# Patient Record
Sex: MALE | Race: WHITE | NOT HISPANIC OR LATINO | Employment: OTHER | URBAN - NONMETROPOLITAN AREA
[De-identification: names, ages, dates, MRNs, and addresses within clinical notes are randomized per-mention and may not be internally consistent; named-entity substitution may affect disease eponyms.]

---

## 2021-02-03 ENCOUNTER — TRANSCRIBE ORDERS (OUTPATIENT)
Dept: ADMINISTRATIVE | Facility: HOSPITAL | Age: 61
End: 2021-02-03

## 2021-02-03 DIAGNOSIS — Z01.818 PREOP EXAMINATION: Primary | ICD-10-CM

## 2021-02-05 ENCOUNTER — LAB (OUTPATIENT)
Dept: LAB | Facility: HOSPITAL | Age: 61
End: 2021-02-05

## 2021-02-05 DIAGNOSIS — Z01.818 PREOP EXAMINATION: ICD-10-CM

## 2021-02-05 PROCEDURE — U0004 COV-19 TEST NON-CDC HGH THRU: HCPCS

## 2021-02-05 PROCEDURE — C9803 HOPD COVID-19 SPEC COLLECT: HCPCS

## 2021-02-06 LAB — SARS-COV-2 RNA RESP QL NAA+PROBE: NOT DETECTED

## 2022-08-11 ENCOUNTER — HOSPITAL ENCOUNTER (OUTPATIENT)
Dept: HOSPITAL 79 - NM | Age: 62
End: 2022-08-11
Attending: INTERNAL MEDICINE
Payer: COMMERCIAL

## 2022-08-11 DIAGNOSIS — E11.43: Primary | ICD-10-CM

## 2022-08-11 PROCEDURE — A9541 TC99M SULFUR COLLOID: HCPCS

## 2022-12-05 ENCOUNTER — OFFICE VISIT (OUTPATIENT)
Dept: CARDIOLOGY | Facility: CLINIC | Age: 62
End: 2022-12-05

## 2022-12-05 VITALS
WEIGHT: 315 LBS | SYSTOLIC BLOOD PRESSURE: 155 MMHG | RESPIRATION RATE: 16 BRPM | DIASTOLIC BLOOD PRESSURE: 92 MMHG | HEIGHT: 73 IN | BODY MASS INDEX: 41.75 KG/M2 | HEART RATE: 90 BPM

## 2022-12-05 DIAGNOSIS — I20.8 ANGINAL EQUIVALENT: ICD-10-CM

## 2022-12-05 DIAGNOSIS — E78.5 DYSLIPIDEMIA: ICD-10-CM

## 2022-12-05 DIAGNOSIS — R06.09 DYSPNEA ON EXERTION: ICD-10-CM

## 2022-12-05 DIAGNOSIS — I25.10 ASCVD (ARTERIOSCLEROTIC CARDIOVASCULAR DISEASE): Primary | ICD-10-CM

## 2022-12-05 DIAGNOSIS — I10 ESSENTIAL HYPERTENSION: ICD-10-CM

## 2022-12-05 PROCEDURE — 93000 ELECTROCARDIOGRAM COMPLETE: CPT | Performed by: INTERNAL MEDICINE

## 2022-12-05 PROCEDURE — 99204 OFFICE O/P NEW MOD 45 MIN: CPT | Performed by: INTERNAL MEDICINE

## 2022-12-05 NOTE — PROGRESS NOTES
Jose M Simon MD  Wilbert Kelley  1960  12/05/2022      Dear Jose M Simon MD:    Subjective     Wilbert Kelley is a 62 y.o. male with the problems as listed above, presents    Chief complaint: To establish cardiac care and follow-up in a patient with history of known coronary artery disease, status post previous PCI/stenting in June and August 2010.    History of Present Illness: Mr. Kelley is a pleasant 62-year-old  male who has known significant coronary artery disease, status post PCI/stenting of the LAD with 3.0 x 23 mm Xience 5 stent, stenting of the RCA with 2.75 x 28 mm Xience stent as well as 3.0 x 23 mm Xience stent on 6/4/2010 at [facility].  He subsequently had stenting of mid RCA with Xience 2.75 x 28 mm stent on 8/12/2010 also at [facility].  He now presents to establish cardiac care and follow-up through our office.  On further questioning he denies any complaints of chest pain or discomfort recently.  He has dyspnea with moderate exertion such as going up incline or going up a flight of stairs but denies any PND, orthopnea.  He has some mild bilateral leg edema at times.  He is a non-smoker.  He states he takes his medications regularly.  He states he has gained about 50 pounds over a year.  Patient states that his blood pressure at home runs around 120s to 140s over 60s    Allergies   Allergen Reactions   • Morphine Nausea And Vomiting   :    Current Outpatient Medications:   •  amLODIPine (NORVASC) 10 MG tablet, Take 1 tablet by mouth Daily., Disp: , Rfl:   •  atorvastatin (LIPITOR) 20 MG tablet, , Disp: , Rfl:   •  clopidogrel (PLAVIX) 75 MG tablet, Take 1 tablet by mouth Daily., Disp: , Rfl:   •  fenofibrate 160 MG tablet, Take 1 tablet by mouth Daily., Disp: , Rfl:   •  gabapentin (NEURONTIN) 800 MG tablet, 800 mg 3 (Three) Times a Day., Disp: , Rfl:   •  Insulin Glargine (TOUJEO MAX SOLOSTAR SC), Inject 85 Units under the skin into the  appropriate area as directed 2 (Two) Times a Day., Disp: , Rfl:   •  Insulin Regular Human, Conc, (HumuLIN R) 500 UNIT/ML solution pen-injector CONCENTRATED injection, Inject 65 Units under the skin into the appropriate area as directed 3 (Three) Times a Day Before Meals., Disp: , Rfl:   •  lisinopril (PRINIVIL,ZESTRIL) 40 MG tablet, Take 1 tablet by mouth 2 (Two) Times a Day., Disp: , Rfl:   •  metFORMIN (GLUCOPHAGE) 1000 MG tablet, Take 1 tablet by mouth 2 (Two) Times a Day., Disp: , Rfl:   •  metoprolol tartrate (LOPRESSOR) 50 MG tablet, Take 1 tablet by mouth 2 (Two) Times a Day., Disp: , Rfl:   •  B-D ULTRAFINE III SHORT PEN 31G X 8 MM misc, , Disp: , Rfl:   •  BD Insulin Syringe U-500 31G X 6MM 0.5 ML misc, , Disp: , Rfl:   •  furosemide (LASIX) 20 MG tablet, Take 1 tablet by mouth Daily., Disp: , Rfl:   •  potassium chloride 10 MEQ CR tablet, Take 1 tablet by mouth Daily., Disp: , Rfl:     Past Medical History:   Diagnosis Date   • Coronary artery disease    • Diabetes mellitus (HCC)    • Hyperlipidemia    • Hypertension    • Sleep apnea      Past Surgical History:   Procedure Laterality Date   • CATARACT EXTRACTION, BILATERAL     • CORONARY STENT PLACEMENT     • SKIN CANCER EXCISION       Family History   Problem Relation Age of Onset   • COPD Mother    • Heart attack Father 45        passed   • Heart attack Paternal Grandfather 42        massive heart attack   • Heart disease Paternal Grandfather      Social History     Tobacco Use   • Smoking status: Never     Passive exposure: Past   • Smokeless tobacco: Current     Types: Chew   Substance Use Topics   • Alcohol use: Not Currently   • Drug use: Never       Review of Systems   Constitutional: Negative for chills, diaphoresis and fever.   HENT: Positive for hearing loss.    Eyes: Positive for visual disturbance.   Cardiovascular: Positive for leg swelling. Negative for chest pain, orthopnea, palpitations and paroxysmal nocturnal dyspnea.   Respiratory:  "Positive for shortness of breath and wheezing. Negative for cough and hemoptysis.    Endocrine: Negative for cold intolerance and heat intolerance.   Hematologic/Lymphatic: Does not bruise/bleed easily.   Skin: Positive for dry skin. Negative for rash.   Musculoskeletal: Positive for joint pain. Negative for myalgias.   Gastrointestinal: Negative for abdominal pain, constipation, diarrhea, nausea and vomiting.   Genitourinary: Positive for bladder incontinence. Negative for dysuria and hematuria.   Neurological: Negative for dizziness, focal weakness and numbness.   Psychiatric/Behavioral: Negative.    Allergic/Immunologic: Negative.        Objective   Blood pressure 155/92, pulse 90, resp. rate 16, height 185.4 cm (73\"), weight (!) 144 kg (318 lb 6.4 oz).  Body mass index is 42.01 kg/m².    Vitals reviewed.   Constitutional:       Appearance: Well-developed.   Eyes:      Conjunctiva/sclera: Conjunctivae normal.   HENT:      Head: Normocephalic.   Neck:      Thyroid: No thyromegaly.      Vascular: No JVD.      Trachea: No tracheal deviation.   Pulmonary:      Effort: No respiratory distress.      Breath sounds: Normal breath sounds. No wheezing. No rales.   Cardiovascular:      PMI at left midclavicular line. Normal rate. Regular rhythm. Normal S1. Normal S2.      Murmurs: There is no murmur.      No gallop. No click. No rub.   Pulses:     Intact distal pulses.   Edema:     Peripheral edema absent.   Abdominal:      General: Bowel sounds are normal.      Palpations: Abdomen is soft. There is no abdominal mass.      Tenderness: There is no abdominal tenderness.   Musculoskeletal:      Cervical back: Normal range of motion and neck supple. Skin:     General: Skin is warm and dry.   Neurological:      Mental Status: Alert and oriented to person, place, and time.      Cranial Nerves: No cranial nerve deficit.           ECG 12 Lead    Date/Time: 12/5/2022 9:36 AM  Performed by: Sachin Barrett MD  Authorized by: Arnold, " Sachin WAGNER MD   Previous ECG: no previous ECG available  Rhythm: sinus rhythm  BPM: 94  Conduction: conduction normal  T inversion: I, II, aVL, V2, V3, V4, V5 and V6                          Assessment & Plan :   Diagnosis Plan   1. ASCVD s/p stenting of the LAD with 3.0 x 23 mm Xience stent, stenting of RCA with 2.75x28 and 3.0 x 23 mm Xience stents on 6/4/2010 and stenting of RCA with 2.75 x 28 mm Xience stent on 8/12/2010 at [facility]  Stress Test With Myocardial Perfusion (1 Day)   2. Dyspnea on exertion  Adult Transthoracic Echo Complete   3. Anginal equivalent (HCC)  Stress Test With Myocardial Perfusion (1 Day)   4. Essential hypertension     5. Dyslipidemia, on atorvastatin.            Recommendations:  Orders Placed This Encounter   Procedures   • Stress Test With Myocardial Perfusion (1 Day)   • ECG 12 Lead   • Adult Transthoracic Echo Complete w/ Color, Spectral and Contrast if necessary per protocol      1. Since it has been more than 10 years since he had the PCI, and with patient complaining of dyspnea which could be angina equivalent as well as abnormal EKG revealing T wave inversion across the precordial leads and with patient being diabetic, there is a good possibility of progression of the atherosclerotic disease over the last 10 years.  Hence I have discussed with him about the option of further cardiac evaluation with a pharmacological nuclear stress test.  Patient is agreeable to this.  2. Will evaluate his LV systolic and diastolic function with an echo Doppler study again due to dyspnea and tailor further therapy accordingly.  3. For now continue with clopidogrel, metoprolol heart rate, atorvastatin and lisinopril at current doses.  4. I have asked him to keep a check on his blood pressure at home twice a day and take it to the next provider's visit.       Return in about 4 weeks (around 1/2/2023) for or sooner if needed.    As always, Jose M Simon MD  I appreciate very much the opportunity to  participate in the cardiovascular care of your patients. Please do not hesitate to call me with any questions with regards to Wilbert Kelley's evaluation and management.       With Best Regards,        Sachin Barrett MD, FACC    Please note that portions of this note were completed with a voice recognition program.

## 2022-12-08 ENCOUNTER — PATIENT ROUNDING (BHMG ONLY) (OUTPATIENT)
Dept: CARDIOLOGY | Facility: CLINIC | Age: 62
End: 2022-12-08

## 2022-12-08 NOTE — PROGRESS NOTES
December 8, 2022    Hello, may I speak with Wilbert Kelley?    My name is Christina    I am  with [facility]  [facility] CARDIOLOG[facility]  [address] [place] [address] [phone].    Before we get started may I verify your date of birth? 1960    I am calling to officially welcome you to our practice and ask about your recent visit. Is this a good time to talk? YES     Tell me about your visit with us. What things went well?  Patient was really impressed with how nice everyone was     We're always looking for ways to make our patients' experiences even better. Do you have recommendations on ways we may improve?   NO      Overall were you satisfied with your first visit to our practice? YES      I appreciate you taking the time to speak with me today. Is there anything else I can do for you?  NO    Thank you, and have a great day.

## 2023-02-02 ENCOUNTER — HOSPITAL ENCOUNTER (OUTPATIENT)
Dept: NUCLEAR MEDICINE | Facility: HOSPITAL | Age: 63
Discharge: HOME | End: 2023-02-02
Payer: MEDICARE

## 2023-02-02 ENCOUNTER — HOSPITAL ENCOUNTER (OUTPATIENT)
Dept: CARDIOLOGY | Facility: HOSPITAL | Age: 63
Discharge: HOME | End: 2023-02-02
Payer: MEDICARE

## 2023-02-02 DIAGNOSIS — I25.10 ASCVD (ARTERIOSCLEROTIC CARDIOVASCULAR DISEASE): ICD-10-CM

## 2023-02-02 DIAGNOSIS — R06.09 DYSPNEA ON EXERTION: ICD-10-CM

## 2023-02-02 DIAGNOSIS — I20.8 ANGINAL EQUIVALENT: ICD-10-CM

## 2023-02-02 PROCEDURE — 93018 CV STRESS TEST I&R ONLY: CPT | Performed by: SPECIALIST

## 2023-02-02 PROCEDURE — 78452 HT MUSCLE IMAGE SPECT MULT: CPT

## 2023-02-02 PROCEDURE — 0 TECHNETIUM SESTAMIBI: Performed by: INTERNAL MEDICINE

## 2023-02-02 PROCEDURE — 25010000002 PERFLUTREN (DEFINITY) 8.476 MG IN SODIUM CHLORIDE (PF) 0.9 % 10 ML INJECTION: Performed by: INTERNAL MEDICINE

## 2023-02-02 PROCEDURE — 25010000002 REGADENOSON 0.4 MG/5ML SOLUTION: Performed by: INTERNAL MEDICINE

## 2023-02-02 PROCEDURE — 93017 CV STRESS TEST TRACING ONLY: CPT

## 2023-02-02 PROCEDURE — A9500 TC99M SESTAMIBI: HCPCS | Performed by: INTERNAL MEDICINE

## 2023-02-02 PROCEDURE — 93306 TTE W/DOPPLER COMPLETE: CPT

## 2023-02-02 PROCEDURE — 78452 HT MUSCLE IMAGE SPECT MULT: CPT | Performed by: SPECIALIST

## 2023-02-02 RX ADMIN — SODIUM CHLORIDE 2 ML: 9 INJECTION INTRAMUSCULAR; INTRAVENOUS; SUBCUTANEOUS at 09:42

## 2023-02-02 RX ADMIN — TECHNETIUM TC 99M SESTAMIBI 1 DOSE: 1 INJECTION INTRAVENOUS at 11:20

## 2023-02-02 RX ADMIN — REGADENOSON 0.4 MG: 0.08 INJECTION, SOLUTION INTRAVENOUS at 11:20

## 2023-02-02 RX ADMIN — TECHNETIUM TC 99M SESTAMIBI 1 DOSE: 1 INJECTION INTRAVENOUS at 09:55

## 2023-02-03 LAB
BH CV NUCLEAR PRIOR STUDY: 3
BH CV REST NUCLEAR ISOTOPE DOSE: 9.5 MCI
BH CV STRESS BP STAGE 1: NORMAL
BH CV STRESS COMMENTS STAGE 1: NORMAL
BH CV STRESS DOSE REGADENOSON STAGE 1: 0.4
BH CV STRESS DURATION MIN STAGE 1: 0
BH CV STRESS DURATION SEC STAGE 1: 10
BH CV STRESS HR STAGE 1: 88
BH CV STRESS NUCLEAR ISOTOPE DOSE: 28.9 MCI
BH CV STRESS PROTOCOL 1: NORMAL
BH CV STRESS RECOVERY BP: NORMAL MMHG
BH CV STRESS RECOVERY HR: 85 BPM
BH CV STRESS STAGE 1: 1
LV EF NUC BP: 57 %
MAXIMAL PREDICTED HEART RATE: 159 BPM
PERCENT MAX PREDICTED HR: 55.35 %
STRESS BASELINE BP: NORMAL MMHG
STRESS BASELINE HR: 75 BPM
STRESS PERCENT HR: 65 %
STRESS POST PEAK BP: NORMAL MMHG
STRESS POST PEAK HR: 88 BPM
STRESS TARGET HR: 135 BPM

## 2023-02-07 LAB
BH CV ECHO MEAS - ACS: 1.8 CM
BH CV ECHO MEAS - AO MAX PG: 7 MMHG
BH CV ECHO MEAS - AO MEAN PG: 4 MMHG
BH CV ECHO MEAS - AO ROOT DIAM: 3.5 CM
BH CV ECHO MEAS - AO V2 MAX: 132 CM/SEC
BH CV ECHO MEAS - AO V2 VTI: 25.8 CM
BH CV ECHO MEAS - EDV(CUBED): 182.8 ML
BH CV ECHO MEAS - EDV(MOD-SP4): 146 ML
BH CV ECHO MEAS - EF(MOD-SP4): 77 %
BH CV ECHO MEAS - ESV(CUBED): 52.7 ML
BH CV ECHO MEAS - ESV(MOD-SP4): 33.6 ML
BH CV ECHO MEAS - FS: 33.9 %
BH CV ECHO MEAS - IVS/LVPW: 1.11 CM
BH CV ECHO MEAS - IVSD: 1.43 CM
BH CV ECHO MEAS - LA DIMENSION: 3.9 CM
BH CV ECHO MEAS - LAT PEAK E' VEL: 5.7 CM/SEC
BH CV ECHO MEAS - LV DIASTOLIC VOL/BSA (35-75): 55.7 CM2
BH CV ECHO MEAS - LV MASS(C)D: 340.8 GRAMS
BH CV ECHO MEAS - LV SYSTOLIC VOL/BSA (12-30): 12.8 CM2
BH CV ECHO MEAS - LVIDD: 5.7 CM
BH CV ECHO MEAS - LVIDS: 3.8 CM
BH CV ECHO MEAS - LVOT AREA: 4.2 CM2
BH CV ECHO MEAS - LVOT DIAM: 2.3 CM
BH CV ECHO MEAS - LVPWD: 1.29 CM
BH CV ECHO MEAS - MED PEAK E' VEL: 6 CM/SEC
BH CV ECHO MEAS - MV A MAX VEL: 79.9 CM/SEC
BH CV ECHO MEAS - MV E MAX VEL: 125 CM/SEC
BH CV ECHO MEAS - MV E/A: 1.56
BH CV ECHO MEAS - PA ACC TIME: 0.12 SEC
BH CV ECHO MEAS - PA PR(ACCEL): 25 MMHG
BH CV ECHO MEAS - SI(MOD-SP4): 42.9 ML/M2
BH CV ECHO MEAS - SV(MOD-SP4): 112.4 ML
BH CV ECHO MEASUREMENTS AVERAGE E/E' RATIO: 21.37
LEFT ATRIUM VOLUME INDEX: 14.1 ML/M2
MAXIMAL PREDICTED HEART RATE: 159 BPM
STRESS TARGET HR: 135 BPM

## 2023-02-09 ENCOUNTER — OFFICE VISIT (OUTPATIENT)
Dept: CARDIOLOGY | Facility: CLINIC | Age: 63
End: 2023-02-09
Payer: MEDICARE

## 2023-02-09 VITALS
OXYGEN SATURATION: 97 % | HEIGHT: 73 IN | SYSTOLIC BLOOD PRESSURE: 148 MMHG | HEART RATE: 76 BPM | WEIGHT: 315 LBS | BODY MASS INDEX: 41.75 KG/M2 | DIASTOLIC BLOOD PRESSURE: 87 MMHG

## 2023-02-09 DIAGNOSIS — I10 ESSENTIAL HYPERTENSION: ICD-10-CM

## 2023-02-09 DIAGNOSIS — R94.39 ABNORMAL NUCLEAR STRESS TEST: ICD-10-CM

## 2023-02-09 DIAGNOSIS — I25.10 ASCVD (ARTERIOSCLEROTIC CARDIOVASCULAR DISEASE): Primary | ICD-10-CM

## 2023-02-09 DIAGNOSIS — I20.8 ANGINAL EQUIVALENT: ICD-10-CM

## 2023-02-09 DIAGNOSIS — E78.5 DYSLIPIDEMIA: ICD-10-CM

## 2023-02-09 PROCEDURE — 99214 OFFICE O/P EST MOD 30 MIN: CPT | Performed by: NURSE PRACTITIONER

## 2023-02-09 RX ORDER — METOPROLOL TARTRATE 75 MG/1
75 TABLET, FILM COATED ORAL 2 TIMES DAILY
Qty: 180 TABLET | Refills: 3 | Status: SHIPPED | OUTPATIENT
Start: 2023-02-09 | End: 2023-03-30 | Stop reason: HOSPADM

## 2023-02-09 NOTE — PROGRESS NOTES
Jose M Simon MD  Wilbert Kelley  1960  02/09/2023    There is no problem list on file for this patient.      Dear Jose M Simon MD:    Subjective     Chief Complaint   Patient presents with   • Med Management     Verbal.    • Results     Echo and stress   • Edema           History of Present Illness:    Wilbert Kelley is a 62 y.o. male with a past medical history of CAD s/p PCI in 2010 at [facility] in [place]. He had a stent in the LAD with a 3.0 x 23 mm Xience 5 stent, stenting of the RCA with 2.75 x 28 mm Xience stent as well as 3.0 x 23 mm Xience stent. He subsequently had stenting of the mid RCA with Xience 2.75 x 28 mm stent on 8/12/2010. He recently was noted to have T wave inversions in the precordial leads on EKG as well as exertional dyspnea felt to be anginal equivalent. He was evaluated further with a lexiscan stress test. This revealed a basal to mid inferior infarction with basal to mid gibran-infarction ischemia extending to the inferoseptal walls with small apical septal ischemia consistent with an intermediate risk study. An echocardiogram showed an EF of 66-70% with mild LVH and grade II diastolic dysfunction. He has a history of hypertension, dyslipidemia, and diabetes mellitus type 2. He denies any recent chest pains but does have dyspnea with moderate exertion such as walking up stairs or uphill.          Allergies   Allergen Reactions   • Morphine Nausea And Vomiting   :      Current Outpatient Medications:   •  amLODIPine (NORVASC) 10 MG tablet, Take 1 tablet by mouth Daily., Disp: , Rfl:   •  atorvastatin (LIPITOR) 20 MG tablet, , Disp: , Rfl:   •  B-D ULTRAFINE III SHORT PEN 31G X 8 MM misc, , Disp: , Rfl:   •  BD Insulin Syringe U-500 31G X 6MM 0.5 ML misc, , Disp: , Rfl:   •  clopidogrel (PLAVIX) 75 MG tablet, Take 1 tablet by mouth Daily., Disp: , Rfl:   •  fenofibrate 160 MG tablet, Take 1 tablet by mouth Daily., Disp: , Rfl:   •  furosemide (LASIX) 20 MG tablet,  "Take 1 tablet by mouth Daily., Disp: , Rfl:   •  gabapentin (NEURONTIN) 800 MG tablet, 800 mg 3 (Three) Times a Day., Disp: , Rfl:   •  Insulin Glargine (TOUJEO MAX SOLOSTAR SC), Inject 85 Units under the skin into the appropriate area as directed 2 (Two) Times a Day., Disp: , Rfl:   •  Insulin Regular Human, Conc, (HumuLIN R) 500 UNIT/ML solution pen-injector CONCENTRATED injection, Inject 65 Units under the skin into the appropriate area as directed 3 (Three) Times a Day Before Meals., Disp: , Rfl:   •  lisinopril (PRINIVIL,ZESTRIL) 40 MG tablet, Take 1 tablet by mouth 2 (Two) Times a Day., Disp: , Rfl:   •  metFORMIN (GLUCOPHAGE) 1000 MG tablet, Take 1 tablet by mouth 2 (Two) Times a Day., Disp: , Rfl:   •  metoprolol tartrate 75 MG tablet, Take 75 mg by mouth 2 (Two) Times a Day., Disp: 180 tablet, Rfl: 3  •  potassium chloride 10 MEQ CR tablet, Take 1 tablet by mouth Daily., Disp: , Rfl:       The following portions of the patient's history were reviewed and updated as appropriate: allergies, current medications, past family history, past medical history, past social history, past surgical history and problem list.    Social History     Tobacco Use   • Smoking status: Never     Passive exposure: Past   • Smokeless tobacco: Current     Types: Chew   Substance Use Topics   • Alcohol use: Not Currently   • Drug use: Never       Review of Systems   Constitutional: Negative for decreased appetite and malaise/fatigue.   Cardiovascular: Positive for dyspnea on exertion. Negative for chest pain and palpitations.   Respiratory: Negative for cough and shortness of breath.        Objective   Vitals:    02/09/23 1448   BP: 148/87   BP Location: Left arm   Patient Position: Sitting   Cuff Size: Adult   Pulse: 76   SpO2: 97%   Weight: (!) 147 kg (323 lb)   Height: 185.4 cm (73\")     Body mass index is 42.61 kg/m².        Vitals reviewed.   Constitutional:       Appearance: Healthy appearance. Well-developed and not in " distress.   HENT:      Head: Normocephalic and atraumatic.   Pulmonary:      Effort: Pulmonary effort is normal.      Breath sounds: Normal breath sounds. No wheezing. No rales.   Cardiovascular:      Normal rate. Regular rhythm.      Murmurs: There is no murmur.      . No S3 and S4 gallop.   Edema:     Peripheral edema absent.   Abdominal:      General: Bowel sounds are normal.      Palpations: Abdomen is soft.   Skin:     General: Skin is warm and dry.   Neurological:      Mental Status: Alert, oriented to person, place, and time and oriented to person, place and time.   Psychiatric:         Mood and Affect: Mood normal.         Behavior: Behavior normal.               Procedures      Assessment & Plan    Diagnosis Plan   1. ASCVD s/p stenting of the LAD with 3.0 x 23 mm Xience stent, stenting of RCA with 2.75x28 and 3.0 x 23 mm Xience stents on 6/4/2010 and stenting of RCA with 2.75 x 28 mm Xience stent on 8/12/2010 at [facility]  metoprolol tartrate 75 MG tablet    Ambulatory Referral to Cardiology      2. Anginal equivalent (HCC)  metoprolol tartrate 75 MG tablet    Ambulatory Referral to Cardiology      3. Essential hypertension  metoprolol tartrate 75 MG tablet    Ambulatory Referral to Cardiology      4. Dyslipidemia, on atorvastatin.        5. Abnormal nuclear stress test  metoprolol tartrate 75 MG tablet    Ambulatory Referral to Cardiology                   Recommendations:    1. ASCVD - Recent symptoms concerning for anginal equivalent with history of CAD and stenting in 2010 and recent abnormal nuclear stress test. Will refer to interventional cardiology for consideration of cardiac catheterization. He is agreeable. Continue Plavix, atorvastatin, lisinopril, and metoprolol.  2. Essential hypertension - uncontrolled. Will increase metoprolol tartrate to 75 mg BID.  3. Dyslipidemia - on statin therapy.  4. Follow up in 4 weeks or sooner if needed.         Return in about 4 weeks (around 3/9/2023) for  Recheck.    As always, I appreciate very much the opportunity to participate in the cardiovascular care of your patients.      With Best Regards,    MALINDA Farley

## 2023-02-13 ENCOUNTER — OFFICE VISIT (OUTPATIENT)
Dept: CARDIOLOGY | Facility: CLINIC | Age: 63
End: 2023-02-13
Payer: MEDICARE

## 2023-02-13 VITALS
HEART RATE: 83 BPM | SYSTOLIC BLOOD PRESSURE: 135 MMHG | WEIGHT: 315 LBS | HEIGHT: 73 IN | OXYGEN SATURATION: 95 % | DIASTOLIC BLOOD PRESSURE: 73 MMHG | BODY MASS INDEX: 41.75 KG/M2

## 2023-02-13 DIAGNOSIS — I25.118 CORONARY ARTERY DISEASE OF NATIVE HEART WITH STABLE ANGINA PECTORIS, UNSPECIFIED VESSEL OR LESION TYPE: ICD-10-CM

## 2023-02-13 DIAGNOSIS — R06.02 SOB (SHORTNESS OF BREATH) ON EXERTION: ICD-10-CM

## 2023-02-13 DIAGNOSIS — R07.1 CHEST PAIN ON BREATHING: Primary | ICD-10-CM

## 2023-02-13 DIAGNOSIS — M79.89 RIGHT LEG SWELLING: ICD-10-CM

## 2023-02-13 PROCEDURE — 93000 ELECTROCARDIOGRAM COMPLETE: CPT | Performed by: INTERNAL MEDICINE

## 2023-02-13 PROCEDURE — 99213 OFFICE O/P EST LOW 20 MIN: CPT | Performed by: INTERNAL MEDICINE

## 2023-02-13 RX ORDER — HYDROCHLOROTHIAZIDE 25 MG/1
25 TABLET ORAL DAILY
Qty: 90 TABLET | Refills: 3 | Status: SHIPPED | OUTPATIENT
Start: 2023-02-13 | End: 2023-03-30 | Stop reason: HOSPADM

## 2023-02-13 RX ORDER — HYDROCHLOROTHIAZIDE 25 MG/1
25 TABLET ORAL DAILY
Qty: 90 TABLET | Refills: 3 | Status: SHIPPED | OUTPATIENT
Start: 2023-02-13 | End: 2023-02-13 | Stop reason: SDUPTHER

## 2023-02-13 NOTE — PROGRESS NOTES
Office Note    Subjective     Wilbret Kelley is a 62 y.o. male who presents to day for evaluation of dyspnea on exertion.  Patient underwent stress test recently which showed inferior scar with gibran-infarct ischemia.  Preserved LV ejection fraction.  Patient has been getting dyspnea on exertion on going up an incline.  Denies any shortness of breath on walking on a flat surface.  No real chest pain but some Pressure at times.  No palpitation or blackouts.  Any questions for me right lower extremity edema at times.  No nausea vomiting..      Active Problems:  Problem List Items Addressed This Visit    None  Visit Diagnoses     Chest pain on breathing    -  Primary    SOB (shortness of breath) on exertion        Relevant Orders    Case Request Cath Lab: Left Heart Cath (Completed)    Coronary artery disease of native heart with stable angina pectoris, unspecified vessel or lesion type (HCC)        Relevant Orders    Case Request Cath Lab: Left Heart Cath (Completed)    Right leg swelling        Relevant Orders    US Venous Doppler Upper Extremity Right (duplex)           all the symptoms been going    PRIOR MEDS  Current Outpatient Medications on File Prior to Visit   Medication Sig Dispense Refill   • atorvastatin (LIPITOR) 20 MG tablet      • B-D ULTRAFINE III SHORT PEN 31G X 8 MM misc      • BD Insulin Syringe U-500 31G X 6MM 0.5 ML misc      • clopidogrel (PLAVIX) 75 MG tablet Take 1 tablet by mouth Daily.     • fenofibrate 160 MG tablet Take 1 tablet by mouth Daily.     • furosemide (LASIX) 20 MG tablet Take 1 tablet by mouth Daily.     • gabapentin (NEURONTIN) 800 MG tablet 800 mg 3 (Three) Times a Day.     • Insulin Glargine (TOUJEO MAX SOLOSTAR SC) Inject 85 Units under the skin into the appropriate area as directed 2 (Two) Times a Day.     • Insulin Regular Human, Conc, (HumuLIN R) 500 UNIT/ML solution pen-injector CONCENTRATED injection Inject 65 Units under the skin into the appropriate area as directed 3  (Three) Times a Day Before Meals.     • lisinopril (PRINIVIL,ZESTRIL) 40 MG tablet Take 1 tablet by mouth 2 (Two) Times a Day.     • metFORMIN (GLUCOPHAGE) 1000 MG tablet Take 1 tablet by mouth 2 (Two) Times a Day.     • metoprolol tartrate 75 MG tablet Take 75 mg by mouth 2 (Two) Times a Day. 180 tablet 3   • potassium chloride 10 MEQ CR tablet Take 1 tablet by mouth Daily.     • [DISCONTINUED] amLODIPine (NORVASC) 10 MG tablet Take 1 tablet by mouth Daily.       No current facility-administered medications on file prior to visit.       ALLERGIES  Morphine    HISTORY  Past Medical History:   Diagnosis Date   • Coronary artery disease    • Diabetes mellitus (HCC)    • Hyperlipidemia    • Hypertension    • Sleep apnea        Social History     Socioeconomic History   • Marital status:    Tobacco Use   • Smoking status: Never     Passive exposure: Past   • Smokeless tobacco: Current     Types: Chew   Substance and Sexual Activity   • Alcohol use: Not Currently   • Drug use: Never   • Sexual activity: Defer       Family History   Problem Relation Age of Onset   • COPD Mother    • Heart attack Father 45        passed   • Heart attack Paternal Grandfather 42        massive heart attack   • Heart disease Paternal Grandfather        Review of Systems   Constitutional: Negative for activity change, appetite change, chills, diaphoresis, fatigue, fever and unexpected weight change.   HENT: Negative for congestion, ear discharge, ear pain, hearing loss, nosebleeds, sore throat and tinnitus.    Eyes: Negative for redness and visual disturbance.   Respiratory: Positive for shortness of breath. Negative for apnea, cough, choking, chest tightness, wheezing and stridor.    Cardiovascular: Positive for leg swelling. Negative for chest pain and palpitations.   Gastrointestinal: Negative for abdominal distention, abdominal pain, constipation, diarrhea, nausea and vomiting.   Endocrine: Negative for cold intolerance, heat  "intolerance, polydipsia, polyphagia and polyuria.   Genitourinary: Negative for difficulty urinating, flank pain, frequency, hematuria and urgency.   Musculoskeletal: Negative for arthralgias, back pain, gait problem, joint swelling, myalgias and neck pain.   Skin: Negative for pallor and rash.   Neurological: Negative for dizziness, tremors, seizures, syncope, weakness, light-headedness and headaches.   Hematological: Does not bruise/bleed easily.   Psychiatric/Behavioral: Negative for agitation, hallucinations, self-injury, sleep disturbance and suicidal ideas. The patient is not nervous/anxious.        Objective     VITALS: /73   Pulse 83   Ht 185.4 cm (73\")   Wt (!) 148 kg (325 lb 6.4 oz)   SpO2 95%   BMI 42.93 kg/m²     LABS:   Hospital Outpatient Visit on 02/02/2023   Component Date Value Ref Range Status   • Target HR (85%) 02/02/2023 135  bpm Final   • Max. Pred. HR (100%) 02/02/2023 159  bpm Final   • Nuclear Prior Study 02/02/2023 3.0   Final   • BH CV REST NUCLEAR ISOTOPE DOSE 02/02/2023 9.5  mCi Final   • BH CV STRESS NUCLEAR ISOTOPE DOSE 02/02/2023 28.9  mCi Final   • BH CV STRESS PROTOCOL 1 02/02/2023 Pharmacologic   Final   • Stage 1 02/02/2023 1   Final   • HR Stage 1 02/02/2023 88   Final   • BP Stage 1 02/02/2023 143/72   Final   • Duration Min Stage 1 02/02/2023 0   Final   • Duration Sec Stage 1 02/02/2023 10   Final   • Stress Dose Regadenoson Stage 1 02/02/2023 0.4   Final   • Stress Comments Stage 1 02/02/2023 10 sec bolus injection   Final   • Baseline HR 02/02/2023 75  bpm Final   • Baseline BP 02/02/2023 150/74  mmHg Final   • Peak HR 02/02/2023 88  bpm Final   • Percent Max Pred HR 02/02/2023 55.35  % Final   • Percent Target HR 02/02/2023 65  % Final   • Peak BP 02/02/2023 143/72  mmHg Final   • Recovery HR 02/02/2023 85  bpm Final   • Recovery BP 02/02/2023 130/66  mmHg Final   • Nuc Stress EF 02/02/2023 57  % Final   Hospital Outpatient Visit on 02/02/2023   Component Date " Value Ref Range Status   • Target HR (85%) 02/02/2023 135  bpm Final   • Max. Pred. HR (100%) 02/02/2023 159  bpm Final   • LVIDd 02/02/2023 5.7  cm Final   • LVIDs 02/02/2023 3.8  cm Final   • IVSd 02/02/2023 1.43  cm Final   • LVPWd 02/02/2023 1.29  cm Final   • FS 02/02/2023 33.9  % Final   • IVS/LVPW 02/02/2023 1.11  cm Final   • ESV(cubed) 02/02/2023 52.7  ml Final   • LV Sys Vol (BSA corrected) 02/02/2023 12.8  cm2 Final   • EDV(cubed) 02/02/2023 182.8  ml Final   • LV Neri Vol (BSA corrected) 02/02/2023 55.7  cm2 Final   • LVOT area 02/02/2023 4.2  cm2 Final   • LV mass(C)d 02/02/2023 340.8  grams Final   • LVOT diam 02/02/2023 2.30  cm Final   • EDV(MOD-sp4) 02/02/2023 146.0  ml Final   • ESV(MOD-sp4) 02/02/2023 33.6  ml Final   • SV(MOD-sp4) 02/02/2023 112.4  ml Final   • SI(MOD-sp4) 02/02/2023 42.9  ml/m2 Final   • EF(MOD-sp4) 02/02/2023 77.0  % Final   • MV E max benigno 02/02/2023 125.0  cm/sec Final   • MV A max benigno 02/02/2023 79.9  cm/sec Final   • MV E/A 02/02/2023 1.56   Final   • LA ESV Index (BP) 02/02/2023 14.1  ml/m2 Final   • Med Peak E' Benigno 02/02/2023 6.0  cm/sec Final   • Lat Peak E' Benigno 02/02/2023 5.7  cm/sec Final   • Avg E/e' ratio 02/02/2023 21.37   Final   • LA dimension (2D)  02/02/2023 3.9  cm Final   • Ao pk benigno 02/02/2023 132.0  cm/sec Final   • Ao max PG 02/02/2023 7.0  mmHg Final   • Ao mean PG 02/02/2023 4.0  mmHg Final   • Ao V2 VTI 02/02/2023 25.8  cm Final   • PA acc time 02/02/2023 0.12  sec Final   • PA pr(Accel) 02/02/2023 25.0  mmHg Final   • Ao root diam 02/02/2023 3.5  cm Final   • ACS 02/02/2023 1.80  cm Final         IMAGING:   No Images in the past 120 days found..  Results for orders placed during the hospital encounter of 02/02/23    Stress Test With Myocardial Perfusion (1 Day)    Interpretation Summary  •  Left ventricular ejection fraction is normal (Calculated EF = 57%).  •  Impressions are consistent with an intermediate risk study.  •  There is no prior study  available for comparison.  •  Findings consistent with a normal ECG stress test.  •  Basal to mid inferior infarction with basal to mid gibran-infarction ischemia extending also to the inferoseptal walls with also small apical septal ischemia, TID 1.51.     No results found for this or any previous visit.          EXAM:  Physical Exam  Vitals and nursing note reviewed.   Constitutional:       General: He is not in acute distress.     Appearance: Normal appearance. He is not ill-appearing or diaphoretic.   HENT:      Head: Normocephalic and atraumatic.      Right Ear: External ear normal.      Left Ear: External ear normal.      Nose: Nose normal. No congestion or rhinorrhea.      Mouth/Throat:      Mouth: Mucous membranes are moist.      Pharynx: No oropharyngeal exudate.   Eyes:      Extraocular Movements: Extraocular movements intact.      Conjunctiva/sclera: Conjunctivae normal.      Pupils: Pupils are equal, round, and reactive to light.   Neck:      Vascular: No carotid bruit.   Cardiovascular:      Rate and Rhythm: Normal rate and regular rhythm.      Pulses: Normal pulses.      Heart sounds: Normal heart sounds, S1 normal and S2 normal. No murmur heard.   No systolic murmur is present.   No diastolic murmur is present.  Pulmonary:      Effort: Pulmonary effort is normal. No respiratory distress.      Breath sounds: Normal breath sounds. No wheezing, rhonchi or rales.   Abdominal:      General: Abdomen is flat. Bowel sounds are normal.      Palpations: Abdomen is soft.   Musculoskeletal:         General: No swelling, tenderness, deformity or signs of injury.      Cervical back: Normal range of motion and neck supple.      Right lower le+ Edema present.      Left lower leg: No edema.   Skin:     General: Skin is warm.      Coloration: Skin is not jaundiced.      Findings: No bruising, erythema or rash.   Neurological:      General: No focal deficit present.      Mental Status: He is alert and oriented to  person, place, and time. Mental status is at baseline.   Psychiatric:         Mood and Affect: Mood normal.         Behavior: Behavior normal.         Thought Content: Thought content normal.         Judgment: Judgment normal.         Procedure     ECG 12 Lead    Date/Time: 2/13/2023 1:26 PM  Performed by: Jarod Boyle MD  Authorized by: Jarod Boyle MD   Comparison: compared with previous ECG from 12/5/2022  Similar to previous ECG  Comments: Normal sinus rhythm at 76, normal axis, normal intervals, T inversion noted lateral leads.               Assessment & Plan     Diagnoses and all orders for this visit:    1. Chest pain on breathing (Primary)    2. SOB (shortness of breath) on exertion  -     Case Request Cath Lab: Left Heart Cath    3. Coronary artery disease of native heart with stable angina pectoris, unspecified vessel or lesion type (HCC)  -     Case Request Cath Lab: Left Heart Cath    4. Right leg swelling  -     US Venous Doppler Upper Extremity Right (duplex)    Other orders  -     ECG 12 Lead  -     hydroCHLOROthiazide (HYDRODIURIL) 25 MG tablet; Take 1 tablet by mouth Daily.  Dispense: 90 tablet; Refill: 3      Plan  1.  Cardiac.  Patient with history of coronary artery disease with PTCA and stent placement to the LAD and right coronary artery in 2010.  Patient had abnormal stress test done recently and came back abnormal with inferior scar with gibran-infarct ischemia.  Patient EF was normal.  Patient has had dyspnea on exertion.  Has right lower extremity edema.  He has multiple risk factors for heart disease including tobacco use.  Discussed about cutting back on the drip.  Will schedule for left heart catheterization.  #1 hypertension.  Patient blood pressure is stable.  We will get him off Norvasc and start on hydrochlorothiazide.  Rest of the medications to continue.             MEDS ORDERED DURING VISIT ch makes the stent stable and plaque buildup and it breaks off and then it  closes:    Medications Discontinued During This Encounter   Medication Reason   • amLODIPine (NORVASC) 10 MG tablet *Therapy completed        New Medications Ordered This Visit   Medications   • hydroCHLOROthiazide (HYDRODIURIL) 25 MG tablet     Sig: Take 1 tablet by mouth Daily.     Dispense:  90 tablet     Refill:  3         Follow Up:   Return in about 4 weeks (around 3/13/2023) for Recheck.    Patient was given instructions and counseling regarding his condition or for health maintenance advice. Please see specific information pulled into the AVS if appropriate.   As always, Jose M Simon MD  I appreciate very much the opportunity to participate in the cardiovascular care of your patients. Please do not hesitate to call me with any questions with regards to Wilbert Kelley evaluation and management.         This document has been electronically signed by Jarod Boyle MD [facility], Interventional Cardiology  February 13, 2023 13:37 EST    Dictated Utilizing Dragon Dictation: Part of this note may be an electronic transcription/translation of spoken language to printed text using the Dragon Dictation System.

## 2023-02-14 ENCOUNTER — PATIENT ROUNDING (BHMG ONLY) (OUTPATIENT)
Dept: CARDIOLOGY | Facility: CLINIC | Age: 63
End: 2023-02-14
Payer: MEDICARE

## 2023-03-02 ENCOUNTER — HOSPITAL ENCOUNTER (OUTPATIENT)
Dept: CARDIOLOGY | Facility: HOSPITAL | Age: 63
Discharge: HOME | End: 2023-03-02
Admitting: INTERNAL MEDICINE
Payer: MEDICARE

## 2023-03-02 PROCEDURE — 93971 EXTREMITY STUDY: CPT | Performed by: RADIOLOGY

## 2023-03-02 PROCEDURE — 93971 EXTREMITY STUDY: CPT

## 2023-03-08 PROBLEM — R06.02 SOB (SHORTNESS OF BREATH) ON EXERTION: Status: ACTIVE | Noted: 2023-03-08

## 2023-03-08 PROBLEM — I25.118 CORONARY ARTERY DISEASE OF NATIVE HEART WITH STABLE ANGINA PECTORIS: Status: ACTIVE | Noted: 2023-03-08

## 2023-03-16 ENCOUNTER — PREP FOR SURGERY (OUTPATIENT)
Dept: OTHER | Facility: HOSPITAL | Age: 63
End: 2023-03-16
Payer: MEDICARE

## 2023-03-16 ENCOUNTER — HOSPITAL ENCOUNTER (OUTPATIENT)
Facility: HOSPITAL | Age: 63
Discharge: HOME | End: 2023-03-16
Attending: INTERNAL MEDICINE | Admitting: INTERNAL MEDICINE
Payer: MEDICARE

## 2023-03-16 VITALS
HEART RATE: 68 BPM | WEIGHT: 312 LBS | HEIGHT: 73 IN | OXYGEN SATURATION: 95 % | DIASTOLIC BLOOD PRESSURE: 97 MMHG | TEMPERATURE: 98.2 F | SYSTOLIC BLOOD PRESSURE: 147 MMHG | BODY MASS INDEX: 41.35 KG/M2 | RESPIRATION RATE: 16 BRPM

## 2023-03-16 DIAGNOSIS — R06.02 SOB (SHORTNESS OF BREATH) ON EXERTION: ICD-10-CM

## 2023-03-16 DIAGNOSIS — I25.118 CORONARY ARTERY DISEASE OF NATIVE ARTERY OF NATIVE HEART WITH STABLE ANGINA PECTORIS: Primary | ICD-10-CM

## 2023-03-16 DIAGNOSIS — I25.118 CORONARY ARTERY DISEASE OF NATIVE HEART WITH STABLE ANGINA PECTORIS, UNSPECIFIED VESSEL OR LESION TYPE: ICD-10-CM

## 2023-03-16 DIAGNOSIS — I25.118 CORONARY ARTERY DISEASE OF NATIVE HEART WITH STABLE ANGINA PECTORIS, UNSPECIFIED VESSEL OR LESION TYPE: Primary | ICD-10-CM

## 2023-03-16 PROCEDURE — 93454 CORONARY ARTERY ANGIO S&I: CPT | Performed by: INTERNAL MEDICINE

## 2023-03-16 PROCEDURE — C1894 INTRO/SHEATH, NON-LASER: HCPCS | Performed by: INTERNAL MEDICINE

## 2023-03-16 PROCEDURE — 25510000001 IOPAMIDOL PER 1 ML: Performed by: INTERNAL MEDICINE

## 2023-03-16 PROCEDURE — S0260 H&P FOR SURGERY: HCPCS | Performed by: INTERNAL MEDICINE

## 2023-03-16 PROCEDURE — 25010000002 HEPARIN (PORCINE) PER 1000 UNITS: Performed by: INTERNAL MEDICINE

## 2023-03-16 PROCEDURE — 25010000002 FENTANYL CITRATE (PF) 50 MCG/ML SOLUTION: Performed by: INTERNAL MEDICINE

## 2023-03-16 PROCEDURE — 25010000002 MIDAZOLAM PER 1 MG: Performed by: INTERNAL MEDICINE

## 2023-03-16 PROCEDURE — 99239 HOSP IP/OBS DSCHRG MGMT >30: CPT | Performed by: INTERNAL MEDICINE

## 2023-03-16 PROCEDURE — 80048 BASIC METABOLIC PNL TOTAL CA: CPT | Performed by: INTERNAL MEDICINE

## 2023-03-16 PROCEDURE — 25010000002 HYDRALAZINE PER 20 MG: Performed by: INTERNAL MEDICINE

## 2023-03-16 PROCEDURE — 85027 COMPLETE CBC AUTOMATED: CPT | Performed by: INTERNAL MEDICINE

## 2023-03-16 PROCEDURE — C1769 GUIDE WIRE: HCPCS | Performed by: INTERNAL MEDICINE

## 2023-03-16 RX ORDER — CHLORHEXIDINE GLUCONATE 500 MG/1
1 CLOTH TOPICAL EVERY 12 HOURS PRN
Status: CANCELLED | OUTPATIENT
Start: 2023-03-20

## 2023-03-16 RX ORDER — HYDRALAZINE HYDROCHLORIDE 20 MG/ML
INJECTION INTRAMUSCULAR; INTRAVENOUS
Status: DISCONTINUED | OUTPATIENT
Start: 2023-03-16 | End: 2023-03-16 | Stop reason: HOSPADM

## 2023-03-16 RX ORDER — ACETAMINOPHEN 325 MG/1
650 TABLET ORAL EVERY 4 HOURS PRN
Status: DISCONTINUED | OUTPATIENT
Start: 2023-03-16 | End: 2023-03-16 | Stop reason: HOSPADM

## 2023-03-16 RX ORDER — CHLORHEXIDINE GLUCONATE 0.12 MG/ML
15 RINSE ORAL ONCE
Status: CANCELLED | OUTPATIENT
Start: 2023-03-21 | End: 2023-03-21

## 2023-03-16 RX ORDER — CEFAZOLIN SODIUM 2 G/100ML
2 INJECTION, SOLUTION INTRAVENOUS ONCE
Status: CANCELLED | OUTPATIENT
Start: 2023-03-21 | End: 2023-03-21

## 2023-03-16 RX ORDER — HEPARIN SODIUM 1000 [USP'U]/ML
INJECTION, SOLUTION INTRAVENOUS; SUBCUTANEOUS
Status: DISCONTINUED | OUTPATIENT
Start: 2023-03-16 | End: 2023-03-16 | Stop reason: HOSPADM

## 2023-03-16 RX ORDER — MIDAZOLAM HYDROCHLORIDE 1 MG/ML
INJECTION INTRAMUSCULAR; INTRAVENOUS
Status: DISCONTINUED | OUTPATIENT
Start: 2023-03-16 | End: 2023-03-16 | Stop reason: HOSPADM

## 2023-03-16 RX ORDER — VERAPAMIL HYDROCHLORIDE 2.5 MG/ML
INJECTION, SOLUTION INTRAVENOUS
Status: DISCONTINUED | OUTPATIENT
Start: 2023-03-16 | End: 2023-03-16 | Stop reason: HOSPADM

## 2023-03-16 RX ORDER — LIDOCAINE HYDROCHLORIDE 20 MG/ML
INJECTION, SOLUTION INFILTRATION; PERINEURAL
Status: DISCONTINUED | OUTPATIENT
Start: 2023-03-16 | End: 2023-03-16 | Stop reason: HOSPADM

## 2023-03-16 RX ORDER — FENTANYL CITRATE 50 UG/ML
INJECTION, SOLUTION INTRAMUSCULAR; INTRAVENOUS
Status: DISCONTINUED | OUTPATIENT
Start: 2023-03-16 | End: 2023-03-16 | Stop reason: HOSPADM

## 2023-03-16 RX ORDER — SODIUM CHLORIDE 9 MG/ML
INJECTION, SOLUTION INTRAVENOUS
Status: COMPLETED | OUTPATIENT
Start: 2023-03-16 | End: 2023-03-16

## 2023-03-16 RX ORDER — SODIUM CHLORIDE AND POTASSIUM CHLORIDE 150; 450 MG/100ML; MG/100ML
75 INJECTION, SOLUTION INTRAVENOUS CONTINUOUS
OUTPATIENT
Start: 2023-03-16

## 2023-03-16 RX ORDER — ASPIRIN 325 MG
325 TABLET ORAL NIGHTLY
Status: CANCELLED | OUTPATIENT
Start: 2023-03-20 | End: 2023-03-21

## 2023-03-16 RX ORDER — CHLORHEXIDINE GLUCONATE 500 MG/1
1 CLOTH TOPICAL EVERY 12 HOURS PRN
Status: CANCELLED | OUTPATIENT
Start: 2023-03-21

## 2023-03-16 RX ORDER — NITROGLYCERIN 0.4 MG/1
0.4 TABLET SUBLINGUAL
Status: CANCELLED | OUTPATIENT
Start: 2023-03-21

## 2023-03-16 NOTE — DISCHARGE INSTR - ACTIVITY
NO CHANGES TO YOUR MEDICATIONS    No Baths, hot tubs, or swimming pools for 3 days. No submerging wrist in water for 3 days    Rest when you get home, no activities today or tomorrow.    If site starts to bleed or you notice swelling or a lot of bruising at site, hold pressure, raise arm above your head, and call 911 or have someone drive you to nearest Emergency Room.    Tomorrow you can take a shower and remove dressing. Replace with a bandaid, with no creams, powders, or ointments applied.     For today and tomorrow, do not use right hand except for example a cup of coffee.

## 2023-03-16 NOTE — DISCHARGE SUMMARY
.  Patient Identification:  Name:  Wilbert Kelley  Age:  62 y.o.  Sex:  male  :  1960  MRN:  [id number]  Visit Number:  [id number]    Date of Admission: 3/16/2023  Date of Discharge:       PCP: Jose M Simon MD    DISCHARGE DIAGNOSIS  Coronary artery disease    CONSULTS   None    PROCEDURES PERFORMED  Left heart cardiac catheterization with significant coronary artery disease involving multiple vessels.  Patient referred for bypass surgery    HOSPITAL COURSE  Patient is a 62 y.o. male presented to [facility] complaining of worsening dyspnea on exertion and abnormal stress test.  Please see the admitting history and physical for further details.      VITAL SIGNS:      23  0752   Weight: (!) 142 kg (312 lb)     Body mass index is 41.16 kg/m².    PHYSICAL EXAM:  Constitutional:  Well-developed and well-nourished.  No respiratory distress.      HENT:  Head: Normocephalic and atraumatic.  Mouth:  Moist mucous membranes.    Eyes:  Conjunctivae and EOM are normal.  Pupils are equal, round, and reactive to light.  No scleral icterus.  Neck:  Neck supple.  No JVD present.    Cardiovascular:  Normal rate, regular rhythm and normal heart sounds with no murmur.  Pulmonary/Chest:  No respiratory distress, no wheezes, no crackles, with normal breath sounds and good air movement.  Abdominal:  Soft.  Bowel sounds are normal.  No distension and no tenderness.   Musculoskeletal:  No edema, no tenderness, and no deformity.  No red or swollen joints anywhere.    Neurological:  Alert and oriented to person, place, and time.  No cranial nerve deficit.  No tongue deviation.  No facial droop.  No slurred speech.   Skin:  Skin is warm and dry.  No rash noted.  No pallor.   Psychiatric:  Normal mood and affect.  Behavior is normal.  Judgment and thought content normal.   Peripheral vascular:  No edema and strong pulses on all 4 extremities.    DISCHARGE DISPOSITION   Stable    DISCHARGE MEDICATIONS:      Discharge Medications      Changes to Medications      Instructions Start Date   metFORMIN 1000 MG tablet  Commonly known as: GLUCOPHAGE  What changed:   · when to take this  · additional instructions   1,000 mg, Oral, 2 Times Daily With Meals, Hold metformin for 2 days post-cath.  Restart on 3/19/2023         Continue These Medications      Instructions Start Date   atorvastatin 20 MG tablet  Commonly known as: LIPITOR   No dose, route, or frequency recorded.      B-D ULTRAFINE III SHORT PEN 31G X 8 MM misc  Generic drug: Insulin Pen Needle   No dose, route, or frequency recorded.      BD Insulin Syringe U-500 31G X 6MM 0.5 ML misc  Generic drug: Insulin Syringe/Needle U-500   No dose, route, or frequency recorded.      clopidogrel 75 MG tablet  Commonly known as: PLAVIX   75 mg, Oral, Daily      fenofibrate 160 MG tablet   160 mg, Oral, Daily      furosemide 20 MG tablet  Commonly known as: LASIX   20 mg, Oral, Daily      gabapentin 800 MG tablet  Commonly known as: NEURONTIN   800 mg, 3 Times Daily      hydroCHLOROthiazide 25 MG tablet  Commonly known as: HYDRODIURIL   25 mg, Oral, Daily      Insulin Regular Human (Conc) 500 UNIT/ML solution pen-injector CONCENTRATED injection  Commonly known as: HumuLIN R   65 Units, Subcutaneous, 3 Times Daily Before Meals      lisinopril 40 MG tablet  Commonly known as: PRINIVIL,ZESTRIL   40 mg, Oral, 2 Times Daily      Metoprolol Tartrate 75 MG tablet   75 mg, Oral, 2 Times Daily      potassium chloride 10 MEQ CR tablet   10 mEq, Oral, Daily      TOUJEO MAX SOLOSTAR SC   85 Units, Subcutaneous, 2 Times Daily              Results for orders placed during the hospital encounter of 02/02/23    Adult Transthoracic Echo Complete w/ Color, Spectral and Contrast if necessary per protocol    Interpretation Summary  •  Left ventricular systolic function is normal. Left ventricular ejection fraction appears to be 66 - 70%.  •  Left ventricular wall thickness is consistent with mild  concentric hypertrophy.  •  Left ventricular diastolic function is consistent with (grade II w/high LAP) pseudonormalization.  •  Technically very difficult study.  Right ventricle and right atrium is not well visualized for adequate evaluation.   Results for orders placed during the hospital encounter of 02/02/23    Stress Test With Myocardial Perfusion (1 Day)    Interpretation Summary  •  Left ventricular ejection fraction is normal (Calculated EF = 57%).  •  Impressions are consistent with an intermediate risk study.  •  There is no prior study available for comparison.  •  Findings consistent with a normal ECG stress test.  •  Basal to mid inferior infarction with basal to mid gibran-infarction ischemia extending also to the inferoseptal walls with also small apical septal ischemia, TID 1.51.   Results for orders placed during the hospital encounter of 02/02/23    Stress Test With Myocardial Perfusion (1 Day)    Interpretation Summary  •  Left ventricular ejection fraction is normal (Calculated EF = 57%).  •  Impressions are consistent with an intermediate risk study.  •  There is no prior study available for comparison.  •  Findings consistent with a normal ECG stress test.  •  Basal to mid inferior infarction with basal to mid gibran-infarction ischemia extending also to the inferoseptal walls with also small apical septal ischemia, TID 1.51.   Results for orders placed during the hospital encounter of 03/16/23    Cardiac Catheterization/Vascular Study    Narrative  •  Mid RCA lesion is 100% stenosed.  •  1st Mrg lesion is 80% stenosed.  •  Prox LAD lesion is 80% stenosed.  •  Mid LAD lesion is 70% stenosed.    Cardiac.  Patient with significant coronary artery disease involving multiple vessels.  Patient is diabetic with proximal LAD disease also.  Will refer for bypass surgery.  Discussed with Dr. Swanson at [facility].  Patient is going to follow-up on Monday in their clinic    Procedures       Your  Scheduled Appointments    Mar 20, 2023  8:45 AM  New Patient with Markus Emanuel MD  [facility] CARDIOTHORACIC SURGERY ([place]) [address]  [facility] [address]  [facility] [id number] [phone]   -Bring photo ID, insurance card, and list of medications to appointment  -If testing was completed outside of [facility] then patient must bring images on a disc  -Copay will be collected at time of appointment  -New patients should arrive 15 minutes prior to appointment             Follow-up Information     Jose M Simon MD .    Specialty: Geriatric Medicine  Contact information:  [address]  [facility] [address]  [facility] [address] [phone]                            This document has been electronically signed by Jarod Boyle MD [facility], Interventional Cardiology  March 16, 2023 10:07 EDT    Please note that this discharge summary required more than 30 minutes to complete.

## 2023-03-16 NOTE — H&P
.        Patient Identification:  Name:  Wilbert Kelley  Age:  62 y.o.  Sex:  male  :  1960  MRN:  [id number]   Visit Number:  [id number]  Primary Care Physician:  Jose M Simon MD    Chief complaint:   Chest pains  History of presenting illness:            Wilbert Kelley is a 62 y.o. male who presents to day for evaluation of dyspnea on exertion.  Patient underwent stress test recently which showed inferior scar with gibran-infarct ischemia.  Preserved LV ejection fraction.  Patient has been getting dyspnea on exertion on going up an incline.  Denies any shortness of breath on walking on a flat surface.  No real chest pain but some Pressure at times.  No palpitation or blackouts.  Any questions for me right lower extremity edema at times.  No nausea vomiting.    Patient scheduled for outpatient elective cath for today.      Active Problems:  Problem List Items Addressed This Visit        Cardiac and Vasculature    SOB (shortness of breath) on exertion    Overview     Added automatically from request for surgery [id number]         Relevant Orders    Cardiac Catheterization/Vascular Study    Coronary artery disease of native heart with stable angina pectoris (HCC)    Overview     Added automatically from request for surgery [id number]         Relevant Orders    Cardiac Catheterization/Vascular Study        all the symptoms been going    PRIOR MEDS  No current facility-administered medications on file prior to encounter.     Current Outpatient Medications on File Prior to Encounter   Medication Sig Dispense Refill   • atorvastatin (LIPITOR) 20 MG tablet      • B-D ULTRAFINE III SHORT PEN 31G X 8 MM misc      • BD Insulin Syringe U-500 31G X 6MM 0.5 ML misc      • clopidogrel (PLAVIX) 75 MG tablet Take 1 tablet by mouth Daily.     • fenofibrate 160 MG tablet Take 1 tablet by mouth Daily.     • furosemide (LASIX) 20 MG tablet Take 1 tablet by mouth Daily.     • gabapentin (NEURONTIN) 800 MG tablet 1 tablet 3  (Three) Times a Day.     • hydroCHLOROthiazide (HYDRODIURIL) 25 MG tablet Take 1 tablet by mouth Daily. 90 tablet 3   • Insulin Glargine (TOUJEO MAX SOLOSTAR SC) Inject 85 Units under the skin into the appropriate area as directed 2 (Two) Times a Day.     • Insulin Regular Human, Conc, (HumuLIN R) 500 UNIT/ML solution pen-injector CONCENTRATED injection Inject 65 Units under the skin into the appropriate area as directed 3 (Three) Times a Day Before Meals.     • lisinopril (PRINIVIL,ZESTRIL) 40 MG tablet Take 1 tablet by mouth 2 (Two) Times a Day.     • metFORMIN (GLUCOPHAGE) 1000 MG tablet Take 1 tablet by mouth 2 (Two) Times a Day.     • metoprolol tartrate 75 MG tablet Take 75 mg by mouth 2 (Two) Times a Day. 180 tablet 3   • potassium chloride 10 MEQ CR tablet Take 1 tablet by mouth Daily.         ALLERGIES  Morphine    HISTORY  Past Medical History:   Diagnosis Date   • Coronary artery disease    • Diabetes mellitus (HCC)    • Hyperlipidemia    • Hypertension    • Sleep apnea        Social History     Socioeconomic History   • Marital status:    Tobacco Use   • Smoking status: Never     Passive exposure: Past   • Smokeless tobacco: Current     Types: Snuff   Substance and Sexual Activity   • Alcohol use: Not Currently   • Drug use: Never   • Sexual activity: Defer       Family History   Problem Relation Age of Onset   • COPD Mother    • Heart attack Father 45        passed   • Heart attack Paternal Grandfather 42        massive heart attack   • Heart disease Paternal Grandfather        Review of Systems   Constitutional: Negative for activity change, appetite change, chills, diaphoresis, fatigue, fever and unexpected weight change.   HENT: Negative for congestion, ear discharge, ear pain, hearing loss, nosebleeds, sore throat and tinnitus.    Eyes: Negative for redness and visual disturbance.   Respiratory: Positive for shortness of breath. Negative for apnea, cough, choking, chest tightness, wheezing  and stridor.    Cardiovascular: Positive for leg swelling. Negative for chest pain and palpitations.   Gastrointestinal: Negative for abdominal distention, abdominal pain, constipation, diarrhea, nausea and vomiting.   Endocrine: Negative for cold intolerance, heat intolerance, polydipsia, polyphagia and polyuria.   Genitourinary: Negative for difficulty urinating, flank pain, frequency, hematuria and urgency.   Musculoskeletal: Negative for arthralgias, back pain, gait problem, joint swelling, myalgias and neck pain.   Skin: Negative for pallor and rash.   Neurological: Negative for dizziness, tremors, seizures, syncope, weakness, light-headedness and headaches.   Hematological: Does not bruise/bleed easily.   Psychiatric/Behavioral: Negative for agitation, hallucinations, self-injury, sleep disturbance and suicidal ideas. The patient is not nervous/anxious.        Objective     VITALS:   Pulse 67, BP   171/95   98% Pox    LABS:   Results Encounter on 02/18/2023   Component Date Value Ref Range Status   • WBC 03/16/2023 10.58  3.40 - 10.80 10*3/mm3 Final   • RBC 03/16/2023 5.55  4.14 - 5.80 10*6/mm3 Final   • Hemoglobin 03/16/2023 14.9  13.0 - 17.7 g/dL Final   • Hematocrit 03/16/2023 46.8  37.5 - 51.0 % Final   • MCV 03/16/2023 84.3  79.0 - 97.0 fL Final   • MCH 03/16/2023 26.8  26.6 - 33.0 pg Final   • MCHC 03/16/2023 31.8  31.5 - 35.7 g/dL Final   • RDW 03/16/2023 16.6 (H)  12.3 - 15.4 % Final   • RDW-SD 03/16/2023 50.3  37.0 - 54.0 fl Final   • MPV 03/16/2023 10.1  6.0 - 12.0 fL Final   • Platelets 03/16/2023 361  140 - 450 10*3/mm3 Final   • Glucose 03/16/2023 104 (H)  65 - 99 mg/dL Final   • BUN 03/16/2023 19  8 - 23 mg/dL Final   • Creatinine 03/16/2023 1.07  0.76 - 1.27 mg/dL Final   • Sodium 03/16/2023 140  136 - 145 mmol/L Final   • Potassium 03/16/2023 4.3  3.5 - 5.2 mmol/L Final   • Chloride 03/16/2023 101  98 - 107 mmol/L Final   • CO2 03/16/2023 27.3  22.0 - 29.0 mmol/L Final   • Calcium 03/16/2023  9.8  8.6 - 10.5 mg/dL Final   • BUN/Creatinine Ratio 03/16/2023 17.8  7.0 - 25.0 Final   • Anion Gap 03/16/2023 11.7  5.0 - 15.0 mmol/L Final   • eGFR 03/16/2023 79.0  >60.0 mL/min/1.73 Final   Hospital Outpatient Visit on 02/02/2023   Component Date Value Ref Range Status   • Target HR (85%) 02/02/2023 135  bpm Final   • Max. Pred. HR (100%) 02/02/2023 159  bpm Final   • Nuclear Prior Study 02/02/2023 3.0   Final   • BH CV REST NUCLEAR ISOTOPE DOSE 02/02/2023 9.5  mCi Final   • BH CV STRESS NUCLEAR ISOTOPE DOSE 02/02/2023 28.9  mCi Final   • BH CV STRESS PROTOCOL 1 02/02/2023 Pharmacologic   Final   • Stage 1 02/02/2023 1   Final   • HR Stage 1 02/02/2023 88   Final   • BP Stage 1 02/02/2023 143/72   Final   • Duration Min Stage 1 02/02/2023 0   Final   • Duration Sec Stage 1 02/02/2023 10   Final   • Stress Dose Regadenoson Stage 1 02/02/2023 0.4   Final   • Stress Comments Stage 1 02/02/2023 10 sec bolus injection   Final   • Baseline HR 02/02/2023 75  bpm Final   • Baseline BP 02/02/2023 150/74  mmHg Final   • Peak HR 02/02/2023 88  bpm Final   • Percent Max Pred HR 02/02/2023 55.35  % Final   • Percent Target HR 02/02/2023 65  % Final   • Peak BP 02/02/2023 143/72  mmHg Final   • Recovery HR 02/02/2023 85  bpm Final   • Recovery BP 02/02/2023 130/66  mmHg Final   • Nuc Stress EF 02/02/2023 57  % Final   Hospital Outpatient Visit on 02/02/2023   Component Date Value Ref Range Status   • Target HR (85%) 02/02/2023 135  bpm Final   • Max. Pred. HR (100%) 02/02/2023 159  bpm Final   • LVIDd 02/02/2023 5.7  cm Final   • LVIDs 02/02/2023 3.8  cm Final   • IVSd 02/02/2023 1.43  cm Final   • LVPWd 02/02/2023 1.29  cm Final   • FS 02/02/2023 33.9  % Final   • IVS/LVPW 02/02/2023 1.11  cm Final   • ESV(cubed) 02/02/2023 52.7  ml Final   • LV Sys Vol (BSA corrected) 02/02/2023 12.8  cm2 Final   • EDV(cubed) 02/02/2023 182.8  ml Final   • LV Neri Vol (BSA corrected) 02/02/2023 55.7  cm2 Final   • LVOT area 02/02/2023 4.2   cm2 Final   • LV mass(C)d 02/02/2023 340.8  grams Final   • LVOT diam 02/02/2023 2.30  cm Final   • EDV(MOD-sp4) 02/02/2023 146.0  ml Final   • ESV(MOD-sp4) 02/02/2023 33.6  ml Final   • SV(MOD-sp4) 02/02/2023 112.4  ml Final   • SI(MOD-sp4) 02/02/2023 42.9  ml/m2 Final   • EF(MOD-sp4) 02/02/2023 77.0  % Final   • MV E max benigno 02/02/2023 125.0  cm/sec Final   • MV A max benigno 02/02/2023 79.9  cm/sec Final   • MV E/A 02/02/2023 1.56   Final   • LA ESV Index (BP) 02/02/2023 14.1  ml/m2 Final   • Med Peak E' Benigno 02/02/2023 6.0  cm/sec Final   • Lat Peak E' Benigno 02/02/2023 5.7  cm/sec Final   • Avg E/e' ratio 02/02/2023 21.37   Final   • LA dimension (2D)  02/02/2023 3.9  cm Final   • Ao pk benigno 02/02/2023 132.0  cm/sec Final   • Ao max PG 02/02/2023 7.0  mmHg Final   • Ao mean PG 02/02/2023 4.0  mmHg Final   • Ao V2 VTI 02/02/2023 25.8  cm Final   • PA acc time 02/02/2023 0.12  sec Final   • PA pr(Accel) 02/02/2023 25.0  mmHg Final   • Ao root diam 02/02/2023 3.5  cm Final   • ACS 02/02/2023 1.80  cm Final         IMAGING:   No Images in the past 120 days found..  Results for orders placed during the hospital encounter of 02/02/23    Stress Test With Myocardial Perfusion (1 Day)    Interpretation Summary  •  Left ventricular ejection fraction is normal (Calculated EF = 57%).  •  Impressions are consistent with an intermediate risk study.  •  There is no prior study available for comparison.  •  Findings consistent with a normal ECG stress test.  •  Basal to mid inferior infarction with basal to mid gibran-infarction ischemia extending also to the inferoseptal walls with also small apical septal ischemia, TID 1.51.     No results found for this or any previous visit.          EXAM:  Physical Exam  Vitals and nursing note reviewed.   Constitutional:       General: He is not in acute distress.     Appearance: Normal appearance. He is not ill-appearing or diaphoretic.   HENT:      Head: Normocephalic and atraumatic.      Right  Ear: External ear normal.      Left Ear: External ear normal.      Nose: Nose normal. No congestion or rhinorrhea.      Mouth/Throat:      Mouth: Mucous membranes are moist.      Pharynx: No oropharyngeal exudate.   Eyes:      Extraocular Movements: Extraocular movements intact.      Conjunctiva/sclera: Conjunctivae normal.      Pupils: Pupils are equal, round, and reactive to light.   Neck:      Vascular: No carotid bruit.   Cardiovascular:      Rate and Rhythm: Normal rate and regular rhythm.      Pulses: Normal pulses.      Heart sounds: Normal heart sounds, S1 normal and S2 normal. No murmur heard.   No systolic murmur is present.   No diastolic murmur is present.  Pulmonary:      Effort: Pulmonary effort is normal. No respiratory distress.      Breath sounds: Normal breath sounds. No wheezing, rhonchi or rales.   Abdominal:      General: Abdomen is flat. Bowel sounds are normal.      Palpations: Abdomen is soft.   Musculoskeletal:         General: No swelling, tenderness, deformity or signs of injury.      Cervical back: Normal range of motion and neck supple.      Right lower le+ Edema present.      Left lower leg: No edema.   Skin:     General: Skin is warm.      Coloration: Skin is not jaundiced.      Findings: No bruising, erythema or rash.   Neurological:      General: No focal deficit present.      Mental Status: He is alert and oriented to person, place, and time. Mental status is at baseline.   Psychiatric:         Mood and Affect: Mood normal.         Behavior: Behavior normal.         Thought Content: Thought content normal.         Judgment: Judgment normal.             Assessment & Plan     Diagnoses and all orders for this visit:    1. SOB (shortness of breath) on exertion  -     Cardiac Catheterization/Vascular Study; Standing  -     Cardiac Catheterization/Vascular Study    2. Coronary artery disease of native heart with stable angina pectoris, unspecified vessel or lesion type ([facility])  -      Cardiac Catheterization/Vascular Study; Standing  -     Cardiac Catheterization/Vascular Study      Plan  1.  Cardiac.  Patient with history of coronary artery disease with PTCA and stent placement to the LAD and right coronary artery in 2010.  Patient had abnormal stress test done recently and came back abnormal with inferior scar with gibran-infarct ischemia.  Patient EF was normal.  Patient has had dyspnea on exertion.  Has right lower extremity edema.  He has multiple risk factors for heart disease including tobacco use.  Discussed about cutting back on the drip.  Will schedule for left heart catheterization.  Patient here for left heart cardiac catheterization today.  #2 hypertension.  Patient blood pressure is high today and will reassess post cath to see if medication need to be readjusted.                 This document has been electronically signed by Jarod Boyle MD FACC, Interventional Cardiology  March 16, 2023 08:35 EDT

## 2023-03-20 ENCOUNTER — OFFICE VISIT (OUTPATIENT)
Dept: CARDIAC SURGERY | Facility: CLINIC | Age: 63
End: 2023-03-20
Payer: MEDICARE

## 2023-03-20 ENCOUNTER — ANESTHESIA EVENT (OUTPATIENT)
Dept: PERIOP | Facility: HOSPITAL | Age: 63
DRG: 236 | End: 2023-03-20
Payer: MEDICARE

## 2023-03-20 ENCOUNTER — PRE-ADMISSION TESTING (OUTPATIENT)
Dept: PREADMISSION TESTING | Facility: HOSPITAL | Age: 63
DRG: 236 | End: 2023-03-20
Payer: MEDICARE

## 2023-03-20 ENCOUNTER — HOSPITAL ENCOUNTER (OUTPATIENT)
Dept: GENERAL RADIOLOGY | Facility: HOSPITAL | Age: 63
Discharge: HOME | DRG: 236 | End: 2023-03-20
Payer: MEDICARE

## 2023-03-20 ENCOUNTER — HOSPITAL ENCOUNTER (OUTPATIENT)
Dept: PULMONOLOGY | Facility: HOSPITAL | Age: 63
Discharge: HOME | DRG: 236 | End: 2023-03-20
Payer: MEDICARE

## 2023-03-20 VITALS — HEIGHT: 73 IN | BODY MASS INDEX: 41.64 KG/M2 | WEIGHT: 314.16 LBS | OXYGEN SATURATION: 93 %

## 2023-03-20 VITALS
HEIGHT: 73 IN | SYSTOLIC BLOOD PRESSURE: 132 MMHG | HEART RATE: 71 BPM | TEMPERATURE: 97.2 F | BODY MASS INDEX: 41.35 KG/M2 | DIASTOLIC BLOOD PRESSURE: 60 MMHG | OXYGEN SATURATION: 96 % | WEIGHT: 312 LBS

## 2023-03-20 DIAGNOSIS — I25.118 CORONARY ARTERY DISEASE OF NATIVE ARTERY OF NATIVE HEART WITH STABLE ANGINA PECTORIS: ICD-10-CM

## 2023-03-20 DIAGNOSIS — I25.10 CORONARY ARTERY DISEASE INVOLVING NATIVE CORONARY ARTERY OF NATIVE HEART WITHOUT ANGINA PECTORIS: Primary | ICD-10-CM

## 2023-03-20 LAB
ABO GROUP BLD: NORMAL
ALBUMIN SERPL-MCNC: 4.5 G/DL (ref 3.5–5.2)
ALBUMIN/GLOB SERPL: 1.5 G/DL
ALP SERPL-CCNC: 76 U/L (ref 39–117)
ALT SERPL W P-5'-P-CCNC: 19 U/L (ref 1–41)
AMPHET+METHAMPHET UR QL: NEGATIVE
AMPHETAMINES UR QL: NEGATIVE
ANION GAP SERPL CALCULATED.3IONS-SCNC: 13 MMOL/L (ref 5–15)
APTT PPP: 28.7 SECONDS (ref 22–39)
AST SERPL-CCNC: 20 U/L (ref 1–40)
BARBITURATES UR QL SCN: NEGATIVE
BASOPHILS # BLD AUTO: 0.05 10*3/MM3 (ref 0–0.2)
BASOPHILS NFR BLD AUTO: 0.6 % (ref 0–1.5)
BENZODIAZ UR QL SCN: NEGATIVE
BILIRUB SERPL-MCNC: 0.4 MG/DL (ref 0–1.2)
BLD GP AB SCN SERPL QL: NEGATIVE
BUN SERPL-MCNC: 25 MG/DL (ref 8–23)
BUN/CREAT SERPL: 20.8 (ref 7–25)
BUPRENORPHINE SERPL-MCNC: NEGATIVE NG/ML
CALCIUM SPEC-SCNC: 9.6 MG/DL (ref 8.6–10.5)
CANNABINOIDS SERPL QL: NEGATIVE
CHLORIDE SERPL-SCNC: 99 MMOL/L (ref 98–107)
CO2 SERPL-SCNC: 28 MMOL/L (ref 22–29)
COCAINE UR QL: NEGATIVE
CREAT SERPL-MCNC: 1.2 MG/DL (ref 0.76–1.27)
DEPRECATED RDW RBC AUTO: 48.2 FL (ref 37–54)
EGFRCR SERPLBLD CKD-EPI 2021: 68.8 ML/MIN/1.73
EOSINOPHIL # BLD AUTO: 0.35 10*3/MM3 (ref 0–0.4)
EOSINOPHIL NFR BLD AUTO: 4 % (ref 0.3–6.2)
ERYTHROCYTE [DISTWIDTH] IN BLOOD BY AUTOMATED COUNT: 16 % (ref 12.3–15.4)
GLOBULIN UR ELPH-MCNC: 3.1 GM/DL
GLUCOSE SERPL-MCNC: 84 MG/DL (ref 65–99)
HBA1C MFR BLD: 7.7 % (ref 4.8–5.6)
HCT VFR BLD AUTO: 45.1 % (ref 37.5–51)
HGB BLD-MCNC: 14.4 G/DL (ref 13–17.7)
IMM GRANULOCYTES # BLD AUTO: 0.03 10*3/MM3 (ref 0–0.05)
IMM GRANULOCYTES NFR BLD AUTO: 0.3 % (ref 0–0.5)
INR PPP: 0.98 (ref 0.84–1.13)
LYMPHOCYTES # BLD AUTO: 1.5 10*3/MM3 (ref 0.7–3.1)
LYMPHOCYTES NFR BLD AUTO: 17.1 % (ref 19.6–45.3)
MAGNESIUM SERPL-MCNC: 1.7 MG/DL (ref 1.6–2.4)
MCH RBC QN AUTO: 26.4 PG (ref 26.6–33)
MCHC RBC AUTO-ENTMCNC: 31.9 G/DL (ref 31.5–35.7)
MCV RBC AUTO: 82.8 FL (ref 79–97)
METHADONE UR QL SCN: NEGATIVE
MONOCYTES # BLD AUTO: 0.64 10*3/MM3 (ref 0.1–0.9)
MONOCYTES NFR BLD AUTO: 7.3 % (ref 5–12)
NEUTROPHILS NFR BLD AUTO: 6.22 10*3/MM3 (ref 1.7–7)
NEUTROPHILS NFR BLD AUTO: 70.7 % (ref 42.7–76)
NRBC BLD AUTO-RTO: 0 /100 WBC (ref 0–0.2)
OPIATES UR QL: NEGATIVE
OXYCODONE UR QL SCN: NEGATIVE
PA ADP PRP-ACNC: 155 PRU
PCP UR QL SCN: NEGATIVE
PLATELET # BLD AUTO: 385 10*3/MM3 (ref 140–450)
PMV BLD AUTO: 9.8 FL (ref 6–12)
POTASSIUM SERPL-SCNC: 4.2 MMOL/L (ref 3.5–5.2)
PROPOXYPH UR QL: NEGATIVE
PROT SERPL-MCNC: 7.6 G/DL (ref 6–8.5)
PROTHROMBIN TIME: 12.9 SECONDS (ref 11.4–14.4)
RBC # BLD AUTO: 5.45 10*6/MM3 (ref 4.14–5.8)
RH BLD: POSITIVE
SODIUM SERPL-SCNC: 140 MMOL/L (ref 136–145)
T&S EXPIRATION DATE: NORMAL
TRICYCLICS UR QL SCN: NEGATIVE
WBC NRBC COR # BLD: 8.79 10*3/MM3 (ref 3.4–10.8)

## 2023-03-20 PROCEDURE — 1159F MED LIST DOCD IN RCRD: CPT | Performed by: THORACIC SURGERY (CARDIOTHORACIC VASCULAR SURGERY)

## 2023-03-20 PROCEDURE — 85610 PROTHROMBIN TIME: CPT

## 2023-03-20 PROCEDURE — 1160F RVW MEDS BY RX/DR IN RCRD: CPT | Performed by: THORACIC SURGERY (CARDIOTHORACIC VASCULAR SURGERY)

## 2023-03-20 PROCEDURE — 85576 BLOOD PLATELET AGGREGATION: CPT

## 2023-03-20 PROCEDURE — 80053 COMPREHEN METABOLIC PANEL: CPT

## 2023-03-20 PROCEDURE — 86900 BLOOD TYPING SEROLOGIC ABO: CPT

## 2023-03-20 PROCEDURE — 99205 OFFICE O/P NEW HI 60 MIN: CPT | Performed by: THORACIC SURGERY (CARDIOTHORACIC VASCULAR SURGERY)

## 2023-03-20 PROCEDURE — 71046 X-RAY EXAM CHEST 2 VIEWS: CPT

## 2023-03-20 PROCEDURE — 83036 HEMOGLOBIN GLYCOSYLATED A1C: CPT

## 2023-03-20 PROCEDURE — 80306 DRUG TEST PRSMV INSTRMNT: CPT

## 2023-03-20 PROCEDURE — 85730 THROMBOPLASTIN TIME PARTIAL: CPT

## 2023-03-20 PROCEDURE — 36415 COLL VENOUS BLD VENIPUNCTURE: CPT

## 2023-03-20 PROCEDURE — 94010 BREATHING CAPACITY TEST: CPT | Performed by: INTERNAL MEDICINE

## 2023-03-20 PROCEDURE — 94010 BREATHING CAPACITY TEST: CPT

## 2023-03-20 PROCEDURE — 80305 DRUG TEST PRSMV DIR OPT OBS: CPT | Performed by: PHYSICIAN ASSISTANT

## 2023-03-20 PROCEDURE — 83735 ASSAY OF MAGNESIUM: CPT

## 2023-03-20 PROCEDURE — 86901 BLOOD TYPING SEROLOGIC RH(D): CPT

## 2023-03-20 PROCEDURE — 86850 RBC ANTIBODY SCREEN: CPT

## 2023-03-20 PROCEDURE — 86923 COMPATIBILITY TEST ELECTRIC: CPT

## 2023-03-20 PROCEDURE — 85025 COMPLETE CBC W/AUTO DIFF WBC: CPT

## 2023-03-20 RX ORDER — FAMOTIDINE 10 MG/ML
20 INJECTION, SOLUTION INTRAVENOUS ONCE
Status: CANCELLED | OUTPATIENT
Start: 2023-03-20 | End: 2023-03-20

## 2023-03-20 NOTE — PROGRESS NOTES
2023  Patient Information  Wilbert Kelley                                                                                          [address] [facility] [address]   1960  'PCP/Referring Physician'  Jose M Simon MD  [phone]  No ref. provider found    Chief Complaint   Patient presents with   • Coronary Artery Disease     New patient per Dr. Boyle for CAD       History of Present Illness:  The patient is a 62-year-old male who was referred to me to evaluate for coronary bypass grafting surgery.  He has a history of a stent in his left anterior descending coronary artery and he states that his father and grandfather  at age 42 and 45 respectively from heart attacks.  He denies chest pain but states that he has shortness of breath with minimal exertion and a recent catheterization last week demonstrates critical three-vessel coronary disease.  He is a diabetic requiring insulin with hypertension and hyperlipidemia.  He has not smoked in many years.      Patient Active Problem List   Diagnosis   • SOB (shortness of breath) on exertion   • Coronary artery disease involving native coronary artery of native heart without angina pectoris     Past Medical History:   Diagnosis Date   • Coronary artery disease    • Diabetes mellitus (HCC)    • Hyperlipidemia    • Hypertension    • Sleep apnea      Past Surgical History:   Procedure Laterality Date   • CARDIAC CATHETERIZATION     • CARDIAC CATHETERIZATION Right 3/16/2023    Procedure: Left Heart Cath;  Surgeon: Jarod Boyle MD;  Location: [facility] CATH INVASIVE LOCATION;  Service: Cardiovascular;  Laterality: Right;   • CATARACT EXTRACTION, BILATERAL     • CORONARY STENT PLACEMENT     • SKIN CANCER EXCISION         Current Outpatient Medications:   •  atorvastatin (LIPITOR) 20 MG tablet, , Disp: , Rfl:   •  B-D ULTRAFINE III SHORT PEN 31G X 8 MM misc, , Disp: , Rfl:   •  BD Insulin Syringe U-500 31G X 6MM 0.5 ML misc, , Disp: , Rfl:   •  clopidogrel  (PLAVIX) 75 MG tablet, Take 1 tablet by mouth Daily., Disp: , Rfl:   •  fenofibrate 160 MG tablet, Take 1 tablet by mouth Daily., Disp: , Rfl:   •  furosemide (LASIX) 20 MG tablet, Take 1 tablet by mouth Daily., Disp: , Rfl:   •  gabapentin (NEURONTIN) 800 MG tablet, 1 tablet 3 (Three) Times a Day., Disp: , Rfl:   •  hydroCHLOROthiazide (HYDRODIURIL) 25 MG tablet, Take 1 tablet by mouth Daily., Disp: 90 tablet, Rfl: 3  •  Insulin Glargine (TOUJEO MAX SOLOSTAR SC), Inject 85 Units under the skin into the appropriate area as directed 2 (Two) Times a Day., Disp: , Rfl:   •  Insulin Regular Human, Conc, (HumuLIN R) 500 UNIT/ML solution pen-injector CONCENTRATED injection, Inject 65 Units under the skin into the appropriate area as directed 3 (Three) Times a Day Before Meals., Disp: , Rfl:   •  lisinopril (PRINIVIL,ZESTRIL) 40 MG tablet, Take 1 tablet by mouth 2 (Two) Times a Day., Disp: , Rfl:   •  metFORMIN (GLUCOPHAGE) 1000 MG tablet, Take 1 tablet by mouth 2 (Two) Times a Day With Meals. Hold metformin for 2 days post-cath.  Restart on 3/19/2023, Disp: 60 tablet, Rfl: 12  •  metoprolol tartrate 75 MG tablet, Take 75 mg by mouth 2 (Two) Times a Day., Disp: 180 tablet, Rfl: 3  •  potassium chloride 10 MEQ CR tablet, Take 1 tablet by mouth Daily., Disp: , Rfl:   Allergies   Allergen Reactions   • Morphine Nausea And Vomiting     Social History     Socioeconomic History   • Marital status:    Tobacco Use   • Smoking status: Never     Passive exposure: Past   • Smokeless tobacco: Current     Types: Snuff   Vaping Use   • Vaping Use: Never used   Substance and Sexual Activity   • Alcohol use: Not Currently   • Drug use: Never   • Sexual activity: Defer     Family History   Problem Relation Age of Onset   • COPD Mother    • Heart attack Father 45        passed   • Heart attack Paternal Grandfather 42        massive heart attack   • Heart disease Paternal Grandfather      Review of Systems   Constitutional: Negative  "for chills, decreased appetite, diaphoresis, fever, malaise/fatigue, night sweats, weight gain and weight loss.   HENT: Negative for hoarse voice.    Eyes: Negative for blurred vision, double vision and visual disturbance.   Cardiovascular: Positive for dyspnea on exertion. Negative for chest pain, claudication, irregular heartbeat, leg swelling, near-syncope, orthopnea, palpitations, paroxysmal nocturnal dyspnea and syncope.   Respiratory: Negative for cough, hemoptysis, shortness of breath, sputum production and wheezing.    Hematologic/Lymphatic: Negative for adenopathy and bleeding problem. Does not bruise/bleed easily.   Skin: Negative for color change, nail changes, poor wound healing and rash.   Musculoskeletal: Negative for back pain, falls and muscle cramps.   Gastrointestinal: Negative for abdominal pain, dysphagia and heartburn.   Genitourinary: Negative for flank pain.   Neurological: Negative for brief paralysis, disturbances in coordination, dizziness, focal weakness, headaches, light-headedness, loss of balance, numbness, paresthesias, sensory change, vertigo and weakness.   Psychiatric/Behavioral: Negative for depression and suicidal ideas.   Allergic/Immunologic: Negative for persistent infections.     Vitals:    03/20/23 0852   BP: 132/60   BP Location: Right arm   Patient Position: Sitting   Pulse: 71   Temp: 97.2 °F (36.2 °C)   SpO2: 96%   Weight: (!) 142 kg (312 lb)   Height: 185.4 cm (73\")      Physical Exam    CONSTITUTIONAL: Alert and conversant, Well dressed, Well nourished, No acute distress obese  EYES: Sclera clean, Anicteric, Pupils equal  ENT: No nasal deviation, Trachea midline  NECK: No neck masses, Supple  LUNGS: No wheezing, Cough, non-congested  HEART: No rubs, No murmurs  GASTROINTESTINAL: Soft, non-distended, No masses, Non tender  to palpation, normal bowel sounds  NEURO: No motor deficits, No sensory deficits, Cranial Nerves 2 through 12 grossly intact  PSYCHIATRIC: Oriented to " person, place and time, No memory deficits, Mood appropriate  VASCULAR: No carotid bruits, Femoral pulses palpable and symmetric  MUSKULOSKELETAL: No extremity trauma or extremity asymmetry    The ROS, past medical history, surgical history, family history, social history and vitals were reviewed by myself and corrected as needed.      Labs/Imaging:  I discussed this case with his cardiologist and reviewed his cardiac catheterization images demonstrating coronary disease. Smoking cessation was not discussed as patient is no longer a smoker.  He does use chewing tobacco which contains nicotine and will discuss this in more detail. He denies advanced directive.    Assessment/Plan:   The patient is a 62-year-old male who has diabetes requiring insulin and shortness of breath with minimal activity.  His ejection fraction is preserved but he has critical three-vessel coronary disease and a strong family history of coronary disease in his father and grandfather with their death at age 42 and 45 respectively.  We discussed surgery and his recovery also the fact that his risk of infection may be higher with his diabetes and his morbid obesity of over 300 pounds.  He also states that he has severe pain in both shoulders, primarily the left.  He says that at some point he has already been evaluated for bilateral shoulder surgery if not replacement.  He is agreeable to proceed with coronary surgery and is aware of the risks of stroke, bleeding, infection and death and agrees to proceed.  No guarantees have been made as to the outcome.  His wife tells me she is a nurse who used to work in the post open heart recovery room and that she has recovered mini open heart surgery patient's and that she has also talked with him in detail and she is familiar with the procedure    Patient Active Problem List   Diagnosis   • SOB (shortness of breath) on exertion   • Coronary artery disease involving native coronary artery of native heart  without angina pectoris       CC: MD Yamilka Miller editing for Markus Emanuel MD      I, Makrus Emanuel MD, have read and agree with the editing done by Yamilka Rodriguez, .

## 2023-03-20 NOTE — PAT
ekg on chart from 23.     93% ra so abg not needed.     Patient directed to Radiology Department for CXR and pft after Pre Admission Testing Appointment.     Bactroban to be applied to each nostril during PAT visit if surgery the following day.  If surgery NOT the following day, then Bactroban supplied to patient with instructions both written and verbally to insert into each nares the night before surgery.    Instructed patient to take 325 mg of Asprin (or four tablets of the 81 mg strength) the night before heart surgery as per CT surgeon's order.  Patient and/or family verbalized understanding.    Patient instructed to drink 20 ounces of Gatorade and it needs to be completed 1 hour (for Main OR patients) or 2 hours (scheduled  section & [facility] patients) before given arrival time for procedure (NO RED Gatorade)    Patient verbalized understanding.    Per Anesthesia Request, patient instructed not to take their ACE/ARB medications on the AM of surgery.    Patient to apply Chlorhexadine wipes  to surgical area (as instructed) the night before procedure and the AM of procedure. Wipes provided.    Patient/family provided a [facility] Heart Surgery book (if not already provided by the CT surgeon's office).  Encouraged patient and family to read the Heart Surgery book if they had not already done so.     Education during PAT visit included general perioperative instructions that are routine for all surgical patients (PAT PASS, wipes, directions to pre-op,etc.).  Additionally, a handout was provided and discussed that stressed the importance of Honesty, Incentive Spirometry use, Ambulation, and Pain control.  This handout also explained the tubes in place during immediate post op recovery.  Verbal instructions given as well.     Patient and/or family verbalized understanding of education and reinforcement was provided as needed.    Instructed patient to take 325 mg of Aspirin the night  before heart surgery as per CT surgeon's order.  Patient and/or family verbalized understanding.    Patient viewed general PAT education video as instructed in their preoperative information received from their surgeon.  Patient stated the general PAT education video was viewed in its entirety and survey completed.  Copies of PAT general education handouts (Incentive Spirometry, Meds to Beds Program, Patient Belongings, Pre-op skin preparation instructions, Blood Glucose testing, Visitor policy, Surgery FAQ, Code H) distributed to patient if not printed. Education related to the PAT pass and skin preparation for surgery (if applicable) completed in PAT as a reinforcement to PAT education video. Patient instructed to return PAT pass provided today as well as completed skin preparation sheet (if applicable) on the day of procedure.     Additionally if patient had not viewed video yet but intended to view it at home or in our waiting area, then referred them to the handout with QR code/link provided during PAT visit.  Instructed patient to complete survey after viewing the video in its entirety.  Encouraged patient/family to read PAT general education handouts thoroughly and notify PAT staff with any questions or concerns. Patient verbalized understanding of all information and priority content.

## 2023-03-20 NOTE — H&P (VIEW-ONLY)
2023  Patient Information  Wilbert Kelley                                                                                          [address] [facility] [address]   1960  'PCP/Referring Physician'  Jose M Simon MD  [phone]  No ref. provider found    Chief Complaint   Patient presents with   • Coronary Artery Disease     New patient per Dr. Boyle for CAD       History of Present Illness:  The patient is a 62-year-old male who was referred to me to evaluate for coronary bypass grafting surgery.  He has a history of a stent in his left anterior descending coronary artery and he states that his father and grandfather  at age 42 and 45 respectively from heart attacks.  He denies chest pain but states that he has shortness of breath with minimal exertion and a recent catheterization last week demonstrates critical three-vessel coronary disease.  He is a diabetic requiring insulin with hypertension and hyperlipidemia.  He has not smoked in many years.      Patient Active Problem List   Diagnosis   • SOB (shortness of breath) on exertion   • Coronary artery disease involving native coronary artery of native heart without angina pectoris     Past Medical History:   Diagnosis Date   • Coronary artery disease    • Diabetes mellitus (HCC)    • Hyperlipidemia    • Hypertension    • Sleep apnea      Past Surgical History:   Procedure Laterality Date   • CARDIAC CATHETERIZATION     • CARDIAC CATHETERIZATION Right 3/16/2023    Procedure: Left Heart Cath;  Surgeon: Jarod Boyle MD;  Location: [facility] CATH INVASIVE LOCATION;  Service: Cardiovascular;  Laterality: Right;   • CATARACT EXTRACTION, BILATERAL     • CORONARY STENT PLACEMENT     • SKIN CANCER EXCISION         Current Outpatient Medications:   •  atorvastatin (LIPITOR) 20 MG tablet, , Disp: , Rfl:   •  B-D ULTRAFINE III SHORT PEN 31G X 8 MM misc, , Disp: , Rfl:   •  BD Insulin Syringe U-500 31G X 6MM 0.5 ML misc, , Disp: , Rfl:   •  clopidogrel  (PLAVIX) 75 MG tablet, Take 1 tablet by mouth Daily., Disp: , Rfl:   •  fenofibrate 160 MG tablet, Take 1 tablet by mouth Daily., Disp: , Rfl:   •  furosemide (LASIX) 20 MG tablet, Take 1 tablet by mouth Daily., Disp: , Rfl:   •  gabapentin (NEURONTIN) 800 MG tablet, 1 tablet 3 (Three) Times a Day., Disp: , Rfl:   •  hydroCHLOROthiazide (HYDRODIURIL) 25 MG tablet, Take 1 tablet by mouth Daily., Disp: 90 tablet, Rfl: 3  •  Insulin Glargine (TOUJEO MAX SOLOSTAR SC), Inject 85 Units under the skin into the appropriate area as directed 2 (Two) Times a Day., Disp: , Rfl:   •  Insulin Regular Human, Conc, (HumuLIN R) 500 UNIT/ML solution pen-injector CONCENTRATED injection, Inject 65 Units under the skin into the appropriate area as directed 3 (Three) Times a Day Before Meals., Disp: , Rfl:   •  lisinopril (PRINIVIL,ZESTRIL) 40 MG tablet, Take 1 tablet by mouth 2 (Two) Times a Day., Disp: , Rfl:   •  metFORMIN (GLUCOPHAGE) 1000 MG tablet, Take 1 tablet by mouth 2 (Two) Times a Day With Meals. Hold metformin for 2 days post-cath.  Restart on 3/19/2023, Disp: 60 tablet, Rfl: 12  •  metoprolol tartrate 75 MG tablet, Take 75 mg by mouth 2 (Two) Times a Day., Disp: 180 tablet, Rfl: 3  •  potassium chloride 10 MEQ CR tablet, Take 1 tablet by mouth Daily., Disp: , Rfl:   Allergies   Allergen Reactions   • Morphine Nausea And Vomiting     Social History     Socioeconomic History   • Marital status:    Tobacco Use   • Smoking status: Never     Passive exposure: Past   • Smokeless tobacco: Current     Types: Snuff   Vaping Use   • Vaping Use: Never used   Substance and Sexual Activity   • Alcohol use: Not Currently   • Drug use: Never   • Sexual activity: Defer     Family History   Problem Relation Age of Onset   • COPD Mother    • Heart attack Father 45        passed   • Heart attack Paternal Grandfather 42        massive heart attack   • Heart disease Paternal Grandfather      Review of Systems   Constitutional: Negative  "for chills, decreased appetite, diaphoresis, fever, malaise/fatigue, night sweats, weight gain and weight loss.   HENT: Negative for hoarse voice.    Eyes: Negative for blurred vision, double vision and visual disturbance.   Cardiovascular: Positive for dyspnea on exertion. Negative for chest pain, claudication, irregular heartbeat, leg swelling, near-syncope, orthopnea, palpitations, paroxysmal nocturnal dyspnea and syncope.   Respiratory: Negative for cough, hemoptysis, shortness of breath, sputum production and wheezing.    Hematologic/Lymphatic: Negative for adenopathy and bleeding problem. Does not bruise/bleed easily.   Skin: Negative for color change, nail changes, poor wound healing and rash.   Musculoskeletal: Negative for back pain, falls and muscle cramps.   Gastrointestinal: Negative for abdominal pain, dysphagia and heartburn.   Genitourinary: Negative for flank pain.   Neurological: Negative for brief paralysis, disturbances in coordination, dizziness, focal weakness, headaches, light-headedness, loss of balance, numbness, paresthesias, sensory change, vertigo and weakness.   Psychiatric/Behavioral: Negative for depression and suicidal ideas.   Allergic/Immunologic: Negative for persistent infections.     Vitals:    03/20/23 0852   BP: 132/60   BP Location: Right arm   Patient Position: Sitting   Pulse: 71   Temp: 97.2 °F (36.2 °C)   SpO2: 96%   Weight: (!) 142 kg (312 lb)   Height: 185.4 cm (73\")      Physical Exam    CONSTITUTIONAL: Alert and conversant, Well dressed, Well nourished, No acute distress obese  EYES: Sclera clean, Anicteric, Pupils equal  ENT: No nasal deviation, Trachea midline  NECK: No neck masses, Supple  LUNGS: No wheezing, Cough, non-congested  HEART: No rubs, No murmurs  GASTROINTESTINAL: Soft, non-distended, No masses, Non tender  to palpation, normal bowel sounds  NEURO: No motor deficits, No sensory deficits, Cranial Nerves 2 through 12 grossly intact  PSYCHIATRIC: Oriented to " person, place and time, No memory deficits, Mood appropriate  VASCULAR: No carotid bruits, Femoral pulses palpable and symmetric  MUSKULOSKELETAL: No extremity trauma or extremity asymmetry    The ROS, past medical history, surgical history, family history, social history and vitals were reviewed by myself and corrected as needed.      Labs/Imaging:  I discussed this case with his cardiologist and reviewed his cardiac catheterization images demonstrating coronary disease. Smoking cessation was not discussed as patient is no longer a smoker.  He does use chewing tobacco which contains nicotine and will discuss this in more detail. He denies advanced directive.    Assessment/Plan:   The patient is a 62-year-old male who has diabetes requiring insulin and shortness of breath with minimal activity.  His ejection fraction is preserved but he has critical three-vessel coronary disease and a strong family history of coronary disease in his father and grandfather with their death at age 42 and 45 respectively.  We discussed surgery and his recovery also the fact that his risk of infection may be higher with his diabetes and his morbid obesity of over 300 pounds.  He also states that he has severe pain in both shoulders, primarily the left.  He says that at some point he has already been evaluated for bilateral shoulder surgery if not replacement.  He is agreeable to proceed with coronary surgery and is aware of the risks of stroke, bleeding, infection and death and agrees to proceed.  No guarantees have been made as to the outcome.  His wife tells me she is a nurse who used to work in the post open heart recovery room and that she has recovered mini open heart surgery patient's and that she has also talked with him in detail and she is familiar with the procedure    Patient Active Problem List   Diagnosis   • SOB (shortness of breath) on exertion   • Coronary artery disease involving native coronary artery of native heart  without angina pectoris       CC: MD Yamilka Miller editing for Markus Emanuel MD      I, Markus Emanuel MD, have read and agree with the editing done by Yamilka Rodriguez, .

## 2023-03-21 ENCOUNTER — ANESTHESIA EVENT CONVERTED (OUTPATIENT)
Dept: ANESTHESIOLOGY | Facility: HOSPITAL | Age: 63
DRG: 236 | End: 2023-03-21
Payer: MEDICARE

## 2023-03-21 ENCOUNTER — APPOINTMENT (OUTPATIENT)
Dept: GENERAL RADIOLOGY | Facility: HOSPITAL | Age: 63
DRG: 236 | End: 2023-03-21
Payer: MEDICARE

## 2023-03-21 ENCOUNTER — HOSPITAL ENCOUNTER (INPATIENT)
Facility: HOSPITAL | Age: 63
LOS: 9 days | Discharge: HOME HEALTH | DRG: 236 | End: 2023-03-30
Attending: THORACIC SURGERY (CARDIOTHORACIC VASCULAR SURGERY) | Admitting: THORACIC SURGERY (CARDIOTHORACIC VASCULAR SURGERY)
Payer: MEDICARE

## 2023-03-21 ENCOUNTER — ANESTHESIA (OUTPATIENT)
Dept: PERIOP | Facility: HOSPITAL | Age: 63
DRG: 236 | End: 2023-03-21
Payer: MEDICARE

## 2023-03-21 DIAGNOSIS — Z95.1 S/P CABG X 3: Primary | ICD-10-CM

## 2023-03-21 DIAGNOSIS — I25.118 CORONARY ARTERY DISEASE OF NATIVE ARTERY OF NATIVE HEART WITH STABLE ANGINA PECTORIS: ICD-10-CM

## 2023-03-21 LAB
ABO GROUP BLD: NORMAL
ACT BLD: 131 SECONDS (ref 82–152)
ACT BLD: 137 SECONDS (ref 82–152)
ACT BLD: 149 SECONDS (ref 82–152)
ACT BLD: 377 SECONDS (ref 82–152)
ACT BLD: 420 SECONDS (ref 82–152)
ACT BLD: 438 SECONDS (ref 82–152)
ALBUMIN SERPL-MCNC: 4 G/DL (ref 3.5–5.2)
ALBUMIN SERPL-MCNC: 4.2 G/DL (ref 3.5–5.2)
ANION GAP SERPL CALCULATED.3IONS-SCNC: 12 MMOL/L (ref 5–15)
ANION GAP SERPL CALCULATED.3IONS-SCNC: 13 MMOL/L (ref 5–15)
APTT PPP: 32.9 SECONDS (ref 22–39)
ARTERIAL PATENCY WRIST A: ABNORMAL
ARTERIAL PATENCY WRIST A: ABNORMAL
ATMOSPHERIC PRESS: ABNORMAL MM[HG]
ATMOSPHERIC PRESS: ABNORMAL MM[HG]
BASE EXCESS BLDA CALC-SCNC: 1 MMOL/L (ref -5–5)
BASE EXCESS BLDA CALC-SCNC: 1.3 MMOL/L (ref 0–2)
BASE EXCESS BLDA CALC-SCNC: 1.5 MMOL/L (ref 0–2)
BASE EXCESS BLDA CALC-SCNC: 2 MMOL/L (ref -5–5)
BASE EXCESS BLDA CALC-SCNC: 4 MMOL/L (ref -5–5)
BASE EXCESS BLDA CALC-SCNC: 4 MMOL/L (ref -5–5)
BDY SITE: ABNORMAL
BDY SITE: ABNORMAL
BODY TEMPERATURE: 37 C
BODY TEMPERATURE: 37 C
BUN SERPL-MCNC: 24 MG/DL (ref 8–23)
BUN SERPL-MCNC: 24 MG/DL (ref 8–23)
BUN/CREAT SERPL: 16.9 (ref 7–25)
BUN/CREAT SERPL: 17.1 (ref 7–25)
CA-I BLDA-SCNC: 1.03 MMOL/L (ref 1.2–1.32)
CA-I BLDA-SCNC: 1.05 MMOL/L (ref 1.2–1.32)
CA-I BLDA-SCNC: 1.08 MMOL/L (ref 1.2–1.32)
CA-I BLDA-SCNC: 1.2 MMOL/L (ref 1.2–1.32)
CA-I BLDA-SCNC: 1.2 MMOL/L (ref 1.2–1.32)
CA-I BLDA-SCNC: 1.22 MMOL/L (ref 1.2–1.32)
CA-I SERPL ISE-MCNC: 1.25 MMOL/L (ref 1.12–1.32)
CALCIUM SPEC-SCNC: 8.1 MG/DL (ref 8.6–10.5)
CALCIUM SPEC-SCNC: 9 MG/DL (ref 8.6–10.5)
CHLORIDE SERPL-SCNC: 103 MMOL/L (ref 98–107)
CHLORIDE SERPL-SCNC: 99 MMOL/L (ref 98–107)
CO2 BLDA-SCNC: 28 MMOL/L (ref 24–29)
CO2 BLDA-SCNC: 28 MMOL/L (ref 24–29)
CO2 BLDA-SCNC: 28.1 MMOL/L (ref 22–33)
CO2 BLDA-SCNC: 28.4 MMOL/L (ref 22–33)
CO2 BLDA-SCNC: 30 MMOL/L (ref 24–29)
CO2 BLDA-SCNC: 32 MMOL/L (ref 24–29)
CO2 SERPL-SCNC: 25 MMOL/L (ref 22–29)
CO2 SERPL-SCNC: 27 MMOL/L (ref 22–29)
COHGB MFR BLD: 1.2 % (ref 0–2)
COHGB MFR BLD: 1.5 % (ref 0–2)
COTININE UR QL SCN: POSITIVE NG/ML
CPAP: 5 CMH2O
CREAT SERPL-MCNC: 1.4 MG/DL (ref 0.76–1.27)
CREAT SERPL-MCNC: 1.42 MG/DL (ref 0.76–1.27)
DEPRECATED RDW RBC AUTO: 51 FL (ref 37–54)
DEPRECATED RDW RBC AUTO: 51.1 FL (ref 37–54)
EGFRCR SERPLBLD CKD-EPI 2021: 56.2 ML/MIN/1.73
EGFRCR SERPLBLD CKD-EPI 2021: 57.2 ML/MIN/1.73
EPAP: 0
EPAP: 0
ERYTHROCYTE [DISTWIDTH] IN BLOOD BY AUTOMATED COUNT: 16.4 % (ref 12.3–15.4)
ERYTHROCYTE [DISTWIDTH] IN BLOOD BY AUTOMATED COUNT: 16.6 % (ref 12.3–15.4)
GLUCOSE BLDC GLUCOMTR-MCNC: 149 MG/DL (ref 70–130)
GLUCOSE BLDC GLUCOMTR-MCNC: 159 MG/DL (ref 70–130)
GLUCOSE BLDC GLUCOMTR-MCNC: 165 MG/DL (ref 70–130)
GLUCOSE BLDC GLUCOMTR-MCNC: 165 MG/DL (ref 70–130)
GLUCOSE BLDC GLUCOMTR-MCNC: 166 MG/DL (ref 70–130)
GLUCOSE BLDC GLUCOMTR-MCNC: 167 MG/DL (ref 70–130)
GLUCOSE BLDC GLUCOMTR-MCNC: 171 MG/DL (ref 70–130)
GLUCOSE BLDC GLUCOMTR-MCNC: 172 MG/DL (ref 70–130)
GLUCOSE BLDC GLUCOMTR-MCNC: 173 MG/DL (ref 70–130)
GLUCOSE BLDC GLUCOMTR-MCNC: 175 MG/DL (ref 70–130)
GLUCOSE BLDC GLUCOMTR-MCNC: 176 MG/DL (ref 70–130)
GLUCOSE BLDC GLUCOMTR-MCNC: 182 MG/DL (ref 70–130)
GLUCOSE BLDC GLUCOMTR-MCNC: 189 MG/DL (ref 70–130)
GLUCOSE BLDC GLUCOMTR-MCNC: 194 MG/DL (ref 70–130)
GLUCOSE BLDC GLUCOMTR-MCNC: 197 MG/DL (ref 70–130)
GLUCOSE BLDC GLUCOMTR-MCNC: 197 MG/DL (ref 70–130)
GLUCOSE BLDC GLUCOMTR-MCNC: 198 MG/DL (ref 70–130)
GLUCOSE BLDC GLUCOMTR-MCNC: 201 MG/DL (ref 70–130)
GLUCOSE BLDC GLUCOMTR-MCNC: 208 MG/DL (ref 70–130)
GLUCOSE SERPL-MCNC: 184 MG/DL (ref 65–99)
GLUCOSE SERPL-MCNC: 190 MG/DL (ref 65–99)
HCO3 BLDA-SCNC: 26.5 MMOL/L (ref 22–26)
HCO3 BLDA-SCNC: 26.7 MMOL/L (ref 20–26)
HCO3 BLDA-SCNC: 26.9 MMOL/L (ref 22–26)
HCO3 BLDA-SCNC: 27 MMOL/L (ref 20–26)
HCO3 BLDA-SCNC: 28.5 MMOL/L (ref 22–26)
HCO3 BLDA-SCNC: 29.8 MMOL/L (ref 22–26)
HCO3 BLDA-SCNC: 30.1 MMOL/L (ref 22–26)
HCO3 BLDA-SCNC: 30.3 MMOL/L (ref 22–26)
HCT VFR BLD AUTO: 32.3 % (ref 37.5–51)
HCT VFR BLD AUTO: 34.1 % (ref 37.5–51)
HCT VFR BLD CALC: 32.6 % (ref 38–51)
HCT VFR BLD CALC: 33.3 % (ref 38–51)
HCT VFR BLDA CALC: 30 % (ref 38–51)
HCT VFR BLDA CALC: 32 % (ref 38–51)
HCT VFR BLDA CALC: 33 % (ref 38–51)
HCT VFR BLDA CALC: 35 % (ref 38–51)
HCT VFR BLDA CALC: 38 % (ref 38–51)
HCT VFR BLDA CALC: 42 % (ref 38–51)
HGB BLD-MCNC: 10.2 G/DL (ref 13–17.7)
HGB BLD-MCNC: 10.7 G/DL (ref 13–17.7)
HGB BLDA-MCNC: 10.2 G/DL (ref 12–17)
HGB BLDA-MCNC: 10.6 G/DL (ref 13.5–17.5)
HGB BLDA-MCNC: 10.9 G/DL (ref 12–17)
HGB BLDA-MCNC: 10.9 G/DL (ref 13.5–17.5)
HGB BLDA-MCNC: 11.2 G/DL (ref 12–17)
HGB BLDA-MCNC: 11.9 G/DL (ref 12–17)
HGB BLDA-MCNC: 12.9 G/DL (ref 12–17)
HGB BLDA-MCNC: 14.3 G/DL (ref 12–17)
INHALED O2 CONCENTRATION: 100 %
INHALED O2 CONCENTRATION: 40 %
INR PPP: 1.29 (ref 0.84–1.13)
IPAP: 0
IPAP: 0
Lab: ABNORMAL
MAGNESIUM SERPL-MCNC: 2.1 MG/DL (ref 1.6–2.4)
MAGNESIUM SERPL-MCNC: 2.3 MG/DL (ref 1.6–2.4)
MCH RBC QN AUTO: 26.5 PG (ref 26.6–33)
MCH RBC QN AUTO: 26.6 PG (ref 26.6–33)
MCHC RBC AUTO-ENTMCNC: 31.4 G/DL (ref 31.5–35.7)
MCHC RBC AUTO-ENTMCNC: 31.6 G/DL (ref 31.5–35.7)
MCV RBC AUTO: 84.1 FL (ref 79–97)
MCV RBC AUTO: 84.4 FL (ref 79–97)
METHGB BLD QL: 0.5 % (ref 0–1.5)
METHGB BLD QL: 0.6 % (ref 0–1.5)
MODALITY: ABNORMAL
MODALITY: ABNORMAL
NOTE: ABNORMAL
NOTE: ABNORMAL
OXYHGB MFR BLDV: 95.5 % (ref 94–99)
OXYHGB MFR BLDV: 98.7 % (ref 94–99)
PAW @ PEAK INSP FLOW SETTING VENT: 0 CMH2O
PAW @ PEAK INSP FLOW SETTING VENT: 0 CMH2O
PCO2 BLDA: 41.6 MM HG (ref 35–45)
PCO2 BLDA: 43.9 MM HG (ref 35–45)
PCO2 BLDA: 45.6 MM HG (ref 35–45)
PCO2 BLDA: 46.6 MM HG (ref 35–45)
PCO2 BLDA: 55.6 MM HG (ref 35–45)
PCO2 BLDA: 56.6 MM HG (ref 35–45)
PCO2 BLDA: 57.2 MM HG (ref 35–45)
PCO2 BLDA: 73.9 MM HG (ref 35–45)
PCO2 TEMP ADJ BLD: 43.9 MM HG (ref 35–48)
PCO2 TEMP ADJ BLD: 46.6 MM HG (ref 35–48)
PEEP RESPIRATORY: 5 CM[H2O]
PH BLDA: 7.22 PH UNITS (ref 7.35–7.6)
PH BLDA: 7.31 PH UNITS (ref 7.35–7.6)
PH BLDA: 7.33 PH UNITS (ref 7.35–7.6)
PH BLDA: 7.34 PH UNITS (ref 7.35–7.6)
PH BLDA: 7.37 PH UNITS (ref 7.35–7.45)
PH BLDA: 7.37 PH UNITS (ref 7.35–7.6)
PH BLDA: 7.39 PH UNITS (ref 7.35–7.45)
PH BLDA: 7.42 PH UNITS (ref 7.35–7.6)
PH, TEMP CORRECTED: 7.37 PH UNITS
PH, TEMP CORRECTED: 7.39 PH UNITS
PHOSPHATE SERPL-MCNC: 4.4 MG/DL (ref 2.5–4.5)
PHOSPHATE SERPL-MCNC: 4.4 MG/DL (ref 2.5–4.5)
PLATELET # BLD AUTO: 304 10*3/MM3 (ref 140–450)
PLATELET # BLD AUTO: 307 10*3/MM3 (ref 140–450)
PMV BLD AUTO: 10.1 FL (ref 6–12)
PMV BLD AUTO: 9.8 FL (ref 6–12)
PO2 BLDA: 122 MMHG (ref 80–105)
PO2 BLDA: 203 MMHG (ref 80–105)
PO2 BLDA: 252 MM HG (ref 83–108)
PO2 BLDA: 260 MMHG (ref 80–105)
PO2 BLDA: 304 MMHG (ref 80–105)
PO2 BLDA: 42 MMHG (ref 80–105)
PO2 BLDA: 74 MMHG (ref 80–105)
PO2 BLDA: 92.7 MM HG (ref 83–108)
PO2 TEMP ADJ BLD: 252 MM HG (ref 83–108)
PO2 TEMP ADJ BLD: 92.7 MM HG (ref 83–108)
POTASSIUM BLDA-SCNC: 3.5 MMOL/L (ref 3.5–4.9)
POTASSIUM BLDA-SCNC: 3.5 MMOL/L (ref 3.5–4.9)
POTASSIUM BLDA-SCNC: 3.8 MMOL/L (ref 3.5–4.9)
POTASSIUM BLDA-SCNC: 3.9 MMOL/L (ref 3.5–4.9)
POTASSIUM BLDA-SCNC: 4.6 MMOL/L (ref 3.5–4.9)
POTASSIUM BLDA-SCNC: 5.1 MMOL/L (ref 3.5–4.9)
POTASSIUM SERPL-SCNC: 3.5 MMOL/L (ref 3.5–5.2)
POTASSIUM SERPL-SCNC: 4.1 MMOL/L (ref 3.5–5.2)
PROTHROMBIN TIME: 16.1 SECONDS (ref 11.4–14.4)
PSV: 10 CMH2O
PSV: 10 CMH2O
RBC # BLD AUTO: 3.84 10*6/MM3 (ref 4.14–5.8)
RBC # BLD AUTO: 4.04 10*6/MM3 (ref 4.14–5.8)
RH BLD: POSITIVE
SAO2 % BLDA: 100 % (ref 95–98)
SAO2 % BLDA: 72 % (ref 95–98)
SAO2 % BLDA: 94 % (ref 95–98)
SAO2 % BLDA: 98 % (ref 95–98)
SODIUM BLD-SCNC: 136 MMOL/L (ref 138–146)
SODIUM BLD-SCNC: 136 MMOL/L (ref 138–146)
SODIUM BLD-SCNC: 137 MMOL/L (ref 138–146)
SODIUM BLD-SCNC: 137 MMOL/L (ref 138–146)
SODIUM BLD-SCNC: 139 MMOL/L (ref 138–146)
SODIUM BLD-SCNC: 140 MMOL/L (ref 138–146)
SODIUM SERPL-SCNC: 138 MMOL/L (ref 136–145)
SODIUM SERPL-SCNC: 141 MMOL/L (ref 136–145)
TOTAL RATE: 0 BREATHS/MINUTE
TOTAL RATE: 18 BREATHS/MINUTE
VENTILATOR MODE: ABNORMAL
VENTILATOR MODE: ABNORMAL
VT ON VENT VENT: 0.58 ML
WBC NRBC COR # BLD: 13.26 10*3/MM3 (ref 3.4–10.8)
WBC NRBC COR # BLD: 13.54 10*3/MM3 (ref 3.4–10.8)

## 2023-03-21 PROCEDURE — 25010000002 PAPAVERINE PER 60 MG: Performed by: THORACIC SURGERY (CARDIOTHORACIC VASCULAR SURGERY)

## 2023-03-21 PROCEDURE — 33508 ENDOSCOPIC VEIN HARVEST: CPT | Performed by: THORACIC SURGERY (CARDIOTHORACIC VASCULAR SURGERY)

## 2023-03-21 PROCEDURE — 83050 HGB METHEMOGLOBIN QUAN: CPT

## 2023-03-21 PROCEDURE — 02100Z9 BYPASS CORONARY ARTERY, ONE ARTERY FROM LEFT INTERNAL MAMMARY, OPEN APPROACH: ICD-10-PCS | Performed by: THORACIC SURGERY (CARDIOTHORACIC VASCULAR SURGERY)

## 2023-03-21 PROCEDURE — P9100 PATHOGEN TEST FOR PLATELETS: HCPCS

## 2023-03-21 PROCEDURE — 33533 CABG ARTERIAL SINGLE: CPT | Performed by: THORACIC SURGERY (CARDIOTHORACIC VASCULAR SURGERY)

## 2023-03-21 PROCEDURE — 25010000002 CEFAZOLIN PER 500 MG: Performed by: STUDENT IN AN ORGANIZED HEALTH CARE EDUCATION/TRAINING PROGRAM

## 2023-03-21 PROCEDURE — 82803 BLOOD GASES ANY COMBINATION: CPT

## 2023-03-21 PROCEDURE — 25010000002 MIDAZOLAM PER 1 MG: Performed by: ANESTHESIOLOGY

## 2023-03-21 PROCEDURE — 021109W BYPASS CORONARY ARTERY, TWO ARTERIES FROM AORTA WITH AUTOLOGOUS VENOUS TISSUE, OPEN APPROACH: ICD-10-PCS | Performed by: THORACIC SURGERY (CARDIOTHORACIC VASCULAR SURGERY)

## 2023-03-21 PROCEDURE — 85014 HEMATOCRIT: CPT

## 2023-03-21 PROCEDURE — 33508 ENDOSCOPIC VEIN HARVEST: CPT | Performed by: STUDENT IN AN ORGANIZED HEALTH CARE EDUCATION/TRAINING PROGRAM

## 2023-03-21 PROCEDURE — 94799 UNLISTED PULMONARY SVC/PX: CPT

## 2023-03-21 PROCEDURE — 25010000002 PROPOFOL 10 MG/ML EMULSION

## 2023-03-21 PROCEDURE — 82805 BLOOD GASES W/O2 SATURATION: CPT

## 2023-03-21 PROCEDURE — 80069 RENAL FUNCTION PANEL: CPT | Performed by: STUDENT IN AN ORGANIZED HEALTH CARE EDUCATION/TRAINING PROGRAM

## 2023-03-21 PROCEDURE — 82330 ASSAY OF CALCIUM: CPT | Performed by: STUDENT IN AN ORGANIZED HEALTH CARE EDUCATION/TRAINING PROGRAM

## 2023-03-21 PROCEDURE — 82947 ASSAY GLUCOSE BLOOD QUANT: CPT

## 2023-03-21 PROCEDURE — 25010000002 PROTAMINE SULFATE PER 10 MG: Performed by: ANESTHESIOLOGY

## 2023-03-21 PROCEDURE — 84295 ASSAY OF SERUM SODIUM: CPT

## 2023-03-21 PROCEDURE — 25010000002 HYDROMORPHONE PER 4 MG: Performed by: INTERNAL MEDICINE

## 2023-03-21 PROCEDURE — 86900 BLOOD TYPING SEROLOGIC ABO: CPT

## 2023-03-21 PROCEDURE — 25010000002 CEFAZOLIN PER 500 MG: Performed by: PHYSICIAN ASSISTANT

## 2023-03-21 PROCEDURE — 85347 COAGULATION TIME ACTIVATED: CPT

## 2023-03-21 PROCEDURE — 33533 CABG ARTERIAL SINGLE: CPT | Performed by: STUDENT IN AN ORGANIZED HEALTH CARE EDUCATION/TRAINING PROGRAM

## 2023-03-21 PROCEDURE — 0 ACETAMINOPHEN 10 MG/ML SOLUTION: Performed by: STUDENT IN AN ORGANIZED HEALTH CARE EDUCATION/TRAINING PROGRAM

## 2023-03-21 PROCEDURE — 71045 X-RAY EXAM CHEST 1 VIEW: CPT

## 2023-03-21 PROCEDURE — 82375 ASSAY CARBOXYHB QUANT: CPT

## 2023-03-21 PROCEDURE — P9041 ALBUMIN (HUMAN),5%, 50ML: HCPCS

## 2023-03-21 PROCEDURE — 85730 THROMBOPLASTIN TIME PARTIAL: CPT | Performed by: STUDENT IN AN ORGANIZED HEALTH CARE EDUCATION/TRAINING PROGRAM

## 2023-03-21 PROCEDURE — 25810000003 DEXTROSE 5 % WITH KCL 20 MEQ 20 MEQ/L SOLUTION: Performed by: STUDENT IN AN ORGANIZED HEALTH CARE EDUCATION/TRAINING PROGRAM

## 2023-03-21 PROCEDURE — 33518 CABG ARTERY-VEIN TWO: CPT | Performed by: THORACIC SURGERY (CARDIOTHORACIC VASCULAR SURGERY)

## 2023-03-21 PROCEDURE — 86901 BLOOD TYPING SEROLOGIC RH(D): CPT

## 2023-03-21 PROCEDURE — 0 ACETAMINOPHEN 10 MG/ML SOLUTION: Performed by: ANESTHESIOLOGY

## 2023-03-21 PROCEDURE — P9035 PLATELET PHERES LEUKOREDUCED: HCPCS

## 2023-03-21 PROCEDURE — 84132 ASSAY OF SERUM POTASSIUM: CPT

## 2023-03-21 PROCEDURE — 25010000002 GENTAMICIN PER 80 MG: Performed by: THORACIC SURGERY (CARDIOTHORACIC VASCULAR SURGERY)

## 2023-03-21 PROCEDURE — P9041 ALBUMIN (HUMAN),5%, 50ML: HCPCS | Performed by: PHYSICIAN ASSISTANT

## 2023-03-21 PROCEDURE — 25010000002 HEPARIN (PORCINE) PER 1000 UNITS: Performed by: THORACIC SURGERY (CARDIOTHORACIC VASCULAR SURGERY)

## 2023-03-21 PROCEDURE — 85027 COMPLETE CBC AUTOMATED: CPT | Performed by: STUDENT IN AN ORGANIZED HEALTH CARE EDUCATION/TRAINING PROGRAM

## 2023-03-21 PROCEDURE — 82330 ASSAY OF CALCIUM: CPT

## 2023-03-21 PROCEDURE — 99233 SBSQ HOSP IP/OBS HIGH 50: CPT | Performed by: INTERNAL MEDICINE

## 2023-03-21 PROCEDURE — 0 POTASSIUM CHLORIDE PER 2 MEQ: Performed by: THORACIC SURGERY (CARDIOTHORACIC VASCULAR SURGERY)

## 2023-03-21 PROCEDURE — 94002 VENT MGMT INPAT INIT DAY: CPT

## 2023-03-21 PROCEDURE — 25010000002 PROTAMINE SULFATE PER 10 MG

## 2023-03-21 PROCEDURE — 94761 N-INVAS EAR/PLS OXIMETRY MLT: CPT

## 2023-03-21 PROCEDURE — 25010000002 CEFAZOLIN PER 500 MG: Performed by: THORACIC SURGERY (CARDIOTHORACIC VASCULAR SURGERY)

## 2023-03-21 PROCEDURE — 25010000002 VANCOMYCIN PER 500 MG: Performed by: THORACIC SURGERY (CARDIOTHORACIC VASCULAR SURGERY)

## 2023-03-21 PROCEDURE — 85610 PROTHROMBIN TIME: CPT | Performed by: STUDENT IN AN ORGANIZED HEALTH CARE EDUCATION/TRAINING PROGRAM

## 2023-03-21 PROCEDURE — 25010000002 PROPOFOL 10 MG/ML EMULSION: Performed by: STUDENT IN AN ORGANIZED HEALTH CARE EDUCATION/TRAINING PROGRAM

## 2023-03-21 PROCEDURE — 25010000002 ONDANSETRON PER 1 MG: Performed by: STUDENT IN AN ORGANIZED HEALTH CARE EDUCATION/TRAINING PROGRAM

## 2023-03-21 PROCEDURE — 0 ACETAMINOPHEN 10 MG/ML SOLUTION: Performed by: THORACIC SURGERY (CARDIOTHORACIC VASCULAR SURGERY)

## 2023-03-21 PROCEDURE — 33518 CABG ARTERY-VEIN TWO: CPT | Performed by: STUDENT IN AN ORGANIZED HEALTH CARE EDUCATION/TRAINING PROGRAM

## 2023-03-21 PROCEDURE — 99222 1ST HOSP IP/OBS MODERATE 55: CPT | Performed by: INTERNAL MEDICINE

## 2023-03-21 PROCEDURE — 25010000002 ALBUMIN HUMAN 5% PER 50 ML: Performed by: STUDENT IN AN ORGANIZED HEALTH CARE EDUCATION/TRAINING PROGRAM

## 2023-03-21 PROCEDURE — 36430 TRANSFUSION BLD/BLD COMPNT: CPT

## 2023-03-21 PROCEDURE — 06BP4ZZ EXCISION OF RIGHT SAPHENOUS VEIN, PERCUTANEOUS ENDOSCOPIC APPROACH: ICD-10-PCS | Performed by: THORACIC SURGERY (CARDIOTHORACIC VASCULAR SURGERY)

## 2023-03-21 PROCEDURE — 83735 ASSAY OF MAGNESIUM: CPT | Performed by: STUDENT IN AN ORGANIZED HEALTH CARE EDUCATION/TRAINING PROGRAM

## 2023-03-21 PROCEDURE — 25010000002 ALBUMIN HUMAN 5% PER 50 ML

## 2023-03-21 PROCEDURE — 25010000002 CEFAZOLIN PER 500 MG: Performed by: ANESTHESIOLOGY

## 2023-03-21 PROCEDURE — P9041 ALBUMIN (HUMAN),5%, 50ML: HCPCS | Performed by: STUDENT IN AN ORGANIZED HEALTH CARE EDUCATION/TRAINING PROGRAM

## 2023-03-21 PROCEDURE — 25010000002 ALBUMIN HUMAN 5% PER 50 ML: Performed by: PHYSICIAN ASSISTANT

## 2023-03-21 PROCEDURE — 25010000002 HEPARIN (PORCINE) PER 1000 UNITS: Performed by: ANESTHESIOLOGY

## 2023-03-21 PROCEDURE — C1894 INTRO/SHEATH, NON-LASER: HCPCS | Performed by: THORACIC SURGERY (CARDIOTHORACIC VASCULAR SURGERY)

## 2023-03-21 DEVICE — LIGACLIP MCA MULTIPLE CLIP APPLIERS, 20 SMALL CLIPS
Type: IMPLANTABLE DEVICE | Site: LEG | Status: FUNCTIONAL
Brand: LIGACLIP

## 2023-03-21 DEVICE — SEALANT HEMO TACHOSIL FIBRIN PTCH 9.5X4.8CM: Type: IMPLANTABLE DEVICE | Site: CHEST | Status: FUNCTIONAL

## 2023-03-21 RX ORDER — NITROGLYCERIN 20 MG/100ML
5-200 INJECTION INTRAVENOUS CONTINUOUS PRN
Status: DISCONTINUED | OUTPATIENT
Start: 2023-03-21 | End: 2023-03-24

## 2023-03-21 RX ORDER — PAPAVERINE HYDROCHLORIDE 30 MG/ML
INJECTION INTRAMUSCULAR; INTRAVENOUS AS NEEDED
Status: DISCONTINUED | OUTPATIENT
Start: 2023-03-21 | End: 2023-03-21 | Stop reason: HOSPADM

## 2023-03-21 RX ORDER — CEFAZOLIN SODIUM IN 0.9 % NACL 3 G/100 ML
3 INTRAVENOUS SOLUTION, PIGGYBACK (ML) INTRAVENOUS EVERY 8 HOURS
Status: COMPLETED | OUTPATIENT
Start: 2023-03-21 | End: 2023-03-23

## 2023-03-21 RX ORDER — SODIUM CHLORIDE 9 MG/ML
INJECTION, SOLUTION INTRAVENOUS AS NEEDED
Status: DISCONTINUED | OUTPATIENT
Start: 2023-03-21 | End: 2023-03-21 | Stop reason: HOSPADM

## 2023-03-21 RX ORDER — SODIUM CHLORIDE 0.9 % (FLUSH) 0.9 %
10 SYRINGE (ML) INJECTION EVERY 12 HOURS SCHEDULED
Status: DISCONTINUED | OUTPATIENT
Start: 2023-03-21 | End: 2023-03-21 | Stop reason: HOSPADM

## 2023-03-21 RX ORDER — POTASSIUM CHLORIDE 29.8 MG/ML
20 INJECTION INTRAVENOUS
Status: COMPLETED | OUTPATIENT
Start: 2023-03-21 | End: 2023-03-21

## 2023-03-21 RX ORDER — HYDROMORPHONE HYDROCHLORIDE 1 MG/ML
0.5 INJECTION, SOLUTION INTRAMUSCULAR; INTRAVENOUS; SUBCUTANEOUS
Status: DISCONTINUED | OUTPATIENT
Start: 2023-03-21 | End: 2023-03-26

## 2023-03-21 RX ORDER — ROCURONIUM BROMIDE 10 MG/ML
INJECTION, SOLUTION INTRAVENOUS AS NEEDED
Status: DISCONTINUED | OUTPATIENT
Start: 2023-03-21 | End: 2023-03-21 | Stop reason: SURG

## 2023-03-21 RX ORDER — IBUPROFEN 600 MG/1
1 TABLET ORAL
Status: DISCONTINUED | OUTPATIENT
Start: 2023-03-21 | End: 2023-03-29

## 2023-03-21 RX ORDER — PROTAMINE SULFATE 10 MG/ML
50 INJECTION, SOLUTION INTRAVENOUS ONCE
Status: COMPLETED | OUTPATIENT
Start: 2023-03-21 | End: 2023-03-21

## 2023-03-21 RX ORDER — LIDOCAINE HYDROCHLORIDE 10 MG/ML
0.5 INJECTION, SOLUTION EPIDURAL; INFILTRATION; INTRACAUDAL; PERINEURAL ONCE AS NEEDED
Status: COMPLETED | OUTPATIENT
Start: 2023-03-21 | End: 2023-03-21

## 2023-03-21 RX ORDER — SODIUM CHLORIDE 9 MG/ML
40 INJECTION, SOLUTION INTRAVENOUS AS NEEDED
Status: DISCONTINUED | OUTPATIENT
Start: 2023-03-21 | End: 2023-03-21 | Stop reason: HOSPADM

## 2023-03-21 RX ORDER — CHLORHEXIDINE GLUCONATE 500 MG/1
1 CLOTH TOPICAL EVERY 12 HOURS PRN
Status: DISCONTINUED | OUTPATIENT
Start: 2023-03-21 | End: 2023-03-21

## 2023-03-21 RX ORDER — ACETAMINOPHEN 10 MG/ML
INJECTION, SOLUTION INTRAVENOUS AS NEEDED
Status: DISCONTINUED | OUTPATIENT
Start: 2023-03-21 | End: 2023-03-21 | Stop reason: SURG

## 2023-03-21 RX ORDER — ACETAMINOPHEN 325 MG/1
650 TABLET ORAL EVERY 8 HOURS
Status: DISCONTINUED | OUTPATIENT
Start: 2023-03-21 | End: 2023-03-21

## 2023-03-21 RX ORDER — METOPROLOL TARTRATE 5 MG/5ML
2.5 INJECTION INTRAVENOUS EVERY 6 HOURS SCHEDULED
Status: ACTIVE | OUTPATIENT
Start: 2023-03-21 | End: 2023-03-22

## 2023-03-21 RX ORDER — CHLORHEXIDINE GLUCONATE 0.12 MG/ML
15 RINSE ORAL EVERY 12 HOURS SCHEDULED
Status: DISPENSED | OUTPATIENT
Start: 2023-03-21 | End: 2023-03-22

## 2023-03-21 RX ORDER — THROMBIN HUMAN AND FIBRINOGEN 2; 5.5 [USP'U]/1; MG/1
PATCH TOPICAL AS NEEDED
Status: DISCONTINUED | OUTPATIENT
Start: 2023-03-21 | End: 2023-03-21 | Stop reason: HOSPADM

## 2023-03-21 RX ORDER — ATORVASTATIN CALCIUM 40 MG/1
40 TABLET, FILM COATED ORAL NIGHTLY
Status: DISCONTINUED | OUTPATIENT
Start: 2023-03-21 | End: 2023-03-30 | Stop reason: HOSPADM

## 2023-03-21 RX ORDER — ALBUMIN, HUMAN INJ 5% 5 %
250 SOLUTION INTRAVENOUS AS NEEDED
Status: DISCONTINUED | OUTPATIENT
Start: 2023-03-21 | End: 2023-03-23

## 2023-03-21 RX ORDER — FENTANYL CITRATE 50 UG/ML
25 INJECTION, SOLUTION INTRAMUSCULAR; INTRAVENOUS
Status: DISCONTINUED | OUTPATIENT
Start: 2023-03-21 | End: 2023-03-22

## 2023-03-21 RX ORDER — ALBUTEROL SULFATE 2.5 MG/3ML
2.5 SOLUTION RESPIRATORY (INHALATION) EVERY 4 HOURS PRN
Status: ACTIVE | OUTPATIENT
Start: 2023-03-21 | End: 2023-03-22

## 2023-03-21 RX ORDER — ASPIRIN 325 MG
325 TABLET ORAL NIGHTLY
Status: DISCONTINUED | OUTPATIENT
Start: 2023-03-21 | End: 2023-03-21

## 2023-03-21 RX ORDER — PHENYLEPHRINE HCL IN 0.9% NACL 1 MG/10 ML
SYRINGE (ML) INTRAVENOUS AS NEEDED
Status: DISCONTINUED | OUTPATIENT
Start: 2023-03-21 | End: 2023-03-21 | Stop reason: SURG

## 2023-03-21 RX ORDER — FAMOTIDINE 20 MG/1
20 TABLET, FILM COATED ORAL ONCE
Status: COMPLETED | OUTPATIENT
Start: 2023-03-21 | End: 2023-03-21

## 2023-03-21 RX ORDER — NITROGLYCERIN 0.4 MG/1
0.4 TABLET SUBLINGUAL
Status: DISCONTINUED | OUTPATIENT
Start: 2023-03-21 | End: 2023-03-21 | Stop reason: HOSPADM

## 2023-03-21 RX ORDER — AMINOCAPROIC ACID 250 MG/ML
INJECTION, SOLUTION INTRAVENOUS AS NEEDED
Status: DISCONTINUED | OUTPATIENT
Start: 2023-03-21 | End: 2023-03-21 | Stop reason: SURG

## 2023-03-21 RX ORDER — ASPIRIN 325 MG
325 TABLET, DELAYED RELEASE (ENTERIC COATED) ORAL DAILY
Status: DISCONTINUED | OUTPATIENT
Start: 2023-03-22 | End: 2023-03-30 | Stop reason: HOSPADM

## 2023-03-21 RX ORDER — DEXMEDETOMIDINE HYDROCHLORIDE 4 UG/ML
.2-1.5 INJECTION, SOLUTION INTRAVENOUS CONTINUOUS PRN
Status: DISCONTINUED | OUTPATIENT
Start: 2023-03-21 | End: 2023-03-23

## 2023-03-21 RX ORDER — ALBUMIN, HUMAN INJ 5% 5 %
SOLUTION INTRAVENOUS
Status: COMPLETED
Start: 2023-03-21 | End: 2023-03-21

## 2023-03-21 RX ORDER — NOREPINEPHRINE BIT/0.9 % NACL 8 MG/250ML
.02-.3 INFUSION BOTTLE (ML) INTRAVENOUS CONTINUOUS PRN
Status: DISCONTINUED | OUTPATIENT
Start: 2023-03-21 | End: 2023-03-26

## 2023-03-21 RX ORDER — OXYCODONE HYDROCHLORIDE 5 MG/1
10 TABLET ORAL EVERY 4 HOURS PRN
Status: DISPENSED | OUTPATIENT
Start: 2023-03-21 | End: 2023-03-28

## 2023-03-21 RX ORDER — ASPIRIN 325 MG
325 TABLET ORAL ONCE
Status: COMPLETED | OUTPATIENT
Start: 2023-03-21 | End: 2023-03-21

## 2023-03-21 RX ORDER — SODIUM CHLORIDE 9 MG/ML
INJECTION, SOLUTION INTRAVENOUS CONTINUOUS PRN
Status: DISCONTINUED | OUTPATIENT
Start: 2023-03-21 | End: 2023-03-21 | Stop reason: SURG

## 2023-03-21 RX ORDER — ALBUMIN, HUMAN INJ 5% 5 %
25 SOLUTION INTRAVENOUS ONCE
Status: COMPLETED | OUTPATIENT
Start: 2023-03-21 | End: 2023-03-21

## 2023-03-21 RX ORDER — AMOXICILLIN 250 MG
2 CAPSULE ORAL 2 TIMES DAILY
Status: DISCONTINUED | OUTPATIENT
Start: 2023-03-21 | End: 2023-03-23

## 2023-03-21 RX ORDER — SODIUM CHLORIDE 0.9 % (FLUSH) 0.9 %
10 SYRINGE (ML) INJECTION AS NEEDED
Status: DISCONTINUED | OUTPATIENT
Start: 2023-03-21 | End: 2023-03-21 | Stop reason: HOSPADM

## 2023-03-21 RX ORDER — PROPOFOL 10 MG/ML
VIAL (ML) INTRAVENOUS
Status: COMPLETED
Start: 2023-03-21 | End: 2023-03-21

## 2023-03-21 RX ORDER — ETOMIDATE 2 MG/ML
INJECTION INTRAVENOUS AS NEEDED
Status: DISCONTINUED | OUTPATIENT
Start: 2023-03-21 | End: 2023-03-21 | Stop reason: SURG

## 2023-03-21 RX ORDER — CHLORHEXIDINE GLUCONATE 0.12 MG/ML
15 RINSE ORAL ONCE
Status: COMPLETED | OUTPATIENT
Start: 2023-03-21 | End: 2023-03-21

## 2023-03-21 RX ORDER — MIDAZOLAM HYDROCHLORIDE 1 MG/ML
1 INJECTION INTRAMUSCULAR; INTRAVENOUS
Status: DISCONTINUED | OUTPATIENT
Start: 2023-03-21 | End: 2023-03-21 | Stop reason: HOSPADM

## 2023-03-21 RX ORDER — SODIUM CHLORIDE, SODIUM LACTATE, POTASSIUM CHLORIDE, CALCIUM CHLORIDE 600; 310; 30; 20 MG/100ML; MG/100ML; MG/100ML; MG/100ML
INJECTION, SOLUTION INTRAVENOUS CONTINUOUS PRN
Status: DISCONTINUED | OUTPATIENT
Start: 2023-03-21 | End: 2023-03-21 | Stop reason: SURG

## 2023-03-21 RX ORDER — MIDAZOLAM HYDROCHLORIDE 1 MG/ML
INJECTION INTRAMUSCULAR; INTRAVENOUS AS NEEDED
Status: DISCONTINUED | OUTPATIENT
Start: 2023-03-21 | End: 2023-03-21 | Stop reason: SURG

## 2023-03-21 RX ORDER — CEFAZOLIN SODIUM 1 G/3ML
INJECTION, POWDER, FOR SOLUTION INTRAMUSCULAR; INTRAVENOUS AS NEEDED
Status: DISCONTINUED | OUTPATIENT
Start: 2023-03-21 | End: 2023-03-21 | Stop reason: SURG

## 2023-03-21 RX ORDER — PROTAMINE SULFATE 10 MG/ML
INJECTION, SOLUTION INTRAVENOUS
Status: COMPLETED
Start: 2023-03-21 | End: 2023-03-21

## 2023-03-21 RX ORDER — ONDANSETRON 2 MG/ML
4 INJECTION INTRAMUSCULAR; INTRAVENOUS EVERY 6 HOURS PRN
Status: DISCONTINUED | OUTPATIENT
Start: 2023-03-21 | End: 2023-03-30 | Stop reason: HOSPADM

## 2023-03-21 RX ORDER — POLYETHYLENE GLYCOL 3350 17 G/17G
17 POWDER, FOR SOLUTION ORAL DAILY PRN
Status: DISCONTINUED | OUTPATIENT
Start: 2023-03-21 | End: 2023-03-23

## 2023-03-21 RX ORDER — NICOTINE POLACRILEX 4 MG
15 LOZENGE BUCCAL
Status: DISCONTINUED | OUTPATIENT
Start: 2023-03-21 | End: 2023-03-29

## 2023-03-21 RX ORDER — HEPARIN SODIUM 1000 [USP'U]/ML
INJECTION, SOLUTION INTRAVENOUS; SUBCUTANEOUS AS NEEDED
Status: DISCONTINUED | OUTPATIENT
Start: 2023-03-21 | End: 2023-03-21 | Stop reason: SURG

## 2023-03-21 RX ORDER — ACETAMINOPHEN 10 MG/ML
1000 INJECTION, SOLUTION INTRAVENOUS ONCE
Status: COMPLETED | OUTPATIENT
Start: 2023-03-21 | End: 2023-03-21

## 2023-03-21 RX ORDER — CEFAZOLIN SODIUM IN 0.9 % NACL 3 G/100 ML
3 INTRAVENOUS SOLUTION, PIGGYBACK (ML) INTRAVENOUS ONCE
Status: COMPLETED | OUTPATIENT
Start: 2023-03-21 | End: 2023-03-21

## 2023-03-21 RX ORDER — ACETAMINOPHEN 325 MG/1
650 TABLET ORAL EVERY 8 HOURS SCHEDULED
Status: DISCONTINUED | OUTPATIENT
Start: 2023-03-22 | End: 2023-03-30 | Stop reason: HOSPADM

## 2023-03-21 RX ORDER — SODIUM CHLORIDE, SODIUM LACTATE, POTASSIUM CHLORIDE, CALCIUM CHLORIDE 600; 310; 30; 20 MG/100ML; MG/100ML; MG/100ML; MG/100ML
9 INJECTION, SOLUTION INTRAVENOUS CONTINUOUS
Status: DISCONTINUED | OUTPATIENT
Start: 2023-03-21 | End: 2023-03-21

## 2023-03-21 RX ORDER — VANCOMYCIN HYDROCHLORIDE 500 MG/10ML
INJECTION, POWDER, LYOPHILIZED, FOR SOLUTION INTRAVENOUS AS NEEDED
Status: DISCONTINUED | OUTPATIENT
Start: 2023-03-21 | End: 2023-03-21 | Stop reason: HOSPADM

## 2023-03-21 RX ORDER — BISACODYL 10 MG
10 SUPPOSITORY, RECTAL RECTAL DAILY PRN
Status: DISCONTINUED | OUTPATIENT
Start: 2023-03-21 | End: 2023-03-23

## 2023-03-21 RX ORDER — SUFENTANIL CITRATE 50 UG/ML
INJECTION EPIDURAL; INTRAVENOUS AS NEEDED
Status: DISCONTINUED | OUTPATIENT
Start: 2023-03-21 | End: 2023-03-21 | Stop reason: SURG

## 2023-03-21 RX ORDER — CHLORHEXIDINE GLUCONATE 500 MG/1
1 CLOTH TOPICAL EVERY 12 HOURS PRN
Status: DISCONTINUED | OUTPATIENT
Start: 2023-03-21 | End: 2023-03-21 | Stop reason: HOSPADM

## 2023-03-21 RX ORDER — NOREPINEPHRINE BIT/0.9 % NACL 8 MG/250ML
INFUSION BOTTLE (ML) INTRAVENOUS CONTINUOUS PRN
Status: DISCONTINUED | OUTPATIENT
Start: 2023-03-21 | End: 2023-03-21 | Stop reason: SURG

## 2023-03-21 RX ORDER — HYDROCODONE BITARTRATE AND ACETAMINOPHEN 7.5; 325 MG/1; MG/1
1 TABLET ORAL EVERY 4 HOURS PRN
Status: DISPENSED | OUTPATIENT
Start: 2023-03-21 | End: 2023-03-28

## 2023-03-21 RX ORDER — ACETAMINOPHEN 10 MG/ML
1000 INJECTION, SOLUTION INTRAVENOUS ONCE
Status: DISCONTINUED | OUTPATIENT
Start: 2023-03-21 | End: 2023-03-21

## 2023-03-21 RX ORDER — MORPHINE SULFATE 2 MG/ML
2 INJECTION, SOLUTION INTRAMUSCULAR; INTRAVENOUS
Status: DISCONTINUED | OUTPATIENT
Start: 2023-03-21 | End: 2023-03-21

## 2023-03-21 RX ORDER — MEPERIDINE HYDROCHLORIDE 25 MG/ML
25 INJECTION INTRAMUSCULAR; INTRAVENOUS; SUBCUTANEOUS EVERY 4 HOURS PRN
Status: ACTIVE | OUTPATIENT
Start: 2023-03-21 | End: 2023-03-21

## 2023-03-21 RX ORDER — DEXMEDETOMIDINE HYDROCHLORIDE 4 UG/ML
INJECTION, SOLUTION INTRAVENOUS CONTINUOUS PRN
Status: DISCONTINUED | OUTPATIENT
Start: 2023-03-21 | End: 2023-03-21 | Stop reason: SURG

## 2023-03-21 RX ORDER — DOPAMINE HYDROCHLORIDE 160 MG/100ML
2-20 INJECTION, SOLUTION INTRAVENOUS CONTINUOUS PRN
Status: DISCONTINUED | OUTPATIENT
Start: 2023-03-21 | End: 2023-03-22

## 2023-03-21 RX ORDER — EPHEDRINE SULFATE 50 MG/ML
INJECTION INTRAVENOUS AS NEEDED
Status: DISCONTINUED | OUTPATIENT
Start: 2023-03-21 | End: 2023-03-21 | Stop reason: SURG

## 2023-03-21 RX ORDER — VECURONIUM BROMIDE 1 MG/ML
INJECTION, POWDER, LYOPHILIZED, FOR SOLUTION INTRAVENOUS AS NEEDED
Status: DISCONTINUED | OUTPATIENT
Start: 2023-03-21 | End: 2023-03-21 | Stop reason: SURG

## 2023-03-21 RX ORDER — DEXTROSE MONOHYDRATE 25 G/50ML
10-50 INJECTION, SOLUTION INTRAVENOUS
Status: DISCONTINUED | OUTPATIENT
Start: 2023-03-21 | End: 2023-03-29

## 2023-03-21 RX ORDER — NITROGLYCERIN 20 MG/100ML
INJECTION INTRAVENOUS CONTINUOUS PRN
Status: DISCONTINUED | OUTPATIENT
Start: 2023-03-21 | End: 2023-03-21 | Stop reason: SURG

## 2023-03-21 RX ORDER — GABAPENTIN 100 MG/1
100 CAPSULE ORAL EVERY 8 HOURS
Status: DISCONTINUED | OUTPATIENT
Start: 2023-03-21 | End: 2023-03-23

## 2023-03-21 RX ORDER — POTASSIUM CHLORIDE, DEXTROSE MONOHYDRATE 150; 5 MG/100ML; G/100ML
30 INJECTION, SOLUTION INTRAVENOUS CONTINUOUS
Status: DISCONTINUED | OUTPATIENT
Start: 2023-03-21 | End: 2023-03-23

## 2023-03-21 RX ORDER — DOBUTAMINE HYDROCHLORIDE 100 MG/100ML
2-20 INJECTION INTRAVENOUS CONTINUOUS PRN
Status: DISCONTINUED | OUTPATIENT
Start: 2023-03-21 | End: 2023-03-22

## 2023-03-21 RX ORDER — PROTAMINE SULFATE 10 MG/ML
INJECTION, SOLUTION INTRAVENOUS AS NEEDED
Status: DISCONTINUED | OUTPATIENT
Start: 2023-03-21 | End: 2023-03-21 | Stop reason: SURG

## 2023-03-21 RX ADMIN — ACETAMINOPHEN 1000 MG: 10 INJECTION INTRAVENOUS at 17:23

## 2023-03-21 RX ADMIN — ATORVASTATIN CALCIUM 40 MG: 40 TABLET, FILM COATED ORAL at 22:14

## 2023-03-21 RX ADMIN — ALBUMIN (HUMAN) 250 ML: 12.5 INJECTION, SOLUTION INTRAVENOUS at 11:40

## 2023-03-21 RX ADMIN — ALBUMIN (HUMAN) 250 ML: 12.5 INJECTION, SOLUTION INTRAVENOUS at 11:56

## 2023-03-21 RX ADMIN — SENNOSIDES AND DOCUSATE SODIUM 2 TABLET: 8.6; 5 TABLET ORAL at 22:14

## 2023-03-21 RX ADMIN — VECURONIUM BROMIDE 3 MG: 1 INJECTION, POWDER, LYOPHILIZED, FOR SOLUTION INTRAVENOUS at 08:59

## 2023-03-21 RX ADMIN — 0.12% CHLORHEXIDINE GLUCONATE 15 ML: 1.2 RINSE ORAL at 22:14

## 2023-03-21 RX ADMIN — VECURONIUM BROMIDE 2 MG: 1 INJECTION, POWDER, LYOPHILIZED, FOR SOLUTION INTRAVENOUS at 09:48

## 2023-03-21 RX ADMIN — SUFENTANIL CITRATE 100 MCG: 50 INJECTION EPIDURAL; INTRAVENOUS at 07:49

## 2023-03-21 RX ADMIN — SUFENTANIL CITRATE 50 MCG: 50 INJECTION EPIDURAL; INTRAVENOUS at 09:48

## 2023-03-21 RX ADMIN — HYDROMORPHONE HYDROCHLORIDE 0.5 MG: 1 INJECTION, SOLUTION INTRAMUSCULAR; INTRAVENOUS; SUBCUTANEOUS at 18:36

## 2023-03-21 RX ADMIN — OXYCODONE 10 MG: 5 TABLET ORAL at 22:14

## 2023-03-21 RX ADMIN — 0.12% CHLORHEXIDINE GLUCONATE 15 ML: 1.2 RINSE ORAL at 12:19

## 2023-03-21 RX ADMIN — POTASSIUM CHLORIDE 20 MEQ: 29.8 INJECTION, SOLUTION INTRAVENOUS at 15:21

## 2023-03-21 RX ADMIN — Medication 100 MCG: at 07:12

## 2023-03-21 RX ADMIN — ONDANSETRON 4 MG: 2 INJECTION INTRAMUSCULAR; INTRAVENOUS at 20:44

## 2023-03-21 RX ADMIN — POTASSIUM CHLORIDE 20 MEQ: 29.8 INJECTION, SOLUTION INTRAVENOUS at 14:03

## 2023-03-21 RX ADMIN — EPHEDRINE SULFATE 5 MG: 50 INJECTION INTRAVENOUS at 07:26

## 2023-03-21 RX ADMIN — HEPARIN SODIUM 40000 UNITS: 1000 INJECTION, SOLUTION INTRAVENOUS; SUBCUTANEOUS at 08:38

## 2023-03-21 RX ADMIN — HYDROMORPHONE HYDROCHLORIDE 0.5 MG: 1 INJECTION, SOLUTION INTRAMUSCULAR; INTRAVENOUS; SUBCUTANEOUS at 16:56

## 2023-03-21 RX ADMIN — SUFENTANIL CITRATE 25 MCG: 50 INJECTION EPIDURAL; INTRAVENOUS at 08:49

## 2023-03-21 RX ADMIN — SODIUM CHLORIDE, POTASSIUM CHLORIDE, SODIUM LACTATE AND CALCIUM CHLORIDE 9 ML/HR: 600; 310; 30; 20 INJECTION, SOLUTION INTRAVENOUS at 06:23

## 2023-03-21 RX ADMIN — LIDOCAINE HYDROCHLORIDE 0.5 ML: 10 INJECTION, SOLUTION EPIDURAL; INFILTRATION; INTRACAUDAL; PERINEURAL at 06:12

## 2023-03-21 RX ADMIN — ETOMIDATE 22 MG: 20 INJECTION, SOLUTION INTRAVENOUS at 07:07

## 2023-03-21 RX ADMIN — ALBUMIN (HUMAN) 25 G: 12.5 INJECTION, SOLUTION INTRAVENOUS at 13:15

## 2023-03-21 RX ADMIN — HYDROCODONE BITARTRATE AND ACETAMINOPHEN 1 TABLET: 7.5; 325 TABLET ORAL at 15:21

## 2023-03-21 RX ADMIN — AMINOCAPROIC ACID 10 G: 250 INJECTION, SOLUTION INTRAVENOUS at 07:20

## 2023-03-21 RX ADMIN — CEFAZOLIN 2 G: 10 INJECTION, POWDER, FOR SOLUTION INTRAVENOUS at 07:09

## 2023-03-21 RX ADMIN — CEFAZOLIN SODIUM 2 G: 1 INJECTION, POWDER, FOR SOLUTION INTRAMUSCULAR; INTRAVENOUS at 10:03

## 2023-03-21 RX ADMIN — Medication 100 MCG: at 08:53

## 2023-03-21 RX ADMIN — PROTAMINE SULFATE 50 MG: 10 INJECTION, SOLUTION INTRAVENOUS at 11:20

## 2023-03-21 RX ADMIN — ALBUMIN (HUMAN) 250 ML: 12.5 INJECTION, SOLUTION INTRAVENOUS at 11:20

## 2023-03-21 RX ADMIN — SODIUM CHLORIDE: 9 INJECTION, SOLUTION INTRAVENOUS at 07:19

## 2023-03-21 RX ADMIN — PROTAMINE SULFATE 400 MG: 10 INJECTION, SOLUTION INTRAVENOUS at 09:56

## 2023-03-21 RX ADMIN — LIDOCAINE HYDROCHLORIDE 100 MG: 20 INJECTION INTRAVENOUS at 07:07

## 2023-03-21 RX ADMIN — HYDROMORPHONE HYDROCHLORIDE 0.5 MG: 1 INJECTION, SOLUTION INTRAMUSCULAR; INTRAVENOUS; SUBCUTANEOUS at 22:11

## 2023-03-21 RX ADMIN — OXYCODONE 10 MG: 5 TABLET ORAL at 16:00

## 2023-03-21 RX ADMIN — EPHEDRINE SULFATE 2.5 MG: 50 INJECTION INTRAVENOUS at 08:06

## 2023-03-21 RX ADMIN — EPHEDRINE SULFATE 2.5 MG: 50 INJECTION INTRAVENOUS at 07:12

## 2023-03-21 RX ADMIN — DEXMEDETOMIDINE HYDROCHLORIDE 0.2 MCG/KG/HR: 4 INJECTION, SOLUTION INTRAVENOUS at 08:10

## 2023-03-21 RX ADMIN — MIDAZOLAM HYDROCHLORIDE 2 MG: 1 INJECTION, SOLUTION INTRAMUSCULAR; INTRAVENOUS at 07:02

## 2023-03-21 RX ADMIN — PROTAMINE SULFATE 50 MG: 10 INJECTION, SOLUTION INTRAVENOUS at 10:21

## 2023-03-21 RX ADMIN — CEFAZOLIN 3 G: 10 INJECTION, POWDER, FOR SOLUTION INTRAVENOUS at 17:34

## 2023-03-21 RX ADMIN — VECURONIUM BROMIDE 3 MG: 1 INJECTION, POWDER, LYOPHILIZED, FOR SOLUTION INTRAVENOUS at 08:16

## 2023-03-21 RX ADMIN — ACETAMINOPHEN 1000 MG: 10 INJECTION INTRAVENOUS at 10:31

## 2023-03-21 RX ADMIN — HYDROMORPHONE HYDROCHLORIDE 0.5 MG: 1 INJECTION, SOLUTION INTRAMUSCULAR; INTRAVENOUS; SUBCUTANEOUS at 15:44

## 2023-03-21 RX ADMIN — POTASSIUM CHLORIDE AND DEXTROSE MONOHYDRATE 30 ML/HR: 150; 5 INJECTION, SOLUTION INTRAVENOUS at 11:15

## 2023-03-21 RX ADMIN — ROCURONIUM BROMIDE 100 MG: 10 INJECTION INTRAVENOUS at 07:07

## 2023-03-21 RX ADMIN — SUFENTANIL CITRATE 50 MCG: 50 INJECTION EPIDURAL; INTRAVENOUS at 10:29

## 2023-03-21 RX ADMIN — MUPIROCIN 1 APPLICATION: 20 OINTMENT TOPICAL at 06:12

## 2023-03-21 RX ADMIN — ASPIRIN 325 MG: 325 TABLET ORAL at 12:19

## 2023-03-21 RX ADMIN — PROPOFOL 45 MCG/KG/MIN: 10 INJECTION, EMULSION INTRAVENOUS at 13:34

## 2023-03-21 RX ADMIN — CHLORHEXIDINE GLUCONATE 0.12% ORAL RINSE 15 ML: 1.2 LIQUID ORAL at 06:12

## 2023-03-21 RX ADMIN — AMINOCAPROIC ACID 10 G: 250 INJECTION, SOLUTION INTRAVENOUS at 10:03

## 2023-03-21 RX ADMIN — VECURONIUM BROMIDE 2 MG: 1 INJECTION, POWDER, LYOPHILIZED, FOR SOLUTION INTRAVENOUS at 10:21

## 2023-03-21 RX ADMIN — SODIUM CHLORIDE, POTASSIUM CHLORIDE, SODIUM LACTATE AND CALCIUM CHLORIDE 9 ML/HR: 600; 310; 30; 20 INJECTION, SOLUTION INTRAVENOUS at 06:12

## 2023-03-21 RX ADMIN — SODIUM CHLORIDE, POTASSIUM CHLORIDE, SODIUM LACTATE AND CALCIUM CHLORIDE: 600; 310; 30; 20 INJECTION, SOLUTION INTRAVENOUS at 07:19

## 2023-03-21 RX ADMIN — SUFENTANIL CITRATE 25 MCG: 50 INJECTION EPIDURAL; INTRAVENOUS at 07:07

## 2023-03-21 RX ADMIN — GABAPENTIN 100 MG: 100 CAPSULE ORAL at 22:14

## 2023-03-21 RX ADMIN — INSULIN HUMAN 2.7 UNITS/HR: 1 INJECTION, SOLUTION INTRAVENOUS at 07:30

## 2023-03-21 RX ADMIN — Medication 0.02 MCG/KG/MIN: at 08:05

## 2023-03-21 RX ADMIN — FAMOTIDINE 20 MG: 20 TABLET ORAL at 06:12

## 2023-03-21 RX ADMIN — Medication 50 MCG: at 08:57

## 2023-03-21 RX ADMIN — PROPOFOL 5 MCG/KG/MIN: 10 INJECTION, EMULSION INTRAVENOUS at 11:25

## 2023-03-21 RX ADMIN — Medication 100 MCG: at 10:15

## 2023-03-21 RX ADMIN — NITROGLYCERIN 0.5 MCG/KG/MIN: 20 INJECTION INTRAVENOUS at 10:07

## 2023-03-21 NOTE — PROGRESS NOTES
INTENSIVIST   PROGRESS NOTE     Hospital:  LOS: 0 days     Chief Complaint: Postoperative management    Subjective   S     HPI:   Mr. Kelley is a 61 yo male with PMH CAD with PTCA and stent placement to LAD and right Coronary Artery in 2010, HTN, HLD, diabetes who presents to the ICU s/p CABG with Dr. Emanuel. Patient initially presented to Dr. Campbell cardiology office in December 2022 to establish care. At that time he had been having bouts of dyspnea. With history of CAD and concern for progression of disease he was sent for further cardiac evaluation.  Stress test revealed a basal to mid inferior infarction with basal to mid gibran-infarction ischemia extending to the inferior septal walls with small apical septal ischemia consistent with an intermediate risk study. Echo showed EF 66-70% with mild LVH and grade II diastolic dysfunction. At that time he was referred for [facility] which showed critical three vessel CAD so he was sent to Dr. Emanuel for surgical evaluation.   Dr. Emanuel felt patient would benefit from CABG so he presented to the hospital today for procedure. Post op he is brought to the ICU for management.     PFTs: FVC 75%, FEV1 72%, FEV1/FVC 73    A1C: 7.7    I spent 5 minutes of time on this.  Kassi Gerber, MSN, AGACNP-BC, APRN    Electronically signed by MALINDA Zhang, 03/21/23, 7:11 AM EDT.      The patient's relevant past medical, surgical and social history were reviewed and updated in Epic as appropriate.      ROS: Unable to obtain as patient intubated and sedated.    Objective   O     Intake/Ouptut 24 hrs (7:00AM - 6:59 AM)  Intake & Output (last 3 days)       03/18 0701  03/19 0700 03/19 0701  03/20 0700 03/20 0701  03/21 0700 03/21 0701  03/22 0700    I.V. (mL/kg)    1750 (12.3)    Blood    631    IV Piggyback    200    Total Intake(mL/kg)    2581 (18.2)    Urine (mL/kg/hr)    1010 (0.9)    Chest Tube    230    Total Output    1240    Net    +1341                Medications  "(drips):  dexmedetomidine, Last Rate: Stopped (03/21/23 1130)  dextrose 5 % with KCl 20 mEq, Last Rate: 30 mL/hr (03/21/23 1115)  DOBUTamine  DOPamine  EPINEPHrine  insulin, Last Rate: 2 Units/hr (03/21/23 1153)  niCARdipine  nitroglycerin, Last Rate: Stopped (03/21/23 1130)  norepinephrine, Last Rate: 0.01 mcg/kg/min (03/21/23 1202)  phenylephrine  propofol, Last Rate: 45 mcg/kg/min (03/21/23 1334)      Respiratory Support: MV    Physical Examination:  Vital Signs: Blood pressure 106/70, pulse 79, temperature 96.8 °F (36 °C), temperature source Bladder, resp. rate 20, height 185.4 cm (73\"), weight (!) 142 kg (314 lb), SpO2 98 %.    General: Critically ill-appearing.  Intubated on mechanical ventilation.  Head/neck: Trachea midline, No masses.  ETT in place.  Chest: Clear to auscultation bilaterally, No wheezing, rhonchi, or rales. Normal work of breathing. Equal chest rise.  Cardiac: Regular rhythm, normal rate, S1S2 auscultated.  Extremities: Nonpitting lower extremity edema. No clubbing or cyanosis.  Skin: Surgical site appears normal without significant postprocedural bleeding, swelling.  Neuro: Unable to obtain full exam as patient intubated/sedated.    Lines, Drains & Airways     Active LDAs     Name Placement date Placement time Site Days    Peripheral IV 03/21/23 0612 Left;Posterior Hand 03/21/23  0612  Hand  less than 1    NG/OG Tube Nasogastric 16 Fr Right mouth 03/21/23  1116  Right mouth  less than 1    Urethral Catheter Silicone 16 Fr. 03/21/23  --  -- less than 1    Y Chest Tube 1 and 2 1 Mediastinal 32 Fr. 2 Mediastinal 32 Fr. 03/21/23  --  -- less than 1    Y Chest Tube 3 and 4 3 Mediastinal 32 Fr. 4 Mediastinal 32 Fr. 03/21/23  --  -- less than 1    ETT  03/21/23  0734  created via procedure documentation  -- less than 1    Arterial Line 03/21/23 Right Radial 03/21/23  0645  created via procedure documentation  Radial  less than 1    Arterial Line 03/21/23 Left Femoral 03/21/23  --  Femoral  less " than 1    Introducer- Double Lumen 03/21/23 Internal jugular Right 03/21/23  --  Internal jugular  less than 1    Pacer Wires 03/21/23  --  Ventricular  less than 1                Results from last 7 days   Lab Units 03/21/23  1137 03/21/23  1011 03/21/23  0941 03/21/23  0708 03/20/23  1512 03/16/23  0804   WBC 10*3/mm3 13.54*  --   --   --  8.79 10.58   HEMOGLOBIN g/dL 10.7*  --   --   --  14.4 14.9   HEMOGLOBIN, POC g/dL  --  11.2* 11.9*   < >  --   --    MCV fL 84.4  --   --   --  82.8 84.3   PLATELETS 10*3/mm3 304  --   --   --  385 361    < > = values in this interval not displayed.     Results from last 7 days   Lab Units 03/21/23  1137 03/20/23  1512 03/16/23  0804   SODIUM mmol/L 138 140 140   POTASSIUM mmol/L 3.5 4.2 4.3   CO2 mmol/L 27.0 28.0 27.3   CREATININE mg/dL 1.40* 1.20 1.07   GLUCOSE mg/dL 184* 84 104*   MAGNESIUM mg/dL 2.1 1.7  --    PHOSPHORUS mg/dL 4.4  --   --      Estimated Creatinine Clearance: 82.3 mL/min (A) (by C-G formula based on SCr of 1.4 mg/dL (H)).  Results from last 7 days   Lab Units 03/20/23  1512   ALK PHOS U/L 76   BILIRUBIN mg/dL 0.4   ALT (SGPT) U/L 19   AST (SGOT) U/L 20       Results from last 7 days   Lab Units 03/21/23  1231 03/21/23  1011 03/21/23  0941 03/21/23  0910 03/21/23  0906   PH, ARTERIAL pH units 7.392 7.37 7.34* 7.33* 7.31*   PCO2, ARTERIAL mm Hg 43.9  --   --   --   --    PO2 ART mm Hg 252.0*  --   --   --   --    FIO2 % 100  --   --   --   --        CXR (3/21/2023):  Radiology Impression:  Postoperative changes noted from interval median sternotomy. Endotracheal tube terminates in good position above the shelley. A nasogastric tube is noted entering the stomach. Right IJ introducer is present in the SVC. Mediastinal and pleural drains noted   in place. Lung volumes are diminished with associated atelectasis. There is otherwise no focal opacity. There is no distinct pneumothorax. Prominent cardiac and mediastinal contours are noted, likely  postoperative.    Results: Reviewed.    I reviewed the patient's new laboratory and imaging results.  I independently reviewed the patient's new images.    Medications: Reviewed.    Assessment & Plan   A / P     Active Hospital Problems    Diagnosis    • **Coronary artery disease involving native coronary artery of native heart without angina pectoris    • Coronary artery disease of native artery of native heart with stable angina pectoris (HCC)    • Diabetes mellitus (HCC)    • Sleep apnea      Patient Warren is a 62M s/p CABG x3 on 3/21.  Immediate postoperative course complicated by labile blood pressure.  Otherwise no acute concerns per bedside nursing.    -ICU to follow  -Postoperative orders and chest tubes per CT surgery  -Continue mechanical ventilation wean as able  -Continue to wean vasoactive medications   -Insulin drip  -Aspirin/statin/beta-blocker  -Duo nebs as needed  -PT/OT to evaluate post extubation  -SCDs for DVT prophylaxis until chest tubes are removed  -A.m. chest x-ray  -A.m. labs    Advance Directives:   Code Status and Medical Interventions:   Ordered at: 03/21/23 1133     Code Status (Patient has no pulse and is not breathing):    CPR (Attempt to Resuscitate)     Medical Interventions (Patient has pulse or is breathing):    Full Support     Release to patient:    Routine Release     High level of risk due to:  severe exacerbation of chronic illness and illness with threat to life or bodily function.    Plan of care and goals reviewed during interdisciplinary rounds.  I discussed the patient's findings and my recommendations with patient and nursing staff    -- Maury Engel MD  Pulmonary/Critical Care

## 2023-03-21 NOTE — CONSULTS
[facility] at [facility]  CARDIOLOGY CONSULTATION NOTE    Wilbert Kelley  : 1960  MRN:[id number]  Home Phone:[phone]    Date of Admission:3/21/2023  Date of Consultation: 23    PCP: Jose M Simon MD    IDENTIFICATION: A 62 y.o. male resident of [place], [place]     CC/ Reason for consult: post-op management of HTN, HLD    PROBLEM LIST:   Active Hospital Problems    Diagnosis  POA   • **Coronary artery disease involving native coronary artery of native heart without angina pectoris [I25.10]  Yes     · H/o remote JOVITA to LAD/RCA  · ROBLES with abnormal stress MPS  · Echo 2023: EF normal, grade II diastolic dysfunction, technically difficult study, right heart note well visualized  · LHC 3/16/23 Dr. Boyle: MVCAD involving RCA, LAD and Om1   · CABG x3 Dr. Emanuel 3/21/23: LIMA-LAD, SVG to OM, SVG to RPDA      • Coronary artery disease of native artery of native heart with stable angina pectoris (HCC) [I25.118]  Yes   • Diabetes mellitus (HCC) [E11.9]  Yes     couple times month check sugar     • Sleep apnea [G47.30]  Yes     doesnt use machine       ALLERGIES:   Allergies   Allergen Reactions   • Morphine Nausea And Vomiting     projectile   • Adhesive Tape Rash     Pt states sticky tabs cause irritation when left on long        HOME MEDICINES:   Current Outpatient Medications   Medication Instructions   • atorvastatin (LIPITOR) 20 MG tablet No dose, route, or frequency recorded.   • B-D ULTRAFINE III SHORT PEN 31G X 8 MM misc No dose, route, or frequency recorded.   • BD Insulin Syringe U-500 31G X 6MM 0.5 ML misc No dose, route, or frequency recorded.   • clopidogrel (PLAVIX) 75 mg, Oral, Daily   • fenofibrate 160 mg, Oral, Daily   • gabapentin (NEURONTIN) 800 mg, 3 Times Daily   • hydroCHLOROthiazide (HYDRODIURIL) 25 mg, Oral, Daily   • Insulin Glargine (TOUJEO MAX SOLOSTAR SC) 85 Units, Subcutaneous, 2 Times Daily   • Insulin Regular Human (Conc) (HUMULIN R) 65 Units,  Subcutaneous, 3 Times Daily Before Meals   • lisinopril (PRINIVIL,ZESTRIL) 40 mg, Oral, 2 Times Daily   • metFORMIN (GLUCOPHAGE) 1,000 mg, Oral, 2 Times Daily With Meals, Hold metformin for 2 days post-cath.  Restart on 3/19/2023   • Metoprolol Tartrate 75 mg, Oral, 2 Times Daily   • potassium chloride 10 MEQ CR tablet 10 mEq, Oral, Daily       HPI: History obtained from chart review as patient is intubated and sedated. Mr. Kelley is a 61 y/o male with CAD, HTN, HLD, diabetes who is seen in consultation post-op CABG. He is followed by Dr. Boyle and recently underwent stress MPS for ROBLES which was abnormal. Subsequent cath 3/16 showed MVCAD and he was referred to Dr. Emanuel for CABG. Echo 02/2022 with normal EF, technically difficult study, grade II diastolic dysfunction. Cardiology has been asked to see him post-op for management of HTN, HLD and any arrhythmias. Post-op he is hemodynamically stable, however nursing reports labile blood pressures.       ROS: Unable to obtain as patient is intubated and sedated.    Surgical History:   Past Surgical History:   Procedure Laterality Date   • CARDIAC CATHETERIZATION     • CARDIAC CATHETERIZATION Right 03/16/2023    Procedure: Left Heart Cath;  Surgeon: Jarod Boyle MD;  Location: [facility] INVASIVE LOC[facility];  Service: Cardiovascular;  Laterality: Right;   • CATARACT EXTRACTION, BILATERAL Bilateral    • COLONOSCOPY     • CORONARY STENT PLACEMENT      x4   • ENDOSCOPY     • FINGER SURGERY Left     middle finger - left side   • SKIN CANCER EXCISION      x2   • WISDOM TOOTH EXTRACTION         Social History:   Social History     Socioeconomic History   • Marital status:    Tobacco Use   • Smoking status: Never     Passive exposure: Past   • Smokeless tobacco: Current     Types: Snuff   Vaping Use   • Vaping Use: Never used   Substance and Sexual Activity   • Alcohol use: Not Currently   • Drug use: Never   • Sexual activity: Defer     Family History:   Family  "History   Problem Relation Age of Onset   • COPD Mother    • Heart attack Father 45        passed   • Heart attack Paternal Grandfather 42        massive heart attack   • Heart disease Paternal Grandfather        Objective     BP 91/56   Pulse 74   Temp 97.7 °F (36.5 °C) (Axillary)   Resp 18   Ht 185.4 cm (73\")   Wt (!) 142 kg (314 lb)   SpO2 100%   BMI 41.43 kg/m²     Intake/Output Summary (Last 24 hours) at 3/21/2023 1301  Last data filed at 3/21/2023 1116  Gross per 24 hour   Intake 2581 ml   Output 565 ml   Net 2016 ml       PHYSICAL EXAM:  CONSTITUTIONAL: Sedated, intubated; morbidly obese   CARDIOVASCULAR:  Regular rhythm and normal rate, no murmur, gallop, rub.   RESPIRATORY: Intubated on mechanical ventilation, no wheezing, rales or rhonchi  EXTREMITIES: No gross deformities, no edema. RLE wrap in place  SKIN: Warm, dry. No bruising or rash  NEUROLOGICAL: Sedated     Labs/Diagnostic Data  Results from last 7 days   Lab Units 03/21/23  1137 03/20/23  1512 03/16/23  0804   SODIUM mmol/L 138 140 140   POTASSIUM mmol/L 3.5 4.2 4.3   CHLORIDE mmol/L 99 99 101   CO2 mmol/L 27.0 28.0 27.3   BUN mg/dL 24* 25* 19   CREATININE mg/dL 1.40* 1.20 1.07   GLUCOSE mg/dL 184* 84 104*   CALCIUM mg/dL 9.0 9.6 9.8         Results from last 7 days   Lab Units 03/21/23  1137 03/21/23  1011 03/21/23  0941 03/21/23  0708 03/20/23  1512 03/16/23  0804   WBC 10*3/mm3 13.54*  --   --   --  8.79 10.58   HEMOGLOBIN g/dL 10.7*  --   --   --  14.4 14.9   HEMOGLOBIN, POC g/dL  --  11.2* 11.9*   < >  --   --    HEMATOCRIT % 34.1*  --   --   --  45.1 46.8   HEMATOCRIT POC %  --  33* 35*   < >  --   --    PLATELETS 10*3/mm3 304  --   --   --  385 361    < > = values in this interval not displayed.     Results from last 7 days   Lab Units 03/21/23  1137   MAGNESIUM mg/dL 2.1             Results from last 7 days   Lab Units 03/20/23  1512   HEMOGLOBIN A1C % 7.70*         Results from last 7 days   Lab Units 03/21/23  1137 03/20/23  1512 "   PROTIME Seconds 16.1* 12.9   INR  1.29* 0.98   APTT seconds 32.9 28.7       I personally reviewed the patient's EKG/Telemetry data    Radiology Data:   CXR 3/21/23:  IMPRESSION:  Impression:  Postoperative changes noted from interval median sternotomy. Endotracheal tube terminates in good position above the shelley. A nasogastric tube is noted entering the stomach. Right IJ introducer is present in the SVC. Mediastinal and pleural drains noted   in place. Lung volumes are diminished with associated atelectasis. There is otherwise no focal opacity. There is no distinct pneumothorax. Prominent cardiac and mediastinal contours are noted, likely postoperative.    Current Medications:    acetaminophen, 1,000 mg, Intravenous, Once  [START ON 3/22/2023] acetaminophen, 650 mg, Oral, Q8H  albumin human, 25 g, Intravenous, Once  [START ON 3/22/2023] aspirin, 325 mg, Oral, Daily  atorvastatin, 40 mg, Oral, Nightly  ceFAZolin, 3 g, Intravenous, Q8H  chlorhexidine, 15 mL, Mouth/Throat, Q12H  gabapentin, 100 mg, Oral, Q8H  [START ON 3/22/2023] metoprolol tartrate, 12.5 mg, Oral, Q12H  metoprolol tartrate, 2.5 mg, Intravenous, Q6H  pharmacy consult - [place], , Does not apply, Daily  senna-docusate sodium, 2 tablet, Oral, BID      dexmedetomidine, 0.2-1.5 mcg/kg/hr, Last Rate: Stopped (03/21/23 1130)  dextrose 5 % with KCl 20 mEq, 30 mL/hr, Last Rate: 30 mL/hr (03/21/23 1115)  DOBUTamine, 2-20 mcg/kg/min  DOPamine, 2-20 mcg/kg/min  EPINEPHrine, 0.02-0.3 mcg/kg/min  insulin, 0-100 Units/hr, Last Rate: 2 Units/hr (03/21/23 1153)  niCARdipine, 5-15 mg/hr  nitroglycerin, 5-200 mcg/min, Last Rate: Stopped (03/21/23 1130)  norepinephrine, 0.02-0.3 mcg/kg/min, Last Rate: 0.01 mcg/kg/min (03/21/23 1202)  phenylephrine, 0.5-3 mcg/kg/min  propofol, 5-50 mcg/kg/min, Last Rate: 40 mcg/kg/min (03/21/23 1203)      CO:  [8.1 L/min] 8.1 L/min      Assessment and Plan:     1. MVCAD   - [facility] 3/16 Dr. Boyle with MVCAD, normal EF pre-op   - CABG x3 with  "Dr. Emanuel 3/21  - continue routine post-op care, EKG stable     2. Hypertension   - labile BPs per nursing  - currently on low dose NE     3. Hyperlipidemia  - On Lipitor 20mg at home  - check AM lipid panel and target LDL <70    4. DM2  - A1C 7.7%  - per intensivists    5. EMANUEL   - Cr up to 1.4 post-op  - continue to monitor and avoid hypotension/nephrotoxins         Electronically signed by Corinna Savage PA-C, 03/21/23, 1:13 PM EDT.    ___________________________________________________________  The patient was seen and examined by me.  Agree and verified/discussed key components of E/M as outlined by the Nurse practitioner/PA.    /70   Pulse 76   Temp 96.8 °F (36 °C) (Bladder)   Resp 20   Ht 185.4 cm (73\")   Wt (!) 142 kg (314 lb)   SpO2 98%   BMI 41.43 kg/m²     General Appearance:   · Morbidly obese, intubated and sedated  Neck:  · thyroid not enlarged  · supple  Respiratory:  · no respiratory distress  · normal breath sounds  · no rales  Cardiovascular:  · no jugular venous distention  · regular rhythm  · apical impulse normal  · S1 normal, S2 normal  · no S3, no S4   · no murmur  · no rub, no thrill  · carotid pulses normal; no bruit  · pedal pulses normal  · lower extremity edema: 1+.   Skin:   · warm, dry        Plan:    For family members including his wife at bedside, I discussed his overall prognosis and care with them, all questions answered.  Sinus rhythm currently, on low-dose Levophed  Holding beta-blocker until off pressors  Monitor rhythm closely  Adequate urine output thus far    Markus Riddle MD, [facility], [facility]                   "

## 2023-03-21 NOTE — OP NOTE
Operative Report    Preop Diagnosis: Coronary disease.  Morbid obesity hypertension hyperlipidemia diabetes shoulder pain with frozen shoulders    Results of STS risk score discussed with patient and family    Postoperative Diagnosis: Same      Procedure: Coronary artery bypass grafting x3 with endoscopic harvesting of the right great saphenous vein.  Left internal mammary artery to left anterior descending.  Saphenous vein graft to the obtuse marginal branch and circumflex.  Saphenous vein graft to the posterior descending branch of the right.        Surgeons: Markus Emanuel MD      Assistant: Juanpablo gregg PA-C    The Assistant provided services of suctioning, irrigation and retraction.  Also, assisted in suture closure of parts of the skin incision.      Indication: This patient was referred to me with the above listed medical problems.  He understood that surgery carries with the risk of stroke bleeding infection death and agreed to proceed.  His wife is a retired postcardiac surgery intensive care unit nurse she was also very well informed.  I also discussed the STS mortality risk base with the patient preoperatively.  He understood his risk according to their scale.  I believe his mortality was slightly higher than that based upon his overall lack of mobility which is not well captured in the STS database        Description: Supine position sterile prep and drape.  Antibiotics given.  General endotracheal anesthesia right great saphenous vein harvested from the right leg using an endoscope and it was a good quality conduit.  Median sternotomy was performed there was nearly 4 inches of fat between the skin and the anterior sternal table.  The left internal mammary artery was harvested with some difficulty due to visualization difficulties secondary to his obesity.  Patient was fully heparinized and cannulas placed in the ascending aortic aneurysm atrial appendage and patient begun on cardiopulmonary bypass.   Aorta crossclamped antegrade cardioplegia given.  For saphenous vein graft was sutured to a 2 mm obtuse marginal branch of the circumflex and the second saphenous vein graft to a 1-1/2 mm posterior descending branch of the right.  The left internal mammary was sutured to the left anterior descending coronary which was a good quality target vessel.  2 proximal vein graft sutured to the ascending aortic aneurysm and the patient weaned from bypass without the need for pressor agents but paced initially.  Patient subsequently regained his own sinus rhythm.  No red cells were transfused his did receive platelets secondary to low P2 Y12.  The sternum was closed with a number of #7 wires due to his obesity fascia was closed with PDS subcutaneous tissue and a number of layers of Vicryl as to the skin and some staples were added on the skin for additional security.  Estimated blood loss less than 250 mL.  Sponge and needle count reported as correct and there was no apparent early complications.      Please note that portions of this note were completed with a voice recognition program. Efforts were made to edit the dictations, but occasionally words are mistranscribed.

## 2023-03-21 NOTE — ANESTHESIA PREPROCEDURE EVALUATION
Anesthesia Evaluation     Patient summary reviewed and Nursing notes reviewed   no history of anesthetic complications:  NPO Solid Status: > 8 hours  NPO Liquid Status: > 2 hours           Airway   Mallampati: I  TM distance: >3 FB  Neck ROM: full  No difficulty expected  Dental    (+) edentulous    Pulmonary - normal exam   (+) sleep apnea,   Cardiovascular - normal exam  Exercise tolerance: poor (<4 METS)    ECG reviewed  PT is on anticoagulation therapy  Patient on routine beta blocker and Beta blocker given within 24 hours of surgery    (+) hypertension, CAD, cardiac stents more than 12 months ago hyperlipidemia,     ROS comment: TTE reviewed. Normal LVEF, no significant valvular abnormalities.     Neuro/Psych  GI/Hepatic/Renal/Endo    (+) morbid obesity, GERD,  diabetes mellitus,     ROS Comment: Denies pain or difficulty swallowing. No h/o esophageal procedures.     Musculoskeletal     Abdominal    Substance History      OB/GYN          Other   arthritis,                      Anesthesia Plan    ASA 4     general, More and CVL     intravenous induction   Postoperative Plan: Expected vent after surgery  Anesthetic plan, risks, benefits, and alternatives have been provided, discussed and informed consent has been obtained with: patient.    Use of blood products discussed with patient and spouse/significant other  Consented to blood products.       CODE STATUS:

## 2023-03-21 NOTE — ANESTHESIA PROCEDURE NOTES
Airway  Urgency: elective    Date/Time: 3/21/2023 7:08 AM  Airway not difficult    General Information and Staff    Patient location during procedure: OR  Anesthesiologist: Orion Parmar Jr., MD    Indications and Patient Condition  Indications for airway management: airway protection    Preoxygenated: yes  MILS not maintained throughout  Mask difficulty assessment: 2 - vent by mask + OA or adjuvant +/- NMBA    Final Airway Details  Final airway type: endotracheal airway      Successful airway: ETT  Cuffed: yes   Successful intubation technique: direct laryngoscopy  Facilitating devices/methods: intubating stylet  Endotracheal tube insertion site: oral  Blade: James  Blade size: 4  ETT size (mm): 8.0  Cormack-Lehane Classification: grade IIa - partial view of glottis  Placement verified by: chest auscultation and capnometry   Measured from: lips  ETT/EBT  to lips (cm): 23  Number of attempts at approach: 1  Assessment: lips, teeth, and gum same as pre-op and atraumatic intubation    Additional Comments  Negative epigastric sounds, Breath sound equal bilaterally with symmetric chest rise and fall

## 2023-03-21 NOTE — PROGRESS NOTES
[facility] Adult Cardiac Surgery Database Version 4.20  RISK SCORES  Procedure: Isolated CAB  Risk of Mortality:  0.858%  Renal Failure:  1.418%  Permanent Stroke:  0.426%  Prolonged Ventilation:  4.598%  DSW Infection:  0.263%  Reoperation:  1.512%  Morbidity or Mortality:  7.599%  Short Length of Stay:  56.438%  Long Length of Stay:  2.460%

## 2023-03-21 NOTE — ANESTHESIA POSTPROCEDURE EVALUATION
Patient: Wilbert Kelley    Procedure Summary     Date: 03/21/23 Room / Location:  [facility] [address] [place] [address] [facility]    Anesthesia Start: 0700 Anesthesia Stop: 1117    Procedures:       MEDIAN STERNOTOMY, CORONARY ARTERY BYPASS GRAFTING X 3, UTILIZING THE LEFT INTERNAL MAMMARY ARTERY (Chest)      ENDOSCOPIC VEIN HARVEST OF THE RIGHT GREATER SAPENOUSE VEIN Diagnosis:       Coronary artery disease of native heart with stable angina pectoris, unspecified vessel or lesion type (HCC)      (Coronary artery disease of native heart with stable angina pectoris, unspecified vessel or lesion type (HCC) [I25.118])    Surgeons: Maruks Emanuel MD Provider: Orion Parmar Jr., MD    Anesthesia Type: general, More, CVL ASA Status: 4          Anesthesia Type: general, [place], CVL    Vitals  No vitals data found for the desired time range.          Post Anesthesia Care and Evaluation    Patient location during evaluation: ICU  Patient participation: waiting for patient participation  Post-procedure mental status: Intubated and sedated.  Pain score: 0  Pain management: adequate    Airway patency: patent  Anesthetic complications: No anesthetic complications  PONV Status: none  Cardiovascular status: acceptable, hemodynamically stable and stable  Respiratory status: acceptable, ETT, intubated and ventilator  Hydration status: acceptable    Comments: Pt transported to ICU with transport monitor and 100% O2 by ambu bag. Report given to RN at the bedside.

## 2023-03-21 NOTE — INTERVAL H&P NOTE
"Pre-Op H&P  Wilbert Kelley  [id number]  1960    Full history physical from office visit on 3/20/2023 is up-to-date.    Reports he has held his Plavix as directed and states his last dose was on Wednesday, 3/15/2023.    Review of Systems:  General ROS: negative for chills, fever or skin lesions;  No changes since last office visit.  Neg for recent sick exposure  Cardiovascular ROS: no chest pain or dyspnea on exertion  Respiratory ROS: no cough, shortness of breath, or wheezing    Allergies: Denies allergy to latex or contrast dye.  Allergies   Allergen Reactions   • Morphine Nausea And Vomiting     projectile   • Adhesive Tape Rash     Pt states sticky tabs cause irritation when left on long        Immunization History:  Influenza: No  Pneumococcal: No  Tetanus: No    Vitals:           /93 (BP Location: Left arm, Patient Position: Lying)   Pulse 84   Temp 96.9 °F (36.1 °C) (Tympanic)   Resp 20   Ht 185.4 cm (73\")   Wt (!) 142 kg (314 lb)   SpO2 93%   BMI 41.43 kg/m²     Physical Exam:  General Appearance:    Alert, cooperative, no distress, appears stated age   Head:    Normocephalic, without obvious abnormality, atraumatic   Lungs:     Clear to auscultation bilaterally, respirations unlabored    Heart:   Regular rate and rhythm, S1 and S2 normal, no murmur, rub    or gallop    Abdomen:    Soft, nontender.  +bowel sounds   Breast Exam:    deferred   Genitalia:    deferred   Extremities:   Extremities normal, atraumatic, no cyanosis or edema   Skin:   Skin color, texture, turgor normal, no rashes or lesions   Neurologic:   Grossly intact   Results Review  LABS:  Lab Results   Component Value Date    WBC 8.79 03/20/2023    HGB 14.4 03/20/2023    HCT 45.1 03/20/2023    MCV 82.8 03/20/2023     03/20/2023    NEUTROABS 6.22 03/20/2023    GLUCOSE 84 03/20/2023    BUN 25 (H) 03/20/2023    CREATININE 1.20 03/20/2023     03/20/2023    K 4.2 03/20/2023    CL 99 03/20/2023    CO2 28.0 03/20/2023 "    MG 1.7 03/20/2023    CALCIUM 9.6 03/20/2023    ALBUMIN 4.5 03/20/2023    AST 20 03/20/2023    ALT 19 03/20/2023    BILITOT 0.4 03/20/2023    PTT 28.7 03/20/2023    INR 0.98 03/20/2023       RADIOLOGY:  No radiology results for the last 3 days     I reviewed the patient's new clinical results.  CBC and CHEM profile from 3/20/2023 reviewed and available within patient's chart.    Cancer Staging (if applicable)  Cancer Patient: __ yes __no __unknown; If yes, clinical stage T:__ N:__M:__, stage group or __N/A    Impression: Patient presents with coronary artery disease involving native coronary artery of native heart without angina pectoris.    Plan: Dr. Emanuel will perform coronary artery bypass grafting with endoscopic vein harvest.  I agree with the above plan for coronary bypass grafting today I discussed this in detail with the patient and his wife who is a former postcardiac surgery care nurse.  We discussed this yesterday and they are both aware of the risk of stroke bleeding infection death and agreed to proceed  Isael Serna PA-C   03/21/23   6:41 AM EDT

## 2023-03-21 NOTE — ANESTHESIA PROCEDURE NOTES
Arterial Line      Patient reassessed immediately prior to procedure    Patient location during procedure: pre-op  Start time: 3/21/2023 6:45 AM  Stop Time:3/21/2023 6:50 AM       Line placed for hemodynamic monitoring.  Performed By   Anesthesiologist: Orion Parmar Jr., MD   Preanesthetic Checklist  Completed: patient identified, IV checked, site marked, risks and benefits discussed, surgical consent, monitors and equipment checked, pre-op evaluation and timeout performed  Arterial Line Prep    Sterile Tech: cap, gloves and sterile barriers  Prep: ChloraPrep  Patient monitoring: blood pressure monitoring, continuous pulse oximetry and EKG  Arterial Line Procedure   Laterality:right  Location:  radial artery  Catheter size: 20 G   Guidance: ultrasound guided  PROCEDURE NOTE/ULTRASOUND INTERPRETATION.  Using ultrasound guidance the potential vascular sites for insertion of the catheter were visualized to determine the patency of the vessel to be used for vascular access.  After selecting the appropriate site for insertion, the needle was visualized under ultrasound being inserted into the radial artery, followed by ultrasound confirmation of wire and catheter placement. There were no abnormalities seen on ultrasound; an image was taken; and the patient tolerated the procedure with no complications.   Number of attempts: 1  Successful placement: yes   Post Assessment   Dressing Type: line sutured, occlusive dressing applied, secured with tape, wrist guard applied and biopatch applied.   Complications no  Circ/Move/Sens Assessment: normal and unchanged.   Patient Tolerance: patient tolerated the procedure well with no apparent complications

## 2023-03-21 NOTE — PLAN OF CARE
Goal Outcome Evaluation:    Extubated to 2L N/C at 1640. Resp even without labor 20-22. Sats 93-94%. Lungs clear but diminished in lower bases. Coughing up tan sputum. MT tubes intact with 1+2=260 and 3+4=170. Alert and orientated x4 pleasant. Speech hoarse at this time. Able to follow commands. PERRLA. HR in 70's-80's. Heart tones S1 S2 with pericardial friction rub. Remains on levophed for pressure support. Bp has had multiple shifts from high to low. Has received 1500 ML Albumin. Remains NPO until swallow test. BS positive but hypoactive.

## 2023-03-22 ENCOUNTER — APPOINTMENT (OUTPATIENT)
Dept: GENERAL RADIOLOGY | Facility: HOSPITAL | Age: 63
DRG: 236 | End: 2023-03-22
Payer: MEDICARE

## 2023-03-22 LAB
ALBUMIN SERPL-MCNC: 4.3 G/DL (ref 3.5–5.2)
ALBUMIN/GLOB SERPL: 1.9 G/DL
ALP SERPL-CCNC: 52 U/L (ref 39–117)
ALT SERPL W P-5'-P-CCNC: 13 U/L (ref 1–41)
ANION GAP SERPL CALCULATED.3IONS-SCNC: 11 MMOL/L (ref 5–15)
AST SERPL-CCNC: 25 U/L (ref 1–40)
BASOPHILS # BLD AUTO: 0.05 10*3/MM3 (ref 0–0.2)
BASOPHILS NFR BLD AUTO: 0.3 % (ref 0–1.5)
BH BB BLOOD EXPIRATION DATE: NORMAL
BH BB BLOOD EXPIRATION DATE: NORMAL
BH BB BLOOD TYPE BARCODE: 6200
BH BB BLOOD TYPE BARCODE: 8400
BH BB DISPENSE STATUS: NORMAL
BH BB DISPENSE STATUS: NORMAL
BH BB PRODUCT CODE: NORMAL
BH BB PRODUCT CODE: NORMAL
BH BB UNIT NUMBER: NORMAL
BH BB UNIT NUMBER: NORMAL
BILIRUB SERPL-MCNC: 0.6 MG/DL (ref 0–1.2)
BUN SERPL-MCNC: 23 MG/DL (ref 8–23)
BUN/CREAT SERPL: 14 (ref 7–25)
CALCIUM SPEC-SCNC: 8.5 MG/DL (ref 8.6–10.5)
CHLORIDE SERPL-SCNC: 101 MMOL/L (ref 98–107)
CHOLEST SERPL-MCNC: 71 MG/DL (ref 0–200)
CO2 SERPL-SCNC: 26 MMOL/L (ref 22–29)
CREAT SERPL-MCNC: 1.64 MG/DL (ref 0.76–1.27)
DEPRECATED RDW RBC AUTO: 51.8 FL (ref 37–54)
EGFRCR SERPLBLD CKD-EPI 2021: 47.3 ML/MIN/1.73
EOSINOPHIL # BLD AUTO: 0.09 10*3/MM3 (ref 0–0.4)
EOSINOPHIL NFR BLD AUTO: 0.6 % (ref 0.3–6.2)
ERYTHROCYTE [DISTWIDTH] IN BLOOD BY AUTOMATED COUNT: 16.7 % (ref 12.3–15.4)
GLOBULIN UR ELPH-MCNC: 2.3 GM/DL
GLUCOSE BLDC GLUCOMTR-MCNC: 137 MG/DL (ref 70–130)
GLUCOSE BLDC GLUCOMTR-MCNC: 143 MG/DL (ref 70–130)
GLUCOSE BLDC GLUCOMTR-MCNC: 159 MG/DL (ref 70–130)
GLUCOSE BLDC GLUCOMTR-MCNC: 161 MG/DL (ref 70–130)
GLUCOSE BLDC GLUCOMTR-MCNC: 166 MG/DL (ref 70–130)
GLUCOSE BLDC GLUCOMTR-MCNC: 169 MG/DL (ref 70–130)
GLUCOSE BLDC GLUCOMTR-MCNC: 170 MG/DL (ref 70–130)
GLUCOSE BLDC GLUCOMTR-MCNC: 174 MG/DL (ref 70–130)
GLUCOSE BLDC GLUCOMTR-MCNC: 175 MG/DL (ref 70–130)
GLUCOSE BLDC GLUCOMTR-MCNC: 178 MG/DL (ref 70–130)
GLUCOSE BLDC GLUCOMTR-MCNC: 182 MG/DL (ref 70–130)
GLUCOSE BLDC GLUCOMTR-MCNC: 187 MG/DL (ref 70–130)
GLUCOSE BLDC GLUCOMTR-MCNC: 191 MG/DL (ref 70–130)
GLUCOSE BLDC GLUCOMTR-MCNC: 194 MG/DL (ref 70–130)
GLUCOSE BLDC GLUCOMTR-MCNC: 194 MG/DL (ref 70–130)
GLUCOSE SERPL-MCNC: 175 MG/DL (ref 65–99)
HCT VFR BLD AUTO: 33.4 % (ref 37.5–51)
HDLC SERPL-MCNC: 18 MG/DL (ref 40–60)
HGB BLD-MCNC: 10.1 G/DL (ref 13–17.7)
IMM GRANULOCYTES # BLD AUTO: 0.05 10*3/MM3 (ref 0–0.05)
IMM GRANULOCYTES NFR BLD AUTO: 0.3 % (ref 0–0.5)
INR PPP: 1.17 (ref 0.84–1.13)
LDLC SERPL CALC-MCNC: 23 MG/DL (ref 0–100)
LDLC/HDLC SERPL: 0.89 {RATIO}
LYMPHOCYTES # BLD AUTO: 0.62 10*3/MM3 (ref 0.7–3.1)
LYMPHOCYTES NFR BLD AUTO: 3.9 % (ref 19.6–45.3)
MAGNESIUM SERPL-MCNC: 2 MG/DL (ref 1.6–2.4)
MCH RBC QN AUTO: 25.9 PG (ref 26.6–33)
MCHC RBC AUTO-ENTMCNC: 30.2 G/DL (ref 31.5–35.7)
MCV RBC AUTO: 85.6 FL (ref 79–97)
MONOCYTES # BLD AUTO: 1.34 10*3/MM3 (ref 0.1–0.9)
MONOCYTES NFR BLD AUTO: 8.5 % (ref 5–12)
NEUTROPHILS NFR BLD AUTO: 13.58 10*3/MM3 (ref 1.7–7)
NEUTROPHILS NFR BLD AUTO: 86.4 % (ref 42.7–76)
NRBC BLD AUTO-RTO: 0 /100 WBC (ref 0–0.2)
PLATELET # BLD AUTO: 311 10*3/MM3 (ref 140–450)
PMV BLD AUTO: 10 FL (ref 6–12)
POTASSIUM SERPL-SCNC: 4.6 MMOL/L (ref 3.5–5.2)
PROT SERPL-MCNC: 6.6 G/DL (ref 6–8.5)
PROTHROMBIN TIME: 14.9 SECONDS (ref 11.4–14.4)
QT INTERVAL: 340 MS
QTC INTERVAL: 434 MS
RBC # BLD AUTO: 3.9 10*6/MM3 (ref 4.14–5.8)
SODIUM SERPL-SCNC: 138 MMOL/L (ref 136–145)
TRIGL SERPL-MCNC: 185 MG/DL (ref 0–150)
UNIT  ABO: NORMAL
UNIT  ABO: NORMAL
UNIT  RH: NORMAL
UNIT  RH: NORMAL
VLDLC SERPL-MCNC: 30 MG/DL (ref 5–40)
WBC NRBC COR # BLD: 15.73 10*3/MM3 (ref 3.4–10.8)

## 2023-03-22 PROCEDURE — 80061 LIPID PANEL: CPT | Performed by: PHYSICIAN ASSISTANT

## 2023-03-22 PROCEDURE — 83735 ASSAY OF MAGNESIUM: CPT | Performed by: STUDENT IN AN ORGANIZED HEALTH CARE EDUCATION/TRAINING PROGRAM

## 2023-03-22 PROCEDURE — 85610 PROTHROMBIN TIME: CPT | Performed by: STUDENT IN AN ORGANIZED HEALTH CARE EDUCATION/TRAINING PROGRAM

## 2023-03-22 PROCEDURE — 74018 RADEX ABDOMEN 1 VIEW: CPT

## 2023-03-22 PROCEDURE — 71045 X-RAY EXAM CHEST 1 VIEW: CPT

## 2023-03-22 PROCEDURE — 85025 COMPLETE CBC W/AUTO DIFF WBC: CPT | Performed by: STUDENT IN AN ORGANIZED HEALTH CARE EDUCATION/TRAINING PROGRAM

## 2023-03-22 PROCEDURE — 93005 ELECTROCARDIOGRAM TRACING: CPT | Performed by: STUDENT IN AN ORGANIZED HEALTH CARE EDUCATION/TRAINING PROGRAM

## 2023-03-22 PROCEDURE — 25010000002 HYDROMORPHONE PER 4 MG: Performed by: INTERNAL MEDICINE

## 2023-03-22 PROCEDURE — 99024 POSTOP FOLLOW-UP VISIT: CPT | Performed by: THORACIC SURGERY (CARDIOTHORACIC VASCULAR SURGERY)

## 2023-03-22 PROCEDURE — 99291 CRITICAL CARE FIRST HOUR: CPT | Performed by: INTERNAL MEDICINE

## 2023-03-22 PROCEDURE — 25010000002 CEFAZOLIN PER 500 MG: Performed by: STUDENT IN AN ORGANIZED HEALTH CARE EDUCATION/TRAINING PROGRAM

## 2023-03-22 PROCEDURE — 97162 PT EVAL MOD COMPLEX 30 MIN: CPT

## 2023-03-22 PROCEDURE — 82962 GLUCOSE BLOOD TEST: CPT

## 2023-03-22 PROCEDURE — 93010 ELECTROCARDIOGRAM REPORT: CPT | Performed by: STUDENT IN AN ORGANIZED HEALTH CARE EDUCATION/TRAINING PROGRAM

## 2023-03-22 PROCEDURE — 25010000002 METOCLOPRAMIDE PER 10 MG: Performed by: NURSE PRACTITIONER

## 2023-03-22 PROCEDURE — 80053 COMPREHEN METABOLIC PANEL: CPT | Performed by: STUDENT IN AN ORGANIZED HEALTH CARE EDUCATION/TRAINING PROGRAM

## 2023-03-22 PROCEDURE — 25010000002 ONDANSETRON PER 1 MG: Performed by: STUDENT IN AN ORGANIZED HEALTH CARE EDUCATION/TRAINING PROGRAM

## 2023-03-22 PROCEDURE — 99232 SBSQ HOSP IP/OBS MODERATE 35: CPT | Performed by: INTERNAL MEDICINE

## 2023-03-22 RX ORDER — SODIUM CHLORIDE, SODIUM LACTATE, POTASSIUM CHLORIDE, CALCIUM CHLORIDE 600; 310; 30; 20 MG/100ML; MG/100ML; MG/100ML; MG/100ML
100 INJECTION, SOLUTION INTRAVENOUS CONTINUOUS
Status: DISCONTINUED | OUTPATIENT
Start: 2023-03-22 | End: 2023-03-23

## 2023-03-22 RX ORDER — METOCLOPRAMIDE HYDROCHLORIDE 5 MG/ML
10 INJECTION INTRAMUSCULAR; INTRAVENOUS ONCE
Status: COMPLETED | OUTPATIENT
Start: 2023-03-22 | End: 2023-03-22

## 2023-03-22 RX ORDER — SIMETHICONE 80 MG
80 TABLET,CHEWABLE ORAL 4 TIMES DAILY PRN
Status: DISCONTINUED | OUTPATIENT
Start: 2023-03-22 | End: 2023-03-30 | Stop reason: HOSPADM

## 2023-03-22 RX ADMIN — ACETAMINOPHEN 325MG 650 MG: 325 TABLET ORAL at 01:55

## 2023-03-22 RX ADMIN — METOCLOPRAMIDE 10 MG: 5 INJECTION, SOLUTION INTRAMUSCULAR; INTRAVENOUS at 21:22

## 2023-03-22 RX ADMIN — 0.12% CHLORHEXIDINE GLUCONATE 15 ML: 1.2 RINSE ORAL at 22:11

## 2023-03-22 RX ADMIN — INSULIN HUMAN 1.7 UNITS/HR: 1 INJECTION, SOLUTION INTRAVENOUS at 15:46

## 2023-03-22 RX ADMIN — SIMETHICONE 80 MG: 80 TABLET, CHEWABLE ORAL at 18:10

## 2023-03-22 RX ADMIN — OXYCODONE 10 MG: 5 TABLET ORAL at 15:02

## 2023-03-22 RX ADMIN — ACETAMINOPHEN 325MG 650 MG: 325 TABLET ORAL at 22:11

## 2023-03-22 RX ADMIN — ASPIRIN 325 MG: 325 TABLET, COATED ORAL at 08:40

## 2023-03-22 RX ADMIN — CEFAZOLIN 3 G: 10 INJECTION, POWDER, FOR SOLUTION INTRAVENOUS at 17:34

## 2023-03-22 RX ADMIN — CEFAZOLIN 3 G: 10 INJECTION, POWDER, FOR SOLUTION INTRAVENOUS at 01:55

## 2023-03-22 RX ADMIN — ONDANSETRON 4 MG: 2 INJECTION INTRAMUSCULAR; INTRAVENOUS at 18:10

## 2023-03-22 RX ADMIN — HYDROMORPHONE HYDROCHLORIDE 0.5 MG: 1 INJECTION, SOLUTION INTRAMUSCULAR; INTRAVENOUS; SUBCUTANEOUS at 07:00

## 2023-03-22 RX ADMIN — ONDANSETRON 4 MG: 2 INJECTION INTRAMUSCULAR; INTRAVENOUS at 05:40

## 2023-03-22 RX ADMIN — HYDROMORPHONE HYDROCHLORIDE 0.5 MG: 1 INJECTION, SOLUTION INTRAMUSCULAR; INTRAVENOUS; SUBCUTANEOUS at 18:24

## 2023-03-22 RX ADMIN — GABAPENTIN 100 MG: 100 CAPSULE ORAL at 05:27

## 2023-03-22 RX ADMIN — SENNOSIDES AND DOCUSATE SODIUM 2 TABLET: 8.6; 5 TABLET ORAL at 22:11

## 2023-03-22 RX ADMIN — INSULIN HUMAN 8.3 UNITS/HR: 1 INJECTION, SOLUTION INTRAVENOUS at 01:56

## 2023-03-22 RX ADMIN — OXYCODONE 10 MG: 5 TABLET ORAL at 05:27

## 2023-03-22 RX ADMIN — Medication 12.5 MG: at 22:11

## 2023-03-22 RX ADMIN — GABAPENTIN 100 MG: 100 CAPSULE ORAL at 15:03

## 2023-03-22 RX ADMIN — SODIUM CHLORIDE, POTASSIUM CHLORIDE, SODIUM LACTATE AND CALCIUM CHLORIDE 100 ML/HR: 600; 310; 30; 20 INJECTION, SOLUTION INTRAVENOUS at 15:19

## 2023-03-22 RX ADMIN — HYDROCODONE BITARTRATE AND ACETAMINOPHEN 1 TABLET: 7.5; 325 TABLET ORAL at 08:40

## 2023-03-22 RX ADMIN — ATORVASTATIN CALCIUM 40 MG: 40 TABLET, FILM COATED ORAL at 22:11

## 2023-03-22 RX ADMIN — OXYCODONE 10 MG: 5 TABLET ORAL at 22:11

## 2023-03-22 RX ADMIN — GABAPENTIN 100 MG: 100 CAPSULE ORAL at 22:11

## 2023-03-22 RX ADMIN — ONDANSETRON 4 MG: 2 INJECTION INTRAMUSCULAR; INTRAVENOUS at 12:08

## 2023-03-22 RX ADMIN — SIMETHICONE 80 MG: 80 TABLET, CHEWABLE ORAL at 11:08

## 2023-03-22 RX ADMIN — CEFAZOLIN 3 G: 10 INJECTION, POWDER, FOR SOLUTION INTRAVENOUS at 11:08

## 2023-03-22 RX ADMIN — SENNOSIDES AND DOCUSATE SODIUM 2 TABLET: 8.6; 5 TABLET ORAL at 08:40

## 2023-03-22 NOTE — PLAN OF CARE
Goal Outcome Evaluation:  Plan of Care Reviewed With: patient           Outcome Evaluation: Patient presents s/p CABGx3 with deficits in strength and endurance that are currently limiting his functional mobility below his baseline. He ambulated 70' CGA with BUE support on tripod monitor today, all VSS with minor dizziness. IPPT is indicated to address current deficits. Will recommend that patient D/C home with assist from his family.

## 2023-03-22 NOTE — NURSING NOTE
Pt. Referred for Phase II Cardiac Rehab. Staff discussed benefits of exercise, program protocol, and educational material provided. Teach back verified.  Permission granted from patient for staff to fax referral information to outlying program at this time.  Staff faxed referral info to [facility].

## 2023-03-22 NOTE — PROGRESS NOTES
INTENSIVIST / PULMONARY FOLLOW UP NOTE     Hospital:  LOS: 1 day   Mr. Wilbert Kelley, 62 y.o. male is followed for:     Coronary artery disease involving native coronary artery of native heart without angina pectoris    Diabetes mellitus (HCC)    Sleep apnea    Coronary artery disease of native artery of native heart with stable angina pectoris (HCC)          SUBJECTIVE   Remains on insulin gtt and low dose levophed    The patient's relevant past medical, surgical, family, and social history were reviewed    Allergies and medications were reviewed    ROS:    Per subjective, all other systems were reviewed and were negative        OBJECTIVE     Vital Sign Min/Max for last 24 hours:  Temp  Min: 96.4 °F (35.8 °C)  Max: 100 °F (37.8 °C)   BP  Min: 71/57  Max: 147/88   Pulse  Min: 76  Max: 101   Resp  Min: 13  Max: 32   SpO2  Min: 85 %  Max: 100 %   No data recorded     Physical Exam:  General Appearance:  No acute distress  Eyes:  No scleral icterus or pallor, pupils normal  Ears, Nose, Mouth, Throat:  Atraumatic, oropharynx clear  Neck:  Trachea midline, thyroid normal  Respiratory:  Clear to auscultation bilaterally, normal effort  Cardiovascular:  Regular rate and rhythm, no murmurs, no peripheral edema  Gastrointestinal:  Soft, non-tender, non-distended, no hepatosplenomegaly  Skin:  Normal temperature, no rash  Psychiatric:  No agitation  Neuro:  No new focal neurologic deficits observed    Telemetry:              Hemodynamics:   CVP:     PAP:     PAOP:     CO: CO (L/min): 11.9 L/min   CI: CI (L/min/m2): 4.56 L/min/m2   SVI: SVI: 49.03   SVR:       SpO2: (!) 88 % SpO2  Min: 85 %  Max: 100 %   Device:      Flow Rate:   No data recorded       Intake/Ouptut 24 hrs (7:00AM - 6:59 AM)  Intake & Output (last 3 days)       03/19 0701 03/20 0700 03/20 0701 03/21 0700 03/21 0701 03/22 0700 03/22 0701  03/23 0700    P.O.   480     I.V. (mL/kg)   3340.5 (22.7)     Blood   631     IV Piggyback   1828.6     Total  Intake(mL/kg)   6280.1 (42.7)     Urine (mL/kg/hr)   2485 (0.7) 104 (0.1)    Chest Tube   865 50    Total Output   3350 154    Net   +2930.1 -154                  Lines, Drains & Airways     Active LDAs     Name Placement date Placement time Site Days    Peripheral IV 03/21/23 0612 Left;Posterior Hand 03/21/23  0612  Hand  1    Urethral Catheter Silicone 16 Fr. 03/21/23  --  -- 1    Y Chest Tube 1 and 2 1 Mediastinal 32 Fr. 2 Mediastinal 32 Fr. 03/21/23  --  -- 1    Y Chest Tube 3 and 4 3 Mediastinal 32 Fr. 4 Mediastinal 32 Fr. 03/21/23  --  -- 1    Arterial Line 03/21/23 Right Radial 03/21/23  0645  created via procedure documentation  Radial  1    Introducer- Double Lumen 03/21/23 Internal jugular Right 03/21/23  --  Internal jugular  1    Pacer Wires 03/21/23  --  Ventricular  1                Hematology:  Results from last 7 days   Lab Units 03/22/23  0218 03/21/23  1529 03/21/23  1137 03/21/23  1011 03/21/23  0941 03/21/23  0910 03/21/23  0906 03/21/23  0708 03/20/23  1512 03/16/23  0804   WBC 10*3/mm3 15.73* 13.26* 13.54*  --   --   --   --   --  8.79 10.58   HEMOGLOBIN g/dL 10.1* 10.2* 10.7*  --   --   --   --   --  14.4 14.9   HEMOGLOBIN, POC g/dL  --   --   --  11.2* 11.9* 10.9* 10.2*   < >  --   --    HEMATOCRIT % 33.4* 32.3* 34.1*  --   --   --   --   --  45.1 46.8   HEMATOCRIT POC %  --   --   --  33* 35* 32* 30*   < >  --   --    PLATELETS 10*3/mm3 311 307 304  --   --   --   --   --  385 361    < > = values in this interval not displayed.     Electrolytes, Magnesium and Phosphorus:  Results from last 7 days   Lab Units 03/22/23  0218 03/21/23  1529 03/21/23  1137 03/20/23  1512 03/16/23  0804   SODIUM mmol/L 138 141 138 140 140   CHLORIDE mmol/L 101 103 99 99 101   POTASSIUM mmol/L 4.6 4.1 3.5 4.2 4.3   CO2 mmol/L 26.0 25.0 27.0 28.0 27.3   MAGNESIUM mg/dL 2.0 2.3 2.1 1.7  --    PHOSPHORUS mg/dL  --  4.4 4.4  --   --      Renal:  Results from last 7 days   Lab Units 03/22/23  0218 03/21/23  1529  03/21/23  1137 03/20/23  1512 03/16/23  0804   CREATININE mg/dL 1.64* 1.42* 1.40* 1.20 1.07   BUN mg/dL 23 24* 24* 25* 19     Estimated Creatinine Clearance: 71.6 mL/min (A) (by C-G formula based on SCr of 1.64 mg/dL (H)).  Hepatic:  Results from last 7 days   Lab Units 03/22/23  0218 03/20/23  1512   ALK PHOS U/L 52 76   BILIRUBIN mg/dL 0.6 0.4   ALT (SGPT) U/L 13 19   AST (SGOT) U/L 25 20     Arterial Blood Gases:  Results from last 7 days   Lab Units 03/21/23  1632 03/21/23  1231 03/21/23  1011 03/21/23  0941 03/21/23  0910 03/21/23  0906 03/21/23  0845   PH, ARTERIAL pH units 7.371 7.392 7.37 7.34* 7.33* 7.31* 7.22*   PCO2, ARTERIAL mm Hg 46.6* 43.9  --   --   --   --   --    PO2 ART mm Hg 92.7 252.0*  --   --   --   --   --    FIO2 % 40 100  --   --   --   --   --        Results from last 7 days   Lab Units 03/20/23  1512   HEMOGLOBIN A1C % 7.70*       No results found for: LACTATE    Relevant imaging studies and labs from 03/22/23 were reviewed    Medications (drips):  dexmedetomidine, Last Rate: Stopped (03/21/23 1130)  dextrose 5 % with KCl 20 mEq, Last Rate: 30 mL/hr (03/21/23 1115)  insulin, Last Rate: 8.4 Units/hr (03/22/23 0719)  niCARdipine  nitroglycerin, Last Rate: Stopped (03/21/23 1630)  norepinephrine, Last Rate: 0.02 mcg/kg/min (03/22/23 0300)        acetaminophen, 650 mg, Oral, Q8H  aspirin, 325 mg, Oral, Daily  atorvastatin, 40 mg, Oral, Nightly  ceFAZolin, 3 g, Intravenous, Q8H  chlorhexidine, 15 mL, Mouth/Throat, Q12H  gabapentin, 100 mg, Oral, Q8H  metoprolol tartrate, 12.5 mg, Oral, Q12H  pharmacy consult - [place], , Does not apply, Daily  senna-docusate sodium, 2 tablet, Oral, BID        •  albumin human  •  bisacodyl  •  Calcium Replacement - Follow Nurse / BPA Driven Protocol  •  dexmedetomidine  •  dextrose  •  dextrose  •  glucagon (human recombinant)  •  HYDROcodone-acetaminophen  •  HYDROmorphone  •  Magnesium Standard Dose Replacement - Follow Nurse / BPA Driven Protocol  •   niCARdipine  •  nitroglycerin  •  norepinephrine  •  ondansetron  •  oxyCODONE  •  Phosphorus Replacement - Follow Nurse / BPA Driven Protocol  •  polyethylene glycol  •  Potassium Replacement - Follow Nurse / BPA Driven Protocol  •  racemic epinephrine  •  simethicone  •  sodium bicarbonate    Assessment & Plan   IMPRESSION / PLAN     Inpatient Problem List:  62 y.o.male:  Active Hospital Problems    Diagnosis    • **Coronary artery disease involving native coronary artery of native heart without angina pectoris    • Coronary artery disease of native artery of native heart with stable angina pectoris (HCC)    • Diabetes mellitus (HCC)    • Sleep apnea         Hospital Course:  62 y.o.male with relevant PMH of CAD with PTCA and stent placement to LAD and right Coronary Artery in 2010, HTN, HLD, recent LHC 3/16 w/ MVCAD, diabetes admitted 3/21/2023 post op [id number] by Dr. Emanuel.    Impression:  Remains on insulin gtt and low dose levophed    Plan:    Post op Care  CAD  Per CTS / Cardiology    T2DM A1c 7.7  Stress Hyperglycemia  D/c dextrose containing IVF, try to transition to SQ insulin later today    Hypotension  EMANUEL  Wean levophed as able to keep MAP > 65, give 2 liters of LR over 20 hours.  Avoid Nephrotoxins.    Likely MARIAH  CPAP hs and prn    PT/OT    Nutrition  Dietary Orders (From admission, onward)     Start     Ordered    03/22/23 0126  Diet: Liquid Diets; Clear Liquid; Texture: Regular Texture (IDDSI 7); Fluid Consistency: Thin (IDDSI 0)  Diet Effective Now        References:    Diet Order [facility]   Question Answer Comment   Diets: Liquid Diets    Liquid Diet: Clear Liquid    Texture: Regular Texture (IDDSI 7)    Fluid Consistency: Thin (IDDSI 0)        03/22/23 0125              Plan of care and goals reviewed with multidisciplinary team at daily rounds    Critical Care time spent in direct patient care: 35 minutes (excluding procedure time, if applicable) including high complexity decision making to  assess, manipulate, and support vital organ system failure in this individual who has impairment of one or more vital organ systems such that there is a high probability of imminent or life threatening deterioration in the patient’s condition.       Sushil Reina MD  Intensive Care Medicine  03/22/23 13:15 EDT

## 2023-03-22 NOTE — PLAN OF CARE
Goal Outcome Evaluation:  Plan of Care Reviewed With: patient        Progress: improving    Neuro- Patient drowsy but easily wakes to verbal stimuli. Remains AO x 4. Moves all extremities and follows all commands without diff. Patient transferred to bedside chair this am with assist x 3 with minimal diff.     Respiratory- Respirations even and unlabored. Denies any SOB or difficulty breathing at this time. Remains on O2 at 4lpm via nasal cannula at this time. Patient with strong cough and minimal amount of thin white sputum noted. MT x 4 noted, draining sanguineous drainage noted. MT 1/2 output- 340, MT 3/4 output- 95.     Cardiac- Remains in NSR at this time. HR 80-90. SBP- 110-130. Norepinephrine remains at 0.02mcg/kg/min. Soft heart tones noted. Pulses palpable. + pericardial friction rub noted.     GI/- Patient passed dysphagia screen without diff. Encouraged to take drinking water slow due N/V. Patient voiced understanding. Abd soft non-tender with ABS + x 4. 0 BM noted this shift. Ken cath remains and draining clear yellow urine. UOP- 600.     Other- Patient medicated multiple times for pain and nausea. 0 s/s of acute distress noted. Will cont to follow.

## 2023-03-22 NOTE — PROGRESS NOTES
"  [facility]  PROGRESS NOTE    Date of Admission: 3/21/2023  Date of Service: 03/22/23    Primary Care Physician: Jose M Simon MD    Chief Complaint: f/u HTN, HLD  Problem List:   Coronary artery disease involving native coronary artery of native heart without angina pectoris    Diabetes mellitus (HCC)    Sleep apnea    Coronary artery disease of native artery of native heart with stable angina pectoris (HCC)      Subjective      HPI: Extubated, ambulated with physical therapy, wife at bedside, no new complaints today.      Objective   Vitals: BP 91/54   Pulse 92   Temp 99.5 °F (37.5 °C) (Bladder)   Resp 15   Ht 185.4 cm (73\")   Wt (!) 147 kg (324 lb 15.3 oz)   SpO2 94%   BMI 42.87 kg/m²     Physical Exam:  General Appearance:   · Morbidly obese no acute distress  Neck:  · thyroid not enlarged  · supple  Respiratory:  · no respiratory distress  · normal breath sounds  · no rales  Cardiovascular:  · no jugular venous distention  · regular rhythm  · apical impulse normal  · S1 normal, S2 normal  · no S3, no S4   · no murmur  · Slight precordial rub, no thrill  · carotid pulses normal; no bruit  · pedal pulses normal  · lower extremity edema: 1+  Skin:   warm, dry      Results:  Results from last 7 days   Lab Units 03/22/23  0218 03/21/23  1529 03/21/23  1137   WBC 10*3/mm3 15.73* 13.26* 13.54*   HEMOGLOBIN g/dL 10.1* 10.2* 10.7*   HEMATOCRIT % 33.4* 32.3* 34.1*   PLATELETS 10*3/mm3 311 307 304     Results from last 7 days   Lab Units 03/22/23  0218 03/21/23  1529 03/21/23  1137   SODIUM mmol/L 138 141 138   POTASSIUM mmol/L 4.6 4.1 3.5   CHLORIDE mmol/L 101 103 99   CO2 mmol/L 26.0 25.0 27.0   BUN mg/dL 23 24* 24*   CREATININE mg/dL 1.64* 1.42* 1.40*   GLUCOSE mg/dL 175* 190* 184*      Lab Results   Component Value Date    AST 25 03/22/2023    ALT 13 03/22/2023     Results from last 7 days   Lab Units 03/20/23  1512   HEMOGLOBIN A1C % 7.70*         Results from last " 7 days   Lab Units 03/22/23  0218 03/21/23  1137 03/20/23  1512   PROTIME Seconds 14.9* 16.1* 12.9   INR  1.17* 1.29* 0.98   APTT seconds  --  32.9 28.7       Intake/Output Summary (Last 24 hours) at 3/22/2023 0802  Last data filed at 3/22/2023 0600  Gross per 24 hour   Intake 6180.11 ml   Output 3350 ml   Net 2830.11 ml     I personally reviewed the patient's EKG/Telemetry data    Radiology Data:   CXR 3/22/23:  IMPRESSION:  Impression:  1. Interval extubation and removal of esophagogastric tube.  2. Right IJ central venous catheter tip at the mid SVC.  3. Small left apical pneumothorax measuring 10 mm. Left chest tube in place.  4. Hypoinflated lungs with bibasilar atelectasis left greater than right, stable.    Current Medications:  acetaminophen, 650 mg, Oral, Q8H  aspirin, 325 mg, Oral, Daily  atorvastatin, 40 mg, Oral, Nightly  ceFAZolin, 3 g, Intravenous, Q8H  chlorhexidine, 15 mL, Mouth/Throat, Q12H  gabapentin, 100 mg, Oral, Q8H  metoprolol tartrate, 12.5 mg, Oral, Q12H  metoprolol tartrate, 2.5 mg, Intravenous, Q6H  pharmacy consult - [place], , Does not apply, Daily  senna-docusate sodium, 2 tablet, Oral, BID      dexmedetomidine, 0.2-1.5 mcg/kg/hr, Last Rate: Stopped (03/21/23 1130)  dextrose 5 % with KCl 20 mEq, 30 mL/hr, Last Rate: 30 mL/hr (03/21/23 1115)  insulin, 0-100 Units/hr, Last Rate: 8.4 Units/hr (03/22/23 0719)  niCARdipine, 5-15 mg/hr  nitroglycerin, 5-200 mcg/min, Last Rate: Stopped (03/21/23 1630)  norepinephrine, 0.02-0.3 mcg/kg/min, Last Rate: 0.02 mcg/kg/min (03/22/23 0300)        Assessment and Plan:     1. MVCAD   - [facility] 3/16 Dr. Boyle with MVCAD, normal EF pre-op   - CABG x3 with Dr. Emanuel 3/21     2. Hypertension   - currently on low dose NE   Holding beta-blocker for now     3. Hyperlipidemia  -Continue atorvastatin 40 mg, which is double home dose  - check AM lipid panel and target LDL <70     4. DM2  - A1C 7.7%  - per intensivists     5. EMANUEL   - Cr up to 1.6 today   - continue to  monitor and avoid hypotension/nephrotoxins   Encourage fluids        Electronically signed by Corinna Savage PA-C, 03/22/23, 8:05 AM EDT.    I have seen and examined the patient, performing a face-to-face diagnostic evaluation with plan of care reviewed and developed with the Advanced Practice Clinician and nursing staff. I have addended and modified the above history of present illness, physical examination, and assessment and plan to reflect my findings and impressions. All medical decision making performed by Dr. Riddle.    Markus Riddle MD, [facility], [facility]  03/22/23

## 2023-03-22 NOTE — THERAPY EVALUATION
Patient Name: Wilbert Kelley  : 1960    MRN: [id number]                              Today's Date: 3/22/2023       Admit Date: 3/21/2023    Visit Dx:     ICD-10-CM ICD-9-CM   1. Coronary artery disease of native artery of native heart with stable angina pectoris (HCC)  I25.118 414.01     413.9     Patient Active Problem List   Diagnosis   • SOB (shortness of breath) on exertion   • Coronary artery disease involving native coronary artery of native heart without angina pectoris   • Diabetes mellitus (HCC)   • Sleep apnea   • Coronary artery disease of native artery of native heart with stable angina pectoris (HCC)     Past Medical History:   Diagnosis Date   • Arthritis    • BPH (benign prostatic hyperplasia)    • Cancer (HCC)     basal and melanoma-  x2 - left side ear and elbow   • Constipation    • Coronary artery disease    • DDD (degenerative disc disease), lumbar    • Diabetes mellitus (HCC)     couple times month check sugar   • Frozen shoulder     left   • GERD (gastroesophageal reflux disease)    • Hyperlipidemia    • Hypertension    • Limited mobility     left shoulder  - possible frozen shoulder-= please be aware for surgery   • Sleep apnea     doesnt use machine   • Tinnitus    • Wears glasses     small print     Past Surgical History:   Procedure Laterality Date   • CARDIAC CATHETERIZATION     • CARDIAC CATHETERIZATION Right 2023    Procedure: Left Heart Cath;  Surgeon: Jarod Boyle MD;  Location:  [facility];  Service: Cardiovascular;  Laterality: Right;   • CATARACT EXTRACTION, BILATERAL Bilateral    • COLONOSCOPY     • CORONARY ARTERY BYPASS GRAFT N/A 3/21/2023    Procedure: MEDIAN STERNOTOMY, CORONARY ARTERY BYPASS GRAFTING X 3, UTILIZING THE LEFT INTERNAL MAMMARY ARTERY, ENDOSCOPIC VEIN HARVEST OF THE RIGHT GREATER SAPENOUSE VEIN;  Surgeon: Markus Emanuel MD;  Location: [facility] OR;  Service: Cardiothoracic;  Laterality: N/A;   • CORONARY STENT PLACEMENT       x4   • ENDOSCOPY     • FINGER SURGERY Left     middle finger - left side   • SKIN CANCER EXCISION      x2   • WISDOM TOOTH EXTRACTION        General Information     Row Name 03/22/23 1129          Physical Therapy Time and Intention    Document Type evaluation  -CM     Mode of Treatment physical therapy;individual therapy  -CM     Row Name 03/22/23 1129          General Information    Patient Profile Reviewed yes  -CM     Prior Level of Function independent:;all household mobility;ADL's  ind no AD, walks short distances at baseline due to sciatica pain  -CM     Existing Precautions/Restrictions cardiac;fall;oxygen therapy device and L/min;sternal;other (see comments)  CTx2  -CM     Barriers to Rehab medically complex;previous functional deficit  -CM     Row Name 03/22/23 1129          Living Environment    People in Home spouse;child(rama), adult;grandchild(rama)  wife, daughter, 2 grandchildren  -CM     Row Name 03/22/23 1129          Home Main Entrance    Number of Stairs, Main Entrance five  -CM     Stair Railings, Main Entrance none  -CM     Row Name 03/22/23 1129          Stairs Within Home, Primary    Stairs, Within Home, Primary 2 story home, able to stay on main level  -CM     Number of Stairs, Within Home, Primary twelve  -CM     Row Name 03/22/23 1129          Cognition    Orientation Status (Cognition) oriented x 3  -CM     Row Name 03/22/23 1129          Safety Issues, Functional Mobility    Safety Issues Affecting Function (Mobility) awareness of need for assistance;insight into deficits/self-awareness;safety precaution awareness;safety precautions follow-through/compliance  -CM     Impairments Affecting Function (Mobility) balance;endurance/activity tolerance;shortness of breath;strength;pain  -CM           User Key  (r) = Recorded By, (t) = Taken By, (c) = Cosigned By    Initials Name Provider Type    CM Anahy Deleon, PT Physical Therapist               Mobility     Row Name 03/22/23 113           Bed Mobility    Bed Mobility sit-supine  -CM     Sit-Supine [place] (Bed Mobility) moderate assist (50% patient effort);2 person assist;verbal cues  -CM     Assistive Device (Bed Mobility) draw sheet  -CM     Comment, (Bed Mobility) VCs provided for maintenance of sternal precautions, patient requires assist at trunk and BLEs for sit to sup  -CM     Row Name 03/22/23 1132          Sit-Stand Transfer    Sit-Stand [place] (Transfers) minimum assist (75% patient effort);2 person assist;verbal cues  -CM     Comment, (Sit-Stand Transfer) cues for sternal precautions, grasps tripod monitor upon standing  -CM     Row Name 03/22/23 1132          Gait/Stairs (Locomotion)    [place] Level (Gait) contact guard;1 person assist  -CM     Assistive Device (Gait) other (see comments)  BUE support on tripod monitor  -CM     Distance in Feet (Gait) 70  -CM     Deviations/Abnormal Patterns (Gait) bilateral deviations;base of support, wide;gait speed decreased;stride length decreased;weight shifting decreased  -CM     Comment, (Gait/Stairs) Patient ambulated in hallway with a step through gait pattern. He demonstrates a wide base of support and endorses dizziness with ambulation. Cues provided for maintenance of eye opening and navigation of obstacles. All VSS and patient with no LOB. Distance limited by dizziness and fatigue.  -CM           User Key  (r) = Recorded By, (t) = Taken By, (c) = Cosigned By    Initials Name Provider Type    Anahy Plata, PT Physical Therapist               Obj/Interventions     Row Name 03/22/23 1137          Range of Motion Comprehensive    General Range of Motion bilateral lower extremity ROM WFL  -CM     Row Name 03/22/23 1137          Strength Comprehensive (MMT)    General Manual Muscle Testing (MMT) Assessment lower extremity strength deficits identified  -CM     Comment, General Manual Muscle Testing (MMT) Assessment BLE grossly 4-/5  -CM     Row Name 03/22/23 1137           Balance    Balance Assessment sitting static balance;standing static balance;standing dynamic balance  -CM     Static Sitting Balance contact guard  -CM     Position, Sitting Balance unsupported;sitting in chair  -CM     Static Standing Balance contact guard  -CM     Dynamic Standing Balance contact guard  -CM     Position/Device Used, Standing Balance supported;other (see comments)  BUE support on tripod monitor  -CM     Comment, Balance no LOB  -CM           User Key  (r) = Recorded By, (t) = Taken By, (c) = Cosigned By    Initials Name Provider Type    CM Anahy Deleon, PT Physical Therapist               Goals/Plan     Row Name 03/22/23 1140          Bed Mobility Goal 1 (PT)    Activity/Assistive Device (Bed Mobility Goal 1, PT) sit to supine/supine to sit  -CM     [place] Level/Cues Needed (Bed Mobility Goal 1, PT) minimum assist (75% or more patient effort)  -CM     Time Frame (Bed Mobility Goal 1, PT) long term goal (LTG);10 days  -CM     Progress/Outcomes (Bed Mobility Goal 1, PT) new goal  -CM     Row Name 03/22/23 1140          Transfer Goal 1 (PT)    Activity/Assistive Device (Transfer Goal 1, PT) sit-to-stand/stand-to-sit;bed-to-chair/chair-to-bed  -CM     [place] Level/Cues Needed (Transfer Goal 1, PT) supervision required  -CM     Time Frame (Transfer Goal 1, PT) long term goal (LTG);10 days  -CM     Progress/Outcome (Transfer Goal 1, PT) new goal  -CM     Row Name 03/22/23 1140          Gait Training Goal 1 (PT)    Activity/Assistive Device (Gait Training Goal 1, PT) gait (walking locomotion)  -CM     [place] Level (Gait Training Goal 1, PT) standby assist  -CM     Distance (Gait Training Goal 1, PT) 500'  -CM     Time Frame (Gait Training Goal 1, PT) long term goal (LTG);10 days  -CM     Progress/Outcome (Gait Training Goal 1, PT) new goal  -CM     Row Name 03/22/23 1140          Stairs Goal 1 (PT)    Activity/Assistive Device (Stairs Goal 1, PT) ascending stairs;descending  stairs  -CM     [place] Level/Cues Needed (Stairs Goal 1, PT) contact guard required  -CM     Number of Stairs (Stairs Goal 1, PT) 5  -CM     Time Frame (Stairs Goal 1, PT) long term goal (LTG);10 days  -CM     Progress/Outcome (Stairs Goal 1, PT) new goal  -CM     Row Name 03/22/23 1140          Therapy Assessment/Plan (PT)    Planned Therapy Interventions (PT) balance training;bed mobility training;gait training;home exercise program;postural re-education;patient/family education;neuromuscular re-education;motor coordination training;ROM (range of motion);stair training;strengthening;stretching;transfer training  -CM           User Key  (r) = Recorded By, (t) = Taken By, (c) = Cosigned By    Initials Name Provider Type    CM Anahy Deleon, PT Physical Therapist               Clinical Impression     Row Name 03/22/23 7852          Pain    Pain Intervention(s) Ambulation/increased activity;Repositioned  -CM     Additional Documentation Pain Scale: FACES Pre/Post-Treatment (Group)  -CM     Row Name 03/22/23 2405          Pain Scale: FACES Pre/Post-Treatment    Pain: FACES Scale, Pretreatment 4-->hurts little more  -CM     Posttreatment Pain Rating 4-->hurts little more  -CM     Pain Location generalized  -CM     Pain Location - back  -CM     Pre/Posttreatment Pain Comment patient with complaints of sciatica that he reports is present at baseline  -     Row Name 03/22/23 5508          Plan of Care Review    Plan of Care Reviewed With patient  -CM     Outcome Evaluation Patient presents s/p CABGx3 with deficits in strength and endurance that are currently limiting his functional mobility below his baseline. He ambulated 70' CGA with BUE support on tripod monitor today, all VSS with minor dizziness. IPPT is indicated to address current deficits. Will recommend that patient D/C home with assist from his family.  -CM     Row Name 03/22/23 3846          Therapy Assessment/Plan (PT)    Rehab Potential (PT)  good, to achieve stated therapy goals  -CM     Criteria for Skilled Interventions Met (PT) yes;meets criteria;skilled treatment is necessary  -CM     Therapy Frequency (PT) daily  -CM     Row Name 03/22/23 1137          Vital Signs    Pre Systolic BP Rehab 106  -CM     Pre Treatment Diastolic BP 57  -CM     Post Systolic BP Rehab 94  -CM     Post Treatment Diastolic BP 54  -CM     Pretreatment Heart Rate (beats/min) 91  -CM     Posttreatment Heart Rate (beats/min) 93  -CM     Pre SpO2 (%) 95  -CM     O2 Delivery Pre Treatment nasal cannula  -CM     O2 Delivery Intra Treatment nasal cannula  -CM     Post SpO2 (%) 92  -CM     O2 Delivery Post Treatment nasal cannula  -CM     Pre Patient Position Sitting  -CM     Intra Patient Position Standing  -CM     Post Patient Position Supine  -CM     Row Name 03/22/23 1137          Positioning and Restraints    Pre-Treatment Position sitting in chair/recliner  -CM     Post Treatment Position bed  -CM     In Bed supine;call light within reach;encouraged to call for assist;RUE elevated;LUE elevated;with nsg;notified nsg  -CM           User Key  (r) = Recorded By, (t) = Taken By, (c) = Cosigned By    Initials Name Provider Type    CM Anahy Deleon, PT Physical Therapist               Outcome Measures     Row Name 03/22/23 1142          How much help from another person do you currently need...    Turning from your back to your side while in flat bed without using bedrails? 2  -CM     Moving from lying on back to sitting on the side of a flat bed without bedrails? 2  -CM     Moving to and from a bed to a chair (including a wheelchair)? 3  -CM     Standing up from a chair using your arms (e.g., wheelchair, bedside chair)? 3  -CM     Climbing 3-5 steps with a railing? 3  -CM     To walk in hospital room? 3  -CM     AM-PAC 6 Clicks Score (PT) 16  -CM     Highest level of mobility 5 --> Static standing  -CM           User Key  (r) = Recorded By, (t) = Taken By, (c) = Cosigned By     Initials Name Provider Type    Anahy Plata PT Physical Therapist                             Physical Therapy Education     Title: PT OT SLP Therapies (In Progress)     Topic: Physical Therapy (In Progress)     Point: Mobility training (In Progress)     Learning Progress Summary           Patient Acceptance, E, NR by CM at 3/22/2023 1142                   Point: Home exercise program (Not Started)     Learner Progress:  Not documented in this visit.          Point: Body mechanics (In Progress)     Learning Progress Summary           Patient Acceptance, E, NR by CM at 3/22/2023 1142                   Point: Precautions (In Progress)     Learning Progress Summary           Patient Acceptance, E, NR by CM at 3/22/2023 1142                               User Key     Initials Effective Dates Name Provider Type Discipline     09/22/22 -  Anahy Deleon PT Physical Therapist PT              PT Recommendation and Plan  Planned Therapy Interventions (PT): balance training, bed mobility training, gait training, home exercise program, postural re-education, patient/family education, neuromuscular re-education, motor coordination training, ROM (range of motion), stair training, strengthening, stretching, transfer training  Plan of Care Reviewed With: patient  Outcome Evaluation: Patient presents s/p CABGx3 with deficits in strength and endurance that are currently limiting his functional mobility below his baseline. He ambulated 70' CGA with BUE support on tripod monitor today, all VSS with minor dizziness. IPPT is indicated to address current deficits. Will recommend that patient D/C home with assist from his family.     Time Calculation:    PT Charges     Row Name 03/22/23 1142             Time Calculation    Start Time 0940  -CM      PT Received On 03/22/23  -CM      PT Goal Re-Cert Due Date 04/01/23  -CM         Untimed Charges    PT Eval/Re-eval Minutes 50  -CM         Total Minutes    Untimed  Charges Total Minutes 50  -CM       Total Minutes 50  -CM            User Key  (r) = Recorded By, (t) = Taken By, (c) = Cosigned By    Initials Name Provider Type    Anahy Plata, PT Physical Therapist              Therapy Charges for Today     Code Description Service Date Service Provider Modifiers Qty    79994819708 HC PT EVAL MOD COMPLEXITY 4 3/22/2023 Anahy Deleon, PT GP 1          PT G-Codes  AM-PAC 6 Clicks Score (PT): 16  PT Discharge Summary  Anticipated Discharge Disposition (PT): home with assist    Anahy Deleon, MANOHAR  3/22/2023

## 2023-03-22 NOTE — PROGRESS NOTES
CTS Progress Note       LOS: 1 day   Patient Care Team:  Jose M Simon MD as PCP - General (Geriatric Medicine)    Chief Complaint: Coronary artery disease involving native coronary artery of native heart without angina pectoris    Vital Signs:  Temp:  [96.4 °F (35.8 °C)-100 °F (37.8 °C)] 99.5 °F (37.5 °C)  Heart Rate:  [] 95  Resp:  [13-32] 15  BP: ()/(45-96) 112/64  FiO2 (%):  [40 %-100 %] 40 %    Physical Exam: Extubated.  Up in chair neurologically intact       Results:   Results from last 7 days   Lab Units 03/22/23  0218   WBC 10*3/mm3 15.73*   HEMOGLOBIN g/dL 10.1*   HEMATOCRIT % 33.4*   PLATELETS 10*3/mm3 311     Results from last 7 days   Lab Units 03/22/23  0218   SODIUM mmol/L 138   POTASSIUM mmol/L 4.6   CHLORIDE mmol/L 101   CO2 mmol/L 26.0   BUN mg/dL 23   CREATININE mg/dL 1.64*   GLUCOSE mg/dL 175*   CALCIUM mg/dL 8.5*           Imaging Results (Last 24 Hours)     Procedure Component Value Units Date/Time    XR Chest 1 View [[id number]] Resulted: 03/22/23 0403     Updated: 03/22/23 0403    XR Chest 1 View [[id number]] Collected: 03/21/23 1251     Updated: 03/21/23 1258    Narrative:      XR CHEST 1 VW    Date of Exam: 3/21/2023 11:41 AM EDT    Indication: Post-Op Check Line & Tube Placement.    Comparison: One day prior    Findings:  Postoperative changes noted from interval median sternotomy. Endotracheal tube terminates in good position above the shelley. A nasogastric tube is noted entering the stomach. Right IJ introducer is present in the SVC. Mediastinal and pleural drains noted   in place. Lung volumes are diminished with associated atelectasis. There is otherwise no focal opacity. There is no distinct pneumothorax. Prominent cardiac and mediastinal contours are noted, likely postoperative.      Impression:      Impression:  Postoperative changes noted from interval median sternotomy. Endotracheal tube terminates in good position above the shelley. A nasogastric tube is noted  entering the stomach. Right IJ introducer is present in the SVC. Mediastinal and pleural drains noted   in place. Lung volumes are diminished with associated atelectasis. There is otherwise no focal opacity. There is no distinct pneumothorax. Prominent cardiac and mediastinal contours are noted, likely postoperative.    Electronically Signed: Prudencio Mendoza    3/21/2023 12:55 PM EDT    Workstation ID: [id number]          Assessment      Coronary artery disease involving native coronary artery of native heart without angina pectoris    Diabetes mellitus (HCC)    Sleep apnea    Coronary artery disease of native artery of native heart with stable angina pectoris (HCC)    Satisfactory course less than 24 hours postoperative    Plan   Continuing supportive care    Please note that portions of this note were completed with a voice recognition program. Efforts were made to edit the dictations, but occasionally words are mistranscribed.    Markus Emanuel MD  03/22/23  07:04 EDT

## 2023-03-23 ENCOUNTER — APPOINTMENT (OUTPATIENT)
Dept: GENERAL RADIOLOGY | Facility: HOSPITAL | Age: 63
DRG: 236 | End: 2023-03-23
Payer: MEDICARE

## 2023-03-23 ENCOUNTER — APPOINTMENT (OUTPATIENT)
Dept: ULTRASOUND IMAGING | Facility: HOSPITAL | Age: 63
DRG: 236 | End: 2023-03-23
Payer: MEDICARE

## 2023-03-23 PROBLEM — Z95.1 S/P CABG X 3: Status: ACTIVE | Noted: 2023-03-23

## 2023-03-23 LAB
ALBUMIN SERPL-MCNC: 4.1 G/DL (ref 3.5–5.2)
ALBUMIN/GLOB SERPL: 1.5 G/DL
ALP SERPL-CCNC: 57 U/L (ref 39–117)
ALT SERPL W P-5'-P-CCNC: 6 U/L (ref 1–41)
ANION GAP SERPL CALCULATED.3IONS-SCNC: 12 MMOL/L (ref 5–15)
AST SERPL-CCNC: 26 U/L (ref 1–40)
BASOPHILS # BLD AUTO: 0.05 10*3/MM3 (ref 0–0.2)
BASOPHILS NFR BLD AUTO: 0.2 % (ref 0–1.5)
BH BB BLOOD EXPIRATION DATE: NORMAL
BH BB BLOOD EXPIRATION DATE: NORMAL
BH BB BLOOD TYPE BARCODE: 5100
BH BB BLOOD TYPE BARCODE: 5100
BH BB DISPENSE STATUS: NORMAL
BH BB DISPENSE STATUS: NORMAL
BH BB PRODUCT CODE: NORMAL
BH BB PRODUCT CODE: NORMAL
BH BB UNIT NUMBER: NORMAL
BH BB UNIT NUMBER: NORMAL
BILIRUB SERPL-MCNC: 0.7 MG/DL (ref 0–1.2)
BUN SERPL-MCNC: 36 MG/DL (ref 8–23)
BUN/CREAT SERPL: 14.5 (ref 7–25)
CALCIUM SPEC-SCNC: 8.5 MG/DL (ref 8.6–10.5)
CHLORIDE SERPL-SCNC: 98 MMOL/L (ref 98–107)
CO2 SERPL-SCNC: 27 MMOL/L (ref 22–29)
CREAT SERPL-MCNC: 2.49 MG/DL (ref 0.76–1.27)
CROSSMATCH INTERPRETATION: NORMAL
CROSSMATCH INTERPRETATION: NORMAL
DEPRECATED RDW RBC AUTO: 53.8 FL (ref 37–54)
EGFRCR SERPLBLD CKD-EPI 2021: 28.7 ML/MIN/1.73
EOSINOPHIL # BLD AUTO: 0.02 10*3/MM3 (ref 0–0.4)
EOSINOPHIL NFR BLD AUTO: 0.1 % (ref 0.3–6.2)
ERYTHROCYTE [DISTWIDTH] IN BLOOD BY AUTOMATED COUNT: 17.1 % (ref 12.3–15.4)
GLOBULIN UR ELPH-MCNC: 2.8 GM/DL
GLUCOSE BLDC GLUCOMTR-MCNC: 122 MG/DL (ref 70–130)
GLUCOSE BLDC GLUCOMTR-MCNC: 133 MG/DL (ref 70–130)
GLUCOSE BLDC GLUCOMTR-MCNC: 133 MG/DL (ref 70–130)
GLUCOSE BLDC GLUCOMTR-MCNC: 138 MG/DL (ref 70–130)
GLUCOSE BLDC GLUCOMTR-MCNC: 143 MG/DL (ref 70–130)
GLUCOSE BLDC GLUCOMTR-MCNC: 144 MG/DL (ref 70–130)
GLUCOSE BLDC GLUCOMTR-MCNC: 144 MG/DL (ref 70–130)
GLUCOSE BLDC GLUCOMTR-MCNC: 149 MG/DL (ref 70–130)
GLUCOSE BLDC GLUCOMTR-MCNC: 161 MG/DL (ref 70–130)
GLUCOSE BLDC GLUCOMTR-MCNC: 162 MG/DL (ref 70–130)
GLUCOSE BLDC GLUCOMTR-MCNC: 163 MG/DL (ref 70–130)
GLUCOSE BLDC GLUCOMTR-MCNC: 167 MG/DL (ref 70–130)
GLUCOSE BLDC GLUCOMTR-MCNC: 169 MG/DL (ref 70–130)
GLUCOSE SERPL-MCNC: 154 MG/DL (ref 65–99)
HCT VFR BLD AUTO: 33.1 % (ref 37.5–51)
HGB BLD-MCNC: 10 G/DL (ref 13–17.7)
IMM GRANULOCYTES # BLD AUTO: 0.15 10*3/MM3 (ref 0–0.05)
IMM GRANULOCYTES NFR BLD AUTO: 0.7 % (ref 0–0.5)
LYMPHOCYTES # BLD AUTO: 1 10*3/MM3 (ref 0.7–3.1)
LYMPHOCYTES NFR BLD AUTO: 4.7 % (ref 19.6–45.3)
MAGNESIUM SERPL-MCNC: 2.1 MG/DL (ref 1.6–2.4)
MCH RBC QN AUTO: 26.1 PG (ref 26.6–33)
MCHC RBC AUTO-ENTMCNC: 30.2 G/DL (ref 31.5–35.7)
MCV RBC AUTO: 86.4 FL (ref 79–97)
MONOCYTES # BLD AUTO: 2.05 10*3/MM3 (ref 0.1–0.9)
MONOCYTES NFR BLD AUTO: 9.6 % (ref 5–12)
NEUTROPHILS NFR BLD AUTO: 18.06 10*3/MM3 (ref 1.7–7)
NEUTROPHILS NFR BLD AUTO: 84.7 % (ref 42.7–76)
NRBC BLD AUTO-RTO: 0 /100 WBC (ref 0–0.2)
PHOSPHATE SERPL-MCNC: 4.6 MG/DL (ref 2.5–4.5)
PLATELET # BLD AUTO: 319 10*3/MM3 (ref 140–450)
PMV BLD AUTO: 10.2 FL (ref 6–12)
POTASSIUM SERPL-SCNC: 4.7 MMOL/L (ref 3.5–5.2)
PROT SERPL-MCNC: 6.9 G/DL (ref 6–8.5)
QT INTERVAL: 344 MS
QTC INTERVAL: 432 MS
RBC # BLD AUTO: 3.83 10*6/MM3 (ref 4.14–5.8)
SODIUM SERPL-SCNC: 137 MMOL/L (ref 136–145)
UNIT  ABO: NORMAL
UNIT  ABO: NORMAL
UNIT  RH: NORMAL
UNIT  RH: NORMAL
WBC NRBC COR # BLD: 21.33 10*3/MM3 (ref 3.4–10.8)

## 2023-03-23 PROCEDURE — 82570 ASSAY OF URINE CREATININE: CPT | Performed by: INTERNAL MEDICINE

## 2023-03-23 PROCEDURE — 99291 CRITICAL CARE FIRST HOUR: CPT | Performed by: INTERNAL MEDICINE

## 2023-03-23 PROCEDURE — 97530 THERAPEUTIC ACTIVITIES: CPT

## 2023-03-23 PROCEDURE — 84100 ASSAY OF PHOSPHORUS: CPT | Performed by: INTERNAL MEDICINE

## 2023-03-23 PROCEDURE — 83735 ASSAY OF MAGNESIUM: CPT | Performed by: INTERNAL MEDICINE

## 2023-03-23 PROCEDURE — 94799 UNLISTED PULMONARY SVC/PX: CPT

## 2023-03-23 PROCEDURE — 25010000002 CEFAZOLIN PER 500 MG: Performed by: STUDENT IN AN ORGANIZED HEALTH CARE EDUCATION/TRAINING PROGRAM

## 2023-03-23 PROCEDURE — 25010000002 HYDROMORPHONE PER 4 MG: Performed by: INTERNAL MEDICINE

## 2023-03-23 PROCEDURE — 76775 US EXAM ABDO BACK WALL LIM: CPT

## 2023-03-23 PROCEDURE — 25010000002 METOCLOPRAMIDE PER 10 MG: Performed by: THORACIC SURGERY (CARDIOTHORACIC VASCULAR SURGERY)

## 2023-03-23 PROCEDURE — P9047 ALBUMIN (HUMAN), 25%, 50ML: HCPCS | Performed by: INTERNAL MEDICINE

## 2023-03-23 PROCEDURE — 93005 ELECTROCARDIOGRAM TRACING: CPT | Performed by: STUDENT IN AN ORGANIZED HEALTH CARE EDUCATION/TRAINING PROGRAM

## 2023-03-23 PROCEDURE — 25010000002 METHYLNALTREXONE 12 MG/0.6ML SOLUTION: Performed by: INTERNAL MEDICINE

## 2023-03-23 PROCEDURE — 84156 ASSAY OF PROTEIN URINE: CPT | Performed by: INTERNAL MEDICINE

## 2023-03-23 PROCEDURE — 25010000002 ALBUMIN HUMAN 25% PER 50 ML: Performed by: INTERNAL MEDICINE

## 2023-03-23 PROCEDURE — 94640 AIRWAY INHALATION TREATMENT: CPT

## 2023-03-23 PROCEDURE — 93010 ELECTROCARDIOGRAM REPORT: CPT | Performed by: INTERNAL MEDICINE

## 2023-03-23 PROCEDURE — 85025 COMPLETE CBC W/AUTO DIFF WBC: CPT | Performed by: INTERNAL MEDICINE

## 2023-03-23 PROCEDURE — 99232 SBSQ HOSP IP/OBS MODERATE 35: CPT | Performed by: INTERNAL MEDICINE

## 2023-03-23 PROCEDURE — 82962 GLUCOSE BLOOD TEST: CPT

## 2023-03-23 PROCEDURE — 84300 ASSAY OF URINE SODIUM: CPT | Performed by: INTERNAL MEDICINE

## 2023-03-23 PROCEDURE — 94660 CPAP INITIATION&MGMT: CPT

## 2023-03-23 PROCEDURE — 71045 X-RAY EXAM CHEST 1 VIEW: CPT

## 2023-03-23 PROCEDURE — 99024 POSTOP FOLLOW-UP VISIT: CPT | Performed by: THORACIC SURGERY (CARDIOTHORACIC VASCULAR SURGERY)

## 2023-03-23 PROCEDURE — 80053 COMPREHEN METABOLIC PANEL: CPT | Performed by: INTERNAL MEDICINE

## 2023-03-23 RX ORDER — ALBUMIN (HUMAN) 12.5 G/50ML
25 SOLUTION INTRAVENOUS ONCE
Status: COMPLETED | OUTPATIENT
Start: 2023-03-23 | End: 2023-03-23

## 2023-03-23 RX ORDER — METOCLOPRAMIDE HYDROCHLORIDE 5 MG/ML
10 INJECTION INTRAMUSCULAR; INTRAVENOUS 3 TIMES DAILY
Status: DISCONTINUED | OUTPATIENT
Start: 2023-03-23 | End: 2023-03-23

## 2023-03-23 RX ORDER — BISACODYL 5 MG/1
5 TABLET, DELAYED RELEASE ORAL DAILY PRN
Status: DISCONTINUED | OUTPATIENT
Start: 2023-03-23 | End: 2023-03-30 | Stop reason: HOSPADM

## 2023-03-23 RX ORDER — BUMETANIDE 0.25 MG/ML
0.5 INJECTION INTRAMUSCULAR; INTRAVENOUS EVERY 6 HOURS
Status: COMPLETED | OUTPATIENT
Start: 2023-03-23 | End: 2023-03-24

## 2023-03-23 RX ORDER — AMOXICILLIN 250 MG
2 CAPSULE ORAL 2 TIMES DAILY
Status: DISCONTINUED | OUTPATIENT
Start: 2023-03-23 | End: 2023-03-30 | Stop reason: HOSPADM

## 2023-03-23 RX ORDER — METOCLOPRAMIDE HYDROCHLORIDE 5 MG/ML
10 INJECTION INTRAMUSCULAR; INTRAVENOUS
Status: DISCONTINUED | OUTPATIENT
Start: 2023-03-23 | End: 2023-03-25

## 2023-03-23 RX ORDER — IPRATROPIUM BROMIDE AND ALBUTEROL SULFATE 2.5; .5 MG/3ML; MG/3ML
3 SOLUTION RESPIRATORY (INHALATION) EVERY 6 HOURS PRN
Status: DISCONTINUED | OUTPATIENT
Start: 2023-03-23 | End: 2023-03-30 | Stop reason: HOSPADM

## 2023-03-23 RX ORDER — BISACODYL 10 MG
10 SUPPOSITORY, RECTAL RECTAL DAILY PRN
Status: DISCONTINUED | OUTPATIENT
Start: 2023-03-23 | End: 2023-03-30 | Stop reason: HOSPADM

## 2023-03-23 RX ORDER — SODIUM CHLORIDE, SODIUM LACTATE, POTASSIUM CHLORIDE, CALCIUM CHLORIDE 600; 310; 30; 20 MG/100ML; MG/100ML; MG/100ML; MG/100ML
100 INJECTION, SOLUTION INTRAVENOUS CONTINUOUS
Status: DISCONTINUED | OUTPATIENT
Start: 2023-03-23 | End: 2023-03-24

## 2023-03-23 RX ORDER — POLYETHYLENE GLYCOL 3350 17 G/17G
17 POWDER, FOR SOLUTION ORAL DAILY PRN
Status: DISCONTINUED | OUTPATIENT
Start: 2023-03-23 | End: 2023-03-30 | Stop reason: HOSPADM

## 2023-03-23 RX ORDER — CASTOR OIL AND BALSAM, PERU 788; 87 MG/G; MG/G
1 OINTMENT TOPICAL EVERY 12 HOURS SCHEDULED
Status: DISCONTINUED | OUTPATIENT
Start: 2023-03-23 | End: 2023-03-30 | Stop reason: HOSPADM

## 2023-03-23 RX ADMIN — HYDROMORPHONE HYDROCHLORIDE 0.5 MG: 1 INJECTION, SOLUTION INTRAMUSCULAR; INTRAVENOUS; SUBCUTANEOUS at 07:53

## 2023-03-23 RX ADMIN — CASTOR OIL AND BALSAM, PERU 1 APPLICATION: 788; 87 OINTMENT TOPICAL at 16:20

## 2023-03-23 RX ADMIN — HYDROMORPHONE HYDROCHLORIDE 0.5 MG: 1 INJECTION, SOLUTION INTRAMUSCULAR; INTRAVENOUS; SUBCUTANEOUS at 02:54

## 2023-03-23 RX ADMIN — SENNOSIDES AND DOCUSATE SODIUM 2 TABLET: 8.6; 5 TABLET ORAL at 10:59

## 2023-03-23 RX ADMIN — ACETAMINOPHEN 325MG 650 MG: 325 TABLET ORAL at 22:38

## 2023-03-23 RX ADMIN — SODIUM CHLORIDE, POTASSIUM CHLORIDE, SODIUM LACTATE AND CALCIUM CHLORIDE 100 ML/HR: 600; 310; 30; 20 INJECTION, SOLUTION INTRAVENOUS at 03:02

## 2023-03-23 RX ADMIN — INSULIN HUMAN 4.1 UNITS/HR: 1 INJECTION, SOLUTION INTRAVENOUS at 06:45

## 2023-03-23 RX ADMIN — IPRATROPIUM BROMIDE AND ALBUTEROL SULFATE 3 ML: .5; 3 SOLUTION RESPIRATORY (INHALATION) at 05:33

## 2023-03-23 RX ADMIN — METOCLOPRAMIDE 10 MG: 5 INJECTION, SOLUTION INTRAMUSCULAR; INTRAVENOUS at 11:03

## 2023-03-23 RX ADMIN — HYDROMORPHONE HYDROCHLORIDE 0.5 MG: 1 INJECTION, SOLUTION INTRAMUSCULAR; INTRAVENOUS; SUBCUTANEOUS at 05:53

## 2023-03-23 RX ADMIN — ACETAMINOPHEN 325MG 650 MG: 325 TABLET ORAL at 14:16

## 2023-03-23 RX ADMIN — SENNOSIDES AND DOCUSATE SODIUM 2 TABLET: 8.6; 5 TABLET ORAL at 20:47

## 2023-03-23 RX ADMIN — ACETAMINOPHEN 325MG 650 MG: 325 TABLET ORAL at 05:53

## 2023-03-23 RX ADMIN — OXYCODONE 10 MG: 5 TABLET ORAL at 18:48

## 2023-03-23 RX ADMIN — ALBUMIN (HUMAN) 25 G: 0.25 INJECTION, SOLUTION INTRAVENOUS at 16:04

## 2023-03-23 RX ADMIN — CEFAZOLIN 3 G: 10 INJECTION, POWDER, FOR SOLUTION INTRAVENOUS at 02:47

## 2023-03-23 RX ADMIN — SODIUM CHLORIDE, POTASSIUM CHLORIDE, SODIUM LACTATE AND CALCIUM CHLORIDE 100 ML/HR: 600; 310; 30; 20 INJECTION, SOLUTION INTRAVENOUS at 11:04

## 2023-03-23 RX ADMIN — GABAPENTIN 100 MG: 100 CAPSULE ORAL at 05:53

## 2023-03-23 RX ADMIN — HYDROMORPHONE HYDROCHLORIDE 0.5 MG: 1 INJECTION, SOLUTION INTRAMUSCULAR; INTRAVENOUS; SUBCUTANEOUS at 22:55

## 2023-03-23 RX ADMIN — BUMETANIDE 0.5 MG: 0.25 INJECTION INTRAMUSCULAR; INTRAVENOUS at 16:04

## 2023-03-23 RX ADMIN — CASTOR OIL AND BALSAM, PERU 1 APPLICATION: 788; 87 OINTMENT TOPICAL at 20:48

## 2023-03-23 RX ADMIN — ATORVASTATIN CALCIUM 40 MG: 40 TABLET, FILM COATED ORAL at 20:47

## 2023-03-23 RX ADMIN — METOCLOPRAMIDE 10 MG: 5 INJECTION, SOLUTION INTRAMUSCULAR; INTRAVENOUS at 18:04

## 2023-03-23 RX ADMIN — BUMETANIDE 0.5 MG: 0.25 INJECTION INTRAMUSCULAR; INTRAVENOUS at 22:38

## 2023-03-23 RX ADMIN — OXYCODONE 10 MG: 5 TABLET ORAL at 07:53

## 2023-03-23 RX ADMIN — HYDROCODONE BITARTRATE AND ACETAMINOPHEN 1 TABLET: 7.5; 325 TABLET ORAL at 06:45

## 2023-03-23 RX ADMIN — METHYLNALTREXONE BROMIDE 12 MG: 12 INJECTION, SOLUTION SUBCUTANEOUS at 11:04

## 2023-03-23 NOTE — CONSULTS
Referring Provider: Jose Preston  Reason for Consultation: Acute kidney failure    Subjective     Chief complaint coronary artery disease with elective CABG procedure on 3/21/2023    History of present illness:    62-year-old male admitted for CABG three-vessel by Dr. Emanuel's on 3/21/2023, patient had recent heart cath on 3/16/2023 which showed multiple vessel coronary artery disease, does have a history of diabetes, hypertension, hyperlipidemia, shortness of breath, sleep apnea.  Admission creatinine 1.2 which has got worse over the third time postsurgery.  Patient significant hypotension required Levophed.  He also received IV fluids.  Renal has been consulted.  Patient creatinine up to 2.49, BUN 36, calcium 8.5, albumin 4.1, phosphorus 4.6, hemoglobin 10.0 significant oliguria is noted.  Patient no history of epistaxis, hemoptysis hematemesis, gross hematuria, kidney stones, nonsteroidal use, no history of kidney disease no family history of kidney disease.  He has never smoked.  History  Past Medical History:   Diagnosis Date   • Arthritis    • BPH (benign prostatic hyperplasia)    • Cancer (HCC)     basal and melanoma-  x2 - left side ear and elbow   • Constipation    • Coronary artery disease    • DDD (degenerative disc disease), lumbar    • Diabetes mellitus (HCC)     couple times month check sugar   • Frozen shoulder     left   • GERD (gastroesophageal reflux disease)    • Hyperlipidemia    • Hypertension    • Limited mobility     left shoulder  - possible frozen shoulder-= please be aware for surgery   • Sleep apnea     doesnt use machine   • Tinnitus    • Wears glasses     small print   ,   Past Surgical History:   Procedure Laterality Date   • CARDIAC CATHETERIZATION     • CARDIAC CATHETERIZATION Right 03/16/2023    Procedure: Left Heart Cath;  Surgeon: Jarod Boyle MD;  Location: [facility] INVASIVE LOCATION;  Service: Cardiovascular;  Laterality: Right;   • CATARACT EXTRACTION, BILATERAL  Bilateral    • COLONOSCOPY     • CORONARY ARTERY BYPASS GRAFT N/A 3/21/2023    Procedure: MEDIAN STERNOTOMY, CORONARY ARTERY BYPASS GRAFTING X 3, UTILIZING THE LEFT INTERNAL MAMMARY ARTERY, ENDOSCOPIC VEIN HARVEST OF THE RIGHT GREATER SAPENOUSE VEIN;  Surgeon: Markus Emanuel MD;  Location: [facility];  Service: Cardiothoracic;  Laterality: N/A;   • CORONARY STENT PLACEMENT      x4   • ENDOSCOPY     • FINGER SURGERY Left     middle finger - left side   • SKIN CANCER EXCISION      x2   • WISDOM TOOTH EXTRACTION     ,   Family History   Problem Relation Age of Onset   • COPD Mother    • Heart attack Father 45        passed   • Heart attack Paternal Grandfather 42        massive heart attack   • Heart disease Paternal Grandfather    ,   Social History     Socioeconomic History   • Marital status:    Tobacco Use   • Smoking status: Never     Passive exposure: Past   • Smokeless tobacco: Current     Types: Snuff   Vaping Use   • Vaping Use: Never used   Substance and Sexual Activity   • Alcohol use: Not Currently   • Drug use: Never   • Sexual activity: Defer     E-cigarette/Vaping   • E-cigarette/Vaping Use Never User      E-cigarette/Vaping Substances     E-cigarette/Vaping Devices       ,   Medications Prior to Admission   Medication Sig Dispense Refill Last Dose   • atorvastatin (LIPITOR) 20 MG tablet Take 1 tablet by mouth Daily.   3/20/2023 at 0800   • B-D ULTRAFINE III SHORT PEN 31G X 8 MM misc    3/20/2023 at 0800   • BD Insulin Syringe U-500 31G X 6MM 0.5 ML misc    3/20/2023 at 0800   • fenofibrate 160 MG tablet Take 1 tablet by mouth Daily.   3/20/2023 at 0800   • gabapentin (NEURONTIN) 800 MG tablet 1 tablet 3 (Three) Times a Day.   3/20/2023 at 2000   • hydroCHLOROthiazide (HYDRODIURIL) 25 MG tablet Take 1 tablet by mouth Daily. 90 tablet 3 3/20/2023 at 0800   • Insulin Glargine (TOUJEO MAX SOLOSTAR SC) Inject 80 Units under the skin into the appropriate area as directed 2 (Two) Times a Day.    3/20/2023 at 2000   • Insulin Regular Human, Conc, (HumuLIN R) 500 UNIT/ML solution pen-injector CONCENTRATED injection Inject 65 Units under the skin into the appropriate area as directed 3 (Three) Times a Day Before Meals.   3/20/2023 at 0800   • lisinopril (PRINIVIL,ZESTRIL) 40 MG tablet Take 1 tablet by mouth 2 (Two) Times a Day.   3/20/2023 at 0800   • metFORMIN (GLUCOPHAGE) 1000 MG tablet Take 1 tablet by mouth 2 (Two) Times a Day With Meals. Hold metformin for 2 days post-cath.  Restart on 3/19/2023 60 tablet 12 3/20/2023 at 2000   • metoprolol tartrate 75 MG tablet Take 75 mg by mouth 2 (Two) Times a Day. 180 tablet 3 3/20/2023 at 2000   • clopidogrel (PLAVIX) 75 MG tablet Take 1 tablet by mouth Daily.   3/17/2023   , Scheduled Meds:  acetaminophen, 650 mg, Oral, Q8H  aspirin, 325 mg, Oral, Daily  atorvastatin, 40 mg, Oral, Nightly  castor oil-balsam peru, 1 application, Topical, Q12H  metoclopramide, 10 mg, Intravenous, TID AC  pharmacy consult - [facility], , Does not apply, Daily  senna-docusate sodium, 2 tablet, Oral, BID    , Continuous Infusions:  insulin, 0-100 Units/hr, Last Rate: 4.1 Units/hr (03/23/23 1400)  lactated ringers, 100 mL/hr, Last Rate: 100 mL/hr (03/23/23 1104)  niCARdipine, 5-15 mg/hr  nitroglycerin, 5-200 mcg/min, Last Rate: Stopped (03/21/23 1630)  norepinephrine, 0.02-0.3 mcg/kg/min, Last Rate: Stopped (03/22/23 1200)    , PRN Meds:  •  senna-docusate sodium **AND** polyethylene glycol **AND** bisacodyl **AND** bisacodyl  •  bisacodyl  •  Calcium Replacement - Follow Nurse / BPA Driven Protocol  •  dextrose  •  dextrose  •  glucagon (human recombinant)  •  HYDROcodone-acetaminophen  •  HYDROmorphone  •  ipratropium-albuterol  •  Magnesium Standard Dose Replacement - Follow Nurse / BPA Driven Protocol  •  niCARdipine  •  nitroglycerin  •  norepinephrine  •  ondansetron  •  oxyCODONE  •  Phosphorus Replacement - Follow Nurse / BPA Driven Protocol  •  polyethylene glycol  •  Potassium  Replacement - Follow Nurse / BPA Driven Protocol  •  simethicone and Allergies:  Morphine and Adhesive tape    Review of Systems  Pertinent items are noted in HPI, all other systems reviewed and negative    Objective     Vital Signs  Temp:  [98.3 °F (36.8 °C)-100.4 °F (38 °C)] 98.3 °F (36.8 °C)  Heart Rate:  [] 89  Resp:  [12-28] 14  BP: ()/() 126/62    I/O this shift:  In: 180 [P.O.:120; Other:60]  Out: 265 [Urine:185; Chest Tube:80]  I/O last 3 completed shifts:  In: 3785 [P.O.:600; I.V.:2865; NG/GT:120; IV Piggyback:200]  Out: 2040 [Urine:1195; Emesis/NG output:100; Chest Tube:745]    Physical Exam:  General appearance:  male morbidly obese mild distress, anxious.  HEENT: Atraumatic normocephalic head, eyes pupil reactive, extraocular muscle intact, nose no bleed, oropharynx clear, neck is supple, no JVD, no lymph node enlargement, trachea midline.  Chest: Chest tubes are noted.   Lungs: Clear to auscultation, equal chest movement, no crepitation.  Heart: Positive rub, normal S1, S2, no gallop, murmur, RRR.  Abdomen: Morbid obesity soft, nontender, positive bowel sounds, organomegaly cannot be ruled out  Extremities: No edema, no cyanosis, no joint swelling.  Neuro: Alert, oriented x4, no focal deficit.  Psych: Mood and affect are normal and appropriate.  Skin: Skin is warm and dry.  : No suprapubic fullness or tenderness, Ken catheter in place..    Results Review:   I reviewed the patient's new clinical results.    WBC WBC   Date Value Ref Range Status   03/23/2023 21.33 (H) 3.40 - 10.80 10*3/mm3 Final   03/22/2023 15.73 (H) 3.40 - 10.80 10*3/mm3 Final   03/21/2023 13.26 (H) 3.40 - 10.80 10*3/mm3 Final   03/21/2023 13.54 (H) 3.40 - 10.80 10*3/mm3 Final      HGB Hemoglobin   Date Value Ref Range Status   03/23/2023 10.0 (L) 13.0 - 17.7 g/dL Final   03/22/2023 10.1 (L) 13.0 - 17.7 g/dL Final   03/21/2023 10.2 (L) 13.0 - 17.7 g/dL Final   03/21/2023 10.7 (L) 13.0 - 17.7 g/dL Final    03/21/2023 11.2 (L) 12.0 - 17.0 g/dL Final   03/21/2023 11.9 (L) 12.0 - 17.0 g/dL Final   03/21/2023 10.9 (L) 12.0 - 17.0 g/dL Final   03/21/2023 10.2 (L) 12.0 - 17.0 g/dL Final   03/21/2023 12.9 12.0 - 17.0 g/dL Final   03/21/2023 14.3 12.0 - 17.0 g/dL Final      HCT Hematocrit   Date Value Ref Range Status   03/23/2023 33.1 (L) 37.5 - 51.0 % Final   03/22/2023 33.4 (L) 37.5 - 51.0 % Final   03/21/2023 32.3 (L) 37.5 - 51.0 % Final   03/21/2023 34.1 (L) 37.5 - 51.0 % Final   03/21/2023 33 (L) 38 - 51 % Final   03/21/2023 35 (L) 38 - 51 % Final   03/21/2023 32 (L) 38 - 51 % Final   03/21/2023 30 (L) 38 - 51 % Final   03/21/2023 38 38 - 51 % Final   03/21/2023 42 38 - 51 % Final      Platlets No results found for: LABPLAT   MCV MCV   Date Value Ref Range Status   03/23/2023 86.4 79.0 - 97.0 fL Final   03/22/2023 85.6 79.0 - 97.0 fL Final   03/21/2023 84.1 79.0 - 97.0 fL Final   03/21/2023 84.4 79.0 - 97.0 fL Final          Sodium Sodium   Date Value Ref Range Status   03/23/2023 137 136 - 145 mmol/L Final   03/22/2023 138 136 - 145 mmol/L Final   03/21/2023 141 136 - 145 mmol/L Final   03/21/2023 138 136 - 145 mmol/L Final      Potassium Potassium   Date Value Ref Range Status   03/23/2023 4.7 3.5 - 5.2 mmol/L Final     Comment:     Slight hemolysis detected by analyzer. Results may be affected.   03/22/2023 4.6 3.5 - 5.2 mmol/L Final   03/21/2023 4.1 3.5 - 5.2 mmol/L Final     Comment:     Slight hemolysis detected by analyzer. Results may be affected.   03/21/2023 3.5 3.5 - 5.2 mmol/L Final     Comment:     Slight hemolysis detected by analyzer. Results may be affected.      Chloride Chloride   Date Value Ref Range Status   03/23/2023 98 98 - 107 mmol/L Final   03/22/2023 101 98 - 107 mmol/L Final   03/21/2023 103 98 - 107 mmol/L Final   03/21/2023 99 98 - 107 mmol/L Final      CO2 CO2   Date Value Ref Range Status   03/23/2023 27.0 22.0 - 29.0 mmol/L Final   03/22/2023 26.0 22.0 - 29.0 mmol/L Final   03/21/2023  25.0 22.0 - 29.0 mmol/L Final   03/21/2023 27.0 22.0 - 29.0 mmol/L Final      BUN BUN   Date Value Ref Range Status   03/23/2023 36 (H) 8 - 23 mg/dL Final   03/22/2023 23 8 - 23 mg/dL Final   03/21/2023 24 (H) 8 - 23 mg/dL Final   03/21/2023 24 (H) 8 - 23 mg/dL Final      Creatinine Creatinine   Date Value Ref Range Status   03/23/2023 2.49 (H) 0.76 - 1.27 mg/dL Final   03/22/2023 1.64 (H) 0.76 - 1.27 mg/dL Final   03/21/2023 1.42 (H) 0.76 - 1.27 mg/dL Final   03/21/2023 1.40 (H) 0.76 - 1.27 mg/dL Final      Calcium Calcium   Date Value Ref Range Status   03/23/2023 8.5 (L) 8.6 - 10.5 mg/dL Final   03/22/2023 8.5 (L) 8.6 - 10.5 mg/dL Final   03/21/2023 8.1 (L) 8.6 - 10.5 mg/dL Final   03/21/2023 9.0 8.6 - 10.5 mg/dL Final      PO4 No results found for: CAPO4   Albumin Albumin   Date Value Ref Range Status   03/23/2023 4.1 3.5 - 5.2 g/dL Final   03/22/2023 4.3 3.5 - 5.2 g/dL Final   03/21/2023 4.2 3.5 - 5.2 g/dL Final   03/21/2023 4.0 3.5 - 5.2 g/dL Final      Magnesium Magnesium   Date Value Ref Range Status   03/23/2023 2.1 1.6 - 2.4 mg/dL Final   03/22/2023 2.0 1.6 - 2.4 mg/dL Final   03/21/2023 2.3 1.6 - 2.4 mg/dL Final   03/21/2023 2.1 1.6 - 2.4 mg/dL Final      Uric Acid No results found for: URICACID         acetaminophen, 650 mg, Oral, Q8H  aspirin, 325 mg, Oral, Daily  atorvastatin, 40 mg, Oral, Nightly  castor oil-balsam peru, 1 application, Topical, Q12H  metoclopramide, 10 mg, Intravenous, TID AC  pharmacy consult - [facility] [place], , Does not apply, Daily  senna-docusate sodium, 2 tablet, Oral, BID      insulin, 0-100 Units/hr, Last Rate: 4.1 Units/hr (03/23/23 1400)  lactated ringers, 100 mL/hr, Last Rate: 100 mL/hr (03/23/23 1104)  niCARdipine, 5-15 mg/hr  nitroglycerin, 5-200 mcg/min, Last Rate: Stopped (03/21/23 1630)  norepinephrine, 0.02-0.3 mcg/kg/min, Last Rate: Stopped (03/22/23 1200)        Assessment & Plan       Coronary artery disease involving native coronary artery of native heart without angina  pectoris    Diabetes mellitus (HCC)    Sleep apnea    Coronary artery disease of native artery of native heart with stable angina pectoris (HCC)    1.  EMANUEL on CKD, baseline creatinine 1.2, patient is postop CABG, also had hypotensive episode postsurgery requiring IV Levophed.  Patient has decreased urine output with increasing creatinine most likely ATN.  Electrolytes acceptable range at this time.  2.  S/p CABG x3.  3.  Shock: Improved at this time off the Levophed.  4.  History of diabetes without significant complications.  5.  History of hypertension.  Plan:  IV albumin will be ordered at this time.  Low-dose IV diuretic try to convert to nonoliguric.  Case discussed with the wife, and the staff RN.  No indication for dialysis today.  Avoid nephrotoxic medications.  Keep systolic blood pressure greater than 100.  Check volume status.  Check labs in the morning.  Daily evaluation for renal replacement therapy will be done.  Adjust medication for the new GFR.  High risk patient with multiple medical problems.    Holden Arenas MD  03/23/23  @NOW

## 2023-03-23 NOTE — PROGRESS NOTES
CTS Progress Note       LOS: 2 days   Patient Care Team:  Jose M Simon MD as PCP - General (Geriatric Medicine)    Chief Complaint: Coronary artery disease involving native coronary artery of native heart without angina pectoris    Vital Signs:  Temp:  [98.7 °F (37.1 °C)-100.4 °F (38 °C)] 100.4 °F (38 °C)  Heart Rate:  [89-99] 99  Resp:  [15-24] 24  BP: ()/() 143/78    Physical Exam: Up in chair breathing unlabored       Results:   Results from last 7 days   Lab Units 03/23/23  0303   WBC 10*3/mm3 21.33*   HEMOGLOBIN g/dL 10.0*   HEMATOCRIT % 33.1*   PLATELETS 10*3/mm3 319     Results from last 7 days   Lab Units 03/23/23  0303   SODIUM mmol/L 137   POTASSIUM mmol/L 4.7   CHLORIDE mmol/L 98   CO2 mmol/L 27.0   BUN mg/dL 36*   CREATININE mg/dL 2.49*   GLUCOSE mg/dL 154*   CALCIUM mg/dL 8.5*           Imaging Results (Last 24 Hours)     Procedure Component Value Units Date/Time    XR Chest 1 View [[id number]] Resulted: 03/23/23 0319     Updated: 03/23/23 0328    XR Abdomen KUB [[id number]] Collected: 03/22/23 2156     Updated: 03/22/23 2201    Narrative:      XR ABDOMEN KUB    Date of Exam: 3/22/2023 9:20 PM EDT    Indication: NG tube placement.    Comparison: 3/22/2023    Findings:  There is been interval placement of nasogastric tube the tip projecting over the expected location of the fundus of the stomach. Bibasilar thoracostomy tubes and mediastinal drains are unchanged. There is patchy left basilar airspace disease likely due   to atelectasis, unchanged. Visualized upper abdominal bowel gas pattern is within normal      Impression:      Impression:    1. Interval placement of nasogastric tube with tip projecting over the expected location of the fundus of the stomach.    Electronically Signed: Boni Maher    3/22/2023 9:58 PM EDT    Workstation ID: [id number]    XR Abdomen KUB [[id number]] Collected: 03/22/23 1619     Updated: 03/22/23 1623    Narrative:      XR ABDOMEN KUB    Date of Exam:  3/22/2023 3:54 PM EDT    Indication: PONV.    Comparison: None available.    Findings:  Nonspecific gas-filled segments of small bowel and colon are seen throughout the abdomen, with appearance favoring ileus over obstruction. There is no overt pneumoperitoneum.      Impression:      Impression:  Nonspecific gas-filled segments of small bowel and colon are seen throughout the abdomen, with appearance favoring ileus over obstruction. There is no overt pneumoperitoneum.    Electronically Signed: Prudencio Mendoza    3/22/2023 4:20 PM EDT    Workstation ID: [id number]    XR Chest 1 View [[id number]] Collected: 03/22/23 0725     Updated: 03/22/23 0730    Narrative:      XR CHEST 1 VW    Date of Exam: 3/22/2023 3:19 AM EDT    Indication: Post-Op Heart Surgery.    Comparison: 3/21/2023    Findings:  Interval extubation and removal of esophagogastric tube. Right IJ central venous catheter tip is at the mid SVC. Left-sided chest tube in stable position. Small left apical pneumothorax measuring 10 mm.. Low lung volumes with persistent bibasilar   atelectasis left greater than right.       Impression:      Impression:  1. Interval extubation and removal of esophagogastric tube.  2. Right IJ central venous catheter tip at the mid SVC.  3. Small left apical pneumothorax measuring 10 mm. Left chest tube in place.  4. Hypoinflated lungs with bibasilar atelectasis left greater than right, stable.    Electronically Signed: Tom Michaudmarlene    3/22/2023 7:27 AM EDT    Workstation ID: [id number]          Assessment      Coronary artery disease involving native coronary artery of native heart without angina pectoris    Diabetes mellitus (HCC)    Sleep apnea    Coronary artery disease of native artery of native heart with stable angina pectoris (HCC)        Plan   Do not remove chest tubes  [facility] to see  CBC, CMP and chest x-ray in the a.m.    Please note that portions of this note were completed with a voice recognition program.  Efforts were made to edit the dictations, but occasionally words are mistranscribed.    Markus Emanuel MD  03/23/23  06:34 EDT

## 2023-03-23 NOTE — NURSING NOTE
Patient noted with multiple episodes of belching and retching. 0 emesis noted but patient does c/o nausea. ICU NP notified and new orders obtained. NGT placed to left nare without diff. Lg amounts of air noted once in stomach. X-ray obtained for placement verification. Patient tolerated without diff and stated relief after placement.

## 2023-03-23 NOTE — NURSING NOTE
"                             Wound, Ostomy and Continence (WOC) Note    Reason for WOC Consultation: \"right inner thigh wound \"     Patient awake, sitting up in chair.  Family/support person at bedside.     RN reports patient's Ace wrap on the right leg was removed per protocol  post CABG.  It should be noted patient is edematous in his right lower extremity.    Skin/Wound Assessment:     Wound Type: Medical device related pressure injury (ace wrap) versus bruise  Location: Right inner thigh  Measurements: 0.5 x 11 x 0 cm  Wound Bed: non-blanchable and maroon/purple  Wound Edges: Linear  Periwound Skin: intact and blanchable, bruised  Drainage Characteristics/Odor: none  Drainage Amount: none  Pain: No   Image:         Summary and Recommendation(s):     - Venelex ordered BID.  - Keep all pressure from effected area.   - Refer to wound care instructions in the \"Orders\" tab  - Specialty support surface in place: Isolibrium       Pressure Injury Prevention Measures (initiate for a Gigi Scale Score of <18):     Most recent Gigi Scale score:  Sensory Perception: 4-->no impairment  Moisture: 4-->rarely moist  Activity: 3-->walks occasionally  Mobility: 3-->slightly limited  Nutrition: 2-->probably inadequate  Friction and Shear: 2-->potential problem  Gigi Score: 18 (03/23/23 0800)     -Turn q 2 hr. using an offloading foam wedge, keep heels elevated and offloaded with offloading heel boots.    -Raise knee-gatch before elevating HOB to reduce shearing   -Follow C.A.R.E protocol if medical devices (Bipap, mai, Ng tube, etc) are being used.  -Apply moisture barrier cream to bottom BID & PRN, if incontinent.  -If incontinent, consider applying an external catheter. External catheters are finicky and should be checked every few hours for placement.   -Clean skin gently with no-rinse PH-balanced foam cleanser and a soft, disposable cloth (barrier wipes-blue pack).   -Reduce layers under patient (one sheet as drawsheet " and two incontinence pads)    Thank you for consulting the WOC Nurse.  WOC Team will continue to follow.  Please re consult if the wound(s) worsens.     Marcelo Tesfaye RN, BSN, CCRN, CWOCN  Wound, Ostomy and Continence (WOC) Department  [facility]

## 2023-03-23 NOTE — PLAN OF CARE
Goal Outcome Evaluation:  Plan of Care Reviewed With: patient           Outcome Evaluation: Pt neuro intact.  Pt up to chair.  Pt on 4-5L NC sat >93%.  CT drainage #1/2-100ml #3/4-50ml.  UOP 400ml.  Neb treatment given .  will continue to monitor.  at 3/23/2023 [id number]

## 2023-03-23 NOTE — PLAN OF CARE
Problem: Adult Inpatient Plan of Care  Goal: Plan of Care Review   Goal Outcome Evaluation:  Plan of Care Reviewed With: patient           Outcome Evaluation: Pt neuro intac.  Pt up in chair.  Pt remains on 4-5L NC sats >92%.  CT drainage #1/2-30ml #3/4-150ml.  UOP 400ml.  Neb treatment  given.  Pain meds for discomfort.  will continue to monitor.  at 3/23/2023 [id number]

## 2023-03-23 NOTE — THERAPY TREATMENT NOTE
Patient Name: Wilbert Kelely  : 1960    MRN: [id number]                              Today's Date: 3/23/2023       Admit Date: 3/21/2023    Visit Dx:     ICD-10-CM ICD-9-CM   1. Coronary artery disease of native artery of native heart with stable angina pectoris (HCC)  I25.118 414.01     413.9     Patient Active Problem List   Diagnosis   • SOB (shortness of breath) on exertion   • Coronary artery disease involving native coronary artery of native heart without angina pectoris   • Diabetes mellitus (HCC)   • Sleep apnea   • Coronary artery disease of native artery of native heart with stable angina pectoris (HCC)     Past Medical History:   Diagnosis Date   • Arthritis    • BPH (benign prostatic hyperplasia)    • Cancer (HCC)     basal and melanoma-  x2 - left side ear and elbow   • Constipation    • Coronary artery disease    • DDD (degenerative disc disease), lumbar    • Diabetes mellitus (HCC)     couple times month check sugar   • Frozen shoulder     left   • GERD (gastroesophageal reflux disease)    • Hyperlipidemia    • Hypertension    • Limited mobility     left shoulder  - possible frozen shoulder-= please be aware for surgery   • Sleep apnea     doesnt use machine   • Tinnitus    • Wears glasses     small print     Past Surgical History:   Procedure Laterality Date   • CARDIAC CATHETERIZATION     • CARDIAC CATHETERIZATION Right 2023    Procedure: Left Heart Cath;  Surgeon: Jarod Boyle MD;  Location:  [facility];  Service: Cardiovascular;  Laterality: Right;   • CATARACT EXTRACTION, BILATERAL Bilateral    • COLONOSCOPY     • CORONARY ARTERY BYPASS GRAFT N/A 3/21/2023    Procedure: MEDIAN STERNOTOMY, CORONARY ARTERY BYPASS GRAFTING X 3, UTILIZING THE LEFT INTERNAL MAMMARY ARTERY, ENDOSCOPIC VEIN HARVEST OF THE RIGHT GREATER SAPENOUSE VEIN;  Surgeon: Markus Emanuel MD;  [facility];  Service: Cardiothoracic;  Laterality: N/A;   • CORONARY STENT PLACEMENT       x4   • ENDOSCOPY     • FINGER SURGERY Left     middle finger - left side   • SKIN CANCER EXCISION      x2   • WISDOM TOOTH EXTRACTION        General Information     Row Name 03/23/23 1035          Physical Therapy Time and Intention    Document Type therapy note (daily note)  -CM     Mode of Treatment physical therapy;individual therapy  -CM     Row Name 03/23/23 1035          General Information    Patient Profile Reviewed yes  -CM     Existing Precautions/Restrictions cardiac;fall;oxygen therapy device and L/min;sternal;other (see comments)  CTx2  -CM     Barriers to Rehab medically complex;previous functional deficit  -CM     Row Name 03/23/23 1035          Cognition    Orientation Status (Cognition) oriented x 3  -CM     Row Name 03/23/23 1035          Safety Issues, Functional Mobility    Safety Issues Affecting Function (Mobility) awareness of need for assistance;insight into deficits/self-awareness;safety precaution awareness;safety precautions follow-through/compliance  -CM     Impairments Affecting Function (Mobility) balance;endurance/activity tolerance;shortness of breath;strength;pain  -CM           User Key  (r) = Recorded By, (t) = Taken By, (c) = Cosigned By    Initials Name Provider Type    CM Anahy Deleon, PT Physical Therapist               Mobility     Row Name 03/23/23 1036          Bed Mobility    Comment, (Bed Mobility) [facility] pre/post treatment  -     Row Name 03/23/23 1036          Sit-Stand Transfer    Sit-Stand [place] (Transfers) minimum assist (75% patient effort);2 person assist;verbal cues  -CM     Comment, (Sit-Stand Transfer) cues for sternal precautions, grasps tripod monitor upon standing  -     Row Name 03/23/23 1036          Gait/Stairs (Locomotion)    [place] Level (Gait) minimum assist (75% patient effort);2 person assist;verbal cues  -CM     Assistive Device (Gait) other (see comments)  BUE support on tripod monitor  -CM     Distance in Feet (Gait) 150'  -CM      Deviations/Abnormal Patterns (Gait) bilateral deviations;base of support, wide;gait speed decreased;stride length decreased;weight shifting decreased  -CM     Bilateral Gait Deviations forward flexed posture  -CM     Comment, (Gait/Stairs) Patient ambulated in hallway on 4L O2 with a step through gait pattern. He continues to demonstrate wide base of support with mild unsteadiness, however dizziness improved today. VCs provided for upright posture with decreased weight through arms on tripod monitor and safe proximity of tripod monitor. Gait distance limited by fatigue today.  -CM           User Key  (r) = Recorded By, (t) = Taken By, (c) = Cosigned By    Initials Name Provider Type    Anahy Plata, MANOHAR Physical Therapist               Obj/Interventions     Row Name 03/23/23 1039          Balance    Balance Assessment sitting static balance;standing static balance;standing dynamic balance  -CM     Static Sitting Balance supervision  -CM     Position, Sitting Balance unsupported;sitting in chair  -CM     Static Standing Balance contact guard  -CM     Dynamic Standing Balance minimal assist;2-person assist;verbal cues  -CM     Position/Device Used, Standing Balance supported;other (see comments)  BUE support on tripod monitor  -CM     Comment, Balance mildly unsteady requiring assist for management of tripod monitor, no overt LOB  -CM           User Key  (r) = Recorded By, (t) = Taken By, (c) = Cosigned By    Initials Name Provider Type    Anahy Plata, MANOHAR Physical Therapist               Goals/Plan    No documentation.                Clinical Impression     Row Name 03/23/23 1040          Pain    Pretreatment Pain Rating 4/10  -CM     Posttreatment Pain Rating 4/10  -CM     Pain Location generalized  -CM     Pain Location - back  -CM     Pre/Posttreatment Pain Comment patient reports sciatic pain, present at baseline, RN aware  -CM     Pain Intervention(s) Ambulation/increased activity;Nursing  Notified;Repositioned  -CM     Row Name 03/23/23 1040          Plan of Care Review    Plan of Care Reviewed With patient  -CM     Progress improving  -CM     Outcome Evaluation Patient able to progress ambulation distance to 150' today on 4L O2 with minAx2 and BUE support on tripod monitor. Dizziness with ambulation is improved today, however patient with unsteadiness requiring assist for management of tripod monitor. He continues to mobilize below his baseline indicating IPPT intervention. Will continue with current PT POC with D/C home with family assist recommended.  -CM     Row Name 03/23/23 1040          Vital Signs    Pre Systolic BP Rehab 93  -CM     Pre Treatment Diastolic BP 50  -CM     Post Systolic BP Rehab 136  -CM     Post Treatment Diastolic BP 71  -CM     Pretreatment Heart Rate (beats/min) 92  -CM     Posttreatment Heart Rate (beats/min) 101  -CM     Pre SpO2 (%) 93  -CM     O2 Delivery Pre Treatment nasal cannula  -CM     Post SpO2 (%) 93  -CM     O2 Delivery Post Treatment nasal cannula  -CM     Pre Patient Position Sitting  -CM     Intra Patient Position Standing  -CM     Post Patient Position Sitting  -CM     Row Name 03/23/23 1040          Positioning and Restraints    Pre-Treatment Position sitting in chair/recliner  -CM     Post Treatment Position chair  -CM     In Chair reclined;call light within reach;encouraged to call for assist;RUE elevated;LUE elevated;with nsg;notified nsg  -CM           User Key  (r) = Recorded By, (t) = Taken By, (c) = Cosigned By    Initials Name Provider Type    Anahy Plata, PT Physical Therapist               Outcome Measures     Row Name 03/23/23 1042          How much help from another person do you currently need...    Turning from your back to your side while in flat bed without using bedrails? 2  -CM     Moving from lying on back to sitting on the side of a flat bed without bedrails? 2  -CM     Moving to and from a bed to a chair (including a  wheelchair)? 3  -CM     Standing up from a chair using your arms (e.g., wheelchair, bedside chair)? 3  -CM     Climbing 3-5 steps with a railing? 3  -CM     To walk in hospital room? 3  -CM     AM-PAC 6 Clicks Score (PT) 16  -CM     Highest level of mobility 5 --> Static standing  -CM     Row Name 03/23/23 1042          Functional Assessment    Outcome Measure Options AM-PAC 6 Clicks Basic Mobility (PT)  -CM           User Key  (r) = Recorded By, (t) = Taken By, (c) = Cosigned By    Initials Name Provider Type    CM Anahy Deleon PT Physical Therapist                             Physical Therapy Education     Title: PT OT SLP Therapies (In Progress)     Topic: Physical Therapy (In Progress)     Point: Mobility training (In Progress)     Learning Progress Summary           Patient Acceptance, E, NR by CM at 3/23/2023 1043    Acceptance, E, NR by CM at 3/22/2023 1142                   Point: Home exercise program (Not Started)     Learner Progress:  Not documented in this visit.          Point: Body mechanics (In Progress)     Learning Progress Summary           Patient Acceptance, E, NR by CM at 3/23/2023 1043    Acceptance, E, NR by CM at 3/22/2023 1142                   Point: Precautions (In Progress)     Learning Progress Summary           Patient Acceptance, E, NR by CM at 3/23/2023 1043    Acceptance, E, NR by CM at 3/22/2023 1142                               User Key     Initials Effective Dates Name Provider Type Discipline     09/22/22 -  Anahy Deleon PT Physical Therapist PT              PT Recommendation and Plan  Planned Therapy Interventions (PT): balance training, bed mobility training, gait training, home exercise program, postural re-education, patient/family education, neuromuscular re-education, motor coordination training, ROM (range of motion), stair training, strengthening, stretching, transfer training  Plan of Care Reviewed With: patient  Progress: improving  Outcome  Evaluation: Patient able to progress ambulation distance to 150' today on 4L O2 with minAx2 and BUE support on tripod monitor. Dizziness with ambulation is improved today, however patient with unsteadiness requiring assist for management of tripod monitor. He continues to mobilize below his baseline indicating IPPT intervention. Will continue with current PT POC with D/C home with family assist recommended.     Time Calculation:    PT Charges     Row Name 03/23/23 1043             Time Calculation    Start Time 0915  -CM      PT Received On 03/23/23  -CM      PT Goal Re-Cert Due Date 04/01/23  -CM         Timed Charges    50698 - PT Therapeutic Activity Minutes 23  -CM         Total Minutes    Timed Charges Total Minutes 23  -CM       Total Minutes 23  -CM            User Key  (r) = Recorded By, (t) = Taken By, (c) = Cosigned By    Initials Name Provider Type    Anahy Plata, PT Physical Therapist              Therapy Charges for Today     Code Description Service Date Service Provider Modifiers Qty    01667832835 HC PT EVAL MOD COMPLEXITY 4 3/22/2023 Anahy Deleon, PT GP 1    52365998899 HC PT THERAPEUTIC ACT EA 15 MIN 3/23/2023 Anahy Deleon, PT GP 2          PT G-Codes  Outcome Measure Options: AM-PAC 6 Clicks Basic Mobility (PT)  AM-PAC 6 Clicks Score (PT): 16  PT Discharge Summary  Anticipated Discharge Disposition (PT): home with assist    Anahy Deleon PT  3/23/2023

## 2023-03-23 NOTE — PLAN OF CARE
Goal Outcome Evaluation:  Plan of Care Reviewed With: patient        Progress: improving  Outcome Evaluation: Patient able to progress ambulation distance to 150' today on 4L O2 with minAx2 and BUE support on tripod monitor. Dizziness with ambulation is improved today, however patient with unsteadiness requiring assist for management of tripod monitor. He continues to mobilize below his baseline indicating IPPT intervention. Will continue with current PT POC with D/C home with family assist recommended.

## 2023-03-23 NOTE — PROGRESS NOTES
INTENSIVIST / PULMONARY FOLLOW UP NOTE     Hospital:  LOS: 2 days   Mr. Wilbert Kelley, 62 y.o. male is followed for:     Coronary artery disease involving native coronary artery of native heart without angina pectoris    Diabetes mellitus (HCC)    Sleep apnea    Coronary artery disease of native artery of native heart with stable angina pectoris (HCC)          SUBJECTIVE   Remains on insulin gtt     The patient's relevant past medical, surgical, family, and social history were reviewed    Allergies and medications were reviewed    ROS:    Per subjective, all other systems were reviewed and were negative        OBJECTIVE     Vital Sign Min/Max for last 24 hours:  Temp  Min: 98.3 °F (36.8 °C)  Max: 100.4 °F (38 °C)   BP  Min: 93/63  Max: 143/78   Pulse  Min: 89  Max: 100   Resp  Min: 15  Max: 24   SpO2  Min: 87 %  Max: 100 %   No data recorded     Physical Exam:  General Appearance:  No acute distress  Eyes:  No scleral icterus or pallor, pupils normal  Ears, Nose, Mouth, Throat:  Atraumatic, oropharynx clear  Neck:  Trachea midline, thyroid normal  Respiratory:  Clear to auscultation bilaterally, normal effort  Cardiovascular:  Regular rate and rhythm, no murmurs, no peripheral edema  Gastrointestinal:  Soft, non-tender, non-distended, no hepatosplenomegaly  Skin:  Normal temperature, no rash  Psychiatric:  No agitation  Neuro:  No new focal neurologic deficits observed    Telemetry:              Hemodynamics:   CVP:     PAP:     PAOP:     CO: CO (L/min): 11.9 L/min   CI: CI (L/min/m2): 4.56 L/min/m2   SVI: SVI: 49.03   SVR:       SpO2: 95 % SpO2  Min: 87 %  Max: 100 %   Device:      Flow Rate:   No data recorded       Intake/Ouptut 24 hrs (7:00AM - 6:59 AM)  Intake & Output (last 3 days)       03/20 0701  03/21 0700 03/21 0701  03/22 0700 03/22 0701  03/23 0700 03/23 0701  03/24 0700    P.O.  480 120 120    I.V. (mL/kg)  3340.5 (22.7) 1882 (12.8)     Blood  631      NG/GT   120     IV Piggyback  1828.6 200      Total Intake(mL/kg)  6280.1 (42.7) 2322 (15.8) 120 (0.8)    Urine (mL/kg/hr)  2485 (0.7) 595 (0.2) 75 (0.1)    Emesis/NG output   100     Chest Tube  865 310 40    Total Output  3350 1005 115    Net  +2930.1 +1317 +5                  Lines, Drains & Airways     Active LDAs     Name Placement date Placement time Site Days    Peripheral IV 03/21/23 0612 Left;Posterior Hand 03/21/23  0612  Hand  1    Urethral Catheter Silicone 16 Fr. 03/21/23  --  -- 1    Y Chest Tube 1 and 2 1 Mediastinal 32 Fr. 2 Mediastinal 32 Fr. 03/21/23  --  -- 1    Y Chest Tube 3 and 4 3 Mediastinal 32 Fr. 4 Mediastinal 32 Fr. 03/21/23  --  -- 1    Arterial Line 03/21/23 Right Radial 03/21/23  0645  created via procedure documentation  Radial  1    Introducer- Double Lumen 03/21/23 Internal jugular Right 03/21/23  --  Internal jugular  1    Pacer Wires 03/21/23  --  Ventricular  1                Hematology:  Results from last 7 days   Lab Units 03/23/23  0303 03/22/23  0218 03/21/23  1529 03/21/23  1137 03/21/23  1011 03/21/23  0941 03/21/23  0910 03/21/23  0708 03/20/23  1512   WBC 10*3/mm3 21.33* 15.73* 13.26* 13.54*  --   --   --   --  8.79   HEMOGLOBIN g/dL 10.0* 10.1* 10.2* 10.7*  --   --   --   --  14.4   HEMOGLOBIN, POC g/dL  --   --   --   --  11.2* 11.9* 10.9*   < >  --    HEMATOCRIT % 33.1* 33.4* 32.3* 34.1*  --   --   --   --  45.1   HEMATOCRIT POC %  --   --   --   --  33* 35* 32*   < >  --    PLATELETS 10*3/mm3 319 311 307 304  --   --   --   --  385    < > = values in this interval not displayed.     Electrolytes, Magnesium and Phosphorus:  Results from last 7 days   Lab Units 03/23/23  0303 03/22/23  0218 03/21/23  1529 03/21/23  1137 03/20/23  1512   SODIUM mmol/L 137 138 141 138 140   CHLORIDE mmol/L 98 101 103 99 99   POTASSIUM mmol/L 4.7 4.6 4.1 3.5 4.2   CO2 mmol/L 27.0 26.0 25.0 27.0 28.0   MAGNESIUM mg/dL 2.1 2.0 2.3 2.1 1.7   PHOSPHORUS mg/dL 4.6*  --  4.4 4.4  --      Renal:  Results from last 7 days   Lab Units  03/23/23  0303 03/22/23  0218 03/21/23  1529 03/21/23  1137 03/20/23  1512   CREATININE mg/dL 2.49* 1.64* 1.42* 1.40* 1.20   BUN mg/dL 36* 23 24* 24* 25*     Estimated Creatinine Clearance: 47.1 mL/min (A) (by C-G formula based on SCr of 2.49 mg/dL (H)).  Hepatic:  Results from last 7 days   Lab Units 03/23/23  0303 03/22/23  0218 03/20/23  1512   ALK PHOS U/L 57 52 76   BILIRUBIN mg/dL 0.7 0.6 0.4   ALT (SGPT) U/L 6 13 19   AST (SGOT) U/L 26 25 20     Arterial Blood Gases:  Results from last 7 days   Lab Units 03/21/23  1632 03/21/23  1231 03/21/23  1011 03/21/23  0941 03/21/23  0910 03/21/23  0906 03/21/23  0845   PH, ARTERIAL pH units 7.371 7.392 7.37 7.34* 7.33* 7.31* 7.22*   PCO2, ARTERIAL mm Hg 46.6* 43.9  --   --   --   --   --    PO2 ART mm Hg 92.7 252.0*  --   --   --   --   --    FIO2 % 40 100  --   --   --   --   --        Results from last 7 days   Lab Units 03/20/23  1512   HEMOGLOBIN A1C % 7.70*       No results found for: LACTATE    Relevant imaging studies and labs from 03/23/23 were reviewed    Medications (drips):  insulin, Last Rate: 4.1 Units/hr (03/23/23 0645)  lactated ringers  niCARdipine  nitroglycerin, Last Rate: Stopped (03/21/23 1630)  norepinephrine, Last Rate: Stopped (03/22/23 1200)        acetaminophen, 650 mg, Oral, Q8H  aspirin, 325 mg, Oral, Daily  atorvastatin, 40 mg, Oral, Nightly  metoclopramide, 10 mg, Intravenous, TID AC  pharmacy consult - MTM, , Does not apply, Daily  senna-docusate sodium, 2 tablet, Oral, BID        •  bisacodyl  •  Calcium Replacement - Follow Nurse / BPA Driven Protocol  •  dextrose  •  dextrose  •  glucagon (human recombinant)  •  HYDROcodone-acetaminophen  •  HYDROmorphone  •  ipratropium-albuterol  •  Magnesium Standard Dose Replacement - Follow Nurse / BPA Driven Protocol  •  niCARdipine  •  nitroglycerin  •  norepinephrine  •  ondansetron  •  oxyCODONE  •  Phosphorus Replacement - Follow Nurse / BPA Driven Protocol  •  polyethylene glycol  •   Potassium Replacement - Follow Nurse / BPA Driven Protocol  •  simethicone    Assessment & Plan   IMPRESSION / PLAN     Inpatient Problem List:  62 y.o.male:  Active Hospital Problems    Diagnosis    • **Coronary artery disease involving native coronary artery of native heart without angina pectoris    • Coronary artery disease of native artery of native heart with stable angina pectoris (HCC)    • Diabetes mellitus (HCC)    • Sleep apnea         Hospital Course:  62 y.o.male with relevant PMH of CAD with PTCA and stent placement to LAD and right Coronary Artery in 2010, HTN, HLD, recent LHC 3/16 w/ MVCAD, diabetes admitted 3/21/2023 post op 3vCABG by Dr. Emanuel.    Impression:  Remains on insulin gtt, poor po intake, Cr rising despite IVF    Plan:    Post op Care  CAD  Per CTS / Cardiology    T2DM A1c 7.7  Stress Hyperglycemia  Nausea  Likely Gastroparesis  D/cd dextrose containing IVF, continue insulin gtt until taking better po.  Schedule Reglan 10 mg IV q8h.  Relistor x1 --> narcotics can exacerbate gastroparesis.  On enormous amounts of insulin at home nearly 400 units per 24 hours.  Bowel Regimen.    Hypotension  EMANUEL  Given his baseline HTN and worsening despite IVF, use levophed to try and keep MAP > 75.  D/c anti-hypertensives for now.  Re-order another 2 liters of LR over 20 hours.  Avoid Nephrotoxins.  Nephrology has been consulted.  Will order urine studies / renal US.    Pain  Titrate narcs, hold neurontin in setting of worsening EMANUEL    Likely MARIAH  CPAP hs and prn    PT/OT    Nutrition  Dietary Orders (From admission, onward)     Start     Ordered    03/22/23 0126  Diet: Liquid Diets; Clear Liquid; Texture: Regular Texture (IDDSI 7); Fluid Consistency: Thin (IDDSI 0)  Diet Effective Now        References:    Diet Order [facility]   Question Answer Comment   Diets: Liquid Diets    Liquid Diet: Clear Liquid    Texture: Regular Texture (IDDSI 7)    Fluid Consistency: Thin (IDDSI 0)        03/22/23 0125               Plan of care and goals reviewed with multidisciplinary team at daily rounds    Critical Care time spent in direct patient care: 35 minutes (excluding procedure time, if applicable) including high complexity decision making to assess, manipulate, and support vital organ system failure in this individual who has impairment of one or more vital organ systems such that there is a high probability of imminent or life threatening deterioration in the patient’s condition.       Sushil Reina MD  Intensive Care Medicine  03/23/23 10:44 EDT

## 2023-03-23 NOTE — PROGRESS NOTES
"  [facility] at [facility]  PROGRESS NOTE    Date of Admission: 3/21/2023  Date of Service: 03/23/23    Primary Care Physician: Jose M Simon MD    Chief Complaint: f/u HTN, HLD  Problem List:   Coronary artery disease involving native coronary artery of native heart without angina pectoris    Diabetes mellitus (HCC)    Sleep apnea    Coronary artery disease of native artery of native heart with stable angina pectoris (HCC)      Subjective      HPI: Belching and nausea continue.  Creatinine rising      Objective   Vitals: /78   Pulse 99   Temp 100.4 °F (38 °C) (Bladder)   Resp 24   Ht 185.4 cm (73\")   Wt (!) 147 kg (324 lb 15.3 oz)   SpO2 98%   BMI 42.87 kg/m²     Physical Exam:  General Appearance:   · Morbidly obese no acute distress  Neck:  · thyroid not enlarged  · supple  Respiratory:  · no respiratory distress  · normal breath sounds  · no rales  Cardiovascular:  · no jugular venous distention  · regular rhythm  · apical impulse normal  · S1 normal, S2 normal  · no S3, no S4   · no murmur  · Slight precordial rub, no thrill  · carotid pulses normal; no bruit  · pedal pulses normal  · lower extremity edema: 1+  Skin:   warm, dry      Results:  Results from last 7 days   Lab Units 03/23/23  0303 03/22/23  0218 03/21/23  1529   WBC 10*3/mm3 21.33* 15.73* 13.26*   HEMOGLOBIN g/dL 10.0* 10.1* 10.2*   HEMATOCRIT % 33.1* 33.4* 32.3*   PLATELETS 10*3/mm3 319 311 307     Results from last 7 days   Lab Units 03/23/23  0303 03/22/23  0218 03/21/23  1529   SODIUM mmol/L 137 138 141   POTASSIUM mmol/L 4.7 4.6 4.1   CHLORIDE mmol/L 98 101 103   CO2 mmol/L 27.0 26.0 25.0   BUN mg/dL 36* 23 24*   CREATININE mg/dL 2.49* 1.64* 1.42*   GLUCOSE mg/dL 154* 175* 190*      Lab Results   Component Value Date    CHOL 71 03/22/2023    TRIG 185 (H) 03/22/2023    HDL 18 (L) 03/22/2023    LDL 23 03/22/2023    AST 26 03/23/2023    ALT 6 03/23/2023     Results from last 7 days   Lab Units " 03/20/23  1512   HEMOGLOBIN A1C % 7.70*         Results from last 7 days   Lab Units 03/22/23  0218 03/21/23  1137 03/20/23  1512   PROTIME Seconds 14.9* 16.1* 12.9   INR  1.17* 1.29* 0.98   APTT seconds  --  32.9 28.7       Intake/Output Summary (Last 24 hours) at 3/23/2023 0817  Last data filed at 3/23/2023 0600  Gross per 24 hour   Intake 2322.02 ml   Output 905 ml   Net 1417.02 ml     I personally reviewed the patient's EKG/Telemetry data    Radiology Data:   CXR 3/22/23:  IMPRESSION:  Impression:  1. Interval extubation and removal of esophagogastric tube.  2. Right IJ central venous catheter tip at the mid SVC.  3. Small left apical pneumothorax measuring 10 mm. Left chest tube in place.  4. Hypoinflated lungs with bibasilar atelectasis left greater than right, stable.    Current Medications:  acetaminophen, 650 mg, Oral, Q8H  aspirin, 325 mg, Oral, Daily  atorvastatin, 40 mg, Oral, Nightly  gabapentin, 100 mg, Oral, Q8H  metoprolol tartrate, 12.5 mg, Oral, Q12H  pharmacy consult - [place], , Does not apply, Daily  senna-docusate sodium, 2 tablet, Oral, BID      dexmedetomidine, 0.2-1.5 mcg/kg/hr, Last Rate: Stopped (03/21/23 1130)  dextrose 5 % with KCl 20 mEq, 30 mL/hr, Last Rate: 30 mL/hr (03/21/23 1115)  insulin, 0-100 Units/hr, Last Rate: 4.1 Units/hr (03/23/23 0645)  lactated ringers, 100 mL/hr, Last Rate: 100 mL/hr (03/23/23 0302)  niCARdipine, 5-15 mg/hr  nitroglycerin, 5-200 mcg/min, Last Rate: Stopped (03/21/23 1630)  norepinephrine, 0.02-0.3 mcg/kg/min, Last Rate: Stopped (03/22/23 1200)        Assessment and Plan:     1. MVCAD   - LHC 3/16 Dr. Boyle with MVCAD, normal EF pre-op   - CABG x3 with Dr. Eamnuel 3/21  Continue aspirin, atorvastatin and low-dose beta-blocker     2. Hypertension   Avoid hypotension due to EMANUEL  Holding beta-blocker for now     3. Hyperlipidemia  -Continue atorvastatin 40 mg, which is double home dose  -Excellent lipids 3/22, LDL 23     4. DM2  - A1C 7.7%  - per  intensivists     5. EMANUEL   - Cr continues to rise  - continue to monitor and avoid hypotension/nephrotoxins   Encourage fluids  Renal consult        Markus Riddle MD, [facility], [facility]  03/23/23

## 2023-03-24 ENCOUNTER — APPOINTMENT (OUTPATIENT)
Dept: GENERAL RADIOLOGY | Facility: HOSPITAL | Age: 63
DRG: 236 | End: 2023-03-24
Payer: MEDICARE

## 2023-03-24 LAB
ALBUMIN SERPL-MCNC: 3.7 G/DL (ref 3.5–5.2)
ALBUMIN/GLOB SERPL: 1.2 G/DL
ALP SERPL-CCNC: 59 U/L (ref 39–117)
ALT SERPL W P-5'-P-CCNC: 6 U/L (ref 1–41)
ANION GAP SERPL CALCULATED.3IONS-SCNC: 11 MMOL/L (ref 5–15)
ARTERIAL PATENCY WRIST A: POSITIVE
AST SERPL-CCNC: 25 U/L (ref 1–40)
ATMOSPHERIC PRESS: ABNORMAL MM[HG]
BASE EXCESS BLDA CALC-SCNC: -1.8 MMOL/L (ref 0–2)
BASOPHILS # BLD AUTO: 0.03 10*3/MM3 (ref 0–0.2)
BASOPHILS NFR BLD AUTO: 0.2 % (ref 0–1.5)
BDY SITE: ABNORMAL
BILIRUB SERPL-MCNC: 0.8 MG/DL (ref 0–1.2)
BODY TEMPERATURE: 37 C
BUN SERPL-MCNC: 44 MG/DL (ref 8–23)
BUN/CREAT SERPL: 15.9 (ref 7–25)
CALCIUM SPEC-SCNC: 8.5 MG/DL (ref 8.6–10.5)
CHLORIDE SERPL-SCNC: 95 MMOL/L (ref 98–107)
CO2 BLDA-SCNC: 25.9 MMOL/L (ref 22–33)
CO2 SERPL-SCNC: 26 MMOL/L (ref 22–29)
COHGB MFR BLD: 1.6 % (ref 0–2)
CREAT SERPL-MCNC: 2.77 MG/DL (ref 0.76–1.27)
CREAT UR-MCNC: 246.9 MG/DL
DEPRECATED RDW RBC AUTO: 53.1 FL (ref 37–54)
EGFRCR SERPLBLD CKD-EPI 2021: 25.2 ML/MIN/1.73
EOSINOPHIL # BLD AUTO: 0.18 10*3/MM3 (ref 0–0.4)
EOSINOPHIL NFR BLD AUTO: 1.1 % (ref 0.3–6.2)
EPAP: 0
ERYTHROCYTE [DISTWIDTH] IN BLOOD BY AUTOMATED COUNT: 17.1 % (ref 12.3–15.4)
GLOBULIN UR ELPH-MCNC: 3.1 GM/DL
GLUCOSE BLDC GLUCOMTR-MCNC: 120 MG/DL (ref 70–130)
GLUCOSE BLDC GLUCOMTR-MCNC: 121 MG/DL (ref 70–130)
GLUCOSE BLDC GLUCOMTR-MCNC: 134 MG/DL (ref 70–130)
GLUCOSE BLDC GLUCOMTR-MCNC: 135 MG/DL (ref 70–130)
GLUCOSE BLDC GLUCOMTR-MCNC: 161 MG/DL (ref 70–130)
GLUCOSE BLDC GLUCOMTR-MCNC: 164 MG/DL (ref 70–130)
GLUCOSE BLDC GLUCOMTR-MCNC: 179 MG/DL (ref 70–130)
GLUCOSE BLDC GLUCOMTR-MCNC: 187 MG/DL (ref 70–130)
GLUCOSE BLDC GLUCOMTR-MCNC: 188 MG/DL (ref 70–130)
GLUCOSE BLDC GLUCOMTR-MCNC: 189 MG/DL (ref 70–130)
GLUCOSE SERPL-MCNC: 126 MG/DL (ref 65–99)
HCO3 BLDA-SCNC: 24.5 MMOL/L (ref 20–26)
HCT VFR BLD AUTO: 29.4 % (ref 37.5–51)
HCT VFR BLD CALC: 28.1 % (ref 38–51)
HGB BLD-MCNC: 9.1 G/DL (ref 13–17.7)
HGB BLDA-MCNC: 9.2 G/DL (ref 13.5–17.5)
IMM GRANULOCYTES # BLD AUTO: 0.07 10*3/MM3 (ref 0–0.05)
IMM GRANULOCYTES NFR BLD AUTO: 0.4 % (ref 0–0.5)
INHALED O2 CONCENTRATION: 36 %
IPAP: 0
LYMPHOCYTES # BLD AUTO: 0.83 10*3/MM3 (ref 0.7–3.1)
LYMPHOCYTES NFR BLD AUTO: 5.3 % (ref 19.6–45.3)
MAGNESIUM SERPL-MCNC: 1.8 MG/DL (ref 1.6–2.4)
MCH RBC QN AUTO: 26.8 PG (ref 26.6–33)
MCHC RBC AUTO-ENTMCNC: 31 G/DL (ref 31.5–35.7)
MCV RBC AUTO: 86.5 FL (ref 79–97)
METHGB BLD QL: 0.4 % (ref 0–1.5)
MODALITY: ABNORMAL
MONOCYTES # BLD AUTO: 1.57 10*3/MM3 (ref 0.1–0.9)
MONOCYTES NFR BLD AUTO: 10 % (ref 5–12)
NEUTROPHILS NFR BLD AUTO: 13.01 10*3/MM3 (ref 1.7–7)
NEUTROPHILS NFR BLD AUTO: 83 % (ref 42.7–76)
NOTE: ABNORMAL
NRBC BLD AUTO-RTO: 0 /100 WBC (ref 0–0.2)
OXYHGB MFR BLDV: 92 % (ref 94–99)
PAW @ PEAK INSP FLOW SETTING VENT: 0 CMH2O
PCO2 BLDA: 47.8 MM HG (ref 35–45)
PCO2 TEMP ADJ BLD: 47.8 MM HG (ref 35–48)
PH BLDA: 7.32 PH UNITS (ref 7.35–7.45)
PH, TEMP CORRECTED: 7.32 PH UNITS
PHOSPHATE SERPL-MCNC: 4.3 MG/DL (ref 2.5–4.5)
PLATELET # BLD AUTO: 281 10*3/MM3 (ref 140–450)
PMV BLD AUTO: 10.1 FL (ref 6–12)
PO2 BLDA: 74.5 MM HG (ref 83–108)
PO2 TEMP ADJ BLD: 74.5 MM HG (ref 83–108)
POTASSIUM SERPL-SCNC: 4.8 MMOL/L (ref 3.5–5.2)
PROT ?TM UR-MCNC: 57.6 MG/DL
PROT SERPL-MCNC: 6.8 G/DL (ref 6–8.5)
RBC # BLD AUTO: 3.4 10*6/MM3 (ref 4.14–5.8)
SODIUM SERPL-SCNC: 132 MMOL/L (ref 136–145)
SODIUM UR-SCNC: <20 MMOL/L
TOTAL RATE: 0 BREATHS/MINUTE
WBC NRBC COR # BLD: 15.69 10*3/MM3 (ref 3.4–10.8)

## 2023-03-24 PROCEDURE — 85025 COMPLETE CBC W/AUTO DIFF WBC: CPT | Performed by: INTERNAL MEDICINE

## 2023-03-24 PROCEDURE — 82962 GLUCOSE BLOOD TEST: CPT

## 2023-03-24 PROCEDURE — 25010000002 METOCLOPRAMIDE PER 10 MG: Performed by: THORACIC SURGERY (CARDIOTHORACIC VASCULAR SURGERY)

## 2023-03-24 PROCEDURE — 63710000001 INSULIN DETEMIR PER 5 UNITS: Performed by: INTERNAL MEDICINE

## 2023-03-24 PROCEDURE — 93005 ELECTROCARDIOGRAM TRACING: CPT | Performed by: THORACIC SURGERY (CARDIOTHORACIC VASCULAR SURGERY)

## 2023-03-24 PROCEDURE — 97530 THERAPEUTIC ACTIVITIES: CPT

## 2023-03-24 PROCEDURE — 83050 HGB METHEMOGLOBIN QUAN: CPT

## 2023-03-24 PROCEDURE — 63710000001 INSULIN LISPRO (HUMAN) PER 5 UNITS: Performed by: INTERNAL MEDICINE

## 2023-03-24 PROCEDURE — 80053 COMPREHEN METABOLIC PANEL: CPT | Performed by: STUDENT IN AN ORGANIZED HEALTH CARE EDUCATION/TRAINING PROGRAM

## 2023-03-24 PROCEDURE — 25010000002 HYDROMORPHONE PER 4 MG: Performed by: INTERNAL MEDICINE

## 2023-03-24 PROCEDURE — 82375 ASSAY CARBOXYHB QUANT: CPT

## 2023-03-24 PROCEDURE — 84100 ASSAY OF PHOSPHORUS: CPT | Performed by: INTERNAL MEDICINE

## 2023-03-24 PROCEDURE — 71045 X-RAY EXAM CHEST 1 VIEW: CPT

## 2023-03-24 PROCEDURE — 36600 WITHDRAWAL OF ARTERIAL BLOOD: CPT

## 2023-03-24 PROCEDURE — 83735 ASSAY OF MAGNESIUM: CPT | Performed by: INTERNAL MEDICINE

## 2023-03-24 PROCEDURE — 99232 SBSQ HOSP IP/OBS MODERATE 35: CPT | Performed by: INTERNAL MEDICINE

## 2023-03-24 PROCEDURE — 99024 POSTOP FOLLOW-UP VISIT: CPT | Performed by: THORACIC SURGERY (CARDIOTHORACIC VASCULAR SURGERY)

## 2023-03-24 PROCEDURE — 99291 CRITICAL CARE FIRST HOUR: CPT | Performed by: INTERNAL MEDICINE

## 2023-03-24 PROCEDURE — 82805 BLOOD GASES W/O2 SATURATION: CPT

## 2023-03-24 PROCEDURE — 0 MAGNESIUM SULFATE 4 GM/100ML SOLUTION: Performed by: THORACIC SURGERY (CARDIOTHORACIC VASCULAR SURGERY)

## 2023-03-24 PROCEDURE — 93010 ELECTROCARDIOGRAM REPORT: CPT | Performed by: INTERNAL MEDICINE

## 2023-03-24 RX ORDER — INSULIN LISPRO 100 [IU]/ML
5 INJECTION, SOLUTION INTRAVENOUS; SUBCUTANEOUS
Status: DISCONTINUED | OUTPATIENT
Start: 2023-03-25 | End: 2023-03-30 | Stop reason: HOSPADM

## 2023-03-24 RX ORDER — INSULIN LISPRO 100 [IU]/ML
0-24 INJECTION, SOLUTION INTRAVENOUS; SUBCUTANEOUS
Status: DISCONTINUED | OUTPATIENT
Start: 2023-03-24 | End: 2023-03-29

## 2023-03-24 RX ORDER — SODIUM CHLORIDE 9 MG/ML
50 INJECTION, SOLUTION INTRAVENOUS CONTINUOUS
Status: DISCONTINUED | OUTPATIENT
Start: 2023-03-24 | End: 2023-03-25

## 2023-03-24 RX ORDER — MAGNESIUM SULFATE HEPTAHYDRATE 40 MG/ML
4 INJECTION, SOLUTION INTRAVENOUS ONCE
Status: COMPLETED | OUTPATIENT
Start: 2023-03-24 | End: 2023-03-24

## 2023-03-24 RX ADMIN — INSULIN DETEMIR 15 UNITS: 100 INJECTION, SOLUTION SUBCUTANEOUS at 16:47

## 2023-03-24 RX ADMIN — METOCLOPRAMIDE 10 MG: 5 INJECTION, SOLUTION INTRAMUSCULAR; INTRAVENOUS at 06:43

## 2023-03-24 RX ADMIN — ASPIRIN 325 MG: 325 TABLET, COATED ORAL at 08:56

## 2023-03-24 RX ADMIN — CASTOR OIL AND BALSAM, PERU 1 APPLICATION: 788; 87 OINTMENT TOPICAL at 08:57

## 2023-03-24 RX ADMIN — HYDROMORPHONE HYDROCHLORIDE 0.5 MG: 1 INJECTION, SOLUTION INTRAMUSCULAR; INTRAVENOUS; SUBCUTANEOUS at 04:18

## 2023-03-24 RX ADMIN — ACETAMINOPHEN 325MG 650 MG: 325 TABLET ORAL at 06:02

## 2023-03-24 RX ADMIN — BUMETANIDE 0.5 MG: 0.25 INJECTION INTRAMUSCULAR; INTRAVENOUS at 10:42

## 2023-03-24 RX ADMIN — ACETAMINOPHEN 325MG 650 MG: 325 TABLET ORAL at 14:21

## 2023-03-24 RX ADMIN — OXYCODONE 10 MG: 5 TABLET ORAL at 16:47

## 2023-03-24 RX ADMIN — METOCLOPRAMIDE 10 MG: 5 INJECTION, SOLUTION INTRAMUSCULAR; INTRAVENOUS at 16:47

## 2023-03-24 RX ADMIN — SENNOSIDES AND DOCUSATE SODIUM 2 TABLET: 8.6; 5 TABLET ORAL at 08:56

## 2023-03-24 RX ADMIN — OXYCODONE 10 MG: 5 TABLET ORAL at 06:42

## 2023-03-24 RX ADMIN — CASTOR OIL AND BALSAM, PERU 1 APPLICATION: 788; 87 OINTMENT TOPICAL at 21:08

## 2023-03-24 RX ADMIN — MAGNESIUM SULFATE HEPTAHYDRATE 4 G: 40 INJECTION, SOLUTION INTRAVENOUS at 06:02

## 2023-03-24 RX ADMIN — INSULIN LISPRO 4 UNITS: 100 INJECTION, SOLUTION INTRAVENOUS; SUBCUTANEOUS at 21:08

## 2023-03-24 RX ADMIN — BUMETANIDE 0.5 MG: 0.25 INJECTION INTRAMUSCULAR; INTRAVENOUS at 04:20

## 2023-03-24 RX ADMIN — METOCLOPRAMIDE 10 MG: 5 INJECTION, SOLUTION INTRAMUSCULAR; INTRAVENOUS at 12:16

## 2023-03-24 RX ADMIN — SODIUM CHLORIDE 75 ML/HR: 9 INJECTION, SOLUTION INTRAVENOUS at 10:42

## 2023-03-24 RX ADMIN — SENNOSIDES AND DOCUSATE SODIUM 2 TABLET: 8.6; 5 TABLET ORAL at 21:08

## 2023-03-24 RX ADMIN — ATORVASTATIN CALCIUM 40 MG: 40 TABLET, FILM COATED ORAL at 21:08

## 2023-03-24 RX ADMIN — HYDROMORPHONE HYDROCHLORIDE 0.5 MG: 1 INJECTION, SOLUTION INTRAMUSCULAR; INTRAVENOUS; SUBCUTANEOUS at 04:14

## 2023-03-24 RX ADMIN — ACETAMINOPHEN 325MG 650 MG: 325 TABLET ORAL at 21:08

## 2023-03-24 NOTE — THERAPY TREATMENT NOTE
Patient Name: Wilbert Kelley  : 1960    MRN: [id number]                              Today's Date: 3/24/2023       Admit Date: 3/21/2023    Visit Dx:     ICD-10-CM ICD-9-CM   1. Coronary artery disease of native artery of native heart with stable angina pectoris (HCC)  I25.118 414.01     413.9     Patient Active Problem List   Diagnosis   • SOB (shortness of breath) on exertion   • Insulin resistant Diabetes mellitus (HCC)   • Sleep apnea   • Multivessel CAD with remote ptca stent, now with stable anginal equivelent (dyspnea) ([facility])   • S/P CABG x 3 on 3/21/2023   • Hypertension   • Hyperlipidemia   • EMANUEL (HCC)     Past Medical History:   Diagnosis Date   • Arthritis    • BPH (benign prostatic hyperplasia)    • Cancer ([facility])     basal and melanoma-  x2 - left side ear and elbow   • Constipation    • Coronary artery disease    • DDD (degenerative disc disease), lumbar    • Diabetes mellitus (HCC)     couple times month check sugar   • Frozen shoulder     left   • GERD (gastroesophageal reflux disease)    • Hyperlipidemia    • Hypertension    • Limited mobility     left shoulder  - possible frozen shoulder-= please be aware for surgery   • Sleep apnea     doesnt use machine   • Tinnitus    • Wears glasses     small print     Past Surgical History:   Procedure Laterality Date   • CARDIAC CATHETERIZATION     • CARDIAC CATHETERIZATION Right 2023    Procedure: Left Heart Cath;  Surgeon: Jarod Boyle MD;  Location:  [facility] CATH INVASIVE LOCATION;  Service: Cardiovascular;  Laterality: Right;   • CATARACT EXTRACTION, BILATERAL Bilateral    • COLONOSCOPY     • CORONARY ARTERY BYPASS GRAFT N/A 3/21/2023    Procedure: MEDIAN STERNOTOMY, CORONARY ARTERY BYPASS GRAFTING X 3, UTILIZING THE LEFT INTERNAL MAMMARY ARTERY, ENDOSCOPIC VEIN HARVEST OF THE RIGHT GREATER SAPENOUSE VEIN;  Surgeon: Markus Emanuel MD;  Location: [facility] OR;  Service: Cardiothoracic;  Laterality: N/A;   • CORONARY STENT PLACEMENT      x4    • ENDOSCOPY     • FINGER SURGERY Left     middle finger - left side   • SKIN CANCER EXCISION      x2   • WISDOM TOOTH EXTRACTION        General Information     Row Name 03/24/23 1145          Physical Therapy Time and Intention    Document Type therapy note (daily note)  -CM     Mode of Treatment physical therapy;individual therapy  -CM     Row Name 03/24/23 1145          General Information    Patient Profile Reviewed yes  -CM     Existing Precautions/Restrictions cardiac;fall;oxygen therapy device and L/min;sternal  -CM     Barriers to Rehab medically complex;previous functional deficit  -CM     Row Name 03/24/23 1145          Cognition    Orientation Status (Cognition) oriented x 3  -CM     Row Name 03/24/23 1145          Safety Issues, Functional Mobility    Safety Issues Affecting Function (Mobility) awareness of need for assistance;insight into deficits/self-awareness;safety precautions follow-through/compliance  -CM     Impairments Affecting Function (Mobility) balance;endurance/activity tolerance;shortness of breath;strength;pain  -CM           User Key  (r) = Recorded By, (t) = Taken By, (c) = Cosigned By    Initials Name Provider Type    CM Anahy Deleon, PT Physical Therapist               Mobility     Row Name 03/24/23 1145          Bed Mobility    Bed Mobility supine-sit  -CM     Supine-Sit [place] (Bed Mobility) moderate assist (50% patient effort);2 person assist;verbal cues  -CM     Assistive Device (Bed Mobility) head of bed elevated  -CM     Comment, (Bed Mobility) cues for sternal precautions and to bring BLEs to EOB, assist required at trunk to lift from elevated HOB and to scoot to EOB  -CM     Row Name 03/24/23 1145          Sit-Stand Transfer    Sit-Stand [place] (Transfers) minimum assist (75% patient effort);2 person assist;verbal cues  -CM     Comment, (Sit-Stand Transfer) cues for sternal precautions, grasps tripod monitor upon standing  -CM     Row Name 03/24/23 1143           Gait/Stairs (Locomotion)    [place] Level (Gait) contact guard  -CM     Assistive Device (Gait) other (see comments)  BUE support on tripod monitor  -CM     Distance in Feet (Gait) 220  -CM     Deviations/Abnormal Patterns (Gait) bilateral deviations;base of support, wide;gait speed decreased;stride length decreased;weight shifting decreased  -CM     Bilateral Gait Deviations forward flexed posture  -CM     Comment, (Gait/Stairs) Patient ambulated in hallway on 6L O2 with a step through gait pattern. He continues to demonstrate wide base of support but is steady on his feet with no LOB. He takes 2 brief standing rest breaks due to SOA, all VSS.  -CM           User Key  (r) = Recorded By, (t) = Taken By, (c) = Cosigned By    Initials Name Provider Type    Anahy Plata PT Physical Therapist               Obj/Interventions     Row Name 03/24/23 1149          Balance    Balance Assessment sitting static balance;standing static balance;standing dynamic balance  -CM     Static Sitting Balance supervision  -CM     Position, Sitting Balance unsupported;sitting in chair;sitting edge of bed  -CM     Static Standing Balance contact guard  -CM     Dynamic Standing Balance contact guard  -CM     Position/Device Used, Standing Balance supported;other (see comments)  BUE support on tripod monitor  -CM     Comment, Balance no LOB  -CM           User Key  (r) = Recorded By, (t) = Taken By, (c) = Cosigned By    Initials Name Provider Type    Anahy Plata PT Physical Therapist               Goals/Plan    No documentation.                Clinical Impression     Row Name 03/24/23 1150          Pain    Pain Intervention(s) Ambulation/increased activity;Repositioned;Splinting  -CM     Row Name 03/24/23 1150          Pain Scale: FACES Pre/Post-Treatment    Pain: FACES Scale, Pretreatment 2-->hurts little bit  -CM     Posttreatment Pain Rating 2-->hurts little bit  -CM     Pain Location incisional  -CM      Pain Location - chest  -CM     Row Name 03/24/23 1150          Plan of Care Review    Plan of Care Reviewed With patient;spouse  -CM     Progress improving  -CM     Outcome Evaluation Patient continues to show improvement with ability to progress ambulation distance to 220' with CGA and BUE support on tripod monitor. He took 2 brief standing rest breaks due to SOA. His balance is improved requiring on CGA to mobilize on his feet, however still requiring significant assist for bed mobility. IPPT intervention remains indicated. Will continue with current PT POC with D/C rec home with assist remaining appropriate.  -CM     Row Name 03/24/23 1150          Vital Signs    Pre Systolic BP Rehab 112  -CM     Pre Treatment Diastolic BP 78  -CM     Post Systolic BP Rehab 144  -CM     Post Treatment Diastolic BP 63  -CM     Pretreatment Heart Rate (beats/min) 87  -CM     Posttreatment Heart Rate (beats/min) 100  -CM     Pre SpO2 (%) 93  -CM     O2 Delivery Pre Treatment nasal cannula  -CM     Post SpO2 (%) 94  -CM     O2 Delivery Post Treatment nasal cannula  -CM     Pre Patient Position Supine  -CM     Intra Patient Position Standing  -CM     Post Patient Position Sitting  -CM     Row Name 03/24/23 1150          Positioning and Restraints    Pre-Treatment Position in bed  -CM     Post Treatment Position chair  -CM     In Chair reclined;call light within reach;encouraged to call for assist;with family/caregiver;RUE elevated;LUE elevated;with nsg;notified nsg  -CM           User Key  (r) = Recorded By, (t) = Taken By, (c) = Cosigned By    Initials Name Provider Type    Anahy Plata, PT Physical Therapist               Outcome Measures     Row Name 03/24/23 1150          How much help from another person do you currently need...    Turning from your back to your side while in flat bed without using bedrails? 2  -CM     Moving from lying on back to sitting on the side of a flat bed without bedrails? 2  -CM     Moving  to and from a bed to a chair (including a wheelchair)? 3  -CM     Standing up from a chair using your arms (e.g., wheelchair, bedside chair)? 3  -CM     Climbing 3-5 steps with a railing? 3  -CM     To walk in hospital room? 3  -CM     AM-PAC 6 Clicks Score (PT) 16  -CM     Highest level of mobility 5 --> Static standing  -CM           User Key  (r) = Recorded By, (t) = Taken By, (c) = Cosigned By    Initials Name Provider Type    Anahy Plata PT Physical Therapist                             Physical Therapy Education     Title: PT OT SLP Therapies (In Progress)     Topic: Physical Therapy (In Progress)     Point: Mobility training (In Progress)     Learning Progress Summary           Patient Acceptance, E, NR by CM at 3/24/2023 1155    Acceptance, E, NR by CM at 3/23/2023 1043    Acceptance, E, NR by CM at 3/22/2023 1142   Significant Other Acceptance, E, NR by CM at 3/24/2023 1155                   Point: Home exercise program (Not Started)     Learner Progress:  Not documented in this visit.          Point: Body mechanics (In Progress)     Learning Progress Summary           Patient Acceptance, E, NR by CM at 3/24/2023 1155    Acceptance, E, NR by CM at 3/23/2023 1043    Acceptance, E, NR by CM at 3/22/2023 1142   Significant Other Acceptance, E, NR by CM at 3/24/2023 1155                   Point: Precautions (In Progress)     Learning Progress Summary           Patient Acceptance, E, NR by CM at 3/24/2023 1155    Acceptance, E, NR by CM at 3/23/2023 1043    Acceptance, E, NR by CM at 3/22/2023 1142   Significant Other Acceptance, E, NR by CM at 3/24/2023 1155                               User Key     Initials Effective Dates Name Provider Type Discipline    MANSI 09/22/22 -  Anahy Deleon PT Physical Therapist PT              PT Recommendation and Plan  Planned Therapy Interventions (PT): balance training, bed mobility training, gait training, home exercise program, postural re-education,  patient/family education, neuromuscular re-education, motor coordination training, ROM (range of motion), stair training, strengthening, stretching, transfer training  Plan of Care Reviewed With: patient, spouse  Progress: improving  Outcome Evaluation: Patient continues to show improvement with ability to progress ambulation distance to 220' with CGA and BUE support on tripod monitor. He took 2 brief standing rest breaks due to SOA. His balance is improved requiring on CGA to mobilize on his feet, however still requiring significant assist for bed mobility. IPPT intervention remains indicated. Will continue with current PT POC with D/C rec home with assist remaining appropriate.     Time Calculation:    PT Charges     Row Name 03/24/23 1155             Time Calculation    Start Time 1015  -CM      PT Received On 03/24/23  -CM      PT Goal Re-Cert Due Date 04/01/23  -CM         Timed Charges    10252 - PT Therapeutic Activity Minutes 15  -CM         Total Minutes    Timed Charges Total Minutes 15  -CM       Total Minutes 15  -CM            User Key  (r) = Recorded By, (t) = Taken By, (c) = Cosigned By    Initials Name Provider Type    CM Anahy Deleon, PT Physical Therapist              Therapy Charges for Today     Code Description Service Date Service Provider Modifiers Qty    25440949191 HC PT THERAPEUTIC ACT EA 15 MIN 3/23/2023 Anahy Deleon, MANOHAR GP 2    33877005049 HC PT THERAPEUTIC ACT EA 15 MIN 3/24/2023 Anahy Deleon, MANOHAR GP 1          PT G-Codes  Outcome Measure Options: AM-PAC 6 Clicks Basic Mobility (PT)  AM-PAC 6 Clicks Score (PT): 16  PT Discharge Summary  Anticipated Discharge Disposition (PT): home with assist    Anahy Deleon PT  3/24/2023

## 2023-03-24 NOTE — PROGRESS NOTES
CTS Progress Note       LOS: 3 days   Patient Care Team:  Jose M Simon MD as PCP - General (Geriatric Medicine)    Chief Complaint: Coronary artery disease of native artery of native heart with stable angina pectoris (HCC)    Vital Signs:  Temp:  [98.3 °F (36.8 °C)-99.3 °F (37.4 °C)] 99.2 °F (37.3 °C)  Heart Rate:  [] 100  Resp:  [12-28] 14  BP: ()/(50-87) 121/73    Physical Exam:  Up in chair breathing unlabored     Results:   Results from last 7 days   Lab Units 03/24/23  0305   WBC 10*3/mm3 15.69*   HEMOGLOBIN g/dL 9.1*   HEMATOCRIT % 29.4*   PLATELETS 10*3/mm3 281     Results from last 7 days   Lab Units 03/24/23  0305   SODIUM mmol/L 132*   POTASSIUM mmol/L 4.8   CHLORIDE mmol/L 95*   CO2 mmol/L 26.0   BUN mg/dL 44*   CREATININE mg/dL 2.77*   GLUCOSE mg/dL 126*   CALCIUM mg/dL 8.5*           Imaging Results (Last 24 Hours)     Procedure Component Value Units Date/Time    XR Chest 1 View [[id number]] Resulted: 03/24/23 0320     Updated: 03/24/23 0326     Renal Limited [[id number]] Resulted: 03/23/23 2243     Updated: 03/23/23 2254    XR Chest 1 View [[id number]] Collected: 03/23/23 0838     Updated: 03/23/23 0842    Narrative:      XR CHEST 1 VW    Date of Exam: 3/23/2023 2:47 AM EDT    Indication: follow up  follow up.    Comparison: One day prior    Findings:  Interval placement of a nasogastric tube which is noted terminating in the stomach. Remaining support hardware projects unchanged. Lung volumes are mildly diminished in comparison, with some increased basilar atelectasis on the left. There is no   enlarging effusion or distinct pneumothorax.      Impression:      Impression:  Interval placement of a nasogastric tube which is noted terminating in the stomach. Remaining support hardware projects unchanged. Lung volumes are mildly diminished in comparison, with some increased basilar atelectasis on the left. There is no   enlarging effusion or distinct pneumothorax.    Electronically  Signed: Prudencio Mendoza    3/23/2023 8:39 AM EDT    Workstation ID: [id number]          Assessment      Multivessel CAD with remote ptca stent, now with stable anginal equivelent (dyspnea) (HCC)    Insulin resistant Diabetes mellitus (HCC)    Sleep apnea    S/P CABG x 3 on 3/21/2023    Hypertension    Hyperlipidemia    EMANUEL (HCC)    Slight increase in serum creatinine but patient with over 945 mL of urine.  This slight increase in creatinine is probably relatively insignificant when considering his overall body mass.  We should be able to hold off on dialysis for today  Plan   CBC and CMP in the a.m.  Discontinue nasogastric tube  Discontinue chest tubes and pacer wires    Please note that portions of this note were completed with a voice recognition program. Efforts were made to edit the dictations, but occasionally words are mistranscribed.    Markus Emanuel MD  03/24/23  06:34 EDT

## 2023-03-24 NOTE — PROGRESS NOTES
"  [facility] at [facility]  PROGRESS NOTE    Date of Admission: 3/21/2023  Date of Service: 03/24/23    Primary Care Physician: Jose M Simon MD    Chief Complaint: f/u HTN, HLD  Problem List:   Multivessel CAD with remote ptca stent, now with stable anginal equivelent (dyspnea) (HCC)    Insulin resistant Diabetes mellitus (HCC)    Sleep apnea    S/P CABG x 3 on 3/21/2023    Hypertension    Hyperlipidemia    EMANUEL (HCC)      Subjective      HPI: No new complaints today, wife at bedside      Objective   Vitals: /68   Pulse 86   Temp 99.1 °F (37.3 °C) (Oral)   Resp 16   Ht 185.4 cm (73\")   Wt (!) 147 kg (324 lb 15.3 oz)   SpO2 93%   BMI 42.87 kg/m²     Physical Exam:  General Appearance:   · well developed  · well nourished, obese  Neck:  · thyroid not enlarged  · supple  Respiratory:  · no respiratory distress  · normal breath sounds  · no rales  Cardiovascular:  · no jugular venous distention  · regular rhythm  · apical impulse normal  · S1 normal, S2 normal  · no S3, no S4   · no murmur  · no rub, no thrill  · carotid pulses normal; no bruit  · pedal pulses normal  · lower extremity edema: 1+  Skin:   warm, dry      Results:  Results from last 7 days   Lab Units 03/24/23  0305 03/23/23 0303 03/22/23  0218   WBC 10*3/mm3 15.69* 21.33* 15.73*   HEMOGLOBIN g/dL 9.1* 10.0* 10.1*   HEMATOCRIT % 29.4* 33.1* 33.4*   PLATELETS 10*3/mm3 281 319 311     Results from last 7 days   Lab Units 03/24/23  0305 03/23/23  0303 03/22/23  0218   SODIUM mmol/L 132* 137 138   POTASSIUM mmol/L 4.8 4.7 4.6   CHLORIDE mmol/L 95* 98 101   CO2 mmol/L 26.0 27.0 26.0   BUN mg/dL 44* 36* 23   CREATININE mg/dL 2.77* 2.49* 1.64*   GLUCOSE mg/dL 126* 154* 175*      Lab Results   Component Value Date    CHOL 71 03/22/2023    TRIG 185 (H) 03/22/2023    HDL 18 (L) 03/22/2023    LDL 23 03/22/2023    AST 25 03/24/2023    ALT 6 03/24/2023     Results from last 7 days   Lab Units 03/20/23  1512   HEMOGLOBIN A1C % " 7.70*     Results from last 7 days   Lab Units 03/22/23  0218   CHOLESTEROL mg/dL 71   TRIGLYCERIDES mg/dL 185*   HDL CHOL mg/dL 18*   LDL CHOL mg/dL 23         Results from last 7 days   Lab Units 03/22/23  0218 03/21/23  1137 03/20/23  1512   PROTIME Seconds 14.9* 16.1* 12.9   INR  1.17* 1.29* 0.98   APTT seconds  --  32.9 28.7       Intake/Output Summary (Last 24 hours) at 3/24/2023 1252  Last data filed at 3/24/2023 1000  Gross per 24 hour   Intake 1673.38 ml   Output 1310 ml   Net 363.38 ml     I personally reviewed the patient's EKG/Telemetry data    Radiology Data:   CXR 3/24/23:  IMPRESSION:  Impression:  1. Stable lines and support tubes.  2. Suspected 10 mm left apical pneumothorax. Left-sided chest tube in stable position.  3. Stable cardiomegaly.  4. Hypoinflated lungs with unchanged bibasilar airspace disease likely atelectasis.    Current Medications:  acetaminophen, 650 mg, Oral, Q8H  aspirin, 325 mg, Oral, Daily  atorvastatin, 40 mg, Oral, Nightly  castor oil-balsam peru, 1 application, Topical, Q12H  metoclopramide, 10 mg, Intravenous, TID AC  pharmacy consult - [facility], , Does not apply, Daily  senna-docusate sodium, 2 tablet, Oral, BID      insulin, 0-100 Units/hr, Last Rate: 4 Units/hr (03/23/23 1800)  niCARdipine, 5-15 mg/hr  nitroglycerin, 5-200 mcg/min, Last Rate: Stopped (03/21/23 1630)  norepinephrine, 0.02-0.3 mcg/kg/min, Last Rate: Stopped (03/22/23 1200)  sodium chloride, 75 mL/hr, Last Rate: 75 mL/hr (03/24/23 1042)        Assessment and Plan:     1. MVCAD   - C 3/16 Dr. Boyle with MVCAD, normal EF pre-op   - CABG x3 with Dr. Emanuel 3/21  Continue aspirin, atorvastatin and low-dose beta-blocker     2. Hypertension   Avoid hypotension due to EMANUEL  Resume metoprolol if renal function stable or improving 3/25     3. Hyperlipidemia  -Continue atorvastatin 40 mg, which is double home dose  -Excellent lipids 3/22, LDL 23     4. DM2  - A1C 7.7%  - per intensivists     5. EMANUEL   - Cr continues to  rise  - continue to monitor and avoid hypotension/nephrotoxins   Received albumin and diuretics per nephrology  Renal following       Electronically signed by Corinna Savage PA-C, 03/24/23, 12:55 PM EDT.    I have seen and examined the patient, performing a face-to-face diagnostic evaluation with plan of care reviewed and developed with the Advanced Practice Clinician and nursing staff. I have addended and modified the above history of present illness, physical examination, and assessment and plan to reflect my findings and impressions. All medical decision making performed by Dr. Riddle.    Markus Riddle MD, [facility], [facility]  03/24/23

## 2023-03-24 NOTE — PLAN OF CARE
Goal Outcome Evaluation:  Plan of Care Reviewed With: patient, spouse        Progress: improving  Outcome Evaluation: Patient continues to show improvement with ability to progress ambulation distance to 220' with CGA and BUE support on tripod monitor. He took 2 brief standing rest breaks due to SOA. His balance is improved requiring on CGA to mobilize on his feet, however still requiring significant assist for bed mobility. IPPT intervention remains indicated. Will continue with current PT POC with D/C rec home with assist remaining appropriate.

## 2023-03-24 NOTE — PROGRESS NOTES
Nutrition Services    Patient Name:  Wilbert Kelley  YOB: 1960  MRN: [id number]  Admit Date:  3/21/2023     Pt seen 2/2 potential NPO/Clr Liq diet> 72 hr. Order per MD to advance diet. Order placed, menu choice taken and supplement ordered per pt preference. Follow per protocol.     Electronically signed by:  Corrina Schuster RD  03/24/23 18:16 EDT

## 2023-03-24 NOTE — PROGRESS NOTES
"Continued Stay Note  [facility]     Patient Name: Wilbert Kelley  MRN: [id number]  Today's Date: 3/24/2023    Admit Date: 3/21/2023    Plan: Ongoing   Discharge Plan     Row Name 03/24/23 1131       Plan    Plan Ongoing    Plan Comments Spoke with patient's wife who states she is a nurse and will be available to assist with care after discharge.  Reports she has obtained a lift chair, rollator and shower bench for use at home \"...if he needs it.\"  Voices concern over steps into their home and I assured her physical therapy would address this if patient is able.  She does report resources with local EMS that she could call on if needed.  Case Management will continue to follow and assist with any discharge planning needs.               Discharge Codes    No documentation.               Expected Discharge Date and Time     Expected Discharge Date Expected Discharge Time    Mar 27, 2023             Kika Fletcher RN    "

## 2023-03-24 NOTE — PROGRESS NOTES
Intensivist Note     3/24/2023  Hospital Day: 3  3 Days Post-Op  ICU Stays Timeline            Hospital Admission: 03/21/23 0544 - Current  ICU stays: 1      In Date/Time Event Department ICU Stay Duration     03/21/23 0544 Admission  [facility] OR      03/21/23 1120 Transfer In  [facility] 3 days 4 hours 58 minutes             Mr. Wilbert Kelley, 62 y.o. male is followed for:    Multivessel CAD with remote ptca stent, now with stable anginal equivelent (dyspnea) ([facility])    S/P CABG x 3 on 3/21/2023    EMANUEL ([facility])    Hypertension    Hyperlipidemia    Insulin resistant Diabetes mellitus ([facility])    Sleep apnea       SUBJECTIVE     62-year-old white male remote smoker with PMH CAD s/p PTCA and stent to LAD and RCA 2010, hypertension and diastolic dysfunction, hyperlipidemia, and diabetes mellitus.  Had noted progressive ROBLES for which he underwent LHC at [facility] revealing critical three-vessel CAD but preserved LV function.  Was referred to Dr. Emanuel 3/20/2023 and admitted 3/21/2023 at which point he underwent uneventful CABG x3 and was transferred to the ICU where he was weaned and extubated in a timely fashion.  He however required nocturnal BiPAP in order to maintain adequate oxygenation and ventilation.  In addition has suffered postoperative EMANUEL.    Interval history: Continues to have the usual amount of postoperative pain but reasonable control.  Continues to require BiPAP at night and is on 5 L of nasal oxygen at the present time with a sat of 95%.  Denies however any significant dyspnea. Hemodynamics adequate and patient not requiring any pressors.  UOP remains on the low side with only 945 cc out over 24 hours and BUN/creatinine continued to worsen increasing from 36/2.49 yesterday, to 44/2.77 today.  Tmax 99.3 but WBC has decreased from 21.33 yesterday, to 15.69 today off all antimicrobials.  Glycemic control remains fair although he is still on an insulin drip on POD #3.  Oral intake remains poor because  "of nausea for which NG tube was placed yesterday.  Tube removed this a.m. per surgery as patient denied nausea.    ROS: Per subjective, all other systems reviewed and were negative.    The patient's relevant PMH, PSH, FH, and SH were reviewed and updated in Epic as appropriate. Allergies and Medications reviewed.    OBJECTIVE     /61   Pulse 86   Temp 99 °F (37.2 °C) (Oral)   Resp 18   Ht 185.4 cm (73\")   Wt (!) 147 kg (324 lb 15.3 oz)   SpO2 92%   BMI 42.87 kg/m²   Flow (L/min): 4    Flowsheet Rows    Flowsheet Row First Filed Value   Admission Height 185.4 cm (73\") Documented at 03/21/2023 0615   Admission Weight 142 kg (314 lb) Documented at 03/21/2023 0615        Intake & Output (last day)       03/23 0701  03/24 0700 03/24 0701  03/25 0700    P.O. 120     I.V. (mL/kg) 1673.4 (11.4)     Other 60     NG/GT      IV Piggyback      Total Intake(mL/kg) 1853.4 (12.6)     Urine (mL/kg/hr) 945 (0.3) 450 (0.3)    Emesis/NG output 100     Chest Tube 280     Total Output 1325 450    Net +528.4 -450                Exam:  General Exam:  Overweight white male sitting up in chair in NAD  HEENT: Pupils equal and reactive. Nose and throat clear.  Neck:                          Supple, no JVD, thyromegaly, or adenopathy  Lungs: Clear to auscultation and percussion anteriorly but diminished breath sounds in the bases.  No wheezes, rales, rhonchi  Cardiovascular: RRR without murmurs or gallops.  Loud pericardial rub.  HR 91 bpm  Abdomen: Soft nontender without organomegaly or masses.  Quite obese   and rectal: Ken catheter in place  Extremities: No cyanosis or clubbing and only trace lower extremity edema.  Neurologic:                 Symmetric strength. No focal deficits.  Oriented x 3 and cooperative      Chest X-Ray: Cardiomegaly with intact sternal wires.  Poor inspiration with mild bibasilar atelectasis.  Multiple MT/CT tubes in place and no evidence of pneumothorax.  Right IJ cordis in " place    INFUSIONS  insulin, 0-100 Units/hr, Last Rate: 4 Units/hr (03/23/23 1800)  nitroglycerin, 5-200 mcg/min, Last Rate: Stopped (03/21/23 1630)  norepinephrine, 0.02-0.3 mcg/kg/min, Last Rate: Stopped (03/22/23 1200)  sodium chloride, 75 mL/hr, Last Rate: 75 mL/hr (03/24/23 1042)        Results from last 7 days   Lab Units 03/24/23 0305 03/23/23 0303 03/22/23 0218   WBC 10*3/mm3 15.69* 21.33* 15.73*   HEMOGLOBIN g/dL 9.1* 10.0* 10.1*   HEMATOCRIT % 29.4* 33.1* 33.4*   PLATELETS 10*3/mm3 281 319 311     Results from last 7 days   Lab Units 03/24/23 0305 03/23/23 0303   SODIUM mmol/L 132* 137   POTASSIUM mmol/L 4.8 4.7   CHLORIDE mmol/L 95* 98   CO2 mmol/L 26.0 27.0   BUN mg/dL 44* 36*   CREATININE mg/dL 2.77* 2.49*   GLUCOSE mg/dL 126* 154*   CALCIUM mg/dL 8.5* 8.5*     Results from last 7 days   Lab Units 03/24/23  0305 03/23/23  0303 03/22/23  0218 03/21/23  1529   MAGNESIUM mg/dL 1.8 2.1 2.0 2.3   PHOSPHORUS mg/dL 4.3 4.6*  --  4.4     Results from last 7 days   Lab Units 03/24/23  0305 03/23/23  0303 03/22/23  0218   ALK PHOS U/L 59 57 52   BILIRUBIN mg/dL 0.8 0.7 0.6   ALT (SGPT) U/L 6 6 13   AST (SGOT) U/L 25 26 25       No results found for: SEDRATE    No results found for: PROBNP      Procalitonin Results:             COVID LABS:  Results From Last 14 Days   Lab Units 03/22/23  0218 03/21/23  1137 03/20/23  1512   PROTIME Seconds 14.9* 16.1* 12.9   INR  1.17* 1.29* 0.98   TRIGLYCERIDES mg/dL 185*  --   --           No results found for: CKTOTAL, CKMB, CKMBINDEX, TROPONINI, TROPONINT  No results found for: TSH  No results found for: LACTATE  No results found for: CORTISOL    Results from last 7 days   Lab Units 03/24/23  1116 03/21/23  1632 03/21/23  1231 03/21/23  1011 03/21/23  0941   PH, ARTERIAL pH units 7.318* 7.371 7.392 7.37 7.34*   PCO2, ARTERIAL mm Hg 47.8* 46.6* 43.9  --   --    PO2 ART mm Hg 74.5* 92.7 252.0*  --   --    HCO3 ART mmol/L 24.5 27.0* 26.7*  --   --    FIO2 % 36 40 100  --    --        I reviewed the patient's results, images and medication.    Assessment & Plan   ASSESSMENT        Multivessel CAD with remote ptca stent, now with stable anginal equivelent (dyspnea) ([facility])    S/P CABG x 3 on 3/21/2023    EMANUEL ([facility])    Hypertension    Hyperlipidemia    Insulin resistant Diabetes mellitus ([facility])    Sleep apnea      DISCUSSION: Appears to have adequate hemodynamics but EMANUEL worsening and oral intake is quite poor.  He does not appear to be significantly volume overloaded so I am going to place him on IV fluids and follow-up renal function in a.m.  Nephrology is ordering albumin as well for volume challenge and is also giving him low-dose diuretic to try to improve output.  He remains mildly hypercarbic most likely due to obesity hypoventilation syndrome and will continue nocturnal BiPAP.  Glycemic control adequate at present and should be able to transition soon but holding off because of poor oral intake and wish to avoid hypoglycemia (is on significant doses of insulin at home)    PLAN     Avoid nephrotoxic agents  Albumin and diuretics per nephrology  IV fluids normal saline 50 cc an hour  Recheck labs in a.m.  CVP monitoring  Transition off insulin drip when oral intake improves  Continue to use BiPAP at night  Wean nasal oxygen as tolerated  Aggressive pulmonary toilet and mobilization    Plan of care and goals reviewed with multidisciplinary team at daily rounds.    I discussed the patient's findings and my recommendations with patient and nursing staff    Patient is critically ill due to persistent hypercarbia, worsening renal function, and marginal glycemic control.  Still has a high risk of life-threatening decline in condition and requires continuous monitoring and frequent reassessment of condition for adjustment of management in order to lessen risk.  I visited the patient's bedside and interacted with nurse numerous times today.    Time spent Critical care 35 min (It does not  include procedure time).    Electronically signed by Stephen Calzada MD, 03/24/23, 4:18 PM EDT.   Pulmonary / Critical care medicine

## 2023-03-24 NOTE — PROGRESS NOTES
"   LOS: 3 days    Patient Care Team:  Jose M Simon MD as PCP - General (Geriatric Medicine)    Chief Complaint:   CABG x3 on 3/21/2023  History of diabetes, hypertension, hyperlipidemia, sleep apnea, admission creatinine 1.2, patient has shock received IV pressors, now the creatinine 2.49, along with oliguria  Subjective     Interval History:   Patient remained critically ill in ICU setting although improvement is noted urine output has improved, patient received some diuretics and albumin yesterday.    Review of Systems:   Complains of pain on the incision site of the chest.  Patient denies shortness of breath, chest pain, dysuria, hematuria, nausea, vomiting.  All systems negative.    Objective     Vital Sign Min/Max for last 24 hours  Temp  Min: 98.9 °F (37.2 °C)  Max: 99.3 °F (37.4 °C)   BP  Min: 103/62  Max: 145/73   Pulse  Min: 86  Max: 114   Resp  Min: 12  Max: 22   SpO2  Min: 84 %  Max: 98 %   Flow (L/min)  Min: 4  Max: 5   No data recorded     Flowsheet Rows    Flowsheet Row First Filed Value   Admission Height 185.4 cm (73\") Documented at 03/21/2023 0615   Admission Weight 142 kg (314 lb) Documented at 03/21/2023 0615          I/O this shift:  In: -   Out: 250 [Urine:250]  I/O last 3 completed shifts:  In: 2978.4 [P.O.:240; I.V.:2558.4; Other:60; NG/GT:120]  Out: 2005 [Urine:1345; Emesis/NG output:200; Chest Tube:460]    Physical Exam:  General Appearance: Alert, oriented, no obvious distress.  Morbidly obese  male  Eyes: PER, EOMI.  Neck: Supple no JVD.  Right IJ in place  Chest wall: Incision slightly slightly inflamed  Lungs: Clear auscultation, no rales rhonchi's, equal chest movement, nonlabored.  Heart: Rub improved, RRR.  Abdomen: Soft, nontender, positive bowel sounds, no organomegaly.  Extremities: No edema, no cyanosis.  Neuro: No focal deficit, moving all extremities, alert oriented X 3   Ken catheter in place.    WBC WBC   Date Value Ref Range Status   03/24/2023 15.69 (H) " 3.40 - 10.80 10*3/mm3 Final   03/23/2023 21.33 (H) 3.40 - 10.80 10*3/mm3 Final   03/22/2023 15.73 (H) 3.40 - 10.80 10*3/mm3 Final   03/21/2023 13.26 (H) 3.40 - 10.80 10*3/mm3 Final      HGB Hemoglobin   Date Value Ref Range Status   03/24/2023 9.1 (L) 13.0 - 17.7 g/dL Final   03/23/2023 10.0 (L) 13.0 - 17.7 g/dL Final   03/22/2023 10.1 (L) 13.0 - 17.7 g/dL Final   03/21/2023 10.2 (L) 13.0 - 17.7 g/dL Final      HCT Hematocrit   Date Value Ref Range Status   03/24/2023 29.4 (L) 37.5 - 51.0 % Final   03/23/2023 33.1 (L) 37.5 - 51.0 % Final   03/22/2023 33.4 (L) 37.5 - 51.0 % Final   03/21/2023 32.3 (L) 37.5 - 51.0 % Final      Platlets No results found for: LABPLAT   MCV MCV   Date Value Ref Range Status   03/24/2023 86.5 79.0 - 97.0 fL Final   03/23/2023 86.4 79.0 - 97.0 fL Final   03/22/2023 85.6 79.0 - 97.0 fL Final   03/21/2023 84.1 79.0 - 97.0 fL Final          Sodium Sodium   Date Value Ref Range Status   03/24/2023 132 (L) 136 - 145 mmol/L Final   03/23/2023 137 136 - 145 mmol/L Final   03/22/2023 138 136 - 145 mmol/L Final   03/21/2023 141 136 - 145 mmol/L Final      Potassium Potassium   Date Value Ref Range Status   03/24/2023 4.8 3.5 - 5.2 mmol/L Final   03/23/2023 4.7 3.5 - 5.2 mmol/L Final     Comment:     Slight hemolysis detected by analyzer. Results may be affected.   03/22/2023 4.6 3.5 - 5.2 mmol/L Final   03/21/2023 4.1 3.5 - 5.2 mmol/L Final     Comment:     Slight hemolysis detected by analyzer. Results may be affected.      Chloride Chloride   Date Value Ref Range Status   03/24/2023 95 (L) 98 - 107 mmol/L Final   03/23/2023 98 98 - 107 mmol/L Final   03/22/2023 101 98 - 107 mmol/L Final   03/21/2023 103 98 - 107 mmol/L Final      CO2 CO2   Date Value Ref Range Status   03/24/2023 26.0 22.0 - 29.0 mmol/L Final   03/23/2023 27.0 22.0 - 29.0 mmol/L Final   03/22/2023 26.0 22.0 - 29.0 mmol/L Final   03/21/2023 25.0 22.0 - 29.0 mmol/L Final      BUN BUN   Date Value Ref Range Status   03/24/2023 44  (H) 8 - 23 mg/dL Final   03/23/2023 36 (H) 8 - 23 mg/dL Final   03/22/2023 23 8 - 23 mg/dL Final   03/21/2023 24 (H) 8 - 23 mg/dL Final      Creatinine Creatinine   Date Value Ref Range Status   03/24/2023 2.77 (H) 0.76 - 1.27 mg/dL Final   03/23/2023 2.49 (H) 0.76 - 1.27 mg/dL Final   03/22/2023 1.64 (H) 0.76 - 1.27 mg/dL Final   03/21/2023 1.42 (H) 0.76 - 1.27 mg/dL Final      Calcium Calcium   Date Value Ref Range Status   03/24/2023 8.5 (L) 8.6 - 10.5 mg/dL Final   03/23/2023 8.5 (L) 8.6 - 10.5 mg/dL Final   03/22/2023 8.5 (L) 8.6 - 10.5 mg/dL Final   03/21/2023 8.1 (L) 8.6 - 10.5 mg/dL Final      PO4 No results found for: CAPO4   Albumin Albumin   Date Value Ref Range Status   03/24/2023 3.7 3.5 - 5.2 g/dL Final   03/23/2023 4.1 3.5 - 5.2 g/dL Final   03/22/2023 4.3 3.5 - 5.2 g/dL Final   03/21/2023 4.2 3.5 - 5.2 g/dL Final      Magnesium Magnesium   Date Value Ref Range Status   03/24/2023 1.8 1.6 - 2.4 mg/dL Final   03/23/2023 2.1 1.6 - 2.4 mg/dL Final   03/22/2023 2.0 1.6 - 2.4 mg/dL Final   03/21/2023 2.3 1.6 - 2.4 mg/dL Final      Uric Acid No results found for: URICACID        Results Review:     I reviewed the patient's new clinical results.  I reviewed the patient's new imaging results and agree with the interpretation.    acetaminophen, 650 mg, Oral, Q8H  aspirin, 325 mg, Oral, Daily  atorvastatin, 40 mg, Oral, Nightly  castor oil-balsam peru, 1 application, Topical, Q12H  metoclopramide, 10 mg, Intravenous, TID AC  pharmacy consult - [facility], , Does not apply, Daily  senna-docusate sodium, 2 tablet, Oral, BID      insulin, 0-100 Units/hr, Last Rate: 4 Units/hr (03/23/23 1800)  niCARdipine, 5-15 mg/hr  nitroglycerin, 5-200 mcg/min, Last Rate: Stopped (03/21/23 1630)  norepinephrine, 0.02-0.3 mcg/kg/min, Last Rate: Stopped (03/22/23 1200)  sodium chloride, 75 mL/hr, Last Rate: 75 mL/hr (03/24/23 1042)        Medication Review: Reviewed    Assessment & Plan       Multivessel CAD with remote ptca stent, now  with stable anginal equivelent (dyspnea) (H[facility])    Insulin resistant Diabetes mellitus ([facility]CC)    Sleep apnea    S/P CABG x 3 on 3/21/2023    Hypertension    Hyperlipidemia    EMANUEL (HCC)       1.  EMANUEL on CKD, baseline creatinine 1.2, patient is postop CABG, also had hypotensive episode postsurgery requiring IV Levophed.  Patient has decreased urine output with increasing creatinine most likely ATN.  Electrolytes acceptable range at this time.  2.  S/p CABG x3.  3.  Shock: Improved at this time off the Levophed.  4.  History of diabetes without significant complications.  5.  History of hypertension.  6.  Leukocytosis  7.  Mild hyponatremia    Recommendation:    Repeat IV albumin and IV diuretic to keep him nonoliguric  No dialysis for today.  Case discussed with RN.   Avoid nephrotoxic medications.  Keep systolic blood pressure greater than 100.  Check volume status.  Check labs in the morning.  Daily evaluation for renal replacement therapy will be done.  Adjust medication for the new GFR.  High risk patient with multiple medical problems  Case discussed with the wife in the room  Holden Arenas MD  03/24/23  11:43 EDT

## 2023-03-25 LAB
ALBUMIN SERPL-MCNC: 3.6 G/DL (ref 3.5–5.2)
ALBUMIN/GLOB SERPL: 1.1 G/DL
ALP SERPL-CCNC: 74 U/L (ref 39–117)
ALT SERPL W P-5'-P-CCNC: 7 U/L (ref 1–41)
ANION GAP SERPL CALCULATED.3IONS-SCNC: 11 MMOL/L (ref 5–15)
AST SERPL-CCNC: 27 U/L (ref 1–40)
BILIRUB SERPL-MCNC: 1 MG/DL (ref 0–1.2)
BUN SERPL-MCNC: 45 MG/DL (ref 8–23)
BUN/CREAT SERPL: 31.7 (ref 7–25)
CALCIUM SPEC-SCNC: 8.7 MG/DL (ref 8.6–10.5)
CHLORIDE SERPL-SCNC: 94 MMOL/L (ref 98–107)
CO2 SERPL-SCNC: 25 MMOL/L (ref 22–29)
CREAT SERPL-MCNC: 1.42 MG/DL (ref 0.76–1.27)
DEPRECATED RDW RBC AUTO: 51.6 FL (ref 37–54)
EGFRCR SERPLBLD CKD-EPI 2021: 56.2 ML/MIN/1.73
ERYTHROCYTE [DISTWIDTH] IN BLOOD BY AUTOMATED COUNT: 16.7 % (ref 12.3–15.4)
GLOBULIN UR ELPH-MCNC: 3.2 GM/DL
GLUCOSE BLDC GLUCOMTR-MCNC: 156 MG/DL (ref 70–130)
GLUCOSE BLDC GLUCOMTR-MCNC: 159 MG/DL (ref 70–130)
GLUCOSE BLDC GLUCOMTR-MCNC: 162 MG/DL (ref 70–130)
GLUCOSE BLDC GLUCOMTR-MCNC: 174 MG/DL (ref 70–130)
GLUCOSE BLDC GLUCOMTR-MCNC: 191 MG/DL (ref 70–130)
GLUCOSE SERPL-MCNC: 163 MG/DL (ref 65–99)
HCT VFR BLD AUTO: 30 % (ref 37.5–51)
HGB BLD-MCNC: 9.5 G/DL (ref 13–17.7)
MAGNESIUM SERPL-MCNC: 2.3 MG/DL (ref 1.6–2.4)
MCH RBC QN AUTO: 26.8 PG (ref 26.6–33)
MCHC RBC AUTO-ENTMCNC: 31.7 G/DL (ref 31.5–35.7)
MCV RBC AUTO: 84.7 FL (ref 79–97)
PLATELET # BLD AUTO: 320 10*3/MM3 (ref 140–450)
PMV BLD AUTO: 10.1 FL (ref 6–12)
POTASSIUM SERPL-SCNC: 4.1 MMOL/L (ref 3.5–5.2)
PROT SERPL-MCNC: 6.8 G/DL (ref 6–8.5)
QT INTERVAL: 332 MS
QT INTERVAL: 344 MS
QTC INTERVAL: 475 MS
QTC INTERVAL: 513 MS
RBC # BLD AUTO: 3.54 10*6/MM3 (ref 4.14–5.8)
SODIUM SERPL-SCNC: 130 MMOL/L (ref 136–145)
WBC NRBC COR # BLD: 11.22 10*3/MM3 (ref 3.4–10.8)

## 2023-03-25 PROCEDURE — 97530 THERAPEUTIC ACTIVITIES: CPT

## 2023-03-25 PROCEDURE — 63710000001 INSULIN DETEMIR PER 5 UNITS: Performed by: INTERNAL MEDICINE

## 2023-03-25 PROCEDURE — 25010000002 AMIODARONE IN DEXTROSE 5% 360-4.14 MG/200ML-% SOLUTION: Performed by: PHYSICIAN ASSISTANT

## 2023-03-25 PROCEDURE — 25010000002 AMIODARONE IN DEXTROSE 5% 360-4.14 MG/200ML-% SOLUTION: Performed by: NURSE PRACTITIONER

## 2023-03-25 PROCEDURE — 80053 COMPREHEN METABOLIC PANEL: CPT | Performed by: STUDENT IN AN ORGANIZED HEALTH CARE EDUCATION/TRAINING PROGRAM

## 2023-03-25 PROCEDURE — 63710000001 INSULIN LISPRO (HUMAN) PER 5 UNITS: Performed by: INTERNAL MEDICINE

## 2023-03-25 PROCEDURE — 99232 SBSQ HOSP IP/OBS MODERATE 35: CPT | Performed by: INTERNAL MEDICINE

## 2023-03-25 PROCEDURE — 93010 ELECTROCARDIOGRAM REPORT: CPT | Performed by: INTERNAL MEDICINE

## 2023-03-25 PROCEDURE — 63710000001 INSULIN LISPRO (HUMAN) PER 5 UNITS: Performed by: PHYSICIAN ASSISTANT

## 2023-03-25 PROCEDURE — 25010000002 HYDROMORPHONE PER 4 MG: Performed by: INTERNAL MEDICINE

## 2023-03-25 PROCEDURE — 93005 ELECTROCARDIOGRAM TRACING: CPT | Performed by: INTERNAL MEDICINE

## 2023-03-25 PROCEDURE — 25010000002 METOCLOPRAMIDE PER 10 MG: Performed by: THORACIC SURGERY (CARDIOTHORACIC VASCULAR SURGERY)

## 2023-03-25 PROCEDURE — 82962 GLUCOSE BLOOD TEST: CPT

## 2023-03-25 PROCEDURE — 85027 COMPLETE CBC AUTOMATED: CPT | Performed by: STUDENT IN AN ORGANIZED HEALTH CARE EDUCATION/TRAINING PROGRAM

## 2023-03-25 PROCEDURE — 93005 ELECTROCARDIOGRAM TRACING: CPT | Performed by: THORACIC SURGERY (CARDIOTHORACIC VASCULAR SURGERY)

## 2023-03-25 PROCEDURE — 83735 ASSAY OF MAGNESIUM: CPT | Performed by: THORACIC SURGERY (CARDIOTHORACIC VASCULAR SURGERY)

## 2023-03-25 PROCEDURE — 25010000002 AMIODARONE IN DEXTROSE 5% 150-4.21 MG/100ML-% SOLUTION: Performed by: NURSE PRACTITIONER

## 2023-03-25 PROCEDURE — 63710000001 INSULIN DETEMIR PER 5 UNITS: Performed by: PHYSICIAN ASSISTANT

## 2023-03-25 RX ORDER — AMIODARONE HYDROCHLORIDE 200 MG/1
200 TABLET ORAL EVERY 12 HOURS
Status: DISCONTINUED | OUTPATIENT
Start: 2023-04-02 | End: 2023-03-26

## 2023-03-25 RX ORDER — AMIODARONE HYDROCHLORIDE 200 MG/1
200 TABLET ORAL DAILY
Status: DISCONTINUED | OUTPATIENT
Start: 2023-04-16 | End: 2023-03-26

## 2023-03-25 RX ORDER — AMIODARONE HYDROCHLORIDE 200 MG/1
200 TABLET ORAL EVERY 8 HOURS
Status: DISCONTINUED | OUTPATIENT
Start: 2023-03-26 | End: 2023-03-26

## 2023-03-25 RX ORDER — AMIODARONE HYDROCHLORIDE 200 MG/1
200 TABLET ORAL ONCE
Status: DISCONTINUED | OUTPATIENT
Start: 2023-03-26 | End: 2023-03-26

## 2023-03-25 RX ORDER — TAMSULOSIN HYDROCHLORIDE 0.4 MG/1
0.4 CAPSULE ORAL DAILY
Status: DISCONTINUED | OUTPATIENT
Start: 2023-03-25 | End: 2023-03-30 | Stop reason: HOSPADM

## 2023-03-25 RX ORDER — NEBIVOLOL 5 MG/1
5 TABLET ORAL
Status: DISCONTINUED | OUTPATIENT
Start: 2023-03-25 | End: 2023-03-30 | Stop reason: HOSPADM

## 2023-03-25 RX ORDER — METOCLOPRAMIDE 10 MG/1
10 TABLET ORAL
Status: DISCONTINUED | OUTPATIENT
Start: 2023-03-25 | End: 2023-03-28

## 2023-03-25 RX ORDER — GABAPENTIN 400 MG/1
400 CAPSULE ORAL 3 TIMES DAILY
Status: DISCONTINUED | OUTPATIENT
Start: 2023-03-25 | End: 2023-03-30 | Stop reason: HOSPADM

## 2023-03-25 RX ADMIN — GABAPENTIN 400 MG: 400 CAPSULE ORAL at 20:25

## 2023-03-25 RX ADMIN — OXYCODONE 10 MG: 5 TABLET ORAL at 16:34

## 2023-03-25 RX ADMIN — AMIODARONE HYDROCHLORIDE 1 MG/MIN: 1.8 INJECTION, SOLUTION INTRAVENOUS at 13:57

## 2023-03-25 RX ADMIN — TAMSULOSIN HYDROCHLORIDE 0.4 MG: 0.4 CAPSULE ORAL at 12:12

## 2023-03-25 RX ADMIN — ACETAMINOPHEN 325MG 650 MG: 325 TABLET ORAL at 20:25

## 2023-03-25 RX ADMIN — METOCLOPRAMIDE 10 MG: 10 TABLET ORAL at 16:35

## 2023-03-25 RX ADMIN — INSULIN LISPRO 4 UNITS: 100 INJECTION, SOLUTION INTRAVENOUS; SUBCUTANEOUS at 17:29

## 2023-03-25 RX ADMIN — HYDROCODONE BITARTRATE AND ACETAMINOPHEN 1 TABLET: 7.5; 325 TABLET ORAL at 07:42

## 2023-03-25 RX ADMIN — SENNOSIDES AND DOCUSATE SODIUM 2 TABLET: 8.6; 5 TABLET ORAL at 08:42

## 2023-03-25 RX ADMIN — AMIODARONE HYDROCHLORIDE 0.5 MG/MIN: 1.8 INJECTION, SOLUTION INTRAVENOUS at 20:24

## 2023-03-25 RX ADMIN — INSULIN LISPRO 5 UNITS: 100 INJECTION, SOLUTION INTRAVENOUS; SUBCUTANEOUS at 12:13

## 2023-03-25 RX ADMIN — SENNOSIDES AND DOCUSATE SODIUM 2 TABLET: 8.6; 5 TABLET ORAL at 20:24

## 2023-03-25 RX ADMIN — INSULIN LISPRO 4 UNITS: 100 INJECTION, SOLUTION INTRAVENOUS; SUBCUTANEOUS at 12:12

## 2023-03-25 RX ADMIN — ACETAMINOPHEN 325MG 650 MG: 325 TABLET ORAL at 06:43

## 2023-03-25 RX ADMIN — NEBIVOLOL 5 MG: 5 TABLET ORAL at 10:26

## 2023-03-25 RX ADMIN — METOCLOPRAMIDE 10 MG: 5 INJECTION, SOLUTION INTRAMUSCULAR; INTRAVENOUS at 07:46

## 2023-03-25 RX ADMIN — HYDROCODONE BITARTRATE AND ACETAMINOPHEN 1 TABLET: 7.5; 325 TABLET ORAL at 01:01

## 2023-03-25 RX ADMIN — INSULIN DETEMIR 15 UNITS: 100 INJECTION, SOLUTION SUBCUTANEOUS at 08:42

## 2023-03-25 RX ADMIN — METOCLOPRAMIDE 10 MG: 5 INJECTION, SOLUTION INTRAMUSCULAR; INTRAVENOUS at 10:30

## 2023-03-25 RX ADMIN — HYDROMORPHONE HYDROCHLORIDE 0.5 MG: 1 INJECTION, SOLUTION INTRAMUSCULAR; INTRAVENOUS; SUBCUTANEOUS at 08:44

## 2023-03-25 RX ADMIN — INSULIN LISPRO 5 UNITS: 100 INJECTION, SOLUTION INTRAVENOUS; SUBCUTANEOUS at 07:46

## 2023-03-25 RX ADMIN — INSULIN LISPRO 5 UNITS: 100 INJECTION, SOLUTION INTRAVENOUS; SUBCUTANEOUS at 17:29

## 2023-03-25 RX ADMIN — INSULIN LISPRO 4 UNITS: 100 INJECTION, SOLUTION INTRAVENOUS; SUBCUTANEOUS at 07:46

## 2023-03-25 RX ADMIN — OXYCODONE 10 MG: 5 TABLET ORAL at 10:30

## 2023-03-25 RX ADMIN — INSULIN LISPRO 4 UNITS: 100 INJECTION, SOLUTION INTRAVENOUS; SUBCUTANEOUS at 20:26

## 2023-03-25 RX ADMIN — AMIODARONE HYDROCHLORIDE 150 MG: 1.5 INJECTION, SOLUTION INTRAVENOUS at 13:52

## 2023-03-25 RX ADMIN — HYDROCODONE BITARTRATE AND ACETAMINOPHEN 1 TABLET: 7.5; 325 TABLET ORAL at 14:16

## 2023-03-25 RX ADMIN — OXYCODONE 10 MG: 5 TABLET ORAL at 03:22

## 2023-03-25 RX ADMIN — ATORVASTATIN CALCIUM 40 MG: 40 TABLET, FILM COATED ORAL at 20:25

## 2023-03-25 RX ADMIN — ASPIRIN 325 MG: 325 TABLET, COATED ORAL at 08:42

## 2023-03-25 RX ADMIN — CASTOR OIL AND BALSAM, PERU 1 APPLICATION: 788; 87 OINTMENT TOPICAL at 08:42

## 2023-03-25 RX ADMIN — GABAPENTIN 400 MG: 400 CAPSULE ORAL at 16:23

## 2023-03-25 RX ADMIN — INSULIN DETEMIR 15 UNITS: 100 INJECTION, SOLUTION SUBCUTANEOUS at 20:25

## 2023-03-25 NOTE — PROGRESS NOTES
INTENSIVIST / PULMONARY FOLLOW UP NOTE     Hospital:  LOS: 4 days   Mr. Wilbert Kelley, 62 y.o. male is followed for:     Multivessel CAD with remote ptca stent, now with stable anginal equivelent (dyspnea) (HCC)    Insulin resistant Diabetes mellitus (HCC)    Sleep apnea    S/P CABG x 3 on 3/21/2023    Hypertension    Hyperlipidemia    EMANUEL (HCC)          SUBJECTIVE   Complains of pain    The patient's relevant past medical, surgical, family, and social history were reviewed    Allergies and medications were reviewed    ROS:    Per subjective, all other systems were reviewed and were negative        OBJECTIVE     Vital Sign Min/Max for last 24 hours:  Temp  Min: 98.9 °F (37.2 °C)  Max: 99.1 °F (37.3 °C)   BP  Min: 121/61  Max: 174/94   Pulse  Min: 84  Max: 112   Resp  Min: 18  Max: 20   SpO2  Min: 86 %  Max: 98 %   No data recorded     Physical Exam:  General Appearance:  No acute distress  Eyes:  No scleral icterus or pallor, pupils normal  Ears, Nose, Mouth, Throat:  Atraumatic, oropharynx clear  Neck:  Trachea midline, thyroid normal  Respiratory:  Clear to auscultation bilaterally, normal effort  Cardiovascular:  Regular rate and rhythm, no murmurs, no peripheral edema  Gastrointestinal:  Soft, non-tender, non-distended, no hepatosplenomegaly  Skin:  Normal temperature, no rash  Psychiatric:  No agitation  Neuro:  No new focal neurologic deficits observed    Telemetry:              Hemodynamics:   CVP: CVP (mmHg): 33 mmHg   PAP:     PAOP:     CO: CO (L/min): 11.9 L/min   CI: CI (L/min/m2): 4.56 L/min/m2   SVI: SVI: 49.03   SVR:       SpO2: 95 % SpO2  Min: 86 %  Max: 98 %   Device:      Flow Rate:   No data recorded       Intake/Ouptut 24 hrs (7:00AM - 6:59 AM)  Intake & Output (last 3 days)       03/22 0701  03/23 0700 03/23 0701 03/24 0700 03/24 0701 03/25 0700 03/25 0701 03/26 0700    P.O. 120 120  150    I.V. (mL/kg) 1882 (12.8) 1673.4 (11.4) 1908 (13)     Blood        Other  60      NG/       IV  Piggyback 200       Total Intake(mL/kg) 2322 (15.8) 1853.4 (12.6) 1908 (13) 150 (1)    Urine (mL/kg/hr) 595 (0.2) 945 (0.3) 2800 (0.8) 400 (0.4)    Emesis/NG output 100 100      Chest Tube 310 280      Total Output 1005 1325 2800 400    Net +1317 +528.4 -892 -250                  Lines, Drains & Airways     Active LDAs     Name Placement date Placement time Site Days    Peripheral IV 03/21/23 0612 Left;Posterior Hand 03/21/23  0612  Hand  1    Urethral Catheter Silicone 16 Fr. 03/21/23  --  -- 1    Y Chest Tube 1 and 2 1 Mediastinal 32 Fr. 2 Mediastinal 32 Fr. 03/21/23  --  -- 1    Y Chest Tube 3 and 4 3 Mediastinal 32 Fr. 4 Mediastinal 32 Fr. 03/21/23  --  -- 1    Arterial Line 03/21/23 Right Radial 03/21/23  0645  created via procedure documentation  Radial  1    Introducer- Double Lumen 03/21/23 Internal jugular Right 03/21/23  --  Internal jugular  1    Pacer Wires 03/21/23  --  Ventricular  1                Hematology:  Results from last 7 days   Lab Units 03/25/23  0350 03/24/23  0305 03/23/23  0303 03/22/23  0218 03/21/23  1529 03/21/23  1137 03/21/23  1011 03/21/23  0708 03/20/23  1512   WBC 10*3/mm3 11.22* 15.69* 21.33* 15.73* 13.26* 13.54*  --   --  8.79   HEMOGLOBIN g/dL 9.5* 9.1* 10.0* 10.1* 10.2* 10.7*  --   --  14.4   HEMOGLOBIN, POC g/dL  --   --   --   --   --   --  11.2*   < >  --    HEMATOCRIT % 30.0* 29.4* 33.1* 33.4* 32.3* 34.1*  --   --  45.1   HEMATOCRIT POC %  --   --   --   --   --   --  33*   < >  --    PLATELETS 10*3/mm3 320 281 319 311 307 304  --   --  385    < > = values in this interval not displayed.     Electrolytes, Magnesium and Phosphorus:  Results from last 7 days   Lab Units 03/25/23  0350 03/24/23  0305 03/23/23  0303 03/22/23  0218 03/21/23  1529 03/21/23  1137 03/20/23  1512   SODIUM mmol/L 130* 132* 137 138 141 138 140   CHLORIDE mmol/L 94* 95* 98 101 103 99 99   POTASSIUM mmol/L 4.1 4.8 4.7 4.6 4.1 3.5 4.2   CO2 mmol/L 25.0 26.0 27.0 26.0 25.0 27.0 28.0   MAGNESIUM mg/dL 2.3  1.8 2.1 2.0 2.3 2.1 1.7   PHOSPHORUS mg/dL  --  4.3 4.6*  --  4.4 4.4  --      Renal:  Results from last 7 days   Lab Units 03/25/23  0350 03/24/23  0305 03/23/23  0303 03/22/23  0218 03/21/23  1529 03/21/23  1137 03/20/23  1512   CREATININE mg/dL 1.42* 2.77* 2.49* 1.64* 1.42* 1.40* 1.20   BUN mg/dL 45* 44* 36* 23 24* 24* 25*     Estimated Creatinine Clearance: 82.7 mL/min (A) (by C-G formula based on SCr of 1.42 mg/dL (H)).  Hepatic:  Results from last 7 days   Lab Units 03/25/23  0350 03/24/23  0305 03/23/23  0303 03/22/23  0218 03/20/23  1512   ALK PHOS U/L 74 59 57 52 76   BILIRUBIN mg/dL 1.0 0.8 0.7 0.6 0.4   ALT (SGPT) U/L 7 6 6 13 19   AST (SGOT) U/L 27 25 26 25 20     Arterial Blood Gases:  Results from last 7 days   Lab Units 03/24/23  1116 03/21/23  1632 03/21/23  1231 03/21/23  1011 03/21/23  0941 03/21/23  0910 03/21/23  0906   PH, ARTERIAL pH units 7.318* 7.371 7.392 7.37 7.34* 7.33* 7.31*   PCO2, ARTERIAL mm Hg 47.8* 46.6* 43.9  --   --   --   --    PO2 ART mm Hg 74.5* 92.7 252.0*  --   --   --   --    FIO2 % 36 40 100  --   --   --   --        Results from last 7 days   Lab Units 03/20/23  1512   HEMOGLOBIN A1C % 7.70*       No results found for: LACTATE    Relevant imaging studies and labs from 03/25/23 were reviewed    Medications (drips):  norepinephrine, Last Rate: Stopped (03/22/23 1200)  sodium chloride, Last Rate: 50 mL/hr (03/24/23 1844)        acetaminophen, 650 mg, Oral, Q8H  aspirin, 325 mg, Oral, Daily  atorvastatin, 40 mg, Oral, Nightly  castor oil-balsam peru, 1 application, Topical, Q12H  gabapentin, 400 mg, Oral, TID  insulin detemir, 15 Units, Subcutaneous, Q12H  insulin lispro, 0-24 Units, Subcutaneous, 4x Daily With Meals & Nightly  Insulin Lispro, 5 Units, Subcutaneous, TID With Meals  metoclopramide, 10 mg, Intravenous, TID AC  nebivolol, 5 mg, Oral, Q24H  pharmacy consult - [facility], , Does not apply, Daily  senna-docusate sodium, 2 tablet, Oral, BID  tamsulosin, 0.4 mg, Oral,  Daily        •  senna-docusate sodium **AND** polyethylene glycol **AND** bisacodyl **AND** bisacodyl  •  Calcium Replacement - Follow Nurse / BPA Driven Protocol  •  dextrose  •  dextrose  •  glucagon (human recombinant)  •  HYDROcodone-acetaminophen  •  HYDROmorphone  •  ipratropium-albuterol  •  Magnesium Standard Dose Replacement - Follow Nurse / BPA Driven Protocol  •  norepinephrine  •  ondansetron  •  oxyCODONE  •  Phosphorus Replacement - Follow Nurse / BPA Driven Protocol  •  Potassium Replacement - Follow Nurse / BPA Driven Protocol  •  simethicone    Assessment & Plan   IMPRESSION / PLAN     Inpatient Problem List:  62 y.o.male:  Active Hospital Problems    Diagnosis    • **Multivessel CAD with remote ptca stent, now with stable anginal equivelent (dyspnea) ([facility])    • S/P CABG x 3 on 3/21/2023    • Hypertension    • Hyperlipidemia    • EMANUEL ([facility])    • Insulin resistant Diabetes mellitus ([facility])    • Sleep apnea         Hospital Course:  62 y.o.male with relevant PMH of CAD with PTCA and stent placement to LAD and right Coronary Artery in 2010, HTN, HLD, recent LHC 3/16 w/ MVCAD, diabetes admitted 3/21/2023 post op 3vCABG by Dr. Emanuel.    Impression:  Remains on insulin gtt, poor po intake, Cr rising despite IVF    Plan:    Post op Care  CAD  PAT vs Aflutter  Per CTS / Cardiology    T2DM A1c 7.7  Stress Hyperglycemia  Nausea  Likely Gastroparesis  Titrate SQ insulin.  Bowel Regimen.  Continue reglan -> switch to po.    Hypotension  EMANUEL  Hyponatremia  Improved w/ fluids / liberal MAP.  Nephrology following.    Pain  Titrate narcs, resume home neurontin at half home dose (renal function recovering).    Likely MARIAH  CPAP hs and prn    PT/OT    Nutrition  Dietary Orders (From admission, onward)     Start     Ordered    03/25/23 0919  DIET MESSAGE Please send patient  a banana  Once        Comments: Please send patient  a banana    03/25/23 0919 03/24/23 1800  Dietary Nutrition Supplements Premier Protein   Daily With Breakfast & Dinner      Comments: Premier Protein Bfst and Dinner   Question:  Select Supplement:  Answer:  Premier Protein    03/24/23 1459    03/24/23 1800  DIET MESSAGE Need to use the tray tags with carbohydrate printed on them for the consistent carb menu.  Daily With Breakfast, Lunch & Dinner      Comments: Need to use the tray tags with carbohydrate printed on them for the consistent carb menu.    03/24/23 1459    03/24/23 1457  Diet: Cardiac Diets, Diabetic Diets; Healthy Heart (2-3 Na+); Consistent Carbohydrate; Texture: Regular Texture (IDDSI 7); Fluid Consistency: Thin (IDDSI 0)  Diet Effective Now        References:    Diet Order [place]   Question Answer Comment   Diets: Cardiac Diets    Diets: Diabetic Diets    Cardiac Diet: Healthy Heart (2-3 Na+)    Diabetic Diet: Consistent Carbohydrate    Texture: Regular Texture (IDDSI 7)    Fluid Consistency: Thin (IDDSI 0)        03/24/23 1459              Plan of care and goals reviewed with multidisciplinary team at daily rounds    Ok for tele from my standpoint         Sushil Reina MD  Intensive Care Medicine  03/25/23 13:19 EDT

## 2023-03-25 NOTE — PROGRESS NOTES
Wilbert Kelley  [id number]  1960   LOS: 4 days   Patient Care Team:  Jose M Simon MD as PCP - General (Geriatric Medicine)    Chief Complaint:  IHD / T2 DM / EMANUEL / CABG x 3    Subjective     Comfortable up in chair on supplemental oxygen therapy (oximetry 96%).  Moderate incisional pain generally well controlled.  No anginal type chest discomfort, increased tachypnea/dyspnea, nausea, emesis, abdominal pain, headache, focal motor/sensory change.  Suboptimal appetite.    Objective     Vital Sign Min/Max for last 24 hours  Temp  Min: 98.9 °F (37.2 °C)  Max: 99.1 °F (37.3 °C)   BP  Min: 107/76  Max: 174/93   Pulse  Min: 84  Max: 112   Resp  Min: 16  Max: 20   SpO2  Min: 88 %  Max: 98 %               03/21/23  0615 03/22/23  0600   Weight: (!) 142 kg (314 lb) (!) 147 kg (324 lb 15.3 oz)         Intake/Output Summary (Last 24 hours) at 3/25/2023 0855  Last data filed at 3/25/2023 0550  Gross per 24 hour   Intake 1908 ml   Output 2675 ml   Net -767 ml       Physical Exam:     General Appearance:    Alert, cooperative, in no acute distress   Lungs:    Left basilar crackles,respirations regular, even and                unlabored    Heart:    Regular and normal rate, normal S1 and S2, no            murmur, no gallop, no rub, no click   Abdomen:  Extremities:   Soft, nontender, bowel sounds audible x4    No edema, normal range of motion   Pulses:   Pulses palpable and equal bilaterally      Results Review:   Results from last 7 days   Lab Units 03/25/23  0350 03/24/23  0305 03/23/23  0303   SODIUM mmol/L 130* 132* 137   POTASSIUM mmol/L 4.1 4.8 4.7   CHLORIDE mmol/L 94* 95* 98   CO2 mmol/L 25.0 26.0 27.0   BUN mg/dL 45* 44* 36*   CREATININE mg/dL 1.42* 2.77* 2.49*   GLUCOSE mg/dL 163* 126* 154*   CALCIUM mg/dL 8.7 8.5* 8.5*     Results from last 7 days   Lab Units 03/25/23  0350 03/24/23  0305 03/23/23  0303   WBC 10*3/mm3 11.22* 15.69* 21.33*   HEMOGLOBIN g/dL 9.5* 9.1* 10.0*   HEMATOCRIT % 30.0* 29.4* 33.1*    PLATELETS 10*3/mm3 320 281 319     Results from last 7 days   Lab Units 03/22/23  0218   CHOLESTEROL mg/dL 71   TRIGLYCERIDES mg/dL 185*   HDL CHOL mg/dL 18*   LDL CHOL mg/dL 23     Results from last 7 days   Lab Units 03/20/23  1512   HEMOGLOBIN A1C % 7.70*     · ALBUMIN: 3.6  · LFTs: WNL   · NO CXR.  · MAGNESIUM: 2.3  · ACCU-CHEKS: 161 - 164 - 179 - 163 - 162 MG/DL.  · EKG:    Unusual P axis, possible ectopic atrial tachycardia  Inferior infarct (cited on or before 24-MAR-2023)  T wave abnormality, consider lateral ischemia  Abnormal ECG  When compared with ECG of 24-MAR-2023 05:30, (Unconfirmed)  Ectopic atrial rhythm has replaced Junctional rhythm  Serial changes of evolving Inferior infarct present    Medication Review: REVIEWED; DRIP = normal saline 50 cc/hour continuous infusion.    Assessment & Plan     Acceptable diuresis with improving GFR.  Stable acute blood loss anemia.  Electrocardiogram essentially unchanged from 24 hours ago and cannot exclude ectopic atrial rhythm versus atypical atrial flutter with 2 1 AV block.  Currently off beta-blocker drugs; will restart.  We will continue additional cardiac medication.      Multivessel CAD with remote ptca stent, now with stable anginal equivelent (dyspnea) ([facility])    Insulin resistant Diabetes mellitus ([facility])    Sleep apnea    S/P CABG x 3 on 3/21/2023    Hypertension    Hyperlipidemia    EMANUEL ([facility])    03/25/23  08:55 EDT

## 2023-03-25 NOTE — PROGRESS NOTES
Cardiothoracic Surgery Progress Note      POD # 4 s/p CABG x 3     LOS: 4 days      Subjective:  No complaints    Objective:  Vital Signs  Temp:  [98.9 °F (37.2 °C)-99.1 °F (37.3 °C)] 99.1 °F (37.3 °C)  Heart Rate:  [] 94  Resp:  [18-20] 20  BP: (121-174)/() 171/88    Physical Exam:   General Appearance: alert, appears stated age and cooperative   Lungs: clear to auscultation, respirations regular, respirations even and respirations unlabored   Heart: regular rhythm & normal rate, normal S1, S2 and no murmur, no gallop, no rub   Skin: Incision c/d/i     Results:  Results from last 7 days   Lab Units 03/25/23  0350   WBC 10*3/mm3 11.22*   HEMOGLOBIN g/dL 9.5*   HEMATOCRIT % 30.0*   PLATELETS 10*3/mm3 320     Results from last 7 days   Lab Units 03/25/23  0350   SODIUM mmol/L 130*   POTASSIUM mmol/L 4.1   CHLORIDE mmol/L 94*   CO2 mmol/L 25.0   BUN mg/dL 45*   CREATININE mg/dL 1.42*   GLUCOSE mg/dL 163*   CALCIUM mg/dL 8.7       Assessment:  POD # 4 s/p CABG x 3    Plan:  D/C central line  Ambulate  Pulmonary toilet  Transfer to telemetry    Stephen See MD  03/25/23  14:12 EDT

## 2023-03-25 NOTE — NURSING NOTE
Prior to central line removal, order for the removal of catheter was verified, patient was assessed, necessary materials were gathered and patient was educated regarding procedure .    Patient was positioned supine to ensure that the insertion site was at or below the level of the heart.    Hands were washed, clean gloves were applied and central line dressing was gently removed. Catheter exit site was not cultured.     A new pair of clean gloves were then applied. Insertion site was cleansed with 2% Chlorhexidine swab using a circular motion beginning at the insertion site and moving outward for 30 seconds and allowed to dry.     Clamp on line was present and clamped.     Patient was instructed to hold breath as catheter was withdrawn.     The central line was grasped at the insertion site and slowly pulled outward parallel to the skin. Resistance was not met.    After central line was completely removed, a sterile 4x4 gauze pad was used to apply light pressure until bleeding stopped. At that time, petroleum-based gauze and a sterile occlusive dressing was applied to exit site.     Patient was instructed to keep dressing in place for at least 24 hours and to remain in a flat or reclining position for 30 minutes post-removal.     Catheter was inspected after removal and Intact,. Tip of central line was not sent for culture. Patient tolerated procedure.

## 2023-03-25 NOTE — PLAN OF CARE
Goal Outcome Evaluation:  Plan of Care Reviewed With: patient, spouse        Progress: improving  Outcome Evaluation: pt doing ok. LOTS of pain chronic pain issues which have made an already difficult recovery even harder. No position is comfortable, pt in near constant need of repositioning. Creatinine down to 1.42. Sodium 130- 1.5 L FR started today. Decreased appetite. UOP adequate. Flomax started today for hesitancy. Bystolic started today for SBP running up to 170s. VSS. Orders to tele. Pt went into afib , amio protocol started, converted back to NSR approx 1515. Cont plan of care

## 2023-03-25 NOTE — THERAPY TREATMENT NOTE
Patient Name: Wilbert Kelley  : 1960    MRN: [id number]                              Today's Date: 3/25/2023       Admit Date: 3/21/2023    Visit Dx:     ICD-10-CM ICD-9-CM   1. Coronary artery disease of native artery of native heart with stable angina pectoris (HCC)  I25.118 414.01     413.9     Patient Active Problem List   Diagnosis   • SOB (shortness of breath) on exertion   • Insulin resistant Diabetes mellitus (HCC)   • Sleep apnea   • Multivessel CAD with remote ptca stent, now with stable anginal equivelent (dyspnea) ([facility])   • S/P CABG x 3 on 3/21/2023   • Hypertension   • Hyperlipidemia   • EMANUEL (HCC)     Past Medical History:   Diagnosis Date   • Arthritis    • BPH (benign prostatic hyperplasia)    • Cancer ([facility])     basal and melanoma-  x2 - left side ear and elbow   • Constipation    • Coronary artery disease    • DDD (degenerative disc disease), lumbar    • Diabetes mellitus (HCC)     couple times month check sugar   • Frozen shoulder     left   • GERD (gastroesophageal reflux disease)    • Hyperlipidemia    • Hypertension    • Limited mobility     left shoulder  - possible frozen shoulder-= please be aware for surgery   • Sleep apnea     doesnt use machine   • Tinnitus    • Wears glasses     small print     Past Surgical History:   Procedure Laterality Date   • CARDIAC CATHETERIZATION     • CARDIAC CATHETERIZATION Right 2023    Procedure: Left Heart Cath;  Surgeon: Jarod Boyle MD;  Location:  [facility];  Service: Cardiovascular;  Laterality: Right;   • CATARACT EXTRACTION, BILATERAL Bilateral    • COLONOSCOPY     • CORONARY ARTERY BYPASS GRAFT N/A 3/21/2023    Procedure: MEDIAN STERNOTOMY, CORONARY ARTERY BYPASS GRAFTING X 3, UTILIZING THE LEFT INTERNAL MAMMARY ARTERY, ENDOSCOPIC VEIN HARVEST OF THE RIGHT GREATER SAPENOUSE VEIN;  Surgeon: Markus Emanuel MD;  Location: [facility] OR;  Service: Cardiothoracic;  Laterality: N/A;   • CORONARY STENT PLACEMENT      x4    • ENDOSCOPY     • FINGER SURGERY Left     middle finger - left side   • SKIN CANCER EXCISION      x2   • WISDOM TOOTH EXTRACTION        General Information     Row Name 03/25/23 1604          Physical Therapy Time and Intention    Document Type therapy note (daily note)  -     Mode of Treatment physical therapy  -     Row Name 03/25/23 1604          General Information    Existing Precautions/Restrictions cardiac;fall;oxygen therapy device and L/min;sternal  chronic pain issues at baseline  -     Row Name 03/25/23 1604          Cognition    Orientation Status (Cognition) oriented x 3  -           User Key  (r) = Recorded By, (t) = Taken By, (c) = Cosigned By    Initials Name Provider Type     Kirti Morfin, PT Physical Therapist               Mobility     Row Name 03/25/23 1604          Bed Mobility    Bed Mobility sit-supine  -     Sit-Supine [place] (Bed Mobility) moderate assist (50% patient effort);2 person assist;verbal cues  -     Row Name 03/25/23 1604          Sit-Stand Transfer    Sit-Stand [place] (Transfers) minimum assist (75% patient effort);2 person assist;verbal cues  -     Row Name 03/25/23 1604          Gait/Stairs (Locomotion)    [place] Level (Gait) contact guard;verbal cues  -     Assistive Device (Gait) other (see comments)  UE support on tele monitor  -     Deviations/Abnormal Patterns (Gait) base of support, wide;stride length decreased;weight shifting decreased  -     Bilateral Gait Deviations forward flexed posture;heel strike decreased  -     Comment, (Gait/Stairs) VC for upright posture and increasing step length/ foot clearance bilaterally. Required 3 brief standing rest breaks.  -           User Key  (r) = Recorded By, (t) = Taken By, (c) = Cosigned By    Initials Name Provider Type     Kirti Morfin, PT Physical Therapist               Obj/Interventions     Row Name 03/25/23 1605          Balance    Static Sitting Balance supervision  -      Position, Sitting Balance sitting edge of bed  -LS     Static Standing Balance contact guard  -LS     Position/Device Used, Standing Balance supported  -LS           User Key  (r) = Recorded By, (t) = Taken By, (c) = Cosigned By    Initials Name Provider Type    Kirti Jung, PT Physical Therapist               Goals/Plan    No documentation.                Clinical Impression     Row Name 03/25/23 1606          Pain    Pain Intervention(s) Repositioned;Ambulation/increased activity  -     Row Name 03/25/23 1606          Pain Scale: FACES Pre/Post-Treatment    Pain: FACES Scale, Pretreatment 4-->hurts little more  -LS     Posttreatment Pain Rating 4-->hurts little more  -LS     Pain Location incisional  -LS     Pain Location - chest  -LS     Pre/Posttreatment Pain Comment also generalized chronic pain; RN present and aware  -     Row Name 03/25/23 1606          Plan of Care Review    Plan of Care Reviewed With patient  -LS     Progress no change  -LS     Outcome Evaluation Pt demonstrated ability to maintain forward ambulation distance of 220 ft with CGA (3 standing rest breaks). Cont to be limited by incisional and generalized pain, but gave good effort. Will cont PT POC.  -LS     Row Name 03/25/23 1606          Vital Signs    Pre Systolic BP Rehab 171  -LS     Pre Treatment Diastolic BP 88  -LS     Posttreatment Heart Rate (beats/min) 99  -LS     Pre SpO2 (%) 95  -LS     O2 Delivery Pre Treatment nasal cannula  -LS     O2 Delivery Intra Treatment nasal cannula  -LS     Post SpO2 (%) 94  -LS     O2 Delivery Post Treatment nasal cannula  -LS     Pre Patient Position Sitting  -LS     Intra Patient Position Standing  -LS     Post Patient Position Side Lying  -LS     Row Name 03/25/23 1606          Positioning and Restraints    Pre-Treatment Position sitting in chair/recliner  -LS     Post Treatment Position bed  -LS     In Bed notified nsg;side lying right;encouraged to call for assist;call light within  reach;patient within staff view;pillow between legs  -LS           User Key  (r) = Recorded By, (t) = Taken By, (c) = Cosigned By    Initials Name Provider Type    Kirti Jung, PT Physical Therapist               Outcome Measures     Row Name 03/25/23 1609 03/25/23 0800       How much help from another person do you currently need...    Turning from your back to your side while in flat bed without using bedrails? 3  -LS 2  -LK    Moving from lying on back to sitting on the side of a flat bed without bedrails? 2  -LS 2  -LK    Moving to and from a bed to a chair (including a wheelchair)? 3  -LS 2  -LK    Standing up from a chair using your arms (e.g., wheelchair, bedside chair)? 3  -LS 2  -LK    Climbing 3-5 steps with a railing? 2  -LS 3  -LK    To walk in hospital room? 3  -LS 3  -LK    AM-PAC 6 Clicks Score (PT) 16  -LS 14  -LK    Highest level of mobility 5 --> Static standing  -LS 4 --> Transferred to chair/commode  -LK          User Key  (r) = Recorded By, (t) = Taken By, (c) = Cosigned By    Initials Name Provider Type    Kirti Jung, PT Physical Therapist    Lilibeth hPam RN Registered Nurse                             Physical Therapy Education     Title: PT OT SLP Therapies (In Progress)     Topic: Physical Therapy (In Progress)     Point: Mobility training (In Progress)     Learning Progress Summary           Patient Acceptance, E,D, NR by LS at 3/25/2023 1610    Acceptance, E, NR by CM at 3/24/2023 1155    Acceptance, E, NR by CM at 3/23/2023 1043    Acceptance, E, NR by CM at 3/22/2023 1142   Significant Other Acceptance, E, NR by CM at 3/24/2023 1155                   Point: Home exercise program (Not Started)     Learner Progress:  Not documented in this visit.          Point: Body mechanics (In Progress)     Learning Progress Summary           Patient Acceptance, E,D, NR by LS at 3/25/2023 1610    Acceptance, E, NR by CM at 3/24/2023 1155    Acceptance, E, NR by CM at 3/23/2023 1043     Acceptance, E, NR by CM at 3/22/2023 1142   Significant Other Acceptance, E, NR by CM at 3/24/2023 1155                   Point: Precautions (In Progress)     Learning Progress Summary           Patient Acceptance, E,D, NR by LS at 3/25/2023 1610    Acceptance, E, NR by CM at 3/24/2023 1155    Acceptance, E, NR by CM at 3/23/2023 1043    Acceptance, E, NR by CM at 3/22/2023 1142   Significant Other Acceptance, E, NR by CM at 3/24/2023 1155                               User Key     Initials Effective Dates Name Provider Type Discipline    LS 02/03/23 -  Kirti Morfin, PT Physical Therapist PT    CM 09/22/22 -  Anahy Deleon, PT Physical Therapist PT              PT Recommendation and Plan     Plan of Care Reviewed With: patient  Progress: no change  Outcome Evaluation: Pt demonstrated ability to maintain forward ambulation distance of 220 ft with CGA (3 standing rest breaks). Cont to be limited by incisional and generalized pain, but gave good effort. Will cont PT POC.     Time Calculation:    PT Charges     Row Name 03/25/23 1610             Time Calculation    Start Time 1152  -LS      PT Received On 03/25/23  -LS         Timed Charges    33302 - PT Therapeutic Activity Minutes 18  -LS         Total Minutes    Timed Charges Total Minutes 18  -LS       Total Minutes 18  -LS            User Key  (r) = Recorded By, (t) = Taken By, (c) = Cosigned By    Initials Name Provider Type     Kirti Morfin, PT Physical Therapist              Therapy Charges for Today     Code Description Service Date Service Provider Modifiers Qty    [id number] HC PT THERAPEUTIC ACT EA 15 MIN 3/25/2023 Kirti Morfin, PT GP 1          PT G-Codes  Outcome Measure Options: AM-PAC 6 Clicks Basic Mobility (PT)  AM-PAC 6 Clicks Score (PT): 16       Kirti Morfin, PT  3/25/2023

## 2023-03-25 NOTE — PLAN OF CARE
Goal Outcome Evaluation:  Plan of Care Reviewed With: patient        Progress: no change  Outcome Evaluation: Pt demonstrated ability to maintain forward ambulation distance of 220 ft with CGA (3 standing rest breaks). Cont to be limited by incisional and generalized pain, but gave good effort. Will cont PT POC.

## 2023-03-25 NOTE — PROGRESS NOTES
"   LOS: 4 days    Patient Care Team:  Jose M Simon MD as PCP - General (Geriatric Medicine)    Chief Complaint:   CABG x3 on 3/21/2023  History of diabetes, hypertension, hyperlipidemia, sleep apnea, admission creatinine 1.2, patient has shock received IV pressors, creatinine 2.49, along with oliguria, on day of examination  Subjective     Interval History:   Overall improvement is noted.  Patient has good urine output with improvement in creatinine.  No indication for dialysis at this time.    Review of Systems:   Mild complaints disease site of the chest.  Denies any shortness of breath, nausea, vomiting, dysuria, hematuria, all other systems negative  All systems negative.    Objective     Vital Sign Min/Max for last 24 hours  Temp  Min: 98.9 °F (37.2 °C)  Max: 99.1 °F (37.3 °C)   BP  Min: 107/76  Max: 163/92   Pulse  Min: 84  Max: 112   Resp  Min: 16  Max: 20   SpO2  Min: 88 %  Max: 98 %   Flow (L/min)  Min: 4  Max: 4   No data recorded     Flowsheet Rows    Flowsheet Row First Filed Value   Admission Height 185.4 cm (73\") Documented at 03/21/2023 0615   Admission Weight 142 kg (314 lb) Documented at 03/21/2023 0615          No intake/output data recorded.  I/O last 3 completed shifts:  In: 1908 [I.V.:1908]  Out: 3525 [Urine:3365; Chest Tube:160]    Physical Exam:  General Appearance: Morbidly obese  male awake, alert, oriented x4.  Laying comfortable in chair.  Eyes: PER, EOMI.  Neck: Supple no JVD.  Right IJ in place  Chest wall: Incision slightly slightly inflamed  Lungs: Clear auscultation, no rales rhonchi's, equal chest movement, nonlabored.  Heart: Rub improved, RRR.  Abdomen: Soft, nontender, positive bowel sounds, no organomegaly.  Extremities: Trace edema left lower extremity, 1+ edema on the right lower extremity, no cyanosis.  Neuro: No focal deficit, moving all extremities, alert oriented X 3  : No Ken catheter  Skin: Warm and dry.  WBC WBC   Date Value Ref Range Status "   03/25/2023 11.22 (H) 3.40 - 10.80 10*3/mm3 Final   03/24/2023 15.69 (H) 3.40 - 10.80 10*3/mm3 Final   03/23/2023 21.33 (H) 3.40 - 10.80 10*3/mm3 Final      HGB Hemoglobin   Date Value Ref Range Status   03/25/2023 9.5 (L) 13.0 - 17.7 g/dL Final   03/24/2023 9.1 (L) 13.0 - 17.7 g/dL Final   03/23/2023 10.0 (L) 13.0 - 17.7 g/dL Final      HCT Hematocrit   Date Value Ref Range Status   03/25/2023 30.0 (L) 37.5 - 51.0 % Final   03/24/2023 29.4 (L) 37.5 - 51.0 % Final   03/23/2023 33.1 (L) 37.5 - 51.0 % Final      Platlets No results found for: LABPLAT   MCV MCV   Date Value Ref Range Status   03/25/2023 84.7 79.0 - 97.0 fL Final   03/24/2023 86.5 79.0 - 97.0 fL Final   03/23/2023 86.4 79.0 - 97.0 fL Final          Sodium Sodium   Date Value Ref Range Status   03/25/2023 130 (L) 136 - 145 mmol/L Final   03/24/2023 132 (L) 136 - 145 mmol/L Final   03/23/2023 137 136 - 145 mmol/L Final      Potassium Potassium   Date Value Ref Range Status   03/25/2023 4.1 3.5 - 5.2 mmol/L Final   03/24/2023 4.8 3.5 - 5.2 mmol/L Final   03/23/2023 4.7 3.5 - 5.2 mmol/L Final     Comment:     Slight hemolysis detected by analyzer. Results may be affected.      Chloride Chloride   Date Value Ref Range Status   03/25/2023 94 (L) 98 - 107 mmol/L Final   03/24/2023 95 (L) 98 - 107 mmol/L Final   03/23/2023 98 98 - 107 mmol/L Final      CO2 CO2   Date Value Ref Range Status   03/25/2023 25.0 22.0 - 29.0 mmol/L Final   03/24/2023 26.0 22.0 - 29.0 mmol/L Final   03/23/2023 27.0 22.0 - 29.0 mmol/L Final      BUN BUN   Date Value Ref Range Status   03/25/2023 45 (H) 8 - 23 mg/dL Final   03/24/2023 44 (H) 8 - 23 mg/dL Final   03/23/2023 36 (H) 8 - 23 mg/dL Final      Creatinine Creatinine   Date Value Ref Range Status   03/25/2023 1.42 (H) 0.76 - 1.27 mg/dL Final   03/24/2023 2.77 (H) 0.76 - 1.27 mg/dL Final   03/23/2023 2.49 (H) 0.76 - 1.27 mg/dL Final      Calcium Calcium   Date Value Ref Range Status   03/25/2023 8.7 8.6 - 10.5 mg/dL Final    03/24/2023 8.5 (L) 8.6 - 10.5 mg/dL Final   03/23/2023 8.5 (L) 8.6 - 10.5 mg/dL Final      PO4 No results found for: CAPO4   Albumin Albumin   Date Value Ref Range Status   03/25/2023 3.6 3.5 - 5.2 g/dL Final   03/24/2023 3.7 3.5 - 5.2 g/dL Final   03/23/2023 4.1 3.5 - 5.2 g/dL Final      Magnesium Magnesium   Date Value Ref Range Status   03/25/2023 2.3 1.6 - 2.4 mg/dL Final   03/24/2023 1.8 1.6 - 2.4 mg/dL Final   03/23/2023 2.1 1.6 - 2.4 mg/dL Final      Uric Acid No results found for: URICACID        Results Review:     I reviewed the patient's new clinical results.  I reviewed the patient's new imaging results and agree with the interpretation.    acetaminophen, 650 mg, Oral, Q8H  aspirin, 325 mg, Oral, Daily  atorvastatin, 40 mg, Oral, Nightly  castor oil-balsam peru, 1 application, Topical, Q12H  insulin detemir, 15 Units, Subcutaneous, Q12H  insulin lispro, 0-24 Units, Subcutaneous, 4x Daily With Meals & Nightly  Insulin Lispro, 5 Units, Subcutaneous, TID With Meals  metoclopramide, 10 mg, Intravenous, TID AC  pharmacy consult - [facility], , Does not apply, Daily  senna-docusate sodium, 2 tablet, Oral, BID      norepinephrine, 0.02-0.3 mcg/kg/min, Last Rate: Stopped (03/22/23 1200)  sodium chloride, 50 mL/hr, Last Rate: 50 mL/hr (03/24/23 1844)        Medication Review: Reviewed    Assessment & Plan       Multivessel CAD with remote ptca stent, now with stable anginal equivelent (dyspnea) ([facility])    Insulin resistant Diabetes mellitus ([facility])    Sleep apnea    S/P CABG x 3 on 3/21/2023    Hypertension    Hyperlipidemia    EMANUEL ([facility])       1.  EMANUEL on CKD, baseline creatinine 1.2, patient is postop CABG, also had hypotensive episode postsurgery requiring IV Levophed.  Patient has decreased urine output with increasing creatinine most likely ATN.  Improvement in renal function noted.  2.  S/p CABG x3.  3.  Shock: Improved at this time off the Levophed.  4.  History of diabetes without significant complications.  5.   History of hypertension.  6.  Leukocytosis: Improving  7.  Mild hyponatremia slightly worse    Recommendation:  Hyponatremia is noted, will place oral fluid restriction 1200 mL/day.  Renal function has improved creatinine 1.42.  No indication for dialysis.  Patient has improved renal function with good urine output  Monitor electrolytes replace as needed.  Avoid nephrotoxic medications.  Keep systolic blood pressure greater than 100.  Check volume status daily.  Check labs in the morning.   Adjust medication for the new GFR.  High risk patient with multiple medical problems  Other consulting physician notes and recommendations are noted  Holden Arenas MD  03/25/23  08:41 EDT

## 2023-03-26 ENCOUNTER — APPOINTMENT (OUTPATIENT)
Dept: GENERAL RADIOLOGY | Facility: HOSPITAL | Age: 63
DRG: 236 | End: 2023-03-26
Payer: MEDICARE

## 2023-03-26 LAB
ALBUMIN SERPL-MCNC: 3.5 G/DL (ref 3.5–5.2)
ALBUMIN/GLOB SERPL: 1.1 G/DL
ALP SERPL-CCNC: 105 U/L (ref 39–117)
ALT SERPL W P-5'-P-CCNC: 7 U/L (ref 1–41)
ANION GAP SERPL CALCULATED.3IONS-SCNC: 12 MMOL/L (ref 5–15)
AST SERPL-CCNC: 35 U/L (ref 1–40)
BASOPHILS # BLD AUTO: 0.05 10*3/MM3 (ref 0–0.2)
BASOPHILS NFR BLD AUTO: 0.5 % (ref 0–1.5)
BILIRUB SERPL-MCNC: 0.9 MG/DL (ref 0–1.2)
BUN SERPL-MCNC: 50 MG/DL (ref 8–23)
BUN/CREAT SERPL: 44.6 (ref 7–25)
CALCIUM SPEC-SCNC: 8.5 MG/DL (ref 8.6–10.5)
CHLORIDE SERPL-SCNC: 96 MMOL/L (ref 98–107)
CO2 SERPL-SCNC: 26 MMOL/L (ref 22–29)
CREAT SERPL-MCNC: 1.12 MG/DL (ref 0.76–1.27)
DEPRECATED RDW RBC AUTO: 50.1 FL (ref 37–54)
EGFRCR SERPLBLD CKD-EPI 2021: 74.7 ML/MIN/1.73
EOSINOPHIL # BLD AUTO: 0.3 10*3/MM3 (ref 0–0.4)
EOSINOPHIL NFR BLD AUTO: 3 % (ref 0.3–6.2)
ERYTHROCYTE [DISTWIDTH] IN BLOOD BY AUTOMATED COUNT: 16.7 % (ref 12.3–15.4)
GLOBULIN UR ELPH-MCNC: 3.3 GM/DL
GLUCOSE BLDC GLUCOMTR-MCNC: 105 MG/DL (ref 70–130)
GLUCOSE BLDC GLUCOMTR-MCNC: 175 MG/DL (ref 70–130)
GLUCOSE BLDC GLUCOMTR-MCNC: 235 MG/DL (ref 70–130)
GLUCOSE SERPL-MCNC: 175 MG/DL (ref 65–99)
HCT VFR BLD AUTO: 27.1 % (ref 37.5–51)
HGB BLD-MCNC: 8.7 G/DL (ref 13–17.7)
IMM GRANULOCYTES # BLD AUTO: 0.04 10*3/MM3 (ref 0–0.05)
IMM GRANULOCYTES NFR BLD AUTO: 0.4 % (ref 0–0.5)
LYMPHOCYTES # BLD AUTO: 0.68 10*3/MM3 (ref 0.7–3.1)
LYMPHOCYTES NFR BLD AUTO: 6.8 % (ref 19.6–45.3)
MAGNESIUM SERPL-MCNC: 2.2 MG/DL (ref 1.6–2.4)
MCH RBC QN AUTO: 26.6 PG (ref 26.6–33)
MCHC RBC AUTO-ENTMCNC: 32.1 G/DL (ref 31.5–35.7)
MCV RBC AUTO: 82.9 FL (ref 79–97)
MONOCYTES # BLD AUTO: 0.99 10*3/MM3 (ref 0.1–0.9)
MONOCYTES NFR BLD AUTO: 9.9 % (ref 5–12)
NEUTROPHILS NFR BLD AUTO: 7.89 10*3/MM3 (ref 1.7–7)
NEUTROPHILS NFR BLD AUTO: 79.4 % (ref 42.7–76)
NRBC BLD AUTO-RTO: 0 /100 WBC (ref 0–0.2)
PHOSPHATE SERPL-MCNC: 2.8 MG/DL (ref 2.5–4.5)
PLATELET # BLD AUTO: 391 10*3/MM3 (ref 140–450)
PMV BLD AUTO: 9.7 FL (ref 6–12)
POTASSIUM SERPL-SCNC: 4.3 MMOL/L (ref 3.5–5.2)
PROT SERPL-MCNC: 6.8 G/DL (ref 6–8.5)
QT INTERVAL: 354 MS
QT INTERVAL: 448 MS
QTC INTERVAL: 459 MS
QTC INTERVAL: 476 MS
RBC # BLD AUTO: 3.27 10*6/MM3 (ref 4.14–5.8)
SODIUM SERPL-SCNC: 134 MMOL/L (ref 136–145)
WBC NRBC COR # BLD: 9.95 10*3/MM3 (ref 3.4–10.8)

## 2023-03-26 PROCEDURE — 83735 ASSAY OF MAGNESIUM: CPT | Performed by: PHYSICIAN ASSISTANT

## 2023-03-26 PROCEDURE — 71045 X-RAY EXAM CHEST 1 VIEW: CPT

## 2023-03-26 PROCEDURE — 99232 SBSQ HOSP IP/OBS MODERATE 35: CPT | Performed by: INTERNAL MEDICINE

## 2023-03-26 PROCEDURE — 85025 COMPLETE CBC W/AUTO DIFF WBC: CPT | Performed by: PHYSICIAN ASSISTANT

## 2023-03-26 PROCEDURE — 93005 ELECTROCARDIOGRAM TRACING: CPT | Performed by: NURSE PRACTITIONER

## 2023-03-26 PROCEDURE — 84100 ASSAY OF PHOSPHORUS: CPT | Performed by: PHYSICIAN ASSISTANT

## 2023-03-26 PROCEDURE — 80053 COMPREHEN METABOLIC PANEL: CPT | Performed by: PHYSICIAN ASSISTANT

## 2023-03-26 PROCEDURE — 25010000002 AMIODARONE IN DEXTROSE 5% 360-4.14 MG/200ML-% SOLUTION: Performed by: PHYSICIAN ASSISTANT

## 2023-03-26 PROCEDURE — 94799 UNLISTED PULMONARY SVC/PX: CPT

## 2023-03-26 PROCEDURE — 99233 SBSQ HOSP IP/OBS HIGH 50: CPT | Performed by: FAMILY MEDICINE

## 2023-03-26 PROCEDURE — 93010 ELECTROCARDIOGRAM REPORT: CPT | Performed by: INTERNAL MEDICINE

## 2023-03-26 PROCEDURE — 63710000001 INSULIN DETEMIR PER 5 UNITS: Performed by: PHYSICIAN ASSISTANT

## 2023-03-26 PROCEDURE — 63710000001 INSULIN LISPRO (HUMAN) PER 5 UNITS: Performed by: PHYSICIAN ASSISTANT

## 2023-03-26 PROCEDURE — 82962 GLUCOSE BLOOD TEST: CPT

## 2023-03-26 RX ORDER — BUMETANIDE 0.25 MG/ML
2 INJECTION INTRAMUSCULAR; INTRAVENOUS ONCE
Status: DISCONTINUED | OUTPATIENT
Start: 2023-03-26 | End: 2023-03-26

## 2023-03-26 RX ORDER — AMIODARONE HYDROCHLORIDE 200 MG/1
200 TABLET ORAL EVERY 12 HOURS SCHEDULED
Status: DISCONTINUED | OUTPATIENT
Start: 2023-03-26 | End: 2023-03-30

## 2023-03-26 RX ORDER — BUMETANIDE 0.25 MG/ML
2 INJECTION INTRAMUSCULAR; INTRAVENOUS EVERY 12 HOURS
Status: DISCONTINUED | OUTPATIENT
Start: 2023-03-26 | End: 2023-03-27

## 2023-03-26 RX ADMIN — POLYETHYLENE GLYCOL 3350 17 G: 17 POWDER, FOR SOLUTION ORAL at 13:18

## 2023-03-26 RX ADMIN — INSULIN LISPRO 8 UNITS: 100 INJECTION, SOLUTION INTRAVENOUS; SUBCUTANEOUS at 21:47

## 2023-03-26 RX ADMIN — HYDROCODONE BITARTRATE AND ACETAMINOPHEN 1 TABLET: 7.5; 325 TABLET ORAL at 21:48

## 2023-03-26 RX ADMIN — BUMETANIDE 2 MG: 0.25 INJECTION, SOLUTION INTRAMUSCULAR; INTRAVENOUS at 15:28

## 2023-03-26 RX ADMIN — INSULIN DETEMIR 15 UNITS: 100 INJECTION, SOLUTION SUBCUTANEOUS at 09:10

## 2023-03-26 RX ADMIN — INSULIN LISPRO 4 UNITS: 100 INJECTION, SOLUTION INTRAVENOUS; SUBCUTANEOUS at 12:14

## 2023-03-26 RX ADMIN — AMIODARONE HYDROCHLORIDE 200 MG: 200 TABLET ORAL at 12:06

## 2023-03-26 RX ADMIN — GABAPENTIN 400 MG: 400 CAPSULE ORAL at 08:30

## 2023-03-26 RX ADMIN — ACETAMINOPHEN 325MG 650 MG: 325 TABLET ORAL at 05:22

## 2023-03-26 RX ADMIN — INSULIN LISPRO 8 UNITS: 100 INJECTION, SOLUTION INTRAVENOUS; SUBCUTANEOUS at 17:33

## 2023-03-26 RX ADMIN — AMIODARONE HYDROCHLORIDE 0.5 MG/MIN: 1.8 INJECTION, SOLUTION INTRAVENOUS at 09:08

## 2023-03-26 RX ADMIN — GABAPENTIN 400 MG: 400 CAPSULE ORAL at 21:49

## 2023-03-26 RX ADMIN — METOCLOPRAMIDE 10 MG: 10 TABLET ORAL at 08:30

## 2023-03-26 RX ADMIN — OXYCODONE 10 MG: 5 TABLET ORAL at 13:19

## 2023-03-26 RX ADMIN — TAMSULOSIN HYDROCHLORIDE 0.4 MG: 0.4 CAPSULE ORAL at 08:30

## 2023-03-26 RX ADMIN — SENNOSIDES AND DOCUSATE SODIUM 2 TABLET: 8.6; 5 TABLET ORAL at 21:48

## 2023-03-26 RX ADMIN — ASPIRIN 325 MG: 325 TABLET, COATED ORAL at 08:30

## 2023-03-26 RX ADMIN — NEBIVOLOL 5 MG: 5 TABLET ORAL at 12:06

## 2023-03-26 RX ADMIN — AMIODARONE HYDROCHLORIDE 200 MG: 200 TABLET ORAL at 21:49

## 2023-03-26 RX ADMIN — METOCLOPRAMIDE 10 MG: 10 TABLET ORAL at 17:34

## 2023-03-26 RX ADMIN — METOCLOPRAMIDE 10 MG: 10 TABLET ORAL at 12:06

## 2023-03-26 RX ADMIN — CASTOR OIL AND BALSAM, PERU 1 APPLICATION: 788; 87 OINTMENT TOPICAL at 13:18

## 2023-03-26 RX ADMIN — INSULIN LISPRO 5 UNITS: 100 INJECTION, SOLUTION INTRAVENOUS; SUBCUTANEOUS at 17:33

## 2023-03-26 RX ADMIN — INSULIN DETEMIR 15 UNITS: 100 INJECTION, SOLUTION SUBCUTANEOUS at 21:47

## 2023-03-26 RX ADMIN — INSULIN LISPRO 5 UNITS: 100 INJECTION, SOLUTION INTRAVENOUS; SUBCUTANEOUS at 12:09

## 2023-03-26 RX ADMIN — ATORVASTATIN CALCIUM 40 MG: 40 TABLET, FILM COATED ORAL at 21:49

## 2023-03-26 RX ADMIN — GABAPENTIN 400 MG: 400 CAPSULE ORAL at 15:28

## 2023-03-26 RX ADMIN — HYDROCODONE BITARTRATE AND ACETAMINOPHEN 1 TABLET: 7.5; 325 TABLET ORAL at 08:30

## 2023-03-26 RX ADMIN — SENNOSIDES AND DOCUSATE SODIUM 2 TABLET: 8.6; 5 TABLET ORAL at 08:30

## 2023-03-26 RX ADMIN — CASTOR OIL AND BALSAM, PERU 1 APPLICATION: 788; 87 OINTMENT TOPICAL at 21:49

## 2023-03-26 NOTE — PROGRESS NOTES
Wilbert Kelley  [id number]  1960   LOS: 5 days   Patient Care Team:  Jose M Simon MD as PCP - General (Geriatric Medicine)    Chief Complaint:  IHD / T2 DM / EMANUEL / CABG x 3 / MORBID OBESITY    Subjective     Comfortable up in chair.  He was unable to sleep last night due to chronic lower back pain issues.  He denies increasing tachypnea/dyspnea, anginal type chest discomfort, uncontrolled incisional pain, cough, sputum production, pleurisy, or hemoptysis.  Unremarkable bowel movement.  Suboptimal appetite.  No fever or chills.    Objective     Vital Sign Min/Max for last 24 hours  Temp  Min: 97.8 °F (36.6 °C)  Max: 98.8 °F (37.1 °C)   BP  Min: 102/51  Max: 171/88   Pulse  Min: 60  Max: 114   Resp  Min: 18  Max: 22   SpO2  Min: 94 %  Max: 100 %      Weight  Min: 146 kg (321 lb 6.4 oz)  Max: 146 kg (321 lb 6.4 oz)         03/22/23  0600 03/26/23  0500   Weight: (!) 147 kg (324 lb 15.3 oz) (!) 146 kg (321 lb 6.4 oz)         Intake/Output Summary (Last 24 hours) at 3/26/2023 1051  Last data filed at 3/26/2023 1001  Gross per 24 hour   Intake 565 ml   Output 400 ml   Net 165 ml       Physical Exam:     General Appearance:    Alert, cooperative, in no acute distress   Lungs:     Clear to auscultation,respirations regular, even and                unlabored    Heart:    Regular and normal rate, normal S1 and S2, no            murmur, no gallop, no rub, no click   Abdomen:  Extremities:   Soft, nontender, bowel sounds audible x4    No edema, normal range of motion   Pulses:   Pulses palpable and equal bilaterally      Results Review:   Results from last 7 days   Lab Units 03/25/23  0350 03/24/23  0305 03/23/23  0303   SODIUM mmol/L 130* 132* 137   POTASSIUM mmol/L 4.1 4.8 4.7   CHLORIDE mmol/L 94* 95* 98   CO2 mmol/L 25.0 26.0 27.0   BUN mg/dL 45* 44* 36*   CREATININE mg/dL 1.42* 2.77* 2.49*   GLUCOSE mg/dL 163* 126* 154*   CALCIUM mg/dL 8.7 8.5* 8.5*     Results from last 7 days   Lab Units 03/25/23  1960  03/24/23  0305 03/23/23  0303   WBC 10*3/mm3 11.22* 15.69* 21.33*   HEMOGLOBIN g/dL 9.5* 9.1* 10.0*   HEMATOCRIT % 30.0* 29.4* 33.1*   PLATELETS 10*3/mm3 320 281 319     Results from last 7 days   Lab Units 03/22/23  0218   CHOLESTEROL mg/dL 71   TRIGLYCERIDES mg/dL 185*   HDL CHOL mg/dL 18*   LDL CHOL mg/dL 23     Results from last 7 days   Lab Units 03/20/23  1512   HEMOGLOBIN A1C % 7.70*     · ACCU-CHEKS: 156 - 174 - 105 MG/DL.    · NO NEW LAB RESULTS.    · CXR:    Findings:    Right IJ introducer sheath, NG/OG tube, and left thoracostomy tube have been removed in the interim. Stable cardiomegaly and unchanged prominence of the superior mediastinal contour. Mild streaky opacities in the left mid and lower lung likely reflect   atelectasis. The right lung is grossly clear. There may be a small left pleural effusion, unchanged. Redemonstration of vague curvilinear interface of the left apex compatible with trace left apical pneumothorax.        IMPRESSION:    Removal of medical devices as above. Removal of left thoracostomy tube. Unchanged trace left apical pneumothorax. Linear streaky opacities in the mid and lower left lung likely reflect atelectasis.     · EKG:    Normal sinus rhythm  Inferior infarct (cited on or before 24-MAR-2023)  T wave abnormality, consider lateral ischemia  Abnormal ECG  When compared with ECG of 25-MAR-2023 13:04,  Sinus rhythm has replaced Atrial fibrillation  Vent. rate has decreased BY  55 BPM    Medication Review: REVIEWED; DRIP = continuous IV amiodarone 0.5 mg/minute infusion.    Assessment & Plan     Slow but acceptable progress.  Persistent normal sinus rhythm.  We will discontinue IV amiodarone after bag infused today.  Continued efforts at mobilization, observation, and pulmonary hygiene encouraged as well as nutrition.  His wife states she will discuss with nutrition  appropriate foods that he can eat normally.      Multivessel CAD with remote ptca stent, now with stable  anginal equivelent (dyspnea) ([facility])    Insulin resistant Diabetes mellitus ([facility])    Sleep apnea    S/P CABG x 3 on 3/21/2023    Hypertension    Hyperlipidemia    EMANUEL ([facility])        03/26/23  10:51 EDT

## 2023-03-26 NOTE — PROGRESS NOTES
"   LOS: 5 days    Patient Care Team:  Jose M Simon MD as PCP - General (Geriatric Medicine)    Chief Complaint:   CABG x3 on 3/21/2023  History of diabetes, hypertension, hyperlipidemia, sleep apnea, admission creatinine 1.2, patient has shock received IV pressors, creatinine 2.49, along with oliguria, on day of examination  Subjective     Interval History:   Patient transferred to floor.  Overall condition improved.    Review of Systems:   Mild chest discomfort at time, edema.  Denies any nausea, vomiting, chest pain, shortness of breath.  All other systems negative      Objective     Vital Sign Min/Max for last 24 hours  Temp  Min: 97.8 °F (36.6 °C)  Max: 98.8 °F (37.1 °C)   BP  Min: 102/51  Max: 154/78   Pulse  Min: 60  Max: 114   Resp  Min: 18  Max: 22   SpO2  Min: 94 %  Max: 100 %   Flow (L/min)  Min: 3  Max: 3.5   Weight  Min: 146 kg (321 lb 6.4 oz)  Max: 146 kg (321 lb 6.4 oz)     Flowsheet Rows    Flowsheet Row First Filed Value   Admission Height 185.4 cm (73\") Documented at 03/21/2023 0615   Admission Weight 142 kg (314 lb) Documented at 03/21/2023 0615          I/O this shift:  In: 240 [P.O.:240]  Out: -   I/O last 3 completed shifts:  In: 983 [P.O.:475; I.V.:508]  Out: 2150 [Urine:2150]    Physical Exam:  General Appearance: Morbidly obese  male awake, alert, oriented x4.  Laying comfortable in chair.  Eyes: PER, EOMI.  Neck: Supple no JVD.  Right IJ in place  Chest wall: Incision slightly slightly inflamed  Lungs: Clear auscultation, no rales rhonchi's, equal chest movement, nonlabored.  Heart: Rub improved, RRR.  Abdomen: Soft, nontender, positive bowel sounds, no organomegaly.  Extremities: 1+ edema left lower extremity, 2+ edema on the right lower extremity, no cyanosis.  Neuro: No focal deficit, moving all extremities, alert oriented X 3  : No Ken catheter  Skin: Warm and dry.  WBC WBC   Date Value Ref Range Status   03/26/2023 9.95 3.40 - 10.80 10*3/mm3 Final   03/25/2023 11.22 " (H) 3.40 - 10.80 10*3/mm3 Final   03/24/2023 15.69 (H) 3.40 - 10.80 10*3/mm3 Final      HGB Hemoglobin   Date Value Ref Range Status   03/26/2023 8.7 (L) 13.0 - 17.7 g/dL Final   03/25/2023 9.5 (L) 13.0 - 17.7 g/dL Final   03/24/2023 9.1 (L) 13.0 - 17.7 g/dL Final      HCT Hematocrit   Date Value Ref Range Status   03/26/2023 27.1 (L) 37.5 - 51.0 % Final   03/25/2023 30.0 (L) 37.5 - 51.0 % Final   03/24/2023 29.4 (L) 37.5 - 51.0 % Final      Platlets No results found for: LABPLAT   MCV MCV   Date Value Ref Range Status   03/26/2023 82.9 79.0 - 97.0 fL Final   03/25/2023 84.7 79.0 - 97.0 fL Final   03/24/2023 86.5 79.0 - 97.0 fL Final          Sodium Sodium   Date Value Ref Range Status   03/26/2023 134 (L) 136 - 145 mmol/L Final   03/25/2023 130 (L) 136 - 145 mmol/L Final   03/24/2023 132 (L) 136 - 145 mmol/L Final      Potassium Potassium   Date Value Ref Range Status   03/26/2023 4.3 3.5 - 5.2 mmol/L Final   03/25/2023 4.1 3.5 - 5.2 mmol/L Final   03/24/2023 4.8 3.5 - 5.2 mmol/L Final      Chloride Chloride   Date Value Ref Range Status   03/26/2023 96 (L) 98 - 107 mmol/L Final   03/25/2023 94 (L) 98 - 107 mmol/L Final   03/24/2023 95 (L) 98 - 107 mmol/L Final      CO2 CO2   Date Value Ref Range Status   03/26/2023 26.0 22.0 - 29.0 mmol/L Final   03/25/2023 25.0 22.0 - 29.0 mmol/L Final   03/24/2023 26.0 22.0 - 29.0 mmol/L Final      BUN BUN   Date Value Ref Range Status   03/26/2023 50 (H) 8 - 23 mg/dL Final   03/25/2023 45 (H) 8 - 23 mg/dL Final   03/24/2023 44 (H) 8 - 23 mg/dL Final      Creatinine Creatinine   Date Value Ref Range Status   03/26/2023 1.12 0.76 - 1.27 mg/dL Final   03/25/2023 1.42 (H) 0.76 - 1.27 mg/dL Final   03/24/2023 2.77 (H) 0.76 - 1.27 mg/dL Final      Calcium Calcium   Date Value Ref Range Status   03/26/2023 8.5 (L) 8.6 - 10.5 mg/dL Final   03/25/2023 8.7 8.6 - 10.5 mg/dL Final   03/24/2023 8.5 (L) 8.6 - 10.5 mg/dL Final      PO4 No results found for: CAPO4   Albumin Albumin   Date  Value Ref Range Status   03/26/2023 3.5 3.5 - 5.2 g/dL Final   03/25/2023 3.6 3.5 - 5.2 g/dL Final   03/24/2023 3.7 3.5 - 5.2 g/dL Final      Magnesium Magnesium   Date Value Ref Range Status   03/26/2023 2.2 1.6 - 2.4 mg/dL Final   03/25/2023 2.3 1.6 - 2.4 mg/dL Final   03/24/2023 1.8 1.6 - 2.4 mg/dL Final      Uric Acid No results found for: URICACID        Results Review:     I reviewed the patient's new clinical results.  I reviewed the patient's new imaging results and agree with the interpretation.    acetaminophen, 650 mg, Oral, Q8H  amiodarone, 200 mg, Oral, Q12H  aspirin, 325 mg, Oral, Daily  atorvastatin, 40 mg, Oral, Nightly  bumetanide, 2 mg, Intravenous, Once  castor oil-balsam peru, 1 application, Topical, Q12H  gabapentin, 400 mg, Oral, TID  insulin detemir, 15 Units, Subcutaneous, Q12H  insulin lispro, 0-24 Units, Subcutaneous, 4x Daily With Meals & Nightly  Insulin Lispro, 5 Units, Subcutaneous, TID With Meals  metoclopramide, 10 mg, Oral, TID AC  nebivolol, 5 mg, Oral, Q24H  [facility] - [facility], , Does not apply, Daily  senna-docusate sodium, 2 tablet, Oral, BID  tamsulosin, 0.4 mg, Oral, Daily           Medication Review: Reviewed    Assessment & Plan       Multivessel CAD with remote ptca stent, now with stable anginal equivelent (dyspnea) ([facility])    Insulin resistant Diabetes mellitus ([facility])    Sleep apnea    S/P CABG x 3 on 3/21/2023    Hypertension    Hyperlipidemia    EMANUEL ([facility])       1.  EMANUEL on CKD, baseline creatinine 1.2, patient is postop CABG, also had hypotensive episode postsurgery requiring IV Levophed.  Patient has decreased urine output with increasing creatinine most likely ATN.  Improvement in renal function noted.  2.  S/p CABG x3.  3.  Shock: Improved at this time off the Levophed.  4.  History of diabetes without significant complications.  5.  History of hypertension.  6.  Leukocytosis: Improving  7.  Mild hyponatremia slightly worse    Recommendation:  Hyponatremia sodium  improved, change fluid restriction 1500 mL.  Renal function has improved creatinine 1.2.  Bumex 2 mg every 12 will be started for edema.  Renal function slowly improving at this time.  Monitor electrolytes replace as needed.  Avoid nephrotoxic medications.  Keep systolic blood pressure greater than 100.  Check volume status daily.  Check labs in the morning.   Adjust medication for the new GFR.  High risk patient with multiple medical problems  Discussed with the wife in the room.  Discussed with the staff nurse  Other consulting physician notes and recommendations are noted  Holden Arenas MD  03/26/23  13:12 EDT

## 2023-03-26 NOTE — PROGRESS NOTES
Cardiothoracic Surgery Progress Note      POD # 5 s/p CABG x 3     LOS: 5 days      Subjective:  No complaints    Objective:  Vital Signs  Temp:  [97.8 °F (36.6 °C)-98.8 °F (37.1 °C)] 97.8 °F (36.6 °C)  Heart Rate:  [] 69  Resp:  [18-22] 20  BP: (102-154)/(51-90) 121/58    Physical Exam:   General Appearance: alert, appears stated age and cooperative   Lungs: clear to auscultation, respirations regular, respirations even and respirations unlabored   Heart: regular rhythm & normal rate, normal S1, S2 and no murmur, no gallop, no rub   Skin: Incision c/d/i     Results:    Results from last 7 days   Lab Units 03/26/23  1130   WBC 10*3/mm3 9.95   HEMOGLOBIN g/dL 8.7*   HEMATOCRIT % 27.1*   PLATELETS 10*3/mm3 391     Results from last 7 days   Lab Units 03/26/23  1130   SODIUM mmol/L 134*   POTASSIUM mmol/L 4.3   CHLORIDE mmol/L 96*   CO2 mmol/L 26.0   BUN mg/dL 50*   CREATININE mg/dL 1.12   GLUCOSE mg/dL 175*   CALCIUM mg/dL 8.5*       Assessment:  POD # 5 s/p CABG x 3    Plan:    Ambulate  Pulmonary toilet      Low Francois PA-C  03/26/23  12:26 EDT

## 2023-03-26 NOTE — PROGRESS NOTES
[facility] Medicine Services  PROGRESS NOTE    Patient Name: Wilbert Kelley  : 1960  MRN: [id number]    Date of Admission: 3/21/2023  Primary Care Physician: Jose M Simon MD    Subjective   Subjective     CC:  F/U med mgmt     HPI:  Patient seen and examined. Up in chair eating breakfast. Has some pain. Hasn't ambulated much yet today.     ROS:  Gen- No fevers, chills  CV- No chest pain, palpitations  Resp- No cough, dyspnea  GI- No N/V/D, abd pain    Objective   Objective     Vital Signs:   Temp:  [97.8 °F (36.6 °C)-98.8 °F (37.1 °C)] 97.8 °F (36.6 °C)  Heart Rate:  [] 64  Resp:  [18-22] 20  BP: (102-171)/(51-90) 112/68  Flow (L/min):  [3-3.5] 3.5     Physical Exam:  Constitutional: No acute distress, awake, alert, up in chair eating breakfast   HENT: NCAT, mucous membranes moist  Respiratory: Decreased in bases bilaterally, respiratory effort normal   Cardiovascular: RRR, no murmurs, rubs, or gallops  Gastrointestinal: Positive bowel sounds, soft, nontender, nondistended  Musculoskeletal: No bilateral ankle edema  Psychiatric: Appropriate affect, cooperative  Neurologic: Oriented x 3, LICONA, speech clear  Skin: No rashes, sternal incision c/d/i    Results Reviewed:  LAB RESULTS:      Lab 23  0350 23  0305 23  0303 23  0218 23  1529 23  1137 23  0708 23  1512   WBC 11.22* 15.69* 21.33* 15.73* 13.26* 13.54*  --  8.79   HEMOGLOBIN 9.5* 9.1* 10.0* 10.1* 10.2* 10.7*  --  14.4   HEMOGLOBIN, POC  --   --   --   --   --   --    < >  --    HEMATOCRIT 30.0* 29.4* 33.1* 33.4* 32.3* 34.1*  --  45.1   HEMATOCRIT POC  --   --   --   --   --   --    < >  --    PLATELETS 320 281 319 311 307 304  --  385   NEUTROS ABS  --  13.01* 18.06* 13.58*  --   --   --  6.22   IMMATURE GRANS (ABS)  --  0.07* 0.15* 0.05  --   --   --  0.03   LYMPHS ABS  --  0.83 1.00 0.62*  --   --   --  1.50   MONOS ABS  --  1.57* 2.05* 1.34*  --   --   --  0.64    EOS ABS  --  0.18 0.02 0.09  --   --   --  0.35   MCV 84.7 86.5 86.4 85.6 84.1 84.4  --  82.8   PROTIME  --   --   --  14.9*  --  16.1*  --  12.9   APTT  --   --   --   --   --  32.9  --  28.7    < > = values in this interval not displayed.         Lab 03/25/23 0350 03/24/23 0305 03/23/23 0303 03/22/23 0218 03/21/23  1529 03/21/23 1137 03/20/23  1512   SODIUM 130* 132* 137 138 141 138 140   POTASSIUM 4.1 4.8 4.7 4.6 4.1 3.5 4.2   CHLORIDE 94* 95* 98 101 103 99 99   CO2 25.0 26.0 27.0 26.0 25.0 27.0 28.0   ANION GAP 11.0 11.0 12.0 11.0 13.0 12.0 13.0   BUN 45* 44* 36* 23 24* 24* 25*   CREATININE 1.42* 2.77* 2.49* 1.64* 1.42* 1.40* 1.20   EGFR 56.2* 25.2* 28.7* 47.3* 56.2* 57.2* 68.8   GLUCOSE 163* 126* 154* 175* 190* 184* 84   CALCIUM 8.7 8.5* 8.5* 8.5* 8.1* 9.0 9.6   IONIZED CALCIUM  --   --   --   --   --  1.25  --    MAGNESIUM 2.3 1.8 2.1 2.0 2.3 2.1 1.7   PHOSPHORUS  --  4.3 4.6*  --  4.4 4.4  --    HEMOGLOBIN A1C  --   --   --   --   --   --  7.70*         Lab 03/25/23 0350 03/24/23 0305 03/23/23 0303 03/22/23 0218 03/21/23  1529 03/21/23  1137 03/20/23  1512   TOTAL PROTEIN 6.8 6.8 6.9 6.6  --   --  7.6   ALBUMIN 3.6 3.7 4.1 4.3 4.2   < > 4.5   GLOBULIN 3.2 3.1 2.8 2.3  --   --  3.1   ALT (SGPT) 7 6 6 13  --   --  19   AST (SGOT) 27 25 26 25  --   --  20   BILIRUBIN 1.0 0.8 0.7 0.6  --   --  0.4   ALK PHOS 74 59 57 52  --   --  76    < > = values in this interval not displayed.         Lab 03/22/23  0218 03/21/23  1137 03/20/23  1512   PROTIME 14.9* 16.1* 12.9   INR 1.17* 1.29* 0.98         Lab 03/22/23  0218   CHOLESTEROL 71   LDL CHOL 23   HDL CHOL 18*   TRIGLYCERIDES 185*         Lab 03/21/23  0610 03/20/23  1512   ABO TYPING O O   RH TYPING Positive Positive   ANTIBODY SCREEN  --  Negative         Lab 03/24/23  1116 03/21/23  1632 03/21/23  1231   PH, ARTERIAL 7.318* 7.371 7.392   PCO2, ARTERIAL 47.8* 46.6* 43.9   PO2 ART 74.5* 92.7 252.0*   FIO2 36 40 100   HCO3 ART 24.5 27.0* 26.7*   BASE  EXCESS ART -1.8* 1.3 1.5   CARBOXYHEMOGLOBIN 1.6 1.5 1.2     Brief Urine Lab Results  (Last result in the past 365 days)      Color   Clarity   Blood   Leuk Est   Nitrite   Protein   CREAT   Urine HCG        03/23/23 1612             246.9               Microbiology Results Abnormal     None          XR Chest 1 View    Result Date: 3/26/2023  XR CHEST 1 VW Date of Exam: 3/26/2023 5:38 AM EDT Indication: follow up follow up. Comparison: Chest x-ray 3/24/2023 Findings: Right IJ introducer sheath, NG/OG tube, and left thoracostomy tube have been removed in the interim. Stable cardiomegaly and unchanged prominence of the superior mediastinal contour. Mild streaky opacities in the left mid and lower lung likely reflect atelectasis. The right lung is grossly clear. There may be a small left pleural effusion, unchanged. Redemonstration of vague curvilinear interface of the left apex compatible with trace left apical pneumothorax.     Impression: Impression: Removal of medical devices as above. Removal of left thoracostomy tube. Unchanged trace left apical pneumothorax. Linear streaky opacities in the mid and lower left lung likely reflect atelectasis. Electronically Signed: Jung Salazar  3/26/2023 7:57 AM EDT  Workstation ID: [id number]      Results for orders placed during the hospital encounter of 02/02/23    Adult Transthoracic Echo Complete w/ Color, Spectral and Contrast if necessary per protocol    Interpretation Summary  •  Left ventricular systolic function is normal. Left ventricular ejection fraction appears to be 66 - 70%.  •  Left ventricular wall thickness is consistent with mild concentric hypertrophy.  •  Left ventricular diastolic function is consistent with (grade II w/high LAP) pseudonormalization.  •  Technically very difficult study.  Right ventricle and right atrium is not well visualized for adequate evaluation.      Current medications:  Scheduled Meds:acetaminophen, 650 mg, Oral, Q8H  amiodarone,  200 mg, Oral, Once   Followed by  amiodarone, 200 mg, Oral, Q8H   Followed by  [START ON 4/2/2023] amiodarone, 200 mg, Oral, Q12H   Followed by  [START ON 4/16/2023] amiodarone, 200 mg, Oral, Daily  aspirin, 325 mg, Oral, Daily  atorvastatin, 40 mg, Oral, Nightly  castor oil-balsam peru, 1 application, Topical, Q12H  gabapentin, 400 mg, Oral, TID  insulin detemir, 15 Units, Subcutaneous, Q12H  insulin lispro, 0-24 Units, Subcutaneous, 4x Daily With Meals & Nightly  Insulin Lispro, 5 Units, Subcutaneous, TID With Meals  metoclopramide, 10 mg, Oral, TID AC  nebivolol, 5 mg, Oral, Q24H  pharmacy consult - [facility], , Does not apply, Daily  senna-docusate sodium, 2 tablet, Oral, BID  tamsulosin, 0.4 mg, Oral, Daily      Continuous Infusions:amiodarone, 0.5 mg/min, Last Rate: 0.5 mg/min (03/26/23 0908)  norepinephrine, 0.02-0.3 mcg/kg/min, Last Rate: Stopped (03/22/23 1200)      PRN Meds:.•  senna-docusate sodium **AND** polyethylene glycol **AND** bisacodyl **AND** bisacodyl  •  Calcium Replacement - Follow Nurse / BPA Driven Protocol  •  dextrose  •  dextrose  •  glucagon (human recombinant)  •  HYDROcodone-acetaminophen  •  HYDROmorphone  •  ipratropium-albuterol  •  Magnesium Standard Dose Replacement - Follow Nurse / BPA Driven Protocol  •  norepinephrine  •  ondansetron  •  oxyCODONE  •  Phosphorus Replacement - Follow Nurse / BPA Driven Protocol  •  Potassium Replacement - Follow Nurse / BPA Driven Protocol  •  simethicone    Assessment & Plan   Assessment & Plan     Active Hospital Problems    Diagnosis  POA   • **Multivessel CAD with remote ptca stent, now with stable anginal equivelent (dyspnea) ([facility]) [I25.118]  Yes   • S/P CABG x 3 on 3/21/2023 [Z95.1]  Not Applicable   • Hypertension [I10]  Yes   • Hyperlipidemia [E78.5]  Yes   • EMANUEL ([facility]) [N17.9]  No   • Insulin resistant Diabetes mellitus ([facility]) [E11.9]  Yes   • Sleep apnea [G47.30]  Yes      Resolved Hospital Problems   No resolved problems to display.         Brief Hospital Course to date:  Wilbert Kelley is a 62 y.o. male with relevant PMH of CAD with PTCA and stent placement to LAD and right Coronary Artery in 2010, HTN, HLD, recent LHC 3/16 w/ MVCAD, diabetes admitted 3/21/2023 post op 3vCABG by Dr. Emanuel.  Transitioned from ICU to telemetry 3/25.     This patient's problems and plans were partially entered by my partner and updated as appropriate by me 03/26/23.  All problems are new to me today     MVCAD s/p 3v CABG  PAT vs Aflutter  -Post Op care per CTS  -Cardiology following, IV-> PO Amiodarone   -Ambulate, pulmonary toilet   -Pain Control: Continue oral Norco/Roxicodone. Neurontin at half home dose (renal function recovering). DC IV Dilaudid. Continue scheduled Tylenol   -ASA, Statin, BB     T2DM A1c 7.7  Stress Hyperglycemia  Nausea  Likely Gastroparesis  -Continue Basal + Bolus Insulin  -Continue PO Reglan     Hypotension  EMANUEL  Hyponatremia  -No labs to review as of yet   -On Fluid restriction, 1200mL daily per Renal      Likely MARIAH  -CPAP hs and prn     Expected Discharge Location and Transportation: Home per primary service   Expected Discharge   Expected Discharge Date and Time     Expected Discharge Date Expected Discharge Time    Mar 27, 2023            DVT prophylaxis:  Mechanical DVT prophylaxis orders are present.     AM-PAC 6 Clicks Score (PT): 16 (03/26/23 0800)    CODE STATUS:   Code Status and Medical Interventions:   Ordered at: 03/21/23 1133     Code Status (Patient has no pulse and is not breathing):    CPR (Attempt to Resuscitate)     Medical Interventions (Patient has pulse or is breathing):    Full Support     Release to patient:    Routine Release       Swapna Don,   03/26/23

## 2023-03-26 NOTE — PLAN OF CARE
Goal Outcome Evaluation:            VSS. Per , amiodarone drip should be stopped after infusion is complete. Pt is on PO amiodarone.

## 2023-03-27 LAB
ANION GAP SERPL CALCULATED.3IONS-SCNC: 13 MMOL/L (ref 5–15)
BUN SERPL-MCNC: 54 MG/DL (ref 8–23)
BUN/CREAT SERPL: 43.9 (ref 7–25)
CALCIUM SPEC-SCNC: 8.8 MG/DL (ref 8.6–10.5)
CHLORIDE SERPL-SCNC: 95 MMOL/L (ref 98–107)
CO2 SERPL-SCNC: 28 MMOL/L (ref 22–29)
CREAT SERPL-MCNC: 1.23 MG/DL (ref 0.76–1.27)
EGFRCR SERPLBLD CKD-EPI 2021: 66.8 ML/MIN/1.73
GLUCOSE BLDC GLUCOMTR-MCNC: 116 MG/DL (ref 70–130)
GLUCOSE BLDC GLUCOMTR-MCNC: 119 MG/DL (ref 70–130)
GLUCOSE BLDC GLUCOMTR-MCNC: 184 MG/DL (ref 70–130)
GLUCOSE BLDC GLUCOMTR-MCNC: 186 MG/DL (ref 70–130)
GLUCOSE BLDC GLUCOMTR-MCNC: 289 MG/DL (ref 70–130)
GLUCOSE SERPL-MCNC: 226 MG/DL (ref 65–99)
POTASSIUM SERPL-SCNC: 4.6 MMOL/L (ref 3.5–5.2)
SODIUM SERPL-SCNC: 136 MMOL/L (ref 136–145)

## 2023-03-27 PROCEDURE — 82962 GLUCOSE BLOOD TEST: CPT

## 2023-03-27 PROCEDURE — 99024 POSTOP FOLLOW-UP VISIT: CPT | Performed by: THORACIC SURGERY (CARDIOTHORACIC VASCULAR SURGERY)

## 2023-03-27 PROCEDURE — 80048 BASIC METABOLIC PNL TOTAL CA: CPT | Performed by: PHYSICIAN ASSISTANT

## 2023-03-27 PROCEDURE — 93005 ELECTROCARDIOGRAM TRACING: CPT | Performed by: NURSE PRACTITIONER

## 2023-03-27 PROCEDURE — 63710000001 INSULIN LISPRO (HUMAN) PER 5 UNITS: Performed by: PHYSICIAN ASSISTANT

## 2023-03-27 PROCEDURE — 99232 SBSQ HOSP IP/OBS MODERATE 35: CPT | Performed by: INTERNAL MEDICINE

## 2023-03-27 PROCEDURE — 63710000001 INSULIN DETEMIR PER 5 UNITS: Performed by: PHYSICIAN ASSISTANT

## 2023-03-27 PROCEDURE — 99231 SBSQ HOSP IP/OBS SF/LOW 25: CPT | Performed by: PHYSICIAN ASSISTANT

## 2023-03-27 PROCEDURE — 97116 GAIT TRAINING THERAPY: CPT

## 2023-03-27 PROCEDURE — 97530 THERAPEUTIC ACTIVITIES: CPT

## 2023-03-27 RX ORDER — CLOPIDOGREL BISULFATE 75 MG/1
75 TABLET ORAL DAILY
Status: DISCONTINUED | OUTPATIENT
Start: 2023-03-28 | End: 2023-03-30 | Stop reason: HOSPADM

## 2023-03-27 RX ORDER — CLOPIDOGREL BISULFATE 75 MG/1
300 TABLET ORAL ONCE
Status: COMPLETED | OUTPATIENT
Start: 2023-03-27 | End: 2023-03-27

## 2023-03-27 RX ORDER — BUMETANIDE 2 MG/1
2 TABLET ORAL DAILY
Status: DISCONTINUED | OUTPATIENT
Start: 2023-03-27 | End: 2023-03-27

## 2023-03-27 RX ORDER — BUMETANIDE 2 MG/1
2 TABLET ORAL DAILY
Status: DISCONTINUED | OUTPATIENT
Start: 2023-03-27 | End: 2023-03-30 | Stop reason: HOSPADM

## 2023-03-27 RX ORDER — OXYMETAZOLINE HYDROCHLORIDE 0.05 G/100ML
2 SPRAY NASAL 2 TIMES DAILY
Status: COMPLETED | OUTPATIENT
Start: 2023-03-27 | End: 2023-03-29

## 2023-03-27 RX ADMIN — BUMETANIDE 2 MG: 2 TABLET ORAL at 16:47

## 2023-03-27 RX ADMIN — INSULIN LISPRO 5 UNITS: 100 INJECTION, SOLUTION INTRAVENOUS; SUBCUTANEOUS at 08:08

## 2023-03-27 RX ADMIN — METOCLOPRAMIDE 10 MG: 10 TABLET ORAL at 08:07

## 2023-03-27 RX ADMIN — ACETAMINOPHEN 325MG 650 MG: 325 TABLET ORAL at 16:47

## 2023-03-27 RX ADMIN — INSULIN LISPRO 4 UNITS: 100 INJECTION, SOLUTION INTRAVENOUS; SUBCUTANEOUS at 17:18

## 2023-03-27 RX ADMIN — BUMETANIDE 2 MG: 0.25 INJECTION, SOLUTION INTRAMUSCULAR; INTRAVENOUS at 02:02

## 2023-03-27 RX ADMIN — OXYCODONE 10 MG: 5 TABLET ORAL at 08:07

## 2023-03-27 RX ADMIN — NEBIVOLOL 5 MG: 5 TABLET ORAL at 08:07

## 2023-03-27 RX ADMIN — INSULIN LISPRO 5 UNITS: 100 INJECTION, SOLUTION INTRAVENOUS; SUBCUTANEOUS at 12:33

## 2023-03-27 RX ADMIN — TAMSULOSIN HYDROCHLORIDE 0.4 MG: 0.4 CAPSULE ORAL at 08:07

## 2023-03-27 RX ADMIN — METOCLOPRAMIDE 10 MG: 10 TABLET ORAL at 16:47

## 2023-03-27 RX ADMIN — GABAPENTIN 400 MG: 400 CAPSULE ORAL at 08:07

## 2023-03-27 RX ADMIN — ATORVASTATIN CALCIUM 40 MG: 40 TABLET, FILM COATED ORAL at 20:24

## 2023-03-27 RX ADMIN — INSULIN DETEMIR 15 UNITS: 100 INJECTION, SOLUTION SUBCUTANEOUS at 20:25

## 2023-03-27 RX ADMIN — ASPIRIN 325 MG: 325 TABLET, COATED ORAL at 08:07

## 2023-03-27 RX ADMIN — GABAPENTIN 400 MG: 400 CAPSULE ORAL at 16:47

## 2023-03-27 RX ADMIN — OXYCODONE 10 MG: 5 TABLET ORAL at 18:42

## 2023-03-27 RX ADMIN — INSULIN DETEMIR 15 UNITS: 100 INJECTION, SOLUTION SUBCUTANEOUS at 08:08

## 2023-03-27 RX ADMIN — CASTOR OIL AND BALSAM, PERU 1 APPLICATION: 788; 87 OINTMENT TOPICAL at 20:25

## 2023-03-27 RX ADMIN — CLOPIDOGREL BISULFATE 300 MG: 75 TABLET ORAL at 12:32

## 2023-03-27 RX ADMIN — HYDROCODONE BITARTRATE AND ACETAMINOPHEN 1 TABLET: 7.5; 325 TABLET ORAL at 20:25

## 2023-03-27 RX ADMIN — OXYMETAZOLINE HCL 2 SPRAY: 0.05 SPRAY NASAL at 20:28

## 2023-03-27 RX ADMIN — OXYMETAZOLINE HCL 2 SPRAY: 0.05 SPRAY NASAL at 16:46

## 2023-03-27 RX ADMIN — INSULIN LISPRO 4 UNITS: 100 INJECTION, SOLUTION INTRAVENOUS; SUBCUTANEOUS at 08:08

## 2023-03-27 RX ADMIN — INSULIN LISPRO 12 UNITS: 100 INJECTION, SOLUTION INTRAVENOUS; SUBCUTANEOUS at 12:33

## 2023-03-27 RX ADMIN — GABAPENTIN 400 MG: 400 CAPSULE ORAL at 20:24

## 2023-03-27 RX ADMIN — OXYCODONE 10 MG: 5 TABLET ORAL at 02:03

## 2023-03-27 RX ADMIN — SENNOSIDES AND DOCUSATE SODIUM 2 TABLET: 8.6; 5 TABLET ORAL at 08:07

## 2023-03-27 RX ADMIN — INSULIN LISPRO 5 UNITS: 100 INJECTION, SOLUTION INTRAVENOUS; SUBCUTANEOUS at 17:17

## 2023-03-27 RX ADMIN — ACETAMINOPHEN 325MG 650 MG: 325 TABLET ORAL at 05:30

## 2023-03-27 RX ADMIN — SENNOSIDES AND DOCUSATE SODIUM 2 TABLET: 8.6; 5 TABLET ORAL at 20:24

## 2023-03-27 RX ADMIN — AMIODARONE HYDROCHLORIDE 200 MG: 200 TABLET ORAL at 20:25

## 2023-03-27 RX ADMIN — AMIODARONE HYDROCHLORIDE 200 MG: 200 TABLET ORAL at 08:07

## 2023-03-27 RX ADMIN — CASTOR OIL AND BALSAM, PERU 1 APPLICATION: 788; 87 OINTMENT TOPICAL at 08:07

## 2023-03-27 RX ADMIN — METOCLOPRAMIDE 10 MG: 10 TABLET ORAL at 12:33

## 2023-03-27 NOTE — PLAN OF CARE
Goal Outcome Evaluation:               Uneventful shift. Vitals stable. PRN paramjit given. Bumex changed from IV to PO. Patient ambulated in halls.

## 2023-03-27 NOTE — PLAN OF CARE
Goal Outcome Evaluation:  Plan of Care Reviewed With: patient, spouse        Progress: improving  Outcome Evaluation: Patient continuing to require frequent rest breaks during gait activities due to SOA. SpO2 levels maintain > 90% throughout on RA. Continuing to require vc for sternal precautions throughout all transfers. Cont IP PT POC

## 2023-03-27 NOTE — CASE MANAGEMENT/SOCIAL WORK
Continued Stay Note  [facility]     Patient Name: Wilbert Kelley  MRN: [id number]  Today's Date: 3/27/2023    Admit Date: 3/21/2023    Plan: Home   Discharge Plan     Row Name 03/27/23 1003       Plan    Plan Home    Patient/Family in Agreement with Plan yes    Plan Comments CM met w/pt in room. Pt d/c plan remains home w/spouse transporting. PT to see today, on Friday pt ambulated 220 ft. Pt on room air. Denies d/c needs @ this time.    Final Discharge Disposition Code 01 - home or self-care    Row Name 03/27/23 0810       Plan    Final Discharge Disposition Code 01 - home or self-care               Discharge Codes    No documentation.               Expected Discharge Date and Time     Expected Discharge Date Expected Discharge Time    Mar 28, 2023             Isael Pak RN

## 2023-03-27 NOTE — THERAPY TREATMENT NOTE
Patient Name: Wilbert Kelley  : 1960    MRN: [id number]                              Today's Date: 3/27/2023       Admit Date: 3/21/2023    Visit Dx:     ICD-10-CM ICD-9-CM   1. Coronary artery disease of native artery of native heart with stable angina pectoris (HCC)  I25.118 414.01     413.9     Patient Active Problem List   Diagnosis   • SOB (shortness of breath) on exertion   • Insulin resistant Diabetes mellitus (HCC)   • Sleep apnea   • Multivessel CAD with remote ptca stent, now with stable anginal equivelent (dyspnea) ([facility])   • S/P CABG x 3 on 3/21/2023   • Hypertension   • Hyperlipidemia   • EMANUEL (HCC)     Past Medical History:   Diagnosis Date   • Arthritis    • BPH (benign prostatic hyperplasia)    • Cancer ([facility])     basal and melanoma-  x2 - left side ear and elbow   • Constipation    • Coronary artery disease    • DDD (degenerative disc disease), lumbar    • Diabetes mellitus (HCC)     couple times month check sugar   • Frozen shoulder     left   • GERD (gastroesophageal reflux disease)    • Hyperlipidemia    • Hypertension    • Limited mobility     left shoulder  - possible frozen shoulder-= please be aware for surgery   • Sleep apnea     doesnt use machine   • Tinnitus    • Wears glasses     small print     Past Surgical History:   Procedure Laterality Date   • CARDIAC CATHETERIZATION     • CARDIAC CATHETERIZATION Right 2023    Procedure: Left Heart Cath;  Surgeon: Jarod Boyle MD;  Location:  [facility]ATH [facility]VASIVE LOCATION;  Service: Cardiovascular;  Laterality: Right;   • CATARACT EXTRACTION, BILATERAL Bilateral    • COLONOSCOPY     • CORONARY ARTERY BYPASS GRAFT N/A 3/21/2023    Procedure: MEDIAN STERNOTOMY, CORONARY ARTERY BYPASS GRAFTING X 3, UTILIZING THE LEFT INTERNAL MAMMARY ARTERY, ENDOSCOPIC VEIN HARVEST OF THE RIGHT GREATER SAPENOUSE VEIN;  Surgeon: Markus Emanuel MD;  Location: [facility] OR;  Service: Cardiothoracic;  Laterality: N/A;   • CORONARY STENT PLACEMENT      x4    • ENDOSCOPY     • FINGER SURGERY Left     middle finger - left side   • SKIN CANCER EXCISION      x2   • WISDOM TOOTH EXTRACTION        General Information     Row Name 03/27/23 1542          Physical Therapy Time and Intention    Document Type therapy note (daily note)  -LO     Mode of Treatment physical therapy  -LO     Row Name 03/27/23 1542          General Information    Patient Profile Reviewed yes  -LO     Existing Precautions/Restrictions cardiac;fall;oxygen therapy device and L/min;sternal  -LO     Row Name 03/27/23 1542          Cognition    Orientation Status (Cognition) oriented x 3  -LO     Row Name 03/27/23 1542          Safety Issues, Functional Mobility    Impairments Affecting Function (Mobility) balance;endurance/activity tolerance;shortness of breath;strength;pain  -LO           User Key  (r) = Recorded By, (t) = Taken By, (c) = Cosigned By    Initials Name Provider Type    Kirti Mike, PT Physical Therapist               Mobility     Row Name 03/27/23 1542          Bed Mobility    Comment, (Bed Mobility) UIC  -LO     Row Name 03/27/23 1542          Transfers    Comment, (Transfers) recliner>FWW>recliner  -LO     Row Name 03/27/23 1542          Sit-Stand Transfer    Sit-Stand Leon (Transfers) minimum assist (75% patient effort);verbal cues  -LO     Assistive Device (Sit-Stand Transfers) walker, front-wheeled  -LO     Comment, (Sit-Stand Transfer) vc for sternal precautions  -LO     Row Name 03/27/23 1542          Gait/Stairs (Locomotion)    Leon Level (Gait) contact guard;verbal cues  -LO     Assistive Device (Gait) walker, front-wheeled  -LO     Distance in Feet (Gait) 100+50+100+100  -LO     Deviations/Abnormal Patterns (Gait) base of support, wide;stride length decreased;weight shifting decreased  -LO     Bilateral Gait Deviations forward flexed posture;heel strike decreased  -LO     Comment, (Gait/Stairs) Frequent rest breaks ( 3 standing, 1 sitting) required for SOA,  SPO2 levels maintain >90% on RA, vc for upright posture, deep breathng techniques  -LO           User Key  (r) = Recorded By, (t) = Taken By, (c) = Cosigned By    Initials Name Provider Type    Kirti Mike PT Physical Therapist               Obj/Interventions     Row Name 03/27/23 1546          Balance    Balance Assessment sitting static balance;sitting dynamic balance;standing static balance;standing dynamic balance  -LO     Static Sitting Balance independent  -LO     Dynamic Sitting Balance independent  -LO     Position, Sitting Balance supported;sitting in chair  -LO     Static Standing Balance contact guard  -LO     Dynamic Standing Balance contact guard  -LO     Position/Device Used, Standing Balance supported;walker, rolling  -LO     Comment, Balance FWW and CGA instanding  -LO           User Key  (r) = Recorded By, (t) = Taken By, (c) = Cosigned By    Initials Name Provider Type    Kirti Mike, MANOHAR Physical Therapist               Goals/Plan    No documentation.                Clinical Impression     Row Name 03/27/23 1547          Pain Scale: FACES Pre/Post-Treatment    Pain: FACES Scale, Pretreatment 4-->hurts little more  -LO     Posttreatment Pain Rating 4-->hurts little more  -LO     Pain Location incisional  -LO     Pain Location - chest  -LO     Pre/Posttreatment Pain Comment no change in pain throughout session  -LO     Row Name 03/27/23 1540          Plan of Care Review    Plan of Care Reviewed With patient;spouse  -LO     Progress improving  -LO     Outcome Evaluation Patient continuing to require frequent rest breaks during gait activities due to SOA. SpO2 levels maintain > 90% throughout on RA. Continuing to require vc for sternal precautions throughout all transfers. Cont IP PT POC  -LO     Row Name 03/27/23 5070          Therapy Assessment/Plan (PT)    Rehab Potential (PT) good, to achieve stated therapy goals  -LO     Criteria for Skilled Interventions Met (PT) yes;meets  criteria;skilled treatment is necessary  -LO     Therapy Frequency (PT) daily  -LO     Row Name 03/27/23 1547          Vital Signs    Pre Systolic BP Rehab 163  -LO     Pre Treatment Diastolic BP 81  -LO     Post Systolic BP Rehab 150  -LO     Post Treatment Diastolic BP 78  -LO     Pretreatment Heart Rate (beats/min) 69  -LO     Posttreatment Heart Rate (beats/min) 71  -LO     O2 Delivery Pre Treatment room air  -LO     Intra SpO2 (%) 92  -LO     O2 Delivery Intra Treatment room air  -LO     Post SpO2 (%) 95  -LO     O2 Delivery Post Treatment room air  -LO     Pre Patient Position Sitting  -LO     Intra Patient Position Standing  -LO     Post Patient Position Sitting  -LO     Rest Breaks  4  -LO     Row Name 03/27/23 1547          Positioning and Restraints    Pre-Treatment Position sitting in chair/recliner  -LO     Post Treatment Position chair  -LO     In Chair notified nsg;reclined;call light within reach;encouraged to call for assist;exit alarm on;waffle cushion  -LO           User Key  (r) = Recorded By, (t) = Taken By, (c) = Cosigned By    Initials Name Provider Type    Kirti Mike, PT Physical Therapist               Outcome Measures     Row Name 03/27/23 1551 03/27/23 0808       How much help from another person do you currently need...    Turning from your back to your side while in flat bed without using bedrails? 3  -LO 3  -MP    Moving from lying on back to sitting on the side of a flat bed without bedrails? 2  -LO 2  -MP    Moving to and from a bed to a chair (including a wheelchair)? 3  -LO 3  -MP    Standing up from a chair using your arms (e.g., wheelchair, bedside chair)? 3  -LO 3  -MP    Climbing 3-5 steps with a railing? 2  -LO 2  -MP    To walk in hospital room? 3  -LO 3  -MP    AM-PAC 6 Clicks Score (PT) 16  -LO 16  -MP    Highest level of mobility 5 --> Static standing  -LO 5 --> Static standing  -MP    Row Name 03/27/23 1551          Functional Assessment    Outcome Measure Options  AM-PAC 6 Clicks Basic Mobility (PT)  -           User Key  (r) = Recorded By, (t) = Taken By, (c) = Cosigned By    Initials Name Provider Type    Kirti Mike PT Physical Therapist    Angie Houston, RN Registered Nurse                             Physical Therapy Education     Title: PT OT SLP Therapies (In Progress)     Topic: Physical Therapy (In Progress)     Point: Mobility training (In Progress)     Learning Progress Summary           Patient Acceptance, E, VU,NR by LO at 3/27/2023 1454    Comment: PT POC, sternal precautions    Acceptance, E,D, NR by LS at 3/25/2023 1610    Acceptance, E, NR by CM at 3/24/2023 1155    Acceptance, E, NR by CM at 3/23/2023 1043    Acceptance, E, NR by CM at 3/22/2023 1142   Significant Other Acceptance, E, NR by CM at 3/24/2023 1155                   Point: Home exercise program (Done)     Learning Progress Summary           Patient Acceptance, E, VU,NR by LO at 3/27/2023 1454    Comment: PT POC, sternal precautions                   Point: Body mechanics (In Progress)     Learning Progress Summary           Patient Acceptance, E, VU,NR by LO at 3/27/2023 1454    Comment: PT POC, sternal precautions    Acceptance, E,D, NR by LS at 3/25/2023 1610    Acceptance, E, NR by CM at 3/24/2023 1155    Acceptance, E, NR by CM at 3/23/2023 1043    Acceptance, E, NR by CM at 3/22/2023 1142   Significant Other Acceptance, E, NR by CM at 3/24/2023 1155                   Point: Precautions (In Progress)     Learning Progress Summary           Patient Acceptance, E, VU,NR by LO at 3/27/2023 1454    Comment: PT POC, sternal precautions    Acceptance, E,D, NR by LS at 3/25/2023 1610    Acceptance, E, NR by CM at 3/24/2023 1155    Acceptance, E, NR by CM at 3/23/2023 1043    Acceptance, E, NR by CM at 3/22/2023 1142   Significant Other Acceptance, E, NR by CM at 3/24/2023 1155                               User Key     Initials Effective Dates Name Provider Type [facility]  02/03/23 -  Kirti Morfin PT Physical Therapist PT    LO 06/16/21 -  Kirti Garcia, PT Physical Therapist PT    CM 09/22/22 -  Anahy Deleon, PT Physical Therapist PT              PT Recommendation and Plan     Plan of Care Reviewed With: patient, spouse  Progress: improving  Outcome Evaluation: Patient continuing to require frequent rest breaks during gait activities due to SOA. SpO2 levels maintain > 90% throughout on RA. Continuing to require vc for sternal precautions throughout all transfers. Cont IP PT POC     Time Calculation:    PT Charges     Row Name 03/27/23 1454             Time Calculation    Start Time 1454  -LO      PT Received On 03/27/23  -LO      PT Goal Re-Cert Due Date 04/01/23  -LO         Timed Charges    20088 - Gait Training Minutes  18  -LO      11905 - PT Therapeutic Activity Minutes 14  -LO         Total Minutes    Timed Charges Total Minutes 32  -LO       Total Minutes 32  -LO            User Key  (r) = Recorded By, (t) = Taken By, (c) = Cosigned By    Initials Name Provider Type     Kirti Garcia, PT Physical Therapist              Therapy Charges for Today     Code Description Service Date Service Provider Modifiers Qty    87246212868 HC GAIT TRAINING EA 15 MIN 3/27/2023 Kirti Garcia, PT GP 1    14186096319 HC PT THERAPEUTIC ACT EA 15 MIN 3/27/2023 Kirti Garcia, PT GP 1          PT G-Codes  Outcome Measure Options: AM-PAC 6 Clicks Basic Mobility (PT)  AM-PAC 6 Clicks Score (PT): 16  PT Discharge Summary  Anticipated Discharge Disposition (PT): home with assist    Kirti Garcia PT  3/27/2023

## 2023-03-27 NOTE — PROGRESS NOTES
[facility] Medicine Services  PROGRESS NOTE    Patient Name: Wilbert Kelley  : 1960  MRN: [id number]    Date of Admission: 3/21/2023  Primary Care Physician: Jose M Simon MD    Subjective     CC: post-CABG medical management      HPI:  In chair. Feeling okay. Complains of back pain and nasal congestion      ROS:  Gen- No fevers, chills  CV- No chest pain, palpitations  Resp- No cough, dyspnea  GI- No N/V/D, abd pain    Objective     Vital Signs:   Temp:  [98.1 °F (36.7 °C)-98.8 °F (37.1 °C)] 98.1 °F (36.7 °C)  Heart Rate:  [67-74] 72  Resp:  [18-20] 18  BP: (118-156)/(62-84) 125/65  Flow (L/min):  [0-2] 0     Physical Exam:  Constitutional: No acute distress, awake, alert and conversant. Sitting in chair. Chronically ill appearing   HENT: NCAT, mucous membranes moist  Respiratory: Expiratory wheezing bilaterally without rales or rhonchi. Normal respiratory effort on room air   Cardiovascular: RRR, no murmurs, rubs, or gallops. Border of sternal incision is erythematous without overt purulence ir induration, EVH site c/d/i without erythema, induration or drainage.  Gastrointestinal: Positive bowel sounds, soft, nontender, nondistended  Musculoskeletal: No bilateral ankle edema  Psychiatric: Appropriate affect, cooperative  Neurologic: Oriented x 3, moves all extremities spontaneously without focal deficits, speech clear    Results Reviewed:  LAB RESULTS:      Lab 23  1130 23  0350 23  0305 23  0303 23  0218 23  1529 23  1137 23  0708 23  1512   WBC 9.95 11.22* 15.69* 21.33* 15.73*   < > 13.54*  --  8.79   HEMOGLOBIN 8.7* 9.5* 9.1* 10.0* 10.1*   < > 10.7*  --  14.4   HEMOGLOBIN, POC  --   --   --   --   --   --   --    < >  --    HEMATOCRIT 27.1* 30.0* 29.4* 33.1* 33.4*   < > 34.1*  --  45.1   HEMATOCRIT POC  --   --   --   --   --   --   --    < >  --    PLATELETS 391 320 281 319 311   < > 304  --  385   NEUTROS ABS  7.89*  --  13.01* 18.06* 13.58*  --   --   --  6.22   IMMATURE GRANS (ABS) 0.04  --  0.07* 0.15* 0.05  --   --   --  0.03   LYMPHS ABS 0.68*  --  0.83 1.00 0.62*  --   --   --  1.50   MONOS ABS 0.99*  --  1.57* 2.05* 1.34*  --   --   --  0.64   EOS ABS 0.30  --  0.18 0.02 0.09  --   --   --  0.35   MCV 82.9 84.7 86.5 86.4 85.6   < > 84.4  --  82.8   PROTIME  --   --   --   --  14.9*  --  16.1*  --  12.9   APTT  --   --   --   --   --   --  32.9  --  28.7    < > = values in this interval not displayed.         Lab 03/27/23  1110 03/26/23  1130 03/25/23  0350 03/24/23  0305 03/23/23  0303 03/22/23  0218 03/21/23  1529 03/21/23  1137 03/20/23  1512   SODIUM 136 134* 130* 132* 137 138 141 138 140   POTASSIUM 4.6 4.3 4.1 4.8 4.7 4.6 4.1 3.5 4.2   CHLORIDE 95* 96* 94* 95* 98 101 103 99 99   CO2 28.0 26.0 25.0 26.0 27.0 26.0 25.0 27.0 28.0   ANION GAP 13.0 12.0 11.0 11.0 12.0 11.0 13.0 12.0 13.0   BUN 54* 50* 45* 44* 36* 23 24* 24* 25*   CREATININE 1.23 1.12 1.42* 2.77* 2.49* 1.64* 1.42* 1.40* 1.20   EGFR 66.8 74.7 56.2* 25.2* 28.7* 47.3* 56.2* 57.2* 68.8   GLUCOSE 226* 175* 163* 126* 154* 175* 190* 184* 84   CALCIUM 8.8 8.5* 8.7 8.5* 8.5* 8.5* 8.1* 9.0 9.6   IONIZED CALCIUM  --   --   --   --   --   --   --  1.25  --    MAGNESIUM  --  2.2 2.3 1.8 2.1 2.0 2.3 2.1 1.7   PHOSPHORUS  --  2.8  --  4.3 4.6*  --  4.4 4.4  --    HEMOGLOBIN A1C  --   --   --   --   --   --   --   --  7.70*         Lab 03/26/23  1130 03/25/23  0350 03/24/23  0305 03/23/23  0303 03/22/23  0218   TOTAL PROTEIN 6.8 6.8 6.8 6.9 6.6   ALBUMIN 3.5 3.6 3.7 4.1 4.3   GLOBULIN 3.3 3.2 3.1 2.8 2.3   ALT (SGPT) 7 7 6 6 13   AST (SGOT) 35 27 25 26 25   BILIRUBIN 0.9 1.0 0.8 0.7 0.6   ALK PHOS 105 74 59 57 52         Lab 03/22/23  0218 03/21/23  1137 03/20/23  1512   PROTIME 14.9* 16.1* 12.9   INR 1.17* 1.29* 0.98         Lab 03/22/23  0218   CHOLESTEROL 71   LDL CHOL 23   HDL CHOL 18*   TRIGLYCERIDES 185*         Lab 03/21/23  0610 03/20/23  1512   ABO TYPING  O O   RH TYPING Positive Positive   ANTIBODY SCREEN  --  Negative         Lab 03/24/23  1116 03/21/23  1632 03/21/23  1231   PH, ARTERIAL 7.318* 7.371 7.392   PCO2, ARTERIAL 47.8* 46.6* 43.9   PO2 ART 74.5* 92.7 252.0*   FIO2 36 40 100   HCO3 ART 24.5 27.0* 26.7*   BASE EXCESS ART -1.8* 1.3 1.5   CARBOXYHEMOGLOBIN 1.6 1.5 1.2     Brief Urine Lab Results  (Last result in the past 365 days)      Color   Clarity   Blood   Leuk Est   Nitrite   Protein   CREAT   Urine HCG        03/23/23 1612             246.9             Microbiology Results Abnormal     None        XR Chest 1 View    Result Date: 3/26/2023  XR CHEST 1 VW Date of Exam: 3/26/2023 5:38 AM EDT Indication: follow up follow up. Comparison: Chest x-ray 3/24/2023 Findings: Right IJ introducer sheath, NG/OG tube, and left thoracostomy tube have been removed in the interim. Stable cardiomegaly and unchanged prominence of the superior mediastinal contour. Mild streaky opacities in the left mid and lower lung likely reflect atelectasis. The right lung is grossly clear. There may be a small left pleural effusion, unchanged. Redemonstration of vague curvilinear interface of the left apex compatible with trace left apical pneumothorax.     Impression: Impression: Removal of medical devices as above. Removal of left thoracostomy tube. Unchanged trace left apical pneumothorax. Linear streaky opacities in the mid and lower left lung likely reflect atelectasis. Electronically Signed: Jung Salazar  3/26/2023 7:57 AM EDT  Workstation ID: [id number]    Results for orders placed during the hospital encounter of 02/02/23    Adult Transthoracic Echo Complete w/ Color, Spectral and Contrast if necessary per protocol    Interpretation Summary  •  Left ventricular systolic function is normal. Left ventricular ejection fraction appears to be 66 - 70%.  •  Left ventricular wall thickness is consistent with mild concentric hypertrophy.  •  Left ventricular diastolic function is  consistent with (grade II w/high LAP) pseudonormalization.  •  Technically very difficult study.  Right ventricle and right atrium is not well visualized for adequate evaluation.    Current medications:  Scheduled Meds:acetaminophen, 650 mg, Oral, Q8H  amiodarone, 200 mg, Oral, Q12H  aspirin, 325 mg, Oral, Daily  atorvastatin, 40 mg, Oral, Nightly  bumetanide, 2 mg, Oral, Daily  castor oil-balsam peru, 1 application, Topical, Q12H  [START ON 3/28/2023] clopidogrel, 75 mg, Oral, Daily  gabapentin, 400 mg, Oral, TID  insulin detemir, 15 Units, Subcutaneous, Q12H  insulin lispro, 0-24 Units, Subcutaneous, 4x Daily With Meals & Nightly  Insulin Lispro, 5 Units, Subcutaneous, TID With Meals  metoclopramide, 10 mg, Oral, TID AC  nebivolol, 5 mg, Oral, Q24H  oxymetazoline, 2 spray, Each Nare, BID  pharmacy consult - MTM, , Does not apply, Daily  senna-docusate sodium, 2 tablet, Oral, BID  tamsulosin, 0.4 mg, Oral, Daily      Continuous Infusions:   PRN Meds:.•  senna-docusate sodium **AND** polyethylene glycol **AND** bisacodyl **AND** bisacodyl  •  Calcium Replacement - Follow Nurse / BPA Driven Protocol  •  dextrose  •  dextrose  •  glucagon (human recombinant)  •  HYDROcodone-acetaminophen  •  ipratropium-albuterol  •  Magnesium Standard Dose Replacement - Follow Nurse / BPA Driven Protocol  •  ondansetron  •  oxyCODONE  •  Phosphorus Replacement - Follow Nurse / BPA Driven Protocol  •  Potassium Replacement - Follow Nurse / BPA Driven Protocol  •  simethicone    Assessment & Plan     Active Hospital Problems    Diagnosis  POA   • **Multivessel CAD with remote ptca stent, now with stable anginal equivelent (dyspnea) (HCC) [I25.118]  Yes   • S/P CABG x 3 on 3/21/2023 [Z95.1]  Not Applicable   • Hypertension [I10]  Yes   • Hyperlipidemia [E78.5]  Yes   • EMANUEL (HCC) [N17.9]  No   • Insulin resistant Diabetes mellitus (HCC) [E11.9]  Yes   • Sleep apnea [G47.30]  Yes      Resolved Hospital Problems   No resolved problems to  display.     Brief Hospital Course to date:  Wilbert Kelley is a 62 y.o. male with relevant PMH of CAD with PTCA and stent placement to LAD and right Coronary Artery in 2010, HTN, HLD, recent [facility] 3/16 w/ MVCAD and diabetes. He was admitted to [facility] 3/21/2023 following CABG x 3 by Dr. Emanuel. He transferred out of ICU on 3/25/2023 and hospital medicine consulted to follow.      MVCAD s/p 3v CABG  PAT vs Aflutter  - Post-operative care per CT surgery  - Cardiology following. S/p IV Amiodarone, now onPO  - Encourage ambulation, IS  - PRN pain control. Gabapenin dose reduced due to EMANUEL   - Continue ASA, high-intensity statin and BB     DMII   Nausea, suspected gastroparesis  Diabetic peripheral neuropathy  - Reasonable glucose control - continue current regimen  - PO Reglan      Hypotension, resolved   EMANUEL  Hyponatremia  - Nephrology following  - 1200mL fluid restriction - sodium normal at 136 today  - Creatinine 1.23 today    Hypertension  - Cardiology managing      Likely MARIAH  - CPAP QHS and PRN    Expected Discharge Location and Transportation: Home per primary service   Expected Discharge   Expected Discharge Date and Time     Expected Discharge Date Expected Discharge Time    Mar 29, 2023          DVT prophylaxis:Mechanical DVT prophylaxis orders are present.     AM-PAC 6 Clicks Score (PT): 16 (03/27/23 0808)    CODE STATUS:   Code Status and Medical Interventions:   Ordered at: 03/21/23 1133     Code Status (Patient has no pulse and is not breathing):    CPR (Attempt to Resuscitate)     Medical Interventions (Patient has pulse or is breathing):    Full Support     Release to patient:    Routine Release     Mirian Hurley PA-C  03/27/23

## 2023-03-27 NOTE — PROGRESS NOTES
"  [facility] at [facility]  PROGRESS NOTE    Date of Admission: 3/21/2023  Date of Service: 03/27/23    Primary Care Physician: Jose M Simon MD    Chief Complaint: f/u HTN, HLD  Problem List:   Multivessel CAD with remote ptca stent, now with stable anginal equivelent (dyspnea) (HCC)    Insulin resistant Diabetes mellitus (HCC)    Sleep apnea    S/P CABG x 3 on 3/21/2023    Hypertension    Hyperlipidemia    EMANUEL (HCC)      Subjective      Sitting in chair, reports he is in \"pain all over.\" Has history of chronic back pain. Denies any pleuritic pain.  Denies specific complaints, ambulating with assist    Objective   Vitals: /82 (BP Location: Right arm, Patient Position: Sitting)   Pulse 67   Temp 98.3 °F (36.8 °C) (Oral)   Resp 20   Ht 185.4 cm (73\")   Wt (!) 148 kg (325 lb 8 oz)   SpO2 91%   BMI 42.94 kg/m²     Physical Exam:  GENERAL: Alert, cooperative, in no acute distress.   HEENT: Normocephalic, no jugular venous distention  HEART: Regular rhythm, normal rate, and no murmurs, gallops, or rubs.   LUNGS: No wheezing, rales or rhonchi.  NEUROLOGIC: No focal abnormalities involving strength or sensation are noted.   EXTREMITIES: No clubbing, cyanosis, 1 +BLE edema noted.   Regular rate and rhythm, normal sinus rhythm on telemetry    Results:  Results from last 7 days   Lab Units 03/26/23  1130 03/25/23  0350 03/24/23  0305   WBC 10*3/mm3 9.95 11.22* 15.69*   HEMOGLOBIN g/dL 8.7* 9.5* 9.1*   HEMATOCRIT % 27.1* 30.0* 29.4*   PLATELETS 10*3/mm3 391 320 281     Results from last 7 days   Lab Units 03/26/23  1130 03/25/23  0350 03/24/23  0305   SODIUM mmol/L 134* 130* 132*   POTASSIUM mmol/L 4.3 4.1 4.8   CHLORIDE mmol/L 96* 94* 95*   CO2 mmol/L 26.0 25.0 26.0   BUN mg/dL 50* 45* 44*   CREATININE mg/dL 1.12 1.42* 2.77*   GLUCOSE mg/dL 175* 163* 126*      Lab Results   Component Value Date    CHOL 71 03/22/2023    TRIG 185 (H) 03/22/2023    HDL 18 (L) 03/22/2023    LDL 23 " 03/22/2023    AST 35 03/26/2023    ALT 7 03/26/2023     Results from last 7 days   Lab Units 03/20/23  1512   HEMOGLOBIN A1C % 7.70*     Results from last 7 days   Lab Units 03/22/23  0218   CHOLESTEROL mg/dL 71   TRIGLYCERIDES mg/dL 185*   HDL CHOL mg/dL 18*   LDL CHOL mg/dL 23             Results from last 7 days   Lab Units 03/22/23  0218 03/21/23  1137 03/20/23  1512   PROTIME Seconds 14.9* 16.1* 12.9   INR  1.17* 1.29* 0.98   APTT seconds  --  32.9 28.7       Intake/Output Summary (Last 24 hours) at 3/27/2023 0906  Last data filed at 3/26/2023 2000  Gross per 24 hour   Intake 715 ml   Output --   Net 715 ml     I personally reviewed the patient's EKG/Telemetry data    Radiology Data:  CXR 3/26/23:  IMPRESSION:  Impression:  Removal of medical devices as above. Removal of left thoracostomy tube. Unchanged trace left apical pneumothorax. Linear streaky opacities in the mid and lower left lung likely reflect atelectasis.       Current Medications:  acetaminophen, 650 mg, Oral, Q8H  amiodarone, 200 mg, Oral, Q12H  aspirin, 325 mg, Oral, Daily  atorvastatin, 40 mg, Oral, Nightly  bumetanide, 2 mg, Intravenous, Q12H  castor oil-balsam peru, 1 application, Topical, Q12H  clopidogrel, 300 mg, Oral, Once  [START ON 3/28/2023] clopidogrel, 75 mg, Oral, Daily  gabapentin, 400 mg, Oral, TID  insulin detemir, 15 Units, Subcutaneous, Q12H  insulin lispro, 0-24 Units, Subcutaneous, 4x Daily With Meals & Nightly  Insulin Lispro, 5 Units, Subcutaneous, TID With Meals  metoclopramide, 10 mg, Oral, TID AC  nebivolol, 5 mg, Oral, Q24H  pharmacy consult - [place], , Does not apply, Daily  senna-docusate sodium, 2 tablet, Oral, BID  tamsulosin, 0.4 mg, Oral, Daily           Assessment and Plan:     1. MVCAD   - LHC 3/16 Dr. Boyle with MVCAD, normal EF pre-op   - CABG x3 with Dr. Emanuel 3/21  Continue aspirin, atorvastatin and low-dose beta-blocker    Change Bumex to 2 mg p.o. daily    2. Post-op Afib  - onset 3/25, brief  - started on  Amiodarone protocol and maintaining SR  Amiodarone 200 mg twice daily for 5 days then 200 mg daily     3. Hypertension   - slightly above target  - Nebivolol added over the weekend   Continue close monitoring     4. Hyperlipidemia  -Continue atorvastatin 40 mg, which is double home dose  -Excellent lipids 3/22, LDL 23     5. DM2  - A1C 7.7%  - per intensivists     6. EMANUEL   - resolved, max Cr 2.7, returned to normal at 1.12 yesterday  Renal following      Likely stable for discharge 3/28/2020    Electronically signed by Corinna Savage PA-C, 03/27/23, 9:09 AM EDT.    I have seen and examined the patient, performing a face-to-face diagnostic evaluation with plan of care reviewed and developed with the Advanced Practice Clinician and nursing staff. I have addended and modified the above history of present illness, physical examination, and assessment and plan to reflect my findings and impressions. All medical decision making performed by Dr. Riddle.    Markus Riddle MD, FAC, [facility]  03/27/23

## 2023-03-27 NOTE — PROGRESS NOTES
"   LOS: 6 days    Patient Care Team:  Jose M Simon MD as PCP - General (Geriatric Medicine)    Chief Complaint:   CABG x3 on 3/21/2023  History of diabetes, hypertension, hyperlipidemia, sleep apnea, admission creatinine 1.2, patient has shock received IV pressors, creatinine 2.49, along with oliguria, on day of examination  Subjective F/U EMANUEL ON CKD    Interval History:     Review of Systems:   Mild chest discomfort at time, edema.  Denies any nausea, vomiting, chest pain, shortness of breath.  All other systems negative      Objective     Vital Sign Min/Max for last 24 hours  Temp  Min: 98.1 °F (36.7 °C)  Max: 98.8 °F (37.1 °C)   BP  Min: 118/74  Max: 156/82   Pulse  Min: 67  Max: 74   Resp  Min: 18  Max: 20   SpO2  Min: 90 %  Max: 97 %   Flow (L/min)  Min: 0  Max: 2   Weight  Min: 148 kg (325 lb 8 oz)  Max: 148 kg (325 lb 8 oz)     Flowsheet Rows    Flowsheet Row First Filed Value   Admission Height 185.4 cm (73\") Documented at 03/21/2023 0615   Admission Weight 142 kg (314 lb) Documented at 03/21/2023 0615          I/O this shift:  In: 750 [P.O.:750]  Out: 100 [Urine:100]  I/O last 3 completed shifts:  In: 715 [P.O.:715]  Out: 400 [Urine:400]    Physical Exam:  General Appearance: Morbidly obese  male awake, alert, oriented x4.  Laying comfortable in chair.  Eyes: PER, EOMI.  Neck: Supple no JVD.  Right IJ in place  Chest wall: Incision slightly slightly inflamed  Lungs: Clear auscultation, no rales rhonchi's, equal chest movement, nonlabored.  Heart: Rub improved, RRR.  Abdomen: Soft, nontender, positive bowel sounds, no organomegaly.  Extremities: 1+ edema left lower extremity, 1+ edema on the right lower extremity, no cyanosis.  Neuro: No focal deficit, moving all extremities, alert oriented X 3  : No Ken catheter  Skin: Warm and dry.  WBC WBC   Date Value Ref Range Status   03/26/2023 9.95 3.40 - 10.80 10*3/mm3 Final   03/25/2023 11.22 (H) 3.40 - 10.80 10*3/mm3 Final      HGB Hemoglobin "   Date Value Ref Range Status   03/26/2023 8.7 (L) 13.0 - 17.7 g/dL Final   03/25/2023 9.5 (L) 13.0 - 17.7 g/dL Final      HCT Hematocrit   Date Value Ref Range Status   03/26/2023 27.1 (L) 37.5 - 51.0 % Final   03/25/2023 30.0 (L) 37.5 - 51.0 % Final      Platlets No results found for: LABPLAT   MCV MCV   Date Value Ref Range Status   03/26/2023 82.9 79.0 - 97.0 fL Final   03/25/2023 84.7 79.0 - 97.0 fL Final          Sodium Sodium   Date Value Ref Range Status   03/27/2023 136 136 - 145 mmol/L Final   03/26/2023 134 (L) 136 - 145 mmol/L Final   03/25/2023 130 (L) 136 - 145 mmol/L Final      Potassium Potassium   Date Value Ref Range Status   03/27/2023 4.6 3.5 - 5.2 mmol/L Final   03/26/2023 4.3 3.5 - 5.2 mmol/L Final   03/25/2023 4.1 3.5 - 5.2 mmol/L Final      Chloride Chloride   Date Value Ref Range Status   03/27/2023 95 (L) 98 - 107 mmol/L Final   03/26/2023 96 (L) 98 - 107 mmol/L Final   03/25/2023 94 (L) 98 - 107 mmol/L Final      CO2 CO2   Date Value Ref Range Status   03/27/2023 28.0 22.0 - 29.0 mmol/L Final   03/26/2023 26.0 22.0 - 29.0 mmol/L Final   03/25/2023 25.0 22.0 - 29.0 mmol/L Final      BUN BUN   Date Value Ref Range Status   03/27/2023 54 (H) 8 - 23 mg/dL Final   03/26/2023 50 (H) 8 - 23 mg/dL Final   03/25/2023 45 (H) 8 - 23 mg/dL Final      Creatinine Creatinine   Date Value Ref Range Status   03/27/2023 1.23 0.76 - 1.27 mg/dL Final   03/26/2023 1.12 0.76 - 1.27 mg/dL Final   03/25/2023 1.42 (H) 0.76 - 1.27 mg/dL Final      Calcium Calcium   Date Value Ref Range Status   03/27/2023 8.8 8.6 - 10.5 mg/dL Final   03/26/2023 8.5 (L) 8.6 - 10.5 mg/dL Final   03/25/2023 8.7 8.6 - 10.5 mg/dL Final      PO4 No results found for: CAPO4   Albumin Albumin   Date Value Ref Range Status   03/26/2023 3.5 3.5 - 5.2 g/dL Final   03/25/2023 3.6 3.5 - 5.2 g/dL Final      Magnesium Magnesium   Date Value Ref Range Status   03/26/2023 2.2 1.6 - 2.4 mg/dL Final   03/25/2023 2.3 1.6 - 2.4 mg/dL Final      Uric  Acid No results found for: URICACID        Results Review:     I reviewed the patient's new clinical results.  I reviewed the patient's new imaging results and agree with the interpretation.    acetaminophen, 650 mg, Oral, Q8H  amiodarone, 200 mg, Oral, Q12H  aspirin, 325 mg, Oral, Daily  atorvastatin, 40 mg, Oral, Nightly  bumetanide, 2 mg, Oral, Daily  castor oil-balsam peru, 1 application, Topical, Q12H  [START ON 3/28/2023] clopidogrel, 75 mg, Oral, Daily  gabapentin, 400 mg, Oral, TID  insulin detemir, 15 Units, Subcutaneous, Q12H  insulin lispro, 0-24 Units, Subcutaneous, 4x Daily With Meals & Nightly  Insulin Lispro, 5 Units, Subcutaneous, TID With Meals  metoclopramide, 10 mg, Oral, TID AC  nebivolol, 5 mg, Oral, Q24H  oxymetazoline, 2 spray, Each Nare, BID  pharmacy consult - [place], , Does not apply, Daily  senna-docusate sodium, 2 tablet, Oral, BID  tamsulosin, 0.4 mg, Oral, Daily           Medication Review: Reviewed    Assessment & Plan       Multivessel CAD with remote ptca stent, now with stable anginal equivelent (dyspnea) ([facility])    Insulin resistant Diabetes mellitus ([facility])    Sleep apnea    S/P CABG x 3 on 3/21/2023    Hypertension    Hyperlipidemia    EMANUEL ([facility])       1.  EMANUEL on CKD, baseline creatinine 1.2, patient is postop CABG, also had hypotensive episode postsurgery requiring IV Levophed.  Patient has decreased urine output with increasing creatinine most likely ATN.  Improvement in renal function noted.  2.  S/p CABG x3.  3.  Shock: Improved at this time off the Levophed.  4.  History of diabetes without significant complications.  5.  History of hypertension.  6.  Leukocytosis: Improving  7.  Mild hyponatremia slightly BETTER    Recommendation:  Hyponatremia sodium improved, change fluid restriction 1500 mL.  Renal function has improved creatinine 1.2.  Bumex 2 mg every 12 will be started for edema.  Renal function slowly improving at this time.  Monitor electrolytes replace as needed.  Dick  Nichelle Lagos MD  03/27/23  15:23 EDT

## 2023-03-27 NOTE — PROGRESS NOTES
CTS Progress Note       LOS: 6 days   Patient Care Team:  Jose M Simon MD as PCP - General (Geriatric Medicine)    Chief Complaint: Coronary artery disease of native artery of native heart with stable angina pectoris (HCC)    Vital Signs:  Temp:  [97.8 °F (36.6 °C)-98.8 °F (37.1 °C)] 98.4 °F (36.9 °C)  Heart Rate:  [62-77] 67  Resp:  [18-20] 18  BP: (110-154)/(55-92) 147/84    Physical Exam: Up in chair breathing unlabored       Results:   Results from last 7 days   Lab Units 03/26/23  1130   WBC 10*3/mm3 9.95   HEMOGLOBIN g/dL 8.7*   HEMATOCRIT % 27.1*   PLATELETS 10*3/mm3 391     Results from last 7 days   Lab Units 03/26/23  1130   SODIUM mmol/L 134*   POTASSIUM mmol/L 4.3   CHLORIDE mmol/L 96*   CO2 mmol/L 26.0   BUN mg/dL 50*   CREATININE mg/dL 1.12   GLUCOSE mg/dL 175*   CALCIUM mg/dL 8.5*           Imaging Results (Last 24 Hours)     Procedure Component Value Units Date/Time    XR Chest 1 View [[id number]] Collected: 03/26/23 0754     Updated: 03/26/23 0800    Narrative:      XR CHEST 1 VW    Date of Exam: 3/26/2023 5:38 AM EDT    Indication: follow up  follow up.    Comparison: Chest x-ray 3/24/2023    Findings:  Right IJ introducer sheath, NG/OG tube, and left thoracostomy tube have been removed in the interim. Stable cardiomegaly and unchanged prominence of the superior mediastinal contour. Mild streaky opacities in the left mid and lower lung likely reflect   atelectasis. The right lung is grossly clear. There may be a small left pleural effusion, unchanged. Redemonstration of vague curvilinear interface of the left apex compatible with trace left apical pneumothorax.        Impression:      Impression:  Removal of medical devices as above. Removal of left thoracostomy tube. Unchanged trace left apical pneumothorax. Linear streaky opacities in the mid and lower left lung likely reflect atelectasis.    Electronically Signed: Jung Salazar    3/26/2023 7:57 AM EDT    Workstation ID: [id number]           Assessment      Multivessel CAD with remote ptca stent, now with stable anginal equivelent (dyspnea) (HCC)    Insulin resistant Diabetes mellitus (HCC)    Sleep apnea    S/P CABG x 3 on 3/21/2023    Hypertension    Hyperlipidemia    EMANUEL (HCC)    Creatinine has improved.  Patient is still not incredibly mobile but is improving anticipate should be able to discharge home tomorrow  Plan   Plavix 300 mg p.o. now and then 75 mg p.o. daily  Anticipate probable discharge tomorrow if satisfactory with cardiology and nephrology    Please note that portions of this note were completed with a voice recognition program. Efforts were made to edit the dictations, but occasionally words are mistranscribed.    Markus Emanuel MD  03/27/23  06:44 EDT

## 2023-03-28 ENCOUNTER — APPOINTMENT (OUTPATIENT)
Dept: GENERAL RADIOLOGY | Facility: HOSPITAL | Age: 63
DRG: 236 | End: 2023-03-28
Payer: MEDICARE

## 2023-03-28 LAB
ANION GAP SERPL CALCULATED.3IONS-SCNC: 13 MMOL/L (ref 5–15)
BUN SERPL-MCNC: 43 MG/DL (ref 8–23)
BUN/CREAT SERPL: 38.7 (ref 7–25)
CALCIUM SPEC-SCNC: 9 MG/DL (ref 8.6–10.5)
CHLORIDE SERPL-SCNC: 97 MMOL/L (ref 98–107)
CO2 SERPL-SCNC: 28 MMOL/L (ref 22–29)
CREAT SERPL-MCNC: 1.11 MG/DL (ref 0.76–1.27)
DEPRECATED RDW RBC AUTO: 53 FL (ref 37–54)
EGFRCR SERPLBLD CKD-EPI 2021: 75.5 ML/MIN/1.73
ERYTHROCYTE [DISTWIDTH] IN BLOOD BY AUTOMATED COUNT: 17.1 % (ref 12.3–15.4)
GLUCOSE BLDC GLUCOMTR-MCNC: 186 MG/DL (ref 70–130)
GLUCOSE BLDC GLUCOMTR-MCNC: 188 MG/DL (ref 70–130)
GLUCOSE BLDC GLUCOMTR-MCNC: 197 MG/DL (ref 70–130)
GLUCOSE BLDC GLUCOMTR-MCNC: 239 MG/DL (ref 70–130)
GLUCOSE SERPL-MCNC: 195 MG/DL (ref 65–99)
HCT VFR BLD AUTO: 31.4 % (ref 37.5–51)
HGB BLD-MCNC: 9.8 G/DL (ref 13–17.7)
L PNEUMO1 AG UR QL IA: NEGATIVE
MCH RBC QN AUTO: 26.6 PG (ref 26.6–33)
MCHC RBC AUTO-ENTMCNC: 31.2 G/DL (ref 31.5–35.7)
MCV RBC AUTO: 85.3 FL (ref 79–97)
PLATELET # BLD AUTO: 595 10*3/MM3 (ref 140–450)
PMV BLD AUTO: 9.5 FL (ref 6–12)
POTASSIUM SERPL-SCNC: 4.3 MMOL/L (ref 3.5–5.2)
PROCALCITONIN SERPL-MCNC: 0.12 NG/ML (ref 0–0.25)
RBC # BLD AUTO: 3.68 10*6/MM3 (ref 4.14–5.8)
S PNEUM AG SPEC QL LA: NEGATIVE
SODIUM SERPL-SCNC: 138 MMOL/L (ref 136–145)
WBC NRBC COR # BLD: 11.86 10*3/MM3 (ref 3.4–10.8)

## 2023-03-28 PROCEDURE — 87899 AGENT NOS ASSAY W/OPTIC: CPT | Performed by: INTERNAL MEDICINE

## 2023-03-28 PROCEDURE — 84145 PROCALCITONIN (PCT): CPT | Performed by: PHYSICIAN ASSISTANT

## 2023-03-28 PROCEDURE — 63710000001 INSULIN DETEMIR PER 5 UNITS: Performed by: PHYSICIAN ASSISTANT

## 2023-03-28 PROCEDURE — 99231 SBSQ HOSP IP/OBS SF/LOW 25: CPT | Performed by: PHYSICIAN ASSISTANT

## 2023-03-28 PROCEDURE — 87641 MR-STAPH DNA AMP PROBE: CPT | Performed by: FAMILY MEDICINE

## 2023-03-28 PROCEDURE — 87449 NOS EACH ORGANISM AG IA: CPT | Performed by: INTERNAL MEDICINE

## 2023-03-28 PROCEDURE — 99024 POSTOP FOLLOW-UP VISIT: CPT | Performed by: THORACIC SURGERY (CARDIOTHORACIC VASCULAR SURGERY)

## 2023-03-28 PROCEDURE — 85027 COMPLETE CBC AUTOMATED: CPT

## 2023-03-28 PROCEDURE — 82962 GLUCOSE BLOOD TEST: CPT

## 2023-03-28 PROCEDURE — 71045 X-RAY EXAM CHEST 1 VIEW: CPT

## 2023-03-28 PROCEDURE — 63710000001 INSULIN LISPRO (HUMAN) PER 5 UNITS: Performed by: PHYSICIAN ASSISTANT

## 2023-03-28 PROCEDURE — 97530 THERAPEUTIC ACTIVITIES: CPT

## 2023-03-28 PROCEDURE — 80048 BASIC METABOLIC PNL TOTAL CA: CPT

## 2023-03-28 PROCEDURE — 94799 UNLISTED PULMONARY SVC/PX: CPT

## 2023-03-28 PROCEDURE — 93005 ELECTROCARDIOGRAM TRACING: CPT | Performed by: NURSE PRACTITIONER

## 2023-03-28 PROCEDURE — 99232 SBSQ HOSP IP/OBS MODERATE 35: CPT | Performed by: INTERNAL MEDICINE

## 2023-03-28 PROCEDURE — 97116 GAIT TRAINING THERAPY: CPT

## 2023-03-28 RX ORDER — L.ACID,PARA/B.BIFIDUM/S.THERM 8B CELL
1 CAPSULE ORAL DAILY
Status: DISCONTINUED | OUTPATIENT
Start: 2023-03-28 | End: 2023-03-30 | Stop reason: HOSPADM

## 2023-03-28 RX ORDER — LISINOPRIL 20 MG/1
20 TABLET ORAL
Status: DISCONTINUED | OUTPATIENT
Start: 2023-03-28 | End: 2023-03-29

## 2023-03-28 RX ORDER — LINEZOLID 600 MG/1
600 TABLET, FILM COATED ORAL EVERY 12 HOURS SCHEDULED
Status: DISCONTINUED | OUTPATIENT
Start: 2023-03-28 | End: 2023-03-30 | Stop reason: HOSPADM

## 2023-03-28 RX ORDER — AZITHROMYCIN 250 MG/1
500 TABLET, FILM COATED ORAL DAILY
Status: DISCONTINUED | OUTPATIENT
Start: 2023-03-28 | End: 2023-03-30 | Stop reason: HOSPADM

## 2023-03-28 RX ORDER — METOCLOPRAMIDE 10 MG/1
10 TABLET ORAL
Status: COMPLETED | OUTPATIENT
Start: 2023-03-28 | End: 2023-03-29

## 2023-03-28 RX ADMIN — METOCLOPRAMIDE 10 MG: 10 TABLET ORAL at 12:09

## 2023-03-28 RX ADMIN — SENNOSIDES AND DOCUSATE SODIUM 2 TABLET: 8.6; 5 TABLET ORAL at 21:31

## 2023-03-28 RX ADMIN — INSULIN DETEMIR 15 UNITS: 100 INJECTION, SOLUTION SUBCUTANEOUS at 21:33

## 2023-03-28 RX ADMIN — INSULIN LISPRO 5 UNITS: 100 INJECTION, SOLUTION INTRAVENOUS; SUBCUTANEOUS at 12:04

## 2023-03-28 RX ADMIN — ACETAMINOPHEN 325MG 650 MG: 325 TABLET ORAL at 15:37

## 2023-03-28 RX ADMIN — ACETAMINOPHEN 325MG 650 MG: 325 TABLET ORAL at 21:32

## 2023-03-28 RX ADMIN — IPRATROPIUM BROMIDE AND ALBUTEROL SULFATE 3 ML: .5; 3 SOLUTION RESPIRATORY (INHALATION) at 22:05

## 2023-03-28 RX ADMIN — METOCLOPRAMIDE 10 MG: 10 TABLET ORAL at 05:25

## 2023-03-28 RX ADMIN — METOCLOPRAMIDE 10 MG: 10 TABLET ORAL at 17:58

## 2023-03-28 RX ADMIN — INSULIN LISPRO 4 UNITS: 100 INJECTION, SOLUTION INTRAVENOUS; SUBCUTANEOUS at 08:45

## 2023-03-28 RX ADMIN — SENNOSIDES AND DOCUSATE SODIUM 2 TABLET: 8.6; 5 TABLET ORAL at 08:46

## 2023-03-28 RX ADMIN — AMIODARONE HYDROCHLORIDE 200 MG: 200 TABLET ORAL at 08:46

## 2023-03-28 RX ADMIN — ATORVASTATIN CALCIUM 40 MG: 40 TABLET, FILM COATED ORAL at 21:31

## 2023-03-28 RX ADMIN — LINEZOLID 600 MG: 600 TABLET ORAL at 21:31

## 2023-03-28 RX ADMIN — TAMSULOSIN HYDROCHLORIDE 0.4 MG: 0.4 CAPSULE ORAL at 08:46

## 2023-03-28 RX ADMIN — OXYMETAZOLINE HCL 2 SPRAY: 0.05 SPRAY NASAL at 08:47

## 2023-03-28 RX ADMIN — GABAPENTIN 400 MG: 400 CAPSULE ORAL at 08:46

## 2023-03-28 RX ADMIN — Medication 1 CAPSULE: at 17:58

## 2023-03-28 RX ADMIN — AZITHROMYCIN MONOHYDRATE 500 MG: 250 TABLET ORAL at 21:31

## 2023-03-28 RX ADMIN — CLOPIDOGREL BISULFATE 75 MG: 75 TABLET ORAL at 08:46

## 2023-03-28 RX ADMIN — INSULIN LISPRO 4 UNITS: 100 INJECTION, SOLUTION INTRAVENOUS; SUBCUTANEOUS at 21:32

## 2023-03-28 RX ADMIN — ASPIRIN 325 MG: 325 TABLET, COATED ORAL at 08:46

## 2023-03-28 RX ADMIN — OXYMETAZOLINE HCL 2 SPRAY: 0.05 SPRAY NASAL at 21:35

## 2023-03-28 RX ADMIN — INSULIN LISPRO 5 UNITS: 100 INJECTION, SOLUTION INTRAVENOUS; SUBCUTANEOUS at 08:45

## 2023-03-28 RX ADMIN — CASTOR OIL AND BALSAM, PERU 1 APPLICATION: 788; 87 OINTMENT TOPICAL at 21:35

## 2023-03-28 RX ADMIN — LISINOPRIL 20 MG: 20 TABLET ORAL at 12:04

## 2023-03-28 RX ADMIN — BUMETANIDE 2 MG: 2 TABLET ORAL at 08:46

## 2023-03-28 RX ADMIN — CASTOR OIL AND BALSAM, PERU 1 APPLICATION: 788; 87 OINTMENT TOPICAL at 08:45

## 2023-03-28 RX ADMIN — INSULIN DETEMIR 15 UNITS: 100 INJECTION, SOLUTION SUBCUTANEOUS at 08:45

## 2023-03-28 RX ADMIN — OXYCODONE 10 MG: 5 TABLET ORAL at 03:32

## 2023-03-28 RX ADMIN — GABAPENTIN 400 MG: 400 CAPSULE ORAL at 15:37

## 2023-03-28 RX ADMIN — INSULIN LISPRO 4 UNITS: 100 INJECTION, SOLUTION INTRAVENOUS; SUBCUTANEOUS at 12:05

## 2023-03-28 RX ADMIN — AMIODARONE HYDROCHLORIDE 200 MG: 200 TABLET ORAL at 21:32

## 2023-03-28 RX ADMIN — GABAPENTIN 400 MG: 400 CAPSULE ORAL at 21:32

## 2023-03-28 RX ADMIN — NEBIVOLOL 5 MG: 5 TABLET ORAL at 08:45

## 2023-03-28 RX ADMIN — INSULIN LISPRO 5 UNITS: 100 INJECTION, SOLUTION INTRAVENOUS; SUBCUTANEOUS at 17:57

## 2023-03-28 RX ADMIN — INSULIN LISPRO 8 UNITS: 100 INJECTION, SOLUTION INTRAVENOUS; SUBCUTANEOUS at 17:57

## 2023-03-28 NOTE — THERAPY TREATMENT NOTE
Patient Name: Wilbert Kelley  : 1960    MRN: [id number]                              Today's Date: 3/28/2023       Admit Date: 3/21/2023    Visit Dx:     ICD-10-CM ICD-9-CM   1. Coronary artery disease of native artery of native heart with stable angina pectoris (HCC)  I25.118 414.01     413.9     Patient Active Problem List   Diagnosis   • SOB (shortness of breath) on exertion   • Insulin resistant Diabetes mellitus (HCC)   • Sleep apnea   • Multivessel CAD with remote ptca stent, now with stable anginal equivelent (dyspnea) ([facility])   • S/P CABG x 3 on 3/21/2023   • Hypertension   • Hyperlipidemia   • EMANUEL (HCC)     Past Medical History:   Diagnosis Date   • Arthritis    • BPH (benign prostatic hyperplasia)    • Cancer ([facility])     basal and melanoma-  x2 - left side ear and elbow   • Constipation    • Coronary artery disease    • DDD (degenerative disc disease), lumbar    • Diabetes mellitus (HCC)     couple times month check sugar   • Frozen shoulder     left   • GERD (gastroesophageal reflux disease)    • Hyperlipidemia    • Hypertension    • Limited mobility     left shoulder  - possible frozen shoulder-= please be aware for surgery   • Sleep apnea     doesnt use machine   • Tinnitus    • Wears glasses     small print     Past Surgical History:   Procedure Laterality Date   • CARDIAC CATHETERIZATION     • CARDIAC CATHETERIZATION Right 2023    Procedure: Left Heart Cath;  Surgeon: Jarod Boyle MD;  Location:  [facility] CATH INVASIVE LOCATION;  Service: Cardiovascular;  Laterality: Right;   • CATARACT EXTRACTION, BILATERAL Bilateral    • COLONOSCOPY     • CORONARY ARTERY BYPASS GRAFT N/A 3/21/2023    Procedure: MEDIAN STERNOTOMY, CORONARY ARTERY BYPASS GRAFTING X 3, UTILIZING THE LEFT INTERNAL MAMMARY ARTERY, ENDOSCOPIC VEIN HARVEST OF THE RIGHT GREATER SAPENOUSE VEIN;  Surgeon: Markus Emanuel MD;  Location: [facility] OR;  Service: Cardiothoracic;  Laterality: N/A;   • CORONARY STENT PLACEMENT      x4    • ENDOSCOPY     • FINGER SURGERY Left     middle finger - left side   • SKIN CANCER EXCISION      x2   • WISDOM TOOTH EXTRACTION        General Information     Row Name 03/28/23 1521          Physical Therapy Time and Intention    Document Type therapy note (daily note)  -ES     Mode of Treatment physical therapy  -ES     Row Name 03/28/23 1521          General Information    Patient Profile Reviewed yes  -ES     Existing Precautions/Restrictions cardiac;fall;oxygen therapy device and L/min;sternal  -ES     Barriers to Rehab medically complex;previous functional deficit  -ES     Row Name 03/28/23 1521          Cognition    Orientation Status (Cognition) oriented x 3  -ES     Row Name 03/28/23 1521          Safety Issues, Functional Mobility    Safety Issues Affecting Function (Mobility) awareness of need for assistance;insight into deficits/self-awareness;safety precaution awareness  -ES     Impairments Affecting Function (Mobility) balance;endurance/activity tolerance;shortness of breath;strength;pain  -ES     Comment, Safety Issues/Impairments (Mobility) Pt required constant cueing/education for adherence to sternal precautions. Adherent ~50% of session despite cueing.  -ES           User Key  (r) = Recorded By, (t) = Taken By, (c) = Cosigned By    Initials Name Provider Type    ES Donna Agudelo, PT Physical Therapist               Mobility     Row Name 03/28/23 1522          Bed Mobility    Comment, (Bed Mobility) [facility]  -ES     Row Name 03/28/23 1522          Sit-Stand Transfer    Sit-Stand Remus (Transfers) moderate assist (50% patient effort)  -ES     Assistive Device (Sit-Stand Transfers) walker, front-wheeled  -ES     Comment, (Sit-Stand Transfer) constant cueing for sternal precautions. Required 2 attempts with rocking to gain momentum to reach upright posture.  -ES     Row Name 03/28/23 1522          Gait/Stairs (Locomotion)    Remus Level (Gait) minimum assist (75% patient effort);verbal  "cues  -ES     Assistive Device (Gait) walker, front-wheeled  -ES     Distance in Feet (Gait) 50+25+25  -ES     Deviations/Abnormal Patterns (Gait) base of support, wide;stride length decreased;weight shifting decreased  -ES     Bilateral Gait Deviations forward flexed posture;heel strike decreased  -ES     Comment, (Gait/Stairs) Pt required frequent rest breaks due to c/o pain \"all over\", especially in BLE. Pt also required frequent cueing for adherence to sternal precautions throughout session with compliance ~50% of session. No LOB.  -ES           User Key  (r) = Recorded By, (t) = Taken By, (c) = Cosigned By    Initials Name Provider Type    ES Donna Agudelo PT Physical Therapist               Obj/Interventions     Row Name 03/28/23 1525          Balance    Balance Assessment sitting static balance;sitting dynamic balance;sit to stand dynamic balance;standing dynamic balance;standing static balance  -ES     Static Sitting Balance supervision  -ES     Dynamic Sitting Balance contact guard;verbal cues;other (see comments)  required constant cueing for adherence to sternal precautions with ~50% compliance  -ES     Position, Sitting Balance unsupported;sitting in chair  -ES     Sit to Stand Dynamic Balance minimal assist;verbal cues  -ES     Static Standing Balance contact guard  -ES     Dynamic Standing Balance minimal assist;verbal cues  -ES     Position/Device Used, Standing Balance supported;walker, front-wheeled  -ES     Balance Interventions sitting;standing;sit to stand;supported;static;dynamic;occupation based/functional task  -ES     Comment, Balance Pt with 2 LOB requiring Karo and constant cueing to return to upright posture.  -ES           User Key  (r) = Recorded By, (t) = Taken By, (c) = Cosigned By    Initials Name Provider Type    Donna Fisher PT Physical Therapist               Goals/Plan    No documentation.                Clinical Impression     Row Name 03/28/23 1537          Pain    " Pretreatment Pain Rating 8/10  -ES     Posttreatment Pain Rating 8/10  -ES     Pain Location incisional  -ES     Pain Location - chest  -ES     Pre/Posttreatment Pain Comment Limited heavily this date by c/o BLE sciatic pain as well as incisional chest pain  -ES     Pain Intervention(s) Repositioned;Ambulation/increased activity;Nursing Notified  -ES     Row Name 03/28/23 1535          Plan of Care Review    Progress declining  -ES     Outcome Evaluation Pt limited heavily this date by c/o BLE sciatic pain as well as incisional chest pain. Pt required constant cueing and increased assistance for adherence to sternal precautions, with only ~50% compliance throughout session. Pt required multiple rest breaks during mobility, including seated rest break and was unable to ambulate household distances. Pt is a high fall risk and at a high risk for readmission based on current functional deficits. Pt has 5 stairs to enter home, which he has been unable to tolerate stair training thus far due to BLE pain, fatigue, and generalized weakness. Based on today's performance and current functional deficits, PT rec IRF at d/c. Also recommend OT consult.  -ES     Row Name 03/28/23 1535          Therapy Assessment/Plan (PT)    Rehab Potential (PT) good, to achieve stated therapy goals  -ES     Criteria for Skilled Interventions Met (PT) yes;meets criteria;skilled treatment is necessary  -ES     Therapy Frequency (PT) daily  -ES     Row Name 03/28/23 1535          Vital Signs    Pre Systolic BP Rehab --  VSS  -ES     O2 Delivery Pre Treatment room air  -ES     O2 Delivery Intra Treatment room air  -ES     O2 Delivery Post Treatment room air  -ES     Pre Patient Position Sitting  -ES     Intra Patient Position Standing  -ES     Post Patient Position Sitting  -ES     Row Name 03/28/23 1535          Positioning and Restraints    Pre-Treatment Position sitting in chair/recliner  -ES     Post Treatment Position chair  -ES     In Chair  notified nsg;reclined;sitting;call light within reach;encouraged to call for assist;exit alarm on;waffle cushion;legs elevated  -ES           User Key  (r) = Recorded By, (t) = Taken By, (c) = Cosigned By    Initials Name Provider Type    Donna Fisher PT Physical Therapist               Outcome Measures     Row Name 03/28/23 1540 03/28/23 0845       How much help from another person do you currently need...    Turning from your back to your side while in flat bed without using bedrails? 3  -ES 3  -TH    Moving from lying on back to sitting on the side of a flat bed without bedrails? 2  -ES 2  -TH    Moving to and from a bed to a chair (including a wheelchair)? 3  -ES 3  -TH    Standing up from a chair using your arms (e.g., wheelchair, bedside chair)? 3  -ES 3  -TH    Climbing 3-5 steps with a railing? 1  -ES 2  -TH    To walk in hospital room? 2  -ES 3  -TH    AM-PAC 6 Clicks Score (PT) 14  -ES 16  -TH    Highest level of mobility 4 --> Transferred to chair/commode  -ES 5 --> Static standing  -TH    Row Name 03/28/23 1540          Functional Assessment    Outcome Measure Options AM-PAC 6 Clicks Basic Mobility (PT)  -ES           User Key  (r) = Recorded By, (t) = Taken By, (c) = Cosigned By    Initials Name Provider Type     Yojana Montes RN Registered Nurse    Donna Fisher PT Physical Therapist                             Physical Therapy Education     Title: PT OT SLP Therapies (In Progress)     Topic: Physical Therapy (In Progress)     Point: Mobility training (In Progress)     Learning Progress Summary           Patient Acceptance, E, VU,NR by LO at 3/27/2023 1454    Comment: PT POC, sternal precautions    Acceptance, E,D, NR by LS at 3/25/2023 1610    Acceptance, E, NR by CM at 3/24/2023 1155    Acceptance, E, NR by CM at 3/23/2023 1043    Acceptance, E, NR by CM at 3/22/2023 1142   Significant Other Acceptance, E, NR by CM at 3/24/2023 1155                   Point: Home exercise program  (Done)     Learning Progress Summary           Patient Acceptance, E, VU,NR by LO at 3/27/2023 1454    Comment: PT POC, sternal precautions                   Point: Body mechanics (In Progress)     Learning Progress Summary           Patient Acceptance, E, VU,NR by LO at 3/27/2023 1454    Comment: PT POC, sternal precautions    Acceptance, E,D, NR by LS at 3/25/2023 1610    Acceptance, E, NR by CM at 3/24/2023 1155    Acceptance, E, NR by CM at 3/23/2023 1043    Acceptance, E, NR by CM at 3/22/2023 1142   Significant Other Acceptance, E, NR by CM at 3/24/2023 1155                   Point: Precautions (In Progress)     Learning Progress Summary           Patient Acceptance, E, VU,NR by LO at 3/27/2023 1454    Comment: PT POC, sternal precautions    Acceptance, E,D, NR by LS at 3/25/2023 1610    Acceptance, E, NR by CM at 3/24/2023 1155    Acceptance, E, NR by CM at 3/23/2023 1043    Acceptance, E, NR by CM at 3/22/2023 1142   Significant Other Acceptance, E, NR by CM at 3/24/2023 1155                               User Key     Initials Effective Dates Name Provider Type Discipline    LS 02/03/23 -  Kirti Morfin, PT Physical Therapist PT    LO 06/16/21 -  Kirti Garcia, PT Physical Therapist PT    CM 09/22/22 -  Anahy Deleon, PT Physical Therapist PT              PT Recommendation and Plan     Progress: declining  Outcome Evaluation: Pt limited heavily this date by c/o BLE sciatic pain as well as incisional chest pain. Pt required constant cueing and increased assistance for adherence to sternal precautions, with only ~50% compliance throughout session. Pt required multiple rest breaks during mobility, including seated rest break and was unable to ambulate household distances. Pt is a high fall risk and at a high risk for readmission based on current functional deficits. Pt has 5 stairs to enter home, which he has been unable to tolerate stair training thus far due to BLE pain, fatigue, and generalized  weakness. Based on today's performance and current functional deficits, PT rec IRF at d/c. Also recommend OT consult.     Time Calculation:    PT Charges     Row Name 03/28/23 1541             Time Calculation    Start Time 1452  -ES      PT Received On 03/28/23  -ES      PT Goal Re-Cert Due Date 04/01/23  -ES         Time Calculation- PT    Total Timed Code Minutes- PT 24 minute(s)  -ES         Timed Charges    52421 - Gait Training Minutes  15  -ES      07995 - PT Therapeutic Activity Minutes 9  -ES         Total Minutes    Timed Charges Total Minutes 24  -ES       Total Minutes 24  -ES            User Key  (r) = Recorded By, (t) = Taken By, (c) = Cosigned By    Initials Name Provider Type    ES Donna Agudelo PT Physical Therapist              Therapy Charges for Today     Code Description Service Date Service Provider Modifiers Qty    25596938968 HC GAIT TRAINING EA 15 MIN 3/28/2023 Donna Agudelo, PT GP 1    68762203448 HC PT THERAPEUTIC ACT EA 15 MIN 3/28/2023 Donna Agudelo, PT GP 1          PT G-Codes  Outcome Measure Options: AM-PAC 6 Clicks Basic Mobility (PT)  AM-PAC 6 Clicks Score (PT): 14  PT Discharge Summary  Anticipated Discharge Disposition (PT): inpatient rehabilitation facility    Donna Agudelo PT  3/28/2023

## 2023-03-28 NOTE — PROGRESS NOTES
"    [facility] Medicine Services  PROGRESS NOTE    Patient Name: Wilbert Kelley  : 1960  MRN: [id number]    Date of Admission: 3/21/2023  Primary Care Physician: Jose M Simon MD    Subjective     CC: post-CABG medical management      HPI:  In chair. Feels about the same today. Coughing up some phlegm - feels like this is improving but still \"thick.\"     ROS:  Gen- No fevers, chills  CV- No chest pain, palpitations  Resp- No cough, dyspnea  GI- No N/V/D, abd pain    Objective     Vital Signs:   Temp:  [97.2 °F (36.2 °C)-98.7 °F (37.1 °C)] 97.2 °F (36.2 °C)  Heart Rate:  [66-79] 71  Resp:  [18-20] 20  BP: (132-153)/(68-86) 153/71  Flow (L/min):  [1-1.5] 1     Physical Exam:  Constitutional: No acute distress, awake, alert and conversant. Sitting in chair. Chronically ill appearing   HENT: NCAT, mucous membranes moist  Respiratory: Expiratory wheezing bilaterally without rales or rhonchi. Normal respiratory effort on room air   Cardiovascular: RRR, no murmurs, rubs, or gallops. Border of sternal incision is erythematous without overt purulence ir induration, EVH site c/d/i without erythema, induration or drainage.  Gastrointestinal: Positive bowel sounds, soft, nontender, nondistended  Musculoskeletal: No bilateral ankle edema  Psychiatric: Appropriate affect, cooperative  Neurologic: Oriented x 3, moves all extremities spontaneously without focal deficits, speech clear    Results Reviewed:  LAB RESULTS:      Lab 23  0741 23  1130 23  0350 23  0305 23  0303 23  0218   WBC 11.86* 9.95 11.22* 15.69* 21.33* 15.73*   HEMOGLOBIN 9.8* 8.7* 9.5* 9.1* 10.0* 10.1*   HEMATOCRIT 31.4* 27.1* 30.0* 29.4* 33.1* 33.4*   PLATELETS 595* 391 320 281 319 311   NEUTROS ABS  --  7.89*  --  13.01* 18.06* 13.58*   IMMATURE GRANS (ABS)  --  0.04  --  0.07* 0.15* 0.05   LYMPHS ABS  --  0.68*  --  0.83 1.00 0.62*   MONOS ABS  --  0.99*  --  1.57* 2.05* 1.34*   EOS ABS  " --  0.30  --  0.18 0.02 0.09   MCV 85.3 82.9 84.7 86.5 86.4 85.6   PROCALCITONIN 0.12  --   --   --   --   --    PROTIME  --   --   --   --   --  14.9*         Lab 03/28/23  0741 03/27/23  1110 03/26/23  1130 03/25/23  0350 03/24/23  0305 03/23/23  0303 03/22/23  0218 03/21/23  1529   SODIUM 138 136 134* 130* 132* 137 138 141   POTASSIUM 4.3 4.6 4.3 4.1 4.8 4.7 4.6 4.1   CHLORIDE 97* 95* 96* 94* 95* 98 101 103   CO2 28.0 28.0 26.0 25.0 26.0 27.0 26.0 25.0   ANION GAP 13.0 13.0 12.0 11.0 11.0 12.0 11.0 13.0   BUN 43* 54* 50* 45* 44* 36* 23 24*   CREATININE 1.11 1.23 1.12 1.42* 2.77* 2.49* 1.64* 1.42*   EGFR 75.5 66.8 74.7 56.2* 25.2* 28.7* 47.3* 56.2*   GLUCOSE 195* 226* 175* 163* 126* 154* 175* 190*   CALCIUM 9.0 8.8 8.5* 8.7 8.5* 8.5* 8.5* 8.1*   MAGNESIUM  --   --  2.2 2.3 1.8 2.1 2.0 2.3   PHOSPHORUS  --   --  2.8  --  4.3 4.6*  --  4.4         Lab 03/26/23  1130 03/25/23  0350 03/24/23  0305 03/23/23  0303 03/22/23  0218   TOTAL PROTEIN 6.8 6.8 6.8 6.9 6.6   ALBUMIN 3.5 3.6 3.7 4.1 4.3   GLOBULIN 3.3 3.2 3.1 2.8 2.3   ALT (SGPT) 7 7 6 6 13   AST (SGOT) 35 27 25 26 25   BILIRUBIN 0.9 1.0 0.8 0.7 0.6   ALK PHOS 105 74 59 57 52         Lab 03/22/23  0218   PROTIME 14.9*   INR 1.17*         Lab 03/22/23  0218   CHOLESTEROL 71   LDL CHOL 23   HDL CHOL 18*   TRIGLYCERIDES 185*             Lab 03/24/23  1116 03/21/23  1632   PH, ARTERIAL 7.318* 7.371   PCO2, ARTERIAL 47.8* 46.6*   PO2 ART 74.5* 92.7   FIO2 36 40   HCO3 ART 24.5 27.0*   BASE EXCESS ART -1.8* 1.3   CARBOXYHEMOGLOBIN 1.6 1.5     Brief Urine Lab Results  (Last result in the past 365 days)      Color   Clarity   Blood   Leuk Est   Nitrite   Protein   CREAT   Urine HCG        03/23/23 1612             246.9             Microbiology Results Abnormal     None        XR Chest 1 View    Result Date: 3/28/2023  XR CHEST 1 VW Date of Exam: 3/28/2023 3:50 AM EDT Indication: postop. Comparison: Chest x-ray 3/26/2023 Findings: Stable cardiomegaly. There is improved  aeration of the left lung base with decreased streaky atelectasis. No significant pleural effusion. No pneumothorax.     Impression: Impression: Improved aeration and decreased atelectasis in the left lung base. No definite pneumothorax. Electronically Signed: Jung Hurdbradley  3/28/2023 8:39 AM EDT  Workstation ID: [id number]    Results for orders placed during the hospital encounter of 02/02/23    Adult Transthoracic Echo Complete w/ Color, Spectral and Contrast if necessary per protocol    Interpretation Summary  •  Left ventricular systolic function is normal. Left ventricular ejection fraction appears to be 66 - 70%.  •  Left ventricular wall thickness is consistent with mild concentric hypertrophy.  •  Left ventricular diastolic function is consistent with (grade II w/high LAP) pseudonormalization.  •  Technically very difficult study.  Right ventricle and right atrium is not well visualized for adequate evaluation.    Current medications:  Scheduled Meds:acetaminophen, 650 mg, Oral, Q8H  amiodarone, 200 mg, Oral, Q12H  aspirin, 325 mg, Oral, Daily  atorvastatin, 40 mg, Oral, Nightly  bumetanide, 2 mg, Oral, Daily  castor oil-balsam peru, 1 application, Topical, Q12H  clopidogrel, 75 mg, Oral, Daily  gabapentin, 400 mg, Oral, TID  insulin detemir, 15 Units, Subcutaneous, Q12H  insulin lispro, 0-24 Units, Subcutaneous, 4x Daily With Meals & Nightly  Insulin Lispro, 5 Units, Subcutaneous, TID With Meals  lisinopril, 20 mg, Oral, Q24H  metoclopramide, 10 mg, Oral, TID AC  nebivolol, 5 mg, Oral, Q24H  oxymetazoline, 2 spray, Each Nare, BID  pharmacy consult - MTM, , Does not apply, Daily  senna-docusate sodium, 2 tablet, Oral, BID  tamsulosin, 0.4 mg, Oral, Daily      Continuous Infusions:   PRN Meds:.•  senna-docusate sodium **AND** polyethylene glycol **AND** bisacodyl **AND** bisacodyl  •  Calcium Replacement - Follow Nurse / BPA Driven Protocol  •  dextrose  •  dextrose  •  glucagon (human recombinant)  •   ipratropium-albuterol  •  Magnesium Standard Dose Replacement - Follow Nurse / BPA Driven Protocol  •  ondansetron  •  Phosphorus Replacement - Follow Nurse / BPA Driven Protocol  •  Potassium Replacement - Follow Nurse / BPA Driven Protocol  •  simethicone    Assessment & Plan     Active Hospital Problems    Diagnosis  POA   • **Multivessel CAD with remote ptca stent, now with stable anginal equivelent (dyspnea) ([facility]) [I25.118]  Yes   • S/P CABG x 3 on 3/21/2023 [Z95.1]  Not Applicable   • Hypertension [I10]  Yes   • Hyperlipidemia [E78.5]  Yes   • EMANUEL (HCC) [N17.9]  No   • Insulin resistant Diabetes mellitus (HCC) [E11.9]  Yes   • Sleep apnea [G47.30]  Yes      Resolved Hospital Problems   No resolved problems to display.     Brief Hospital Course to date:  Wilbert Kelley is a 62 y.o. male with relevant PMH of CAD with PTCA and stent placement to LAD and right Coronary Artery in 2010, HTN, HLD, recent C 3/16 w/ MVCAD and diabetes. He was admitted to [facility] 3/21/2023 following CABG x 3 by Dr. Emanuel. He transferred out of ICU on 3/25/2023 and hospital medicine consulted to follow.      MVCAD s/p 3v CABG  PAT vs Aflutter  - Post-operative care per CT surgery  - Cardiology following. S/p IV Amiodarone, now onPO  - Encourage ambulation, IS  - PRN pain control. Gabapenin dose reduced due to EMANUEL   - Cardiology added Bumex on 3/27  - Continue ASA, high-intensity statin and BB     Leukocytosis  - Discussed with CT surgery - consulting ID today due to concern for possible pneumonia and sternal incision erythema.   - Lungs do sound more coarse today, patient denies history of COPD or asthma. Procalcitonin reassuring at 0.12, afebrile, no hypoxia and WBCs overall improved from prior, slightly elevated from yesterday  - Defer antibiotics to ID      DMII   Nausea, suspected gastroparesis  Diabetic peripheral neuropathy  - Reasonable glucose control - continue current regimen  - PO Reglan       Hypotension, resolved   EMANUEL  Hyponatremia  - Nephrology following  - 1200mL fluid restriction - sodium normal at 138 today  - Creatinine 1.11 today    Hypertension  - Cardiology managing      Likely MARIAH  - CPAP QHS and PRN    Expected Discharge Location and Transportation: Home per primary service   Expected Discharge   Expected Discharge Date and Time     Expected Discharge Date Expected Discharge Time    Mar 29, 2023          DVT prophylaxis:Mechanical DVT prophylaxis orders are present.     AM-PAC 6 Clicks Score (PT): 16 (03/27/23 7001)    CODE STATUS:   Code Status and Medical Interventions:   Ordered at: 03/21/23 1133     Code Status (Patient has no pulse and is not breathing):    CPR (Attempt to Resuscitate)     Medical Interventions (Patient has pulse or is breathing):    Full Support     Release to patient:    Routine Release     Mirian Hurley PA-C  03/28/23

## 2023-03-28 NOTE — PLAN OF CARE
Goal Outcome Evaluation:           Progress: declining  Outcome Evaluation: Pt limited heavily this date by c/o BLE sciatic pain as well as incisional chest pain. Pt required constant cueing and increased assistance for adherence to sternal precautions, with only ~50% compliance throughout session. Pt required multiple rest breaks during mobility, including seated rest break and was unable to ambulate household distances. Pt is a high fall risk and at a high risk for readmission based on current functional deficits. Pt has 5 stairs to enter home, which he has been unable to tolerate stair training thus far due to BLE pain, fatigue, and generalized weakness. Based on today's performance and current functional deficits, PT rec IRF at d/c. Also recommend OT consult.

## 2023-03-28 NOTE — CONSULTS
Patient Name: Wilbert Kelley  : 1960  MRN: [id number]    Date of Consult: 3/28/2023  Admission Date: 3/21/2023    Requesting Provider: Cristina Coombs  Evaluating Physician: Ruben Garcia MD    Chief Complaint: fatigue    Reason for Consultation: pneumonia sternal wound infection    Subjective   History of present illness:   Patient is a 62 y.o. male with history of BPH, skin cancer, degenerative disc disease, diabetes, coronary artery disease.  Admitted for CABG 3/21/23.     3/28/23: Infectious disease consulted due to concern for developing sternal wound infection as well as possible pneumonia.  Afebrile.  No antibiotics for the past week.  Patient complains about pain around incision but no dehiscence, drainage.  No drains in place at this time.  Does complain of cough productive of phlegm which is unusual for him. No other new concerns noted.       Past Medical History:   Diagnosis Date   • Arthritis    • BPH (benign prostatic hyperplasia)    • Cancer (HCC)     basal and melanoma-  x2 - left side ear and elbow   • Constipation    • Coronary artery disease    • DDD (degenerative disc disease), lumbar    • Diabetes mellitus (HCC)     couple times month check sugar   • Frozen shoulder     left   • GERD (gastroesophageal reflux disease)    • Hyperlipidemia    • Hypertension    • Limited mobility     left shoulder  - possible frozen shoulder-= please be aware for surgery   • Sleep apnea     doesnt use machine   • Tinnitus    • Wears glasses     small print       Past Surgical History:   Procedure Laterality Date   • CARDIAC CATHETERIZATION     • CARDIAC CATHETERIZATION Right 2023    Procedure: Left Heart Cath;  Surgeon: Jarod Boyle MD;  Location: [facility] CATH INVASIVE LOCATION;  Service: Cardiovascular;  Laterality: Right;   • CATARACT EXTRACTION, BILATERAL Bilateral    • COLONOSCOPY     • CORONARY ARTERY BYPASS GRAFT N/A 3/21/2023    Procedure: MEDIAN STERNOTOMY, CORONARY ARTERY BYPASS  GRAFTING X 3, UTILIZING THE LEFT INTERNAL MAMMARY ARTERY, ENDOSCOPIC VEIN HARVEST OF THE RIGHT GREATER SAPENOUSE VEIN;  Surgeon: Markus Emanuel MD;  Location[facility];  Service: Cardiothoracic;  Laterality: N/A;   • CORONARY STENT PLACEMENT      x4   • ENDOSCOPY     • FINGER SURGERY Left     middle finger - left side   • SKIN CANCER EXCISION      x2   • WISDOM TOOTH EXTRACTION         Social History     Socioeconomic History   • Marital status:    Tobacco Use   • Smoking status: Never     Passive exposure: Past   • Smokeless tobacco: Current     Types: Snuff   Vaping Use   • Vaping Use: Never used   Substance and Sexual Activity   • Alcohol use: Not Currently   • Drug use: Never   • Sexual activity: Defer        Family History   Problem Relation Age of Onset   • COPD Mother    • Heart attack Father 45        passed   • Heart attack Paternal Grandfather 42        massive heart attack   • Heart disease Paternal Grandfather        Allergies   Allergen Reactions   • Morphine Nausea And Vomiting     projectile   • Adhesive Tape Rash     Pt states sticky tabs cause irritation when left on long          Objective   Antibiotics:  Anti-Infectives (From admission, onward)    Ordered     Dose/Rate Route Frequency Start Stop    03/21/23 1133  ceFAZolin in Sodium Chloride (ANCEF) IVPB solution 3 g        Ordering Provider: Edwina Preston PA-C    3 g  200 mL/hr over 30 Minutes Intravenous Every 8 Hours 03/21/23 1800 03/23/23 0317    03/21/23 0547  ceFAZolin in Sodium Chloride (ANCEF) IVPB solution 3 g        Ordering Provider: Rolan Enriquez PA    3 g  200 mL/hr over 30 Minutes Intravenous Once 03/21/23 0549 03/21/23 0709          Other Medications:  Current Facility-Administered Medications   Medication Dose Route Frequency Provider Last Rate Last Admin   • acetaminophen (TYLENOL) tablet 650 mg  650 mg Oral Q8H Low Francois PA-C   650 mg at 03/28/23 1537   • amiodarone (PACERONE) tablet 200 mg  200 mg  Oral Q12H Kemar Walsh MD   200 mg at 03/28/23 0846   • aspirin EC tablet 325 mg  325 mg Oral Daily Low Francois PA-C   325 mg at 03/28/23 0846   • atorvastatin (LIPITOR) tablet 40 mg  40 mg Oral Nightly Low Francois PA-C   40 mg at 03/27/23 2024   • sennosides-docusate (PERICOLACE) 8.6-50 MG per tablet 2 tablet  2 tablet Oral BID Low Francois PA-C   2 tablet at 03/28/23 0846    And   • polyethylene glycol (MIRALAX) packet 17 g  17 g Oral Daily PRN Low Francois PA-C   17 g at 03/26/23 1318    And   • bisacodyl (DULCOLAX) EC tablet 5 mg  5 mg Oral Daily PRN Low Francois PA-C        And   • bisacodyl (DULCOLAX) suppository 10 mg  10 mg Rectal Daily PRN Low Francois PA-C       • bumetanide (BUMEX) tablet 2 mg  2 mg Oral Daily Markus Riddle MD   2 mg at 03/28/23 0846   • Calcium Replacement - Follow Nurse / BPA Driven Protocol   Does not apply PRN Low Francois PA-C       • castor oil-balsam peru (VENELEX) ointment 1 application  1 application Topical Q12H Low Francois PA-C   1 application at 03/28/23 0845   • clopidogrel (PLAVIX) tablet 75 mg  75 mg Oral Daily Corin Dean APRN   75 mg at 03/28/23 0846   • dextrose (D50W) (25 g/50 mL) IV injection 10-50 mL  10-50 mL Intravenous Q15 Min PRN Low Francois PA-C       • dextrose (GLUTOSE) oral gel 15 g  15 g Oral Q15 Min PRN Low Francois PA-C       • gabapentin (NEURONTIN) capsule 400 mg  400 mg Oral TID Low Francois PA-C   400 mg at 03/28/23 1537   • glucagon (GLUCAGEN) injection 1 mg  1 mg Intramuscular Q15 Min PRN Low Francois PA-C       • insulin detemir (LEVEMIR) injection 15 Units  15 Units Subcutaneous Q12H Low Francois PA-C   15 Units at 03/28/23 0845   • Insulin Lispro (humaLOG) injection 0-24 Units  0-24 Units Subcutaneous 4x Daily With Meals & Nightly Low Francois PA-C   4 Units at 03/28/23 1205   • Insulin Lispro (humaLOG) injection 5 Units  5 Units Subcutaneous TID  "With Meals Low Francois PA-C   5 Units at 03/28/23 1204   • ipratropium-albuterol (DUO-NEB) nebulizer solution 3 mL  3 mL Nebulization Q6H PRN Low Francois PA-C   3 mL at 03/23/23 0533   • lisinopril (PRINIVIL,ZESTRIL) tablet 20 mg  20 mg Oral Q24H Markus Riddle MD   20 mg at 03/28/23 1204   • Magnesium Standard Dose Replacement - Follow Nurse / BPA Driven Protocol   Does not apply PRN Low Francois PA-C       • metoclopramide (REGLAN) tablet 10 mg  10 mg Oral TID AC Mirian Hurley PA-C       • nebivolol (BYSTOLIC) tablet 5 mg  5 mg Oral Q24H Low Francois PA-C   5 mg at 03/28/23 0845   • ondansetron (ZOFRAN) injection 4 mg  4 mg Intravenous Q6H PRN Low Francois PA-C   4 mg at 03/22/23 1810   • oxymetazoline (AFRIN) nasal spray 2 spray  2 spray Each Nare BID Mirian Hurley PA-C   2 spray at 03/28/23 0847   • Pharmacy Consult - MTM   Does not apply Daily Low Francois PA-C       • Phosphorus Replacement - Follow Nurse / BPA Driven Protocol   Does not apply PRN Low Francois PA-C       • Potassium Replacement - Follow Nurse / BPA Driven Protocol   Does not apply PRN Low Francois PA-C       • simethicone (MYLICON) chewable tablet 80 mg  80 mg Oral 4x Daily PRN Low Francois PA-C   80 mg at 03/22/23 1810   • tamsulosin (FLOMAX) 24 hr capsule 0.4 mg  0.4 mg Oral Daily Low Francois PA-C   0.4 mg at 03/28/23 0846       Physical Exam:   Vital Signs   /71   Pulse 72   Temp 97.2 °F (36.2 °C) (Oral)   Resp 20   Ht 185.4 cm (73\")   Wt (!) 148 kg (326 lb)   SpO2 96%   BMI 43.01 kg/m²     GENERAL: Awake and alert, sitting up  HEENT: Normocephalic, atraumatic.  EOMI. No conjunctival injection. No icterus. Oropharynx clear without evidence of thrush or exudate.   NECK: Supple without nuchal rigidity.   HEART: RRR; No murmur.  LUNGS: Wheezes bilaterally.  No cough appreciated  Chest wall: Sternotomy incision with erythema but no " dehiscence  ABDOMEN: obese. Soft, nontender   EXT:  No edema.   : Without Ken catheter.  SKIN: No rash.  Vein graft site healing well.    NEURO: Oriented to PPT. No focal deficits on motor/sensory exam at arms/legs.  PSYCHIATRIC: Normal insight and judgement. Cooperative with PE    Laboratory Data  Lab Results   Component Value Date    WBC 11.86 (H) 03/28/2023    HGB 9.8 (L) 03/28/2023    HCT 31.4 (L) 03/28/2023    MCV 85.3 03/28/2023     (H) 03/28/2023     Lab Results   Component Value Date    GLUCOSE 195 (H) 03/28/2023    CALCIUM 9.0 03/28/2023     03/28/2023    K 4.3 03/28/2023    CO2 28.0 03/28/2023    CL 97 (L) 03/28/2023    BUN 43 (H) 03/28/2023    CREATININE 1.11 03/28/2023     Estimated Creatinine Clearance: 105.8 mL/min (by C-G formula based on SCr of 1.11 mg/dL).  Lab Results   Component Value Date    ALT 7 03/26/2023    AST 35 03/26/2023    ALKPHOS 105 03/26/2023    BILITOT 0.9 03/26/2023     No results found for: CRP  No results found for: SEDRATE    The above labs were reviewed.     Microbiology:  Microbiology Results (last 10 days)     ** No results found for the last 240 hours. **          Radiology:  XR Chest 1 View    Result Date: 3/28/2023  Impression: Improved aeration and decreased atelectasis in the left lung base. No definite pneumothorax. Electronically Signed: Jung Salazar  3/28/2023 8:39 AM EDT  Workstation ID: [id number]    I personally reviewed the above CXR: no focal infiltrate    3/28 QTc 447 ms    Assessment     1. Leukocytosis   2. Sternotomy incision infection, cellulitis: No dehiscence or drainage yet but definitely erythema  3. Productive cough, bronchitis: Possible developing pneumonia but no clear infiltrate on CXR. procal normal  4. CAD s/p CABG on 3/21/23  5. Atrial fibrillation on amiodarone  6. Obesity  7. DM2    PLAN:   - Respiratory culture if able to produce a good specimen  - MRSA nares screening  - Urine strep and Legionella antigens    - Start PO  linezolid 600 mg BID  - Start azithromycin 500 mg daily for a few days.  Follow EKG to monitor for QT prolongation  - probiotic    I will follow     Ruben Garcia MD  3/28/2023

## 2023-03-28 NOTE — PROGRESS NOTES
"  [facility]  PROGRESS NOTE    Date of Admission: 3/21/2023  Date of Service: 03/28/23    Primary Care Physician: Jose M Simon MD    Chief Complaint: f/u HTN, HLD  Problem List:   Multivessel CAD with remote ptca stent, now with stable anginal equivelent (dyspnea) (HCC)    Insulin resistant Diabetes mellitus (HCC)    Sleep apnea    S/P CABG x 3 on 3/21/2023    Hypertension    Hyperlipidemia    EMANUEL (HCC)      Subjective      HPI: Ambulatory this morning, still reporting productive cough white count slightly elevated      Objective   Vitals: /84   Pulse 71   Temp 97.7 °F (36.5 °C) (Oral)   Resp 20   Ht 185.4 cm (73\")   Wt (!) 148 kg (326 lb)   SpO2 95%   BMI 43.01 kg/m²     Physical Exam:  General Appearance:   · well developed  · well nourished  Neck:  · thyroid not enlarged  · supple  Respiratory:  · no respiratory distress  · normal breath sounds  · no rales  Cardiovascular:  · no jugular venous distention  · regular rhythm  · apical impulse normal  · S1 normal, S2 normal  · no S3, no S4   · no murmur  · no rub, no thrill  · carotid pulses normal; no bruit  · pedal pulses normal  · lower extremity edema: 1-2+  Skin:   warm, dry      Results:  Results from last 7 days   Lab Units 03/28/23  0741 03/26/23  1130 03/25/23  0350   WBC 10*3/mm3 11.86* 9.95 11.22*   HEMOGLOBIN g/dL 9.8* 8.7* 9.5*   HEMATOCRIT % 31.4* 27.1* 30.0*   PLATELETS 10*3/mm3 595* 391 320     Results from last 7 days   Lab Units 03/28/23  0741 03/27/23  1110 03/26/23  1130   SODIUM mmol/L 138 136 134*   POTASSIUM mmol/L 4.3 4.6 4.3   CHLORIDE mmol/L 97* 95* 96*   CO2 mmol/L 28.0 28.0 26.0   BUN mg/dL 43* 54* 50*   CREATININE mg/dL 1.11 1.23 1.12   GLUCOSE mg/dL 195* 226* 175*      Lab Results   Component Value Date    CHOL 71 03/22/2023    TRIG 185 (H) 03/22/2023    HDL 18 (L) 03/22/2023    LDL 23 03/22/2023    AST 35 03/26/2023    ALT 7 03/26/2023         Results from last 7 days   Lab Units " 03/22/23  0218   CHOLESTEROL mg/dL 71   TRIGLYCERIDES mg/dL 185*   HDL CHOL mg/dL 18*   LDL CHOL mg/dL 23       Results from last 7 days   Lab Units 03/22/23  0218 03/21/23  1137   PROTIME Seconds 14.9* 16.1*   INR  1.17* 1.29*   APTT seconds  --  32.9       Intake/Output Summary (Last 24 hours) at 3/28/2023 0853  Last data filed at 3/28/2023 0308  Gross per 24 hour   Intake 1090 ml   Output 1500 ml   Net -410 ml     I personally reviewed the patient's EKG/Telemetry data    Radiology Data:   CXR 3/28/23:  IMPRESSION:  Impression:  Improved aeration and decreased atelectasis in the left lung base. No definite pneumothorax.    Current Medications:  acetaminophen, 650 mg, Oral, Q8H  amiodarone, 200 mg, Oral, Q12H  aspirin, 325 mg, Oral, Daily  atorvastatin, 40 mg, Oral, Nightly  bumetanide, 2 mg, Oral, Daily  castor oil-balsam peru, 1 application, Topical, Q12H  clopidogrel, 75 mg, Oral, Daily  gabapentin, 400 mg, Oral, TID  insulin detemir, 15 Units, Subcutaneous, Q12H  insulin lispro, 0-24 Units, Subcutaneous, 4x Daily With Meals & Nightly  Insulin Lispro, 5 Units, Subcutaneous, TID With Meals  metoclopramide, 10 mg, Oral, TID AC  nebivolol, 5 mg, Oral, Q24H  oxymetazoline, 2 spray, Each Nare, BID  pharmacy consult - [facility], , Does not apply, Daily  senna-docusate sodium, 2 tablet, Oral, BID  tamsulosin, 0.4 mg, Oral, Daily           Assessment and Plan:     1. MVCAD   - LHC 3/16 Dr. Boyle with MVCAD, normal EF pre-op   - CABG x3 with Dr. Emanuel 3/21  Continue aspirin, atorvastatin and low-dose beta-blocker  Continue Bumex 2 mg p.o. daily    Slight elevation white count today, plans per CT surgery     2. Post-op Afib  - onset 3/25, brief  - started on Amiodarone protocol and maintaining SR  Amiodarone 200 mg twice daily for 5 days then 200 mg daily  - EKG today reviewed, stable   Anticoagulation not necessary due to brief episode     3. Hypertension   - slightly above target, resume lisinopril 20 mg daily (half home  dose)  - Nebivolol added over the weekend   Continue close monitoring     4. Hyperlipidemia  -Continue atorvastatin 40 mg, which is double home dose  -Excellent lipids 3/22, LDL 23     5. DM2  - A1C 7.7%  - per hospitalists     6. EMANUEL   - resolved, max Cr 2.7, returned to normal at 1.1 today   Renal following          Electronically signed by Corinna Savage PA-C, 03/28/23, 8:55 AM EDT.  I have seen and examined the patient, performing a face-to-face diagnostic evaluation with plan of care reviewed and developed with the Advanced Practice Clinician and nursing staff. I have addended and modified the above history of present illness, physical examination, and assessment and plan to reflect my findings and impressions. All medical decision making performed by Dr. Riddle.    Markus Riddle MD, [facility], [facility]  03/28/23

## 2023-03-28 NOTE — PROGRESS NOTES
"   LOS: 7 days    Patient Care Team:  Jose M Simon MD as PCP - General (Geriatric Medicine)    Chief Complaint:   CABG x3 on 3/21/2023  History of diabetes, hypertension, hyperlipidemia, sleep apnea, admission creatinine 1.2, patient has shock received IV pressors, creatinine 2.49, along with oliguria, on day of examination  Subjective F/U EMANUEL ON CKD    Interval History:     Review of Systems:   Mild chest discomfort at time, edema.  Denies any nausea, vomiting, chest pain, shortness of breath.  All other systems negative      Objective     Vital Sign Min/Max for last 24 hours  Temp  Min: 97.2 °F (36.2 °C)  Max: 98.7 °F (37.1 °C)   BP  Min: 132/72  Max: 153/71   Pulse  Min: 66  Max: 79   Resp  Min: 18  Max: 20   SpO2  Min: 94 %  Max: 100 %   Flow (L/min)  Min: 1  Max: 1.5   Weight  Min: 148 kg (326 lb)  Max: 148 kg (326 lb)     Flowsheet Rows    Flowsheet Row First Filed Value   Admission Height 185.4 cm (73\") Documented at 03/21/2023 0615   Admission Weight 142 kg (314 lb) Documented at 03/21/2023 0615          I/O this shift:  In: 360 [P.O.:360]  Out: -   I/O last 3 completed shifts:  In: 1365 [P.O.:1365]  Out: 1600 [Urine:1600]    Physical Exam:  General Appearance: Morbidly obese  male awake, alert, oriented x4.  Laying comfortable in chair.  Eyes: PER, EOMI.  Neck: Supple no JVD.  Right IJ in place  Chest wall: Incision slightly slightly inflamed  Lungs: Clear auscultation, no rales rhonchi's, equal chest movement, nonlabored.  Heart: Rub improved, RRR.  Abdomen: Soft, nontender, positive bowel sounds, no organomegaly.  Extremities: 1+ edema left lower extremity, 1+ edema on the right lower extremity, no cyanosis.  Neuro: No focal deficit, moving all extremities, alert oriented X 3  : No Ken catheter  Skin: Warm and dry.  WBC WBC   Date Value Ref Range Status   03/28/2023 11.86 (H) 3.40 - 10.80 10*3/mm3 Final   03/26/2023 9.95 3.40 - 10.80 10*3/mm3 Final      HGB Hemoglobin   Date Value Ref " Range Status   03/28/2023 9.8 (L) 13.0 - 17.7 g/dL Final   03/26/2023 8.7 (L) 13.0 - 17.7 g/dL Final      HCT Hematocrit   Date Value Ref Range Status   03/28/2023 31.4 (L) 37.5 - 51.0 % Final   03/26/2023 27.1 (L) 37.5 - 51.0 % Final      Platlets No results found for: LABPLAT   MCV MCV   Date Value Ref Range Status   03/28/2023 85.3 79.0 - 97.0 fL Final   03/26/2023 82.9 79.0 - 97.0 fL Final          Sodium Sodium   Date Value Ref Range Status   03/28/2023 138 136 - 145 mmol/L Final   03/27/2023 136 136 - 145 mmol/L Final   03/26/2023 134 (L) 136 - 145 mmol/L Final      Potassium Potassium   Date Value Ref Range Status   03/28/2023 4.3 3.5 - 5.2 mmol/L Final   03/27/2023 4.6 3.5 - 5.2 mmol/L Final   03/26/2023 4.3 3.5 - 5.2 mmol/L Final      Chloride Chloride   Date Value Ref Range Status   03/28/2023 97 (L) 98 - 107 mmol/L Final   03/27/2023 95 (L) 98 - 107 mmol/L Final   03/26/2023 96 (L) 98 - 107 mmol/L Final      CO2 CO2   Date Value Ref Range Status   03/28/2023 28.0 22.0 - 29.0 mmol/L Final   03/27/2023 28.0 22.0 - 29.0 mmol/L Final   03/26/2023 26.0 22.0 - 29.0 mmol/L Final      BUN BUN   Date Value Ref Range Status   03/28/2023 43 (H) 8 - 23 mg/dL Final   03/27/2023 54 (H) 8 - 23 mg/dL Final   03/26/2023 50 (H) 8 - 23 mg/dL Final      Creatinine Creatinine   Date Value Ref Range Status   03/28/2023 1.11 0.76 - 1.27 mg/dL Final   03/27/2023 1.23 0.76 - 1.27 mg/dL Final   03/26/2023 1.12 0.76 - 1.27 mg/dL Final      Calcium Calcium   Date Value Ref Range Status   03/28/2023 9.0 8.6 - 10.5 mg/dL Final   03/27/2023 8.8 8.6 - 10.5 mg/dL Final   03/26/2023 8.5 (L) 8.6 - 10.5 mg/dL Final      PO4 No results found for: CAPO4   Albumin Albumin   Date Value Ref Range Status   03/26/2023 3.5 3.5 - 5.2 g/dL Final      Magnesium Magnesium   Date Value Ref Range Status   03/26/2023 2.2 1.6 - 2.4 mg/dL Final      Uric Acid No results found for: URICACID        Results Review:     I reviewed the patient's new clinical  results.  I reviewed the patient's new imaging results and agree with the interpretation.    acetaminophen, 650 mg, Oral, Q8H  amiodarone, 200 mg, Oral, Q12H  aspirin, 325 mg, Oral, Daily  atorvastatin, 40 mg, Oral, Nightly  bumetanide, 2 mg, Oral, Daily  castor oil-balsam peru, 1 application, Topical, Q12H  clopidogrel, 75 mg, Oral, Daily  gabapentin, 400 mg, Oral, TID  insulin detemir, 15 Units, Subcutaneous, Q12H  insulin lispro, 0-24 Units, Subcutaneous, 4x Daily With Meals & Nightly  Insulin Lispro, 5 Units, Subcutaneous, TID With Meals  lisinopril, 20 mg, Oral, Q24H  metoclopramide, 10 mg, Oral, TID AC  nebivolol, 5 mg, Oral, Q24H  oxymetazoline, 2 spray, Each Nare, BID  pharmacy consult - [facility], , Does not apply, Daily  senna-docusate sodium, 2 tablet, Oral, BID  tamsulosin, 0.4 mg, Oral, Daily           Medication Review: Reviewed    Assessment & Plan       Multivessel CAD with remote ptca stent, now with stable anginal equivelent (dyspnea) ([facility])    Insulin resistant Diabetes mellitus ([facility])    Sleep apnea    S/P CABG x 3 on 3/21/2023    Hypertension    Hyperlipidemia    EMANUEL ([facility])       1.  EMANUEL on CKD, baseline creatinine 1.2, patient is postop CABG, also had hypotensive episode postsurgery requiring IV Levophed.  Patient has decreased urine output with increasing creatinine most likely ATN.  Improvement in renal function noted. RESOLVING  2.  S/p CABG x3.  3.  Shock: Improved at this time off the Levophed.  4.  History of diabetes without significant complications.  5.  History of hypertension.  6.  Leukocytosis: Improving  7.  Mild hyponatremia.RESOLVED    Recommendation:  Hyponatremia sodium improved, change fluid restriction 1500 mL.  Renal function has improved creatinine 1.1.  Bumex 2 mg every 12 will be started for edema.  Renal function slowly improving at this time.  Monitor GFR/electrolytes WITH DIURETICS. replace as needed.  Dick Lagos MD  03/28/23  13:58 EDT

## 2023-03-28 NOTE — PROGRESS NOTES
[facility] Cardiothoracic Surgery In-Patient Progress Note    POD # 7 s/p CABG x3     LOS: 7 days       Subjective  Sitting up in chair. Denies chest pain, endorses mild dyspnea. On 1L NC saturations 95%. Having productive cough.       Objective  Vital Signs  Temp:  [97.7 °F (36.5 °C)-98.7 °F (37.1 °C)] 97.7 °F (36.5 °C)  Heart Rate:  [66-79] 79  Resp:  [18-20] 20  BP: (125-153)/(65-86) 134/68      Physical Exam:   General Appearance: alert, appears stated age and  cooperative   Lungs: Bibasilar rhonci   Heart: normal S1, S2, no murmur, no gallop, no rub and no click    Skin: [facility] site c/d/i   Ext: Trace edema present bilaterally   Sternum: Stable, staples present every other. Erythema present around incision. See image below.            Results     Results from last 7 days   Lab Units 03/28/23  0741   WBC 10*3/mm3 11.86*   HEMOGLOBIN g/dL 9.8*   HEMATOCRIT % 31.4*   PLATELETS 10*3/mm3 595*     Results from last 7 days   Lab Units 03/28/23  0741   SODIUM mmol/L 138   POTASSIUM mmol/L 4.3   CHLORIDE mmol/L 97*   CO2 mmol/L 28.0   BUN mg/dL 43*   CREATININE mg/dL 1.11   GLUCOSE mg/dL 195*   CALCIUM mg/dL 9.0     Assessment  POD # 7 s/p CABG x3    Plan   Wean oxygen as tolerated  ASA/Statin/BB/Plavix  Diuresis   EMANUEL/Hyponatremia per renal  CXR concerning for pneumonia, aggressive pulmonary toilet and would benefit from oral abx given increase in WBC count this AM    Corin Dean, APRN  03/28/23  08:35 EDT

## 2023-03-29 ENCOUNTER — APPOINTMENT (OUTPATIENT)
Dept: GENERAL RADIOLOGY | Facility: HOSPITAL | Age: 63
DRG: 236 | End: 2023-03-29
Payer: MEDICARE

## 2023-03-29 LAB
ALBUMIN SERPL-MCNC: 3.5 G/DL (ref 3.5–5.2)
ANION GAP SERPL CALCULATED.3IONS-SCNC: 14 MMOL/L (ref 5–15)
BACTERIA SPEC RESP CULT: NORMAL
BUN SERPL-MCNC: 36 MG/DL (ref 8–23)
BUN/CREAT SERPL: 31.9 (ref 7–25)
CALCIUM SPEC-SCNC: 8.8 MG/DL (ref 8.6–10.5)
CHLORIDE SERPL-SCNC: 98 MMOL/L (ref 98–107)
CO2 SERPL-SCNC: 27 MMOL/L (ref 22–29)
CREAT SERPL-MCNC: 1.13 MG/DL (ref 0.76–1.27)
CRP SERPL-MCNC: 9.2 MG/DL (ref 0–0.5)
DEPRECATED RDW RBC AUTO: 53.9 FL (ref 37–54)
EGFRCR SERPLBLD CKD-EPI 2021: 73.9 ML/MIN/1.73
ERYTHROCYTE [DISTWIDTH] IN BLOOD BY AUTOMATED COUNT: 17.3 % (ref 12.3–15.4)
GLUCOSE BLDC GLUCOMTR-MCNC: 195 MG/DL (ref 70–130)
GLUCOSE BLDC GLUCOMTR-MCNC: 198 MG/DL (ref 70–130)
GLUCOSE BLDC GLUCOMTR-MCNC: 201 MG/DL (ref 70–130)
GLUCOSE BLDC GLUCOMTR-MCNC: 205 MG/DL (ref 70–130)
GLUCOSE SERPL-MCNC: 198 MG/DL (ref 65–99)
GRAM STN SPEC: NORMAL
HCT VFR BLD AUTO: 30.3 % (ref 37.5–51)
HGB BLD-MCNC: 9.4 G/DL (ref 13–17.7)
MCH RBC QN AUTO: 26.6 PG (ref 26.6–33)
MCHC RBC AUTO-ENTMCNC: 31 G/DL (ref 31.5–35.7)
MCV RBC AUTO: 85.6 FL (ref 79–97)
MRSA DNA SPEC QL NAA+PROBE: NEGATIVE
PHOSPHATE SERPL-MCNC: 3 MG/DL (ref 2.5–4.5)
PLATELET # BLD AUTO: 597 10*3/MM3 (ref 140–450)
PMV BLD AUTO: 9.5 FL (ref 6–12)
POTASSIUM SERPL-SCNC: 4.7 MMOL/L (ref 3.5–5.2)
RBC # BLD AUTO: 3.54 10*6/MM3 (ref 4.14–5.8)
SODIUM SERPL-SCNC: 139 MMOL/L (ref 136–145)
WBC NRBC COR # BLD: 12.49 10*3/MM3 (ref 3.4–10.8)

## 2023-03-29 PROCEDURE — 63710000001 INSULIN LISPRO (HUMAN) PER 5 UNITS: Performed by: PHYSICIAN ASSISTANT

## 2023-03-29 PROCEDURE — 86140 C-REACTIVE PROTEIN: CPT | Performed by: INTERNAL MEDICINE

## 2023-03-29 PROCEDURE — 63710000001 INSULIN DETEMIR PER 5 UNITS: Performed by: PHYSICIAN ASSISTANT

## 2023-03-29 PROCEDURE — 99024 POSTOP FOLLOW-UP VISIT: CPT | Performed by: THORACIC SURGERY (CARDIOTHORACIC VASCULAR SURGERY)

## 2023-03-29 PROCEDURE — 97110 THERAPEUTIC EXERCISES: CPT

## 2023-03-29 PROCEDURE — 87205 SMEAR GRAM STAIN: CPT | Performed by: INTERNAL MEDICINE

## 2023-03-29 PROCEDURE — 85027 COMPLETE CBC AUTOMATED: CPT

## 2023-03-29 PROCEDURE — 82962 GLUCOSE BLOOD TEST: CPT

## 2023-03-29 PROCEDURE — 99232 SBSQ HOSP IP/OBS MODERATE 35: CPT | Performed by: PHYSICIAN ASSISTANT

## 2023-03-29 PROCEDURE — 97116 GAIT TRAINING THERAPY: CPT

## 2023-03-29 PROCEDURE — 71045 X-RAY EXAM CHEST 1 VIEW: CPT

## 2023-03-29 PROCEDURE — 97166 OT EVAL MOD COMPLEX 45 MIN: CPT

## 2023-03-29 PROCEDURE — 80069 RENAL FUNCTION PANEL: CPT | Performed by: INTERNAL MEDICINE

## 2023-03-29 PROCEDURE — 99232 SBSQ HOSP IP/OBS MODERATE 35: CPT | Performed by: INTERNAL MEDICINE

## 2023-03-29 RX ORDER — INSULIN LISPRO 100 [IU]/ML
0-7 INJECTION, SOLUTION INTRAVENOUS; SUBCUTANEOUS
Status: DISCONTINUED | OUTPATIENT
Start: 2023-03-29 | End: 2023-03-30 | Stop reason: HOSPADM

## 2023-03-29 RX ORDER — AMLODIPINE BESYLATE 5 MG/1
5 TABLET ORAL
Status: DISCONTINUED | OUTPATIENT
Start: 2023-03-29 | End: 2023-03-30 | Stop reason: HOSPADM

## 2023-03-29 RX ORDER — DEXTROSE MONOHYDRATE 25 G/50ML
25 INJECTION, SOLUTION INTRAVENOUS
Status: DISCONTINUED | OUTPATIENT
Start: 2023-03-29 | End: 2023-03-30 | Stop reason: HOSPADM

## 2023-03-29 RX ORDER — LISINOPRIL 20 MG/1
40 TABLET ORAL
Status: DISCONTINUED | OUTPATIENT
Start: 2023-03-29 | End: 2023-03-30 | Stop reason: HOSPADM

## 2023-03-29 RX ORDER — NICOTINE POLACRILEX 4 MG
15 LOZENGE BUCCAL
Status: DISCONTINUED | OUTPATIENT
Start: 2023-03-29 | End: 2023-03-30 | Stop reason: HOSPADM

## 2023-03-29 RX ORDER — BUMETANIDE 0.25 MG/ML
2 INJECTION INTRAMUSCULAR; INTRAVENOUS ONCE
Status: COMPLETED | OUTPATIENT
Start: 2023-03-29 | End: 2023-03-29

## 2023-03-29 RX ORDER — IBUPROFEN 600 MG/1
1 TABLET ORAL
Status: DISCONTINUED | OUTPATIENT
Start: 2023-03-29 | End: 2023-03-30 | Stop reason: HOSPADM

## 2023-03-29 RX ADMIN — SENNOSIDES AND DOCUSATE SODIUM 2 TABLET: 8.6; 5 TABLET ORAL at 21:10

## 2023-03-29 RX ADMIN — LISINOPRIL 40 MG: 20 TABLET ORAL at 09:00

## 2023-03-29 RX ADMIN — ACETAMINOPHEN 325MG 650 MG: 325 TABLET ORAL at 21:10

## 2023-03-29 RX ADMIN — ACETAMINOPHEN 325MG 650 MG: 325 TABLET ORAL at 15:44

## 2023-03-29 RX ADMIN — BUMETANIDE 2 MG: 0.25 INJECTION, SOLUTION INTRAMUSCULAR; INTRAVENOUS at 15:44

## 2023-03-29 RX ADMIN — AMIODARONE HYDROCHLORIDE 200 MG: 200 TABLET ORAL at 21:10

## 2023-03-29 RX ADMIN — TAMSULOSIN HYDROCHLORIDE 0.4 MG: 0.4 CAPSULE ORAL at 09:00

## 2023-03-29 RX ADMIN — SENNOSIDES AND DOCUSATE SODIUM 2 TABLET: 8.6; 5 TABLET ORAL at 09:00

## 2023-03-29 RX ADMIN — INSULIN LISPRO 3 UNITS: 100 INJECTION, SOLUTION INTRAVENOUS; SUBCUTANEOUS at 17:32

## 2023-03-29 RX ADMIN — Medication 1 CAPSULE: at 09:00

## 2023-03-29 RX ADMIN — AMIODARONE HYDROCHLORIDE 200 MG: 200 TABLET ORAL at 09:00

## 2023-03-29 RX ADMIN — CLOPIDOGREL BISULFATE 75 MG: 75 TABLET ORAL at 09:00

## 2023-03-29 RX ADMIN — OXYMETAZOLINE HCL 2 SPRAY: 0.05 SPRAY NASAL at 09:08

## 2023-03-29 RX ADMIN — INSULIN LISPRO 3 UNITS: 100 INJECTION, SOLUTION INTRAVENOUS; SUBCUTANEOUS at 08:59

## 2023-03-29 RX ADMIN — INSULIN LISPRO 5 UNITS: 100 INJECTION, SOLUTION INTRAVENOUS; SUBCUTANEOUS at 17:33

## 2023-03-29 RX ADMIN — METOCLOPRAMIDE 10 MG: 10 TABLET ORAL at 12:42

## 2023-03-29 RX ADMIN — INSULIN DETEMIR 18 UNITS: 100 INJECTION, SOLUTION SUBCUTANEOUS at 08:59

## 2023-03-29 RX ADMIN — GABAPENTIN 400 MG: 400 CAPSULE ORAL at 21:10

## 2023-03-29 RX ADMIN — BUMETANIDE 2 MG: 2 TABLET ORAL at 09:00

## 2023-03-29 RX ADMIN — OXYMETAZOLINE HCL 2 SPRAY: 0.05 SPRAY NASAL at 21:18

## 2023-03-29 RX ADMIN — INSULIN DETEMIR 18 UNITS: 100 INJECTION, SOLUTION SUBCUTANEOUS at 21:17

## 2023-03-29 RX ADMIN — METOCLOPRAMIDE 10 MG: 10 TABLET ORAL at 09:00

## 2023-03-29 RX ADMIN — ACETAMINOPHEN 325MG 650 MG: 325 TABLET ORAL at 05:27

## 2023-03-29 RX ADMIN — BISACODYL 5 MG: 5 TABLET, COATED ORAL at 05:27

## 2023-03-29 RX ADMIN — CASTOR OIL AND BALSAM, PERU 1 APPLICATION: 788; 87 OINTMENT TOPICAL at 21:18

## 2023-03-29 RX ADMIN — ASPIRIN 325 MG: 325 TABLET, COATED ORAL at 09:00

## 2023-03-29 RX ADMIN — AZITHROMYCIN MONOHYDRATE 500 MG: 250 TABLET ORAL at 09:06

## 2023-03-29 RX ADMIN — AMLODIPINE BESYLATE 5 MG: 5 TABLET ORAL at 09:00

## 2023-03-29 RX ADMIN — INSULIN LISPRO 2 UNITS: 100 INJECTION, SOLUTION INTRAVENOUS; SUBCUTANEOUS at 12:42

## 2023-03-29 RX ADMIN — LINEZOLID 600 MG: 600 TABLET ORAL at 09:07

## 2023-03-29 RX ADMIN — INSULIN LISPRO 5 UNITS: 100 INJECTION, SOLUTION INTRAVENOUS; SUBCUTANEOUS at 12:42

## 2023-03-29 RX ADMIN — CASTOR OIL AND BALSAM, PERU 1 APPLICATION: 788; 87 OINTMENT TOPICAL at 09:06

## 2023-03-29 RX ADMIN — ATORVASTATIN CALCIUM 40 MG: 40 TABLET, FILM COATED ORAL at 21:10

## 2023-03-29 RX ADMIN — NEBIVOLOL 5 MG: 5 TABLET ORAL at 09:06

## 2023-03-29 RX ADMIN — LINEZOLID 600 MG: 600 TABLET ORAL at 21:18

## 2023-03-29 RX ADMIN — GABAPENTIN 400 MG: 400 CAPSULE ORAL at 09:00

## 2023-03-29 RX ADMIN — METOCLOPRAMIDE 10 MG: 10 TABLET ORAL at 17:32

## 2023-03-29 RX ADMIN — GABAPENTIN 400 MG: 400 CAPSULE ORAL at 15:44

## 2023-03-29 RX ADMIN — INSULIN LISPRO 5 UNITS: 100 INJECTION, SOLUTION INTRAVENOUS; SUBCUTANEOUS at 08:59

## 2023-03-29 NOTE — THERAPY TREATMENT NOTE
Patient Name: Wilbert Kelley  : 1960    MRN: [id number]                              Today's Date: 3/29/2023       Admit Date: 3/21/2023    Visit Dx:     ICD-10-CM ICD-9-CM   1. Coronary artery disease of native artery of native heart with stable angina pectoris (HCC)  I25.118 414.01     413.9     Patient Active Problem List   Diagnosis   • SOB (shortness of breath) on exertion   • Insulin resistant Diabetes mellitus (HCC)   • Sleep apnea   • Multivessel CAD with remote ptca stent, now with stable anginal equivelent (dyspnea) (Formerly [facility])   • S/P CABG x 3 on 3/21/2023   • Hypertension   • Hyperlipidemia   • EMANUEL (HCC)     Past Medical History:   Diagnosis Date   • Arthritis    • BPH (benign prostatic hyperplasia)    • Cancer (Formerly [facility])     basal and melanoma-  x2 - left side ear and elbow   • Constipation    • Coronary artery disease    • DDD (degenerative disc disease), lumbar    • Diabetes mellitus (HCC)     couple times month check sugar   • Frozen shoulder     left   • GERD (gastroesophageal reflux disease)    • Hyperlipidemia    • Hypertension    • Limited mobility     left shoulder  - possible frozen shoulder-= please be aware for surgery   • Sleep apnea     doesnt use machine   • Tinnitus    • Wears glasses     small print     Past Surgical History:   Procedure Laterality Date   • CARDIAC CATHETERIZATION     • CARDIAC CATHETERIZATION Right 2023    Procedure: Left Heart Cath;  Surgeon: Jarod Boyle MD;  Location:  [facility];  Service: Cardiovascular;  Laterality: Right;   • CATARACT EXTRACTION, BILATERAL Bilateral    • COLONOSCOPY     • CORONARY ARTERY BYPASS GRAFT N/A 3/21/2023    Procedure: MEDIAN STERNOTOMY, CORONARY ARTERY BYPASS GRAFTING X 3, UTILIZING THE LEFT INTERNAL MAMMARY ARTERY, ENDOSCOPIC VEIN HARVEST OF THE RIGHT GREATER SAPENOUSE VEIN;  Surgeon: Markus Emanuel MD;  Location: [facility] OR;  Service: Cardiothoracic;  Laterality: N/A;   • CORONARY STENT PLACEMENT      x4    • ENDOSCOPY     • FINGER SURGERY Left     middle finger - left side   • SKIN CANCER EXCISION      x2   • WISDOM TOOTH EXTRACTION        General Information     Row Name 03/29/23 0946          Physical Therapy Time and Intention    Document Type therapy note (daily note)  -ES     Mode of Treatment physical therapy  -ES     Row Name 03/29/23 0946          General Information    Patient Profile Reviewed yes  -ES     Existing Precautions/Restrictions cardiac;fall;oxygen therapy device and L/min;sternal  -ES     Barriers to Rehab medically complex;previous functional deficit  -ES     Row Name 03/29/23 0946          Cognition    Orientation Status (Cognition) oriented x 3  -ES     Row Name 03/29/23 0946          Safety Issues, Functional Mobility    Safety Issues Affecting Function (Mobility) awareness of need for assistance;insight into deficits/self-awareness;safety precaution awareness;safety precautions follow-through/compliance  -ES     Impairments Affecting Function (Mobility) balance;endurance/activity tolerance;shortness of breath;strength;pain  -ES     Comment, Safety Issues/Impairments (Mobility) Required frequent cueing for adherence to sternal precautions.  -ES           User Key  (r) = Recorded By, (t) = Taken By, (c) = Cosigned By    Initials Name Provider Type    ES Donna Agudelo PT Physical Therapist               Mobility     Row Name 03/29/23 0946          Bed Mobility    Comment, (Bed Mobility) UIC  -ES     Row Name 03/29/23 0946          Sit-Stand Transfer    Sit-Stand [place] (Transfers) minimum assist (75% patient effort);verbal cues  -ES     Assistive Device (Sit-Stand Transfers) walker, front-wheeled  -ES     Comment, (Sit-Stand Transfer) Frequent cueing for sternal precautions. Able to stand on first attempt but required rocking to gain momentum  -ES     Row Name 03/29/23 0946          Gait/Stairs (Locomotion)    [place] Level (Gait) minimum assist (75% patient effort);verbal cues   -ES     Assistive Device (Gait) walker, front-wheeled  -ES     Distance in Feet (Gait) 75+50+50+50  -ES     Deviations/Abnormal Patterns (Gait) base of support, wide;stride length decreased;weight shifting decreased  -ES     Bilateral Gait Deviations forward flexed posture;heel strike decreased  -ES     Comment, (Gait/Stairs) Pt ambulated multiple short bouts but required prolonged standing rest breaks due to c/o BLE pain and fatigue. Required frequent cueing for adherence to sternal precautions. No LOB.  -ES           User Key  (r) = Recorded By, (t) = Taken By, (c) = Cosigned By    Initials Name Provider Type    ES Donna Agudelo PT Physical Therapist               Obj/Interventions     Row Name 03/29/23 0950          Motor Skills    Therapeutic Exercise hip;knee;ankle  -ES     Row Name 03/29/23 0950          Hip (Therapeutic Exercise)    Hip (Therapeutic Exercise) isometric exercises;strengthening exercise  -ES     Hip Isometrics (Therapeutic Exercise) bilateral;gluteal sets;10 repetitions  -ES     Hip Strengthening (Therapeutic Exercise) bilateral;marching while seated;10 repetitions  -ES     Row Name 03/29/23 0950          Knee (Therapeutic Exercise)    Knee (Therapeutic Exercise) isometric exercises;strengthening exercise  -ES     Knee Isometrics (Therapeutic Exercise) bilateral;quad sets;10 repetitions  -ES     Knee Strengthening (Therapeutic Exercise) bilateral;LAQ (long arc quad);SAQ (short arc quad);marching while seated;10 repetitions  -ES     Row Name 03/29/23 0950          Ankle (Therapeutic Exercise)    Ankle (Therapeutic Exercise) strengthening exercise  -ES     Ankle Strengthening (Therapeutic Exercise) bilateral;dorsiflexion;plantarflexion;10 repetitions  -ES     Row Name 03/29/23 0927          Balance    Balance Assessment sitting static balance;sitting dynamic balance;sit to stand dynamic balance;standing static balance;standing dynamic balance  -ES     Static Sitting Balance supervision  -ES      Dynamic Sitting Balance contact guard;verbal cues  -ES     Position, Sitting Balance unsupported;sitting in chair  -ES     Static Standing Balance contact guard  -ES     Dynamic Standing Balance minimal assist;verbal cues  -ES     Position/Device Used, Standing Balance supported;walker, front-wheeled  -ES     Balance Interventions sitting;standing;sit to stand;supported;static;dynamic;occupation based/functional task  -ES     Comment, Balance No LOB but required increased assistance throughout mobility  -ES           User Key  (r) = Recorded By, (t) = Taken By, (c) = Cosigned By    Initials Name Provider Type    ES Donna Agudelo, PT Physical Therapist               Goals/Plan    No documentation.                Clinical Impression     Row Name 03/29/23 0952          Pain    Pretreatment Pain Rating 7/10  -ES     Posttreatment Pain Rating 8/10  -ES     Pain Location incisional  -ES     Pain Location - chest;other (see comments)  BLE pain  -ES     Pre/Posttreatment Pain Comment Continues to be limited heavily by c/o BLE sciatic pain  -ES     Pain Intervention(s) Repositioned;Ambulation/increased activity  -ES     Row Name 03/29/23 0952          Plan of Care Review    Plan of Care Reviewed With patient  -ES     Progress no change  -ES     Outcome Evaluation Pt continues to be limited heavily by BLE sciatic pain and incisional chest pain. Pt also continues to require constant cueing for adherence to sternal precautions with ~50% compliance. Pt required multiple rest breaks throughout mobility and was unable to safely attempt stair training. Will continue to progress as able. PT rec IRF at d/c.  -ES     Row Name 03/29/23 0952          Therapy Assessment/Plan (PT)    Rehab Potential (PT) good, to achieve stated therapy goals  -ES     Criteria for Skilled Interventions Met (PT) yes;meets criteria;skilled treatment is necessary  -ES     Therapy Frequency (PT) daily  -ES     Row Name 03/29/23 0952          Vital Signs     Pre Systolic BP Rehab --  VSS  -ES     O2 Delivery Pre Treatment room air  -ES     O2 Delivery Intra Treatment room air  -ES     O2 Delivery Post Treatment room air  -ES     Pre Patient Position Sitting  -ES     Intra Patient Position Standing  -ES     Post Patient Position Sitting  -ES     Row Name 03/29/23 0952          Positioning and Restraints    Pre-Treatment Position sitting in chair/recliner  -ES     Post Treatment Position chair  -ES     In Chair notified nsg;reclined;sitting;call light within reach;encouraged to call for assist;exit alarm on;waffle cushion  -ES           User Key  (r) = Recorded By, (t) = Taken By, (c) = Cosigned By    Initials Name Provider Type    Donna Fisher PT Physical Therapist               Outcome Measures     Row Name 03/29/23 0954          How much help from another person do you currently need...    Turning from your back to your side while in flat bed without using bedrails? 3  -ES     Moving from lying on back to sitting on the side of a flat bed without bedrails? 2  -ES     Moving to and from a bed to a chair (including a wheelchair)? 3  -ES     Standing up from a chair using your arms (e.g., wheelchair, bedside chair)? 3  -ES     Climbing 3-5 steps with a railing? 1  -ES     To walk in hospital room? 2  -ES     AM-PAC 6 Clicks Score (PT) 14  -ES     Highest level of mobility 4 --> Transferred to chair/commode  -ES     Row Name 03/29/23 0954          Functional Assessment    Outcome Measure Options AM-PAC 6 Clicks Basic Mobility (PT)  -ES           User Key  (r) = Recorded By, (t) = Taken By, (c) = Cosigned By    Initials Name Provider Type    Donna Fisher PT Physical Therapist                             Physical Therapy Education     Title: PT OT SLP Therapies (In Progress)     Topic: Physical Therapy (In Progress)     Point: Mobility training (In Progress)     Learning Progress Summary           Patient Acceptance, E, VU,NR by MOSES at 3/27/2023 1161     Comment: PT POC, sternal precautions    Acceptance, E,D, NR by LS at 3/25/2023 1610    Acceptance, E, NR by CM at 3/24/2023 1155    Acceptance, E, NR by CM at 3/23/2023 1043    Acceptance, E, NR by CM at 3/22/2023 1142   Significant Other Acceptance, E, NR by CM at 3/24/2023 1155                   Point: Home exercise program (Done)     Learning Progress Summary           Patient Acceptance, E, VU,NR by LO at 3/27/2023 1454    Comment: PT POC, sternal precautions                   Point: Body mechanics (In Progress)     Learning Progress Summary           Patient Acceptance, E, VU,NR by LO at 3/27/2023 1454    Comment: PT POC, sternal precautions    Acceptance, E,D, NR by LS at 3/25/2023 1610    Acceptance, E, NR by CM at 3/24/2023 1155    Acceptance, E, NR by CM at 3/23/2023 1043    Acceptance, E, NR by CM at 3/22/2023 1142   Significant Other Acceptance, E, NR by CM at 3/24/2023 1155                   Point: Precautions (In Progress)     Learning Progress Summary           Patient Acceptance, E, VU,NR by LO at 3/27/2023 1454    Comment: PT POC, sternal precautions    Acceptance, E,D, NR by LS at 3/25/2023 1610    Acceptance, E, NR by CM at 3/24/2023 1155    Acceptance, E, NR by CM at 3/23/2023 1043    Acceptance, E, NR by CM at 3/22/2023 1142   Significant Other Acceptance, E, NR by CM at 3/24/2023 1155                               User Key     Initials Effective Dates Name Provider Type Discipline     02/03/23 -  Kirti Morfin, PT Physical Therapist PT    LO 06/16/21 -  Kirti Garcia, PT Physical Therapist PT    CM 09/22/22 -  Anahy Deleon PT Physical Therapist PT              PT Recommendation and Plan     Plan of Care Reviewed With: patient  Progress: no change  Outcome Evaluation: Pt continues to be limited heavily by BLE sciatic pain and incisional chest pain. Pt also continues to require constant cueing for adherence to sternal precautions with ~50% compliance. Pt required multiple rest breaks  throughout mobility and was unable to safely attempt stair training. Will continue to progress as able. PT rec IRF at d/c.     Time Calculation:    PT Charges     Row Name 03/29/23 0954             Time Calculation    Start Time 0918  -ES      PT Received On 03/29/23  -ES      PT Goal Re-Cert Due Date 04/01/23  -ES         Time Calculation- PT    Total Timed Code Minutes- PT 25 minute(s)  -ES         Timed Charges    88599 - PT Therapeutic Exercise Minutes 10  -ES      83138 - Gait Training Minutes  15  -ES         Total Minutes    Timed Charges Total Minutes 25  -ES       Total Minutes 25  -ES            User Key  (r) = Recorded By, (t) = Taken By, (c) = Cosigned By    Initials Name Provider Type    Donna Fisher PT Physical Therapist              Therapy Charges for Today     Code Description Service Date Service Provider Modifiers Qty    98127812547 HC GAIT TRAINING EA 15 MIN 3/28/2023 Donna Agudelo, PT GP 1    39522377702 HC PT THERAPEUTIC ACT EA 15 MIN 3/28/2023 Donna Agudelo, PT GP 1    76132541908 HC PT THER PROC EA 15 MIN 3/29/2023 Donna Agudelo, PT GP 1    76228217599 HC GAIT TRAINING EA 15 MIN 3/29/2023 Donna Agudelo, PT GP 1          PT G-Codes  Outcome Measure Options: AM-PAC 6 Clicks Basic Mobility (PT)  AM-PAC 6 Clicks Score (PT): 14  PT Discharge Summary  Anticipated Discharge Disposition (PT): inpatient rehabilitation facility    Donna Agudelo PT  3/29/2023

## 2023-03-29 NOTE — PLAN OF CARE
Goal Outcome Evaluation:  Plan of Care Reviewed With: patient        Progress: improving  Outcome Evaluation: OT eval completed, Pt demonstrates deficits in strength, balance, and activity tolerance as well as cues required for safe sternal precaution management with progress today with education, recom IPOT DC IRF for best outcomes

## 2023-03-29 NOTE — THERAPY EVALUATION
Patient Name: Wilbert Kelley  : 1960    MRN: [id number]                              Today's Date: 3/29/2023       Admit Date: 3/21/2023    Visit Dx:     ICD-10-CM ICD-9-CM   1. Coronary artery disease of native artery of native heart with stable angina pectoris (HCC)  I25.118 414.01     413.9     Patient Active Problem List   Diagnosis   • SOB (shortness of breath) on exertion   • Insulin resistant Diabetes mellitus (HCC)   • Sleep apnea   • Multivessel CAD with remote ptca stent, now with stable anginal equivelent (dyspnea) ([facility])   • S/P CABG x 3 on 3/21/2023   • Hypertension   • Hyperlipidemia   • EMANUEL (HCC)     Past Medical History:   Diagnosis Date   • Arthritis    • BPH (benign prostatic hyperplasia)    • Cancer ([facility])     basal and melanoma-  x2 - left side ear and elbow   • Constipation    • Coronary artery disease    • DDD (degenerative disc disease), lumbar    • Diabetes mellitus (HCC)     couple times month check sugar   • Frozen shoulder     left   • GERD (gastroesophageal reflux disease)    • Hyperlipidemia    • Hypertension    • Limited mobility     left shoulder  - possible frozen shoulder-= please be aware for surgery   • Sleep apnea     doesnt use machine   • Tinnitus    • Wears glasses     small print     Past Surgical History:   Procedure Laterality Date   • CARDIAC CATHETERIZATION     • CARDIAC CATHETERIZATION Right 2023    Procedure: Left Heart Cath;  Surgeon: Jarod Boyle MD;  Location:  [facility];  Service: Cardiovascular;  Laterality: Right;   • CATARACT EXTRACTION, BILATERAL Bilateral    • COLONOSCOPY     • CORONARY ARTERY BYPASS GRAFT N/A 3/21/2023    Procedure: MEDIAN STERNOTOMY, CORONARY ARTERY BYPASS GRAFTING X 3, UTILIZING THE LEFT INTERNAL MAMMARY ARTERY, ENDOSCOPIC VEIN HARVEST OF THE RIGHT GREATER SAPENOUSE VEIN;  Surgeon: Markus Emanuel MD;  Location: [facility] OR;  Service: Cardiothoracic;  Laterality: N/A;   • CORONARY STENT PLACEMENT      x4    • ENDOSCOPY     • FINGER SURGERY Left     middle finger - left side   • SKIN CANCER EXCISION      x2   • WISDOM TOOTH EXTRACTION        General Information     Row Name 03/29/23 1144          OT Time and Intention    Document Type evaluation  -KF     Mode of Treatment occupational therapy  -     Row Name 03/29/23 1144          General Information    Patient Profile Reviewed yes  -KF     Prior Level of Function independent:;transfer;bed mobility;ADL's  -KF     Existing Precautions/Restrictions cardiac;fall;sternal  -KF     Barriers to Rehab medically complex;previous functional deficit  -KF     Row Name 03/29/23 1144          Occupational Profile    Environmental Supports and Barriers (Occupational Profile) no DME used at baseline, tub shower  -KF     Row Name 03/29/23 1144          Living Environment    People in Home spouse;child(rama), adult;grandchild(rama)  -     Row Name 03/29/23 1144          Home Main Entrance    Number of Stairs, Main Entrance twelve  per PT report has 12+4+3+2 steps to enter dwelling  -     Row Name 03/29/23 1144          Stairs Within Home, Primary    Stairs, Within Home, Primary can stay on main level once in home  -KF     Row Name 03/29/23 1144          Cognition    Orientation Status (Cognition) oriented x 3  -     Row Name 03/29/23 1144          Safety Issues, Functional Mobility    Safety Issues Affecting Function (Mobility) insight into deficits/self-awareness;awareness of need for assistance;safety precaution awareness;sequencing abilities  -KF     Impairments Affecting Function (Mobility) balance;endurance/activity tolerance;shortness of breath;strength;pain  -KF     Comment, Safety Issues/Impairments (Mobility) cues to maintain sternal precautions throughout for repositioning and transfers, did progress with transfers with cues and education  -KF           User Key  (r) = Recorded By, (t) = Taken By, (c) = Cosigned By    Initials Name Provider Type    KF Penny Goodwin  M, OT Occupational Therapist                 Mobility/ADL's     Row Name 03/29/23 1146          Bed Mobility    Comment, (Bed Mobility) UIC  -KF     Row Name 03/29/23 1146          Transfers    Transfers sit-stand transfer;stand-sit transfer  -KF     Comment, (Transfers) 2 STS reps with cues for sternal precautions and did well with rocking to get momentum without using UEs on chair or pushing to maintain precautions  -KF     Row Name 03/29/23 1146          Sit-Stand Transfer    Sit-Stand [place] (Transfers) contact guard  -KF     Assistive Device (Sit-Stand Transfers) walker, front-wheeled  -KF     Row Name 03/29/23 1146          Stand-Sit Transfer    Stand-Sit [place] (Transfers) contact guard  -KF     Assistive Device (Stand-Sit Transfers) walker, front-wheeled  -KF     Row Name 03/29/23 1146          Functional Mobility    Functional Mobility- Safety Issues step length decreased;weight-shifting ability decreased  -KF     Functional Mobility- Comment deferred to PT  -KF     Row Name 03/29/23 1146          Activities of Daily Living    BADL Assessment/Intervention lower body dressing  -     Row Name 03/29/23 1146          Lower Body Dressing Assessment/Training    [place] Level (Lower Body Dressing) don;socks;moderate assist (50% patient effort)  -KF     Position (Lower Body Dressing) unsupported sitting  -KF     Comment, (Lower Body Dressing) can assist in crossing LEs however limited 2/2 edema especially in RLE  -KF           User Key  (r) = Recorded By, (t) = Taken By, (c) = Cosigned By    Initials Name Provider Type    KF Penny Goodwin, OT Occupational Therapist               Obj/Interventions     Row Name 03/29/23 1148          Sensory Assessment (Somatosensory)    Sensory Assessment (Somatosensory) bilateral UE  -KF     Bilateral UE Sensory Assessment impaired;general sensation  -KF     Row Name 03/29/23 1148          Vision Assessment/Intervention    Visual Impairment/Limitations WFL   -KF     Row Name 03/29/23 1148          Range of Motion Comprehensive    General Range of Motion upper extremity range of motion deficits identified  -KF     Comment, General Range of Motion limited chronic LUE shoulder deficits reported, WFL RUE for ADL completion within sternal precautions  -KF     Row Name 03/29/23 1148          Strength Comprehensive (MMT)    Comment, General Manual Muscle Testing (MMT) Assessment B  5/5 deferred full MMT due to sternal precautions  -KF     Row Name 03/29/23 1148          Balance    Balance Assessment sitting static balance;sitting dynamic balance;standing static balance;standing dynamic balance  -KF     Static Sitting Balance supervision  -KF     Dynamic Sitting Balance standby assist  -KF     Position, Sitting Balance unsupported;sitting in chair  -KF     Static Standing Balance contact guard  -KF     Dynamic Standing Balance contact guard  -KF     Position/Device Used, Standing Balance walker, front-wheeled  -KF     Balance Interventions sit to stand;supported;weight shifting activity  -KF     Comment, Balance education on how to weightshift hips without use of UEs and good completion however requires cues throughout to maintain sternal precautions; no LOB overall with use of FWW with weightshifting within room  -KF           User Key  (r) = Recorded By, (t) = Taken By, (c) = Cosigned By    Initials Name Provider Type    KF Penny Goodwin, OT Occupational Therapist               Goals/Plan     Row Name 03/29/23 1153          Transfer Goal 1 (OT)    Activity/Assistive Device (Transfer Goal 1, OT) sit-to-stand/stand-to-sit;toilet;walker, rolling  -KF     [place] Level/Cues Needed (Transfer Goal 1, OT) contact guard required  -KF     Time Frame (Transfer Goal 1, OT) long term goal (LTG);10 days  -KF     Progress/Outcome (Transfer Goal 1, OT) goal ongoing;new goal  -KF     Row Name 03/29/23 1153          Dressing Goal 1 (OT)    Activity/Device (Dressing Goal 1, OT)  lower body dressing  reacher PRN  -KF     [place]/Cues Needed (Dressing Goal 1, OT) contact guard required  -KF     Time Frame (Dressing Goal 1, OT) long term goal (LTG);10 days  -KF     Progress/Outcome (Dressing Goal 1, OT) goal ongoing;new goal  -KF     Row Name 03/29/23 1153          Toileting Goal 1 (OT)    Activity/Device (Toileting Goal 1, OT) adjust/manage clothing;perform perineal hygiene;commode  AE PRN  -KF     [place] Level/Cues Needed (Toileting Goal 1, OT) contact guard required  -KF     Time Frame (Toileting Goal 1, OT) long term goal (LTG);10 days  -KF     Progress/Outcome (Toileting Goal 1, OT) goal ongoing;new goal  -KF     Row Name 03/29/23 1156          Therapy Assessment/Plan (OT)    Planned Therapy Interventions (OT) activity tolerance training;adaptive equipment training;BADL retraining;occupation/activity based interventions;transfer/mobility retraining;functional balance retraining;cognitive/visual perception retraining;ROM/therapeutic exercise;patient/caregiver education/training  -KF           User Key  (r) = Recorded By, (t) = Taken By, (c) = Cosigned By    Initials Name Provider Type    KF Penny Goodwin, OT Occupational Therapist               Clinical Impression     Row Name 03/29/23 1151          Pain Assessment    Pretreatment Pain Rating 6/10  -KF     Posttreatment Pain Rating 6/10  -KF     Pain Location - Side/Orientation Bilateral  -KF     Pain Location lower  -KF     Pain Location - extremity  -KF     Pre/Posttreatment Pain Comment reports shoulder discomfort and BLE pain due to chronic sciatic pain  -KF     Pain Intervention(s) Repositioned;Ambulation/increased activity  -KF     Row Name 03/29/23 1155          Plan of Care Review    Plan of Care Reviewed With patient  -KF     Progress improving  -KF     Outcome Evaluation OT eval completed, Pt demonstrates deficits in strength, balance, and activity tolerance as well as cues required for safe sternal precaution  management with progress today with education, recom IPOT DC IRF for best outcomes  -KF     Row Name 03/29/23 1151          Therapy Assessment/Plan (OT)    Rehab Potential (OT) good, to achieve stated therapy goals  -KF     Criteria for Skilled Therapeutic Interventions Met (OT) yes;meets criteria;skilled treatment is necessary  -KF     Therapy Frequency (OT) daily  -KF     Row Name 03/29/23 1151          Therapy Plan Review/Discharge Plan (OT)    Equipment Needs Upon Discharge (OT) walker, rolling  if home  -KF     Anticipated Discharge Disposition (OT) inpatient rehabilitation facility  -KF     Row Name 03/29/23 1151          Vital Signs    Pre Systolic BP Rehab 128  RN cleared VSS  -KF     Pre Treatment Diastolic BP 65  -KF     O2 Delivery Pre Treatment room air  -KF     Pre Patient Position Sitting  -KF     Intra Patient Position Standing  -KF     Post Patient Position Sitting  -KF     Rest Breaks  2  -KF     Row Name 03/29/23 1151          Positioning and Restraints    Pre-Treatment Position sitting in chair/recliner  -KF     Post Treatment Position chair  -KF     In Chair notified nsg;sitting;call light within reach;encouraged to call for assist;exit alarm on;waffle cushion  -KF           User Key  (r) = Recorded By, (t) = Taken By, (c) = Cosigned By    Initials Name Provider Type    KF Penny Goodwin, OT Occupational Therapist               Outcome Measures     Row Name 03/29/23 1154          How much help from another is currently needed...    Putting on and taking off regular lower body clothing? 2  -KF     Bathing (including washing, rinsing, and drying) 2  -KF     Toileting (which includes using toilet bed pan or urinal) 2  -KF     Putting on and taking off regular upper body clothing 3  -KF     Taking care of personal grooming (such as brushing teeth) 3  -KF     Eating meals 4  -KF     AM-PAC 6 Clicks Score (OT) 16  -KF     Row Name 03/29/23 0954 03/29/23 0859       How much help from another person  do you currently need...    Turning from your back to your side while in flat bed without using bedrails? 3  -ES 3  -TH    Moving from lying on back to sitting on the side of a flat bed without bedrails? 2  -ES 2  -TH    Moving to and from a bed to a chair (including a wheelchair)? 3  -ES 3  -TH    Standing up from a chair using your arms (e.g., wheelchair, bedside chair)? 3  -ES 3  -TH    Climbing 3-5 steps with a railing? 1  -ES 1  -TH    To walk in hospital room? 2  -ES 2  -TH    AM-PAC 6 Clicks Score (PT) 14  -ES 14  -TH    Highest level of mobility 4 --> Transferred to chair/commode  -ES 4 --> Transferred to chair/commode  -TH    Row Name 03/29/23 1154 03/29/23 0954       Functional Assessment    Outcome Measure Options AM-PAC 6 Clicks Daily Activity (OT)  -KF AM-PAC 6 Clicks Basic Mobility (PT)  -ES          User Key  (r) = Recorded By, (t) = Taken By, (c) = Cosigned By    Initials Name Provider Type     Penny Goodwin OT Occupational Therapist    Yojana Landaverde, RN Registered Nurse    Donna Fisher, PT Physical Therapist                Occupational Therapy Education     Title: PT OT SLP Therapies (In Progress)     Topic: Occupational Therapy (In Progress)     Point: ADL training (Done)     Description:   Instruct learner(s) on proper safety adaptation and remediation techniques during self care or transfers.   Instruct in proper use of assistive devices.              Learning Progress Summary           Patient Acceptance, E,TB,D, VU,DU,NR by  at 3/29/2023 1155                   Point: Home exercise program (Not Started)     Description:   Instruct learner(s) on appropriate technique for monitoring, assisting and/or progressing therapeutic exercises/activities.              Learner Progress:  Not documented in this visit.          Point: Precautions (Done)     Description:   Instruct learner(s) on prescribed precautions during self-care and functional transfers.              Learning Progress  Summary           Patient Acceptance, E,TB,D, VU,DU,NR by  at 3/29/2023 1155                   Point: Body mechanics (Done)     Description:   Instruct learner(s) on proper positioning and spine alignment during self-care, functional mobility activities and/or exercises.              Learning Progress Summary           Patient Acceptance, E,TB,D, VU,DU,NR by  at 3/29/2023 1155                               User Key     Initials Effective Dates Name Provider Type Discipline     06/16/21 -  Penny Goodwin OT Occupational Therapist OT              OT Recommendation and Plan  Planned Therapy Interventions (OT): activity tolerance training, adaptive equipment training, BADL retraining, occupation/activity based interventions, transfer/mobility retraining, functional balance retraining, cognitive/visual perception retraining, ROM/therapeutic exercise, patient/caregiver education/training  Therapy Frequency (OT): daily  Plan of Care Review  Plan of Care Reviewed With: patient  Progress: improving  Outcome Evaluation: OT eval completed, Pt demonstrates deficits in strength, balance, and activity tolerance as well as cues required for safe sternal precaution management with progress today with education, recom IPOT DC IRF for best outcomes     Time Calculation:    Time Calculation- OT     Row Name 03/29/23 1123 03/29/23 0954          Time Calculation- OT    OT Start Time 1123 -KF --     OT Received On 03/29/23  - --     OT Goal Re-Cert Due Date 04/08/23  -KF --        Timed Charges    50357 - Gait Training Minutes  -- 15  -ES        Untimed Charges    OT Eval/Re-eval Minutes 35  -KF --        Total Minutes    Timed Charges Total Minutes -- 15  -ES     Untimed Charges Total Minutes 35  -KF --      Total Minutes 35  -KF 15  -ES           User Key  (r) = Recorded By, (t) = Taken By, (c) = Cosigned By    Initials Name Provider Type     Penny Goodwin OT Occupational Therapist    ES Donna Agudelo, PT  Physical Therapist              Therapy Charges for Today     Code Description Service Date Service Provider Modifiers Qty    [id number] HC OT EVAL MOD COMPLEXITY 3 3/29/2023 Penny Goodwin, OT GO 1               Penny Goodwin OT  3/29/2023

## 2023-03-29 NOTE — PROGRESS NOTES
Patient Name: Wilbert Kelley  : 1960  MRN: [id number]    Chief Complaint: fatigue    Reason for Consultation: pneumonia sternal wound infection    Subjective   History of present illness:   Patient is a 62 y.o. male with history of BPH, skin cancer, degenerative disc disease, diabetes, coronary artery disease.  Admitted for CABG 3/21/23.     3/28/23: Infectious disease consulted due to concern for developing sternal wound infection as well as possible pneumonia.  Afebrile.  No antibiotics for the past week.  Patient complains about pain around incision but no dehiscence, drainage.  No drains in place at this time.  Does complain of cough productive of phlegm which is unusual for him. No other new concerns noted.     3/29/23: Afebrile.  Comfortable on room air, but still not feeling great.  Cough less productive.  No sternal drainage or worsening chest pain. Tolerating PO antibiotics.     Allergies   Allergen Reactions   • Morphine Nausea And Vomiting     projectile   • Adhesive Tape Rash     Pt states sticky tabs cause irritation when left on long          Objective   Antibiotics:  Anti-Infectives (From admission, onward)    Ordered     Dose/Rate Route Frequency Start Stop    23 1642  linezolid (ZYVOX) tablet 600 mg        Ordering Provider: Ruben Garcia MD    600 mg Oral Every 12 Hours Scheduled 23 2100 23 2059    23 1642  azithromycin (ZITHROMAX) tablet 500 mg        Ordering Provider: Ruben Garcia MD    500 mg Oral Daily 23 1730 23 0859    23 1133  ceFAZolin in Sodium Chloride (ANCEF) IVPB solution 3 g        Ordering Provider: Edwina Preston PA-C    3 g  200 mL/hr over 30 Minutes Intravenous Every 8 Hours 23 1800 23 0317    23 0547  ceFAZolin in Sodium Chloride (ANCEF) IVPB solution 3 g        Ordering Provider: Rolan Enriquez PA    3 g  200 mL/hr over 30 Minutes Intravenous Once 23 0549 23 0709           Other Medications:  Current Facility-Administered Medications   Medication Dose Route Frequency Provider Last Rate Last Admin   • acetaminophen (TYLENOL) tablet 650 mg  650 mg Oral Q8H Low Francois PA-C   650 mg at 03/29/23 0527   • amiodarone (PACERONE) tablet 200 mg  200 mg Oral Q12H Kemar Walsh MD   200 mg at 03/29/23 0900   • amLODIPine (NORVASC) tablet 5 mg  5 mg Oral Q24H Markus Riddle MD   5 mg at 03/29/23 0900   • aspirin EC tablet 325 mg  325 mg Oral Daily Low Francois PA-C   325 mg at 03/29/23 0900   • atorvastatin (LIPITOR) tablet 40 mg  40 mg Oral Nightly Low Francois PA-C   40 mg at 03/28/23 2131   • azithromycin (ZITHROMAX) tablet 500 mg  500 mg Oral Daily Ruben Garcia MD   500 mg at 03/29/23 0906   • sennosides-docusate (PERICOLACE) 8.6-50 MG per tablet 2 tablet  2 tablet Oral BID Low Francois PA-C   2 tablet at 03/29/23 0900    And   • polyethylene glycol (MIRALAX) packet 17 g  17 g Oral Daily PRN Low Francois PA-C   17 g at 03/26/23 1318    And   • bisacodyl (DULCOLAX) EC tablet 5 mg  5 mg Oral Daily PRN Low Francois PA-C   5 mg at 03/29/23 0527    And   • bisacodyl (DULCOLAX) suppository 10 mg  10 mg Rectal Daily PRN Low Francois PA-C       • bumetanide (BUMEX) tablet 2 mg  2 mg Oral Daily Markus Riddle MD   2 mg at 03/29/23 0900   • Calcium Replacement - Follow Nurse / BPA Driven Protocol   Does not apply PRN Low Francois PA-C       • castor oil-balsam peru (VENELEX) ointment 1 application  1 application Topical Q12H Low Francois PA-C   1 application at 03/29/23 0906   • clopidogrel (PLAVIX) tablet 75 mg  75 mg Oral Daily Corin Dean APRN   75 mg at 03/29/23 0900   • dextrose (D50W) (25 g/50 mL) IV injection 25 g  25 g Intravenous Q15 Min PRN Mirian Hurley PA-C       • dextrose (GLUTOSE) oral gel 15 g  15 g Oral Q15 Min PRN Mirian Hurley PA-C       • gabapentin (NEURONTIN) capsule 400 mg  400 mg  Oral TID Low Francois PA-C   400 mg at 03/29/23 0900   • glucagon (GLUCAGEN) injection 1 mg  1 mg Intramuscular Q15 Min PRN Mirian Hurley PA-C       • insulin detemir (LEVEMIR) injection 18 Units  18 Units Subcutaneous Q12H Mirian Hurley PA-C   18 Units at 03/29/23 0859   • Insulin Lispro (humaLOG) injection 0-7 Units  0-7 Units Subcutaneous TID AC Mirian Hurley PA-C   3 Units at 03/29/23 0859   • Insulin Lispro (humaLOG) injection 5 Units  5 Units Subcutaneous TID With Meals Low Francois PA-C   5 Units at 03/29/23 0859   • ipratropium-albuterol (DUO-NEB) nebulizer solution 3 mL  3 mL Nebulization Q6H PRN Low Francois PA-C   3 mL at 03/28/23 2205   • lactobacillus acidophilus (RISAQUAD) capsule 1 capsule  1 capsule Oral Daily Ruben Garcia MD   1 capsule at 03/29/23 0900   • linezolid (ZYVOX) tablet 600 mg  600 mg Oral Q12H Ruben Garcia MD   600 mg at 03/29/23 0907   • lisinopril (PRINIVIL,ZESTRIL) tablet 40 mg  40 mg Oral Q24H Markus Riddle MD   40 mg at 03/29/23 0900   • Magnesium Standard Dose Replacement - Follow Nurse / BPA Driven Protocol   Does not apply PRN Low Francois PA-C       • metoclopramide (REGLAN) tablet 10 mg  10 mg Oral TID AC Mirian Hurley PA-C   10 mg at 03/29/23 0900   • nebivolol (BYSTOLIC) tablet 5 mg  5 mg Oral Q24H Low Francois PA-C   5 mg at 03/29/23 0906   • ondansetron (ZOFRAN) injection 4 mg  4 mg Intravenous Q6H PRN Low Francois PA-C   4 mg at 03/22/23 1810   • oxymetazoline (AFRIN) nasal spray 2 spray  2 spray Each Nare BID Mirian Hurley PA-C   2 spray at 03/29/23 0908   • Pharmacy Consult - MTM   Does not apply Daily Low Francois PA-C       • Phosphorus Replacement - Follow Nurse / BPA Driven Protocol   Does not apply PRN Low Francois PA-C       • Potassium Replacement - Follow Nurse / BPA Driven Protocol   Does not apply PRN Low Francois PA-C       • simethicone  "(MYLICON) chewable tablet 80 mg  80 mg Oral 4x Daily PRN Low Francois PA-C   80 mg at 03/22/23 1810   • tamsulosin (FLOMAX) 24 hr capsule 0.4 mg  0.4 mg Oral Daily Low Francois PA-C   0.4 mg at 03/29/23 0900       Physical Exam:   Vital Signs   /57   Pulse 73   Temp 98.2 °F (36.8 °C) (Oral)   Resp 18   Ht 185.4 cm (73\")   Wt (!) 148 kg (325 lb 3.2 oz)   SpO2 95%   BMI 42.90 kg/m²     GENERAL: Awake and alert, sitting up  HEENT: Normocephalic, atraumatic.  EOMI. No conjunctival injection. No icterus. NECK: Supple without nuchal rigidity.   HEART: RRR; No murmur.  LUNGS: no wheezes. Dry cough appreciated  Chest wall: Sternotomy incision with erythema but no dehiscence  ABDOMEN: obese. Soft, nontender   EXT:  No edema.   : Without Ken catheter.  SKIN: No rash.  Vein graft site healing well.    NEURO: Oriented to PPT. No focal deficits on motor/sensory exam at arms/legs.  PSYCHIATRIC: Normal insight and judgement. Cooperative with PE    Laboratory Data  Lab Results   Component Value Date    WBC 12.49 (H) 03/29/2023    HGB 9.4 (L) 03/29/2023    HCT 30.3 (L) 03/29/2023    MCV 85.6 03/29/2023     (H) 03/29/2023     Lab Results   Component Value Date    GLUCOSE 198 (H) 03/29/2023    CALCIUM 8.8 03/29/2023     03/29/2023    K 4.7 03/29/2023    CO2 27.0 03/29/2023    CL 98 03/29/2023    BUN 36 (H) 03/29/2023    CREATININE 1.13 03/29/2023     Estimated Creatinine Clearance: 103.9 mL/min (by C-G formula based on SCr of 1.13 mg/dL).  Lab Results   Component Value Date    ALT 7 03/26/2023    AST 35 03/26/2023    ALKPHOS 105 03/26/2023    BILITOT 0.9 03/26/2023     No results found for: CRP  No results found for: SEDRATE    The above labs were reviewed.     Microbiology:  Microbiology Results (last 10 days)     Procedure Component Value - Date/Time    MRSA Screen, PCR (Inpatient) - Swab, Nares [[id number]]  (Normal) Collected: 03/28/23 2136    Lab Status: Final result Specimen: Swab from " Nares Updated: 03/29/23 0803     MRSA PCR Negative    Narrative:      The negative predictive value of this diagnostic test is high and should only be used to consider de-escalating anti-MRSA therapy. A positive result may indicate colonization with MRSA and must be correlated clinically.  MRSA Negative    S. Pneumo Ag Urine or CSF - Urine, Urine, Clean Catch [[id number]]  (Normal) Collected: 03/28/23 1948    Lab Status: Final result Specimen: Urine, Clean Catch Updated: 03/28/23 2325     Strep Pneumo Ag Negative    Legionella Antigen, Urine - Urine, Urine, Clean Catch [[id number]]  (Normal) Collected: 03/28/23 1948    Lab Status: Final result Specimen: Urine, Clean Catch Updated: 03/28/23 2325     LEGIONELLA ANTIGEN, URINE Negative          Radiology:  XR Chest 1 View    Result Date: 3/29/2023  Impression: 1.Continued decreasing patchy left basilar opacities, likely atelectasis. 2.No definite pneumothorax. Electronically Signed: Kirti Cordero  3/29/2023 8:50 AM EDT  Workstation ID: [id number]    XR Chest 1 View    Result Date: 3/28/2023  Impression: Improved aeration and decreased atelectasis in the left lung base. No definite pneumothorax. Electronically Signed: Jung Salazar  3/28/2023 8:39 AM EDT  Workstation ID: [id number]    I personally reviewed the above CXR: no focal infiltrate, stable.     3/28 QTc 447 ms    Assessment     1. Leukocytosis   2. Sternotomy incision infection, cellulitis: No dehiscence or drainage yet but definitely erythema. No improvement yet.    3. Productive cough, bronchitis: Possible developing pneumonia but no clear infiltrate on CXR. procal normal. MRSA nares screen and urine strep and legionella antigens negative.   4. CAD s/p CABG on 3/21/23  5. Atrial fibrillation on amiodarone  6. Obesity  7. DM2    PLAN:   - Respiratory culture if able to produce a good specimen  - Follow CBC, BMP. Add CRP    - continue PO linezolid 600 mg BID  - Start azithromycin 500 mg daily for a few days.  Follow  EKG to monitor for QT prolongation  - probiotic    I will follow     Ruben Garcia MD  3/29/2023

## 2023-03-29 NOTE — PROGRESS NOTES
[facility] Cardiothoracic Surgery In-Patient Progress Note    POD # 8 s/p CABG x3     LOS: 8 days       Subjective  Continued cough.  Still reports mild dyspnea with exertion.  Currently on room air saturations 95%.  Tmax 99.4.      Objective  Vital Signs  Temp:  [97.2 °F (36.2 °C)-99.4 °F (37.4 °C)] 98.2 °F (36.8 °C)  Heart Rate:  [64-73] 67  Resp:  [18-20] 18  BP: (116-172)/(67-97) 151/97      Physical Exam:   General Appearance: alert, appears stated age and  cooperative   Lungs: Bibasilar rhonci   Heart: normal S1, S2, no murmur, no gallop, no rub and no click    Skin: EVH site c/d/i   Ext: Trace edema present bilaterally   Sternum: Stable, staples present every other.  Erythema present around incision.      Results     Results from last 7 days   Lab Units 03/29/23  0524   WBC 10*3/mm3 12.49*   HEMOGLOBIN g/dL 9.4*   HEMATOCRIT % 30.3*   PLATELETS 10*3/mm3 597*     Results from last 7 days   Lab Units 03/29/23  0524   SODIUM mmol/L 139   POTASSIUM mmol/L 4.7   CHLORIDE mmol/L 98   CO2 mmol/L 27.0   BUN mg/dL 36*   CREATININE mg/dL 1.13   GLUCOSE mg/dL 198*   CALCIUM mg/dL 8.8     Assessment  POD # 8 s/p CABG x3    Plan   AM CXR pending  Awaiting sputum culture  Antibiotics per ID  Wean oxygen as tolerated  ASA/Statin/BB/Plavix  Diuresis   EMANUEL/Hyponatremia per renal  Discharge once ok with ID     Corin Dean, APRN  03/29/23  07:35 EDT

## 2023-03-29 NOTE — PROGRESS NOTES
"  [facility]  PROGRESS NOTE    Date of Admission: 3/21/2023  Date of Service: 03/29/23    Primary Care Physician: Jose M Simon MD    Chief Complaint: f/u HTN, HLD  Problem List:   Multivessel CAD with remote ptca stent, now with stable anginal equivelent (dyspnea) (HCC)    Insulin resistant Diabetes mellitus (HCC)    Sleep apnea    S/P CABG x 3 on 3/21/2023    Hypertension    Hyperlipidemia    EMANUEL (HCC)      Subjective      Sitting in chair, still has productive cough. Reports LE edema and pain related to chronic lower back issues.  Has ambulated with physical therapy, very fatigued today      Objective   Vitals: /97 (BP Location: Right arm, Patient Position: Sitting)   Pulse 67   Temp 98.2 °F (36.8 °C) (Oral)   Resp 18   Ht 185.4 cm (73\")   Wt (!) 148 kg (325 lb 3.2 oz)   SpO2 95%   BMI 42.90 kg/m²     Physical Exam:  GENERAL: Alert, cooperative, in no acute distress.   HEENT: Normocephalic, no jugular venous distention  HEART: Regular rhythm, normal rate, and no murmurs, gallops, or rubs.   LUNGS:  No wheezing, rales or rhonchi.  NEUROLOGIC: No focal abnormalities involving strength or sensation are noted.   EXTREMITIES: No clubbing, cyanosis, 1- 2+BLE edema noted.   Regular rate and rhythm, edema noted    Results:  Results from last 7 days   Lab Units 03/29/23  0524 03/28/23  0741 03/26/23  1130   WBC 10*3/mm3 12.49* 11.86* 9.95   HEMOGLOBIN g/dL 9.4* 9.8* 8.7*   HEMATOCRIT % 30.3* 31.4* 27.1*   PLATELETS 10*3/mm3 597* 595* 391     Results from last 7 days   Lab Units 03/29/23  0524 03/28/23  0741 03/27/23  1110   SODIUM mmol/L 139 138 136   POTASSIUM mmol/L 4.7 4.3 4.6   CHLORIDE mmol/L 98 97* 95*   CO2 mmol/L 27.0 28.0 28.0   BUN mg/dL 36* 43* 54*   CREATININE mg/dL 1.13 1.11 1.23   GLUCOSE mg/dL 198* 195* 226*      Lab Results   Component Value Date    CHOL 71 03/22/2023    TRIG 185 (H) 03/22/2023    HDL 18 (L) 03/22/2023    LDL 23 03/22/2023    AST 35 " 03/26/2023    ALT 7 03/26/2023       Intake/Output Summary (Last 24 hours) at 3/29/2023 0812  Last data filed at 3/28/2023 2220  Gross per 24 hour   Intake 360 ml   Output 400 ml   Net -40 ml     I personally reviewed the patient's EKG/Telemetry data    CXR 3/29/23:  IMPRESSION:  Impression:  1.Continued decreasing patchy left basilar opacities, likely atelectasis.  2.No definite pneumothorax.       Current Medications:  acetaminophen, 650 mg, Oral, Q8H  amiodarone, 200 mg, Oral, Q12H  amLODIPine, 5 mg, Oral, Q24H  aspirin, 325 mg, Oral, Daily  atorvastatin, 40 mg, Oral, Nightly  azithromycin, 500 mg, Oral, Daily  bumetanide, 2 mg, Oral, Daily  castor oil-balsam peru, 1 application, Topical, Q12H  clopidogrel, 75 mg, Oral, Daily  gabapentin, 400 mg, Oral, TID  insulin detemir, 18 Units, Subcutaneous, Q12H  insulin lispro, 0-7 Units, Subcutaneous, TID AC  Insulin Lispro, 5 Units, Subcutaneous, TID With Meals  lactobacillus acidophilus, 1 capsule, Oral, Daily  linezolid, 600 mg, Oral, Q12H  lisinopril, 40 mg, Oral, Q24H  metoclopramide, 10 mg, Oral, TID AC  nebivolol, 5 mg, Oral, Q24H  oxymetazoline, 2 spray, Each Nare, BID  pharmacy consult - [facility] [place], , Does not apply, Daily  senna-docusate sodium, 2 tablet, Oral, BID  tamsulosin, 0.4 mg, Oral, Daily           Assessment and Plan:     1. MVCAD   - C 3/16 Dr. Boyle with MVCAD, normal EF pre-op   - CABG x3 with Dr. Emanuel 3/21  Continue aspirin, atorvastatin and low-dose beta-blocker  Continue Bumex 2 mg p.o. daily-we will give additional dose of IV Bumex today 2 mg, monitor renal function closely.       2. Post-op Afib  - onset 3/25, brief  - started on Amiodarone protocol and maintaining SR  Amiodarone 200 mg twice daily for 5 days then 200 mg daily  - EKG 3/28 reviewed, stable   Anticoagulation not necessary due to brief episode     3. Hypertension   - BPs better controlled after resuming lisinopril 20 mg daily (half home dose)  - Nebivolol added over the  weekend   Continue close monitoring  Add amlodipine and increase lisinopril     4. Hyperlipidemia  -Continue atorvastatin 40 mg, which is double home dose  -Excellent lipids 3/22, LDL 23     5. DM2  - A1C 7.7%  - per hospitalists     6. EMANUEL   - resolved, max Cr 2.7, returned to normal at remains stable   Renal following     7. Leukocytosis  - ID consulted for possible pneumonia, sternal wound cellulitis       Electronically signed by Corinna Savage PA-C, 03/29/23, 8:14 AM EDT.    I have seen and examined the patient, performing a face-to-face diagnostic evaluation with plan of care reviewed and developed with the Advanced Practice Clinician and nursing staff. I have addended and modified the above history of present illness, physical examination, and assessment and plan to reflect my findings and impressions. All medical decision making performed by Dr. Riddle.    Markus Riddle MD, [facility], [facility]  03/29/23

## 2023-03-29 NOTE — PLAN OF CARE
Goal Outcome Evaluation:  Plan of Care Reviewed With: patient        Progress: no change  Outcome Evaluation: Pt continues to be limited heavily by BLE sciatic pain and incisional chest pain. Pt also continues to require constant cueing for adherence to sternal precautions with ~50% compliance. Pt required multiple rest breaks throughout mobility and was unable to safely attempt stair training. Will continue to progress as able. PT rec IRF at d/c.

## 2023-03-29 NOTE — CASE MANAGEMENT/SOCIAL WORK
Continued Stay Note  [facility]     Patient Name: Wilbert Kelley  MRN: [id number]  Today's Date: 3/29/2023    Admit Date: 3/21/2023    Plan: Home w/HH   Discharge Plan     Row Name 03/29/23 1419       Plan    Plan Home w/HH    Patient/Family in Agreement with Plan yes    Plan Comments CM met w/pt in room and discussed PT/OT recs of rehab @ d/c. Pt declined rehab, and requested to be discharged home w/HH services. Pt did not have preference in HH agency, choices in that area [facility] and [facility]. CM will fax referrals to both pending insurance network.    Final Discharge Disposition Code 06 - home with home health care               Discharge Codes    No documentation.               Expected Discharge Date and Time     Expected Discharge Date Expected Discharge Time    Mar 31, 2023             Isael Pak RN

## 2023-03-29 NOTE — DISCHARGE PLACEMENT REQUEST
"Wilbert Lopez (62 y.o. Male) From Isael Pak RN CM [phone]    Date of Birth   1960    Social Security Number       Address   [address] [address]    Home Phone   [phone]    MRN   [id number]       [facility]    Marital Status                               Admission Date   3/21/23    Admission Type   Elective    Admitting Provider   Markus Emanuel MD    Attending Provider   Markus Emanuel MD    Department, Room/Bed   [address], S476/1       Discharge Date       Discharge Disposition       Discharge Destination                               Attending Provider: Markus Emanuel MD    Allergies: Morphine, Adhesive Tape    Isolation: None   Infection: None   Code Status: CPR    Ht: 185.4 cm (73\")   Wt: 148 kg (325 lb 3.2 oz)    Admission Cmt: None   Principal Problem: Multivessel CAD with remote ptca stent, now with stable anginal equivelent (dyspnea) (HCC) [I25.118]                 Active Insurance as of 3/21/2023     Primary Coverage     Payor Plan Insurance Group Employer/Plan Group    HUMANA MEDICARE REPLACEMENT HUMANA MEDICARE REPLACEMENT [id number]     Payor Plan Address Payor Plan Phone Number Payor Plan Fax Number Effective Dates    [address] [address] [phone]  2023 - None Entered    Formerly [facility] [address]       Subscriber Name Subscriber Birth Date Member ID       WILBERT LOPEZ 1960 [id number]                 Emergency Contacts      (Rel.) Home Phone Work Phone Mobile Phone    ANUP LOPEZ (Spouse) -- -- [phone]        [address] [address]  Phone:  [phone]  Fax:  [phone] Date: Mar 29, 2023      Ambulatory Referral to Home Health     Patient:  Wilbert Lopez MRN:  [id number]   [address] [id number] :  1960  SSN:    Phone: [phone] Sex:  M      INSURANCE PAYOR PLAN GROUP # SUBSCRIBER ID "   Primary:    HUMANA MEDICARE REPLACEMENT [id number]      Referring Provider Information:  SINTIA BLACKWELL Phone: [phone] Fax: [phone]       Referral Information:   # Visits:  999 Referral Type: Home Health [42]   Urgency:  Routine Referral Reason: Specialty Services Required   Start Date: Mar 29, 2023 End Date:  To be determined by Insurer   Diagnosis: Coronary artery disease of native artery of native heart with stable angina pectoris (HCC) (I25.118 [ICD-10-CM] 414.01,413.9 [ICD-9-CM])      Refer to Dept:   Refer to Provider:   Refer to Provider Phone:   Refer to Facility:       Face to Face Visit Date: 3/29/2023  Follow-up provider for Plan of Care? I treated the patient in an acute care facility and will not continue treatment after discharge.  Follow-up provider: MCKENZIE NAVARRO [[id number]]  Reason/Clinical Findings: impaired endurance, mobility  Describe mobility limitations that make leaving home difficult: impaired endurance, mobility  Nursing/Therapeutic Services Requested: Skilled Nursing  Nursing/Therapeutic Services Requested: Physical Therapy  Nursing/Therapeutic Services Requested: Occupational Therapy  Skilled nursing orders: Post CABG care  PT orders: Therapeutic exercise  PT orders: Gait Training  PT orders: Strengthening  PT orders: Home safety assessment  Weight Bearing Status: As Tolerated  Occupational orders: Activities of daily living  Occupational orders: Energy conservation  Occupational orders: Strengthening  Occupational orders: Home safety assessment  Frequency: 1 Week 1     This document serves as a request of services and does not constitute Insurance authorization or approval of services.  To determine eligibility, please contact the members Insurance carrier to verify and review coverage.     If you have medical questions regarding this request for services. Please contact [address] at [phone] during normal business hours.  "       Verbal Order Mode: Verbal with readback   Authorizing Provider: Mirian Hurley PA-C  Authorizing Provider's NPI: [id number]     Order Entered By: Isael Pak RN 3/29/2023  2:31 PM     Electronically signed by:  Mirian Hurley PA-C              History & Physical      Markus Emanuel MD at 03/21/23 0641          Pre-Op H&P  Wilbert Kelley  [id number]  1960    Full history physical from office visit on 3/20/2023 is up-to-date.    Reports he has held his Plavix as directed and states his last dose was on Wednesday, 3/15/2023.    Review of Systems:  General ROS: negative for chills, fever or skin lesions;  No changes since last office visit.  Neg for recent sick exposure  Cardiovascular ROS: no chest pain or dyspnea on exertion  Respiratory ROS: no cough, shortness of breath, or wheezing    Allergies: Denies allergy to latex or contrast dye.  Allergies   Allergen Reactions   • Morphine Nausea And Vomiting     projectile   • Adhesive Tape Rash     Pt states sticky tabs cause irritation when left on long        Immunization History:  Influenza: No  Pneumococcal: No  Tetanus: No    Vitals:           /93 (BP Location: Left arm, Patient Position: Lying)   Pulse 84   Temp 96.9 °F (36.1 °C) (Tympanic)   Resp 20   Ht 185.4 cm (73\")   Wt (!) 142 kg (314 lb)   SpO2 93%   BMI 41.43 kg/m²     Physical Exam:  General Appearance:    Alert, cooperative, no distress, appears stated age   Head:    Normocephalic, without obvious abnormality, atraumatic   Lungs:     Clear to auscultation bilaterally, respirations unlabored    Heart:   Regular rate and rhythm, S1 and S2 normal, no murmur, rub    or gallop    Abdomen:    Soft, nontender.  +bowel sounds   Breast Exam:    deferred   Genitalia:    deferred   Extremities:   Extremities normal, atraumatic, no cyanosis or edema   Skin:   Skin color, texture, turgor normal, no rashes or lesions   Neurologic:   Grossly intact   Results " Review  LABS:  Lab Results   Component Value Date    WBC 8.79 03/20/2023    HGB 14.4 03/20/2023    HCT 45.1 03/20/2023    MCV 82.8 03/20/2023     03/20/2023    NEUTROABS 6.22 03/20/2023    GLUCOSE 84 03/20/2023    BUN 25 (H) 03/20/2023    CREATININE 1.20 03/20/2023     03/20/2023    K 4.2 03/20/2023    CL 99 03/20/2023    CO2 28.0 03/20/2023    MG 1.7 03/20/2023    CALCIUM 9.6 03/20/2023    ALBUMIN 4.5 03/20/2023    AST 20 03/20/2023    ALT 19 03/20/2023    BILITOT 0.4 03/20/2023    PTT 28.7 03/20/2023    INR 0.98 03/20/2023       RADIOLOGY:  No radiology results for the last 3 days     I reviewed the patient's new clinical results.  CBC and CHEM profile from 3/20/2023 reviewed and available within patient's chart.    Cancer Staging (if applicable)  Cancer Patient: __ yes __no __unknown; If yes, clinical stage T:__ N:__M:__, stage group or __N/A    Impression: Patient presents with coronary artery disease involving native coronary artery of native heart without angina pectoris.    Plan: Dr. Emanuel will perform coronary artery bypass grafting with endoscopic vein harvest.  I agree with the above plan for coronary bypass grafting today I discussed this in detail with the patient and his wife who is a former postcardiac surgery care nurse.  We discussed this yesterday and they are both aware of the risk of stroke bleeding infection death and agreed to proceed  Isael Serna PA-C   03/21/23   6:41 AM EDT           Electronically signed by Markus Emanuel MD at 03/21/23 0655   Source Note          03/20/2023  Patient Information  Wilbert Kelley                                                                                          [address] [facility] [address]   1960  'PCP/Referring Physician'  Jose M Simon MD  [phone]  No ref. provider found    Chief Complaint   Patient presents with   • Coronary Artery Disease     New patient per Dr. Boyle for CAD       History of Present  Illness:  The patient is a 62-year-old male who was referred to me to evaluate for coronary bypass grafting surgery.  He has a history of a stent in his left anterior descending coronary artery and he states that his father and grandfather  at age 42 and 45 respectively from heart attacks.  He denies chest pain but states that he has shortness of breath with minimal exertion and a recent catheterization last week demonstrates critical three-vessel coronary disease.  He is a diabetic requiring insulin with hypertension and hyperlipidemia.  He has not smoked in many years.      Patient Active Problem List   Diagnosis   • SOB (shortness of breath) on exertion   • Coronary artery disease involving native coronary artery of native heart without angina pectoris     Past Medical History:   Diagnosis Date   • Coronary artery disease    • Diabetes mellitus (HCC)    • Hyperlipidemia    • Hypertension    • Sleep apnea      Past Surgical History:   Procedure Laterality Date   • CARDIAC CATHETERIZATION     • CARDIAC CATHETERIZATION Right 3/16/2023    Procedure: Left Heart Cath;  Surgeon: Jarod Boyle MD;  Location: [facility] CATH INVASIVE LOCATION;  Service: Cardiovascular;  Laterality: Right;   • CATARACT EXTRACTION, BILATERAL     • CORONARY STENT PLACEMENT     • SKIN CANCER EXCISION         Current Outpatient Medications:   •  atorvastatin (LIPITOR) 20 MG tablet, , Disp: , Rfl:   •  B-D ULTRAFINE III SHORT PEN 31G X 8 MM misc, , Disp: , Rfl:   •  BD Insulin Syringe U-500 31G X 6MM 0.5 ML misc, , Disp: , Rfl:   •  clopidogrel (PLAVIX) 75 MG tablet, Take 1 tablet by mouth Daily., Disp: , Rfl:   •  fenofibrate 160 MG tablet, Take 1 tablet by mouth Daily., Disp: , Rfl:   •  furosemide (LASIX) 20 MG tablet, Take 1 tablet by mouth Daily., Disp: , Rfl:   •  gabapentin (NEURONTIN) 800 MG tablet, 1 tablet 3 (Three) Times a Day., Disp: , Rfl:   •  hydroCHLOROthiazide (HYDRODIURIL) 25 MG tablet, Take 1 tablet by mouth Daily., Disp: 90  tablet, Rfl: 3  •  Insulin Glargine (TOUJEO MAX SOLOSTAR SC), Inject 85 Units under the skin into the appropriate area as directed 2 (Two) Times a Day., Disp: , Rfl:   •  Insulin Regular Human, Conc, (HumuLIN R) 500 UNIT/ML solution pen-injector CONCENTRATED injection, Inject 65 Units under the skin into the appropriate area as directed 3 (Three) Times a Day Before Meals., Disp: , Rfl:   •  lisinopril (PRINIVIL,ZESTRIL) 40 MG tablet, Take 1 tablet by mouth 2 (Two) Times a Day., Disp: , Rfl:   •  metFORMIN (GLUCOPHAGE) 1000 MG tablet, Take 1 tablet by mouth 2 (Two) Times a Day With Meals. Hold metformin for 2 days post-cath.  Restart on 3/19/2023, Disp: 60 tablet, Rfl: 12  •  metoprolol tartrate 75 MG tablet, Take 75 mg by mouth 2 (Two) Times a Day., Disp: 180 tablet, Rfl: 3  •  potassium chloride 10 MEQ CR tablet, Take 1 tablet by mouth Daily., Disp: , Rfl:   Allergies   Allergen Reactions   • Morphine Nausea And Vomiting     Social History     Socioeconomic History   • Marital status:    Tobacco Use   • Smoking status: Never     Passive exposure: Past   • Smokeless tobacco: Current     Types: Snuff   Vaping Use   • Vaping Use: Never used   Substance and Sexual Activity   • Alcohol use: Not Currently   • Drug use: Never   • Sexual activity: Defer     Family History   Problem Relation Age of Onset   • COPD Mother    • Heart attack Father 45        passed   • Heart attack Paternal Grandfather 42        massive heart attack   • Heart disease Paternal Grandfather      Review of Systems   Constitutional: Negative for chills, decreased appetite, diaphoresis, fever, malaise/fatigue, night sweats, weight gain and weight loss.   HENT: Negative for hoarse voice.    Eyes: Negative for blurred vision, double vision and visual disturbance.   Cardiovascular: Positive for dyspnea on exertion. Negative for chest pain, claudication, irregular heartbeat, leg swelling, near-syncope, orthopnea, palpitations, paroxysmal nocturnal  "dyspnea and syncope.   Respiratory: Negative for cough, hemoptysis, shortness of breath, sputum production and wheezing.    Hematologic/Lymphatic: Negative for adenopathy and bleeding problem. Does not bruise/bleed easily.   Skin: Negative for color change, nail changes, poor wound healing and rash.   Musculoskeletal: Negative for back pain, falls and muscle cramps.   Gastrointestinal: Negative for abdominal pain, dysphagia and heartburn.   Genitourinary: Negative for flank pain.   Neurological: Negative for brief paralysis, disturbances in coordination, dizziness, focal weakness, headaches, light-headedness, loss of balance, numbness, paresthesias, sensory change, vertigo and weakness.   Psychiatric/Behavioral: Negative for depression and suicidal ideas.   Allergic/Immunologic: Negative for persistent infections.     Vitals:    03/20/23 0852   BP: 132/60   BP Location: Right arm   Patient Position: Sitting   Pulse: 71   Temp: 97.2 °F (36.2 °C)   SpO2: 96%   Weight: (!) 142 kg (312 lb)   Height: 185.4 cm (73\")      Physical Exam    CONSTITUTIONAL: Alert and conversant, Well dressed, Well nourished, No acute distress obese  EYES: Sclera clean, Anicteric, Pupils equal  ENT: No nasal deviation, Trachea midline  NECK: No neck masses, Supple  LUNGS: No wheezing, Cough, non-congested  HEART: No rubs, No murmurs  GASTROINTESTINAL: Soft, non-distended, No masses, Non tender  to palpation, normal bowel sounds  NEURO: No motor deficits, No sensory deficits, Cranial Nerves 2 through 12 grossly intact  PSYCHIATRIC: Oriented to person, place and time, No memory deficits, Mood appropriate  VASCULAR: No carotid bruits, Femoral pulses palpable and symmetric  MUSKULOSKELETAL: No extremity trauma or extremity asymmetry    The ROS, past medical history, surgical history, family history, social history and vitals were reviewed by myself and corrected as needed.      Labs/Imaging:  I discussed this case with his cardiologist and " reviewed his cardiac catheterization images demonstrating coronary disease. Smoking cessation was not discussed as patient is no longer a smoker.  He does use chewing tobacco which contains nicotine and will discuss this in more detail. He denies advanced directive.    Assessment/Plan:   The patient is a 62-year-old male who has diabetes requiring insulin and shortness of breath with minimal activity.  His ejection fraction is preserved but he has critical three-vessel coronary disease and a strong family history of coronary disease in his father and grandfather with their death at age 42 and 45 respectively.  We discussed surgery and his recovery also the fact that his risk of infection may be higher with his diabetes and his morbid obesity of over 300 pounds.  He also states that he has severe pain in both shoulders, primarily the left.  He says that at some point he has already been evaluated for bilateral shoulder surgery if not replacement.  He is agreeable to proceed with coronary surgery and is aware of the risks of stroke, bleeding, infection and death and agrees to proceed.  No guarantees have been made as to the outcome.  His wife tells me she is a nurse who used to work in the post open heart recovery room and that she has recovered mini open heart surgery patient's and that she has also talked with him in detail and she is familiar with the procedure    Patient Active Problem List   Diagnosis   • SOB (shortness of breath) on exertion   • Coronary artery disease involving native coronary artery of native heart without angina pectoris       CC: MD Yamilka Miller editing for Markus Emanuel MD      I, Markus Emanuel MD, have read and agree with the editing done by Yamilka Rodriguez, .      Electronically signed by Markus Emanuel MD at 03/20/23 1110             Markus Emanuel MD at 03/20/23 0845          03/20/2023  Patient Information  Wilbert Kelley                                                                                           [address] [facility] [address]   1960  'PCP/Referring Physician'  Jose M Simon MD  [phone]  No ref. provider found    Chief Complaint   Patient presents with   • Coronary Artery Disease     New patient per Dr. Boyle for CAD       History of Present Illness:  The patient is a 62-year-old male who was referred to me to evaluate for coronary bypass grafting surgery.  He has a history of a stent in his left anterior descending coronary artery and he states that his father and grandfather  at age 42 and 45 respectively from heart attacks.  He denies chest pain but states that he has shortness of breath with minimal exertion and a recent catheterization last week demonstrates critical three-vessel coronary disease.  He is a diabetic requiring insulin with hypertension and hyperlipidemia.  He has not smoked in many years.      Patient Active Problem List   Diagnosis   • SOB (shortness of breath) on exertion   • Coronary artery disease involving native coronary artery of native heart without angina pectoris     Past Medical History:   Diagnosis Date   • Coronary artery disease    • Diabetes mellitus (HCC)    • Hyperlipidemia    • Hypertension    • Sleep apnea      Past Surgical History:   Procedure Laterality Date   • CARDIAC CATHETERIZATION     • CARDIAC CATHETERIZATION Right 3/16/2023    Procedure: Left Heart Cath;  Surgeon: Jarod Boyle MD;  Location: [facility] CATH INVASIVE LOCATION;  Service: Cardiovascular;  Laterality: Right;   • CATARACT EXTRACTION, BILATERAL     • CORONARY STENT PLACEMENT     • SKIN CANCER EXCISION         Current Outpatient Medications:   •  atorvastatin (LIPITOR) 20 MG tablet, , Disp: , Rfl:   •  B-D ULTRAFINE III SHORT PEN 31G X 8 MM misc, , Disp: , Rfl:   •  BD Insulin Syringe U-500 31G X 6MM 0.5 ML misc, , Disp: , Rfl:   •  clopidogrel (PLAVIX) 75 MG tablet, Take 1 tablet by mouth Daily., Disp:  , Rfl:   •  fenofibrate 160 MG tablet, Take 1 tablet by mouth Daily., Disp: , Rfl:   •  furosemide (LASIX) 20 MG tablet, Take 1 tablet by mouth Daily., Disp: , Rfl:   •  gabapentin (NEURONTIN) 800 MG tablet, 1 tablet 3 (Three) Times a Day., Disp: , Rfl:   •  hydroCHLOROthiazide (HYDRODIURIL) 25 MG tablet, Take 1 tablet by mouth Daily., Disp: 90 tablet, Rfl: 3  •  Insulin Glargine (TOUJEO MAX SOLOSTAR SC), Inject 85 Units under the skin into the appropriate area as directed 2 (Two) Times a Day., Disp: , Rfl:   •  Insulin Regular Human, Conc, (HumuLIN R) 500 UNIT/ML solution pen-injector CONCENTRATED injection, Inject 65 Units under the skin into the appropriate area as directed 3 (Three) Times a Day Before Meals., Disp: , Rfl:   •  lisinopril (PRINIVIL,ZESTRIL) 40 MG tablet, Take 1 tablet by mouth 2 (Two) Times a Day., Disp: , Rfl:   •  metFORMIN (GLUCOPHAGE) 1000 MG tablet, Take 1 tablet by mouth 2 (Two) Times a Day With Meals. Hold metformin for 2 days post-cath.  Restart on 3/19/2023, Disp: 60 tablet, Rfl: 12  •  metoprolol tartrate 75 MG tablet, Take 75 mg by mouth 2 (Two) Times a Day., Disp: 180 tablet, Rfl: 3  •  potassium chloride 10 MEQ CR tablet, Take 1 tablet by mouth Daily., Disp: , Rfl:   Allergies   Allergen Reactions   • Morphine Nausea And Vomiting     Social History     Socioeconomic History   • Marital status:    Tobacco Use   • Smoking status: Never     Passive exposure: Past   • Smokeless tobacco: Current     Types: Snuff   Vaping Use   • Vaping Use: Never used   Substance and Sexual Activity   • Alcohol use: Not Currently   • Drug use: Never   • Sexual activity: Defer     Family History   Problem Relation Age of Onset   • COPD Mother    • Heart attack Father 45        passed   • Heart attack Paternal Grandfather 42        massive heart attack   • Heart disease Paternal Grandfather      Review of Systems   Constitutional: Negative for chills, decreased appetite, diaphoresis, fever,  "malaise/fatigue, night sweats, weight gain and weight loss.   HENT: Negative for hoarse voice.    Eyes: Negative for blurred vision, double vision and visual disturbance.   Cardiovascular: Positive for dyspnea on exertion. Negative for chest pain, claudication, irregular heartbeat, leg swelling, near-syncope, orthopnea, palpitations, paroxysmal nocturnal dyspnea and syncope.   Respiratory: Negative for cough, hemoptysis, shortness of breath, sputum production and wheezing.    Hematologic/Lymphatic: Negative for adenopathy and bleeding problem. Does not bruise/bleed easily.   Skin: Negative for color change, nail changes, poor wound healing and rash.   Musculoskeletal: Negative for back pain, falls and muscle cramps.   Gastrointestinal: Negative for abdominal pain, dysphagia and heartburn.   Genitourinary: Negative for flank pain.   Neurological: Negative for brief paralysis, disturbances in coordination, dizziness, focal weakness, headaches, light-headedness, loss of balance, numbness, paresthesias, sensory change, vertigo and weakness.   Psychiatric/Behavioral: Negative for depression and suicidal ideas.   Allergic/Immunologic: Negative for persistent infections.     Vitals:    03/20/23 0852   BP: 132/60   BP Location: Right arm   Patient Position: Sitting   Pulse: 71   Temp: 97.2 °F (36.2 °C)   SpO2: 96%   Weight: (!) 142 kg (312 lb)   Height: 185.4 cm (73\")      Physical Exam    CONSTITUTIONAL: Alert and conversant, Well dressed, Well nourished, No acute distress obese  EYES: Sclera clean, Anicteric, Pupils equal  ENT: No nasal deviation, Trachea midline  NECK: No neck masses, Supple  LUNGS: No wheezing, Cough, non-congested  HEART: No rubs, No murmurs  GASTROINTESTINAL: Soft, non-distended, No masses, Non tender  to palpation, normal bowel sounds  NEURO: No motor deficits, No sensory deficits, Cranial Nerves 2 through 12 grossly intact  PSYCHIATRIC: Oriented to person, place and time, No memory deficits, Mood " appropriate  VASCULAR: No carotid bruits, Femoral pulses palpable and symmetric  MUSKULOSKELETAL: No extremity trauma or extremity asymmetry    The ROS, past medical history, surgical history, family history, social history and vitals were reviewed by myself and corrected as needed.      Labs/Imaging:  I discussed this case with his cardiologist and reviewed his cardiac catheterization images demonstrating coronary disease. Smoking cessation was not discussed as patient is no longer a smoker.  He does use chewing tobacco which contains nicotine and will discuss this in more detail. He denies advanced directive.    Assessment/Plan:   The patient is a 62-year-old male who has diabetes requiring insulin and shortness of breath with minimal activity.  His ejection fraction is preserved but he has critical three-vessel coronary disease and a strong family history of coronary disease in his father and grandfather with their death at age 42 and 45 respectively.  We discussed surgery and his recovery also the fact that his risk of infection may be higher with his diabetes and his morbid obesity of over 300 pounds.  He also states that he has severe pain in both shoulders, primarily the left.  He says that at some point he has already been evaluated for bilateral shoulder surgery if not replacement.  He is agreeable to proceed with coronary surgery and is aware of the risks of stroke, bleeding, infection and death and agrees to proceed.  No guarantees have been made as to the outcome.  His wife tells me she is a nurse who used to work in the post open heart recovery room and that she has recovered mini open heart surgery patient's and that she has also talked with him in detail and she is familiar with the procedure    Patient Active Problem List   Diagnosis   • SOB (shortness of breath) on exertion   • Coronary artery disease involving native coronary artery of native heart without angina pectoris       CC: Jose M Simon,  MD Yamilka Rodriguez editing for Markus Emanuel MD      I, Markus Emanuel MD, have read and agree with the editing done by Yamilka Rodriguez, .      Electronically signed by Markus Emanuel MD at 03/20/23 1110          Operative/Procedure Notes (most recent note)      Markus Emanuel MD at 03/21/23 0803        Operative Report    Preop Diagnosis: Coronary disease.  Morbid obesity hypertension hyperlipidemia diabetes shoulder pain with frozen shoulders    Results of STS risk score discussed with patient and family    Postoperative Diagnosis: Same      Procedure: Coronary artery bypass grafting x3 with endoscopic harvesting of the right great saphenous vein.  Left internal mammary artery to left anterior descending.  Saphenous vein graft to the obtuse marginal branch and circumflex.  Saphenous vein graft to the posterior descending branch of the right.        Surgeons: Markus Emanuel MD      Assistant: Juanpablo gregg PA-C    The Assistant provided services of suctioning, irrigation and retraction.  Also, assisted in suture closure of parts of the skin incision.      Indication: This patient was referred to me with the above listed medical problems.  He understood that surgery carries with the risk of stroke bleeding infection death and agreed to proceed.  His wife is a retired postcardiac surgery intensive care unit nurse she was also very well informed.  I also discussed the STS mortality risk base with the patient preoperatively.  He understood his risk according to their scale.  I believe his mortality was slightly higher than that based upon his overall lack of mobility which is not well captured in the STS database        Description: Supine position sterile prep and drape.  Antibiotics given.  General endotracheal anesthesia right great saphenous vein harvested from the right leg using an endoscope and it was a good quality conduit.  Median sternotomy was performed there was nearly 4  inches of fat between the skin and the anterior sternal table.  The left internal mammary artery was harvested with some difficulty due to visualization difficulties secondary to his obesity.  Patient was fully heparinized and cannulas placed in the ascending aortic aneurysm atrial appendage and patient begun on cardiopulmonary bypass.  Aorta crossclamped antegrade cardioplegia given.  For saphenous vein graft was sutured to a 2 mm obtuse marginal branch of the circumflex and the second saphenous vein graft to a 1-1/2 mm posterior descending branch of the right.  The left internal mammary was sutured to the left anterior descending coronary which was a good quality target vessel.  2 proximal vein graft sutured to the ascending aortic aneurysm and the patient weaned from bypass without the need for pressor agents but paced initially.  Patient subsequently regained his own sinus rhythm.  No red cells were transfused his did receive platelets secondary to low P2 Y12.  The sternum was closed with a number of #7 wires due to his obesity fascia was closed with PDS subcutaneous tissue and a number of layers of Vicryl as to the skin and some staples were added on the skin for additional security.  Estimated blood loss less than 250 mL.  Sponge and needle count reported as correct and there was no apparent early complications.      Please note that portions of this note were completed with a voice recognition program. Efforts were made to edit the dictations, but occasionally words are mistranscribed.      Electronically signed by Markus Emanuel MD at 23 1052          Physician Progress Notes (most recent note)      Mirian Hurley PA-C at 23 1211              [facility] Medicine Services  PROGRESS NOTE    Patient Name: Wilbert Kelley  : 1960  MRN: [id number]    Date of Admission: 3/21/2023  Primary Care Physician: Jose M Simon MD    Subjective     CC: post-CABG  medical management      HPI:  In chair after working with therapy. Primary complaint is back/sciatic pain. Asking about discharge plan because different people tell him different things     ROS:  Gen- No fevers, chills  CV- No chest pain, palpitations  Resp- No cough, dyspnea  GI- No N/V/D, abd pain    Objective     Vital Signs:   Temp:  [97.5 °F (36.4 °C)-99.4 °F (37.4 °C)] 97.5 °F (36.4 °C)  Heart Rate:  [64-78] 71  Resp:  [18-20] 18  BP: (116-172)/(57-97) 118/57     Physical Exam:  Constitutional: No acute distress, awake, alert and conversant. Sitting in chair. Chronically ill appearing   HENT: NCAT, mucous membranes moist  Respiratory: Expiratory wheezing bilaterally without rales or rhonchi, improved today. Normal respiratory effort on room air   Cardiovascular: RRR, no murmurs, rubs, or gallops. Sternal incision erythema improved today - no overt purulence ir induration, EVH site c/d/i without erythema, induration or drainage.  Gastrointestinal: Positive bowel sounds, soft, nontender, nondistended  Musculoskeletal: No bilateral ankle edema  Psychiatric: Appropriate affect, cooperative  Neurologic: Oriented x 3, moves all extremities spontaneously without focal deficits, speech clear    Results Reviewed:  LAB RESULTS:      Lab 03/29/23  0524 03/28/23  0741 03/26/23  1130 03/25/23  0350 03/24/23  0305 03/23/23  0303   WBC 12.49* 11.86* 9.95 11.22* 15.69* 21.33*   HEMOGLOBIN 9.4* 9.8* 8.7* 9.5* 9.1* 10.0*   HEMATOCRIT 30.3* 31.4* 27.1* 30.0* 29.4* 33.1*   PLATELETS 597* 595* 391 320 281 319   NEUTROS ABS  --   --  7.89*  --  13.01* 18.06*   IMMATURE GRANS (ABS)  --   --  0.04  --  0.07* 0.15*   LYMPHS ABS  --   --  0.68*  --  0.83 1.00   MONOS ABS  --   --  0.99*  --  1.57* 2.05*   EOS ABS  --   --  0.30  --  0.18 0.02   MCV 85.6 85.3 82.9 84.7 86.5 86.4   CRP 9.20*  --   --   --   --   --    PROCALCITONIN  --  0.12  --   --   --   --          Lab 03/29/23  0524 03/28/23  0741 03/27/23  1110 03/26/23  1130  03/25/23  0350 03/24/23  0305 03/23/23  0303   SODIUM 139 138 136 134* 130* 132* 137   POTASSIUM 4.7 4.3 4.6 4.3 4.1 4.8 4.7   CHLORIDE 98 97* 95* 96* 94* 95* 98   CO2 27.0 28.0 28.0 26.0 25.0 26.0 27.0   ANION GAP 14.0 13.0 13.0 12.0 11.0 11.0 12.0   BUN 36* 43* 54* 50* 45* 44* 36*   CREATININE 1.13 1.11 1.23 1.12 1.42* 2.77* 2.49*   EGFR 73.9 75.5 66.8 74.7 56.2* 25.2* 28.7*   GLUCOSE 198* 195* 226* 175* 163* 126* 154*   CALCIUM 8.8 9.0 8.8 8.5* 8.7 8.5* 8.5*   MAGNESIUM  --   --   --  2.2 2.3 1.8 2.1   PHOSPHORUS 3.0  --   --  2.8  --  4.3 4.6*         Lab 03/29/23  0524 03/26/23  1130 03/25/23  0350 03/24/23  0305 03/23/23  0303   TOTAL PROTEIN  --  6.8 6.8 6.8 6.9   ALBUMIN 3.5 3.5 3.6 3.7 4.1   GLOBULIN  --  3.3 3.2 3.1 2.8   ALT (SGPT)  --  7 7 6 6   AST (SGOT)  --  35 27 25 26   BILIRUBIN  --  0.9 1.0 0.8 0.7   ALK PHOS  --  105 74 59 57         Lab 03/24/23  1116   PH, ARTERIAL 7.318*   PCO2, ARTERIAL 47.8*   PO2 ART 74.5*   FIO2 36   HCO3 ART 24.5   BASE EXCESS ART -1.8*   CARBOXYHEMOGLOBIN 1.6     Brief Urine Lab Results  (Last result in the past 365 days)      Color   Clarity   Blood   Leuk Est   Nitrite   Protein   CREAT   Urine HCG        03/23/23 1612             246.9             Microbiology Results Abnormal     Procedure Component Value - Date/Time    Respiratory Culture - Sputum, Cough [[id number]] Collected: 03/29/23 0941    Lab Status: Final result Specimen: Sputum from Cough Updated: 03/29/23 1104     Respiratory Culture Rejected     Gram Stain Moderate (3+) WBCs per low power field      Moderate (3+) Epithelial cells per low power field      Moderate (3+) Gram positive cocci in pairs and chains      Rare (1+) Gram variable bacilli    Narrative:      Specimen rejected due to oropharyngeal contamination. Please reorder and recollect specimen if clinically necessary.    MRSA Screen, PCR (Inpatient) - Swab, Nares [[id number]]  (Normal) Collected: 03/28/23 4300    Lab Status: Final result Specimen:  Swab from Nares Updated: 03/29/23 0803     MRSA PCR Negative    Narrative:      The negative predictive value of this diagnostic test is high and should only be used to consider de-escalating anti-MRSA therapy. A positive result may indicate colonization with MRSA and must be correlated clinically.  MRSA Negative    S. Pneumo Ag Urine or CSF - Urine, Urine, Clean Catch [[id number]]  (Normal) Collected: 03/28/23 1948    Lab Status: Final result Specimen: Urine, Clean Catch Updated: 03/28/23 2325     Strep Pneumo Ag Negative    Legionella Antigen, Urine - Urine, Urine, Clean Catch [[id number]]  (Normal) Collected: 03/28/23 1948    Lab Status: Final result Specimen: Urine, Clean Catch Updated: 03/28/23 2325     LEGIONELLA ANTIGEN, URINE Negative        XR Chest 1 View    Result Date: 3/29/2023  XR CHEST 1 VW Date of Exam: 3/29/2023 8:26 AM EDT Indication: postop Comparison: AP chest x-ray 3/28/2023, 3/26/2023, 3/24/2023 Findings: Sternal wires are demonstrated. Cardiac silhouette remains enlarged. No pneumothorax is identified. There are patchy left lower lung airspace opacities continued to decrease. Right lung appears clear. Arthritic changes of the left shoulder are partially included.     Impression: Impression: 1.Continued decreasing patchy left basilar opacities, likely atelectasis. 2.No definite pneumothorax. Electronically Signed: Kirti Cordero  3/29/2023 8:50 AM EDT  Workstation ID: [id number]    XR Chest 1 View    Result Date: 3/28/2023  XR CHEST 1 VW Date of Exam: 3/28/2023 3:50 AM EDT Indication: postop. Comparison: Chest x-ray 3/26/2023 Findings: Stable cardiomegaly. There is improved aeration of the left lung base with decreased streaky atelectasis. No significant pleural effusion. No pneumothorax.     Impression: Impression: Improved aeration and decreased atelectasis in the left lung base. No definite pneumothorax. Electronically Signed: Jung Salazar  3/28/2023 8:39 AM EDT  Workstation ID:  [id number]    Results for orders placed during the hospital encounter of 02/02/23    Adult Transthoracic Echo Complete w/ Color, Spectral and Contrast if necessary per protocol    Interpretation Summary  •  Left ventricular systolic function is normal. Left ventricular ejection fraction appears to be 66 - 70%.  •  Left ventricular wall thickness is consistent with mild concentric hypertrophy.  •  Left ventricular diastolic function is consistent with (grade II w/high LAP) pseudonormalization.  •  Technically very difficult study.  Right ventricle and right atrium is not well visualized for adequate evaluation.    Current medications:  Scheduled Meds:acetaminophen, 650 mg, Oral, Q8H  amiodarone, 200 mg, Oral, Q12H  amLODIPine, 5 mg, Oral, Q24H  aspirin, 325 mg, Oral, Daily  atorvastatin, 40 mg, Oral, Nightly  azithromycin, 500 mg, Oral, Daily  bumetanide, 2 mg, Intravenous, Once  bumetanide, 2 mg, Oral, Daily  castor oil-balsam peru, 1 application, Topical, Q12H  clopidogrel, 75 mg, Oral, Daily  gabapentin, 400 mg, Oral, TID  insulin detemir, 18 Units, Subcutaneous, Q12H  insulin lispro, 0-7 Units, Subcutaneous, TID AC  Insulin Lispro, 5 Units, Subcutaneous, TID With Meals  lactobacillus acidophilus, 1 capsule, Oral, Daily  linezolid, 600 mg, Oral, Q12H  lisinopril, 40 mg, Oral, Q24H  metoclopramide, 10 mg, Oral, TID AC  nebivolol, 5 mg, Oral, Q24H  oxymetazoline, 2 spray, Each Nare, BID  pharmacy consult - [place], , Does not apply, Daily  senna-docusate sodium, 2 tablet, Oral, BID  tamsulosin, 0.4 mg, Oral, Daily      Continuous Infusions:   PRN Meds:.•  senna-docusate sodium **AND** polyethylene glycol **AND** bisacodyl **AND** bisacodyl  •  Calcium Replacement - Follow Nurse / BPA Driven Protocol  •  dextrose  •  dextrose  •  glucagon (human recombinant)  •  ipratropium-albuterol  •  Magnesium Standard Dose Replacement - Follow Nurse / BPA Driven Protocol  •  ondansetron  •  Phosphorus Replacement - Follow Nurse / BPA  Driven Protocol  •  Potassium Replacement - Follow Nurse / BPA Driven Protocol  •  simethicone    Assessment & Plan     Active Hospital Problems    Diagnosis  POA   • **Multivessel CAD with remote ptca stent, now with stable anginal equivelent (dyspnea) ([facility]) [I25.118]  Yes   • S/P CABG x 3 on 3/21/2023 [Z95.1]  Not Applicable   • Hypertension [I10]  Yes   • Hyperlipidemia [E78.5]  Yes   • EMANUEL (HCC) [N17.9]  No   • Insulin resistant Diabetes mellitus (HCC) [E11.9]  Yes   • Sleep apnea [G47.30]  Yes      Resolved Hospital Problems   No resolved problems to display.     Brief Hospital Course to date:  Wilbert Kelley is a 62 y.o. male with relevant PMH of CAD with PTCA and stent placement to LAD and right Coronary Artery in 2010, HTN, HLD, recent C 3/16 w/ MVCAD and diabetes. He was admitted to [facility] 3/21/2023 following CABG x 3 by Dr. Emanuel. He transferred out of ICU on 3/25/2023 and hospital medicine consulted to follow.      MVCAD s/p 3v CABG  PAT vs Aflutter  - Post-operative care per CT surgery  - Cardiology following. S/p IV Amiodarone, now onPO  - Encourage ambulation, IS  - PRN pain control. Gabapenin dose reduced due to EMANUEL   - Cardiology added Bumex on 3/27  - Continue ASA, high-intensity statin and BB     Leukocytosis  Sternal incision cellulitis  Bronchitis  - ID following - on Linezolid / Azithromycin      DMII   Nausea, suspected gastroparesis  Diabetic peripheral neuropathy  - Increase Levemir to 18 units BID, continue Lispro 5 units TID AC + SSI  - DC Reglan and see how he does      Hypotension, resolved   EMANUEL  Hyponatremia  - Nephrology following  - 1200mL fluid restriction - sodium normal at 139 today  - Creatinine 1.13 today    Hypertension  - Cardiology managing      Likely MARIAH  - CPAP QHS and PRN     DVT prophylaxis:Mechanical DVT prophylaxis orders are present.     AM-PAC 6 Clicks Score (PT): 14 (03/29/23 4846)    CODE STATUS:   Code Status and Medical Interventions:    Ordered at: 23 1133     Code Status (Patient has no pulse and is not breathing):    CPR (Attempt to Resuscitate)     Medical Interventions (Patient has pulse or is breathing):    Full Support     Release to patient:    Routine Release     Mirian Hurley PA-C  23        Electronically signed by Mirian Hurley PA-C at 23 1216          Consult Notes (most recent note)      Ruben Garcia MD at 23 1631      Consult Orders    1. Inpatient Infectious Diseases Consult [[id number]] ordered by Corin Dean APRN at 23 1012                 Patient Name: Wilbert Kelley  : 1960  MRN: [id number]    Date of Consult: 3/28/2023  Admission Date: 3/21/2023    Requesting Provider: Cristina Coombs  Evaluating Physician: Ruben Garcia MD    Chief Complaint: fatigue    Reason for Consultation: pneumonia sternal wound infection    Subjective   History of present illness:   Patient is a 62 y.o. male with history of BPH, skin cancer, degenerative disc disease, diabetes, coronary artery disease.  Admitted for CABG 3/21/23.     3/28/23: Infectious disease consulted due to concern for developing sternal wound infection as well as possible pneumonia.  Afebrile.  No antibiotics for the past week.  Patient complains about pain around incision but no dehiscence, drainage.  No drains in place at this time.  Does complain of cough productive of phlegm which is unusual for him. No other new concerns noted.       Past Medical History:   Diagnosis Date   • Arthritis    • BPH (benign prostatic hyperplasia)    • Cancer (HCC)     basal and melanoma-  x2 - left side ear and elbow   • Constipation    • Coronary artery disease    • DDD (degenerative disc disease), lumbar    • Diabetes mellitus (HCC)     couple times month check sugar   • Frozen shoulder     left   • GERD (gastroesophageal reflux disease)    • Hyperlipidemia    • Hypertension    • Limited mobility     left shoulder  -  possible frozen shoulder-= please be aware for surgery   • Sleep apnea     doesnt use machine   • Tinnitus    • Wears glasses     small print       Past Surgical History:   Procedure Laterality Date   • CARDIAC CATHETERIZATION     • CARDIAC CATHETERIZATION Right 03/16/2023    Procedure: Left Heart Cath;  Surgeon: Jarod Boyle MD;  Location:  [facility];  Service: Cardiovascular;  Laterality: Right;   • CATARACT EXTRACTION, BILATERAL Bilateral    • COLONOSCOPY     • CORONARY ARTERY BYPASS GRAFT N/A 3/21/2023    Procedure: MEDIAN STERNOTOMY, CORONARY ARTERY BYPASS GRAFTING X 3, UTILIZING THE LEFT INTERNAL MAMMARY ARTERY, ENDOSCOPIC VEIN HARVEST OF THE RIGHT GREATER SAPENOUSE VEIN;  Surgeon: Markus Emanuel MD;  Location:  [facility];  Service: Cardiothoracic;  Laterality: N/A;   • CORONARY STENT PLACEMENT      x4   • ENDOSCOPY     • FINGER SURGERY Left     middle finger - left side   • SKIN CANCER EXCISION      x2   • WISDOM TOOTH EXTRACTION         Social History     Socioeconomic History   • Marital status:    Tobacco Use   • Smoking status: Never     Passive exposure: Past   • Smokeless tobacco: Current     Types: Snuff   Vaping Use   • Vaping Use: Never used   Substance and Sexual Activity   • Alcohol use: Not Currently   • Drug use: Never   • Sexual activity: Defer        Family History   Problem Relation Age of Onset   • COPD Mother    • Heart attack Father 45        passed   • Heart attack Paternal Grandfather 42        massive heart attack   • Heart disease Paternal Grandfather        Allergies   Allergen Reactions   • Morphine Nausea And Vomiting     projectile   • Adhesive Tape Rash     Pt states sticky tabs cause irritation when left on long         Objective   Antibiotics:  Anti-Infectives (From admission, onward)    Ordered     Dose/Rate Route Frequency Start Stop    03/21/23 1133  ceFAZolin in Sodium Chloride (ANCEF) IVPB solution 3 g        Ordering Provider: Kill Buck,  KRISTI Bland    3 g  200 mL/hr over 30 Minutes Intravenous Every 8 Hours 03/21/23 1800 03/23/23 0317    03/21/23 0547  ceFAZolin in Sodium Chloride (ANCEF) IVPB solution 3 g        Ordering Provider: Rolan Enriquez PA    3 g  200 mL/hr over 30 Minutes Intravenous Once 03/21/23 0549 03/21/23 0709          Other Medications:  Current Facility-Administered Medications   Medication Dose Route Frequency Provider Last Rate Last Admin   • acetaminophen (TYLENOL) tablet 650 mg  650 mg Oral Q8H Low Francois PA-C   650 mg at 03/28/23 1537   • amiodarone (PACERONE) tablet 200 mg  200 mg Oral Q12H Kemar Walsh MD   200 mg at 03/28/23 0846   • aspirin EC tablet 325 mg  325 mg Oral Daily Low Francois PA-C   325 mg at 03/28/23 0846   • atorvastatin (LIPITOR) tablet 40 mg  40 mg Oral Nightly Low Francois PA-C   40 mg at 03/27/23 2024   • sennosides-docusate (PERICOLACE) 8.6-50 MG per tablet 2 tablet  2 tablet Oral BID Low Francois PA-C   2 tablet at 03/28/23 0846    And   • polyethylene glycol (MIRALAX) packet 17 g  17 g Oral Daily PRN Low Francois PA-C   17 g at 03/26/23 1318    And   • bisacodyl (DULCOLAX) EC tablet 5 mg  5 mg Oral Daily PRN Low Francois PA-C        And   • bisacodyl (DULCOLAX) suppository 10 mg  10 mg Rectal Daily PRN Low Francois PA-C       • bumetanide (BUMEX) tablet 2 mg  2 mg Oral Daily Markus Riddle MD   2 mg at 03/28/23 0846   • Calcium Replacement - Follow Nurse / BPA Driven Protocol   Does not apply PRN Low Francois PA-C       • castor oil-balsam peru (VENELEX) ointment 1 application  1 application Topical Q12H Low Francois PA-C   1 application at 03/28/23 0845   • clopidogrel (PLAVIX) tablet 75 mg  75 mg Oral Daily Corin Dean APRN   75 mg at 03/28/23 0846   • dextrose (D50W) (25 g/50 mL) IV injection 10-50 mL  10-50 mL Intravenous Q15 Min PRN Low Francois PA-C       • dextrose (GLUTOSE) oral gel 15 g  15 g Oral Q15 Min PRN  Low Francois PA-C       • gabapentin (NEURONTIN) capsule 400 mg  400 mg Oral TID Low Francois PA-C   400 mg at 03/28/23 1537   • glucagon (GLUCAGEN) injection 1 mg  1 mg Intramuscular Q15 Min PRN Low Francois PA-C       • insulin detemir (LEVEMIR) injection 15 Units  15 Units Subcutaneous Q12H Low Francois PA-C   15 Units at 03/28/23 0845   • Insulin Lispro (humaLOG) injection 0-24 Units  0-24 Units Subcutaneous 4x Daily With Meals & Nightly Low Francois PA-C   4 Units at 03/28/23 1205   • Insulin Lispro (humaLOG) injection 5 Units  5 Units Subcutaneous TID With Meals Low Francois PA-C   5 Units at 03/28/23 1204   • ipratropium-albuterol (DUO-NEB) nebulizer solution 3 mL  3 mL Nebulization Q6H PRN Low Francois PA-C   3 mL at 03/23/23 0533   • lisinopril (PRINIVIL,ZESTRIL) tablet 20 mg  20 mg Oral Q24H Markus Riddle MD   20 mg at 03/28/23 1204   • Magnesium Standard Dose Replacement - Follow Nurse / BPA Driven Protocol   Does not apply PRN Low Francois PA-C       • metoclopramide (REGLAN) tablet 10 mg  10 mg Oral TID AC Mirian Hurley PA-C       • nebivolol (BYSTOLIC) tablet 5 mg  5 mg Oral Q24H Low Francois PA-C   5 mg at 03/28/23 0845   • ondansetron (ZOFRAN) injection 4 mg  4 mg Intravenous Q6H PRN Low Francois PA-C   4 mg at 03/22/23 1810   • oxymetazoline (AFRIN) nasal spray 2 spray  2 spray Each Nare BID Mirian Hurley PA-C   2 spray at 03/28/23 0847   • Pharmacy Consult - MTM   Does not apply Daily Low Francois PA-C       • Phosphorus Replacement - Follow Nurse / BPA Driven Protocol   Does not apply PRN Low Francois PA-C       • Potassium Replacement - Follow Nurse / BPA Driven Protocol   Does not apply PRN Alessandro, Low K, PA-C       • simethicone (MYLICON) chewable tablet 80 mg  80 mg Oral 4x Daily PRN Low Francois PA-C   80 mg at 03/22/23 1810   • tamsulosin (FLOMAX) 24 hr capsule 0.4 mg  0.4 mg Oral Daily  "Low Francois PA-C   0.4 mg at 03/28/23 0846       Physical Exam:   Vital Signs   /71   Pulse 72   Temp 97.2 °F (36.2 °C) (Oral)   Resp 20   Ht 185.4 cm (73\")   Wt (!) 148 kg (326 lb)   SpO2 96%   BMI 43.01 kg/m²     GENERAL: Awake and alert, sitting up  HEENT: Normocephalic, atraumatic.  EOMI. No conjunctival injection. No icterus. Oropharynx clear without evidence of thrush or exudate.   NECK: Supple without nuchal rigidity.   HEART: RRR; No murmur.  LUNGS: Wheezes bilaterally.  No cough appreciated  Chest wall: Sternotomy incision with erythema but no dehiscence  ABDOMEN: obese. Soft, nontender   EXT:  No edema.   : Without Ken catheter.  SKIN: No rash.  Vein graft site healing well.    NEURO: Oriented to PPT. No focal deficits on motor/sensory exam at arms/legs.  PSYCHIATRIC: Normal insight and judgement. Cooperative with PE    Laboratory Data  Lab Results   Component Value Date    WBC 11.86 (H) 03/28/2023    HGB 9.8 (L) 03/28/2023    HCT 31.4 (L) 03/28/2023    MCV 85.3 03/28/2023     (H) 03/28/2023     Lab Results   Component Value Date    GLUCOSE 195 (H) 03/28/2023    CALCIUM 9.0 03/28/2023     03/28/2023    K 4.3 03/28/2023    CO2 28.0 03/28/2023    CL 97 (L) 03/28/2023    BUN 43 (H) 03/28/2023    CREATININE 1.11 03/28/2023     Estimated Creatinine Clearance: 105.8 mL/min (by C-G formula based on SCr of 1.11 mg/dL).  Lab Results   Component Value Date    ALT 7 03/26/2023    AST 35 03/26/2023    ALKPHOS 105 03/26/2023    BILITOT 0.9 03/26/2023     No results found for: CRP  No results found for: SEDRATE    The above labs were reviewed.     Microbiology:  Microbiology Results (last 10 days)     ** No results found for the last 240 hours. **          Radiology:  XR Chest 1 View    Result Date: 3/28/2023  Impression: Improved aeration and decreased atelectasis in the left lung base. No definite pneumothorax. Electronically Signed: Jung Salazar  3/28/2023 8:39 AM EDT  " Workstation ID: [id number]    I personally reviewed the above CXR: no focal infiltrate    3/28 QTc 447 ms    Assessment     1. Leukocytosis   2. Sternotomy incision infection, cellulitis: No dehiscence or drainage yet but definitely erythema  3. Productive cough, bronchitis: Possible developing pneumonia but no clear infiltrate on CXR. procal normal  4. CAD s/p CABG on 3/21/23  5. Atrial fibrillation on amiodarone  6. Obesity  7. DM2    PLAN:   - Respiratory culture if able to produce a good specimen  - MRSA nares screening  - Urine strep and Legionella antigens    - Start PO linezolid 600 mg BID  - Start azithromycin 500 mg daily for a few days.  Follow EKG to monitor for QT prolongation  - probiotic    I will follow     Ruben Garcia MD  3/28/2023      Electronically signed by Ruben Garcia MD at 23 0720          Physical Therapy Notes (most recent note)      Donna Agudelo, PT at 23 0918  Version 1 of 1         Patient Name: Wilbert Kelley  : 1960    MRN: [id number]                              Today's Date: 3/29/2023       Admit Date: 3/21/2023    Visit Dx:     ICD-10-CM ICD-9-CM   1. Coronary artery disease of native artery of native heart with stable angina pectoris ([facility])  I25.118 414.01     413.9     Patient Active Problem List   Diagnosis   • SOB (shortness of breath) on exertion   • Insulin resistant Diabetes mellitus (HCC)   • Sleep apnea   • Multivessel CAD with remote ptca stent, now with stable anginal equivelent (dyspnea) ([facility])   • S/P CABG x 3 on 3/21/2023   • Hypertension   • Hyperlipidemia   • EMANUEL (HCC)     Past Medical History:   Diagnosis Date   • Arthritis    • BPH (benign prostatic hyperplasia)    • Cancer (HCC)     basal and melanoma-  x2 - left side ear and elbow   • Constipation    • Coronary artery disease    • DDD (degenerative disc disease), lumbar    • Diabetes mellitus (HCC)     couple times month check sugar   • Frozen shoulder     left   • GERD  (gastroesophageal reflux disease)    • Hyperlipidemia    • Hypertension    • Limited mobility     left shoulder  - possible frozen shoulder-= please be aware for surgery   • Sleep apnea     doesnt use machine   • Tinnitus    • Wears glasses     small print     Past Surgical History:   Procedure Laterality Date   • CARDIAC CATHETERIZATION     • CARDIAC CATHETERIZATION Right 03/16/2023    Procedure: Left Heart Cath;  Surgeon: Jarod Boyle MD;  [facility];  Service: Cardiovascular;  Laterality: Right;   • CATARACT EXTRACTION, BILATERAL Bilateral    • COLONOSCOPY     • CORONARY ARTERY BYPASS GRAFT N/A 3/21/2023    Procedure: MEDIAN STERNOTOMY, CORONARY ARTERY BYPASS GRAFTING X 3, UTILIZING THE LEFT INTERNAL MAMMARY ARTERY, ENDOSCOPIC VEIN HARVEST OF THE RIGHT GREATER SAPENOUSE VEIN;  Surgeon: Markus Emanuel MD;  Location: [facility] OR;  Service: Cardiothoracic;  Laterality: N/A;   • CORONARY STENT PLACEMENT      x4   • ENDOSCOPY     • FINGER SURGERY Left     middle finger - left side   • SKIN CANCER EXCISION      x2   • WISDOM TOOTH EXTRACTION        General Information     Row Name 03/29/23 0946          Physical Therapy Time and Intention    Document Type therapy note (daily note)  -ES     Mode of Treatment physical therapy  -ES     Row Name 03/29/23 0946          General Information    Patient Profile Reviewed yes  -ES     Existing Precautions/Restrictions cardiac;fall;oxygen therapy device and L/min;sternal  -ES     Barriers to Rehab medically complex;previous functional deficit  -ES     Row Name 03/29/23 0946          Cognition    Orientation Status (Cognition) oriented x 3  -ES     Row Name 03/29/23 0946          Safety Issues, Functional Mobility    Safety Issues Affecting Function (Mobility) awareness of need for assistance;insight into deficits/self-awareness;safety precaution awareness;safety precautions follow-through/compliance  -ES     Impairments Affecting Function  (Mobility) balance;endurance/activity tolerance;shortness of breath;strength;pain  -ES     Comment, Safety Issues/Impairments (Mobility) Required frequent cueing for adherence to sternal precautions.  -ES           User Key  (r) = Recorded By, (t) = Taken By, (c) = Cosigned By    Initials Name Provider Type    Donna Fisher PT Physical Therapist               Mobility     Row Name 03/29/23 0946          Bed Mobility    Comment, (Bed Mobility) UIC  -ES     Row Name 03/29/23 0946          Sit-Stand Transfer    Sit-Stand [place] (Transfers) minimum assist (75% patient effort);verbal cues  -ES     Assistive Device (Sit-Stand Transfers) walker, front-wheeled  -ES     Comment, (Sit-Stand Transfer) Frequent cueing for sternal precautions. Able to stand on first attempt but required rocking to gain momentum  -ES     Row Name 03/29/23 0946          Gait/Stairs (Locomotion)    [place] Level (Gait) minimum assist (75% patient effort);verbal cues  -ES     Assistive Device (Gait) walker, front-wheeled  -ES     Distance in Feet (Gait) 75+50+50+50  -ES     Deviations/Abnormal Patterns (Gait) base of support, wide;stride length decreased;weight shifting decreased  -ES     Bilateral Gait Deviations forward flexed posture;heel strike decreased  -ES     Comment, (Gait/Stairs) Pt ambulated multiple short bouts but required prolonged standing rest breaks due to c/o BLE pain and fatigue. Required frequent cueing for adherence to sternal precautions. No LOB.  -ES           User Key  (r) = Recorded By, (t) = Taken By, (c) = Cosigned By    Initials Name Provider Type    Donna Fisher PT Physical Therapist               Obj/Interventions     Row Name 03/29/23 0905          Motor Skills    Therapeutic Exercise hip;knee;ankle  -ES     Row Name 03/29/23 0921          Hip (Therapeutic Exercise)    Hip (Therapeutic Exercise) isometric exercises;strengthening exercise  -ES     Hip Isometrics (Therapeutic Exercise)  bilateral;gluteal sets;10 repetitions  -ES     Hip Strengthening (Therapeutic Exercise) bilateral;marching while seated;10 repetitions  -ES     Row Name 03/29/23 0950          Knee (Therapeutic Exercise)    Knee (Therapeutic Exercise) isometric exercises;strengthening exercise  -ES     Knee Isometrics (Therapeutic Exercise) bilateral;quad sets;10 repetitions  -ES     Knee Strengthening (Therapeutic Exercise) bilateral;LAQ (long arc quad);SAQ (short arc quad);marching while seated;10 repetitions  -ES     Row Name 03/29/23 0950          Ankle (Therapeutic Exercise)    Ankle (Therapeutic Exercise) strengthening exercise  -ES     Ankle Strengthening (Therapeutic Exercise) bilateral;dorsiflexion;plantarflexion;10 repetitions  -ES     Row Name 03/29/23 0950          Balance    Balance Assessment sitting static balance;sitting dynamic balance;sit to stand dynamic balance;standing static balance;standing dynamic balance  -ES     Static Sitting Balance supervision  -ES     Dynamic Sitting Balance contact guard;verbal cues  -ES     Position, Sitting Balance unsupported;sitting in chair  -ES     Static Standing Balance contact guard  -ES     Dynamic Standing Balance minimal assist;verbal cues  -ES     Position/Device Used, Standing Balance supported;walker, front-wheeled  -ES     Balance Interventions sitting;standing;sit to stand;supported;static;dynamic;occupation based/functional task  -ES     Comment, Balance No LOB but required increased assistance throughout mobility  -ES           User Key  (r) = Recorded By, (t) = Taken By, (c) = Cosigned By    Initials Name Provider Type    ES Donna Agudelo PT Physical Therapist               Goals/Plan    No documentation.                Clinical Impression     Row Name 03/29/23 0952          Pain    Pretreatment Pain Rating 7/10  -ES     Posttreatment Pain Rating 8/10  -ES     Pain Location incisional  -ES     Pain Location - chest;other (see comments)  BLE pain  -ES      Pre/Posttreatment Pain Comment Continues to be limited heavily by c/o BLE sciatic pain  -ES     Pain Intervention(s) Repositioned;Ambulation/increased activity  -ES     Row Name 03/29/23 0952          Plan of Care Review    Plan of Care Reviewed With patient  -ES     Progress no change  -ES     Outcome Evaluation Pt continues to be limited heavily by BLE sciatic pain and incisional chest pain. Pt also continues to require constant cueing for adherence to sternal precautions with ~50% compliance. Pt required multiple rest breaks throughout mobility and was unable to safely attempt stair training. Will continue to progress as able. PT rec IRF at d/c.  -ES     Row Name 03/29/23 0952          Therapy Assessment/Plan (PT)    Rehab Potential (PT) good, to achieve stated therapy goals  -ES     Criteria for Skilled Interventions Met (PT) yes;meets criteria;skilled treatment is necessary  -ES     Therapy Frequency (PT) daily  -ES     Row Name 03/29/23 0952          Vital Signs    Pre Systolic BP Rehab --  VSS  -ES     O2 Delivery Pre Treatment room air  -ES     O2 Delivery Intra Treatment room air  -ES     O2 Delivery Post Treatment room air  -ES     Pre Patient Position Sitting  -ES     Intra Patient Position Standing  -ES     Post Patient Position Sitting  -ES     Row Name 03/29/23 0952          Positioning and Restraints    Pre-Treatment Position sitting in chair/recliner  -ES     Post Treatment Position chair  -ES     In Chair notified nsg;reclined;sitting;call light within reach;encouraged to call for assist;exit alarm on;waffle cushion  -ES           User Key  (r) = Recorded By, (t) = Taken By, (c) = Cosigned By    Initials Name Provider Type    Donna Fisher PT Physical Therapist               Outcome Measures     Row Name 03/29/23 0954          How much help from another person do you currently need...    Turning from your back to your side while in flat bed without using bedrails? 3  -ES     Moving from lying  on back to sitting on the side of a flat bed without bedrails? 2  -ES     Moving to and from a bed to a chair (including a wheelchair)? 3  -ES     Standing up from a chair using your arms (e.g., wheelchair, bedside chair)? 3  -ES     Climbing 3-5 steps with a railing? 1  -ES     To walk in hospital room? 2  -ES     AM-PAC 6 Clicks Score (PT) 14  -ES     Highest level of mobility 4 --> Transferred to chair/commode  -ES     Row Name 03/29/23 0954          Functional Assessment    Outcome Measure Options AM-PAC 6 Clicks Basic Mobility (PT)  -ES           User Key  (r) = Recorded By, (t) = Taken By, (c) = Cosigned By    Initials Name Provider Type    Donna Fisher PT Physical Therapist                             Physical Therapy Education     Title: PT OT SLP Therapies (In Progress)     Topic: Physical Therapy (In Progress)     Point: Mobility training (In Progress)     Learning Progress Summary           Patient Acceptance, E, VU,NR by LO at 3/27/2023 1454    Comment: PT POC, sternal precautions    Acceptance, E,D, NR by LS at 3/25/2023 1610    Acceptance, E, NR by CM at 3/24/2023 1155    Acceptance, E, NR by CM at 3/23/2023 1043    Acceptance, E, NR by CM at 3/22/2023 1142   Significant Other Acceptance, E, NR by CM at 3/24/2023 1155                   Point: Home exercise program (Done)     Learning Progress Summary           Patient Acceptance, E, VU,NR by LO at 3/27/2023 1454    Comment: PT POC, sternal precautions                   Point: Body mechanics (In Progress)     Learning Progress Summary           Patient Acceptance, E, VU,NR by LO at 3/27/2023 1454    Comment: PT POC, sternal precautions    Acceptance, E,D, NR by LS at 3/25/2023 1610    Acceptance, E, NR by CM at 3/24/2023 1155    Acceptance, E, NR by CM at 3/23/2023 1043    Acceptance, E, NR by CM at 3/22/2023 1142   Significant Other Acceptance, E, NR by CM at 3/24/2023 1155                   Point: Precautions (In Progress)     Learning  Progress Summary           Patient Acceptance, E, VU,NR by LO at 3/27/2023 1454    Comment: PT POC, sternal precautions    Acceptance, E,D, NR by LS at 3/25/2023 1610    Acceptance, E, NR by CM at 3/24/2023 1155    Acceptance, E, NR by CM at 3/23/2023 1043    Acceptance, E, NR by CM at 3/22/2023 1142   Significant Other Acceptance, E, NR by CM at 3/24/2023 1155                               User Key     Initials Effective Dates Name Provider Type Discipline    LS 02/03/23 -  Kirti Morfin, PT Physical Therapist PT    LO 06/16/21 -  Kirti Garcia, PT Physical Therapist PT    CM 09/22/22 -  Anahy Deleon PT Physical Therapist PT              PT Recommendation and Plan     Plan of Care Reviewed With: patient  Progress: no change  Outcome Evaluation: Pt continues to be limited heavily by BLE sciatic pain and incisional chest pain. Pt also continues to require constant cueing for adherence to sternal precautions with ~50% compliance. Pt required multiple rest breaks throughout mobility and was unable to safely attempt stair training. Will continue to progress as able. PT rec IRF at d/c.     Time Calculation:    PT Charges     Row Name 03/29/23 0954             Time Calculation    Start Time 0918  -ES      PT Received On 03/29/23  -ES      PT Goal Re-Cert Due Date 04/01/23  -ES         Time Calculation- PT    Total Timed Code Minutes- PT 25 minute(s)  -ES         Timed Charges    67622 - PT Therapeutic Exercise Minutes 10  -ES      45455 - Gait Training Minutes  15  -ES         Total Minutes    Timed Charges Total Minutes 25  -ES       Total Minutes 25  -ES            User Key  (r) = Recorded By, (t) = Taken By, (c) = Cosigned By    Initials Name Provider Type    ES Donna Agudelo PT Physical Therapist              Therapy Charges for Today     Code Description Service Date Service Provider Modifiers Qty    37879559115 HC GAIT TRAINING EA 15 MIN 3/28/2023 Donna Agudelo, PT GP 1    50225185632 HC PT  THERAPEUTIC ACT EA 15 MIN 3/28/2023 Donna Agudelo, PT GP 1    14159954929 HC PT THER PROC EA 15 MIN 3/29/2023 Donna Agudelo, PT GP 1    21257455891 HC GAIT TRAINING EA 15 MIN 3/29/2023 Donna Agudelo, PT GP 1          PT G-Codes  Outcome Measure Options: AM-PAC 6 Clicks Basic Mobility (PT)  AM-PAC 6 Clicks Score (PT): 14  PT Discharge Summary  Anticipated Discharge Disposition (PT): inpatient rehabilitation facility    Donna Agudelo PT  3/29/2023      Electronically signed by Donna Agudelo, PT at 23 0954          Occupational Therapy Notes (most recent note)      Penny Goodwin, OT at 23 1123          Patient Name: Wilbert Kelley  : 1960    MRN: [id number]                              Today's Date: 3/29/2023       Admit Date: 3/21/2023    Visit Dx:     ICD-10-CM ICD-9-CM   1. Coronary artery disease of native artery of native heart with stable angina pectoris ([facility]ly [facility])  I25.118 414.01     413.9     Patient Active Problem List   Diagnosis   • SOB (shortness of breath) on exertion   • Insulin resistant Diabetes mellitus ([facility])   • Sleep apnea   • Multivessel CAD with remote ptca stent, now with stable anginal equivelent (dyspnea) ([facility])   • S/P CABG x 3 on 3/21/2023   • Hypertension   • Hyperlipidemia   • EMANUEL (HCC)     Past Medical History:   Diagnosis Date   • Arthritis    • BPH (benign prostatic hyperplasia)    • Cancer ([facility])     basal and melanoma-  x2 - left side ear and elbow   • Constipation    • Coronary artery disease    • DDD (degenerative disc disease), lumbar    • Diabetes mellitus ([facility])     couple times month check sugar   • Frozen shoulder     left   • GERD (gastroesophageal reflux disease)    • Hyperlipidemia    • Hypertension    • Limited mobility     left shoulder  - possible frozen shoulder-= please be aware for surgery   • Sleep apnea     doesnt use machine   • Tinnitus    • Wears glasses     small print     Past Surgical History:   Procedure Laterality Date   •  CARDIAC CATHETERIZATION     • CARDIAC CATHETERIZATION Right 03/16/2023    Procedure: Left Heart Cath;  Surgeon: Jarod Boyle MD;  Location:  [facility];  Service: Cardiovascular;  Laterality: Right;   • CATARACT EXTRACTION, BILATERAL Bilateral    • COLONOSCOPY     • CORONARY ARTERY BYPASS GRAFT N/A 3/21/2023    Procedure: MEDIAN STERNOTOMY, CORONARY ARTERY BYPASS GRAFTING X 3, UTILIZING THE LEFT INTERNAL MAMMARY ARTERY, ENDOSCOPIC VEIN HARVEST OF THE RIGHT GREATER SAPENOUSE VEIN;  Surgeon: Markus Emanuel MD;  Location:  [facility] OR;  Service: Cardiothoracic;  Laterality: N/A;   • CORONARY STENT PLACEMENT      x4   • ENDOSCOPY     • FINGER SURGERY Left     middle finger - left side   • SKIN CANCER EXCISION      x2   • WISDOM TOOTH EXTRACTION        General Information     Row Name 03/29/23 1144          OT Time and Intention    Document Type evaluation  -KF     Mode of Treatment occupational therapy  -KF     Row Name 03/29/23 1144          General Information    Patient Profile Reviewed yes  -KF     Prior Level of Function independent:;transfer;bed mobility;ADL's  -KF     Existing Precautions/Restrictions cardiac;fall;sternal  -KF     Barriers to Rehab medically complex;previous functional deficit  -KF     Row Name 03/29/23 1144          Occupational Profile    Environmental Supports and Barriers (Occupational Profile) no DME used at baseline, tub shower  -KF     Row Name 03/29/23 1144          Living Environment    People in Home spouse;child(rama), adult;grandchild(rama)  -KF     Row Name 03/29/23 1144          Home Main Entrance    Number of Stairs, Main Entrance twelve  per PT report has 12+4+3+2 steps to enter dwelling  -KF     Row Name 03/29/23 1144          Stairs Within Home, Primary    Stairs, Within Home, Primary can stay on main level once in home  -KF     Row Name 03/29/23 1144          Cognition    Orientation Status (Cognition) oriented x 3  -KF     Row Name 03/29/23 1144           Safety Issues, Functional Mobility    Safety Issues Affecting Function (Mobility) insight into deficits/self-awareness;awareness of need for assistance;safety precaution awareness;sequencing abilities  -     Impairments Affecting Function (Mobility) balance;endurance/activity tolerance;shortness of breath;strength;pain  -KF     Comment, Safety Issues/Impairments (Mobility) cues to maintain sternal precautions throughout for repositioning and transfers, did progress with transfers with cues and education  -           User Key  (r) = Recorded By, (t) = Taken By, (c) = Cosigned By    Initials Name Provider Type    KF Penny Goodwin, OT Occupational Therapist                 Mobility/ADL's     Row Name 03/29/23 1146          Bed Mobility    Comment, (Bed Mobility) UIC  -     Row Name 03/29/23 1146          Transfers    Transfers sit-stand transfer;stand-sit transfer  -     Comment, (Transfers) 2 STS reps with cues for sternal precautions and did well with rocking to get momentum without using UEs on chair or pushing to maintain precautions  -     Row Name 03/29/23 1146          Sit-Stand Transfer    Sit-Stand [place] (Transfers) contact guard  -KF     Assistive Device (Sit-Stand Transfers) walker, front-wheeled  -KF     Row Name 03/29/23 1146          Stand-Sit Transfer    Stand-Sit [place] (Transfers) contact guard  -KF     Assistive Device (Stand-Sit Transfers) walker, front-wheeled  -KF     Row Name 03/29/23 1146          Functional Mobility    Functional Mobility- Safety Issues step length decreased;weight-shifting ability decreased  -     Functional Mobility- Comment deferred to PT  -     Row Name 03/29/23 1146          Activities of Daily Living    BADL Assessment/Intervention lower body dressing  -     Row Name 03/29/23 1146          Lower Body Dressing Assessment/Training    [place] Level (Lower Body Dressing) don;socks;moderate assist (50% patient effort)  -KF     Position  (Lower Body Dressing) unsupported sitting  -KF     Comment, (Lower Body Dressing) can assist in crossing LEs however limited 2/2 edema especially in RLE  -KF           User Key  (r) = Recorded By, (t) = Taken By, (c) = Cosigned By    Initials Name Provider Type    KF Penny Goodwin, OT Occupational Therapist               Obj/Interventions     Row Name 03/29/23 1148          Sensory Assessment (Somatosensory)    Sensory Assessment (Somatosensory) bilateral UE  -KF     Bilateral UE Sensory Assessment impaired;general sensation  -     Row Name 03/29/23 1148          Vision Assessment/Intervention    Visual Impairment/Limitations WFL  -KF     Row Name 03/29/23 1148          Range of Motion Comprehensive    General Range of Motion upper extremity range of motion deficits identified  -KF     Comment, General Range of Motion limited chronic LUE shoulder deficits reported, WFL RUE for ADL completion within sternal precautions  -     Row Name 03/29/23 1148          Strength Comprehensive (MMT)    Comment, General Manual Muscle Testing (MMT) Assessment B  5/5 deferred full MMT due to sternal precautions  -     Row Name 03/29/23 1148          Balance    Balance Assessment sitting static balance;sitting dynamic balance;standing static balance;standing dynamic balance  -KF     Static Sitting Balance supervision  -KF     Dynamic Sitting Balance standby assist  -KF     Position, Sitting Balance unsupported;sitting in chair  -KF     Static Standing Balance contact guard  -KF     Dynamic Standing Balance contact guard  -KF     Position/Device Used, Standing Balance walker, front-wheeled  -KF     Balance Interventions sit to stand;supported;weight shifting activity  -     Comment, Balance education on how to weightshift hips without use of UEs and good completion however requires cues throughout to maintain sternal precautions; no LOB overall with use of FWW with weightshifting within room  -KF           User Key   (r) = Recorded By, (t) = Taken By, (c) = Cosigned By    Initials Name Provider Type    Penny Kwok OT Occupational Therapist               Goals/Plan     Row Name 03/29/23 1153          Transfer Goal 1 (OT)    Activity/Assistive Device (Transfer Goal 1, OT) sit-to-stand/stand-to-sit;toilet;walker, rolling  -KF     [place] Level/Cues Needed (Transfer Goal 1, OT) contact guard required  -KF     Time Frame (Transfer Goal 1, OT) long term goal (LTG);10 days  -KF     Progress/Outcome (Transfer Goal 1, OT) goal ongoing;new goal  -     Row Name 03/29/23 1153          Dressing Goal 1 (OT)    Activity/Device (Dressing Goal 1, OT) lower body dressing  reacher PRN  -KF     [place]/Cues Needed (Dressing Goal 1, OT) contact guard required  -KF     Time Frame (Dressing Goal 1, OT) long term goal (LTG);10 days  -KF     Progress/Outcome (Dressing Goal 1, OT) goal ongoing;new goal  -KF     Row Name 03/29/23 1153          Toileting Goal 1 (OT)    Activity/Device (Toileting Goal 1, OT) adjust/manage clothing;perform perineal hygiene;commode  AE PRN  -KF     [place] Level/Cues Needed (Toileting Goal 1, OT) contact guard required  -KF     Time Frame (Toileting Goal 1, OT) long term goal (LTG);10 days  -KF     Progress/Outcome (Toileting Goal 1, OT) goal ongoing;new goal  -     Row Name 03/29/23 1156          Therapy Assessment/Plan (OT)    Planned Therapy Interventions (OT) activity tolerance training;adaptive equipment training;BADL retraining;occupation/activity based interventions;transfer/mobility retraining;functional balance retraining;cognitive/visual perception retraining;ROM/therapeutic exercise;patient/caregiver education/training  -KF           User Key  (r) = Recorded By, (t) = Taken By, (c) = Cosigned By    Initials Name Provider Type    Penny Kwok OT Occupational Therapist               Clinical Impression     Row Name 03/29/23 1153          Pain Assessment    Pretreatment Pain  Rating 6/10  -KF     Posttreatment Pain Rating 6/10  -KF     Pain Location - Side/Orientation Bilateral  -KF     Pain Location lower  -KF     Pain Location - extremity  -KF     Pre/Posttreatment Pain Comment reports shoulder discomfort and BLE pain due to chronic sciatic pain  -KF     Pain Intervention(s) Repositioned;Ambulation/increased activity  -KF     Row Name 03/29/23 1151          Plan of Care Review    Plan of Care Reviewed With patient  -KF     Progress improving  -KF     Outcome Evaluation OT eval completed, Pt demonstrates deficits in strength, balance, and activity tolerance as well as cues required for safe sternal precaution management with progress today with education, recom IPOT DC IRF for best outcomes  -KF     Row Name 03/29/23 1151          Therapy Assessment/Plan (OT)    Rehab Potential (OT) good, to achieve stated therapy goals  -KF     Criteria for Skilled Therapeutic Interventions Met (OT) yes;meets criteria;skilled treatment is necessary  -KF     Therapy Frequency (OT) daily  -KF     Row Name 03/29/23 1151          Therapy Plan Review/Discharge Plan (OT)    Equipment Needs Upon Discharge (OT) walker, rolling  if home  -KF     Anticipated Discharge Disposition (OT) inpatient rehabilitation facility  -KF     Row Name 03/29/23 1151          Vital Signs    Pre Systolic BP Rehab 128  RN cleared VSS  -KF     Pre Treatment Diastolic BP 65  -KF     O2 Delivery Pre Treatment room air  -KF     Pre Patient Position Sitting  -KF     Intra Patient Position Standing  -KF     Post Patient Position Sitting  -KF     Rest Breaks  2  -KF     Row Name 03/29/23 1151          Positioning and Restraints    Pre-Treatment Position sitting in chair/recliner  -KF     Post Treatment Position chair  -KF     In Chair notified nsg;sitting;call light within reach;encouraged to call for assist;exit alarm on;waffle cushion  -KF           User Key  (r) = Recorded By, (t) = Taken By, (c) = Cosigned By    Initials Name Provider  Type    KF Penny Goodwin OT Occupational Therapist               Outcome Measures     Row Name 03/29/23 1154          How much help from another is currently needed...    Putting on and taking off regular lower body clothing? 2  -KF     Bathing (including washing, rinsing, and drying) 2  -KF     Toileting (which includes using toilet bed pan or urinal) 2  -KF     Putting on and taking off regular upper body clothing 3  -KF     Taking care of personal grooming (such as brushing teeth) 3  -KF     Eating meals 4  -KF     AM-PAC 6 Clicks Score (OT) 16  -KF     Row Name 03/29/23 0954 03/29/23 0859       How much help from another person do you currently need...    Turning from your back to your side while in flat bed without using bedrails? 3  -ES 3  -TH    Moving from lying on back to sitting on the side of a flat bed without bedrails? 2  -ES 2  -TH    Moving to and from a bed to a chair (including a wheelchair)? 3  -ES 3  -TH    Standing up from a chair using your arms (e.g., wheelchair, bedside chair)? 3  -ES 3  -TH    Climbing 3-5 steps with a railing? 1  -ES 1  -TH    To walk in hospital room? 2  -ES 2  -TH    AM-PAC 6 Clicks Score (PT) 14  -ES 14  -TH    Highest level of mobility 4 --> Transferred to chair/commode  -ES 4 --> Transferred to chair/commode  -TH    Row Name 03/29/23 1154 03/29/23 0954       Functional Assessment    Outcome Measure Options AM-PAC 6 Clicks Daily Activity (OT)  -KF AM-PAC 6 Clicks Basic Mobility (PT)  -ES          User Key  (r) = Recorded By, (t) = Taken By, (c) = Cosigned By    Initials Name Provider Type    Penny Kwok OT Occupational Therapist    Yojana Landaverde, RN Registered Nurse    Donna Fisher PT Physical Therapist                Occupational Therapy Education     Title: PT OT SLP Therapies (In Progress)     Topic: Occupational Therapy (In Progress)     Point: ADL training (Done)     Description:   Instruct learner(s) on proper safety adaptation and  remediation techniques during self care or transfers.   Instruct in proper use of assistive devices.              Learning Progress Summary           Patient Acceptance, E,TB,D, VU,DU,NR by  at 3/29/2023 1155                   Point: Home exercise program (Not Started)     Description:   Instruct learner(s) on appropriate technique for monitoring, assisting and/or progressing therapeutic exercises/activities.              Learner Progress:  Not documented in this visit.          Point: Precautions (Done)     Description:   Instruct learner(s) on prescribed precautions during self-care and functional transfers.              Learning Progress Summary           Patient Acceptance, E,TB,D, VU,DU,NR by KF at 3/29/2023 1155                   Point: Body mechanics (Done)     Description:   Instruct learner(s) on proper positioning and spine alignment during self-care, functional mobility activities and/or exercises.              Learning Progress Summary           Patient Acceptance, E,TB,D, VU,DU,NR by  at 3/29/2023 1155                               User Key     Initials Effective Dates Name Provider Type Discipline     06/16/21 -  Penny Goodwin, OT Occupational Therapist OT              OT Recommendation and Plan  Planned Therapy Interventions (OT): activity tolerance training, adaptive equipment training, BADL retraining, occupation/activity based interventions, transfer/mobility retraining, functional balance retraining, cognitive/visual perception retraining, ROM/therapeutic exercise, patient/caregiver education/training  Therapy Frequency (OT): daily  Plan of Care Review  Plan of Care Reviewed With: patient  Progress: improving  Outcome Evaluation: OT eval completed, Pt demonstrates deficits in strength, balance, and activity tolerance as well as cues required for safe sternal precaution management with progress today with education, recom IPOT DC IRF for best outcomes     Time Calculation:    Time  Calculation- OT     Row Name 03/29/23 1123 03/29/23 0954          Time Calculation- OT    OT Start Time 1123 -KF --     OT Received On 03/29/23  -KF --     OT Goal Re-Cert Due Date 04/08/23  -KF --        Timed Charges    19419 - Gait Training Minutes  -- 15  -ES        Untimed Charges    OT Eval/Re-eval Minutes 35  -KF --        Total Minutes    Timed Charges Total Minutes -- 15  -ES     Untimed Charges Total Minutes 35  -KF --      Total Minutes 35  -KF 15  -ES           User Key  (r) = Recorded By, (t) = Taken By, (c) = Cosigned By    Initials Name Provider Type    KF Penny Goodwin, OT Occupational Therapist    ES Donna Agudelo PT Physical Therapist              Therapy Charges for Today     Code Description Service Date Service Provider Modifiers Qty    89328077925 HC OT EVAL MOD COMPLEXITY 3 3/29/2023 Penny Goodwin OT GO 1               Penny Goodwin OT  3/29/2023    Electronically signed by Penny Goodwin OT at 03/29/23 1157

## 2023-03-29 NOTE — PLAN OF CARE
Goal Outcome Evaluation:              Outcome Evaluation: VSS; pt maintained O2 > 90% on room air.  Pt expressed feeling discourage about extended stay in hospital.  This nurse attempted to encourage pt but was not successful.  Pt resting in chair. Will continue to monitor.

## 2023-03-29 NOTE — DISCHARGE PLACEMENT REQUEST
"Wilbert Lopez (62 y.o. Male) From Isael Pak RN CM [phone]    Date of Birth   1960    Social Security Number       Address   [address] [address]    Home Phone   [phone]    MRN   [id number]       [facility]    Marital Status                               Admission Date   3/21/23    Admission Type   Elective    Admitting Provider   Markus Emanuel MD    Attending Provider   Markus Emanuel MD    Department, Room/Bed   [address], S476/1       Discharge Date       Discharge Disposition       Discharge Destination                               Attending Provider: Markus Emanuel MD    Allergies: Morphine, Adhesive Tape    Isolation: None   Infection: None   Code Status: CPR    Ht: 185.4 cm (73\")   Wt: 148 kg (325 lb 3.2 oz)    Admission Cmt: None   Principal Problem: Multivessel CAD with remote ptca stent, now with stable anginal equivelent (dyspnea) (HCC) [I25.118]                 Active Insurance as of 3/21/2023     Primary Coverage     Payor Plan Insurance Group Employer/Plan Group    HUMANA MEDICARE REPLACEMENT HUMANA MEDICARE REPLACEMENT [id number]     Payor Plan Address Payor Plan Phone Number Payor Plan Fax Number Effective Dates    [address] [address] [phone]  2023 - None Entered    [facility] [address]       Subscriber Name Subscriber Birth Date Member ID       WILBERT LOPEZ 1960 [id number]                 Emergency Contacts      (Rel.) Home Phone Work Phone Mobile Phone    ANUP LOPEZ (Spouse) -- -- [phone]        [address] [address]  Phone:  [phone]  Fax:  [phone] Date: Mar 29, 2023      Ambulatory Referral to Home Health     Patient:  Wilbert Lopez MRN:  [id number]   [address] [id number] :  1960  SSN:    Phone: [phone] Sex:  M      INSURANCE PAYOR PLAN GROUP # SUBSCRIBER ID "   Primary:    HUMANA MEDICARE REPLACEMENT [id number]      Referring Provider Information:  SINTIA BLACKWELL Phone: [phone] Fax: [phone]       Referral Information:   # Visits:  999 Referral Type: Home Health [42]   Urgency:  Routine Referral Reason: Specialty Services Required   Start Date: Mar 29, 2023 End Date:  To be determined by Insurer   Diagnosis: Coronary artery disease of native artery of native heart with stable angina pectoris (HCC) (I25.118 [ICD-10-CM] 414.01,413.9 [ICD-9-CM])      Refer to Dept:   Refer to Provider:   Refer to Provider Phone:   Refer to Facility:       Face to Face Visit Date: 3/29/2023  Follow-up provider for Plan of Care? I treated the patient in an acute care facility and will not continue treatment after discharge.  Follow-up provider: MCKENZIE NAVARRO [[id number]]  Reason/Clinical Findings: impaired endurance, mobility  Describe mobility limitations that make leaving home difficult: impaired endurance, mobility  Nursing/Therapeutic Services Requested: Skilled Nursing  Nursing/Therapeutic Services Requested: Physical Therapy  Nursing/Therapeutic Services Requested: Occupational Therapy  Skilled nursing orders: Post CABG care  PT orders: Therapeutic exercise  PT orders: Gait Training  PT orders: Strengthening  PT orders: Home safety assessment  Weight Bearing Status: As Tolerated  Occupational orders: Activities of daily living  Occupational orders: Energy conservation  Occupational orders: Strengthening  Occupational orders: Home safety assessment  Frequency: 1 Week 1     This document serves as a request of services and does not constitute Insurance authorization or approval of services.  To determine eligibility, please contact the members Insurance carrier to verify and review coverage.     If you have medical questions regarding this request for services. Please contact [address] at [phone] during normal business hours.  "       Verbal Order Mode: Verbal with readback   Authorizing Provider: Mirian Hurley PA-C  Authorizing Provider's NPI: [id number]     Order Entered By: Isael Pak RN 3/29/2023  2:31 PM     Electronically signed by: Mirian Hurley PA-C            History & Physical      Markus Emanuel MD at 03/21/23 0641          Pre-Op H&P  Wilbert Kelley  [id number]  1960    Full history physical from office visit on 3/20/2023 is up-to-date.    Reports he has held his Plavix as directed and states his last dose was on Wednesday, 3/15/2023.    Review of Systems:  General ROS: negative for chills, fever or skin lesions;  No changes since last office visit.  Neg for recent sick exposure  Cardiovascular ROS: no chest pain or dyspnea on exertion  Respiratory ROS: no cough, shortness of breath, or wheezing    Allergies: Denies allergy to latex or contrast dye.  Allergies   Allergen Reactions   • Morphine Nausea And Vomiting     projectile   • Adhesive Tape Rash     Pt states sticky tabs cause irritation when left on long        Immunization History:  Influenza: No  Pneumococcal: No  Tetanus: No    Vitals:           /93 (BP Location: Left arm, Patient Position: Lying)   Pulse 84   Temp 96.9 °F (36.1 °C) (Tympanic)   Resp 20   Ht 185.4 cm (73\")   Wt (!) 142 kg (314 lb)   SpO2 93%   BMI 41.43 kg/m²     Physical Exam:  General Appearance:    Alert, cooperative, no distress, appears stated age   Head:    Normocephalic, without obvious abnormality, atraumatic   Lungs:     Clear to auscultation bilaterally, respirations unlabored    Heart:   Regular rate and rhythm, S1 and S2 normal, no murmur, rub    or gallop    Abdomen:    Soft, nontender.  +bowel sounds   Breast Exam:    deferred   Genitalia:    deferred   Extremities:   Extremities normal, atraumatic, no cyanosis or edema   Skin:   Skin color, texture, turgor normal, no rashes or lesions   Neurologic:   Grossly intact   Results " Review  LABS:  Lab Results   Component Value Date    WBC 8.79 03/20/2023    HGB 14.4 03/20/2023    HCT 45.1 03/20/2023    MCV 82.8 03/20/2023     03/20/2023    NEUTROABS 6.22 03/20/2023    GLUCOSE 84 03/20/2023    BUN 25 (H) 03/20/2023    CREATININE 1.20 03/20/2023     03/20/2023    K 4.2 03/20/2023    CL 99 03/20/2023    CO2 28.0 03/20/2023    MG 1.7 03/20/2023    CALCIUM 9.6 03/20/2023    ALBUMIN 4.5 03/20/2023    AST 20 03/20/2023    ALT 19 03/20/2023    BILITOT 0.4 03/20/2023    PTT 28.7 03/20/2023    INR 0.98 03/20/2023       RADIOLOGY:  No radiology results for the last 3 days     I reviewed the patient's new clinical results.  CBC and CHEM profile from 3/20/2023 reviewed and available within patient's chart.    Cancer Staging (if applicable)  Cancer Patient: __ yes __no __unknown; If yes, clinical stage T:__ N:__M:__, stage group or __N/A    Impression: Patient presents with coronary artery disease involving native coronary artery of native heart without angina pectoris.    Plan: Dr. Emanuel will perform coronary artery bypass grafting with endoscopic vein harvest.  I agree with the above plan for coronary bypass grafting today I discussed this in detail with the patient and his wife who is a former postcardiac surgery care nurse.  We discussed this yesterday and they are both aware of the risk of stroke bleeding infection death and agreed to proceed  Isael Serna PA-C   03/21/23   6:41 AM EDT           Electronically signed by Markus Emanuel MD at 03/21/23 0655   Source Note          03/20/2023  Patient Information  Wilbert Kelley                                                                                          [address] [facility] [address]   1960  'PCP/Referring Physician'  Jose M Simon MD  [phone]  No ref. provider found    Chief Complaint   Patient presents with   • Coronary Artery Disease     New patient per Dr. Boyle for CAD       History of Present  Illness:  The patient is a 62-year-old male who was referred to me to evaluate for coronary bypass grafting surgery.  He has a history of a stent in his left anterior descending coronary artery and he states that his father and grandfather  at age 42 and 45 respectively from heart attacks.  He denies chest pain but states that he has shortness of breath with minimal exertion and a recent catheterization last week demonstrates critical three-vessel coronary disease.  He is a diabetic requiring insulin with hypertension and hyperlipidemia.  He has not smoked in many years.      Patient Active Problem List   Diagnosis   • SOB (shortness of breath) on exertion   • Coronary artery disease involving native coronary artery of native heart without angina pectoris     Past Medical History:   Diagnosis Date   • Coronary artery disease    • Diabetes mellitus (HCC)    • Hyperlipidemia    • Hypertension    • Sleep apnea      Past Surgical History:   Procedure Laterality Date   • CARDIAC CATHETERIZATION     • CARDIAC CATHETERIZATION Right 3/16/2023    Procedure: Left Heart Cath;  Surgeon: Jarod Boyle MD;  Location: [facility] CATH INVASIVE LOCATION;  Service: Cardiovascular;  Laterality: Right;   • CATARACT EXTRACTION, BILATERAL     • CORONARY STENT PLACEMENT     • SKIN CANCER EXCISION         Current Outpatient Medications:   •  atorvastatin (LIPITOR) 20 MG tablet, , Disp: , Rfl:   •  B-D ULTRAFINE III SHORT PEN 31G X 8 MM misc, , Disp: , Rfl:   •  BD Insulin Syringe U-500 31G X 6MM 0.5 ML misc, , Disp: , Rfl:   •  clopidogrel (PLAVIX) 75 MG tablet, Take 1 tablet by mouth Daily., Disp: , Rfl:   •  fenofibrate 160 MG tablet, Take 1 tablet by mouth Daily., Disp: , Rfl:   •  furosemide (LASIX) 20 MG tablet, Take 1 tablet by mouth Daily., Disp: , Rfl:   •  gabapentin (NEURONTIN) 800 MG tablet, 1 tablet 3 (Three) Times a Day., Disp: , Rfl:   •  hydroCHLOROthiazide (HYDRODIURIL) 25 MG tablet, Take 1 tablet by mouth Daily., Disp: 90  tablet, Rfl: 3  •  Insulin Glargine (TOUJEO MAX SOLOSTAR SC), Inject 85 Units under the skin into the appropriate area as directed 2 (Two) Times a Day., Disp: , Rfl:   •  Insulin Regular Human, Conc, (HumuLIN R) 500 UNIT/ML solution pen-injector CONCENTRATED injection, Inject 65 Units under the skin into the appropriate area as directed 3 (Three) Times a Day Before Meals., Disp: , Rfl:   •  lisinopril (PRINIVIL,ZESTRIL) 40 MG tablet, Take 1 tablet by mouth 2 (Two) Times a Day., Disp: , Rfl:   •  metFORMIN (GLUCOPHAGE) 1000 MG tablet, Take 1 tablet by mouth 2 (Two) Times a Day With Meals. Hold metformin for 2 days post-cath.  Restart on 3/19/2023, Disp: 60 tablet, Rfl: 12  •  metoprolol tartrate 75 MG tablet, Take 75 mg by mouth 2 (Two) Times a Day., Disp: 180 tablet, Rfl: 3  •  potassium chloride 10 MEQ CR tablet, Take 1 tablet by mouth Daily., Disp: , Rfl:   Allergies   Allergen Reactions   • Morphine Nausea And Vomiting     Social History     Socioeconomic History   • Marital status:    Tobacco Use   • Smoking status: Never     Passive exposure: Past   • Smokeless tobacco: Current     Types: Snuff   Vaping Use   • Vaping Use: Never used   Substance and Sexual Activity   • Alcohol use: Not Currently   • Drug use: Never   • Sexual activity: Defer     Family History   Problem Relation Age of Onset   • COPD Mother    • Heart attack Father 45        passed   • Heart attack Paternal Grandfather 42        massive heart attack   • Heart disease Paternal Grandfather      Review of Systems   Constitutional: Negative for chills, decreased appetite, diaphoresis, fever, malaise/fatigue, night sweats, weight gain and weight loss.   HENT: Negative for hoarse voice.    Eyes: Negative for blurred vision, double vision and visual disturbance.   Cardiovascular: Positive for dyspnea on exertion. Negative for chest pain, claudication, irregular heartbeat, leg swelling, near-syncope, orthopnea, palpitations, paroxysmal nocturnal  "dyspnea and syncope.   Respiratory: Negative for cough, hemoptysis, shortness of breath, sputum production and wheezing.    Hematologic/Lymphatic: Negative for adenopathy and bleeding problem. Does not bruise/bleed easily.   Skin: Negative for color change, nail changes, poor wound healing and rash.   Musculoskeletal: Negative for back pain, falls and muscle cramps.   Gastrointestinal: Negative for abdominal pain, dysphagia and heartburn.   Genitourinary: Negative for flank pain.   Neurological: Negative for brief paralysis, disturbances in coordination, dizziness, focal weakness, headaches, light-headedness, loss of balance, numbness, paresthesias, sensory change, vertigo and weakness.   Psychiatric/Behavioral: Negative for depression and suicidal ideas.   Allergic/Immunologic: Negative for persistent infections.     Vitals:    03/20/23 0852   BP: 132/60   BP Location: Right arm   Patient Position: Sitting   Pulse: 71   Temp: 97.2 °F (36.2 °C)   SpO2: 96%   Weight: (!) 142 kg (312 lb)   Height: 185.4 cm (73\")      Physical Exam    CONSTITUTIONAL: Alert and conversant, Well dressed, Well nourished, No acute distress obese  EYES: Sclera clean, Anicteric, Pupils equal  ENT: No nasal deviation, Trachea midline  NECK: No neck masses, Supple  LUNGS: No wheezing, Cough, non-congested  HEART: No rubs, No murmurs  GASTROINTESTINAL: Soft, non-distended, No masses, Non tender  to palpation, normal bowel sounds  NEURO: No motor deficits, No sensory deficits, Cranial Nerves 2 through 12 grossly intact  PSYCHIATRIC: Oriented to person, place and time, No memory deficits, Mood appropriate  VASCULAR: No carotid bruits, Femoral pulses palpable and symmetric  MUSKULOSKELETAL: No extremity trauma or extremity asymmetry    The ROS, past medical history, surgical history, family history, social history and vitals were reviewed by myself and corrected as needed.      Labs/Imaging:  I discussed this case with his cardiologist and " reviewed his cardiac catheterization images demonstrating coronary disease. Smoking cessation was not discussed as patient is no longer a smoker.  He does use chewing tobacco which contains nicotine and will discuss this in more detail. He denies advanced directive.    Assessment/Plan:   The patient is a 62-year-old male who has diabetes requiring insulin and shortness of breath with minimal activity.  His ejection fraction is preserved but he has critical three-vessel coronary disease and a strong family history of coronary disease in his father and grandfather with their death at age 42 and 45 respectively.  We discussed surgery and his recovery also the fact that his risk of infection may be higher with his diabetes and his morbid obesity of over 300 pounds.  He also states that he has severe pain in both shoulders, primarily the left.  He says that at some point he has already been evaluated for bilateral shoulder surgery if not replacement.  He is agreeable to proceed with coronary surgery and is aware of the risks of stroke, bleeding, infection and death and agrees to proceed.  No guarantees have been made as to the outcome.  His wife tells me she is a nurse who used to work in the post open heart recovery room and that she has recovered mini open heart surgery patient's and that she has also talked with him in detail and she is familiar with the procedure    Patient Active Problem List   Diagnosis   • SOB (shortness of breath) on exertion   • Coronary artery disease involving native coronary artery of native heart without angina pectoris       CC: MD Yamilka Miller editing for Markus Emanuel MD      I, Markus Emanuel MD, have read and agree with the editing done by Yamilka Rodriguez, .      Electronically signed by Markus Emanuel MD at 03/20/23 1110             Markus Emanuel MD at 03/20/23 0845          03/20/2023  Patient Information  Wilbert Kelley                                                                                           [address] [facility] [address]   1960  'PCP/Referring Physician'  Jose M Simon MD  [phone]  No ref. provider found    Chief Complaint   Patient presents with   • Coronary Artery Disease     New patient per Dr. Boyle for CAD       History of Present Illness:  The patient is a 62-year-old male who was referred to me to evaluate for coronary bypass grafting surgery.  He has a history of a stent in his left anterior descending coronary artery and he states that his father and grandfather  at age 42 and 45 respectively from heart attacks.  He denies chest pain but states that he has shortness of breath with minimal exertion and a recent catheterization last week demonstrates critical three-vessel coronary disease.  He is a diabetic requiring insulin with hypertension and hyperlipidemia.  He has not smoked in many years.      Patient Active Problem List   Diagnosis   • SOB (shortness of breath) on exertion   • Coronary artery disease involving native coronary artery of native heart without angina pectoris     Past Medical History:   Diagnosis Date   • Coronary artery disease    • Diabetes mellitus (HCC)    • Hyperlipidemia    • Hypertension    • Sleep apnea      Past Surgical History:   Procedure Laterality Date   • CARDIAC CATHETERIZATION     • CARDIAC CATHETERIZATION Right 3/16/2023    Procedure: Left Heart Cath;  Surgeon: Jarod Boyle MD;  Location: [facility] CATH INVASIVE LOC[facility];  Service: Cardiovascular;  Laterality: Right;   • CATARACT EXTRACTION, BILATERAL     • CORONARY STENT PLACEMENT     • SKIN CANCER EXCISION         Current Outpatient Medications:   •  atorvastatin (LIPITOR) 20 MG tablet, , Disp: , Rfl:   •  B-D ULTRAFINE III SHORT PEN 31G X 8 MM misc, , Disp: , Rfl:   •  BD Insulin Syringe U-500 31G X 6MM 0.5 ML misc, , Disp: , Rfl:   •  clopidogrel (PLAVIX) 75 MG tablet, Take 1 tablet by mouth Daily., Disp:  , Rfl:   •  fenofibrate 160 MG tablet, Take 1 tablet by mouth Daily., Disp: , Rfl:   •  furosemide (LASIX) 20 MG tablet, Take 1 tablet by mouth Daily., Disp: , Rfl:   •  gabapentin (NEURONTIN) 800 MG tablet, 1 tablet 3 (Three) Times a Day., Disp: , Rfl:   •  hydroCHLOROthiazide (HYDRODIURIL) 25 MG tablet, Take 1 tablet by mouth Daily., Disp: 90 tablet, Rfl: 3  •  Insulin Glargine (TOUJEO MAX SOLOSTAR SC), Inject 85 Units under the skin into the appropriate area as directed 2 (Two) Times a Day., Disp: , Rfl:   •  Insulin Regular Human, Conc, (HumuLIN R) 500 UNIT/ML solution pen-injector CONCENTRATED injection, Inject 65 Units under the skin into the appropriate area as directed 3 (Three) Times a Day Before Meals., Disp: , Rfl:   •  lisinopril (PRINIVIL,ZESTRIL) 40 MG tablet, Take 1 tablet by mouth 2 (Two) Times a Day., Disp: , Rfl:   •  metFORMIN (GLUCOPHAGE) 1000 MG tablet, Take 1 tablet by mouth 2 (Two) Times a Day With Meals. Hold metformin for 2 days post-cath.  Restart on 3/19/2023, Disp: 60 tablet, Rfl: 12  •  metoprolol tartrate 75 MG tablet, Take 75 mg by mouth 2 (Two) Times a Day., Disp: 180 tablet, Rfl: 3  •  potassium chloride 10 MEQ CR tablet, Take 1 tablet by mouth Daily., Disp: , Rfl:   Allergies   Allergen Reactions   • Morphine Nausea And Vomiting     Social History     Socioeconomic History   • Marital status:    Tobacco Use   • Smoking status: Never     Passive exposure: Past   • Smokeless tobacco: Current     Types: Snuff   Vaping Use   • Vaping Use: Never used   Substance and Sexual Activity   • Alcohol use: Not Currently   • Drug use: Never   • Sexual activity: Defer     Family History   Problem Relation Age of Onset   • COPD Mother    • Heart attack Father 45        passed   • Heart attack Paternal Grandfather 42        massive heart attack   • Heart disease Paternal Grandfather      Review of Systems   Constitutional: Negative for chills, decreased appetite, diaphoresis, fever,  "malaise/fatigue, night sweats, weight gain and weight loss.   HENT: Negative for hoarse voice.    Eyes: Negative for blurred vision, double vision and visual disturbance.   Cardiovascular: Positive for dyspnea on exertion. Negative for chest pain, claudication, irregular heartbeat, leg swelling, near-syncope, orthopnea, palpitations, paroxysmal nocturnal dyspnea and syncope.   Respiratory: Negative for cough, hemoptysis, shortness of breath, sputum production and wheezing.    Hematologic/Lymphatic: Negative for adenopathy and bleeding problem. Does not bruise/bleed easily.   Skin: Negative for color change, nail changes, poor wound healing and rash.   Musculoskeletal: Negative for back pain, falls and muscle cramps.   Gastrointestinal: Negative for abdominal pain, dysphagia and heartburn.   Genitourinary: Negative for flank pain.   Neurological: Negative for brief paralysis, disturbances in coordination, dizziness, focal weakness, headaches, light-headedness, loss of balance, numbness, paresthesias, sensory change, vertigo and weakness.   Psychiatric/Behavioral: Negative for depression and suicidal ideas.   Allergic/Immunologic: Negative for persistent infections.     Vitals:    03/20/23 0852   BP: 132/60   BP Location: Right arm   Patient Position: Sitting   Pulse: 71   Temp: 97.2 °F (36.2 °C)   SpO2: 96%   Weight: (!) 142 kg (312 lb)   Height: 185.4 cm (73\")      Physical Exam    CONSTITUTIONAL: Alert and conversant, Well dressed, Well nourished, No acute distress obese  EYES: Sclera clean, Anicteric, Pupils equal  ENT: No nasal deviation, Trachea midline  NECK: No neck masses, Supple  LUNGS: No wheezing, Cough, non-congested  HEART: No rubs, No murmurs  GASTROINTESTINAL: Soft, non-distended, No masses, Non tender  to palpation, normal bowel sounds  NEURO: No motor deficits, No sensory deficits, Cranial Nerves 2 through 12 grossly intact  PSYCHIATRIC: Oriented to person, place and time, No memory deficits, Mood " appropriate  VASCULAR: No carotid bruits, Femoral pulses palpable and symmetric  MUSKULOSKELETAL: No extremity trauma or extremity asymmetry    The ROS, past medical history, surgical history, family history, social history and vitals were reviewed by myself and corrected as needed.      Labs/Imaging:  I discussed this case with his cardiologist and reviewed his cardiac catheterization images demonstrating coronary disease. Smoking cessation was not discussed as patient is no longer a smoker.  He does use chewing tobacco which contains nicotine and will discuss this in more detail. He denies advanced directive.    Assessment/Plan:   The patient is a 62-year-old male who has diabetes requiring insulin and shortness of breath with minimal activity.  His ejection fraction is preserved but he has critical three-vessel coronary disease and a strong family history of coronary disease in his father and grandfather with their death at age 42 and 45 respectively.  We discussed surgery and his recovery also the fact that his risk of infection may be higher with his diabetes and his morbid obesity of over 300 pounds.  He also states that he has severe pain in both shoulders, primarily the left.  He says that at some point he has already been evaluated for bilateral shoulder surgery if not replacement.  He is agreeable to proceed with coronary surgery and is aware of the risks of stroke, bleeding, infection and death and agrees to proceed.  No guarantees have been made as to the outcome.  His wife tells me she is a nurse who used to work in the post open heart recovery room and that she has recovered mini open heart surgery patient's and that she has also talked with him in detail and she is familiar with the procedure    Patient Active Problem List   Diagnosis   • SOB (shortness of breath) on exertion   • Coronary artery disease involving native coronary artery of native heart without angina pectoris       CC: Jose M Simon,  MD Yamilka Rodriguez editing for Markus Emanuel MD      I, Markus Emanuel MD, have read and agree with the editing done by Yamilka Rodriguez, .      Electronically signed by Markus Emanuel MD at 03/20/23 1110          Operative/Procedure Notes (most recent note)      Markus Emanuel MD at 03/21/23 0803        Operative Report    Preop Diagnosis: Coronary disease.  Morbid obesity hypertension hyperlipidemia diabetes shoulder pain with frozen shoulders    Results of STS risk score discussed with patient and family    Postoperative Diagnosis: Same      Procedure: Coronary artery bypass grafting x3 with endoscopic harvesting of the right great saphenous vein.  Left internal mammary artery to left anterior descending.  Saphenous vein graft to the obtuse marginal branch and circumflex.  Saphenous vein graft to the posterior descending branch of the right.        Surgeons: Markus Emanuel MD      Assistant: Juanpablo gregg PA-C    The Assistant provided services of suctioning, irrigation and retraction.  Also, assisted in suture closure of parts of the skin incision.      Indication: This patient was referred to me with the above listed medical problems.  He understood that surgery carries with the risk of stroke bleeding infection death and agreed to proceed.  His wife is a retired postcardiac surgery intensive care unit nurse she was also very well informed.  I also discussed the STS mortality risk base with the patient preoperatively.  He understood his risk according to their scale.  I believe his mortality was slightly higher than that based upon his overall lack of mobility which is not well captured in the STS database        Description: Supine position sterile prep and drape.  Antibiotics given.  General endotracheal anesthesia right great saphenous vein harvested from the right leg using an endoscope and it was a good quality conduit.  Median sternotomy was performed there was nearly 4  inches of fat between the skin and the anterior sternal table.  The left internal mammary artery was harvested with some difficulty due to visualization difficulties secondary to his obesity.  Patient was fully heparinized and cannulas placed in the ascending aortic aneurysm atrial appendage and patient begun on cardiopulmonary bypass.  Aorta crossclamped antegrade cardioplegia given.  For saphenous vein graft was sutured to a 2 mm obtuse marginal branch of the circumflex and the second saphenous vein graft to a 1-1/2 mm posterior descending branch of the right.  The left internal mammary was sutured to the left anterior descending coronary which was a good quality target vessel.  2 proximal vein graft sutured to the ascending aortic aneurysm and the patient weaned from bypass without the need for pressor agents but paced initially.  Patient subsequently regained his own sinus rhythm.  No red cells were transfused his did receive platelets secondary to low P2 Y12.  The sternum was closed with a number of #7 wires due to his obesity fascia was closed with PDS subcutaneous tissue and a number of layers of Vicryl as to the skin and some staples were added on the skin for additional security.  Estimated blood loss less than 250 mL.  Sponge and needle count reported as correct and there was no apparent early complications.      Please note that portions of this note were completed with a voice recognition program. Efforts were made to edit the dictations, but occasionally words are mistranscribed.      Electronically signed by Markus Emanuel MD at 23 1052          Physician Progress Notes (most recent note)      Mirian Hurley PA-C at 23 1211              [facility] Medicine Services  PROGRESS NOTE    Patient Name: Wilbert Kelley  : 1960  MRN: [id number]    Date of Admission: 3/21/2023  Primary Care Physician: Jose M Simon MD    Subjective     CC: post-CABG  medical management      HPI:  In chair after working with therapy. Primary complaint is back/sciatic pain. Asking about discharge plan because different people tell him different things     ROS:  Gen- No fevers, chills  CV- No chest pain, palpitations  Resp- No cough, dyspnea  GI- No N/V/D, abd pain    Objective     Vital Signs:   Temp:  [97.5 °F (36.4 °C)-99.4 °F (37.4 °C)] 97.5 °F (36.4 °C)  Heart Rate:  [64-78] 71  Resp:  [18-20] 18  BP: (116-172)/(57-97) 118/57     Physical Exam:  Constitutional: No acute distress, awake, alert and conversant. Sitting in chair. Chronically ill appearing   HENT: NCAT, mucous membranes moist  Respiratory: Expiratory wheezing bilaterally without rales or rhonchi, improved today. Normal respiratory effort on room air   Cardiovascular: RRR, no murmurs, rubs, or gallops. Sternal incision erythema improved today - no overt purulence ir induration, EVH site c/d/i without erythema, induration or drainage.  Gastrointestinal: Positive bowel sounds, soft, nontender, nondistended  Musculoskeletal: No bilateral ankle edema  Psychiatric: Appropriate affect, cooperative  Neurologic: Oriented x 3, moves all extremities spontaneously without focal deficits, speech clear    Results Reviewed:  LAB RESULTS:      Lab 03/29/23  0524 03/28/23  0741 03/26/23  1130 03/25/23  0350 03/24/23  0305 03/23/23  0303   WBC 12.49* 11.86* 9.95 11.22* 15.69* 21.33*   HEMOGLOBIN 9.4* 9.8* 8.7* 9.5* 9.1* 10.0*   HEMATOCRIT 30.3* 31.4* 27.1* 30.0* 29.4* 33.1*   PLATELETS 597* 595* 391 320 281 319   NEUTROS ABS  --   --  7.89*  --  13.01* 18.06*   IMMATURE GRANS (ABS)  --   --  0.04  --  0.07* 0.15*   LYMPHS ABS  --   --  0.68*  --  0.83 1.00   MONOS ABS  --   --  0.99*  --  1.57* 2.05*   EOS ABS  --   --  0.30  --  0.18 0.02   MCV 85.6 85.3 82.9 84.7 86.5 86.4   CRP 9.20*  --   --   --   --   --    PROCALCITONIN  --  0.12  --   --   --   --          Lab 03/29/23  0524 03/28/23  0741 03/27/23  1110 03/26/23  1130  03/25/23  0350 03/24/23  0305 03/23/23  0303   SODIUM 139 138 136 134* 130* 132* 137   POTASSIUM 4.7 4.3 4.6 4.3 4.1 4.8 4.7   CHLORIDE 98 97* 95* 96* 94* 95* 98   CO2 27.0 28.0 28.0 26.0 25.0 26.0 27.0   ANION GAP 14.0 13.0 13.0 12.0 11.0 11.0 12.0   BUN 36* 43* 54* 50* 45* 44* 36*   CREATININE 1.13 1.11 1.23 1.12 1.42* 2.77* 2.49*   EGFR 73.9 75.5 66.8 74.7 56.2* 25.2* 28.7*   GLUCOSE 198* 195* 226* 175* 163* 126* 154*   CALCIUM 8.8 9.0 8.8 8.5* 8.7 8.5* 8.5*   MAGNESIUM  --   --   --  2.2 2.3 1.8 2.1   PHOSPHORUS 3.0  --   --  2.8  --  4.3 4.6*         Lab 03/29/23  0524 03/26/23  1130 03/25/23  0350 03/24/23  0305 03/23/23  0303   TOTAL PROTEIN  --  6.8 6.8 6.8 6.9   ALBUMIN 3.5 3.5 3.6 3.7 4.1   GLOBULIN  --  3.3 3.2 3.1 2.8   ALT (SGPT)  --  7 7 6 6   AST (SGOT)  --  35 27 25 26   BILIRUBIN  --  0.9 1.0 0.8 0.7   ALK PHOS  --  105 74 59 57         Lab 03/24/23  1116   PH, ARTERIAL 7.318*   PCO2, ARTERIAL 47.8*   PO2 ART 74.5*   FIO2 36   HCO3 ART 24.5   BASE EXCESS ART -1.8*   CARBOXYHEMOGLOBIN 1.6     Brief Urine Lab Results  (Last result in the past 365 days)      Color   Clarity   Blood   Leuk Est   Nitrite   Protein   CREAT   Urine HCG        03/23/23 1612             246.9             Microbiology Results Abnormal     Procedure Component Value - Date/Time    Respiratory Culture - Sputum, Cough [[id number]] Collected: 03/29/23 0941    Lab Status: Final result Specimen: Sputum from Cough Updated: 03/29/23 1104     Respiratory Culture Rejected     Gram Stain Moderate (3+) WBCs per low power field      Moderate (3+) Epithelial cells per low power field      Moderate (3+) Gram positive cocci in pairs and chains      Rare (1+) Gram variable bacilli    Narrative:      Specimen rejected due to oropharyngeal contamination. Please reorder and recollect specimen if clinically necessary.    MRSA Screen, PCR (Inpatient) - Swab, Nares [[id number]]  (Normal) Collected: 03/28/23 1188    Lab Status: Final result Specimen:  Swab from Nares Updated: 03/29/23 0803     MRSA PCR Negative    Narrative:      The negative predictive value of this diagnostic test is high and should only be used to consider de-escalating anti-MRSA therapy. A positive result may indicate colonization with MRSA and must be correlated clinically.  MRSA Negative    S. Pneumo Ag Urine or CSF - Urine, Urine, Clean Catch [[id number]]  (Normal) Collected: 03/28/23 1948    Lab Status: Final result Specimen: Urine, Clean Catch Updated: 03/28/23 2325     Strep Pneumo Ag Negative    Legionella Antigen, Urine - Urine, Urine, Clean Catch [[id number]]  (Normal) Collected: 03/28/23 1948    Lab Status: Final result Specimen: Urine, Clean Catch Updated: 03/28/23 2325     LEGIONELLA ANTIGEN, URINE Negative        XR Chest 1 View    Result Date: 3/29/2023  XR CHEST 1 VW Date of Exam: 3/29/2023 8:26 AM EDT Indication: postop Comparison: AP chest x-ray 3/28/2023, 3/26/2023, 3/24/2023 Findings: Sternal wires are demonstrated. Cardiac silhouette remains enlarged. No pneumothorax is identified. There are patchy left lower lung airspace opacities continued to decrease. Right lung appears clear. Arthritic changes of the left shoulder are partially included.     Impression: Impression: 1.Continued decreasing patchy left basilar opacities, likely atelectasis. 2.No definite pneumothorax. Electronically Signed: Kirti Cordero  3/29/2023 8:50 AM EDT  Workstation ID: [id number]    XR Chest 1 View    Result Date: 3/28/2023  XR CHEST 1 VW Date of Exam: 3/28/2023 3:50 AM EDT Indication: postop. Comparison: Chest x-ray 3/26/2023 Findings: Stable cardiomegaly. There is improved aeration of the left lung base with decreased streaky atelectasis. No significant pleural effusion. No pneumothorax.     Impression: Impression: Improved aeration and decreased atelectasis in the left lung base. No definite pneumothorax. Electronically Signed: Jung Salzaar  3/28/2023 8:39 AM EDT  Workstation ID:  [id number]    Results for orders placed during the hospital encounter of 02/02/23    Adult Transthoracic Echo Complete w/ Color, Spectral and Contrast if necessary per protocol    Interpretation Summary  •  Left ventricular systolic function is normal. Left ventricular ejection fraction appears to be 66 - 70%.  •  Left ventricular wall thickness is consistent with mild concentric hypertrophy.  •  Left ventricular diastolic function is consistent with (grade II w/high LAP) pseudonormalization.  •  Technically very difficult study.  Right ventricle and right atrium is not well visualized for adequate evaluation.    Current medications:  Scheduled Meds:acetaminophen, 650 mg, Oral, Q8H  amiodarone, 200 mg, Oral, Q12H  amLODIPine, 5 mg, Oral, Q24H  aspirin, 325 mg, Oral, Daily  atorvastatin, 40 mg, Oral, Nightly  azithromycin, 500 mg, Oral, Daily  bumetanide, 2 mg, Intravenous, Once  bumetanide, 2 mg, Oral, Daily  castor oil-balsam peru, 1 application, Topical, Q12H  clopidogrel, 75 mg, Oral, Daily  gabapentin, 400 mg, Oral, TID  insulin detemir, 18 Units, Subcutaneous, Q12H  insulin lispro, 0-7 Units, Subcutaneous, TID AC  Insulin Lispro, 5 Units, Subcutaneous, TID With Meals  lactobacillus acidophilus, 1 capsule, Oral, Daily  linezolid, 600 mg, Oral, Q12H  lisinopril, 40 mg, Oral, Q24H  metoclopramide, 10 mg, Oral, TID AC  nebivolol, 5 mg, Oral, Q24H  oxymetazoline, 2 spray, Each Nare, BID  pharmacy consult - [place], , Does not apply, Daily  senna-docusate sodium, 2 tablet, Oral, BID  tamsulosin, 0.4 mg, Oral, Daily      Continuous Infusions:   PRN Meds:.•  senna-docusate sodium **AND** polyethylene glycol **AND** bisacodyl **AND** bisacodyl  •  Calcium Replacement - Follow Nurse / BPA Driven Protocol  •  dextrose  •  dextrose  •  glucagon (human recombinant)  •  ipratropium-albuterol  •  Magnesium Standard Dose Replacement - Follow Nurse / BPA Driven Protocol  •  ondansetron  •  Phosphorus Replacement - Follow Nurse / BPA  Driven Protocol  •  Potassium Replacement - Follow Nurse / BPA Driven Protocol  •  simethicone    Assessment & Plan     Active Hospital Problems    Diagnosis  POA   • **Multivessel CAD with remote ptca stent, now with stable anginal equivelent (dyspnea) ([facility]) [I25.118]  Yes   • S/P CABG x 3 on 3/21/2023 [Z95.1]  Not Applicable   • Hypertension [I10]  Yes   • Hyperlipidemia [E78.5]  Yes   • EMANUEL (HCC) [N17.9]  No   • Insulin resistant Diabetes mellitus (HCC) [E11.9]  Yes   • Sleep apnea [G47.30]  Yes      Resolved Hospital Problems   No resolved problems to display.     Brief Hospital Course to date:  Wilbert Kelley is a 62 y.o. male with relevant PMH of CAD with PTCA and stent placement to LAD and right Coronary Artery in 2010, HTN, HLD, recent C 3/16 w/ MVCAD and diabetes. He was admitted to [facility] 3/21/2023 following CABG x 3 by Dr. Emanuel. He transferred out of ICU on 3/25/2023 and hospital medicine consulted to follow.      MVCAD s/p 3v CABG  PAT vs Aflutter  - Post-operative care per CT surgery  - Cardiology following. S/p IV Amiodarone, now onPO  - Encourage ambulation, IS  - PRN pain control. Gabapenin dose reduced due to EMANUEL   - Cardiology added Bumex on 3/27  - Continue ASA, high-intensity statin and BB     Leukocytosis  Sternal incision cellulitis  Bronchitis  - ID following - on Linezolid / Azithromycin      DMII   Nausea, suspected gastroparesis  Diabetic peripheral neuropathy  - Increase Levemir to 18 units BID, continue Lispro 5 units TID AC + SSI  - DC Reglan and see how he does      Hypotension, resolved   EMANUEL  Hyponatremia  - Nephrology following  - 1200mL fluid restriction - sodium normal at 139 today  - Creatinine 1.13 today    Hypertension  - Cardiology managing      Likely MARIAH  - CPAP QHS and PRN     DVT prophylaxis:Mechanical DVT prophylaxis orders are present.     AM-PAC 6 Clicks Score (PT): 14 (03/29/23 5701)    CODE STATUS:   Code Status and Medical Interventions:    Ordered at: 23 1133     Code Status (Patient has no pulse and is not breathing):    CPR (Attempt to Resuscitate)     Medical Interventions (Patient has pulse or is breathing):    Full Support     Release to patient:    Routine Release     Mirian Hurley PA-C  23        Electronically signed by Mirian Hurley PA-C at 23 1216          Consult Notes (most recent note)      Ruben Garcia MD at 23 1631      Consult Orders    1. Inpatient Infectious Diseases Consult [[id number]] ordered by Corin Dean APRN at 23 1012                 Patient Name: Wilbert Kelley  : 1960  MRN: [id number]    Date of Consult: 3/28/2023  Admission Date: 3/21/2023    Requesting Provider: Cristina Coombs  Evaluating Physician: Ruben Garcia MD    Chief Complaint: fatigue    Reason for Consultation: pneumonia sternal wound infection    Subjective   History of present illness:   Patient is a 62 y.o. male with history of BPH, skin cancer, degenerative disc disease, diabetes, coronary artery disease.  Admitted for CABG 3/21/23.     3/28/23: Infectious disease consulted due to concern for developing sternal wound infection as well as possible pneumonia.  Afebrile.  No antibiotics for the past week.  Patient complains about pain around incision but no dehiscence, drainage.  No drains in place at this time.  Does complain of cough productive of phlegm which is unusual for him. No other new concerns noted.       Past Medical History:   Diagnosis Date   • Arthritis    • BPH (benign prostatic hyperplasia)    • Cancer (HCC)     basal and melanoma-  x2 - left side ear and elbow   • Constipation    • Coronary artery disease    • DDD (degenerative disc disease), lumbar    • Diabetes mellitus (HCC)     couple times month check sugar   • Frozen shoulder     left   • GERD (gastroesophageal reflux disease)    • Hyperlipidemia    • Hypertension    • Limited mobility     left shoulder  -  possible frozen shoulder-= please be aware for surgery   • Sleep apnea     doesnt use machine   • Tinnitus    • Wears glasses     small print       Past Surgical History:   Procedure Laterality Date   • CARDIAC CATHETERIZATION     • CARDIAC CATHETERIZATION Right 03/16/2023    Procedure: Left Heart Cath;  Surgeon: Jarod Boyle MD;  Location:  [facility];  Service: Cardiovascular;  Laterality: Right;   • CATARACT EXTRACTION, BILATERAL Bilateral    • COLONOSCOPY     • CORONARY ARTERY BYPASS GRAFT N/A 3/21/2023    Procedure: MEDIAN STERNOTOMY, CORONARY ARTERY BYPASS GRAFTING X 3, UTILIZING THE LEFT INTERNAL MAMMARY ARTERY, ENDOSCOPIC VEIN HARVEST OF THE RIGHT GREATER SAPENOUSE VEIN;  Surgeon: Markus Emanuel MD;  Location:  [facility];  Service: Cardiothoracic;  Laterality: N/A;   • CORONARY STENT PLACEMENT      x4   • ENDOSCOPY     • FINGER SURGERY Left     middle finger - left side   • SKIN CANCER EXCISION      x2   • WISDOM TOOTH EXTRACTION         Social History     Socioeconomic History   • Marital status:    Tobacco Use   • Smoking status: Never     Passive exposure: Past   • Smokeless tobacco: Current     Types: Snuff   Vaping Use   • Vaping Use: Never used   Substance and Sexual Activity   • Alcohol use: Not Currently   • Drug use: Never   • Sexual activity: Defer        Family History   Problem Relation Age of Onset   • COPD Mother    • Heart attack Father 45        passed   • Heart attack Paternal Grandfather 42        massive heart attack   • Heart disease Paternal Grandfather        Allergies   Allergen Reactions   • Morphine Nausea And Vomiting     projectile   • Adhesive Tape Rash     Pt states sticky tabs cause irritation when left on long         Objective   Antibiotics:  Anti-Infectives (From admission, onward)    Ordered     Dose/Rate Route Frequency Start Stop    03/21/23 1133  ceFAZolin in Sodium Chloride (ANCEF) IVPB solution 3 g        Ordering Provider: [place],  KRISTI Bland    3 g  200 mL/hr over 30 Minutes Intravenous Every 8 Hours 03/21/23 1800 03/23/23 0317    03/21/23 0547  ceFAZolin in Sodium Chloride (ANCEF) IVPB solution 3 g        Ordering Provider: Rolan Enriquez PA    3 g  200 mL/hr over 30 Minutes Intravenous Once 03/21/23 0549 03/21/23 0709          Other Medications:  Current Facility-Administered Medications   Medication Dose Route Frequency Provider Last Rate Last Admin   • acetaminophen (TYLENOL) tablet 650 mg  650 mg Oral Q8H Low Francois PA-C   650 mg at 03/28/23 1537   • amiodarone (PACERONE) tablet 200 mg  200 mg Oral Q12H Kemar Walsh MD   200 mg at 03/28/23 0846   • aspirin EC tablet 325 mg  325 mg Oral Daily Low Francois PA-C   325 mg at 03/28/23 0846   • atorvastatin (LIPITOR) tablet 40 mg  40 mg Oral Nightly Low Francois PA-C   40 mg at 03/27/23 2024   • sennosides-docusate (PERICOLACE) 8.6-50 MG per tablet 2 tablet  2 tablet Oral BID Low Francois PA-C   2 tablet at 03/28/23 0846    And   • polyethylene glycol (MIRALAX) packet 17 g  17 g Oral Daily PRN Low Francois PA-C   17 g at 03/26/23 1318    And   • bisacodyl (DULCOLAX) EC tablet 5 mg  5 mg Oral Daily PRN Low Francois PA-C        And   • bisacodyl (DULCOLAX) suppository 10 mg  10 mg Rectal Daily PRN Low Francois PA-C       • bumetanide (BUMEX) tablet 2 mg  2 mg Oral Daily Markus Riddle MD   2 mg at 03/28/23 0846   • Calcium Replacement - Follow Nurse / BPA Driven Protocol   Does not apply PRN Low Francois PA-C       • castor oil-balsam peru (VENELEX) ointment 1 application  1 application Topical Q12H Low Francois PA-C   1 application at 03/28/23 0845   • clopidogrel (PLAVIX) tablet 75 mg  75 mg Oral Daily Corin Dean APRN   75 mg at 03/28/23 0846   • dextrose (D50W) (25 g/50 mL) IV injection 10-50 mL  10-50 mL Intravenous Q15 Min PRN Low Francois PA-C       • dextrose (GLUTOSE) oral gel 15 g  15 g Oral Q15 Min PRN  Low Francois PA-C       • gabapentin (NEURONTIN) capsule 400 mg  400 mg Oral TID Low Francois PA-C   400 mg at 03/28/23 1537   • glucagon (GLUCAGEN) injection 1 mg  1 mg Intramuscular Q15 Min PRN Low Francois PA-C       • insulin detemir (LEVEMIR) injection 15 Units  15 Units Subcutaneous Q12H Low Francois PA-C   15 Units at 03/28/23 0845   • Insulin Lispro (humaLOG) injection 0-24 Units  0-24 Units Subcutaneous 4x Daily With Meals & Nightly Low Francois PA-C   4 Units at 03/28/23 1205   • Insulin Lispro (humaLOG) injection 5 Units  5 Units Subcutaneous TID With Meals Low Francois PA-C   5 Units at 03/28/23 1204   • ipratropium-albuterol (DUO-NEB) nebulizer solution 3 mL  3 mL Nebulization Q6H PRN Low Francois PA-C   3 mL at 03/23/23 0533   • lisinopril (PRINIVIL,ZESTRIL) tablet 20 mg  20 mg Oral Q24H Markus Riddle MD   20 mg at 03/28/23 1204   • Magnesium Standard Dose Replacement - Follow Nurse / BPA Driven Protocol   Does not apply PRN Low Francois PA-C       • metoclopramide (REGLAN) tablet 10 mg  10 mg Oral TID AC Mirian Hurley PA-C       • nebivolol (BYSTOLIC) tablet 5 mg  5 mg Oral Q24H Low Francois PA-C   5 mg at 03/28/23 0845   • ondansetron (ZOFRAN) injection 4 mg  4 mg Intravenous Q6H PRN Low Francois PA-C   4 mg at 03/22/23 1810   • oxymetazoline (AFRIN) nasal spray 2 spray  2 spray Each Nare BID Mirian Hurley PA-C   2 spray at 03/28/23 0847   • Pharmacy Consult - MTM   Does not apply Daily Low Francois PA-C       • Phosphorus Replacement - Follow Nurse / BPA Driven Protocol   Does not apply PRN Low Francois PA-C       • Potassium Replacement - Follow Nurse / BPA Driven Protocol   Does not apply PRN Alessandro, Low K, PA-C       • simethicone (MYLICON) chewable tablet 80 mg  80 mg Oral 4x Daily PRN Low Frnacois PA-C   80 mg at 03/22/23 1810   • tamsulosin (FLOMAX) 24 hr capsule 0.4 mg  0.4 mg Oral Daily  "Low Francois PA-C   0.4 mg at 03/28/23 0846       Physical Exam:   Vital Signs   /71   Pulse 72   Temp 97.2 °F (36.2 °C) (Oral)   Resp 20   Ht 185.4 cm (73\")   Wt (!) 148 kg (326 lb)   SpO2 96%   BMI 43.01 kg/m²     GENERAL: Awake and alert, sitting up  HEENT: Normocephalic, atraumatic.  EOMI. No conjunctival injection. No icterus. Oropharynx clear without evidence of thrush or exudate.   NECK: Supple without nuchal rigidity.   HEART: RRR; No murmur.  LUNGS: Wheezes bilaterally.  No cough appreciated  Chest wall: Sternotomy incision with erythema but no dehiscence  ABDOMEN: obese. Soft, nontender   EXT:  No edema.   : Without Ken catheter.  SKIN: No rash.  Vein graft site healing well.    NEURO: Oriented to PPT. No focal deficits on motor/sensory exam at arms/legs.  PSYCHIATRIC: Normal insight and judgement. Cooperative with PE    Laboratory Data  Lab Results   Component Value Date    WBC 11.86 (H) 03/28/2023    HGB 9.8 (L) 03/28/2023    HCT 31.4 (L) 03/28/2023    MCV 85.3 03/28/2023     (H) 03/28/2023     Lab Results   Component Value Date    GLUCOSE 195 (H) 03/28/2023    CALCIUM 9.0 03/28/2023     03/28/2023    K 4.3 03/28/2023    CO2 28.0 03/28/2023    CL 97 (L) 03/28/2023    BUN 43 (H) 03/28/2023    CREATININE 1.11 03/28/2023     Estimated Creatinine Clearance: 105.8 mL/min (by C-G formula based on SCr of 1.11 mg/dL).  Lab Results   Component Value Date    ALT 7 03/26/2023    AST 35 03/26/2023    ALKPHOS 105 03/26/2023    BILITOT 0.9 03/26/2023     No results found for: CRP  No results found for: SEDRATE    The above labs were reviewed.     Microbiology:  Microbiology Results (last 10 days)     ** No results found for the last 240 hours. **          Radiology:  XR Chest 1 View    Result Date: 3/28/2023  Impression: Improved aeration and decreased atelectasis in the left lung base. No definite pneumothorax. Electronically Signed: Jung Salazar  3/28/2023 8:39 AM EDT  " Workstation ID: [id number]    I personally reviewed the above CXR: no focal infiltrate    3/28 QTc 447 ms    Assessment     1. Leukocytosis   2. Sternotomy incision infection, cellulitis: No dehiscence or drainage yet but definitely erythema  3. Productive cough, bronchitis: Possible developing pneumonia but no clear infiltrate on CXR. procal normal  4. CAD s/p CABG on 3/21/23  5. Atrial fibrillation on amiodarone  6. Obesity  7. DM2    PLAN:   - Respiratory culture if able to produce a good specimen  - MRSA nares screening  - Urine strep and Legionella antigens    - Start PO linezolid 600 mg BID  - Start azithromycin 500 mg daily for a few days.  Follow EKG to monitor for QT prolongation  - probiotic    I will follow     Ruben Garcia MD  3/28/2023      Electronically signed by Ruben Garcia MD at 23 [id number]          Physical Therapy Notes (most recent note)      Donna Agudelo, PT at 23 0918  Version 1 of 1         Patient Name: Wilbert Kelley  : 1960    MRN: [id number]                              Today's Date: 3/29/2023       Admit Date: 3/21/2023    Visit Dx:     ICD-10-CM ICD-9-CM   1. Coronary artery disease of native artery of native heart with stable angina pectoris ([facility])  I25.118 414.01     413.9     Patient Active Problem List   Diagnosis   • SOB (shortness of breath) on exertion   • Insulin resistant Diabetes mellitus (HCC)   • Sleep apnea   • Multivessel CAD with remote ptca stent, now with stable anginal equivelent (dyspnea) ([facility])   • S/P CABG x 3 on 3/21/2023   • Hypertension   • Hyperlipidemia   • EMANUEL (HCC)     Past Medical History:   Diagnosis Date   • Arthritis    • BPH (benign prostatic hyperplasia)    • Cancer (HCC)     basal and melanoma-  x2 - left side ear and elbow   • Constipation    • Coronary artery disease    • DDD (degenerative disc disease), lumbar    • Diabetes mellitus (HCC)     couple times month check sugar   • Frozen shoulder     left   • GERD  (gastroesophageal reflux disease)    • Hyperlipidemia    • Hypertension    • Limited mobility     left shoulder  - possible frozen shoulder-= please be aware for surgery   • Sleep apnea     doesnt use machine   • Tinnitus    • Wears glasses     small print     Past Surgical History:   Procedure Laterality Date   • CARDIAC CATHETERIZATION     • CARDIAC CATHETERIZATION Right 03/16/2023    Procedure: Left Heart Cath;  Surgeon: Jarod Boyle MD;  [facility];  Service: Cardiovascular;  Laterality: Right;   • CATARACT EXTRACTION, BILATERAL Bilateral    • COLONOSCOPY     • CORONARY ARTERY BYPASS GRAFT N/A 3/21/2023    Procedure: MEDIAN STERNOTOMY, CORONARY ARTERY BYPASS GRAFTING X 3, UTILIZING THE LEFT INTERNAL MAMMARY ARTERY, ENDOSCOPIC VEIN HARVEST OF THE RIGHT GREATER SAPENOUSE VEIN;  Surgeon: Markus Emanuel MD;  Location: [facility] OR;  Service: Cardiothoracic;  Laterality: N/A;   • CORONARY STENT PLACEMENT      x4   • ENDOSCOPY     • FINGER SURGERY Left     middle finger - left side   • SKIN CANCER EXCISION      x2   • WISDOM TOOTH EXTRACTION        General Information     Row Name 03/29/23 0946          Physical Therapy Time and Intention    Document Type therapy note (daily note)  -ES     Mode of Treatment physical therapy  -ES     Row Name 03/29/23 0946          General Information    Patient Profile Reviewed yes  -ES     Existing Precautions/Restrictions cardiac;fall;oxygen therapy device and L/min;sternal  -ES     Barriers to Rehab medically complex;previous functional deficit  -ES     Row Name 03/29/23 0946          Cognition    Orientation Status (Cognition) oriented x 3  -ES     Row Name 03/29/23 0946          Safety Issues, Functional Mobility    Safety Issues Affecting Function (Mobility) awareness of need for assistance;insight into deficits/self-awareness;safety precaution awareness;safety precautions follow-through/compliance  -ES     Impairments Affecting Function  (Mobility) balance;endurance/activity tolerance;shortness of breath;strength;pain  -ES     Comment, Safety Issues/Impairments (Mobility) Required frequent cueing for adherence to sternal precautions.  -ES           User Key  (r) = Recorded By, (t) = Taken By, (c) = Cosigned By    Initials Name Provider Type    Donna Fisher PT Physical Therapist               Mobility     Row Name 03/29/23 0946          Bed Mobility    Comment, (Bed Mobility) UIC  -ES     Row Name 03/29/23 0946          Sit-Stand Transfer    Sit-Stand [place] (Transfers) minimum assist (75% patient effort);verbal cues  -ES     Assistive Device (Sit-Stand Transfers) walker, front-wheeled  -ES     Comment, (Sit-Stand Transfer) Frequent cueing for sternal precautions. Able to stand on first attempt but required rocking to gain momentum  -ES     Row Name 03/29/23 0946          Gait/Stairs (Locomotion)    [place] Level (Gait) minimum assist (75% patient effort);verbal cues  -ES     Assistive Device (Gait) walker, front-wheeled  -ES     Distance in Feet (Gait) 75+50+50+50  -ES     Deviations/Abnormal Patterns (Gait) base of support, wide;stride length decreased;weight shifting decreased  -ES     Bilateral Gait Deviations forward flexed posture;heel strike decreased  -ES     Comment, (Gait/Stairs) Pt ambulated multiple short bouts but required prolonged standing rest breaks due to c/o BLE pain and fatigue. Required frequent cueing for adherence to sternal precautions. No LOB.  -ES           User Key  (r) = Recorded By, (t) = Taken By, (c) = Cosigned By    Initials Name Provider Type    Donna Fisher PT Physical Therapist               Obj/Interventions     Row Name 03/29/23 0988          Motor Skills    Therapeutic Exercise hip;knee;ankle  -ES     Row Name 03/29/23 0935          Hip (Therapeutic Exercise)    Hip (Therapeutic Exercise) isometric exercises;strengthening exercise  -ES     Hip Isometrics (Therapeutic Exercise)  bilateral;gluteal sets;10 repetitions  -ES     Hip Strengthening (Therapeutic Exercise) bilateral;marching while seated;10 repetitions  -ES     Row Name 03/29/23 0950          Knee (Therapeutic Exercise)    Knee (Therapeutic Exercise) isometric exercises;strengthening exercise  -ES     Knee Isometrics (Therapeutic Exercise) bilateral;quad sets;10 repetitions  -ES     Knee Strengthening (Therapeutic Exercise) bilateral;LAQ (long arc quad);SAQ (short arc quad);marching while seated;10 repetitions  -ES     Row Name 03/29/23 0950          Ankle (Therapeutic Exercise)    Ankle (Therapeutic Exercise) strengthening exercise  -ES     Ankle Strengthening (Therapeutic Exercise) bilateral;dorsiflexion;plantarflexion;10 repetitions  -ES     Row Name 03/29/23 0950          Balance    Balance Assessment sitting static balance;sitting dynamic balance;sit to stand dynamic balance;standing static balance;standing dynamic balance  -ES     Static Sitting Balance supervision  -ES     Dynamic Sitting Balance contact guard;verbal cues  -ES     Position, Sitting Balance unsupported;sitting in chair  -ES     Static Standing Balance contact guard  -ES     Dynamic Standing Balance minimal assist;verbal cues  -ES     Position/Device Used, Standing Balance supported;walker, front-wheeled  -ES     Balance Interventions sitting;standing;sit to stand;supported;static;dynamic;occupation based/functional task  -ES     Comment, Balance No LOB but required increased assistance throughout mobility  -ES           User Key  (r) = Recorded By, (t) = Taken By, (c) = Cosigned By    Initials Name Provider Type    ES Donna Agudelo PT Physical Therapist               Goals/Plan    No documentation.                Clinical Impression     Row Name 03/29/23 0952          Pain    Pretreatment Pain Rating 7/10  -ES     Posttreatment Pain Rating 8/10  -ES     Pain Location incisional  -ES     Pain Location - chest;other (see comments)  BLE pain  -ES      Pre/Posttreatment Pain Comment Continues to be limited heavily by c/o BLE sciatic pain  -ES     Pain Intervention(s) Repositioned;Ambulation/increased activity  -ES     Row Name 03/29/23 0952          Plan of Care Review    Plan of Care Reviewed With patient  -ES     Progress no change  -ES     Outcome Evaluation Pt continues to be limited heavily by BLE sciatic pain and incisional chest pain. Pt also continues to require constant cueing for adherence to sternal precautions with ~50% compliance. Pt required multiple rest breaks throughout mobility and was unable to safely attempt stair training. Will continue to progress as able. PT rec IRF at d/c.  -ES     Row Name 03/29/23 0952          Therapy Assessment/Plan (PT)    Rehab Potential (PT) good, to achieve stated therapy goals  -ES     Criteria for Skilled Interventions Met (PT) yes;meets criteria;skilled treatment is necessary  -ES     Therapy Frequency (PT) daily  -ES     Row Name 03/29/23 0952          Vital Signs    Pre Systolic BP Rehab --  VSS  -ES     O2 Delivery Pre Treatment room air  -ES     O2 Delivery Intra Treatment room air  -ES     O2 Delivery Post Treatment room air  -ES     Pre Patient Position Sitting  -ES     Intra Patient Position Standing  -ES     Post Patient Position Sitting  -ES     Row Name 03/29/23 0952          Positioning and Restraints    Pre-Treatment Position sitting in chair/recliner  -ES     Post Treatment Position chair  -ES     In Chair notified nsg;reclined;sitting;call light within reach;encouraged to call for assist;exit alarm on;waffle cushion  -ES           User Key  (r) = Recorded By, (t) = Taken By, (c) = Cosigned By    Initials Name Provider Type    Donna Fisher PT Physical Therapist               Outcome Measures     Row Name 03/29/23 0954          How much help from another person do you currently need...    Turning from your back to your side while in flat bed without using bedrails? 3  -ES     Moving from lying  on back to sitting on the side of a flat bed without bedrails? 2  -ES     Moving to and from a bed to a chair (including a wheelchair)? 3  -ES     Standing up from a chair using your arms (e.g., wheelchair, bedside chair)? 3  -ES     Climbing 3-5 steps with a railing? 1  -ES     To walk in hospital room? 2  -ES     AM-PAC 6 Clicks Score (PT) 14  -ES     Highest level of mobility 4 --> Transferred to chair/commode  -ES     Row Name 03/29/23 0954          Functional Assessment    Outcome Measure Options AM-PAC 6 Clicks Basic Mobility (PT)  -ES           User Key  (r) = Recorded By, (t) = Taken By, (c) = Cosigned By    Initials Name Provider Type    Donna Fisher PT Physical Therapist                             Physical Therapy Education     Title: PT OT SLP Therapies (In Progress)     Topic: Physical Therapy (In Progress)     Point: Mobility training (In Progress)     Learning Progress Summary           Patient Acceptance, E, VU,NR by LO at 3/27/2023 1454    Comment: PT POC, sternal precautions    Acceptance, E,D, NR by LS at 3/25/2023 1610    Acceptance, E, NR by CM at 3/24/2023 1155    Acceptance, E, NR by CM at 3/23/2023 1043    Acceptance, E, NR by CM at 3/22/2023 1142   Significant Other Acceptance, E, NR by CM at 3/24/2023 1155                   Point: Home exercise program (Done)     Learning Progress Summary           Patient Acceptance, E, VU,NR by LO at 3/27/2023 1454    Comment: PT POC, sternal precautions                   Point: Body mechanics (In Progress)     Learning Progress Summary           Patient Acceptance, E, VU,NR by LO at 3/27/2023 1454    Comment: PT POC, sternal precautions    Acceptance, E,D, NR by LS at 3/25/2023 1610    Acceptance, E, NR by CM at 3/24/2023 1155    Acceptance, E, NR by CM at 3/23/2023 1043    Acceptance, E, NR by CM at 3/22/2023 1142   Significant Other Acceptance, E, NR by CM at 3/24/2023 1155                   Point: Precautions (In Progress)     Learning  Progress Summary           Patient Acceptance, E, VU,NR by LO at 3/27/2023 1454    Comment: PT POC, sternal precautions    Acceptance, E,D, NR by LS at 3/25/2023 1610    Acceptance, E, NR by CM at 3/24/2023 1155    Acceptance, E, NR by CM at 3/23/2023 1043    Acceptance, E, NR by CM at 3/22/2023 1142   Significant Other Acceptance, E, NR by CM at 3/24/2023 1155                               User Key     Initials Effective Dates Name Provider Type Discipline    LS 02/03/23 -  Kirti Morfin, PT Physical Therapist PT    LO 06/16/21 -  Kirti Garcia, PT Physical Therapist PT    CM 09/22/22 -  Anahy Deleon PT Physical Therapist PT              PT Recommendation and Plan     Plan of Care Reviewed With: patient  Progress: no change  Outcome Evaluation: Pt continues to be limited heavily by BLE sciatic pain and incisional chest pain. Pt also continues to require constant cueing for adherence to sternal precautions with ~50% compliance. Pt required multiple rest breaks throughout mobility and was unable to safely attempt stair training. Will continue to progress as able. PT rec IRF at d/c.     Time Calculation:    PT Charges     Row Name 03/29/23 0954             Time Calculation    Start Time 0918  -ES      PT Received On 03/29/23  -ES      PT Goal Re-Cert Due Date 04/01/23  -ES         Time Calculation- PT    Total Timed Code Minutes- PT 25 minute(s)  -ES         Timed Charges    64057 - PT Therapeutic Exercise Minutes 10  -ES      48935 - Gait Training Minutes  15  -ES         Total Minutes    Timed Charges Total Minutes 25  -ES       Total Minutes 25  -ES            User Key  (r) = Recorded By, (t) = Taken By, (c) = Cosigned By    Initials Name Provider Type    ES Donna Agudelo PT Physical Therapist              Therapy Charges for Today     Code Description Service Date Service Provider Modifiers Qty    89794058940 HC GAIT TRAINING EA 15 MIN 3/28/2023 Donna Agudelo, PT GP 1    32939383130 HC PT  THERAPEUTIC ACT EA 15 MIN 3/28/2023 Donna Agudelo, PT GP 1    14623900652 HC PT THER PROC EA 15 MIN 3/29/2023 Donna Agudelo, PT GP 1    85462007725 HC GAIT TRAINING EA 15 MIN 3/29/2023 Donna Agudelo, PT GP 1          PT G-Codes  Outcome Measure Options: AM-PAC 6 Clicks Basic Mobility (PT)  AM-PAC 6 Clicks Score (PT): 14  PT Discharge Summary  Anticipated Discharge Disposition (PT): inpatient rehabilitation facility    Donna Agudelo PT  3/29/2023      Electronically signed by Donna Agudelo, PT at 23 0954          Occupational Therapy Notes (most recent note)      Penny Goodwin, OT at 23 1123          Patient Name: Wilbert Kelley  : 1960    MRN: [id number]                              Today's Date: 3/29/2023       Admit Date: 3/21/2023    Visit Dx:     ICD-10-CM ICD-9-CM   1. Coronary artery disease of native artery of native heart with stable angina pectoris ([facility])  I25.118 414.01     413.9     Patient Active Problem List   Diagnosis   • SOB (shortness of breath) on exertion   • Insulin resistant Diabetes mellitus ([facility])   • Sleep apnea   • Multivessel CAD with remote ptca stent, now with stable anginal equivelent (dyspnea) ([facility])   • S/P CABG x 3 on 3/21/2023   • Hypertension   • Hyperlipidemia   • EMANUEL (HCC)     Past Medical History:   Diagnosis Date   • Arthritis    • BPH (benign prostatic hyperplasia)    • Cancer ([facility])     basal and melanoma-  x2 - left side ear and elbow   • Constipation    • Coronary artery disease    • DDD (degenerative disc disease), lumbar    • Diabetes mellitus ([facility])     couple times month check sugar   • Frozen shoulder     left   • GERD (gastroesophageal reflux disease)    • Hyperlipidemia    • Hypertension    • Limited mobility     left shoulder  - possible frozen shoulder-= please be aware for surgery   • Sleep apnea     doesnt use machine   • Tinnitus    • Wears glasses     small print     Past Surgical History:   Procedure Laterality Date   •  CARDIAC CATHETERIZATION     • CARDIAC CATHETERIZATION Right 03/16/2023    Procedure: Left Heart Cath;  Surgeon: Jarod Boyle MD;  [facility]:  [facility];  Service: Cardiovascular;  Laterality: Right;   • CATARACT EXTRACTION, BILATERAL Bilateral    • COLONOSCOPY     • CORONARY ARTERY BYPASS GRAFT N/A 3/21/2023    Procedure: MEDIAN STERNOTOMY, CORONARY ARTERY BYPASS GRAFTING X 3, UTILIZING THE LEFT INTERNAL MAMMARY ARTERY, ENDOSCOPIC VEIN HARVEST OF THE RIGHT GREATER SAPENOUSE VEIN;  Surgeon: Markus Emanuel MD;  Location:  [facility] OR;  Service: Cardiothoracic;  Laterality: N/A;   • CORONARY STENT PLACEMENT      x4   • ENDOSCOPY     • FINGER SURGERY Left     middle finger - left side   • SKIN CANCER EXCISION      x2   • WISDOM TOOTH EXTRACTION        General Information     Row Name 03/29/23 1144          OT Time and Intention    Document Type evaluation  -KF     Mode of Treatment occupational therapy  -KF     Row Name 03/29/23 1144          General Information    Patient Profile Reviewed yes  -KF     Prior Level of Function independent:;transfer;bed mobility;ADL's  -KF     Existing Precautions/Restrictions cardiac;fall;sternal  -KF     Barriers to Rehab medically complex;previous functional deficit  -KF     Row Name 03/29/23 1144          Occupational Profile    Environmental Supports and Barriers (Occupational Profile) no DME used at baseline, tub shower  -KF     Row Name 03/29/23 1144          Living Environment    People in Home spouse;child(rama), adult;grandchild(rama)  -KF     Row Name 03/29/23 1144          Home Main Entrance    Number of Stairs, Main Entrance twelve  per PT report has 12+4+3+2 steps to enter dwelling  -KF     Row Name 03/29/23 1144          Stairs Within Home, Primary    Stairs, Within Home, Primary can stay on main level once in home  -KF     Row Name 03/29/23 1144          Cognition    Orientation Status (Cognition) oriented x 3  -KF     Row Name 03/29/23 1144           Safety Issues, Functional Mobility    Safety Issues Affecting Function (Mobility) insight into deficits/self-awareness;awareness of need for assistance;safety precaution awareness;sequencing abilities  -     Impairments Affecting Function (Mobility) balance;endurance/activity tolerance;shortness of breath;strength;pain  -KF     Comment, Safety Issues/Impairments (Mobility) cues to maintain sternal precautions throughout for repositioning and transfers, did progress with transfers with cues and education  -           User Key  (r) = Recorded By, (t) = Taken By, (c) = Cosigned By    Initials Name Provider Type    KF Penny Goodwin, OT Occupational Therapist                 Mobility/ADL's     Row Name 03/29/23 1146          Bed Mobility    Comment, (Bed Mobility) UIC  -     Row Name 03/29/23 1146          Transfers    Transfers sit-stand transfer;stand-sit transfer  -     Comment, (Transfers) 2 STS reps with cues for sternal precautions and did well with rocking to get momentum without using UEs on chair or pushing to maintain precautions  -     Row Name 03/29/23 1146          Sit-Stand Transfer    Sit-Stand [place] (Transfers) contact guard  -KF     Assistive Device (Sit-Stand Transfers) walker, front-wheeled  -KF     Row Name 03/29/23 1146          Stand-Sit Transfer    Stand-Sit [place] (Transfers) contact guard  -KF     Assistive Device (Stand-Sit Transfers) walker, front-wheeled  -KF     Row Name 03/29/23 1146          Functional Mobility    Functional Mobility- Safety Issues step length decreased;weight-shifting ability decreased  -     Functional Mobility- Comment deferred to PT  -     Row Name 03/29/23 1146          Activities of Daily Living    BADL Assessment/Intervention lower body dressing  -     Row Name 03/29/23 1146          Lower Body Dressing Assessment/Training    [place] Level (Lower Body Dressing) don;socks;moderate assist (50% patient effort)  -KF     Position  (Lower Body Dressing) unsupported sitting  -KF     Comment, (Lower Body Dressing) can assist in crossing LEs however limited 2/2 edema especially in RLE  -KF           User Key  (r) = Recorded By, (t) = Taken By, (c) = Cosigned By    Initials Name Provider Type    KF Penny Goodwin, OT Occupational Therapist               Obj/Interventions     Row Name 03/29/23 1148          Sensory Assessment (Somatosensory)    Sensory Assessment (Somatosensory) bilateral UE  -KF     Bilateral UE Sensory Assessment impaired;general sensation  -     Row Name 03/29/23 1148          Vision Assessment/Intervention    Visual Impairment/Limitations WFL  -KF     Row Name 03/29/23 1148          Range of Motion Comprehensive    General Range of Motion upper extremity range of motion deficits identified  -KF     Comment, General Range of Motion limited chronic LUE shoulder deficits reported, WFL RUE for ADL completion within sternal precautions  -     Row Name 03/29/23 1148          Strength Comprehensive (MMT)    Comment, General Manual Muscle Testing (MMT) Assessment B  5/5 deferred full MMT due to sternal precautions  -     Row Name 03/29/23 1148          Balance    Balance Assessment sitting static balance;sitting dynamic balance;standing static balance;standing dynamic balance  -KF     Static Sitting Balance supervision  -KF     Dynamic Sitting Balance standby assist  -KF     Position, Sitting Balance unsupported;sitting in chair  -KF     Static Standing Balance contact guard  -KF     Dynamic Standing Balance contact guard  -KF     Position/Device Used, Standing Balance walker, front-wheeled  -KF     Balance Interventions sit to stand;supported;weight shifting activity  -     Comment, Balance education on how to weightshift hips without use of UEs and good completion however requires cues throughout to maintain sternal precautions; no LOB overall with use of FWW with weightshifting within room  -KF           User Key   (r) = Recorded By, (t) = Taken By, (c) = Cosigned By    Initials Name Provider Type    Penny Kwok OT Occupational Therapist               Goals/Plan     Row Name 03/29/23 1153          Transfer Goal 1 (OT)    Activity/Assistive Device (Transfer Goal 1, OT) sit-to-stand/stand-to-sit;toilet;walker, rolling  -KF     [place] Level/Cues Needed (Transfer Goal 1, OT) contact guard required  -KF     Time Frame (Transfer Goal 1, OT) long term goal (LTG);10 days  -KF     Progress/Outcome (Transfer Goal 1, OT) goal ongoing;new goal  -     Row Name 03/29/23 1153          Dressing Goal 1 (OT)    Activity/Device (Dressing Goal 1, OT) lower body dressing  reacher PRN  -KF     [place]/Cues Needed (Dressing Goal 1, OT) contact guard required  -KF     Time Frame (Dressing Goal 1, OT) long term goal (LTG);10 days  -KF     Progress/Outcome (Dressing Goal 1, OT) goal ongoing;new goal  -KF     Row Name 03/29/23 1153          Toileting Goal 1 (OT)    Activity/Device (Toileting Goal 1, OT) adjust/manage clothing;perform perineal hygiene;commode  AE PRN  -KF     [place] Level/Cues Needed (Toileting Goal 1, OT) contact guard required  -KF     Time Frame (Toileting Goal 1, OT) long term goal (LTG);10 days  -KF     Progress/Outcome (Toileting Goal 1, OT) goal ongoing;new goal  -     Row Name 03/29/23 1154          Therapy Assessment/Plan (OT)    Planned Therapy Interventions (OT) activity tolerance training;adaptive equipment training;BADL retraining;occupation/activity based interventions;transfer/mobility retraining;functional balance retraining;cognitive/visual perception retraining;ROM/therapeutic exercise;patient/caregiver education/training  -KF           User Key  (r) = Recorded By, (t) = Taken By, (c) = Cosigned By    Initials Name Provider Type    Penny Kwok OT Occupational Therapist               Clinical Impression     Row Name 03/29/23 1153          Pain Assessment    Pretreatment Pain  Rating 6/10  -KF     Posttreatment Pain Rating 6/10  -KF     Pain Location - Side/Orientation Bilateral  -KF     Pain Location lower  -KF     Pain Location - extremity  -KF     Pre/Posttreatment Pain Comment reports shoulder discomfort and BLE pain due to chronic sciatic pain  -KF     Pain Intervention(s) Repositioned;Ambulation/increased activity  -KF     Row Name 03/29/23 1151          Plan of Care Review    Plan of Care Reviewed With patient  -KF     Progress improving  -KF     Outcome Evaluation OT eval completed, Pt demonstrates deficits in strength, balance, and activity tolerance as well as cues required for safe sternal precaution management with progress today with education, recom IPOT DC IRF for best outcomes  -KF     Row Name 03/29/23 1151          Therapy Assessment/Plan (OT)    Rehab Potential (OT) good, to achieve stated therapy goals  -KF     Criteria for Skilled Therapeutic Interventions Met (OT) yes;meets criteria;skilled treatment is necessary  -KF     Therapy Frequency (OT) daily  -KF     Row Name 03/29/23 1151          Therapy Plan Review/Discharge Plan (OT)    Equipment Needs Upon Discharge (OT) walker, rolling  if home  -KF     Anticipated Discharge Disposition (OT) inpatient rehabilitation facility  -KF     Row Name 03/29/23 1151          Vital Signs    Pre Systolic BP Rehab 128  RN cleared VSS  -KF     Pre Treatment Diastolic BP 65  -KF     O2 Delivery Pre Treatment room air  -KF     Pre Patient Position Sitting  -KF     Intra Patient Position Standing  -KF     Post Patient Position Sitting  -KF     Rest Breaks  2  -KF     Row Name 03/29/23 1151          Positioning and Restraints    Pre-Treatment Position sitting in chair/recliner  -KF     Post Treatment Position chair  -KF     In Chair notified nsg;sitting;call light within reach;encouraged to call for assist;exit alarm on;waffle cushion  -KF           User Key  (r) = Recorded By, (t) = Taken By, (c) = Cosigned By    Initials Name Provider  Type    KF Penny Goodwin OT Occupational Therapist               Outcome Measures     Row Name 03/29/23 1154          How much help from another is currently needed...    Putting on and taking off regular lower body clothing? 2  -KF     Bathing (including washing, rinsing, and drying) 2  -KF     Toileting (which includes using toilet bed pan or urinal) 2  -KF     Putting on and taking off regular upper body clothing 3  -KF     Taking care of personal grooming (such as brushing teeth) 3  -KF     Eating meals 4  -KF     AM-PAC 6 Clicks Score (OT) 16  -KF     Row Name 03/29/23 0954 03/29/23 0859       How much help from another person do you currently need...    Turning from your back to your side while in flat bed without using bedrails? 3  -ES 3  -TH    Moving from lying on back to sitting on the side of a flat bed without bedrails? 2  -ES 2  -TH    Moving to and from a bed to a chair (including a wheelchair)? 3  -ES 3  -TH    Standing up from a chair using your arms (e.g., wheelchair, bedside chair)? 3  -ES 3  -TH    Climbing 3-5 steps with a railing? 1  -ES 1  -TH    To walk in hospital room? 2  -ES 2  -TH    AM-PAC 6 Clicks Score (PT) 14  -ES 14  -TH    Highest level of mobility 4 --> Transferred to chair/commode  -ES 4 --> Transferred to chair/commode  -TH    Row Name 03/29/23 1154 03/29/23 0954       Functional Assessment    Outcome Measure Options AM-PAC 6 Clicks Daily Activity (OT)  -KF AM-PAC 6 Clicks Basic Mobility (PT)  -ES          User Key  (r) = Recorded By, (t) = Taken By, (c) = Cosigned By    Initials Name Provider Type    Penny Kwok OT Occupational Therapist    Yojana Landaverde, RN Registered Nurse    Donna Fisher PT Physical Therapist                Occupational Therapy Education     Title: PT OT SLP Therapies (In Progress)     Topic: Occupational Therapy (In Progress)     Point: ADL training (Done)     Description:   Instruct learner(s) on proper safety adaptation and  remediation techniques during self care or transfers.   Instruct in proper use of assistive devices.              Learning Progress Summary           Patient Acceptance, E,TB,D, VU,DU,NR by  at 3/29/2023 1155                   Point: Home exercise program (Not Started)     Description:   Instruct learner(s) on appropriate technique for monitoring, assisting and/or progressing therapeutic exercises/activities.              Learner Progress:  Not documented in this visit.          Point: Precautions (Done)     Description:   Instruct learner(s) on prescribed precautions during self-care and functional transfers.              Learning Progress Summary           Patient Acceptance, E,TB,D, VU,DU,NR by KF at 3/29/2023 1155                   Point: Body mechanics (Done)     Description:   Instruct learner(s) on proper positioning and spine alignment during self-care, functional mobility activities and/or exercises.              Learning Progress Summary           Patient Acceptance, E,TB,D, VU,DU,NR by  at 3/29/2023 1155                               User Key     Initials Effective Dates Name Provider Type Discipline     06/16/21 -  Penny Goodwin, OT Occupational Therapist OT              OT Recommendation and Plan  Planned Therapy Interventions (OT): activity tolerance training, adaptive equipment training, BADL retraining, occupation/activity based interventions, transfer/mobility retraining, functional balance retraining, cognitive/visual perception retraining, ROM/therapeutic exercise, patient/caregiver education/training  Therapy Frequency (OT): daily  Plan of Care Review  Plan of Care Reviewed With: patient  Progress: improving  Outcome Evaluation: OT eval completed, Pt demonstrates deficits in strength, balance, and activity tolerance as well as cues required for safe sternal precaution management with progress today with education, recom IPOT DC IRF for best outcomes     Time Calculation:    Time  Calculation- OT     Row Name 03/29/23 1123 03/29/23 0954          Time Calculation- OT    OT Start Time 1123 -KF --     OT Received On 03/29/23  -KF --     OT Goal Re-Cert Due Date 04/08/23  -KF --        Timed Charges    56435 - Gait Training Minutes  -- 15  -ES        Untimed Charges    OT Eval/Re-eval Minutes 35  -KF --        Total Minutes    Timed Charges Total Minutes -- 15  -ES     Untimed Charges Total Minutes 35  -KF --      Total Minutes 35  -KF 15  -ES           User Key  (r) = Recorded By, (t) = Taken By, (c) = Cosigned By    Initials Name Provider Type    KF Penny Goodwin, OT Occupational Therapist    ES Donna Agudelo PT Physical Therapist              Therapy Charges for Today     Code Description Service Date Service Provider Modifiers Qty    [id number] HC OT EVAL MOD COMPLEXITY 3 3/29/2023 Penny Goodwin OT GO 1               Penny Goodwin OT  3/29/2023    Electronically signed by Penny Goodwin OT at 03/29/23 1157

## 2023-03-29 NOTE — PROGRESS NOTES
[facility] Medicine Services  PROGRESS NOTE    Patient Name: Wilbert Kelley  : 1960  MRN: [id number]    Date of Admission: 3/21/2023  Primary Care Physician: Jose M Simon MD    Subjective     CC: post-CABG medical management      HPI:  In chair after working with therapy. Primary complaint is back/sciatic pain. Asking about discharge plan because different people tell him different things     ROS:  Gen- No fevers, chills  CV- No chest pain, palpitations  Resp- No cough, dyspnea  GI- No N/V/D, abd pain    Objective     Vital Signs:   Temp:  [97.5 °F (36.4 °C)-99.4 °F (37.4 °C)] 97.5 °F (36.4 °C)  Heart Rate:  [64-78] 71  Resp:  [18-20] 18  BP: (116-172)/(57-97) 118/57     Physical Exam:  Constitutional: No acute distress, awake, alert and conversant. Sitting in chair. Chronically ill appearing   HENT: NCAT, mucous membranes moist  Respiratory: Expiratory wheezing bilaterally without rales or rhonchi, improved today. Normal respiratory effort on room air   Cardiovascular: RRR, no murmurs, rubs, or gallops. Sternal incision erythema improved today - no overt purulence ir induration, EVH site c/d/i without erythema, induration or drainage.  Gastrointestinal: Positive bowel sounds, soft, nontender, nondistended  Musculoskeletal: No bilateral ankle edema  Psychiatric: Appropriate affect, cooperative  Neurologic: Oriented x 3, moves all extremities spontaneously without focal deficits, speech clear    Results Reviewed:  LAB RESULTS:      Lab 23  0524 23  0741 23  1130 23  0350 23  0305 23  0303   WBC 12.49* 11.86* 9.95 11.22* 15.69* 21.33*   HEMOGLOBIN 9.4* 9.8* 8.7* 9.5* 9.1* 10.0*   HEMATOCRIT 30.3* 31.4* 27.1* 30.0* 29.4* 33.1*   PLATELETS 597* 595* 391 320 281 319   NEUTROS ABS  --   --  7.89*  --  13.01* 18.06*   IMMATURE GRANS (ABS)  --   --  0.04  --  0.07* 0.15*   LYMPHS ABS  --   --  0.68*  --  0.83 1.00   MONOS ABS  --   --  0.99*  --   1.57* 2.05*   EOS ABS  --   --  0.30  --  0.18 0.02   MCV 85.6 85.3 82.9 84.7 86.5 86.4   CRP 9.20*  --   --   --   --   --    PROCALCITONIN  --  0.12  --   --   --   --          Lab 03/29/23  0524 03/28/23  0741 03/27/23  1110 03/26/23  1130 03/25/23  0350 03/24/23  0305 03/23/23  0303   SODIUM 139 138 136 134* 130* 132* 137   POTASSIUM 4.7 4.3 4.6 4.3 4.1 4.8 4.7   CHLORIDE 98 97* 95* 96* 94* 95* 98   CO2 27.0 28.0 28.0 26.0 25.0 26.0 27.0   ANION GAP 14.0 13.0 13.0 12.0 11.0 11.0 12.0   BUN 36* 43* 54* 50* 45* 44* 36*   CREATININE 1.13 1.11 1.23 1.12 1.42* 2.77* 2.49*   EGFR 73.9 75.5 66.8 74.7 56.2* 25.2* 28.7*   GLUCOSE 198* 195* 226* 175* 163* 126* 154*   CALCIUM 8.8 9.0 8.8 8.5* 8.7 8.5* 8.5*   MAGNESIUM  --   --   --  2.2 2.3 1.8 2.1   PHOSPHORUS 3.0  --   --  2.8  --  4.3 4.6*         Lab 03/29/23  0524 03/26/23  1130 03/25/23  0350 03/24/23  0305 03/23/23  0303   TOTAL PROTEIN  --  6.8 6.8 6.8 6.9   ALBUMIN 3.5 3.5 3.6 3.7 4.1   GLOBULIN  --  3.3 3.2 3.1 2.8   ALT (SGPT)  --  7 7 6 6   AST (SGOT)  --  35 27 25 26   BILIRUBIN  --  0.9 1.0 0.8 0.7   ALK PHOS  --  105 74 59 57         Lab 03/24/23  1116   PH, ARTERIAL 7.318*   PCO2, ARTERIAL 47.8*   PO2 ART 74.5*   FIO2 36   HCO3 ART 24.5   BASE EXCESS ART -1.8*   CARBOXYHEMOGLOBIN 1.6     Brief Urine Lab Results  (Last result in the past 365 days)      Color   Clarity   Blood   Leuk Est   Nitrite   Protein   CREAT   Urine HCG        03/23/23 1612             246.9             Microbiology Results Abnormal     Procedure Component Value - Date/Time    Respiratory Culture - Sputum, Cough [[id number]] Collected: 03/29/23 0941    Lab Status: Final result Specimen: Sputum from Cough Updated: 03/29/23 1104     Respiratory Culture Rejected     Gram Stain Moderate (3+) WBCs per low power field      Moderate (3+) Epithelial cells per low power field      Moderate (3+) Gram positive cocci in pairs and chains      Rare (1+) Gram variable bacilli    Narrative:       Specimen rejected due to oropharyngeal contamination. Please reorder and recollect specimen if clinically necessary.    MRSA Screen, PCR (Inpatient) - Swab, Nares [[id number]]  (Normal) Collected: 03/28/23 2136    Lab Status: Final result Specimen: Swab from Nares Updated: 03/29/23 0803     MRSA PCR Negative    Narrative:      The negative predictive value of this diagnostic test is high and should only be used to consider de-escalating anti-MRSA therapy. A positive result may indicate colonization with MRSA and must be correlated clinically.  MRSA Negative    S. Pneumo Ag Urine or CSF - Urine, Urine, Clean Catch [[id number]]  (Normal) Collected: 03/28/23 1948    Lab Status: Final result Specimen: Urine, Clean Catch Updated: 03/28/23 2325     Strep Pneumo Ag Negative    Legionella Antigen, Urine - Urine, Urine, Clean Catch [[id number]]  (Normal) Collected: 03/28/23 1948    Lab Status: Final result Specimen: Urine, Clean Catch Updated: 03/28/23 2325     LEGIONELLA ANTIGEN, URINE Negative        XR Chest 1 View    Result Date: 3/29/2023  XR CHEST 1 VW Date of Exam: 3/29/2023 8:26 AM EDT Indication: postop Comparison: AP chest x-ray 3/28/2023, 3/26/2023, 3/24/2023 Findings: Sternal wires are demonstrated. Cardiac silhouette remains enlarged. No pneumothorax is identified. There are patchy left lower lung airspace opacities continued to decrease. Right lung appears clear. Arthritic changes of the left shoulder are partially included.     Impression: Impression: 1.Continued decreasing patchy left basilar opacities, likely atelectasis. 2.No definite pneumothorax. Electronically Signed: Kirti Cordero  3/29/2023 8:50 AM EDT  Workstation ID: [id number]    XR Chest 1 View    Result Date: 3/28/2023  XR CHEST 1 VW Date of Exam: 3/28/2023 3:50 AM EDT Indication: postop. Comparison: Chest x-ray 3/26/2023 Findings: Stable cardiomegaly. There is improved aeration of the left lung base with decreased streaky atelectasis. No significant  pleural effusion. No pneumothorax.     Impression: Impression: Improved aeration and decreased atelectasis in the left lung base. No definite pneumothorax. Electronically Signed: Jung Salazar  3/28/2023 8:39 AM EDT  Workstation ID: [id number]    Results for orders placed during the hospital encounter of 02/02/23    Adult Transthoracic Echo Complete w/ Color, Spectral and Contrast if necessary per protocol    Interpretation Summary  •  Left ventricular systolic function is normal. Left ventricular ejection fraction appears to be 66 - 70%.  •  Left ventricular wall thickness is consistent with mild concentric hypertrophy.  •  Left ventricular diastolic function is consistent with (grade II w/high LAP) pseudonormalization.  •  Technically very difficult study.  Right ventricle and right atrium is not well visualized for adequate evaluation.    Current medications:  Scheduled Meds:acetaminophen, 650 mg, Oral, Q8H  amiodarone, 200 mg, Oral, Q12H  amLODIPine, 5 mg, Oral, Q24H  aspirin, 325 mg, Oral, Daily  atorvastatin, 40 mg, Oral, Nightly  azithromycin, 500 mg, Oral, Daily  bumetanide, 2 mg, Intravenous, Once  bumetanide, 2 mg, Oral, Daily  castor oil-balsam peru, 1 application, Topical, Q12H  clopidogrel, 75 mg, Oral, Daily  gabapentin, 400 mg, Oral, TID  insulin detemir, 18 Units, Subcutaneous, Q12H  insulin lispro, 0-7 Units, Subcutaneous, TID AC  Insulin Lispro, 5 Units, Subcutaneous, TID With Meals  lactobacillus acidophilus, 1 capsule, Oral, Daily  linezolid, 600 mg, Oral, Q12H  lisinopril, 40 mg, Oral, Q24H  metoclopramide, 10 mg, Oral, TID AC  nebivolol, 5 mg, Oral, Q24H  oxymetazoline, 2 spray, Each Nare, BID  pharmacy consult - MTM, , Does not apply, Daily  senna-docusate sodium, 2 tablet, Oral, BID  tamsulosin, 0.4 mg, Oral, Daily      Continuous Infusions:   PRN Meds:.•  senna-docusate sodium **AND** polyethylene glycol **AND** bisacodyl **AND** bisacodyl  •  Calcium Replacement - Follow Nurse / BPA Driven  Protocol  •  dextrose  •  dextrose  •  glucagon (human recombinant)  •  ipratropium-albuterol  •  Magnesium Standard Dose Replacement - Follow Nurse / BPA Driven Protocol  •  ondansetron  •  Phosphorus Replacement - Follow Nurse / BPA Driven Protocol  •  Potassium Replacement - Follow Nurse / BPA Driven Protocol  •  simethicone    Assessment & Plan     Active Hospital Problems    Diagnosis  POA   • **Multivessel CAD with remote ptca stent, now with stable anginal equivelent (dyspnea) ([facility]) [I25.118]  Yes   • S/P CABG x 3 on 3/21/2023 [Z95.1]  Not Applicable   • Hypertension [I10]  Yes   • Hyperlipidemia [E78.5]  Yes   • EMANUEL (HCC) [N17.9]  No   • Insulin resistant Diabetes mellitus (HCC) [E11.9]  Yes   • Sleep apnea [G47.30]  Yes      Resolved Hospital Problems   No resolved problems to display.     Brief Hospital Course to date:  Wilbert Kelley is a 62 y.o. male with relevant PMH of CAD with PTCA and stent placement to LAD and right Coronary Artery in 2010, HTN, HLD, recent C 3/16 w/ MVCAD and diabetes. He was admitted to [facility] 3/21/2023 following CABG x 3 by Dr. Emanuel. He transferred out of ICU on 3/25/2023 and hospital medicine consulted to follow.      MVCAD s/p 3v CABG  PAT vs Aflutter  - Post-operative care per CT surgery  - Cardiology following. S/p IV Amiodarone, now onPO  - Encourage ambulation, IS  - PRN pain control. Gabapenin dose reduced due to EMANUEL   - Cardiology added Bumex on 3/27  - Continue ASA, high-intensity statin and BB     Leukocytosis  Sternal incision cellulitis  Bronchitis  - ID following - on Linezolid / Azithromycin      DMII   Nausea, suspected gastroparesis  Diabetic peripheral neuropathy  - Increase Levemir to 18 units BID, continue Lispro 5 units TID AC + SSI  - DC Reglan and see how he does      Hypotension, resolved   EMANUEL  Hyponatremia  - Nephrology following  - 1200mL fluid restriction - sodium normal at 139 today  - Creatinine 1.13 today    Hypertension  -  Cardiology managing      Likely MARIAH  - CPAP QHS and PRN     DVT prophylaxis:Mechanical DVT prophylaxis orders are present.     AM-PAC 6 Clicks Score (PT): 14 (03/29/23 6285)    CODE STATUS:   Code Status and Medical Interventions:   Ordered at: 03/21/23 113     Code Status (Patient has no pulse and is not breathing):    CPR (Attempt to Resuscitate)     Medical Interventions (Patient has pulse or is breathing):    Full Support     Release to patient:    Routine Release     Mirian Hurley PA-C  03/29/23

## 2023-03-29 NOTE — NURSING NOTE
"                             Wound, Ostomy and Continence (WOC) Note    Reason for WOC follow up: \"right inner thigh wound \"     Patient awake, sitting up in chair.  Family/support person at bedside.       Skin/Wound Assessment:     Wound Type:Bruising ? hematoma-area has dispersed and faded but is still present  Location: Right inner thigh  Wound Bed: ecchymosis, firm in some areas  Wound Edges: Linear  Periwound Skin: intact and blanchable, bruised  Drainage Characteristics/Odor: none  Drainage Amount: none  Pain: No     Summary and Recommendation(s):     - Venelex ordered BID.  - Keep all pressure from effected area.   - Refer to wound care instructions in the \"Orders\" tab    Pressure Injury Prevention Measures (initiate for a Gigi Scale Score of <18):     Most recent Gigi Scale score:  Sensory Perception: 4-->no impairment  Moisture: 4-->rarely moist  Activity: 3-->walks occasionally  Mobility: 3-->slightly limited  Nutrition: 3-->adequate  Friction and Shear: 3-->no apparent problem  Gigi Score: 20 (03/28/23 2130)     -Turn q 2 hr. using an offloading foam wedge, keep heels elevated and offloaded with offloading heel boots.    -Raise knee-gatch before elevating HOB to reduce shearing   -Follow C.A.R.E protocol if medical devices (Bipap, mai, Ng tube, etc) are being used.  -Apply moisture barrier cream to bottom BID & PRN, if incontinent.  -If incontinent, consider applying an external catheter. External catheters are finicky and should be checked every few hours for placement.   -Clean skin gently with no-rinse PH-balanced foam cleanser and a soft, disposable cloth (barrier wipes-blue pack).   -Reduce layers under patient (one sheet as drawsheet and two incontinence pads)    Thank you for consulting the WOC Nurse.  WOC Team will sign off.  Please re consult if the wound(s) worsens.           "

## 2023-03-30 ENCOUNTER — READMISSION MANAGEMENT (OUTPATIENT)
Dept: CALL CENTER | Facility: HOSPITAL | Age: 63
End: 2023-03-30
Payer: MEDICARE

## 2023-03-30 VITALS
TEMPERATURE: 98.2 F | HEIGHT: 73 IN | SYSTOLIC BLOOD PRESSURE: 114 MMHG | HEART RATE: 69 BPM | RESPIRATION RATE: 18 BRPM | DIASTOLIC BLOOD PRESSURE: 74 MMHG | WEIGHT: 315 LBS | OXYGEN SATURATION: 95 % | BODY MASS INDEX: 41.75 KG/M2

## 2023-03-30 LAB
DEPRECATED RDW RBC AUTO: 54.4 FL (ref 37–54)
ERYTHROCYTE [DISTWIDTH] IN BLOOD BY AUTOMATED COUNT: 17.4 % (ref 12.3–15.4)
GLUCOSE BLDC GLUCOMTR-MCNC: 225 MG/DL (ref 70–130)
GLUCOSE BLDC GLUCOMTR-MCNC: 261 MG/DL (ref 70–130)
HCT VFR BLD AUTO: 32.9 % (ref 37.5–51)
HGB BLD-MCNC: 10.2 G/DL (ref 13–17.7)
MCH RBC QN AUTO: 26.6 PG (ref 26.6–33)
MCHC RBC AUTO-ENTMCNC: 31 G/DL (ref 31.5–35.7)
MCV RBC AUTO: 85.9 FL (ref 79–97)
PLATELET # BLD AUTO: 640 10*3/MM3 (ref 140–450)
PMV BLD AUTO: 9.4 FL (ref 6–12)
QT INTERVAL: 434 MS
QTC INTERVAL: 454 MS
RBC # BLD AUTO: 3.83 10*6/MM3 (ref 4.14–5.8)
WBC NRBC COR # BLD: 12.58 10*3/MM3 (ref 3.4–10.8)

## 2023-03-30 PROCEDURE — 99232 SBSQ HOSP IP/OBS MODERATE 35: CPT | Performed by: NURSE PRACTITIONER

## 2023-03-30 PROCEDURE — 25010000002 ONDANSETRON PER 1 MG: Performed by: PHYSICIAN ASSISTANT

## 2023-03-30 PROCEDURE — 82962 GLUCOSE BLOOD TEST: CPT

## 2023-03-30 PROCEDURE — 93005 ELECTROCARDIOGRAM TRACING: CPT | Performed by: INTERNAL MEDICINE

## 2023-03-30 PROCEDURE — 63710000001 INSULIN DETEMIR PER 5 UNITS: Performed by: PHYSICIAN ASSISTANT

## 2023-03-30 PROCEDURE — 85027 COMPLETE CBC AUTOMATED: CPT

## 2023-03-30 PROCEDURE — 99232 SBSQ HOSP IP/OBS MODERATE 35: CPT | Performed by: INTERNAL MEDICINE

## 2023-03-30 PROCEDURE — 63710000001 INSULIN LISPRO (HUMAN) PER 5 UNITS: Performed by: PHYSICIAN ASSISTANT

## 2023-03-30 PROCEDURE — 25010000002 MORPHINE PER 10 MG: Performed by: STUDENT IN AN ORGANIZED HEALTH CARE EDUCATION/TRAINING PROGRAM

## 2023-03-30 PROCEDURE — 99024 POSTOP FOLLOW-UP VISIT: CPT

## 2023-03-30 PROCEDURE — 93010 ELECTROCARDIOGRAM REPORT: CPT | Performed by: INTERNAL MEDICINE

## 2023-03-30 RX ORDER — LISINOPRIL 40 MG/1
40 TABLET ORAL DAILY
Qty: 30 TABLET | Refills: 5 | Status: SHIPPED | OUTPATIENT
Start: 2023-03-30 | End: 2023-03-30 | Stop reason: SDUPTHER

## 2023-03-30 RX ORDER — HYDROCODONE BITARTRATE AND ACETAMINOPHEN 5; 325 MG/1; MG/1
1 TABLET ORAL EVERY 8 HOURS PRN
Qty: 15 TABLET | Refills: 0 | Status: SHIPPED | OUTPATIENT
Start: 2023-03-30

## 2023-03-30 RX ORDER — ATORVASTATIN CALCIUM 40 MG/1
40 TABLET, FILM COATED ORAL NIGHTLY
Qty: 30 TABLET | Refills: 5 | Status: SHIPPED | OUTPATIENT
Start: 2023-03-30

## 2023-03-30 RX ORDER — ASPIRIN 81 MG/1
81 TABLET ORAL DAILY
Qty: 90 TABLET | Refills: 3 | Status: SHIPPED | OUTPATIENT
Start: 2023-03-30 | End: 2023-03-30 | Stop reason: SDUPTHER

## 2023-03-30 RX ORDER — LISINOPRIL 40 MG/1
40 TABLET ORAL DAILY
Qty: 30 TABLET | Refills: 5 | Status: SHIPPED | OUTPATIENT
Start: 2023-03-30

## 2023-03-30 RX ORDER — LINEZOLID 600 MG/1
600 TABLET, FILM COATED ORAL EVERY 12 HOURS SCHEDULED
Qty: 16 TABLET | Refills: 0 | Status: SHIPPED | OUTPATIENT
Start: 2023-03-30 | End: 2023-04-07

## 2023-03-30 RX ORDER — AMIODARONE HYDROCHLORIDE 200 MG/1
200 TABLET ORAL
Qty: 30 TABLET | Refills: 0 | Status: SHIPPED | OUTPATIENT
Start: 2023-03-31 | End: 2023-03-30 | Stop reason: SDUPTHER

## 2023-03-30 RX ORDER — HYDROCODONE BITARTRATE AND ACETAMINOPHEN 5; 325 MG/1; MG/1
1 TABLET ORAL EVERY 6 HOURS PRN
Status: DISCONTINUED | OUTPATIENT
Start: 2023-03-30 | End: 2023-03-30 | Stop reason: HOSPADM

## 2023-03-30 RX ORDER — ASPIRIN 81 MG/1
81 TABLET ORAL DAILY
Qty: 90 TABLET | Refills: 3 | Status: SHIPPED | OUTPATIENT
Start: 2023-03-30

## 2023-03-30 RX ORDER — AMLODIPINE BESYLATE 5 MG/1
5 TABLET ORAL
Qty: 30 TABLET | Refills: 5 | Status: SHIPPED | OUTPATIENT
Start: 2023-03-31 | End: 2023-03-30 | Stop reason: SDUPTHER

## 2023-03-30 RX ORDER — BUMETANIDE 2 MG/1
2 TABLET ORAL DAILY
Qty: 30 TABLET | Refills: 0 | Status: SHIPPED | OUTPATIENT
Start: 2023-03-31 | End: 2023-03-30 | Stop reason: SDUPTHER

## 2023-03-30 RX ORDER — NEBIVOLOL 5 MG/1
5 TABLET ORAL
Qty: 30 TABLET | Refills: 5 | Status: SHIPPED | OUTPATIENT
Start: 2023-03-31

## 2023-03-30 RX ORDER — AMIODARONE HYDROCHLORIDE 200 MG/1
200 TABLET ORAL
Status: DISCONTINUED | OUTPATIENT
Start: 2023-03-31 | End: 2023-03-30 | Stop reason: HOSPADM

## 2023-03-30 RX ORDER — TAMSULOSIN HYDROCHLORIDE 0.4 MG/1
0.4 CAPSULE ORAL DAILY
Qty: 30 CAPSULE | Refills: 0 | Status: SHIPPED | OUTPATIENT
Start: 2023-03-31 | End: 2023-03-30 | Stop reason: SDUPTHER

## 2023-03-30 RX ORDER — NEBIVOLOL 5 MG/1
5 TABLET ORAL
Qty: 30 TABLET | Refills: 5 | Status: SHIPPED | OUTPATIENT
Start: 2023-03-31 | End: 2023-03-30 | Stop reason: SDUPTHER

## 2023-03-30 RX ORDER — AMIODARONE HYDROCHLORIDE 200 MG/1
200 TABLET ORAL
Qty: 30 TABLET | Refills: 0 | Status: SHIPPED | OUTPATIENT
Start: 2023-03-31

## 2023-03-30 RX ORDER — AZITHROMYCIN 250 MG/1
500 TABLET, FILM COATED ORAL DAILY
Qty: 6 TABLET | Refills: 0 | Status: SHIPPED | OUTPATIENT
Start: 2023-03-31 | End: 2023-03-30 | Stop reason: SDUPTHER

## 2023-03-30 RX ORDER — AMLODIPINE BESYLATE 5 MG/1
5 TABLET ORAL
Qty: 30 TABLET | Refills: 5 | Status: SHIPPED | OUTPATIENT
Start: 2023-03-31

## 2023-03-30 RX ORDER — TAMSULOSIN HYDROCHLORIDE 0.4 MG/1
0.4 CAPSULE ORAL DAILY
Qty: 30 CAPSULE | Refills: 0 | Status: SHIPPED | OUTPATIENT
Start: 2023-03-31

## 2023-03-30 RX ORDER — BUMETANIDE 2 MG/1
2 TABLET ORAL DAILY
Qty: 30 TABLET | Refills: 0 | Status: SHIPPED | OUTPATIENT
Start: 2023-03-31

## 2023-03-30 RX ORDER — AZITHROMYCIN 250 MG/1
500 TABLET, FILM COATED ORAL DAILY
Qty: 6 TABLET | Refills: 0 | Status: SHIPPED | OUTPATIENT
Start: 2023-03-31 | End: 2023-04-06

## 2023-03-30 RX ORDER — ATORVASTATIN CALCIUM 40 MG/1
40 TABLET, FILM COATED ORAL NIGHTLY
Qty: 30 TABLET | Refills: 5 | Status: SHIPPED | OUTPATIENT
Start: 2023-03-30 | End: 2023-03-30 | Stop reason: SDUPTHER

## 2023-03-30 RX ORDER — LINEZOLID 600 MG/1
600 TABLET, FILM COATED ORAL EVERY 12 HOURS SCHEDULED
Qty: 16 TABLET | Refills: 0 | Status: SHIPPED | OUTPATIENT
Start: 2023-03-30 | End: 2023-03-30 | Stop reason: SDUPTHER

## 2023-03-30 RX ORDER — MORPHINE SULFATE 2 MG/ML
2 INJECTION, SOLUTION INTRAMUSCULAR; INTRAVENOUS ONCE
Status: COMPLETED | OUTPATIENT
Start: 2023-03-30 | End: 2023-03-30

## 2023-03-30 RX ADMIN — INSULIN DETEMIR 18 UNITS: 100 INJECTION, SOLUTION SUBCUTANEOUS at 08:51

## 2023-03-30 RX ADMIN — MORPHINE SULFATE 2 MG: 2 INJECTION, SOLUTION INTRAMUSCULAR; INTRAVENOUS at 03:32

## 2023-03-30 RX ADMIN — BUMETANIDE 2 MG: 2 TABLET ORAL at 08:50

## 2023-03-30 RX ADMIN — INSULIN LISPRO 4 UNITS: 100 INJECTION, SOLUTION INTRAVENOUS; SUBCUTANEOUS at 12:45

## 2023-03-30 RX ADMIN — LISINOPRIL 40 MG: 20 TABLET ORAL at 08:51

## 2023-03-30 RX ADMIN — SENNOSIDES AND DOCUSATE SODIUM 2 TABLET: 8.6; 5 TABLET ORAL at 08:50

## 2023-03-30 RX ADMIN — NEBIVOLOL 5 MG: 5 TABLET ORAL at 08:54

## 2023-03-30 RX ADMIN — GABAPENTIN 400 MG: 400 CAPSULE ORAL at 08:51

## 2023-03-30 RX ADMIN — GABAPENTIN 400 MG: 400 CAPSULE ORAL at 15:22

## 2023-03-30 RX ADMIN — INSULIN LISPRO 3 UNITS: 100 INJECTION, SOLUTION INTRAVENOUS; SUBCUTANEOUS at 08:53

## 2023-03-30 RX ADMIN — AMIODARONE HYDROCHLORIDE 200 MG: 200 TABLET ORAL at 08:50

## 2023-03-30 RX ADMIN — CLOPIDOGREL BISULFATE 75 MG: 75 TABLET ORAL at 08:51

## 2023-03-30 RX ADMIN — ONDANSETRON 4 MG: 2 INJECTION INTRAMUSCULAR; INTRAVENOUS at 03:35

## 2023-03-30 RX ADMIN — ASPIRIN 325 MG: 325 TABLET, COATED ORAL at 08:51

## 2023-03-30 RX ADMIN — TAMSULOSIN HYDROCHLORIDE 0.4 MG: 0.4 CAPSULE ORAL at 08:51

## 2023-03-30 RX ADMIN — AMLODIPINE BESYLATE 5 MG: 5 TABLET ORAL at 08:50

## 2023-03-30 RX ADMIN — ACETAMINOPHEN 325MG 650 MG: 325 TABLET ORAL at 15:22

## 2023-03-30 RX ADMIN — CASTOR OIL AND BALSAM, PERU 1 APPLICATION: 788; 87 OINTMENT TOPICAL at 08:52

## 2023-03-30 RX ADMIN — AZITHROMYCIN MONOHYDRATE 500 MG: 250 TABLET ORAL at 08:59

## 2023-03-30 RX ADMIN — Medication 1 CAPSULE: at 08:51

## 2023-03-30 RX ADMIN — INSULIN LISPRO 5 UNITS: 100 INJECTION, SOLUTION INTRAVENOUS; SUBCUTANEOUS at 12:46

## 2023-03-30 RX ADMIN — INSULIN LISPRO 5 UNITS: 100 INJECTION, SOLUTION INTRAVENOUS; SUBCUTANEOUS at 08:51

## 2023-03-30 RX ADMIN — LINEZOLID 600 MG: 600 TABLET ORAL at 11:49

## 2023-03-30 NOTE — DISCHARGE SUMMARY
[facility] Cardiothoracic Surgery Discharge Summary    Name:  Wilbert Kelley  MRN Number:  [id number]  Date of Admission: 3/21/2023  Date of Discharge:  3/30/2023    Referred By: Markus Emanuel MD  PCP: Jose M Simon MD  IP Care Team:  Patient Care Team:  Jose M Simon MD as PCP - General (Geriatric Medicine)    Discharge Diagnosis:  Past Medical History:   Diagnosis Date   • Arthritis    • BPH (benign prostatic hyperplasia)    • Cancer (HCC)     basal and melanoma-  x2 - left side ear and elbow   • Constipation    • Coronary artery disease    • DDD (degenerative disc disease), lumbar    • Diabetes mellitus (HCC)     couple times month check sugar   • Frozen shoulder     left   • GERD (gastroesophageal reflux disease)    • Hyperlipidemia    • Hypertension    • Limited mobility     left shoulder  - possible frozen shoulder-= please be aware for surgery   • Sleep apnea     doesnt use machine   • Tinnitus    • Wears glasses     small print       Multivessel CAD with remote ptca stent, now with stable anginal equivelent (dyspnea) ([facility])    Insulin resistant Diabetes mellitus (HCC)    Sleep apnea    S/P CABG x 3 on 3/21/2023    Hypertension    Hyperlipidemia    EMANUEL ([facility])     Coronary artery disease of native heart with stable angina pectoris, unspecified vessel or lesion type ([facility]) [I25.118]  Coronary artery disease of native artery of native heart with stable angina pectoris ([facility]) [I25.118]    Procedures Performed:  Procedure(s):  MEDIAN STERNOTOMY, CORONARY ARTERY BYPASS GRAFTING X 3, UTILIZING THE LEFT INTERNAL MAMMARY ARTERY, ENDOSCOPIC VEIN HARVEST OF THE RIGHT GREATER SAPENOUSE VEIN         History of Present Illness:    The patient is a 62-year-old male who was referred to me to evaluate for coronary bypass grafting surgery.  He has a history of a stent in his left anterior descending coronary artery and he states that his father and grandfather  at age 42 and 45 respectively from heart  attacks.  He denies chest pain but states that he has shortness of breath with minimal exertion and a recent catheterization last week demonstrates critical three-vessel coronary disease.  He is a diabetic requiring insulin with hypertension and hyperlipidemia.  He has not smoked in many years.    Hospital Course:  Patient was admitted to [facility] on 3/21/2023 for scheduled coronary artery bypass grafting x3 with EVH of the rater saphenous vein with Dr. Emanuel.  He was sent to ICU in stable condition and was extubated per ICU protocol.  He was discharged in stable condition on POD #9.  His hospital course is outlined below.    CAD s/p CABG x3:  -3/24 chest tube and pacing wires removed  -3/25 central line was removed and transfer to telemetry orders were placed  -3/26 transferred to telemetry floor  -3/27 started on Plavix  -ASA, Statin, BB, Plavix  -Continue bumex 2 mg p.o. daily  -Follow-up in 10 to 12 days with CT surgery for staple removal    Postop Afib:  -First occurred 3/25, started on amio protocol   -3/26 converted to SR  -Amiodarone 2 mg twice daily x5 days then 200 mg daily per cardiology recommendations  -Anticoagulated not necessary due to brief episode  -Check EKG in 1 week with PCP or at primary cardiology office  -Follow-up in 6 to 8 weeks with Dr. Boyle    EMANUEL (resolved):  -Nephrology consulted due to creatinine of 2.49, BUN 36  -Avoid nephrotoxic medications  -Keep SBP greater than 100  -3/26 Creatinine 1.12     Hyponatremia:  -1500 cc daily, per renal    Urinary hesitancy:  -3/25 Flomax started    Bronchitis/sternal wound cellulitis/leukocytosis:  -3/28 ID consulted. WBC count increased 11.86.  Pro-Gab negative.  Urine cultures and MRSA swab negative.  Respiratory culture showing normal oral dave.  -Per ID recommendations: P.O. linezolid 6 mg twice daily and azithromycin 500 mg daily x 5 days    Discharge Medications:     Discharge Medications      New Medications       Instructions Start Date   amiodarone 200 MG tablet  Commonly known as: PACERONE   200 mg, Oral, Every 24 Hours Scheduled   Start Date: March 31, 2023     amLODIPine 5 MG tablet  Commonly known as: NORVASC   5 mg, Oral, Every 24 Hours Scheduled   Start Date: March 31, 2023     aspirin 81 MG EC tablet   81 mg, Oral, Daily      azithromycin 250 MG tablet  Commonly known as: ZITHROMAX   Take 2 tablets by mouth daily as directed for Pneumonia   Start Date: March 31, 2023     bumetanide 2 MG tablet  Commonly known as: BUMEX   2 mg, Oral, Daily   Start Date: March 31, 2023     HYDROcodone-acetaminophen 5-325 MG per tablet  Commonly known as: NORCO   1 tablet, Oral, Every 8 Hours PRN      linezolid 600 MG tablet  Commonly known as: ZYVOX   Take 1 tablet by mouth Every 12 (Twelve) Hours for 16 doses as directed for Infection      nebivolol 5 MG tablet  Commonly known as: BYSTOLIC   5 mg, Oral, Every 24 Hours Scheduled   Start Date: March 31, 2023     tamsulosin 0.4 MG capsule 24 hr capsule  Commonly known as: FLOMAX   0.4 mg, Oral, Daily   Start Date: March 31, 2023        Changes to Medications      Instructions Start Date   atorvastatin 40 MG tablet  Commonly known as: LIPITOR  What changed:   · medication strength  · how much to take  · when to take this   40 mg, Oral, Nightly      lisinopril 40 MG tablet  Commonly known as: PRINIVIL,ZESTRIL  What changed: when to take this   40 mg, Oral, Daily         Continue These Medications      Instructions Start Date   B-D ULTRAFINE III SHORT PEN 31G X 8 MM misc  Generic drug: Insulin Pen Needle   No dose, route, or frequency recorded.      BD Insulin Syringe U-500 31G X 6MM 0.5 ML misc  Generic drug: Insulin Syringe/Needle U-500   No dose, route, or frequency recorded.      clopidogrel 75 MG tablet  Commonly known as: PLAVIX   75 mg, Oral, Daily      fenofibrate 160 MG tablet   160 mg, Oral, Daily      gabapentin 800 MG tablet  Commonly known as: NEURONTIN   800 mg, 3 Times  Daily      Insulin Regular Human (Conc) 500 UNIT/ML solution pen-injector CONCENTRATED injection  Commonly known as: HumuLIN R   65 Units, Subcutaneous, 3 Times Daily Before Meals      metFORMIN 1000 MG tablet  Commonly known as: GLUCOPHAGE   1,000 mg, Oral, 2 Times Daily With Meals, Hold metformin for 2 days post-cath.  Restart on 3/19/2023      TOUJEO MAX SOLOSTAR SC   80 Units, Subcutaneous, 2 Times Daily         Stop These Medications    hydroCHLOROthiazide 25 MG tablet  Commonly known as: HYDRODIURIL     Metoprolol Tartrate 75 MG tablet            Allergies:  Allergies   Allergen Reactions   • Morphine Nausea And Vomiting     projectile   • Adhesive Tape Rash     Pt states sticky tabs cause irritation when left on long        Discharge Diet:  Diet Instructions     Diet: Cardiac Diets; Healthy Heart (2-3 Na+); Texture: Regular Texture (IDDSI 7); Fluid Consistency: Thin (IDDSI 0)      Discharge Diet: Cardiac Diets    Cardiac Diet: Healthy Heart (2-3 Na+)    Texture: Regular Texture (IDDSI 7)    Fluid Consistency: Thin (IDDSI 0)            Activity at Discharge:  Activity Instructions     Activity as Tolerated      Bathing Restrictions      Type of Restriction: Bathing    Bathing Restrictions: No Tub Bath    Driving Restrictions      Type of Restriction: Driving    Driving Restrictions: No Driving While Taking Narcotics    Lifting Restrictions      Type of Restriction: Lifting    Lifting Restrictions: Lifting Restriction (Indicate Limit)    Weight Limit (Pounds): 10    Length of Lifting Restriction: until seen at next follow-up appointment          Discharge Disposition  Home or Self Care    Discharge Education:  Post-Op Education:  Patient was advised on responsible use of opioids in the post-surgical setting.  Patient was advised these medications are highly addictive.  Patient advised on proper disposal of unused opioid medication and was urged to discard any unused opioid medication. I reviewed the patient's  sternal precautions and outlined the physical activity suitable for each benchmark at the 6-week 3-month and 1 year intervals.  Wound Care:  Patient educated regarding care of post-operative wounds.  Patient is to wash with plain soap and water and pat dry.  Patient is not to use salves on the incision sites.  Patient instructed regarding the signs and symptoms of infection and when to call the office with any concerns.  SBE Education/Valve Education: Symptoms of acute IE usually begin with fever (102°-104°), chills, fast heart rate, fatigue, night sweats, aching joints and muscles, persistent cough, or swelling in the feet, legs or abdomen. You can reduce the risk of IE by maintaining good oral health through regular professional dental care and the use of dental products such as manual, powered and ultrasonic toothbrushes; dental floss; and other plaque-removal devices. Inform your dentist, family doctor (PCP) or other health care professional prior to any surgeries and/or procedures that you have had heart valve surgery.  Prophylactic antibiotics are always recommended prior to any surgical procedures following heart valve replacement.    Special Instructions:   1.  Keep incisions clean and dry at all times. Take a shower daily. Do not take a tub bath or use hot tubs until seen by the cardiac surgeon in your follow-up visit. Clean  your body and incisions daily with Dial soap and water. Always use a clean washcloth. Do not scrub your incision(s). Do not re-use dressings on your incision(s). Do not use any lotions, creams, oils, powders, antibiotic ointment (i.e., Neosporin), peroxide, alcohol, or iodine UNLESS told to do by the cardiac surgeon.  Patient may shower, but no tub baths until CT Surgery followup.   2.  No lifting over 10 lbs until CT Surgery follow up.  3.  No driving until released to do so by the surgeon.      Surgical Leg Precautions:   -Avoid sitting in one position or standing for long periods of  time.  -Do not cross your legs.  -Keep your legs elevated while sitting  -Do not use any lotions, creams, oils, powders, antibiotic ointment (i.e. Neosporin), peroxide, alcohol, or iodine unless specifically prescribed by the cardiac surgeon.  -If elastic stockings (FLY hose) were prescribed to you, wear the stockings while you are up for at least two weeks after discharge. The stockings help decrease swelling. However, remove stockings at bedtime. Wash the stockings with mild soap and water, and make sure they are completely dry before you put them back on.    When to Call the Cardiac Surgeon:  -Increased swelling, redness, tenderness, and drainage  -Increased warmth in the skin around an incision  -Large amount of clear or pinkish drainage  -Sudden increased amount of drainage  -White, yellow, or greenish drainage  -Odor, which may be foul or sweet, coming from an incision  -An opening in your incisions  -Temperature higher than 101 degrees Fahrenheit   -Temperature higher than 100 degrees Fahrenheit two times within 24 hours  -Do not hesitate to call the cardiac surgery nurse navigator or cardiac surgeon if you have concerns or questions.     *The [facility] cardiac surgery nurse navigator is available Monday-Friday 8 a.m. to 4:30 p.m. [phone]  Call 911:  -Chest pain (pain similar to what you experienced prior to surgery)  -Fainting spells  -Bright red stool  -Vomiting bright red blood  -Shortness of breath not relieved by rest  -Sudden numbness or weakness in arms or legs  -Sudden, severe headache  -Heart rate faster than 150 beats/minute with shortness of breath or a new irregular heart rate      Follow-up Appointments  Future Appointments   Date Time Provider Department Center   4/6/2023  3:00 PM Rupa Pearson APRN [facility]   5/11/2023  1:00 PM Jarod Boyle MD [facility]     Additional Instructions for the Follow-ups that You Need to Schedule     Ambulatory Referral  to Home Health   As directed      Face to Face Visit Date: 3/29/2023    Follow-up provider for Plan of Care?: I treated the patient in an acute care facility and will not continue treatment after discharge.    Follow-up provider: JOSE M NAVARRO [[id number]]    Reason/Clinical Findings: impaired endurance, mobility    Describe mobility limitations that make leaving home difficult: impaired endurance, mobility    Nursing/Therapeutic Services Requested: Skilled Nursing Physical Therapy Occupational Therapy    Skilled nursing orders: Post CABG care    PT orders: Therapeutic exercise Gait Training Strengthening Home safety assessment    Weight Bearing Status: As Tolerated    Occupational orders: Activities of daily living Energy conservation Strengthening Home safety assessment    Frequency: 1 Week 1         Call MD With Problems / Concerns   As directed      Instructions:   Please call the CT Surgery office with any questions or concerns you may have [phone]    Order Comments: Instructions: Please call the CT Surgery office with any questions or concerns you may have [phone]          Discharge Follow-up with PCP   As directed       Currently Documented PCP:    Jose M Navarro MD    PCP Phone Number:    [phone]     Follow Up Details: Follow Up With PCP Within 7-14 Days         Discharge Follow-up with Specialty: Cardiology; 1 Month   As directed      Specialty: Cardiology    Follow Up: 1 Month    Follow Up Details: Follow-up in 6 to 8 weeks with Dr. Boyle         Discharge Follow-up with Specialty: Cardiothoracic Surgery   As directed      Follow Up Details: Follow Up in 10-12 days for staple removal    Specialty: Cardiothoracic Surgery         Discharge Follow-up with Specialty: [facility]; 1 Week   As directed      Specialty: [facility]    Follow Up: 1 Week    Follow Up Details: Follow Up With [facility] Within 7 Days of Discharge. If Discharged on a Weekend, Schedule  Follow Up For The Following Friday at 0900.         Cardiac Rehab Evaluation and Enrollment   Apr 04, 2023      Reason for Consult: Education                  Corin Dean, APRN  03/30/23  13:42 EDT    Time: I spent 45 minutes on this discharge activity which included: face-to-face encounter with the patient, reviewing the data in the system, coordination of the care with the nursing staff as well as consultants, documentation, and entering orders.

## 2023-03-30 NOTE — PROGRESS NOTES
Patient Name: Wilbert Kelley  : 1960  MRN: [id number]    Chief Complaint: fatigue    Reason for Consultation: pneumonia sternal wound infection    Subjective   History of present illness:   Patient is a 62 y.o. male with history of BPH, skin cancer, degenerative disc disease, diabetes, coronary artery disease.  Admitted for CABG 3/21/23.     3/28/23: Infectious disease consulted due to concern for developing sternal wound infection as well as possible pneumonia.  Afebrile.  No antibiotics for the past week.  Patient complains about pain around incision but no dehiscence, drainage.  No drains in place at this time.  Does complain of cough productive of phlegm which is unusual for him. No other new concerns noted.     3/29/23: Afebrile.  Comfortable on room air, but still not feeling great.  Cough less productive.  No sternal drainage or worsening chest pain. Tolerating PO antibiotics.     3/30/23: Afebrile.  Complains about difficulty sleeping in chair.  Comfortable on room air.  Cough less productive.  Less chest pain.  Tolerating oral antibiotics    Allergies   Allergen Reactions   • Morphine Nausea And Vomiting     projectile   • Adhesive Tape Rash     Pt states sticky tabs cause irritation when left on long          Objective   Antibiotics:  Anti-Infectives (From admission, onward)    Ordered     Dose/Rate Route Frequency Start Stop    23 1642  linezolid (ZYVOX) tablet 600 mg        Ordering Provider: Ruben Garcia MD    600 mg Oral Every 12 Hours Scheduled 23 2100 23 2059    23 1642  azithromycin (ZITHROMAX) tablet 500 mg        Ordering Provider: Ruben Garcia MD    500 mg Oral Daily 23 1730 23 0859    23 1133  ceFAZolin in Sodium Chloride (ANCEF) IVPB solution 3 g        Ordering Provider: Edwina Preston PA-C    3 g  200 mL/hr over 30 Minutes Intravenous Every 8 Hours 23 1800 23 0317    23 0547  ceFAZolin in Sodium  Chloride (ANCEF) IVPB solution 3 g        Ordering Provider: Rolan Enriquez PA    3 g  200 mL/hr over 30 Minutes Intravenous Once 03/21/23 0549 03/21/23 0709          Other Medications:  Current Facility-Administered Medications   Medication Dose Route Frequency Provider Last Rate Last Admin   • acetaminophen (TYLENOL) tablet 650 mg  650 mg Oral Q8H Low Francois PA-C   650 mg at 03/29/23 2110   • [START ON 3/31/2023] amiodarone (PACERONE) tablet 200 mg  200 mg Oral Q24H Markus Riddle MD       • amLODIPine (NORVASC) tablet 5 mg  5 mg Oral Q24H Markus Riddle MD   5 mg at 03/30/23 0850   • aspirin EC tablet 325 mg  325 mg Oral Daily Low Francois PA-C   325 mg at 03/30/23 0851   • atorvastatin (LIPITOR) tablet 40 mg  40 mg Oral Nightly Low Francois PA-C   40 mg at 03/29/23 2110   • azithromycin (ZITHROMAX) tablet 500 mg  500 mg Oral Daily Ruben Garcia MD   500 mg at 03/30/23 0859   • sennosides-docusate (PERICOLACE) 8.6-50 MG per tablet 2 tablet  2 tablet Oral BID Low Francois PA-C   2 tablet at 03/30/23 0850    And   • polyethylene glycol (MIRALAX) packet 17 g  17 g Oral Daily PRN Low Francois PA-C   17 g at 03/26/23 1318    And   • bisacodyl (DULCOLAX) EC tablet 5 mg  5 mg Oral Daily PRN Low Francois PA-C   5 mg at 03/29/23 0527    And   • bisacodyl (DULCOLAX) suppository 10 mg  10 mg Rectal Daily PRN Low Francois PA-C       • bumetanide (BUMEX) tablet 2 mg  2 mg Oral Daily Markus Riddle MD   2 mg at 03/30/23 0850   • Calcium Replacement - Follow Nurse / BPA Driven Protocol   Does not apply PRN Low Francois PA-C       • castor oil-balsam peru (VENELEX) ointment 1 application  1 application Topical Q12H Alexandra Galdamez DO   1 application at 03/30/23 0852   • clopidogrel (PLAVIX) tablet 75 mg  75 mg Oral Daily Corin Dean APRN   75 mg at 03/30/23 0851   • dextrose (D50W) (25 g/50 mL) IV injection 25 g  25 g Intravenous Q15 Min PRN  Mirian Hurley PA-C       • dextrose (GLUTOSE) oral gel 15 g  15 g Oral Q15 Min PRN Mirian Hurley PA-C       • gabapentin (NEURONTIN) capsule 400 mg  400 mg Oral TID Low Francois PA-C   400 mg at 03/30/23 0851   • glucagon (GLUCAGEN) injection 1 mg  1 mg Intramuscular Q15 Min PRN Mirian Hurley PA-C       • HYDROcodone-acetaminophen (NORCO) 5-325 MG per tablet 1 tablet  1 tablet Oral Q6H PRN Edwina Preston PA-C       • insulin detemir (LEVEMIR) injection 18 Units  18 Units Subcutaneous Q12H Mirian Hurley PA-C   18 Units at 03/30/23 0851   • Insulin Lispro (humaLOG) injection 0-7 Units  0-7 Units Subcutaneous TID AC Mirian Hurley PA-C   3 Units at 03/30/23 0853   • Insulin Lispro (humaLOG) injection 5 Units  5 Units Subcutaneous TID With Meals Low Francois PA-C   5 Units at 03/30/23 0851   • ipratropium-albuterol (DUO-NEB) nebulizer solution 3 mL  3 mL Nebulization Q6H PRN Low Francois PA-C   3 mL at 03/28/23 2205   • lactobacillus acidophilus (RISAQUAD) capsule 1 capsule  1 capsule Oral Daily Ruben Garcia MD   1 capsule at 03/30/23 0851   • linezolid (ZYVOX) tablet 600 mg  600 mg Oral Q12H Ruben Garcia MD   600 mg at 03/29/23 2118   • lisinopril (PRINIVIL,ZESTRIL) tablet 40 mg  40 mg Oral Q24H Markus Riddle MD   40 mg at 03/30/23 0851   • Magnesium Standard Dose Replacement - Follow Nurse / BPA Driven Protocol   Does not apply PRN Low Francois PA-C       • nebivolol (BYSTOLIC) tablet 5 mg  5 mg Oral Q24H Low Francois PA-C   5 mg at 03/30/23 0854   • ondansetron (ZOFRAN) injection 4 mg  4 mg Intravenous Q6H PRN Low Francois PA-C   4 mg at 03/30/23 0335   • Pharmacy Consult - MTM   Does not apply Daily Low Francois PA-C       • Phosphorus Replacement - Follow Nurse / BPA Driven Protocol   Does not apply PRN Low Francois PA-C       • Potassium Replacement - Follow Nurse / BPA Driven Protocol   Does not  "apply PRN Low Francois PA-C       • simethicone (MYLICON) chewable tablet 80 mg  80 mg Oral 4x Daily PRN Low Francois PA-C   80 mg at 03/22/23 1810   • tamsulosin (FLOMAX) 24 hr capsule 0.4 mg  0.4 mg Oral Daily Low Francois PA-C   0.4 mg at 03/30/23 0851       Physical Exam:   Vital Signs   /65 (BP Location: Left arm, Patient Position: Lying)   Pulse 64   Temp 97.2 °F (36.2 °C) (Oral)   Resp 18   Ht 185.4 cm (73\")   Wt (!) 146 kg (321 lb 6.4 oz)   SpO2 99%   BMI 42.40 kg/m²     GENERAL: Awake and alert, sitting up  HEENT: Normocephalic, atraumatic.  EOMI. No conjunctival injection. No icterus. NECK: Supple without nuchal rigidity.   HEART: RRR; No murmur.  LUNGS: Faint bilateral wheezes. Dry cough appreciated  Chest wall: Sternotomy incision with less erythema today.  No dehiscence, drainage  ABDOMEN: obese. Soft, nontender   EXT:  No edema.   : Without Ken catheter.  SKIN: No rash.  Vein graft site healing well.    NEURO: Oriented to PPT. No focal deficits on motor/sensory exam at arms/legs.  PSYCHIATRIC: Normal insight and judgement. Cooperative with PE    Laboratory Data  Lab Results   Component Value Date    WBC 12.58 (H) 03/30/2023    HGB 10.2 (L) 03/30/2023    HCT 32.9 (L) 03/30/2023    MCV 85.9 03/30/2023     (H) 03/30/2023     Lab Results   Component Value Date    GLUCOSE 198 (H) 03/29/2023    CALCIUM 8.8 03/29/2023     03/29/2023    K 4.7 03/29/2023    CO2 27.0 03/29/2023    CL 98 03/29/2023    BUN 36 (H) 03/29/2023    CREATININE 1.13 03/29/2023     Estimated Creatinine Clearance: 102.9 mL/min (by C-G formula based on SCr of 1.13 mg/dL).  Lab Results   Component Value Date    ALT 7 03/26/2023    AST 35 03/26/2023    ALKPHOS 105 03/26/2023    BILITOT 0.9 03/26/2023     Lab Results   Component Value Date    CRP 9.20 (H) 03/29/2023     No results found for: SEDRATE    The above labs were reviewed.     Microbiology:  Microbiology Results (last 10 days)     " Procedure Component Value - Date/Time    Respiratory Culture - Sputum, Cough [[id number]] Collected: 03/29/23 0941    Lab Status: Final result Specimen: Sputum from Cough Updated: 03/29/23 1104     Respiratory Culture Rejected     Gram Stain Moderate (3+) WBCs per low power field      Moderate (3+) Epithelial cells per low power field      Moderate (3+) Gram positive cocci in pairs and chains      Rare (1+) Gram variable bacilli    Narrative:      Specimen rejected due to oropharyngeal contamination. Please reorder and recollect specimen if clinically necessary.    MRSA Screen, PCR (Inpatient) - Swab, Nares [[id number]]  (Normal) Collected: 03/28/23 2136    Lab Status: Final result Specimen: Swab from Nares Updated: 03/29/23 0803     MRSA PCR Negative    Narrative:      The negative predictive value of this diagnostic test is high and should only be used to consider de-escalating anti-MRSA therapy. A positive result may indicate colonization with MRSA and must be correlated clinically.  MRSA Negative    S. Pneumo Ag Urine or CSF - Urine, Urine, Clean Catch [[id number]]  (Normal) Collected: 03/28/23 1948    Lab Status: Final result Specimen: Urine, Clean Catch Updated: 03/28/23 2325     Strep Pneumo Ag Negative    Legionella Antigen, Urine - Urine, Urine, Clean Catch [[id number]]  (Normal) Collected: 03/28/23 1948    Lab Status: Final result Specimen: Urine, Clean Catch Updated: 03/28/23 2325     LEGIONELLA ANTIGEN, URINE Negative          Radiology:  XR Chest 1 View    Result Date: 3/29/2023  Impression: 1.Continued decreasing patchy left basilar opacities, likely atelectasis. 2.No definite pneumothorax. Electronically Signed: Kirti Cordero  3/29/2023 8:50 AM EDT  Workstation ID: [id number]       3/28 QTc 447 ms    Assessment     1. Leukocytosis: Stable.  Clinically slightly improved  2. Sternotomy incision infection, cellulitis: No dehiscence or drainage yet but definitely erythema. N improved on exam for the first time  today.  Tolerating linezolid  3. Productive cough, bronchitis: Possible developing pneumonia but no clear infiltrate on CXR. procal normal. MRSA nares screen and urine strep and legionella antigens negative.  Respiratory culture with oral dave.  Improved.   4. CAD s/p CABG on 3/21/23  5. Atrial fibrillation on amiodarone  6. Obesity  7. DM2    PLAN:   - labs reviewed.   - repeat EKG to assess for QT prolongation while on amiodarone and azithromycin     - continue PO linezolid 600 mg BID for 7 more days    - Assuming no evidence of QTc prolongation on EKG would continue azithromycin for 2 more days.  If QTc >480 ms would switch to doxycyline 100 mg BID     - probiotic    No contraindications to discharge from my standpoint.  Discussed with family at bedside.  Also with RN.      Ruben Garcia MD  3/30/2023

## 2023-03-30 NOTE — CASE MANAGEMENT/SOCIAL WORK
Case Management Discharge Note      Final Note: Pt to be discharged today. Discussed d/c plan of home w/HH w/pt and spouse. Spouse happy w/[facility], she worked there in the past under a different name. CM provided spouse w/cardiac rehab number here. Spouse stated that she spoke w/them in ICU and information was faxed to [place] for cardiac rehab, but spouse couldn't remember if she was supposed to contact them or they would contact her. Per Cardiac rehab, [place] will contact pt to set up cardiac rehab. Spouse will transport home. No  other d/c needs verbalized @ this time.         Selected Continued Care - Admitted Since 3/21/2023     Destination    No services have been selected for the patient.              Durable Medical Equipment    No services have been selected for the patient.              Dialysis/Infusion    No services have been selected for the patient.              Home Medical Care Coordination complete.    Service Provider Selected Services Address Phone Fax Patient Preferred    [id number] [address] [address] [phone] --          Therapy    No services have been selected for the patient.              Community Resources    No services have been selected for the patient.              Community & DME    No services have been selected for the patient.                       Final Discharge Disposition Code: 06 - home with home health care

## 2023-03-30 NOTE — PROGRESS NOTES
"  [facility]  PROGRESS NOTE    Date of Admission: 3/21/2023  Date of Service: 03/30/23    Primary Care Physician: Jose M Simon MD    Chief Complaint: f/u HTN, HLD  Problem List:   Multivessel CAD with remote ptca stent, now with stable anginal equivelent (dyspnea) (HCC)    Insulin resistant Diabetes mellitus (HCC)    Sleep apnea    S/P CABG x 3 on 3/21/2023    Hypertension    Hyperlipidemia    EMANUEL (HCC)      Subjective      HPI: Ambulating in the room this morning, wife at bedside.  Good diuresis overnight, edema improved, denies significant shortness of breath      Objective   Vitals: /65 (BP Location: Left arm, Patient Position: Lying)   Pulse 64   Temp 97.2 °F (36.2 °C) (Oral)   Resp 18   Ht 185.4 cm (73\")   Wt (!) 146 kg (321 lb 6.4 oz)   SpO2 99%   BMI 42.40 kg/m²     Physical Exam:  General Appearance:   · well developed  · well nourished  Obese  Neck:  · thyroid not enlarged  · supple  Respiratory:  · no respiratory distress  · normal breath sounds  · no rales  Cardiovascular:  · no jugular venous distention  · regular rhythm  · apical impulse normal  · S1 normal, S2 normal  · no S3, no S4   · no murmur  · no rub, no thrill  · carotid pulses normal; no bruit  · pedal pulses normal  · lower extremity edema: 1+  Skin:   warm, dry      Results:  Results from last 7 days   Lab Units 03/30/23  0552 03/29/23  0524 03/28/23  0741   WBC 10*3/mm3 12.58* 12.49* 11.86*   HEMOGLOBIN g/dL 10.2* 9.4* 9.8*   HEMATOCRIT % 32.9* 30.3* 31.4*   PLATELETS 10*3/mm3 640* 597* 595*     Results from last 7 days   Lab Units 03/29/23  0524 03/28/23  0741 03/27/23  1110   SODIUM mmol/L 139 138 136   POTASSIUM mmol/L 4.7 4.3 4.6   CHLORIDE mmol/L 98 97* 95*   CO2 mmol/L 27.0 28.0 28.0   BUN mg/dL 36* 43* 54*   CREATININE mg/dL 1.13 1.11 1.23   GLUCOSE mg/dL 198* 195* 226*      Lab Results   Component Value Date    CHOL 71 03/22/2023    TRIG 185 (H) 03/22/2023    HDL 18 (L) " 03/22/2023    LDL 23 03/22/2023    AST 35 03/26/2023    ALT 7 03/26/2023       Intake/Output Summary (Last 24 hours) at 3/30/2023 0831  Last data filed at 3/29/2023 2000  Gross per 24 hour   Intake 600 ml   Output 775 ml   Net -175 ml     I personally reviewed the patient's EKG/Telemetry data    Radiology Data:   CXR 3/29/23:  IMPRESSION:  Impression:  1.Continued decreasing patchy left basilar opacities, likely atelectasis.  2.No definite pneumothorax.       Current Medications:  acetaminophen, 650 mg, Oral, Q8H  amiodarone, 200 mg, Oral, Q12H  amLODIPine, 5 mg, Oral, Q24H  aspirin, 325 mg, Oral, Daily  atorvastatin, 40 mg, Oral, Nightly  azithromycin, 500 mg, Oral, Daily  bumetanide, 2 mg, Oral, Daily  castor oil-balsam peru, 1 application, Topical, Q12H  clopidogrel, 75 mg, Oral, Daily  gabapentin, 400 mg, Oral, TID  insulin detemir, 18 Units, Subcutaneous, Q12H  insulin lispro, 0-7 Units, Subcutaneous, TID AC  Insulin Lispro, 5 Units, Subcutaneous, TID With Meals  lactobacillus acidophilus, 1 capsule, Oral, Daily  linezolid, 600 mg, Oral, Q12H  lisinopril, 40 mg, Oral, Q24H  nebivolol, 5 mg, Oral, Q24H  pharmacy consult - [place], , Does not apply, Daily  senna-docusate sodium, 2 tablet, Oral, BID  tamsulosin, 0.4 mg, Oral, Daily           Assessment and Plan:     1. MVCAD   - C 3/16 Dr. Boyle with MVCAD, normal EF pre-op   - CABG x3 with Dr. Emanuel 3/21  Continue aspirin, atorvastatin and low-dose beta-blocker  Continue Bumex 2 mg p.o. daily     2. Post-op Afib  - onset 3/25, brief  - started on Amiodarone protocol and maintaining SR  Amiodarone 200 mg twice daily for 5 days then 200 mg daily  - EKG 3/28 reviewed, stable   Anticoagulation not necessary due to brief episode    Home on amiodarone 200 mg daily  Check EKG in 1 week at PCP or primary cardiology office    3. Hypertension   - BPs stable  - Nebivolol added cannot uptitrate due to low/normal heart rate  Continue close monitoring  Added amlodipine and  increased lisinopril  Doing quite well, stable blood pressure goal less than 130/80     4. Hyperlipidemia  -Continue atorvastatin 40 mg, which is double home dose  -Excellent lipids 3/22, LDL 23     5. DM2  - A1C 7.7%  - per hospitalists     6. EMANUEL   - resolved, max Cr 2.7, returned to normal at remains stable   Renal following      7. Leukocytosis  - ID consulted for possible pneumonia, sternal wound cellulitis   Now on antibiotics, following daily EKG which is stable, QTc stable      Patient will follow up with Dr. Boyle in 6-8 weeks at discharge.  Okay for discharge home today from cardiology standpoint.      Electronically signed by Corinna Savage PA-C, 03/30/23, 8:33 AM EDT.  I have seen and examined the patient, performing a face-to-face diagnostic evaluation with plan of care reviewed and developed with the Advanced Practice Clinician and nursing staff. I have addended and modified the above history of present illness, physical examination, and assessment and plan to reflect my findings and impressions. All medical decision making performed by Dr. Riddle.    Markus Riddle MD, [facility], [facility]  03/30/23

## 2023-03-30 NOTE — PROGRESS NOTES
[facility] Cardiothoracic [facility] In-Patient Progress Note    POD # 9 s/p CABG x3     LOS: 9 days       Subjective  No complaints, no acute events.      Objective  Vital Signs  Temp:  [97.2 °F (36.2 °C)-98.7 °F (37.1 °C)] 97.2 °F (36.2 °C)  Heart Rate:  [63-81] 79  Resp:  [18] 18  BP: (109-133)/(57-77) 128/65      Physical Exam:   General Appearance: alert, appears stated age and  cooperative   Lungs: Bibasilar rhonci   Heart: normal S1, S2, no murmur, no gallop, no rub and no click    Skin: [facility] site c/d/i   Ext: Trace edema present bilaterally   Sternum: Stable, staples present every other.  Erythema present around incision.      Results     Results from last 7 days   Lab Units 03/30/23  0552   WBC 10*3/mm3 12.58*   HEMOGLOBIN g/dL 10.2*   HEMATOCRIT % 32.9*   PLATELETS 10*3/mm3 640*     Results from last 7 days   Lab Units 03/29/23  0524   SODIUM mmol/L 139   POTASSIUM mmol/L 4.7   CHLORIDE mmol/L 98   CO2 mmol/L 27.0   BUN mg/dL 36*   CREATININE mg/dL 1.13   GLUCOSE mg/dL 198*   CALCIUM mg/dL 8.8     Assessment  POD # 9 s/p CABG x3    Plan   Antibiotics per ID  Wean oxygen as tolerated  ASA/Statin/BB/Plavix  Diuresis   EMANUEL/Hyponatremia per renal  DC today if all agree    Corin Dean, APRN  03/30/23  07:48 EDT

## 2023-03-31 LAB
QT INTERVAL: 346 MS
QTC INTERVAL: 470 MS

## 2023-03-31 NOTE — OUTREACH NOTE
Prep Survey    Flowsheet Row Responses   [facility] facility patient discharged from? [facility]   Is LACE score < 7 ? No   Eligibility Readm Mgmt   Discharge diagnosis CORONARY ARTERY BYPASS GRAFTING X 3,   Does the patient have one of the following disease processes/diagnoses(primary or secondary)? Cardiothoracic surgery   Does the patient have Home health ordered? No   Is there a DME ordered? No   Prep survey completed? Yes          Franca BANUELOS - Registered Nurse

## 2023-03-31 NOTE — CASE MANAGEMENT/SOCIAL WORK
Case Management Discharge Note      Final Note: [facility] faxed d/c summary and med  rec to [facility] @ [phone]         Selected Continued Care - Discharged on 3/30/2023 Admission date: 3/21/2023 - Discharge disposition: Home or Self Care    Destination    No services have been selected for the patient.              Durable Medical Equipment    No services have been selected for the patient.              Dialysis/Infusion    No services have been selected for the patient.              Home Medical Care Coordination complete.    Service Provider Selected Services Address Phone Fax Patient Preferred    [facility] [address] [id number] [phone] --          Therapy    No services have been selected for the patient.              Community Resources    No services have been selected for the patient.              Community & DME    No services have been selected for the patient.                       Final Discharge Disposition Code: 06 - home with home health care

## 2023-03-31 NOTE — DISCHARGE PLACEMENT REQUEST
"Wilbert Kelley (62 y.o. Male) From carla Pak RN CM [id number]    Date of Birth   1960    Social Security Number       Address   [address] [address]    Home Phone   [phone]    MRN   [id number]       [facility]    Marital Status                               Admission Date   3/21/23    Admission Type   Elective    Admitting Provider   Markus Emanuel MD    Attending Provider       Department, Room/Bed   [address], S476/1       Discharge Date   3/30/2023    Discharge Disposition   Home or Self Care    Discharge Destination                               Attending Provider: (none)   Allergies: Morphine, Adhesive Tape    Isolation: None   Infection: None   Code Status: Prior    Ht: 185.4 cm (73\")   Wt: 146 kg (321 lb 6.4 oz)    Admission Cmt: None   Principal Problem: Multivessel CAD with remote ptca stent, now with stable anginal equivelent (dyspnea) (HCC) [I25.118]                 Active Insurance as of 3/21/2023     Primary Coverage     Payor Plan Insurance Group Employer/Plan Group    HUMANA MEDICARE REPLACEMENT HUMANA MEDICARE REPLACEMENT [id number]     Payor Plan Address Payor Plan Phone Number Payor Plan Fax Number Effective Dates    [address] [address] [phone]  1/1/2023 - None Entered    [facility] [address]       Subscriber Name Subscriber Birth Date Member ID       WILBERT KELLEY 1960 [id number]                 Emergency Contacts      (Rel.) Home Phone Work Phone Mobile Phone    ANUP KELLEY (Spouse) -- -- [phone]                 Discharge Summary      Corin Dean APRN at 03/30/23 1342     Attestation signed by Markus Emanuel MD at 03/31/23 0707    I have reviewed this documentation and agree.                  [facility] Cardiothoracic Surgery Discharge Summary    Name:  Wilbert Kelley  MRN Number:  [id number]  Date of Admission: 3/21/2023  Date of Discharge:  3/30/2023    Referred By: " Markus Emanuel MD  PCP: Jose M iSmon MD  IP Care Team:  Patient Care Team:  Jose M Simon MD as PCP - General (Geriatric Medicine)    Discharge Diagnosis:  Past Medical History:   Diagnosis Date   • Arthritis    • BPH (benign prostatic hyperplasia)    • Cancer (HCC)     basal and melanoma-  x2 - left side ear and elbow   • Constipation    • Coronary artery disease    • DDD (degenerative disc disease), lumbar    • Diabetes mellitus ([facility])     couple times month check sugar   • Frozen shoulder     left   • GERD (gastroesophageal reflux disease)    • Hyperlipidemia    • Hypertension    • Limited mobility     left shoulder  - possible frozen shoulder-= please be aware for surgery   • Sleep apnea     doesnt use machine   • Tinnitus    • Wears glasses     small print       Multivessel CAD with remote ptca stent, now with stable anginal equivelent (dyspnea) ([facility])    Insulin resistant Diabetes mellitus ([facility])    Sleep apnea    S/P CABG x 3 on 3/21/2023    Hypertension    Hyperlipidemia    EMANUEL ([facility])     Coronary artery disease of native heart with stable angina pectoris, unspecified vessel or lesion type ([facility]) [I25.118]  Coronary artery disease of native artery of native heart with stable angina pectoris ([facility]) [I25.118]    Procedures Performed:  Procedure(s):  MEDIAN STERNOTOMY, CORONARY ARTERY BYPASS GRAFTING X 3, UTILIZING THE LEFT INTERNAL MAMMARY ARTERY, ENDOSCOPIC VEIN HARVEST OF THE RIGHT GREATER SAPENOUSE VEIN         History of Present Illness:    The patient is a 62-year-old male who was referred to me to evaluate for coronary bypass grafting surgery.  He has a history of a stent in his left anterior descending coronary artery and he states that his father and grandfather  at age 42 and 45 respectively from heart attacks.  He denies chest pain but states that he has shortness of breath with minimal exertion and a recent catheterization last week demonstrates critical three-vessel coronary  disease.  He is a diabetic requiring insulin with hypertension and hyperlipidemia.  He has not smoked in many years.    Hospital Course:  [facility] for scheduled coronary artery bypass grafting x3 with EVH of the rater saphenous vein with Dr. Emanuel.  He was sent to ICU in stable condition and was extubated per ICU protocol.  He was discharged in stable condition on POD #9.  His hospital course is outlined below.    CAD s/p CABG x3:  -3/24 chest tube and pacing wires removed  -3/25 central line was removed and transfer to telemetry orders were placed  -3/26 transferred to telemetry floor  -3/27 started on Plavix  -ASA, Statin, BB, Plavix  -Continue bumex 2 mg p.o. daily  -Follow-up in 10 to 12 days with CT surgery for staple removal    Postop Afib:  -First occurred 3/25, started on amio protocol   -3/26 converted to SR  -Amiodarone 2 mg twice daily x5 days then 200 mg daily per cardiology recommendations  -Anticoagulated not necessary due to brief episode  -Check EKG in 1 week with PCP or at primary cardiology office  -Follow-up in 6 to 8 weeks with Dr. Boyle    EMANUEL (resolved):  -Nephrology consulted due to creatinine of 2.49, BUN 36  -Avoid nephrotoxic medications  -Keep SBP greater than 100  -3/26 Creatinine 1.12     Hyponatremia:  -1500 cc daily, per renal    Urinary hesitancy:  -3/25 Flomax started    Bronchitis/sternal wound cellulitis/leukocytosis:  -3/28 ID consulted. WBC count increased 11.86.  Pro-Gab negative.  Urine cultures and MRSA swab negative.  Respiratory culture showing normal oral dave.  -Per ID recommendations: P.O. linezolid 6 mg twice daily and azithromycin 500 mg daily x 5 days    Discharge Medications:     Discharge Medications      New Medications      Instructions Start Date   amiodarone 200 MG tablet  Commonly known as: PACERONE   200 mg, Oral, Every 24 Hours Scheduled   Start Date: March 31, 2023     amLODIPine 5 MG tablet  Commonly  known as: NORVASC   5 mg, Oral, Every 24 Hours Scheduled   Start Date: March 31, 2023     aspirin 81 MG EC tablet   81 mg, Oral, Daily      azithromycin 250 MG tablet  Commonly known as: ZITHROMAX   Take 2 tablets by mouth daily as directed for Pneumonia   Start Date: March 31, 2023     bumetanide 2 MG tablet  Commonly known as: BUMEX   2 mg, Oral, Daily   Start Date: March 31, 2023     HYDROcodone-acetaminophen 5-325 MG per tablet  Commonly known as: NORCO   1 tablet, Oral, Every 8 Hours PRN      linezolid 600 MG tablet  Commonly known as: ZYVOX   Take 1 tablet by mouth Every 12 (Twelve) Hours for 16 doses as directed for Infection      nebivolol 5 MG tablet  Commonly known as: BYSTOLIC   5 mg, Oral, Every 24 Hours Scheduled   Start Date: March 31, 2023     tamsulosin 0.4 MG capsule 24 hr capsule  Commonly known as: FLOMAX   0.4 mg, Oral, Daily   Start Date: March 31, 2023        Changes to Medications      Instructions Start Date   atorvastatin 40 MG tablet  Commonly known as: LIPITOR  What changed:   · medication strength  · how much to take  · when to take this   40 mg, Oral, Nightly      lisinopril 40 MG tablet  Commonly known as: PRINIVIL,ZESTRIL  What changed: when to take this   40 mg, Oral, Daily         Continue These Medications      Instructions Start Date   B-D ULTRAFINE III SHORT PEN 31G X 8 MM misc  Generic drug: Insulin Pen Needle   No dose, route, or frequency recorded.      BD Insulin Syringe U-500 31G X 6MM 0.5 ML misc  Generic drug: Insulin Syringe/Needle U-500   No dose, route, or frequency recorded.      clopidogrel 75 MG tablet  Commonly known as: PLAVIX   75 mg, Oral, Daily      fenofibrate 160 MG tablet   160 mg, Oral, Daily      gabapentin 800 MG tablet  Commonly known as: NEURONTIN   800 mg, 3 Times Daily      Insulin Regular Human (Conc) 500 UNIT/ML solution pen-injector CONCENTRATED injection  Commonly known as: HumuLIN R   65 Units, Subcutaneous, 3 Times Daily Before Meals       metFORMIN 1000 MG tablet  Commonly known as: GLUCOPHAGE   1,000 mg, Oral, 2 Times Daily With Meals, Hold metformin for 2 days post-cath.  Restart on 3/19/2023      TOUJEO MAX SOLOSTAR SC   80 Units, Subcutaneous, 2 Times Daily         Stop These Medications    hydroCHLOROthiazide 25 MG tablet  Commonly known as: HYDRODIURIL     Metoprolol Tartrate 75 MG tablet            Allergies:  Allergies   Allergen Reactions   • Morphine Nausea And Vomiting     projectile   • Adhesive Tape Rash     Pt states sticky tabs cause irritation when left on long        Discharge Diet:  Diet Instructions     Diet: Cardiac Diets; Healthy Heart (2-3 Na+); Texture: Regular Texture (IDDSI 7); Fluid Consistency: Thin (IDDSI 0)      Discharge Diet: Cardiac Diets    Cardiac Diet: Healthy Heart (2-3 Na+)    Texture: Regular Texture (IDDSI 7)    Fluid Consistency: Thin (IDDSI 0)            Activity at Discharge:  Activity Instructions     Activity as Tolerated      Bathing Restrictions      Type of Restriction: Bathing    Bathing Restrictions: No Tub Bath    Driving Restrictions      Type of Restriction: Driving    Driving Restrictions: No Driving While Taking Narcotics    Lifting Restrictions      Type of Restriction: Lifting    Lifting Restrictions: Lifting Restriction (Indicate Limit)    Weight Limit (Pounds): 10    Length of Lifting Restriction: until seen at next follow-up appointment          Discharge Disposition  Home or Self Care    Discharge Education:  Post-Op Education:  Patient was advised on responsible use of opioids in the post-surgical setting.  Patient was advised these medications are highly addictive.  Patient advised on proper disposal of unused opioid medication and was urged to discard any unused opioid medication. I reviewed the patient's sternal precautions and outlined the physical activity suitable for each benchmark at the 6-week 3-month and 1 year intervals.  Wound Care:  Patient educated regarding care of  post-operative wounds.  Patient is to wash with plain soap and water and pat dry.  Patient is not to use salves on the incision sites.  Patient instructed regarding the signs and symptoms of infection and when to call the office with any concerns.  SBE Education/Valve Education: Symptoms of acute IE usually begin with fever (102°-104°), chills, fast heart rate, fatigue, night sweats, aching joints and muscles, persistent cough, or swelling in the feet, legs or abdomen. You can reduce the risk of IE by maintaining good oral health through regular professional dental care and the use of dental products such as manual, powered and ultrasonic toothbrushes; dental floss; and other plaque-removal devices. Inform your dentist, family doctor (PCP) or other health care professional prior to any surgeries and/or procedures that you have had heart valve surgery.  Prophylactic antibiotics are always recommended prior to any surgical procedures following heart valve replacement.    Special Instructions:   1.  Keep incisions clean and dry at all times. Take a shower daily. Do not take a tub bath or use hot tubs until seen by the cardiac surgeon in your follow-up visit. Clean  your body and incisions daily with Dial soap and water. Always use a clean washcloth. Do not scrub your incision(s). Do not re-use dressings on your incision(s). Do not use any lotions, creams, oils, powders, antibiotic ointment (i.e., Neosporin), peroxide, alcohol, or iodine UNLESS told to do by the cardiac surgeon.  Patient may shower, but no tub baths until CT Surgery followup.   2.  No lifting over 10 lbs until CT Surgery follow up.  3.  No driving until released to do so by the surgeon.      Surgical Leg Precautions:   -Avoid sitting in one position or standing for long periods of time.  -Do not cross your legs.  -Keep your legs elevated while sitting  -Do not use any lotions, creams, oils, powders, antibiotic ointment (i.e. Neosporin), peroxide,  alcohol, or iodine unless specifically prescribed by the cardiac surgeon.  -If elastic stockings (FLY hose) were prescribed to you, wear the stockings while you are up for at least two weeks after discharge. The stockings help decrease swelling. However, remove stockings at bedtime. Wash the stockings with mild soap and water, and make sure they are completely dry before you put them back on.    When to Call the Cardiac Surgeon:  -Increased swelling, redness, tenderness, and drainage  -Increased warmth in the skin around an incision  -Large amount of clear or pinkish drainage  -Sudden increased amount of drainage  -White, yellow, or greenish drainage  -Odor, which may be foul or sweet, coming from an incision  -An opening in your incisions  -Temperature higher than 101 degrees Fahrenheit   -Temperature higher than 100 degrees Fahrenheit two times within 24 hours  -Do not hesitate to call the cardiac surgery nurse navigator or cardiac surgeon if you have concerns or questions.     *The [facility] cardiac surgery nurse navigator is available Monday-Friday 8 a.m. to 4:30 p.m. [phone]  Call 911:  -Chest pain (pain similar to what you experienced prior to surgery)  -Fainting spells  -Bright red stool  -Vomiting bright red blood  -Shortness of breath not relieved by rest  -Sudden numbness or weakness in arms or legs  -Sudden, severe headache  -Heart rate faster than 150 beats/minute with shortness of breath or a new irregular heart rate      Follow-up Appointments  Future Appointments   Date Time Provider Department Center   4/6/2023  3:00 PM Rupa Pearson APRN [facility]   5/11/2023  1:00 PM Jarod Boyle MD [facility]     Additional Instructions for the Follow-ups that You Need to Schedule     Ambulatory Referral to Home Health   As directed      Face to Face Visit Date: 3/29/2023    Follow-up provider for Plan of Care?: I treated the patient in an acute care facility and will not  continue treatment after discharge.    Follow-up provider: JOSE M NAVARRO [[id number]]    Reason/Clinical Findings: impaired endurance, mobility    Describe mobility limitations that make leaving home difficult: impaired endurance, mobility    Nursing/Therapeutic Services Requested: Skilled Nursing Physical Therapy Occupational Therapy    Skilled nursing orders: Post CABG care    PT orders: Therapeutic exercise Gait Training Strengthening Home safety assessment    Weight Bearing Status: As Tolerated    Occupational orders: Activities of daily living Energy conservation Strengthening Home safety assessment    Frequency: 1 Week 1         Call MD With Problems / Concerns   As directed      Instructions:   Please call the CT Surgery office with any questions or concerns you may have [phone]    Order Comments: Instructions: Please call the CT Surgery office with any questions or concerns you may have [phone]          Discharge Follow-up with PCP   As directed       Currently Documented PCP:    Jose M Navarro MD    PCP Phone Number:    [phone]     Follow Up Details: Follow Up With PCP Within 7-14 Days         Discharge Follow-up with Specialty: Cardiology; 1 Month   As directed      Specialty: Cardiology    Follow Up: 1 Month    Follow Up Details: Follow-up in 6 to 8 weeks with Dr. Boyle         Discharge Follow-up with Specialty: Cardiothoracic Surgery   As directed      Follow Up Details: Follow Up in 10-12 days for staple removal    Specialty: Cardiothoracic Surgery         Discharge Follow-up with Specialty: [facility]; 1 Week   As directed      Specialty: [facility]    Follow Up: 1 Week    Follow Up Details: Follow Up With [facility] Center Within 7 Days of Discharge. If Discharged on a Weekend, Schedule Follow Up For The Following Friday at 0900.         Cardiac Rehab Evaluation and Enrollment   Apr 04, 2023      Reason for Consult: Afsaneh Dean  APRN  03/30/23  13:42 EDT    Time: I spent 45 minutes on this discharge activity which included: face-to-face encounter with the patient, reviewing the data in the system, coordination of the care with the nursing staff as well as consultants, documentation, and entering orders.    Electronically signed by Markus Emanuel MD at 03/31/23 0707

## 2023-03-31 NOTE — PROGRESS NOTES
Enter Query Response Below      Query Response: unable to determine  Electronically signed by Markus Emanuel MD, 03/31/23, 7:06 AM EDT.              If applicable, please update the problem list.

## 2023-04-03 ENCOUNTER — TELEPHONE (OUTPATIENT)
Dept: CARDIOLOGY | Facility: CLINIC | Age: 63
End: 2023-04-03

## 2023-04-03 NOTE — TELEPHONE ENCOUNTER
Caller: ANUP LOPEZ    Relationship: Emergency Contact    Best call back number: [phone]    What is the best time to reach you: ANY    Who are you requesting to speak with (clinical staff, provider,  specific staff member): CLINICAL      What was the call regarding: PT RECENTLY HAD A TRIPLE BYPASS, 2 WEEKS TOMORROW THE 4TH. HE HAS SEVERAL FOLLOW UP APPTS SCHEDULE THROUGHOUT THE MONTH AND WOULD LIKE TO SEE IF THE OFFICE CAN SQUEEZE HIM TO BE SEEN BY ANYONE AT THE OFFICE. THE DAY OF DISCHARGE HOSPITAL WAS UNABLE TO GET A STICK FOR LABS, THEY DIDN'T FINISH WITH THE LABS, THEIR MAIN CONCERN IS THAT HIS WHITE BLOOD CELLS WERE REALLY HIGH.     Do you require a callback: ANY

## 2023-04-04 ENCOUNTER — READMISSION MANAGEMENT (OUTPATIENT)
Dept: CALL CENTER | Facility: HOSPITAL | Age: 63
End: 2023-04-04
Payer: MEDICARE

## 2023-04-04 NOTE — OUTREACH NOTE
CT Surgery Week 1 Survey    Flowsheet Row Responses   [facility] patient discharged from? [facility]   Does the patient have one of the following disease processes/diagnoses(primary or secondary)? Cardiothoracic surgery   Week 1 attempt successful? Yes   Call start time 1655   Call end time 1701   Discharge diagnosis CORONARY ARTERY BYPASS GRAFTING X 3,   Meds reviewed with patient/caregiver? Yes   Is the patient having any side effects they believe may be caused by any medication additions or changes? No   Does the patient have all medications related to this admission filled (includes all antibiotics, pain medications, cardiac medications, etc.) Yes   Is the patient taking all medications as directed (includes completed medication regime)? Yes   Does the patient have a primary care provider?  Yes   Does the patient have an appointment scheduled with their C/T surgeon? Yes   Has the patient kept scheduled appointments due by today? N/A   Has home health visited the patient within 72 hours of discharge? N/A   Psychosocial issues? No   Did the patient receive a copy of their discharge instructions? Yes   Nursing interventions Reviewed instructions with patient   What is the patient's perception of their health status since discharge? Improving   Nursing interventions Nurse provided patient education   Is the patient/caregiver able to teach back normal signs of recovery? Pain or discomfort at incisional site   Nursing interventions Reassured on normal signs of recovery   Is the patient /caregiver able to teach back basic post-op care? Continue use of incentive spirometry at least 1 week post discharge, Practice cough and deep breath every 4 hours while awake, Hold pillow to support chest when coughing, Drive as instructed by MD in discharge instructions, Shower daily, No tub bath, swimming, or hot tub until instructed by MD, Use a clean wash cloth and antibacterial bar or liquid soap to clean incisions, If the  steri-strips are falling off, it is okay to remove them. (If applicable), Lifting as instructed by MD in discharge instructions   Is the patient/caregiver able to teach back signs and symptoms of incisional infection? Increased redness, swelling or pain at the incisonal site, Increased drainage or bleeding, Incisional warmth, Pus or odor from incision, Fever   Is the patient/caregiver able to teach back steps to recovery at home? Set small, achievable goals for return to baseline health, Rest and rebuild strength, gradually increase activity, Eat a well-balance diet   Is the patient/caregiver able to teach back the hierarchy of who to call/visit for symptoms/problems? PCP, Specialist, Home health nurse, Urgent Care, ED, 911 Yes   Additional teach back comments states has back and sciatic pain in addition to post op pain, unable to find comfortable poisition for sleep, is exhausted from lack of restful sleep   Week 1 call completed? Yes            Gay BANUELOS - Registered Nurse

## 2023-04-05 NOTE — PROGRESS NOTES
"[facility]  Heart and Valve Center    Chief Complaint  Post-op Open Heart Surgery and Establish Care    Subjective    History of Present Illness {CC  Problem List  Visit  Diagnosis   Encounters  Notes  Medications  Labs  Result Review Imaging  Media :23}     Wilbert Kelley is a 62 y.o. male with CAD status post remote stent, diabetes,Sleep apnea, hyperlipidemia and hypertension who presents today as a hospital referral for CAD status post CABG.    Patient underwent CABG x3 on 3/21 with Dr. Emanuel. Pre-op EF normal.  He had postoperative atrial fibrillation and was started on amiodarone.  Anticoagulation deferred due to brief episode.  He was discharged on Bumex 2 mg daily.  He did have EMANUEL but this resolved prior to discharge.  He was discharged with a fluid restriction due to hyponatremia.  ID was consulted and was treated for bronchitis, sternal wound cellulitis and persistent leukocytosis.    He reports that his ROBLES is improving. He is working home PT. Notes fatigue, reports he is sleep deprived from hi sciatic nerve, which started before his surgery.  He saw his PCP yesterday and reports he was started on doxycycline for some drainage of his MT sites.  He notes ongoing cough and congestion.    Established cardiologist is Dr. Barrett           Objective     Vital Signs:   Vitals:    04/06/23 1459 04/06/23 1500 04/06/23 1501   BP: 119/58 111/58 110/57   BP Location: Right arm Left arm Left arm   Patient Position: Sitting Sitting Sitting   Cuff Size: Adult Adult Adult   Pulse: 76  76   Resp: 18     Temp: 96.6 °F (35.9 °C)     TempSrc: Temporal     SpO2: 96%     Weight: (!) 145 kg (319 lb)     Height: 185.4 cm (73\")       Body mass index is 42.09 kg/m².  Physical Exam  Vitals reviewed.   Constitutional:       Appearance: Normal appearance.   HENT:      Head: Normocephalic.   Neck:      Vascular: No carotid bruit.   Cardiovascular:      Rate and Rhythm: Normal rate and regular rhythm.     "  Pulses: Normal pulses.      Heart sounds: Normal heart sounds, S1 normal and S2 normal. No murmur heard.  Pulmonary:      Effort: Pulmonary effort is normal. No respiratory distress.      Breath sounds: Examination of the left-middle field reveals decreased breath sounds. Examination of the left-lower field reveals decreased breath sounds. Decreased breath sounds present.   Chest:      Chest wall: No tenderness.   Abdominal:      General: Abdomen is flat.      Palpations: Abdomen is soft.   Musculoskeletal:      Cervical back: Neck supple.      Right lower le+ Edema present.      Left lower le+ Edema present.   Skin:     General: Skin is warm and dry.      Comments: Midsternal incision is clean dry and intact with staples present, MT sites have some slight purulent drainage, no significant erythema or foul odor.  EVH site is clean dry and intact   Neurological:      General: No focal deficit present.      Mental Status: He is alert and oriented to person, place, and time. Mental status is at baseline.   Psychiatric:         Mood and Affect: Mood normal.         Behavior: Behavior normal.         Thought Content: Thought content normal.              Result Review  Data Reviewed:{ Labs  Result Review  Imaging  Med Tab  Media :23}   XR Chest 1 View (2023 08:44)  C-reactive Protein (2023 05:24)  Renal Function Panel (2023 05:24)  POC Glucose Once (2023 12:25)  POC Glucose Once (2023 07:44)  CBC (No Diff) (2023 05:52)  C-reactive Protein (2023 05:24)  Renal Function Panel (2023 05:24)  XR Chest 1 View (2023 08:44)  ECG 12 Lead QT Measurement (2023 14:02)  Cardiac Catheterization/Vascular Study (2023 09:11)  Adult Transthoracic Echo Complete w/ Color, Spectral and Contrast if necessary per protocol (2023 09:48)    EKG today shows normal sinus rhythm, possible inferior infarct, ST and T wave abnormalities in the lateral leads.  , QTc  473         Assessment and Plan {CC Problem List  Visit Diagnosis  ROS  Review (Popup)  Health Maintenance  Quality  BestPractice  Medications  SmartSets  SnapShot Encounters  Media :23}   1. Postoperative atrial fibrillation  -Maintaining normal sinus rhythm.  QT appears to be stable.  Continue amiodarone  -No anticoagulation secondary to brief episode  - ECG 12 Lead; Future  - XR Chest PA & Lateral; Future    2. Shortness of breath  -He has significantly decreased lung sounds in his left lung and has significant cough and congestion today.  Have ordered a chest x-ray.  -Continue Bumex, plans to have labs with home health tomorrow  - XR Chest PA & Lateral; Future    3. Cough, unspecified type    - XR Chest PA & Lateral; Future    4. Coronary artery disease involving native coronary artery of native heart without angina pectoris  Status post CABG x3 3/21  Sent patient over to CT surgery for staple removal and to look at MT site. PCP sent in doxycycline  Continue statin and aspirin    5. Primary hypertension  Appears to be well controlled  Continue to monitor          Follow Up {Instructions Charge Capture  Follow-up Communications :23}   Return if symptoms worsen or fail to improve.    Patient was given instructions and counseling regarding his condition or for health maintenance advice. Please see specific information pulled into the AVS if appropriate.  Advised to call the Heart and Valve Center with any questions, concerns, or worsening symptoms.

## 2023-04-06 ENCOUNTER — HOSPITAL ENCOUNTER (OUTPATIENT)
Dept: GENERAL RADIOLOGY | Facility: HOSPITAL | Age: 63
Discharge: HOME | End: 2023-04-06
Payer: MEDICARE

## 2023-04-06 ENCOUNTER — TELEPHONE (OUTPATIENT)
Dept: CARDIOLOGY | Facility: HOSPITAL | Age: 63
End: 2023-04-06

## 2023-04-06 ENCOUNTER — HOSPITAL ENCOUNTER (OUTPATIENT)
Dept: CARDIOLOGY | Facility: HOSPITAL | Age: 63
Discharge: HOME | End: 2023-04-06
Payer: MEDICARE

## 2023-04-06 ENCOUNTER — OFFICE VISIT (OUTPATIENT)
Dept: CARDIOLOGY | Facility: HOSPITAL | Age: 63
End: 2023-04-06
Payer: MEDICARE

## 2023-04-06 VITALS
RESPIRATION RATE: 18 BRPM | SYSTOLIC BLOOD PRESSURE: 110 MMHG | HEIGHT: 73 IN | OXYGEN SATURATION: 96 % | HEART RATE: 76 BPM | WEIGHT: 315 LBS | DIASTOLIC BLOOD PRESSURE: 57 MMHG | TEMPERATURE: 96.6 F | BODY MASS INDEX: 41.75 KG/M2

## 2023-04-06 DIAGNOSIS — I25.10 CORONARY ARTERY DISEASE INVOLVING NATIVE CORONARY ARTERY OF NATIVE HEART WITHOUT ANGINA PECTORIS: ICD-10-CM

## 2023-04-06 DIAGNOSIS — I48.91 POSTOPERATIVE ATRIAL FIBRILLATION: ICD-10-CM

## 2023-04-06 DIAGNOSIS — R06.02 SHORTNESS OF BREATH: ICD-10-CM

## 2023-04-06 DIAGNOSIS — I97.89 POSTOPERATIVE ATRIAL FIBRILLATION: ICD-10-CM

## 2023-04-06 DIAGNOSIS — I97.89 POSTOPERATIVE ATRIAL FIBRILLATION: Primary | ICD-10-CM

## 2023-04-06 DIAGNOSIS — R05.9 COUGH, UNSPECIFIED TYPE: ICD-10-CM

## 2023-04-06 DIAGNOSIS — I10 PRIMARY HYPERTENSION: ICD-10-CM

## 2023-04-06 DIAGNOSIS — I48.91 POSTOPERATIVE ATRIAL FIBRILLATION: Primary | ICD-10-CM

## 2023-04-06 LAB
QT INTERVAL: 448 MS
QTC INTERVAL: 504 MS

## 2023-04-06 PROCEDURE — 3074F SYST BP LT 130 MM HG: CPT | Performed by: NURSE PRACTITIONER

## 2023-04-06 PROCEDURE — 93010 ELECTROCARDIOGRAM REPORT: CPT | Performed by: INTERNAL MEDICINE

## 2023-04-06 PROCEDURE — 3078F DIAST BP <80 MM HG: CPT | Performed by: NURSE PRACTITIONER

## 2023-04-06 PROCEDURE — 93005 ELECTROCARDIOGRAM TRACING: CPT | Performed by: NURSE PRACTITIONER

## 2023-04-06 PROCEDURE — 1160F RVW MEDS BY RX/DR IN RCRD: CPT | Performed by: NURSE PRACTITIONER

## 2023-04-06 PROCEDURE — 1159F MED LIST DOCD IN RCRD: CPT | Performed by: NURSE PRACTITIONER

## 2023-04-06 PROCEDURE — 99214 OFFICE O/P EST MOD 30 MIN: CPT | Performed by: NURSE PRACTITIONER

## 2023-04-06 PROCEDURE — 71046 X-RAY EXAM CHEST 2 VIEWS: CPT

## 2023-04-07 ENCOUNTER — DOCUMENTATION (OUTPATIENT)
Dept: CARDIAC SURGERY | Facility: CLINIC | Age: 63
End: 2023-04-07
Payer: MEDICARE

## 2023-04-07 ENCOUNTER — TELEPHONE (OUTPATIENT)
Dept: CARDIOLOGY | Facility: HOSPITAL | Age: 63
End: 2023-04-07
Payer: MEDICARE

## 2023-04-07 NOTE — PROGRESS NOTES
[facility] Cardiothoracic Surgery     Date of Encounter: 2023     Name: Wilbert Kelley  : 1960     Patient presented to office for staple removal s/p CABG x3 on 3/21/2023.  All midsternotomy staples were removed without complication.  The area was heavily painted with Betadine prior to and after removal.  Patient tolerated well.  Reiterated sternal precautions.  No areas of dehiscence or erythema noted.  Patient has 4-week postop follow-up scheduled.      Thank you for allowing me to participate in your care.  Best Regards,    MALINDA Zhou  [facility] Cardioth[facility]  23  12:53 EDT

## 2023-04-07 NOTE — TELEPHONE ENCOUNTER
----- Message from Danie Almaguer MA sent at 4/7/2023 10:32 AM EDT -----  Patients wife called with questions regarding test results. Xray

## 2023-04-07 NOTE — TELEPHONE ENCOUNTER
Called patients wife with CXR results. Results stable. Would continue bumex and mucinex as well as regular use of I/S.  He currently is on doxycycline.  Advised to follow-up with his PCP if wheezing gets any worse.  She verbalized understanding with no further questions or concerns

## 2023-04-10 ENCOUNTER — READMISSION MANAGEMENT (OUTPATIENT)
Dept: CALL CENTER | Facility: HOSPITAL | Age: 63
End: 2023-04-10
Payer: MEDICARE

## 2023-04-10 NOTE — OUTREACH NOTE
CT Surgery Week 2 Survey    Flowsheet Row Responses   [facility] patient discharged from? [place]   Does the patient have one of the following disease processes/diagnoses(primary or secondary)? Cardiothoracic surgery   Week 2 attempt successful? Yes   Call start time 1742   Call end time 1745   Discharge diagnosis CORONARY ARTERY BYPASS GRAFTING X 3,   Meds reviewed with patient/caregiver? Yes   Is the patient having any side effects they believe may be caused by any medication additions or changes? No   Does the patient have all medications related to this admission filled (includes all antibiotics, pain medications, cardiac medications, etc.) Yes   Is the patient taking all medications as directed (includes completed medication regime)? Yes   Comments regarding appointments Has another post op appt with surgeon on 4/26/2023   Does the patient have a primary care provider?  Yes   Does the patient have an appointment scheduled with their C/T surgeon? Yes   Has the patient kept scheduled appointments due by today? N/A   Has home health visited the patient within 72 hours of discharge? N/A   Psychosocial issues? No   Did the patient receive a copy of their discharge instructions? Yes   Nursing interventions Reviewed instructions with patient   What is the patient's perception of their health status since discharge? Improving   Nursing interventions Nurse provided patient education   Is the patient/caregiver able to teach back normal signs of recovery? Pain or discomfort at incisional site   Nursing interventions Reassured on normal signs of recovery   Is the patient /caregiver able to teach back basic post-op care? Use a clean wash cloth and antibacterial bar or liquid soap to clean incisions   Is the patient/caregiver able to teach back signs and symptoms of incisional infection? Increased redness, swelling or pain at the incisonal site, Increased drainage or bleeding, Incisional warmth, Pus or odor from  incision, Fever   Is the patient/caregiver able to teach back steps to recovery at home? Set small, achievable goals for return to baseline health, Rest and rebuild strength, gradually increase activity, Eat a well-balance diet   Is the patient /caregiver able to teach back the importance of cardiac rehab? Yes   If the patient is a current smoker, are they able to teach back resources for cessation? Not a smoker   Is the patient/caregiver able to teach back the hierarchy of who to call/visit for symptoms/problems? PCP, Specialist, Home health nurse, Urgent Care, ED, 911 Yes   Additional teach back comments states has back and sciatic pain in addition to post op pain, unable to find comfortable poisition for sleep,   Week 2 call completed? Yes   Is the patient interested in additional calls from an ambulatory ?  NOTE:  applies to high risk patients requiring additional follow-up. No   Graduated/Revoked comments Doing well.           Vince BERMEO - Registered Nurse
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
## 2023-04-20 LAB
QT INTERVAL: 414 MS
QT INTERVAL: 436 MS
QTC INTERVAL: 447 MS
QTC INTERVAL: 453 MS

## 2023-04-21 NOTE — PROGRESS NOTES
"     [facility] Cardiothoracic Surgery Office Follow Up Note     Date of Encounter: 2023     Name: Wilbert Kelley  : 1960     Referred By: No ref. provider found  PCP: Jose M Simon MD    Chief Complaint:    No chief complaint on file.      Subjective      History of Present Illness:    Wilbert Kelley is a 62 y.o. male non-smoker with history of MARIAH, HTN, HLD on statin therapy, insulin-dependent DM, and mvCAD s/p stenting and CABG x3 with right EVH (LIMA to LAD, SVG to obtuse marginal, circumflex, and posterior descending branch of the right) on 3/21/2023 by Dr. Emanuel.    Review of Systems:  Review of Systems   Constitutional: Negative. Negative for chills, decreased appetite, fever and malaise/fatigue.   Cardiovascular: Positive for leg swelling. Negative for chest pain, claudication, dyspnea on exertion, irregular heartbeat, near-syncope, orthopnea, palpitations and syncope.   Respiratory: Positive for cough. Negative for hemoptysis, shortness of breath, sputum production and wheezing.    Hematologic/Lymphatic: Negative for bleeding problem. Bruises/bleeds easily.   Skin: Positive for rash. Negative for color change and poor wound healing.   Musculoskeletal: Positive for back pain and joint pain. Negative for falls.   Gastrointestinal: Positive for nausea. Negative for abdominal pain, constipation, diarrhea and vomiting.   Neurological: Negative.  Negative for focal weakness, numbness and paresthesias.   Psychiatric/Behavioral: Negative for depression. The patient has insomnia.        I have reviewed the following portions of the patient's history: {history reviewed:42862::\"allergies\",\"current medications\",\"past family history\",\"past medical history\",\"past social history\",\"past surgical history\",\"problem list\"} and confirm it's accurate.    Allergies:  Allergies   Allergen Reactions   • Morphine Nausea And Vomiting     projectile   • Adhesive Tape Rash     Pt states sticky tabs cause " irritation when left on long        Medications:      Current Outpatient Medications:   •  amiodarone (PACERONE) 200 MG tablet, Take 1 tablet by mouth Daily., Disp: 30 tablet, Rfl: 0  •  amLODIPine (NORVASC) 5 MG tablet, Take 1 tablet by mouth Daily., Disp: 30 tablet, Rfl: 5  •  aspirin 81 MG EC tablet, Take 1 tablet by mouth Daily., Disp: 90 tablet, Rfl: 3  •  atorvastatin (LIPITOR) 40 MG tablet, Take 1 tablet by mouth Every Night., Disp: 30 tablet, Rfl: 5  •  B-D ULTRAFINE III SHORT PEN 31G X 8 MM misc, , Disp: , Rfl:   •  BD Insulin Syringe U-500 31G X 6MM 0.5 ML misc, , Disp: , Rfl:   •  bumetanide (BUMEX) 2 MG tablet, Take 1 tablet by mouth Daily., Disp: 30 tablet, Rfl: 0  •  clopidogrel (PLAVIX) 75 MG tablet, Take 1 tablet by mouth Daily., Disp: , Rfl:   •  fenofibrate 160 MG tablet, Take 1 tablet by mouth Daily., Disp: , Rfl:   •  gabapentin (NEURONTIN) 800 MG tablet, 1 tablet 3 (Three) Times a Day., Disp: , Rfl:   •  HYDROcodone-acetaminophen (NORCO) 5-325 MG per tablet, Take 1 tablet by mouth Every 8 (Eight) Hours As Needed for Moderate Pain., Disp: 15 tablet, Rfl: 0  •  Insulin Glargine (TOUJEO MAX SOLOSTAR SC), Inject 80 Units under the skin into the appropriate area as directed 2 (Two) Times a Day., Disp: , Rfl:   •  Insulin Regular Human, Conc, (HumuLIN R) 500 UNIT/ML solution pen-injector CONCENTRATED injection, Inject 65 Units under the skin into the appropriate area as directed 3 (Three) Times a Day Before Meals., Disp: , Rfl:   •  lisinopril (PRINIVIL,ZESTRIL) 40 MG tablet, Take 1 tablet by mouth Daily., Disp: 30 tablet, Rfl: 5  •  metFORMIN (GLUCOPHAGE) 1000 MG tablet, Take 1 tablet by mouth 2 (Two) Times a Day With Meals. Hold metformin for 2 days post-cath.  Restart on 3/19/2023, Disp: 60 tablet, Rfl: 12  •  nebivolol (BYSTOLIC) 5 MG tablet, Take 1 tablet by mouth Daily., Disp: 30 tablet, Rfl: 5  •  tamsulosin (FLOMAX) 0.4 MG capsule 24 hr capsule, Take 1 capsule by mouth Daily., Disp: 30  capsule, Rfl: 0    History:   Past Medical History:   Diagnosis Date   • Arthritis    • BPH (benign prostatic hyperplasia)    • Cancer     basal and melanoma-  x2 - left side ear and elbow   • Constipation    • Coronary artery disease    • DDD (degenerative disc disease), lumbar    • Diabetes mellitus     couple times month check sugar   • Frozen shoulder     left   • GERD (gastroesophageal reflux disease)    • Hyperlipidemia    • Hypertension    • Limited mobility     left shoulder  - possible frozen shoulder-= please be aware for surgery   • Sleep apnea     doesnt use machine   • Tinnitus    • Wears glasses     small print       Past Surgical History:   Procedure Laterality Date   • CARDIAC CATHETERIZATION     • CARDIAC CATHETERIZATION Right 03/16/2023    Procedure: Left Heart Cath;  Surgeon: Jarod Boyle MD;  Location:  [facility];  Service: Cardiovascular;  Laterality: Right;   • CATARACT EXTRACTION, BILATERAL Bilateral    • COLONOSCOPY     • CORONARY ARTERY BYPASS GRAFT N/A 3/21/2023    Procedure: MEDIAN STERNOTOMY, CORONARY ARTERY BYPASS GRAFTING X 3, UTILIZING THE LEFT INTERNAL MAMMARY ARTERY, ENDOSCOPIC VEIN HARVEST OF THE RIGHT GREATER SAPENOUSE VEIN;  Surgeon: Markus Emanuel MD;  Location: [facility] OR;  Service: Cardiothoracic;  Laterality: N/A;   • CORONARY STENT PLACEMENT      x4   • ENDOSCOPY     • FINGER SURGERY Left     middle finger - left side   • SKIN CANCER EXCISION      x2   • WISDOM TOOTH EXTRACTION         Social History     Socioeconomic History   • Marital status:    Tobacco Use   • Smoking status: Never     Passive exposure: Past   • Smokeless tobacco: Current     Types: Snuff   Vaping Use   • Vaping Use: Never used   Substance and Sexual Activity   • Alcohol use: Not Currently   • Drug use: Never   • Sexual activity: Defer        Family History   Problem Relation Age of Onset   • COPD Mother    • Heart attack Father 45        passed   • Heart attack Paternal  Grandfather 42        massive heart attack   • Heart disease Paternal Grandfather        Objective   ***  Physical Exam:  There were no vitals filed for this visit.   There is no height or weight on file to calculate BMI.    Physical Exam    Imaging/Labs:  ***  US Renal Limited    Result Date: 3/24/2023  Impression: No hydronephrosis or acute sonographic abnormality. Electronically Signed: Tom Banks  3/24/2023 7:55 AM EDT  Workstation ID: [id number]    XR Chest 1 View    Result Date: 3/29/2023  Impression: 1.Continued decreasing patchy left basilar opacities, likely atelectasis. 2.No definite pneumothorax. Electronically Signed: Kirti Cordero  3/29/2023 8:50 AM EDT  Workstation ID: [id number]    XR Chest 1 View    Result Date: 3/28/2023  Impression: Improved aeration and decreased atelectasis in the left lung base. No definite pneumothorax. Electronically Signed: Jung Salazar  3/28/2023 8:39 AM EDT  Workstation ID: [id number]    XR Chest 1 View    Result Date: 3/26/2023  Impression: Removal of medical devices as above. Removal of left thoracostomy tube. Unchanged trace left apical pneumothorax. Linear streaky opacities in the mid and lower left lung likely reflect atelectasis. Electronically Signed: Jung Salzaar  3/26/2023 7:57 AM EDT  Workstation ID: [id number]    XR Chest 1 View    Result Date: 3/24/2023  Impression: 1. Stable lines and support tubes. 2. Suspected 10 mm left apical pneumothorax. Left-sided chest tube in stable position. 3. Stable cardiomegaly. 4. Hypoinflated lungs with unchanged bibasilar airspace disease likely atelectasis. Electronically Signed: Tom Banks  3/24/2023 7:24 AM EDT  Workstation ID: [id number]    XR Chest 1 View    Result Date: 3/23/2023  Impression: Interval placement of a nasogastric tube which is noted terminating in the stomach. Remaining support hardware projects unchanged. Lung volumes are mildly diminished in comparison, with some increased basilar atelectasis on the left.  There is no enlarging effusion or distinct pneumothorax. Electronically Signed: Prudencio Mendoza  3/23/2023 8:39 AM EDT  Workstation ID: [id number]    XR Chest 1 View    Result Date: 3/22/2023  Impression: 1. Interval extubation and removal of esophagogastric tube. 2. Right IJ central venous catheter tip at the mid SVC. 3. Small left apical pneumothorax measuring 10 mm. Left chest tube in place. 4. Hypoinflated lungs with bibasilar atelectasis left greater than right, stable. Electronically Signed: Tom Banks  3/22/2023 7:27 AM EDT  Workstation ID: [id number]    XR Abdomen KUB    Result Date: 3/22/2023  Impression: 1. Interval placement of nasogastric tube with tip projecting over the expected location of the fundus of the stomach. Electronically Signed: Boni Maher  3/22/2023 9:58 PM EDT  Workstation ID: [id number]    XR Abdomen KUB    Result Date: 3/22/2023  Impression: Nonspecific gas-filled segments of small bowel and colon are seen throughout the abdomen, with appearance favoring ileus over obstruction. There is no overt pneumoperitoneum. Electronically Signed: Prudencio Mendoza  3/22/2023 4:20 PM EDT  Workstation ID: [id number]    XR Chest PA & Lateral    Result Date: 4/6/2023  Impression: 1. Postoperative changes of prior sternotomy. 2. Patchy right perihilar infiltrate. 3. Small left-sided dependent pleural effusion Electronically Signed: Stephen Ochoa  4/6/2023 4:48 PM EDT  Workstation ID: [id number]       Assessment / Plan      Assessment / Plan:  There are no diagnoses linked to this encounter.   ***    Patient Education:      Follow Up:   No follow-ups on file.   Or sooner for any further concerns or worsening sign and symptoms. If unable to reach us in the office please dial 911 or go to the nearest emergency department.      MALINDA Garza  [facility]    {Time Spent (Optional):15096}

## 2023-04-24 ENCOUNTER — OFFICE VISIT (OUTPATIENT)
Dept: CARDIAC SURGERY | Facility: CLINIC | Age: 63
End: 2023-04-24
Payer: MEDICARE

## 2023-04-24 VITALS
TEMPERATURE: 97.5 F | WEIGHT: 309 LBS | DIASTOLIC BLOOD PRESSURE: 72 MMHG | SYSTOLIC BLOOD PRESSURE: 128 MMHG | HEIGHT: 73 IN | OXYGEN SATURATION: 98 % | BODY MASS INDEX: 40.95 KG/M2 | HEART RATE: 77 BPM

## 2023-04-24 DIAGNOSIS — Z95.1 S/P CABG (CORONARY ARTERY BYPASS GRAFT): ICD-10-CM

## 2023-04-25 RX ORDER — BUMETANIDE 2 MG/1
TABLET ORAL
Qty: 30 TABLET | Refills: 0 | OUTPATIENT
Start: 2023-04-25

## 2023-04-25 RX ORDER — TAMSULOSIN HYDROCHLORIDE 0.4 MG/1
CAPSULE ORAL
Qty: 30 CAPSULE | Refills: 0 | OUTPATIENT
Start: 2023-04-25

## 2023-04-25 RX ORDER — AMIODARONE HYDROCHLORIDE 200 MG/1
TABLET ORAL
Qty: 30 TABLET | Refills: 0 | OUTPATIENT
Start: 2023-04-25

## 2023-04-25 NOTE — TELEPHONE ENCOUNTER
Refill requests for bumetanide, amiodarone, and tamsulosin denied. Per office note from MALINDA Renee yesterday, patient will need to get med refills from cardiology.

## 2023-04-25 NOTE — PROGRESS NOTES
..       [facility] Cardiothoracic Surgery Office Follow Up Note    Date of Encounter: 2023     Name: Wilbert Kelley  : 1960     Referred By: No ref. provider found  PCP: Jose M Simon MD    Chief Complaint:    Chief Complaint   Patient presents with   • Coronary Artery Disease     Hospital follow up s/p CABG 3/21/23.       Subjective      History of Present Illness:    It was nice to see Wilbert Kelley in follow up.  He is a pleasant 62 y.o. male with PMH significant for hyperlipidemia on statin therapy, hypertension, insulin-dependent diabetes mellitus, sleep apnea, EMANUEL, and multivessel coronary artery disease s/p stent.      Patient is s/p CABG x3 utilizing BAKER/RSVG by Dr. Emanuel on 3/21/2023.  See op report for full details.  He overall did well post-operatively.  Patient did experience postop atrial fibrillation which was treated with amiodarone protocol and later resolved.  Nephrology was consulted due to EMANUEL with a bump in creatinine to 2.49 which later resolved as well, with a creatinine of 1.12 prior to discharge.  On postop day #9 patient met required criteria and was deemed appropriate for discharge home.  Without readmission.  He was discharged on amiodarone, aspirin, statin, Plavix, and beta-blocker.  Patient was seen in office on 2023 for staple removal.  All midsternotomy staples were removed without complication.  No areas of dehiscence or erythema noted.    Mr. Kelley presents to office today accompanied by his wife who is a nurse for initial postop follow-up.  He has overall been recovering well.  Patient denies fever or chills.  He denies popping, clicking, or rubbing sensation of the sternum.  Patient has followed up with cardiology and has an appointment scheduled with his PCP.  Reports sternal precaution compliance as well as medication compliance.      Review of Systems:  Review of Systems   Constitutional: Negative. Negative for chills, decreased appetite,  fever and malaise/fatigue.   Cardiovascular: Positive for leg swelling. Negative for chest pain, claudication, dyspnea on exertion, irregular heartbeat, near-syncope, orthopnea, palpitations and syncope.   Respiratory: Positive for cough. Negative for hemoptysis, shortness of breath, sputum production and wheezing.    Hematologic/Lymphatic: Negative for bleeding problem. Bruises/bleeds easily.   Skin: Positive for rash. Negative for color change and poor wound healing.   Musculoskeletal: Positive for back pain and joint pain. Negative for falls.   Gastrointestinal: Positive for nausea. Negative for abdominal pain, constipation, diarrhea and vomiting.   Neurological: Negative.  Negative for focal weakness, numbness and paresthesias.   Psychiatric/Behavioral: Negative for depression. The patient has insomnia.      I have reviewed the following portions of the patient's history: allergies, current medications, past family history, past medical history, past social history, past surgical history and problem list and confirm it's accurate.    Allergies:  Allergies   Allergen Reactions   • Morphine Nausea And Vomiting     projectile   • Adhesive Tape Rash     Pt states sticky tabs cause irritation when left on long        Medications:      Current Outpatient Medications:   •  amiodarone (PACERONE) 200 MG tablet, Take 1 tablet by mouth Daily., Disp: 30 tablet, Rfl: 0  •  amLODIPine (NORVASC) 5 MG tablet, Take 1 tablet by mouth Daily., Disp: 30 tablet, Rfl: 5  •  aspirin 81 MG EC tablet, Take 1 tablet by mouth Daily., Disp: 90 tablet, Rfl: 3  •  atorvastatin (LIPITOR) 40 MG tablet, Take 1 tablet by mouth Every Night., Disp: 30 tablet, Rfl: 5  •  B-D ULTRAFINE III SHORT PEN 31G X 8 MM misc, , Disp: , Rfl:   •  BD Insulin Syringe U-500 31G X 6MM 0.5 ML misc, , Disp: , Rfl:   •  bumetanide (BUMEX) 2 MG tablet, Take 1 tablet by mouth Daily., Disp: 30 tablet, Rfl: 0  •  clopidogrel (PLAVIX) 75 MG tablet, Take 1 tablet by mouth  Daily., Disp: , Rfl:   •  fenofibrate 160 MG tablet, Take 1 tablet by mouth Daily., Disp: , Rfl:   •  gabapentin (NEURONTIN) 800 MG tablet, 1 tablet 3 (Three) Times a Day., Disp: , Rfl:   •  Insulin Glargine (TOUJEO MAX SOLOSTAR SC), Inject 80 Units under the skin into the appropriate area as directed 2 (Two) Times a Day., Disp: , Rfl:   •  Insulin Regular Human, Conc, (HumuLIN R) 500 UNIT/ML solution pen-injector CONCENTRATED injection, Inject 65 Units under the skin into the appropriate area as directed 3 (Three) Times a Day Before Meals., Disp: , Rfl:   •  lisinopril (PRINIVIL,ZESTRIL) 40 MG tablet, Take 1 tablet by mouth Daily., Disp: 30 tablet, Rfl: 5  •  metFORMIN (GLUCOPHAGE) 1000 MG tablet, Take 1 tablet by mouth 2 (Two) Times a Day With Meals. Hold metformin for 2 days post-cath.  Restart on 3/19/2023, Disp: 60 tablet, Rfl: 12  •  nebivolol (BYSTOLIC) 5 MG tablet, Take 1 tablet by mouth Daily., Disp: 30 tablet, Rfl: 5  •  tamsulosin (FLOMAX) 0.4 MG capsule 24 hr capsule, Take 1 capsule by mouth Daily., Disp: 30 capsule, Rfl: 0    History:   Past Medical History:   Diagnosis Date   • Arthritis    • BPH (benign prostatic hyperplasia)    • Cancer     basal and melanoma-  x2 - left side ear and elbow   • Constipation    • Coronary artery disease    • DDD (degenerative disc disease), lumbar    • Diabetes mellitus     couple times month check sugar   • Frozen shoulder     left   • GERD (gastroesophageal reflux disease)    • Hyperlipidemia    • Hypertension    • Limited mobility     left shoulder  - possible frozen shoulder-= please be aware for surgery   • Sleep apnea     doesnt use machine   • Tinnitus    • Wears glasses     small print       Past Surgical History:   Procedure Laterality Date   • CARDIAC CATHETERIZATION     • CARDIAC CATHETERIZATION Right 03/16/2023    Procedure: Left Heart Cath;  Surgeon: Jarod Boyle MD;  Location:  [facility];  Service: Cardiovascular;  Laterality: Right;  "  • CATARACT EXTRACTION, BILATERAL Bilateral    • COLONOSCOPY     • CORONARY ARTERY BYPASS GRAFT N/A 3/21/2023    Procedure: MEDIAN STERNOTOMY, CORONARY ARTERY BYPASS GRAFTING X 3, UTILIZING THE LEFT INTERNAL MAMMARY ARTERY, ENDOSCOPIC VEIN HARVEST OF THE RIGHT GREATER SAPENOUSE VEIN;  Surgeon: Markus Emanuel MD;  Location: [facility];  Service: Cardiothoracic;  Laterality: N/A;   • CORONARY STENT PLACEMENT      x4   • ENDOSCOPY     • FINGER SURGERY Left     middle finger - left side   • SKIN CANCER EXCISION      x2   • WISDOM TOOTH EXTRACTION         Social History     Socioeconomic History   • Marital status:    • Number of children: 1   Tobacco Use   • Smoking status: Never     Passive exposure: Past   • Smokeless tobacco: Current     Types: Snuff   Vaping Use   • Vaping Use: Never used   Substance and Sexual Activity   • Alcohol use: Not Currently   • Drug use: Never   • Sexual activity: Defer        Family History   Problem Relation Age of Onset   • COPD Mother    • Heart attack Father 45        passed   • Heart attack Paternal Grandfather 42        massive heart attack   • Heart disease Paternal Grandfather        Objective     Physical Exam:  Vitals:    04/24/23 1518 04/24/23 1519   BP: 120/66 128/72   BP Location: Right arm Left arm   Patient Position: Sitting Sitting   Pulse: 77    Temp: 97.5 °F (36.4 °C)    SpO2: 98%    Weight: (!) 140 kg (309 lb)    Height: 185.4 cm (73\")  Comment: patient reports       Body mass index is 40.77 kg/m².    Physical Exam  Vitals reviewed.   Constitutional:       General: He is not in acute distress.     Appearance: Normal appearance. He is not ill-appearing.   Eyes:      Pupils: Pupils are equal, round, and reactive to light.   Cardiovascular:      Rate and Rhythm: Normal rate and regular rhythm.      Heart sounds: Normal heart sounds. No murmur heard.  Pulmonary:      Effort: Pulmonary effort is normal.      Breath sounds: Normal breath sounds.   Musculoskeletal: "      Right lower leg: Edema present.   Skin:     General: Skin is warm and dry.      Comments: Mid-sternotomy incision:  wound edges well approximated, no erythema, edema, or induration.  There were a few undissolved sutures that were removed without complication.  Mediastinal tubes sites:  well healing without erythema, edema, or drainage.  Scabbing present.  Endoscopic vein harvest site:  well healing without erythema, edema, or drainage.       Neurological:      General: No focal deficit present.      Mental Status: He is alert and oriented to person, place, and time.   Psychiatric:         Mood and Affect: Mood normal.         Behavior: Behavior normal.         Thought Content: Thought content normal.         Judgment: Judgment normal.         Imaging/Labs:  Awaiting final results.  XR Chest 2 View (04/24/2023 15:32)    I personally reviewed these images.      Assessment / Plan      Assessment / Plan:  PMH significant for hyperlipidemia on statin therapy, hypertension, insulin-dependent diabetes mellitus, sleep apnea, EMANUEL, and multivessel coronary artery disease s/p stent.      1.  Multivessel coronary artery disease  · S/p CABG x3 utilize BAKER/RSVG by Dr. Emanuel on 3/21/2023.  See op report for full details.  · Overall did well postoperatively.  Did experience postop atrial fibrillation which treated with amiodarone protocol and later resolved.  · Nephrology was consulted due to EMANUEL with a bump in creatinine 2.49 which later resolved as well, with a creatinine of 1.12 prior to discharge.  · On postop day #9 was deemed appropriate for discharge home.  Without readmission.  · Was seen in office on 4/7/2023 for staple removal.  All midsternotomy staples were removed without complication.  No areas of dehiscence or erythema noted.  · Presents office today for initial postop follow-up.  · Has overall been recovering well.  Denies fever or chills.  Denies popping, clicking, or rubbing sensation of the  sternum.  Mid-sternotomy incision:  wound edges well approximated, no erythema, edema, or induration.  There were a few undissolved sutures that were removed without complication.  Mediastinal tubes sites:  well healing without erythema, edema, or drainage.  Scabbing present.  · Endoscopic vein harvest site:  well healing without erythema, edema, or drainage.    · Awaiting final results of CXR.  Expected postoperative changes observed.  · Has followed up with cardiology and has an appointment scheduled with his PCP.  · Reports sternal precaution compliance.  · Reports medication compliance.  Continue amiodarone, aspirin, statin, Plavix, and amlodipine.  · Wife states patient was changed from beta-blocker to amlodipine due to nephrotoxic effects of beta-blocker.  Patient's wife asked about continuance of Bumex.  Defer medication management to cardiology.      Patient Education:  • You may resume driving at 6 weeks from your surgery date.  Please plan to stop every 2 hours to ambulate if driving or flying long distances.  • No lifting over 10 lbs for 12 weeks (3 months).  After this time you can slowly increase your weight as tolerated.  • You should not partake in any overhead activities or movements that involve twisting or jarring of your upper chest and torso such as (but not limited to) -- golfing, tennis, lawn mowing including zero turn mowers, use of lawn trimmers or edgers, shoveling, painting, vacuuming, mopping, sweeping, shooting a gun, etc.  • Continue to keep your incisions clean and dry.  Use plain antibacterial soap (i.e. dial) and water to clean and pat dry.    • Do no apply any lotions, creams, oils, powders, salves, or ointments directly to incisional sites.  • Please watch for signs and symptoms of infection which may include but are not limited to: increased pain or tenderness around your incision, warmth at the site or fever, redness or red streaking, and foul smelling or appearing drainage.   Clear drainage and bloody drainage are not worrisome.  • If your incision opens up please contact our office.    Instructions and post-operative restrictions verbally reviewed -- patient verbalized understanding.    Follow Up:   We will follow on an as-needed basis.  Or sooner for any further concerns or worsening sign and symptoms.  Patient encouraged to call the office with any questions or concerns.     Thank you for allowing me to participate in your care.  Best Regards,    MALINDA Zhou  [facility] Cardiothoracic Surgery  04/24/23  20:08 EDT

## 2023-04-25 NOTE — TELEPHONE ENCOUNTER
"I called and spoke with Sharmin at [facility] to ask that these medications be sent to the pt PCP as this office does not fill this medication on a refill basis, Corin Dean did fill these as a one time courtesy for this patient. I gave her the pt PCP info as well as his cardiologist, she appreciated the help. I told her I was concerned that these medications would not be filled and I did not want the pt to worry about them She V/U said that she would \"take care of him'. I called the pt and left him a VM concerning the refills of these medications and asked him to call me back if he had any questions.    "

## 2023-04-28 DIAGNOSIS — I97.89 POSTOPERATIVE ATRIAL FIBRILLATION: Primary | ICD-10-CM

## 2023-04-28 DIAGNOSIS — I48.91 POSTOPERATIVE ATRIAL FIBRILLATION: Primary | ICD-10-CM

## 2023-04-28 RX ORDER — BUMETANIDE 2 MG/1
2 TABLET ORAL DAILY
Qty: 5 TABLET | Refills: 0 | Status: SHIPPED | OUTPATIENT
Start: 2023-04-28 | End: 2023-05-01

## 2023-04-28 RX ORDER — TAMSULOSIN HYDROCHLORIDE 0.4 MG/1
CAPSULE ORAL
Qty: 30 CAPSULE | Refills: 0 | OUTPATIENT
Start: 2023-04-28

## 2023-04-28 RX ORDER — BUMETANIDE 2 MG/1
TABLET ORAL
Qty: 30 TABLET | Refills: 0 | OUTPATIENT
Start: 2023-04-28

## 2023-04-28 RX ORDER — AMIODARONE HYDROCHLORIDE 200 MG/1
200 TABLET ORAL
Qty: 30 TABLET | Refills: 0 | Status: CANCELLED | OUTPATIENT
Start: 2023-04-28

## 2023-04-28 RX ORDER — AMIODARONE HYDROCHLORIDE 200 MG/1
200 TABLET ORAL
Qty: 5 TABLET | Refills: 0 | Status: SHIPPED | OUTPATIENT
Start: 2023-04-28 | End: 2023-05-01

## 2023-04-28 RX ORDER — BUMETANIDE 2 MG/1
2 TABLET ORAL DAILY
Qty: 30 TABLET | Refills: 0 | Status: CANCELLED | OUTPATIENT
Start: 2023-04-28

## 2023-04-28 RX ORDER — TAMSULOSIN HYDROCHLORIDE 0.4 MG/1
0.4 CAPSULE ORAL DAILY
Qty: 30 CAPSULE | Refills: 0 | Status: CANCELLED | OUTPATIENT
Start: 2023-04-28

## 2023-04-28 NOTE — TELEPHONE ENCOUNTER
Caller: SHBANAM LOPEZ    Relationship: SELF    Best call back number: [phone]    Requested Prescriptions:   Requested Prescriptions     Pending Prescriptions Disp Refills   • amiodarone (PACERONE) 200 MG tablet 30 tablet 0     Sig: Take 1 tablet by mouth Daily.   • bumetanide (BUMEX) 2 MG tablet 30 tablet 0     Sig: Take 1 tablet by mouth Daily.   • tamsulosin (FLOMAX) 0.4 MG capsule 24 hr capsule 30 capsule 0     Sig: Take 1 capsule by mouth Daily.        Pharmacy where request should be sent: [facility] [id number] [address] - [phone]  - [phone] FX     Last office visit with prescribing clinician: 2/13/2023   Last telemedicine visit with prescribing clinician: 5/11/2023   Next office visit with prescribing clinician: 5/11/2023     Additional details provided by patient: PT WAS GETTING THIS REFILLED BY HIS PCP. THEY STATED HE NEEDED TO HAVE THIS MEDICATION TRANSFERRED OVER TO HIS CARDIOLOGIST. PT HAS AN UPCOMING APPT WITH DR. STAPLES ON 05.11.23    Does the patient have less than a 3 day supply:  [x] Yes  [] No    Would you like a call back once the refill request has been completed: [x] Yes [] No    If the office needs to give you a call back, can they leave a voicemail: [x] Yes [] No    Felix Duval Rep   04/28/23 11:09 EDT

## 2023-04-29 DIAGNOSIS — I48.91 POSTOPERATIVE ATRIAL FIBRILLATION: ICD-10-CM

## 2023-04-29 DIAGNOSIS — I97.89 POSTOPERATIVE ATRIAL FIBRILLATION: ICD-10-CM

## 2023-05-01 RX ORDER — BUMETANIDE 2 MG/1
TABLET ORAL
Qty: 30 TABLET | Refills: 0 | Status: SHIPPED | OUTPATIENT
Start: 2023-05-01 | End: 2023-05-08

## 2023-05-01 RX ORDER — AMIODARONE HYDROCHLORIDE 200 MG/1
TABLET ORAL
Qty: 30 TABLET | Refills: 0 | Status: SHIPPED | OUTPATIENT
Start: 2023-05-01 | End: 2023-05-08

## 2023-05-03 NOTE — CASE MANAGEMENT/SOCIAL WORK
Discharge Planning Assessment  [facility]     Patient Name: Wilbert Kelley  MRN: [id number]  Today's Date: 3/22/2023    Admit Date: 3/21/2023    Plan: home   Discharge Needs Assessment     Row Name 03/22/23 1037       Living Environment    People in Home spouse    Current Living Arrangements home    Primary Care Provided by self    Provides Primary Care For no one    Family Caregiver if Needed spouse    Quality of Family Relationships unable to assess    Able to Return to Prior Arrangements yes       Transition Planning    Patient/Family Anticipates Transition to home with family       Discharge Needs Assessment    Readmission Within the Last 30 Days no previous admission in last 30 days    Equipment Currently Used at Home none               Discharge Plan     Row Name 03/22/23 1038       Plan    Plan home    Patient/Family in Agreement with Plan yes    Plan Comments I spoke with Mr. Kelley in room regarding discharge planning.  He resides in [place] with his spouse.  He is independent with ADL's and does not use any DME.  Denies current home health or PT.  I verified his insurance and that he has prescription coverage.  He is post op CABG X3 on O2 at 4L per [place].  Plan is home at discharge and spouse will transport.    Final Discharge Disposition Code 01 - home or self-care              Continued Care and Services - Admitted Since 3/21/2023    Coordination has not been started for this encounter.       Expected Discharge Date and Time     Expected Discharge Date Expected Discharge Time    Mar 27, 2023          Demographic Summary     Row Name 03/22/23 1035       General Information    Admission Type inpatient    Reason for Consult discharge planning    Preferred Language English    General Information Comments PCP - Arash Simon               Functional Status     Row Name 03/22/23 1036       Functional Status    Usual Activity Tolerance good    Current Activity Tolerance good       Functional Status,  IADL    Medications independent    Meal Preparation independent    Housekeeping independent    Laundry independent    Shopping independent       Employment/    Employment Status disabled               Psychosocial    No documentation.                Abuse/Neglect    No documentation.                Legal    No documentation.                Substance Abuse    No documentation.                Patient Forms    No documentation.                   Kelly Curtis RN     Did not state

## 2023-05-06 ENCOUNTER — APPOINTMENT (OUTPATIENT)
Dept: GENERAL RADIOLOGY | Facility: HOSPITAL | Age: 63
DRG: 194 | End: 2023-05-06
Payer: MEDICARE

## 2023-05-06 ENCOUNTER — HOSPITAL ENCOUNTER (EMERGENCY)
Facility: HOSPITAL | Age: 63
Discharge: HOME | DRG: 194 | End: 2023-05-07
Attending: FAMILY MEDICINE
Payer: MEDICARE

## 2023-05-06 ENCOUNTER — APPOINTMENT (OUTPATIENT)
Dept: CT IMAGING | Facility: HOSPITAL | Age: 63
DRG: 194 | End: 2023-05-06
Payer: MEDICARE

## 2023-05-06 DIAGNOSIS — Z95.1 HISTORY OF QUADRUPLE BYPASS: ICD-10-CM

## 2023-05-06 DIAGNOSIS — R11.0 NAUSEA: ICD-10-CM

## 2023-05-06 DIAGNOSIS — J90 PLEURAL EFFUSION, LEFT: Primary | ICD-10-CM

## 2023-05-06 DIAGNOSIS — B34.9 ACUTE VIRAL SYNDROME: ICD-10-CM

## 2023-05-06 LAB
A-A DO2: 46.1 MMHG (ref 0–300)
ALBUMIN SERPL-MCNC: 3.7 G/DL (ref 3.5–5.2)
ALBUMIN/GLOB SERPL: 0.8 G/DL
ALP SERPL-CCNC: 73 U/L (ref 39–117)
ALT SERPL W P-5'-P-CCNC: 12 U/L (ref 1–41)
ANION GAP SERPL CALCULATED.3IONS-SCNC: 11.7 MMOL/L (ref 5–15)
APTT PPP: 40 SECONDS (ref 26.5–34.5)
ARTERIAL PATENCY WRIST A: POSITIVE
AST SERPL-CCNC: 16 U/L (ref 1–40)
ATMOSPHERIC PRESS: 730 MMHG
BACTERIA UR QL AUTO: ABNORMAL /HPF
BASE EXCESS BLDA CALC-SCNC: 2.8 MMOL/L (ref 0–2)
BASOPHILS # BLD AUTO: 0.07 10*3/MM3 (ref 0–0.2)
BASOPHILS NFR BLD AUTO: 0.4 % (ref 0–1.5)
BDY SITE: ABNORMAL
BILIRUB SERPL-MCNC: 0.8 MG/DL (ref 0–1.2)
BILIRUB UR QL STRIP: NEGATIVE
BUN SERPL-MCNC: 14 MG/DL (ref 8–23)
BUN/CREAT SERPL: 13.3 (ref 7–25)
CALCIUM SPEC-SCNC: 9 MG/DL (ref 8.6–10.5)
CHLORIDE SERPL-SCNC: 104 MMOL/L (ref 98–107)
CLARITY UR: CLEAR
CO2 BLDA-SCNC: 26.6 MMOL/L (ref 22–33)
CO2 SERPL-SCNC: 25.3 MMOL/L (ref 22–29)
COHGB MFR BLD: 2 % (ref 0–5)
COLOR UR: YELLOW
CREAT SERPL-MCNC: 1.05 MG/DL (ref 0.76–1.27)
CRP SERPL-MCNC: 15.92 MG/DL (ref 0–0.5)
D-LACTATE SERPL-SCNC: 1.4 MMOL/L (ref 0.5–2)
DEPRECATED RDW RBC AUTO: 51.4 FL (ref 37–54)
EGFRCR SERPLBLD CKD-EPI 2021: 80.8 ML/MIN/1.73
EOSINOPHIL # BLD AUTO: 0.18 10*3/MM3 (ref 0–0.4)
EOSINOPHIL NFR BLD AUTO: 1.1 % (ref 0.3–6.2)
ERYTHROCYTE [DISTWIDTH] IN BLOOD BY AUTOMATED COUNT: 17 % (ref 12.3–15.4)
ERYTHROCYTE [SEDIMENTATION RATE] IN BLOOD: 69 MM/HR (ref 0–20)
FLUAV RNA RESP QL NAA+PROBE: NOT DETECTED
FLUBV RNA ISLT QL NAA+PROBE: NOT DETECTED
GEN 5 2HR TROPONIN T REFLEX: 48 NG/L
GLOBULIN UR ELPH-MCNC: 4.5 GM/DL
GLUCOSE SERPL-MCNC: 121 MG/DL (ref 65–99)
GLUCOSE UR STRIP-MCNC: NEGATIVE MG/DL
HCO3 BLDA-SCNC: 25.7 MMOL/L (ref 20–26)
HCT VFR BLD AUTO: 31.8 % (ref 37.5–51)
HCT VFR BLD CALC: 30.3 % (ref 38–51)
HGB BLD-MCNC: 9.8 G/DL (ref 13–17.7)
HGB BLDA-MCNC: 9.9 G/DL (ref 14–18)
HGB UR QL STRIP.AUTO: ABNORMAL
HOLD SPECIMEN: NORMAL
HOLD SPECIMEN: NORMAL
HYALINE CASTS UR QL AUTO: ABNORMAL /LPF
IMM GRANULOCYTES # BLD AUTO: 0.07 10*3/MM3 (ref 0–0.05)
IMM GRANULOCYTES NFR BLD AUTO: 0.4 % (ref 0–0.5)
INHALED O2 CONCENTRATION: 21 %
INR PPP: 1.07 (ref 0.9–1.1)
KETONES UR QL STRIP: NEGATIVE
LEUKOCYTE ESTERASE UR QL STRIP.AUTO: NEGATIVE
LYMPHOCYTES # BLD AUTO: 0.84 10*3/MM3 (ref 0.7–3.1)
LYMPHOCYTES NFR BLD AUTO: 5.3 % (ref 19.6–45.3)
Lab: ABNORMAL
MAGNESIUM SERPL-MCNC: 1.6 MG/DL (ref 1.6–2.4)
MCH RBC QN AUTO: 26.1 PG (ref 26.6–33)
MCHC RBC AUTO-ENTMCNC: 30.8 G/DL (ref 31.5–35.7)
MCV RBC AUTO: 84.8 FL (ref 79–97)
METHGB BLD QL: 0 % (ref 0–3)
MODALITY: ABNORMAL
MONOCYTES # BLD AUTO: 1.28 10*3/MM3 (ref 0.1–0.9)
MONOCYTES NFR BLD AUTO: 8 % (ref 5–12)
NEUTROPHILS NFR BLD AUTO: 13.47 10*3/MM3 (ref 1.7–7)
NEUTROPHILS NFR BLD AUTO: 84.8 % (ref 42.7–76)
NITRITE UR QL STRIP: NEGATIVE
NOTE: ABNORMAL
NOTIFIED BY: ABNORMAL
NOTIFIED WHO: ABNORMAL
NRBC BLD AUTO-RTO: 0 /100 WBC (ref 0–0.2)
NT-PROBNP SERPL-MCNC: 3018 PG/ML (ref 0–900)
OXYHGB MFR BLDV: 92 % (ref 94–99)
PCO2 BLDA: 32.2 MM HG (ref 35–45)
PCO2 TEMP ADJ BLD: ABNORMAL MM[HG]
PH BLDA: 7.51 PH UNITS (ref 7.35–7.45)
PH UR STRIP.AUTO: >=9 [PH] (ref 5–8)
PH, TEMP CORRECTED: ABNORMAL
PLATELET # BLD AUTO: 410 10*3/MM3 (ref 140–450)
PMV BLD AUTO: 9.8 FL (ref 6–12)
PO2 BLDA: 61 MM HG (ref 83–108)
PO2 TEMP ADJ BLD: ABNORMAL MM[HG]
POTASSIUM SERPL-SCNC: 4 MMOL/L (ref 3.5–5.2)
PROCALCITONIN SERPL-MCNC: 0.07 NG/ML (ref 0–0.25)
PROT SERPL-MCNC: 8.2 G/DL (ref 6–8.5)
PROT UR QL STRIP: ABNORMAL
PROTHROMBIN TIME: 14.4 SECONDS (ref 12.1–14.7)
RBC # BLD AUTO: 3.75 10*6/MM3 (ref 4.14–5.8)
RBC # UR STRIP: ABNORMAL /HPF
REF LAB TEST METHOD: ABNORMAL
SAO2 % BLDCOA: 93.8 % (ref 94–99)
SARS-COV-2 RNA RESP QL NAA+PROBE: NOT DETECTED
SODIUM SERPL-SCNC: 141 MMOL/L (ref 136–145)
SP GR UR STRIP: 1.02 (ref 1–1.03)
SQUAMOUS #/AREA URNS HPF: ABNORMAL /HPF
TROPONIN T DELTA: 5 NG/L
TROPONIN T SERPL HS-MCNC: 43 NG/L
UROBILINOGEN UR QL STRIP: ABNORMAL
VENTILATOR MODE: ABNORMAL
WBC # UR STRIP: ABNORMAL /HPF
WBC NRBC COR # BLD: 15.91 10*3/MM3 (ref 3.4–10.8)
WHOLE BLOOD HOLD COAG: NORMAL
WHOLE BLOOD HOLD SPECIMEN: NORMAL

## 2023-05-06 PROCEDURE — 25510000001 IOPAMIDOL PER 1 ML: Performed by: STUDENT IN AN ORGANIZED HEALTH CARE EDUCATION/TRAINING PROGRAM

## 2023-05-06 PROCEDURE — 36415 COLL VENOUS BLD VENIPUNCTURE: CPT

## 2023-05-06 PROCEDURE — 80053 COMPREHEN METABOLIC PANEL: CPT | Performed by: PHYSICIAN ASSISTANT

## 2023-05-06 PROCEDURE — 87040 BLOOD CULTURE FOR BACTERIA: CPT | Performed by: PHYSICIAN ASSISTANT

## 2023-05-06 PROCEDURE — 83050 HGB METHEMOGLOBIN QUAN: CPT

## 2023-05-06 PROCEDURE — 93010 ELECTROCARDIOGRAM REPORT: CPT | Performed by: SPECIALIST

## 2023-05-06 PROCEDURE — 71045 X-RAY EXAM CHEST 1 VIEW: CPT | Performed by: RADIOLOGY

## 2023-05-06 PROCEDURE — 81001 URINALYSIS AUTO W/SCOPE: CPT | Performed by: PHYSICIAN ASSISTANT

## 2023-05-06 PROCEDURE — 25010000002 ONDANSETRON PER 1 MG: Performed by: PHYSICIAN ASSISTANT

## 2023-05-06 PROCEDURE — 84145 PROCALCITONIN (PCT): CPT | Performed by: PHYSICIAN ASSISTANT

## 2023-05-06 PROCEDURE — 85610 PROTHROMBIN TIME: CPT | Performed by: PHYSICIAN ASSISTANT

## 2023-05-06 PROCEDURE — 85730 THROMBOPLASTIN TIME PARTIAL: CPT | Performed by: PHYSICIAN ASSISTANT

## 2023-05-06 PROCEDURE — 71045 X-RAY EXAM CHEST 1 VIEW: CPT

## 2023-05-06 PROCEDURE — 82375 ASSAY CARBOXYHB QUANT: CPT

## 2023-05-06 PROCEDURE — 85652 RBC SED RATE AUTOMATED: CPT | Performed by: PHYSICIAN ASSISTANT

## 2023-05-06 PROCEDURE — 71275 CT ANGIOGRAPHY CHEST: CPT

## 2023-05-06 PROCEDURE — 86140 C-REACTIVE PROTEIN: CPT | Performed by: PHYSICIAN ASSISTANT

## 2023-05-06 PROCEDURE — 85025 COMPLETE CBC W/AUTO DIFF WBC: CPT | Performed by: PHYSICIAN ASSISTANT

## 2023-05-06 PROCEDURE — 99284 EMERGENCY DEPT VISIT MOD MDM: CPT

## 2023-05-06 PROCEDURE — 71275 CT ANGIOGRAPHY CHEST: CPT | Performed by: RADIOLOGY

## 2023-05-06 PROCEDURE — 82805 BLOOD GASES W/O2 SATURATION: CPT

## 2023-05-06 PROCEDURE — 36600 WITHDRAWAL OF ARTERIAL BLOOD: CPT

## 2023-05-06 PROCEDURE — 84484 ASSAY OF TROPONIN QUANT: CPT | Performed by: PHYSICIAN ASSISTANT

## 2023-05-06 PROCEDURE — 87636 SARSCOV2 & INF A&B AMP PRB: CPT | Performed by: PHYSICIAN ASSISTANT

## 2023-05-06 PROCEDURE — 83880 ASSAY OF NATRIURETIC PEPTIDE: CPT | Performed by: PHYSICIAN ASSISTANT

## 2023-05-06 PROCEDURE — 93005 ELECTROCARDIOGRAM TRACING: CPT | Performed by: STUDENT IN AN ORGANIZED HEALTH CARE EDUCATION/TRAINING PROGRAM

## 2023-05-06 PROCEDURE — 96374 THER/PROPH/DIAG INJ IV PUSH: CPT

## 2023-05-06 PROCEDURE — 83735 ASSAY OF MAGNESIUM: CPT | Performed by: PHYSICIAN ASSISTANT

## 2023-05-06 PROCEDURE — 83605 ASSAY OF LACTIC ACID: CPT | Performed by: PHYSICIAN ASSISTANT

## 2023-05-06 RX ORDER — ACETAMINOPHEN 500 MG
1000 TABLET ORAL ONCE
Status: COMPLETED | OUTPATIENT
Start: 2023-05-06 | End: 2023-05-06

## 2023-05-06 RX ORDER — BUMETANIDE 1 MG/1
1 TABLET ORAL DAILY
Status: DISCONTINUED | OUTPATIENT
Start: 2023-05-06 | End: 2023-05-07 | Stop reason: HOSPADM

## 2023-05-06 RX ORDER — SODIUM CHLORIDE 0.9 % (FLUSH) 0.9 %
10 SYRINGE (ML) INJECTION AS NEEDED
Status: DISCONTINUED | OUTPATIENT
Start: 2023-05-06 | End: 2023-05-07 | Stop reason: HOSPADM

## 2023-05-06 RX ORDER — ONDANSETRON 2 MG/ML
4 INJECTION INTRAMUSCULAR; INTRAVENOUS ONCE
Status: COMPLETED | OUTPATIENT
Start: 2023-05-06 | End: 2023-05-06

## 2023-05-06 RX ORDER — BUMETANIDE 0.25 MG/ML
0.5 INJECTION INTRAMUSCULAR; INTRAVENOUS DAILY
Status: DISCONTINUED | OUTPATIENT
Start: 2023-05-06 | End: 2023-05-07 | Stop reason: HOSPADM

## 2023-05-06 RX ADMIN — ACETAMINOPHEN 1000 MG: 500 TABLET ORAL at 20:37

## 2023-05-06 RX ADMIN — IOPAMIDOL 70 ML: 755 INJECTION, SOLUTION INTRAVENOUS at 21:26

## 2023-05-06 RX ADMIN — BUMETANIDE 0.5 MG: 0.25 INJECTION INTRAMUSCULAR; INTRAVENOUS at 21:23

## 2023-05-06 RX ADMIN — ONDANSETRON 4 MG: 2 INJECTION INTRAMUSCULAR; INTRAVENOUS at 20:37

## 2023-05-06 NOTE — ED PROVIDER NOTES
Subjective   History of Present Illness  62-year-old male who presents to the ED today for cough and congestion.  He has had symptoms since yesterday and they have progressively gotten worse.  He states he has had runny nose and nasal congestion.  He has had a dry cough.  He has had nausea and body aches.  His temperature today was 100.4.  His oxygen saturation at home ran anywhere between 88 to 93%.  He does not typically wear home oxygen.  He does wear a CPAP for sleep apnea but reports that he could not wear it last night because it was smothering him.  He states he has had increased shortness of breath today, especially if he lays back.  He did have triple bypass surgery 7 weeks ago at [facility].  He has recently been around a grandchild who has been sick with similar symptoms.  His wife reports that he is very prone to pneumonia.    History provided by:  Patient  URI  Presenting symptoms: congestion, cough, fatigue, fever and rhinorrhea    Severity:  Moderate  Onset quality:  Gradual  Duration:  1 day  Timing:  Constant  Progression:  Worsening  Chronicity:  New  Relieved by:  Nothing  Worsened by:  Nothing  Associated symptoms: myalgias    Risk factors: chronic cardiac disease, diabetes mellitus and sick contacts        Review of Systems   Constitutional: Positive for fatigue and fever.   HENT: Positive for congestion and rhinorrhea. Negative for trouble swallowing and voice change.    Eyes: Negative.    Respiratory: Positive for cough and shortness of breath.    Cardiovascular: Negative.    Gastrointestinal: Positive for nausea. Negative for abdominal pain, diarrhea and vomiting.   Genitourinary: Negative.    Musculoskeletal: Positive for myalgias.   Skin: Negative.    Neurological: Negative.    Psychiatric/Behavioral: Negative.    All other systems reviewed and are negative.      Past Medical History:   Diagnosis Date   • Arthritis    • BPH (benign prostatic hyperplasia)    • Cancer     basal  and melanoma-  x2 - left side ear and elbow   • Constipation    • Coronary artery disease    • DDD (degenerative disc disease), lumbar    • Diabetes mellitus     couple times month check sugar   • Frozen shoulder     left   • GERD (gastroesophageal reflux disease)    • Hyperlipidemia    • Hypertension    • Limited mobility     left shoulder  - possible frozen shoulder-= please be aware for surgery   • Sleep apnea     doesnt use machine   • Tinnitus    • Wears glasses     small print       Allergies   Allergen Reactions   • Morphine Nausea And Vomiting     projectile   • Adhesive Tape Rash     Pt states sticky tabs cause irritation when left on long        Past Surgical History:   Procedure Laterality Date   • CARDIAC CATHETERIZATION     • CARDIAC CATHETERIZATION Right 03/16/2023    Procedure: Left Heart Cath;  Surgeon: Jarod Boyle MD;  Location:  [facility];  Service: Cardiovascular;  Laterality: Right;   • CATARACT EXTRACTION, BILATERAL Bilateral    • COLONOSCOPY     • CORONARY ARTERY BYPASS GRAFT N/A 3/21/2023    Procedure: MEDIAN STERNOTOMY, CORONARY ARTERY BYPASS GRAFTING X 3, UTILIZING THE LEFT INTERNAL MAMMARY ARTERY, ENDOSCOPIC VEIN HARVEST OF THE RIGHT GREATER SAPENOUSE VEIN;  Surgeon: Markus Emanuel MD;  Location: [facility] OR;  Service: Cardiothoracic;  Laterality: N/A;   • CORONARY STENT PLACEMENT      x4   • ENDOSCOPY     • FINGER SURGERY Left     middle finger - left side   • SKIN CANCER EXCISION      x2   • WISDOM TOOTH EXTRACTION         Family History   Problem Relation Age of Onset   • COPD Mother    • Heart attack Father 45        passed   • Heart attack Paternal Grandfather 42        massive heart attack   • Heart disease Paternal Grandfather        Social History     Socioeconomic History   • Marital status:    • Number of children: 1   Tobacco Use   • Smoking status: Never     Passive exposure: Past   • Smokeless tobacco: Current     Types: Snuff   Vaping Use   •  Vaping Use: Never used   Substance and Sexual Activity   • Alcohol use: Not Currently   • Drug use: Never   • Sexual activity: Defer           Objective   Physical Exam  Vitals and nursing note reviewed.   Constitutional:       General: He is not in acute distress.     Appearance: He is well-developed. He is obese. He is ill-appearing. He is not diaphoretic.   HENT:      Head: Normocephalic and atraumatic.      Mouth/Throat:      Pharynx: Posterior oropharyngeal erythema present. No oropharyngeal exudate.   Eyes:      Extraocular Movements: Extraocular movements intact.      Pupils: Pupils are equal, round, and reactive to light.   Neck:      Vascular: No JVD.      Trachea: No tracheal deviation.   Cardiovascular:      Rate and Rhythm: Normal rate and regular rhythm.      Pulses: Normal pulses.      Heart sounds: Normal heart sounds.   Pulmonary:      Effort: Pulmonary effort is normal.      Breath sounds: Examination of the right-lower field reveals decreased breath sounds. Examination of the left-lower field reveals decreased breath sounds. Decreased breath sounds present.      Comments: Has a dry cough  Chest:      Chest wall: No tenderness.   Abdominal:      General: Bowel sounds are normal.      Palpations: Abdomen is soft.      Tenderness: There is no abdominal tenderness.   Musculoskeletal:         General: Normal range of motion.      Cervical back: Normal range of motion and neck supple.   Lymphadenopathy:      Cervical: No cervical adenopathy.   Skin:     General: Skin is warm and dry.      Capillary Refill: Capillary refill takes less than 2 seconds.   Neurological:      General: No focal deficit present.      Mental Status: He is alert and oriented to person, place, and time.   Psychiatric:         Mood and Affect: Mood normal.         Procedures       Results for orders placed or performed during the hospital encounter of 05/06/23   COVID-19 and FLU A/B PCR - Swab, Nasopharynx    Specimen: Nasopharynx;  Swab   Result Value Ref Range    COVID19 Not Detected Not Detected - Ref. Range    Influenza A PCR Not Detected Not Detected    Influenza B PCR Not Detected Not Detected   Comprehensive Metabolic Panel    Specimen: Arm, Left; Blood   Result Value Ref Range    Glucose 121 (H) 65 - 99 mg/dL    BUN 14 8 - 23 mg/dL    Creatinine 1.05 0.76 - 1.27 mg/dL    Sodium 141 136 - 145 mmol/L    Potassium 4.0 3.5 - 5.2 mmol/L    Chloride 104 98 - 107 mmol/L    CO2 25.3 22.0 - 29.0 mmol/L    Calcium 9.0 8.6 - 10.5 mg/dL    Total Protein 8.2 6.0 - 8.5 g/dL    Albumin 3.7 3.5 - 5.2 g/dL    ALT (SGPT) 12 1 - 41 U/L    AST (SGOT) 16 1 - 40 U/L    Alkaline Phosphatase 73 39 - 117 U/L    Total Bilirubin 0.8 0.0 - 1.2 mg/dL    Globulin 4.5 gm/dL    A/G Ratio 0.8 g/dL    BUN/Creatinine Ratio 13.3 7.0 - 25.0    Anion Gap 11.7 5.0 - 15.0 mmol/L    eGFR 80.8 >60.0 mL/min/1.73   Protime-INR    Specimen: Arm, Left; Blood   Result Value Ref Range    Protime 14.4 12.1 - 14.7 Seconds    INR 1.07 0.90 - 1.10   aPTT    Specimen: Arm, Left; Blood   Result Value Ref Range    PTT 40.0 (H) 26.5 - 34.5 seconds   Urinalysis With Culture If Indicated - Urine, Clean Catch    Specimen: Urine, Clean Catch   Result Value Ref Range    Color, UA Yellow Yellow, Straw    Appearance, UA Clear Clear    pH, UA >=9.0 (H) 5.0 - 8.0    Specific Gravity, UA 1.018 1.005 - 1.030    Glucose, UA Negative Negative    Ketones, UA Negative Negative    Bilirubin, UA Negative Negative    Blood, UA Small (1+) (A) Negative    Protein, UA 30 mg/dL (1+) (A) Negative    Leuk Esterase, UA Negative Negative    Nitrite, UA Negative Negative    Urobilinogen, UA 1.0 E.U./dL 0.2 - 1.0 E.U./dL   BNP    Specimen: Arm, Left; Blood   Result Value Ref Range    proBNP 3,018.0 (H) 0.0 - 900.0 pg/mL   High Sensitivity Troponin T    Specimen: Arm, Left; Blood   Result Value Ref Range    HS Troponin T 43 (H) <15 ng/L   Lactic Acid, Plasma    Specimen: Arm, Left; Blood   Result Value Ref Range     Lactate 1.4 0.5 - 2.0 mmol/L   Procalcitonin    Specimen: Arm, Left; Blood   Result Value Ref Range    Procalcitonin 0.07 0.00 - 0.25 ng/mL   Sedimentation Rate    Specimen: Arm, Left; Blood   Result Value Ref Range    Sed Rate 69 (H) 0 - 20 mm/hr   C-reactive Protein    Specimen: Arm, Left; Blood   Result Value Ref Range    C-Reactive Protein 15.92 (H) 0.00 - 0.50 mg/dL   Magnesium    Specimen: Arm, Left; Blood   Result Value Ref Range    Magnesium 1.6 1.6 - 2.4 mg/dL   CBC Auto Differential    Specimen: Arm, Left; Blood   Result Value Ref Range    WBC 15.91 (H) 3.40 - 10.80 10*3/mm3    RBC 3.75 (L) 4.14 - 5.80 10*6/mm3    Hemoglobin 9.8 (L) 13.0 - 17.7 g/dL    Hematocrit 31.8 (L) 37.5 - 51.0 %    MCV 84.8 79.0 - 97.0 fL    MCH 26.1 (L) 26.6 - 33.0 pg    MCHC 30.8 (L) 31.5 - 35.7 g/dL    RDW 17.0 (H) 12.3 - 15.4 %    RDW-SD 51.4 37.0 - 54.0 fl    MPV 9.8 6.0 - 12.0 fL    Platelets 410 140 - 450 10*3/mm3    Neutrophil % 84.8 (H) 42.7 - 76.0 %    Lymphocyte % 5.3 (L) 19.6 - 45.3 %    Monocyte % 8.0 5.0 - 12.0 %    Eosinophil % 1.1 0.3 - 6.2 %    Basophil % 0.4 0.0 - 1.5 %    Immature Grans % 0.4 0.0 - 0.5 %    Neutrophils, Absolute 13.47 (H) 1.70 - 7.00 10*3/mm3    Lymphocytes, Absolute 0.84 0.70 - 3.10 10*3/mm3    Monocytes, Absolute 1.28 (H) 0.10 - 0.90 10*3/mm3    Eosinophils, Absolute 0.18 0.00 - 0.40 10*3/mm3    Basophils, Absolute 0.07 0.00 - 0.20 10*3/mm3    Immature Grans, Absolute 0.07 (H) 0.00 - 0.05 10*3/mm3    nRBC 0.0 0.0 - 0.2 /100 WBC   Blood Gas, Arterial With Co-Ox    Specimen: Arterial Blood   Result Value Ref Range    Site Left Radial     Chano's Test Positive     pH, Arterial 7.509 (H) 7.350 - 7.450 pH units    pCO2, Arterial 32.2 (L) 35.0 - 45.0 mm Hg    pO2, Arterial 61.0 (L) 83.0 - 108.0 mm Hg    HCO3, Arterial 25.7 20.0 - 26.0 mmol/L    Base Excess, Arterial 2.8 (H) 0.0 - 2.0 mmol/L    O2 Saturation, Arterial 93.8 (L) 94.0 - 99.0 %    Hemoglobin, Blood Gas 9.9 (L) 14 - 18 g/dL    Hematocrit,  Blood Gas 30.3 (L) 38.0 - 51.0 %    Oxyhemoglobin 92.0 (L) 94 - 99 %    Methemoglobin 0.00 0.00 - 3.00 %    Carboxyhemoglobin 2.0 0 - 5 %    A-a DO2 46.1 0.0 - 300.0 mmHg    CO2 Content 26.6 22 - 33 mmol/L    Barometric Pressure for Blood Gas 730 mmHg    Modality Room Air     FIO2 21 %    Ventilator Mode NA     Note      Notified Who Chelo OROZCO     Notified By [id number]     Collected by [id number]     pH, Temp Corrected      pCO2, Temperature Corrected      pO2, Temperature Corrected     Urinalysis, Microscopic Only - Urine, Clean Catch    Specimen: Urine, Clean Catch   Result Value Ref Range    RBC, UA 6-12 (A) None Seen, 0-2 /HPF    WBC, UA 3-5 (A) None Seen, 0-2 /HPF    Bacteria, UA None Seen None Seen /HPF    Squamous Epithelial Cells, UA None Seen None Seen, 0-2 /HPF    Hyaline Casts, UA None Seen None Seen /LPF    Methodology Automated Microscopy    High Sensitivity Troponin T 2Hr    Specimen: Hand, Left; Blood   Result Value Ref Range    HS Troponin T 48 (H) <15 ng/L    Troponin T Delta 5 (C) >=-4 - <+4 ng/L   ECG 12 Lead Dyspnea   Result Value Ref Range    QT Interval 346 ms    QTC Interval 418 ms   ECG 12 Lead Other; TRENDING   Result Value Ref Range    QT Interval 402 ms    QTC Interval 446 ms   Green Top (Gel)   Result Value Ref Range    Extra Tube Hold for add-ons.    Lavender Top   Result Value Ref Range    Extra Tube hold for add-on    Gold Top - SST   Result Value Ref Range    Extra Tube Hold for add-ons.    Light Blue Top   Result Value Ref Range    Extra Tube Hold for add-ons.              ED Course  ED Course as of 05/07/23 0038   Sat May 06, 2023   1856 EKG motion artifact junctional rhythm ventricular 88 QRS 94 QTc 418 no ST elevation. Electronically signed by Mook Ambrose MD, 05/06/23, 6:57 PM EDT.  [BB]   1936 Endorsed to Dr. Sidhu [AH]   1947 Patient care was taken over from Debby; midlevel at shift change.  Currently labs are needing to be collected   [LK]   2030 Patient is currently postop of  quadruple bypass status post 6 weeks.  He did suffer from pneumonia postoperatively but has been doing well.  He states that he has had some upper respiratory congestion and a temperature of 100.4 °F earlier today and an episode of vomiting prior to coming to the ER.   [LK]   2051 Patient has evidence of acute pulmonary vascular congestion and a small effusion. [LK]   2057 Patient has slight increased work of breathing while sitting up on side of the bed.  Initial ABG did not show acute hypoxemia however chest x-ray shows interstitial pulmonary edema with effusion in setting of recent quadruple bypass surgery and patient has not had his oral Bumex at 2 mg earlier today.  Saturations are variable between 90 and 91 on room air.  Respiratory rate of 28.  We will give him a milligram of Bumex IVP x1 2 L nasal cannula oxygen and monitor for clinical improvement. [LK]   Chrissy May 07, 2023   0015 Patient inflammatory markers are elevated which would be expected given recent quadruple bypass with sternotomy.    Patient admits to only taking 1 mg of Bumex and not prescribed 2 mg.    Patient did have a small left pleural effusion in addition to elevated BNP which recommendations to take 1 mg of Bumex twice daily.  Patient states that he does not have any chest pain.  Shortness of breath has improved after IV Bumex.  Patient was given option to be monitored overnight given slightly increase in repeat troponin however felt to not be acute intrinsic cardiac pathology and favor reactivity and type II demand from pulmonary edema.  Patient stated he would prefer to be discharged home and follow-up with Dr. Boyle    Patient's nausea was also improved with IV Zofran.  Sore throat was recommended to be medically manage with over-the-counter cold and flu sinus medication.  Acute viral syndrome is consistent with nephew who also has had sore throat and fever at home. [LK]   0021 Discharge instructions  Take half a tablet of your Bumex  twice daily for total of 2 mg every 24 hours.    Follow-up with your cardiologist and return if you develop chest pain or shortness of breath    Acute viral syndrome, managed with over-the-counter alternation of Motrin and Tylenol for fever, throat lozenges and cold and flu sinus medication as needed. [LK]   0022 Repeat EKG prior to discharge was compared to initial EKG on arrival at 1853.  Patient currently is in normal sinus rhythm initially was in accelerated junctional rhythm  ST and T wave abnormalities are consistent and unchanged with the initial EKG.  In setting of patient recently having quadruple bypass surgery there is no prominent deviations on EKG #2.       [LK]   0036 Patient was given specific discharge instructions that if he developed any acute changes regarding shortness of breath or development of chest pain he should return to the ER immediately.  Patient verbalizes understanding in addition with wife was present at bedside who verbalized her understanding. [LK]   0037 ECG 12 Lead Other; TRENDING   normal sinus rhythm  ST and T wave abnormalities.  Ventricular rate 74   QTc 446 no acute ST elevations.  Electronically signed by Morena Sidhu DO, 05/07/23, 12:38 AM EDT. he [LK]   0038 Oxygen saturations for 95% on room air heart rate 71 with respiratory rate 15 prior to discharge. [LK]      ED Course User Index  [AH] Debby Whitney PA  [BB] Mook Ambrose MD  [LK] Morena Sidhu DO                                           [facility]    Final diagnoses:   History of quadruple bypass   Pleural effusion, left   Acute viral syndrome   Nausea   Electronically signed by ERNESOT Jara, 05/06/23, 7:37 PM EDT.    ED Disposition  ED Disposition     ED Disposition   Discharge    Condition   Stable    Comment   --             Jose M Simon MD  [address]  [facility] [address] [phone]               Medication List      New Prescriptions    ondansetron ODT 4 MG  disintegrating tablet  Commonly known as: ZOFRAN-ODT  Place 1 tablet on the tongue Every 8 (Eight) Hours As Needed for Nausea or Vomiting.           Where to Get Your Medications      These medications were sent to [facility] [id number] - [place], [place] - [address] - [phone]  - [phone] FX  [address] [address], [facility] [address]    Phone: [phone]   · ondansetron ODT 4 MG disintegrating tablet          Morena Sidhu DO  05/07/23 0038

## 2023-05-07 VITALS
OXYGEN SATURATION: 96 % | BODY MASS INDEX: 41.08 KG/M2 | TEMPERATURE: 99.9 F | SYSTOLIC BLOOD PRESSURE: 114 MMHG | RESPIRATION RATE: 20 BRPM | WEIGHT: 310 LBS | DIASTOLIC BLOOD PRESSURE: 60 MMHG | HEIGHT: 73 IN | HEART RATE: 73 BPM

## 2023-05-07 LAB
QT INTERVAL: 346 MS
QT INTERVAL: 402 MS
QTC INTERVAL: 418 MS
QTC INTERVAL: 446 MS

## 2023-05-07 PROCEDURE — 93010 ELECTROCARDIOGRAM REPORT: CPT | Performed by: SPECIALIST

## 2023-05-07 RX ORDER — ONDANSETRON 4 MG/1
4 TABLET, ORALLY DISINTEGRATING ORAL EVERY 8 HOURS PRN
Qty: 20 TABLET | Refills: 0 | Status: SHIPPED | OUTPATIENT
Start: 2023-05-07

## 2023-05-07 NOTE — DISCHARGE INSTRUCTIONS
Take half a tablet of your Bumex twice daily for total of 2 mg every 24 hours.    Follow-up with your cardiologist and return if you develop chest pain or shortness of breath    Acute viral syndrome, managed with over-the-counter alternation of Motrin and Tylenol for fever, throat lozenges and cold and flu sinus medication as needed.

## 2023-05-07 NOTE — ED NOTES
Multiple attempts at iv, dr nair to do ultrasound line   Pt contacted to advise. Per pt, she held her warfarin for 2 days due to a tooth extraction. She held Mon/Tues and checked on Wed and Thurs. INR was 3 both days. I paged Dr. Keisha Goodwin with this information. Pt still to stay on the same dose but recheck in 1 week.  Pt advised

## 2023-05-08 ENCOUNTER — OFFICE VISIT (OUTPATIENT)
Dept: CARDIOLOGY | Facility: CLINIC | Age: 63
End: 2023-05-08
Payer: MEDICARE

## 2023-05-08 VITALS
SYSTOLIC BLOOD PRESSURE: 133 MMHG | DIASTOLIC BLOOD PRESSURE: 77 MMHG | HEIGHT: 73 IN | OXYGEN SATURATION: 94 % | BODY MASS INDEX: 41.08 KG/M2 | WEIGHT: 310 LBS | HEART RATE: 67 BPM

## 2023-05-08 DIAGNOSIS — I97.89 POSTOPERATIVE ATRIAL FIBRILLATION: ICD-10-CM

## 2023-05-08 DIAGNOSIS — R06.02 SOB (SHORTNESS OF BREATH) ON EXERTION: ICD-10-CM

## 2023-05-08 DIAGNOSIS — I48.91 POSTOPERATIVE ATRIAL FIBRILLATION: ICD-10-CM

## 2023-05-08 DIAGNOSIS — E78.2 MIXED HYPERLIPIDEMIA: ICD-10-CM

## 2023-05-08 DIAGNOSIS — G47.30 SLEEP APNEA, UNSPECIFIED TYPE: ICD-10-CM

## 2023-05-08 DIAGNOSIS — I25.118 CORONARY ARTERY DISEASE OF NATIVE ARTERY OF NATIVE HEART WITH STABLE ANGINA PECTORIS: ICD-10-CM

## 2023-05-08 DIAGNOSIS — I10 HYPERTENSION, UNSPECIFIED TYPE: Primary | ICD-10-CM

## 2023-05-08 DIAGNOSIS — R06.02 SHORTNESS OF BREATH: ICD-10-CM

## 2023-05-08 DIAGNOSIS — Z95.1 S/P CABG X 3: ICD-10-CM

## 2023-05-08 RX ORDER — LISINOPRIL 40 MG/1
60 TABLET ORAL DAILY
Qty: 90 TABLET | Refills: 4 | Status: ON HOLD | OUTPATIENT
Start: 2023-05-08 | End: 2023-05-11

## 2023-05-08 RX ORDER — BUMETANIDE 2 MG/1
2 TABLET ORAL 2 TIMES DAILY
Qty: 14 TABLET | Refills: 12 | Status: ON HOLD | OUTPATIENT
Start: 2023-05-08 | End: 2023-05-13 | Stop reason: SDUPTHER

## 2023-05-08 RX ORDER — POTASSIUM CHLORIDE 750 MG/1
TABLET, FILM COATED, EXTENDED RELEASE ORAL
Qty: 14 TABLET | Refills: 0 | Status: ON HOLD | OUTPATIENT
Start: 2023-05-08 | End: 2023-05-11

## 2023-05-08 NOTE — PROGRESS NOTES
Office Note    Subjective     Wilbert Kelley is a 62 y.o. male who presents to day for evaluation of shortness of breath      Active Problems:  Problem List Items Addressed This Visit        Cardiac and Vasculature    Multivessel CAD with remote ptca stent, now with stable anginal equivelent (dyspnea) (HCC)    S/P CABG x 3 on 3/21/2023    Hypertension - Primary    Relevant Medications    bumetanide (BUMEX) 2 MG tablet    lisinopril (PRINIVIL,ZESTRIL) 40 MG tablet    Other Relevant Orders    Basic Metabolic Panel    Hyperlipidemia    Postoperative atrial fibrillation    Relevant Orders    Home O2 Eval (Desaturation Screen)    Basic Metabolic Panel       Pulmonary and Pneumonias    Shortness of breath    Relevant Orders    Home O2 Eval (Desaturation Screen)       Sleep    Sleep apnea    Overview     doesnt use machine            HPI  Patient has history of coronary artery disease with bypass surgery on March 21, 2023.  It was done in [place] he had three-vessel bypass done.  Patient also had postop A-fib and was started on amiodarone.  Patient was seen in the emergency room over the weekend with worsening shortness of breath.  He may have a viral infection going on.  He had somebody in the family sick with similar symptoms.  Patient has been having paroxysmal nocturnal dyspnea.  Shortness of breath on walking.  Denies any chest pains.  Has had low-grade fever also.  Patient had COVID test checked which was negative and flu AMB were negative.    PRIOR MEDS  Current Outpatient Medications on File Prior to Visit   Medication Sig Dispense Refill   • aspirin 81 MG EC tablet Take 1 tablet by mouth Daily. 90 tablet 3   • atorvastatin (LIPITOR) 40 MG tablet Take 1 tablet by mouth Every Night. 30 tablet 5   • B-D ULTRAFINE III SHORT PEN 31G X 8 MM misc      • BD Insulin Syringe U-500 31G X 6MM 0.5 ML misc      • fenofibrate 160 MG tablet Take 1 tablet by mouth Daily.     • gabapentin (NEURONTIN) 800 MG tablet 1 tablet 3  (Three) Times a Day.     • Insulin Glargine (TOUJEO MAX SOLOSTAR SC) Inject 80 Units under the skin into the appropriate area as directed 2 (Two) Times a Day.     • Insulin Regular Human, Conc, (HumuLIN R) 500 UNIT/ML solution pen-injector CONCENTRATED injection Inject 65 Units under the skin into the appropriate area as directed 3 (Three) Times a Day Before Meals.     • metFORMIN (GLUCOPHAGE) 1000 MG tablet Take 1 tablet by mouth 2 (Two) Times a Day With Meals. Hold metformin for 2 days post-cath.  Restart on 3/19/2023 60 tablet 12   • nebivolol (BYSTOLIC) 5 MG tablet Take 1 tablet by mouth Daily. 30 tablet 5   • ondansetron ODT (ZOFRAN-ODT) 4 MG disintegrating tablet Place 1 tablet on the tongue Every 8 (Eight) Hours As Needed for Nausea or Vomiting. 20 tablet 0   • tamsulosin (FLOMAX) 0.4 MG capsule 24 hr capsule Take 1 capsule by mouth Daily. 30 capsule 0   • [DISCONTINUED] amiodarone (PACERONE) 200 MG tablet TAKE ONE TABLET BY MOUTH DAILY 30 tablet 0   • [DISCONTINUED] amLODIPine (NORVASC) 5 MG tablet Take 1 tablet by mouth Daily. 30 tablet 5   • [DISCONTINUED] bumetanide (BUMEX) 2 MG tablet TAKE ONE TABLET BY MOUTH DAILY 30 tablet 0   • [DISCONTINUED] clopidogrel (PLAVIX) 75 MG tablet Take 1 tablet by mouth Daily.     • [DISCONTINUED] lisinopril (PRINIVIL,ZESTRIL) 40 MG tablet Take 1 tablet by mouth Daily. 30 tablet 5     No current facility-administered medications on file prior to visit.       ALLERGIES  Morphine and Adhesive tape    HISTORY  Past Medical History:   Diagnosis Date   • Arthritis    • BPH (benign prostatic hyperplasia)    • Cancer     basal and melanoma-  x2 - left side ear and elbow   • Constipation    • Coronary artery disease    • DDD (degenerative disc disease), lumbar    • Diabetes mellitus     couple times month check sugar   • Frozen shoulder     left   • GERD (gastroesophageal reflux disease)    • Hyperlipidemia    • Hypertension    • Limited mobility     left shoulder  - possible  "frozen shoulder-= please be aware for surgery   • Sleep apnea     doesnt use machine   • Tinnitus    • Wears glasses     small print       Social History     Socioeconomic History   • Marital status:    • Number of children: 1   Tobacco Use   • Smoking status: Never     Passive exposure: Past   • Smokeless tobacco: Current     Types: Snuff   Vaping Use   • Vaping Use: Never used   Substance and Sexual Activity   • Alcohol use: Not Currently   • Drug use: Never   • Sexual activity: Defer       Family History   Problem Relation Age of Onset   • COPD Mother    • Heart attack Father 45        passed   • Heart attack Paternal Grandfather 42        massive heart attack   • Heart disease Paternal Grandfather        Review of Systems   Respiratory: Positive for cough, shortness of breath and wheezing.    Cardiovascular: Positive for leg swelling.   Gastrointestinal: Positive for nausea.   Neurological: Positive for weakness.       Objective     VITALS: /77 (BP Location: Right arm, Patient Position: Sitting, Cuff Size: Large Adult)   Pulse 67   Ht 185.4 cm (73\")   Wt (!) 141 kg (310 lb)   SpO2 94%   BMI 40.90 kg/m²     LABS:   Admission on 05/06/2023, Discharged on 05/07/2023   Component Date Value Ref Range Status   • QT Interval 05/06/2023 346  ms Final   • QTC Interval 05/06/2023 418  ms Final   • COVID19 05/06/2023 Not Detected  Not Detected - Ref. Range Final   • Influenza A PCR 05/06/2023 Not Detected  Not Detected Final   • Influenza B PCR 05/06/2023 Not Detected  Not Detected Final   • Glucose 05/06/2023 121 (H)  65 - 99 mg/dL Final   • BUN 05/06/2023 14  8 - 23 mg/dL Final   • Creatinine 05/06/2023 1.05  0.76 - 1.27 mg/dL Final   • Sodium 05/06/2023 141  136 - 145 mmol/L Final   • Potassium 05/06/2023 4.0  3.5 - 5.2 mmol/L Final   • Chloride 05/06/2023 104  98 - 107 mmol/L Final   • CO2 05/06/2023 25.3  22.0 - 29.0 mmol/L Final   • Calcium 05/06/2023 9.0  8.6 - 10.5 mg/dL Final   • Total Protein " 05/06/2023 8.2  6.0 - 8.5 g/dL Final   • Albumin 05/06/2023 3.7  3.5 - 5.2 g/dL Final   • ALT (SGPT) 05/06/2023 12  1 - 41 U/L Final   • AST (SGOT) 05/06/2023 16  1 - 40 U/L Final   • Alkaline Phosphatase 05/06/2023 73  39 - 117 U/L Final   • Total Bilirubin 05/06/2023 0.8  0.0 - 1.2 mg/dL Final   • Globulin 05/06/2023 4.5  gm/dL Final   • A/G Ratio 05/06/2023 0.8  g/dL Final   • BUN/Creatinine Ratio 05/06/2023 13.3  7.0 - 25.0 Final   • Anion Gap 05/06/2023 11.7  5.0 - 15.0 mmol/L Final   • eGFR 05/06/2023 80.8  >60.0 mL/min/1.73 Final   • Protime 05/06/2023 14.4  12.1 - 14.7 Seconds Final   • INR 05/06/2023 1.07  0.90 - 1.10 Final   • PTT 05/06/2023 40.0 (H)  26.5 - 34.5 seconds Final   • Color, UA 05/06/2023 Yellow  Yellow, Straw Final   • Appearance, UA 05/06/2023 Clear  Clear Final   • pH, UA 05/06/2023 >=9.0 (H)  5.0 - 8.0 Final   • Specific Gravity, UA 05/06/2023 1.018  1.005 - 1.030 Final   • Glucose, UA 05/06/2023 Negative  Negative Final   • Ketones, UA 05/06/2023 Negative  Negative Final   • Bilirubin, UA 05/06/2023 Negative  Negative Final   • Blood, UA 05/06/2023 Small (1+) (A)  Negative Final   • Protein, UA 05/06/2023 30 mg/dL (1+) (A)  Negative Final   • Leuk Esterase, UA 05/06/2023 Negative  Negative Final   • Nitrite, UA 05/06/2023 Negative  Negative Final   • Urobilinogen, UA 05/06/2023 1.0 E.U./dL  0.2 - 1.0 E.U./dL Final   • proBNP 05/06/2023 3,018.0 (H)  0.0 - 900.0 pg/mL Final   • HS Troponin T 05/06/2023 43 (H)  <15 ng/L Final   • Blood Culture 05/06/2023 No growth at 24 hours   Preliminary   • Blood Culture 05/06/2023 No growth at 24 hours   Preliminary   • Lactate 05/06/2023 1.4  0.5 - 2.0 mmol/L Final   • Procalcitonin 05/06/2023 0.07  0.00 - 0.25 ng/mL Final   • Sed Rate 05/06/2023 69 (H)  0 - 20 mm/hr Final   • C-Reactive Protein 05/06/2023 15.92 (H)  0.00 - 0.50 mg/dL Final   • Magnesium 05/06/2023 1.6  1.6 - 2.4 mg/dL Final   • WBC 05/06/2023 15.91 (H)  3.40 - 10.80 10*3/mm3 Final   •  RBC 05/06/2023 3.75 (L)  4.14 - 5.80 10*6/mm3 Final   • Hemoglobin 05/06/2023 9.8 (L)  13.0 - 17.7 g/dL Final   • Hematocrit 05/06/2023 31.8 (L)  37.5 - 51.0 % Final   • MCV 05/06/2023 84.8  79.0 - 97.0 fL Final   • MCH 05/06/2023 26.1 (L)  26.6 - 33.0 pg Final   • MCHC 05/06/2023 30.8 (L)  31.5 - 35.7 g/dL Final   • RDW 05/06/2023 17.0 (H)  12.3 - 15.4 % Final   • RDW-SD 05/06/2023 51.4  37.0 - 54.0 fl Final   • MPV 05/06/2023 9.8  6.0 - 12.0 fL Final   • Platelets 05/06/2023 410  140 - 450 10*3/mm3 Final   • Neutrophil % 05/06/2023 84.8 (H)  42.7 - 76.0 % Final   • Lymphocyte % 05/06/2023 5.3 (L)  19.6 - 45.3 % Final   • Monocyte % 05/06/2023 8.0  5.0 - 12.0 % Final   • Eosinophil % 05/06/2023 1.1  0.3 - 6.2 % Final   • Basophil % 05/06/2023 0.4  0.0 - 1.5 % Final   • Immature Grans % 05/06/2023 0.4  0.0 - 0.5 % Final   • Neutrophils, Absolute 05/06/2023 13.47 (H)  1.70 - 7.00 10*3/mm3 Final   • Lymphocytes, Absolute 05/06/2023 0.84  0.70 - 3.10 10*3/mm3 Final   • Monocytes, Absolute 05/06/2023 1.28 (H)  0.10 - 0.90 10*3/mm3 Final   • Eosinophils, Absolute 05/06/2023 0.18  0.00 - 0.40 10*3/mm3 Final   • Basophils, Absolute 05/06/2023 0.07  0.00 - 0.20 10*3/mm3 Final   • Immature Grans, Absolute 05/06/2023 0.07 (H)  0.00 - 0.05 10*3/mm3 Final   • nRBC 05/06/2023 0.0  0.0 - 0.2 /100 WBC Final   • Extra Tube 05/06/2023 Hold for add-ons.   Final    Auto resulted.   • Extra Tube 05/06/2023 hold for add-on   Final    Auto resulted   • Extra Tube 05/06/2023 Hold for add-ons.   Final    Auto resulted.   • Extra Tube 05/06/2023 Hold for add-ons.   Final    Auto resulted   • Site 05/06/2023 Left Radial   Final   • Chano's Test 05/06/2023 Positive   Final   • pH, Arterial 05/06/2023 7.509 (H)  7.350 - 7.450 pH units Final    83 Value above reference range   • pCO2, Arterial 05/06/2023 32.2 (L)  35.0 - 45.0 mm Hg Final   • pO2, Arterial 05/06/2023 61.0 (L)  83.0 - 108.0 mm Hg Final    84 Value below reference range   • HCO3,  Arterial 05/06/2023 25.7  20.0 - 26.0 mmol/L Final   • Base Excess, Arterial 05/06/2023 2.8 (H)  0.0 - 2.0 mmol/L Final   • O2 Saturation, Arterial 05/06/2023 93.8 (L)  94.0 - 99.0 % Final    84 Value below reference range   • Hemoglobin, Blood Gas 05/06/2023 9.9 (L)  14 - 18 g/dL Final    84 Value below reference range   • Hematocrit, Blood Gas 05/06/2023 30.3 (L)  38.0 - 51.0 % Final    84 Value below reference range   • Oxyhemoglobin 05/06/2023 92.0 (L)  94 - 99 % Final    84 Value below reference range   • Methemoglobin 05/06/2023 0.00  0.00 - 3.00 % Final    84 Value below reference range   • Carboxyhemoglobin 05/06/2023 2.0  0 - 5 % Final   • A-a DO2 05/06/2023 46.1  0.0 - 300.0 mmHg Final   • CO2 Content 05/06/2023 26.6  22 - 33 mmol/L Final   • Barometric Pressure for Blood Gas 05/06/2023 730  mmHg Final   • Modality 05/06/2023 Room Air   Final   • FIO2 05/06/2023 21  % Final   • Ventilator Mode 05/06/2023 NA   Final   • Notified Who 05/06/2023 [place] PA   Final   • Notified By 05/06/2023 [id number]   Final   • Collected by 05/06/2023 [id number]   Final    Meter: [id number]     :  [id number]   • RBC, UA 05/06/2023 6-12 (A)  None Seen, 0-2 /HPF Final   • WBC, UA 05/06/2023 3-5 (A)  None Seen, 0-2 /HPF Final    Urine culture not indicated.   • Bacteria, UA 05/06/2023 None Seen  None Seen /HPF Final   • Squamous Epithelial Cells, UA 05/06/2023 None Seen  None Seen, 0-2 /HPF Final   • Hyaline Casts, UA 05/06/2023 None Seen  None Seen /LPF Final   • Methodology 05/06/2023 Automated Microscopy   Final   • HS Troponin T 05/06/2023 48 (H)  <15 ng/L Final   • Troponin T Delta 05/06/2023 5 (C)  >=-4 - <+4 ng/L Final   • QT Interval 05/07/2023 402  ms Final   • QTC Interval 05/07/2023 446  ms Final   Hospital Outpatient Visit on 04/06/2023   Component Date Value Ref Range Status   • QT Interval 04/06/2023 448  ms Final   • QTC Interval 04/06/2023 504  ms Final   No results displayed because visit has over 200  results.      Pre-Admission Testing on 03/20/2023   Component Date Value Ref Range Status   • Glucose 03/20/2023 84  65 - 99 mg/dL Final   • BUN 03/20/2023 25 (H)  8 - 23 mg/dL Final   • Creatinine 03/20/2023 1.20  0.76 - 1.27 mg/dL Final   • Sodium 03/20/2023 140  136 - 145 mmol/L Final   • Potassium 03/20/2023 4.2  3.5 - 5.2 mmol/L Final   • Chloride 03/20/2023 99  98 - 107 mmol/L Final   • CO2 03/20/2023 28.0  22.0 - 29.0 mmol/L Final   • Calcium 03/20/2023 9.6  8.6 - 10.5 mg/dL Final   • Total Protein 03/20/2023 7.6  6.0 - 8.5 g/dL Final   • Albumin 03/20/2023 4.5  3.5 - 5.2 g/dL Final   • ALT (SGPT) 03/20/2023 19  1 - 41 U/L Final   • AST (SGOT) 03/20/2023 20  1 - 40 U/L Final   • Alkaline Phosphatase 03/20/2023 76  39 - 117 U/L Final   • Total Bilirubin 03/20/2023 0.4  0.0 - 1.2 mg/dL Final   • Globulin 03/20/2023 3.1  gm/dL Final    Calculated Result   • A/G Ratio 03/20/2023 1.5  g/dL Final   • BUN/Creatinine Ratio 03/20/2023 20.8  7.0 - 25.0 Final   • Anion Gap 03/20/2023 13.0  5.0 - 15.0 mmol/L Final   • eGFR 03/20/2023 68.8  >60.0 mL/min/1.73 Final   • Magnesium 03/20/2023 1.7  1.6 - 2.4 mg/dL Final   • Hemoglobin A1C 03/20/2023 7.70 (H)  4.80 - 5.60 % Final   • PTT 03/20/2023 28.7  22.0 - 39.0 seconds Final   • Protime 03/20/2023 12.9  11.4 - 14.4 Seconds Final   • INR 03/20/2023 0.98  0.84 - 1.13 Final   • P2Y12 Reactivity Unit 03/20/2023 155  PRU Final   • THC, Screen, Urine 03/20/2023 Negative  Negative Final   • Phencyclidine (PCP), Urine 03/20/2023 Negative  Negative Final   • Cocaine Screen, Urine 03/20/2023 Negative  Negative Final   • Methamphetamine, Ur 03/20/2023 Negative  Negative Final   • Opiate Screen 03/20/2023 Negative  Negative Final   • Amphetamine Screen, Urine 03/20/2023 Negative  Negative Final   • Benzodiazepine Screen, Urine 03/20/2023 Negative  Negative Final   • Tricyclic Antidepressants Screen 03/20/2023 Negative  Negative Final   • Methadone Screen, Urine 03/20/2023 Negative   Negative Final   • Barbiturates Screen, Urine 03/20/2023 Negative  Negative Final   • Oxycodone Screen, Urine 03/20/2023 Negative  Negative Final   • Propoxyphene Screen 03/20/2023 Negative  Negative Final   • Buprenorphine, Screen, Urine 03/20/2023 Negative  Negative Final   • ABO Type 03/20/2023 O   Final   • RH type 03/20/2023 Positive   Final   • Antibody Screen 03/20/2023 Negative   Final   • T&S Expiration Date 03/20/2023 3/23/2023 11:59:59 PM   Final   • WBC 03/20/2023 8.79  3.40 - 10.80 10*3/mm3 Final   • RBC 03/20/2023 5.45  4.14 - 5.80 10*6/mm3 Final   • Hemoglobin 03/20/2023 14.4  13.0 - 17.7 g/dL Final   • Hematocrit 03/20/2023 45.1  37.5 - 51.0 % Final   • MCV 03/20/2023 82.8  79.0 - 97.0 fL Final   • MCH 03/20/2023 26.4 (L)  26.6 - 33.0 pg Final   • MCHC 03/20/2023 31.9  31.5 - 35.7 g/dL Final   • RDW 03/20/2023 16.0 (H)  12.3 - 15.4 % Final   • RDW-SD 03/20/2023 48.2  37.0 - 54.0 fl Final   • MPV 03/20/2023 9.8  6.0 - 12.0 fL Final   • Platelets 03/20/2023 385  140 - 450 10*3/mm3 Final   • Neutrophil % 03/20/2023 70.7  42.7 - 76.0 % Final   • Lymphocyte % 03/20/2023 17.1 (L)  19.6 - 45.3 % Final   • Monocyte % 03/20/2023 7.3  5.0 - 12.0 % Final   • Eosinophil % 03/20/2023 4.0  0.3 - 6.2 % Final   • Basophil % 03/20/2023 0.6  0.0 - 1.5 % Final   • Immature Grans % 03/20/2023 0.3  0.0 - 0.5 % Final   • Neutrophils, Absolute 03/20/2023 6.22  1.70 - 7.00 10*3/mm3 Final   • Lymphocytes, Absolute 03/20/2023 1.50  0.70 - 3.10 10*3/mm3 Final   • Monocytes, Absolute 03/20/2023 0.64  0.10 - 0.90 10*3/mm3 Final   • Eosinophils, Absolute 03/20/2023 0.35  0.00 - 0.40 10*3/mm3 Final   • Basophils, Absolute 03/20/2023 0.05  0.00 - 0.20 10*3/mm3 Final   • Immature Grans, Absolute 03/20/2023 0.03  0.00 - 0.05 10*3/mm3 Final   • nRBC 03/20/2023 0.0  0.0 - 0.2 /100 WBC Final   • Cotinine 03/20/2023 Positive (A)  Sonmni=456 ng/mL Final   • Drug Screen Comment: 03/20/2023 Comment   Final    This analysis is performed by  immunoassay. Positive findings are  unconfirmed analytical test results; if results do not support  expected clinical finding, confirmation by an alternate methodology is  recommended. Patient metabolic variables, specific drug chemistry, and  specimen characteristics can affect test outcome.  Technical consultation is available at [email or web address], or call  toll free [phone].   Results Encounter on 02/18/2023   Component Date Value Ref Range Status   • WBC 03/16/2023 10.58  3.40 - 10.80 10*3/mm3 Final   • RBC 03/16/2023 5.55  4.14 - 5.80 10*6/mm3 Final   • Hemoglobin 03/16/2023 14.9  13.0 - 17.7 g/dL Final   • Hematocrit 03/16/2023 46.8  37.5 - 51.0 % Final   • MCV 03/16/2023 84.3  79.0 - 97.0 fL Final   • MCH 03/16/2023 26.8  26.6 - 33.0 pg Final   • MCHC 03/16/2023 31.8  31.5 - 35.7 g/dL Final   • RDW 03/16/2023 16.6 (H)  12.3 - 15.4 % Final   • RDW-SD 03/16/2023 50.3  37.0 - 54.0 fl Final   • MPV 03/16/2023 10.1  6.0 - 12.0 fL Final   • Platelets 03/16/2023 361  140 - 450 10*3/mm3 Final   • Glucose 03/16/2023 104 (H)  65 - 99 mg/dL Final   • BUN 03/16/2023 19  8 - 23 mg/dL Final   • Creatinine 03/16/2023 1.07  0.76 - 1.27 mg/dL Final   • Sodium 03/16/2023 140  136 - 145 mmol/L Final   • Potassium 03/16/2023 4.3  3.5 - 5.2 mmol/L Final   • Chloride 03/16/2023 101  98 - 107 mmol/L Final   • CO2 03/16/2023 27.3  22.0 - 29.0 mmol/L Final   • Calcium 03/16/2023 9.8  8.6 - 10.5 mg/dL Final   • BUN/Creatinine Ratio 03/16/2023 17.8  7.0 - 25.0 Final   • Anion Gap 03/16/2023 11.7  5.0 - 15.0 mmol/L Final   • eGFR 03/16/2023 79.0  >60.0 mL/min/1.73 Final         IMAGING:   XR Chest 2 View    Result Date: 4/25/2023  Impression: 1. Stable small left pleural effusion. 2. No pneumothorax. Electronically Signed: Tom Banks  4/25/2023 3:25 PM EDT  Workstation ID: [id number]    US Renal Limited    Result Date: 3/24/2023  Impression: No hydronephrosis or acute sonographic abnormality. Electronically Signed: Tom Banks   3/24/2023 7:55 AM EDT  Workstation ID: [id number]    XR Chest 1 View    Result Date: 5/6/2023  Moderate pulmonary vascular congestion and small left pleural effusion.  This report was finalized on 5/6/2023 8:47 PM by Alex Pallas, DO.      XR Chest 1 View    Result Date: 3/29/2023  Impression: 1.Continued decreasing patchy left basilar opacities, likely atelectasis. 2.No definite pneumothorax. Electronically Signed: Kirti Lynnley  3/29/2023 8:50 AM EDT  Workstation ID: [id number]    XR Chest 1 View    Result Date: 3/28/2023  Impression: Improved aeration and decreased atelectasis in the left lung base. No definite pneumothorax. Electronically Signed: Jung Salazar  3/28/2023 8:39 AM EDT  Workstation ID: [id number]    XR Chest 1 View    Result Date: 3/26/2023  Impression: Removal of medical devices as above. Removal of left thoracostomy tube. Unchanged trace left apical pneumothorax. Linear streaky opacities in the mid and lower left lung likely reflect atelectasis. Electronically Signed: Jung Salazar  3/26/2023 7:57 AM EDT  Workstation ID: [id number]    XR Chest 1 View    Result Date: 3/24/2023  Impression: 1. Stable lines and support tubes. 2. Suspected 10 mm left apical pneumothorax. Left-sided chest tube in stable position. 3. Stable cardiomegaly. 4. Hypoinflated lungs with unchanged bibasilar airspace disease likely atelectasis. Electronically Signed: Tom Banks  3/24/2023 7:24 AM EDT  Workstation ID: [id number]    XR Chest 1 View    Result Date: 3/23/2023  Impression: Interval placement of a nasogastric tube which is noted terminating in the stomach. Remaining support hardware projects unchanged. Lung volumes are mildly diminished in comparison, with some increased basilar atelectasis on the left. There is no enlarging effusion or distinct pneumothorax. Electronically Signed: Prudencio Mendoza  3/23/2023 8:39 AM EDT  Workstation ID: [id number]    XR Chest 1 View    Result Date: 3/22/2023  Impression: 1. Interval extubation  and removal of esophagogastric tube. 2. Right IJ central venous catheter tip at the mid SVC. 3. Small left apical pneumothorax measuring 10 mm. Left chest tube in place. 4. Hypoinflated lungs with bibasilar atelectasis left greater than right, stable. Electronically Signed: Tom Banks  3/22/2023 7:27 AM EDT  Workstation ID: [id number]    XR Chest 1 View    Result Date: 3/21/2023  Impression: Postoperative changes noted from interval median sternotomy. Endotracheal tube terminates in good position above the shelley. A nasogastric tube is noted entering the stomach. Right IJ introducer is present in the SVC. Mediastinal and pleural drains noted  in place. Lung volumes are diminished with associated atelectasis. There is otherwise no focal opacity. There is no distinct pneumothorax. Prominent cardiac and mediastinal contours are noted, likely postoperative. Electronically Signed: Prudencio Mendoza  3/21/2023 12:55 PM EDT  Workstation ID: [id number]    CT Angiogram Chest Pulmonary Embolism    Result Date: 5/6/2023  1.  Negative for pulmonary embolism. 2.  Postoperative changes from median sternotomy. No soft tissue gas. No walled off drainable fluid collection evident. Small left pleural effusion.  This report was finalized on 5/6/2023 9:53 PM by Alex Pallas, DO.      US venous doppler lower extremity right (duplex)    Result Date: 3/2/2023  No DVT.  This report was finalized on 3/2/2023 2:16 PM by Dr. Kemar Felix MD.      XR Abdomen KUB    Result Date: 3/22/2023  Impression: 1. Interval placement of nasogastric tube with tip projecting over the expected location of the fundus of the stomach. Electronically Signed: Boni Maher  3/22/2023 9:58 PM EDT  Workstation ID: [id number]    XR Abdomen KUB    Result Date: 3/22/2023  Impression: Nonspecific gas-filled segments of small bowel and colon are seen throughout the abdomen, with appearance favoring ileus over obstruction. There is no overt pneumoperitoneum. Electronically  Signed: Prudencio Mendoza  3/22/2023 4:20 PM EDT  Workstation ID: [id number]    XR Chest PA & Lateral    Result Date: 4/6/2023  Impression: 1. Postoperative changes of prior sternotomy. 2. Patchy right perihilar infiltrate. 3. Small left-sided dependent pleural effusion Electronically Signed: Stephen Ochoa  4/6/2023 4:48 PM EDT  Workstation ID: [id number]    Chest X-Ray PA & Lateral    Result Date: 3/21/2023  No acute chest finding. Electronically Signed: Sylvia Duckwroth  3/21/2023 12:51 PM EDT  Workstation ID: [id number]    Results for orders placed during the hospital encounter of 02/02/23    Stress Test With Myocardial Perfusion (1 Day)    Interpretation Summary  •  Left ventricular ejection fraction is normal (Calculated EF = 57%).  •  Impressions are consistent with an intermediate risk study.  •  There is no prior study available for comparison.  •  Findings consistent with a normal ECG stress test.  •  Basal to mid inferior infarction with basal to mid gibran-infarction ischemia extending also to the inferoseptal walls with also small apical septal ischemia, TID 1.51.     No results found for this or any previous visit.          EXAM:  Constitutional:       General: Awake.      Appearance: Healthy appearance. Not in distress.   Eyes:      Conjunctiva/sclera: Conjunctivae normal.   HENT:      Head: Normocephalic and atraumatic.      Nose: Nose normal.    Mouth/Throat:      Pharynx: Oropharynx is clear.   Neck:      Thyroid: Thyroid normal.      Vascular: No carotid bruit or JVD.   Pulmonary:      Effort: Pulmonary effort is normal.      Breath sounds: Normal breath sounds.   Chest:      Chest wall: Not tender to palpatation.   Cardiovascular:      PMI at left midclavicular line. Normal rate. Regular rhythm. Normal S1 with normal intensity. Normal S2 with normal intensity.      Murmurs: There is no murmur.      No gallop. No rub.   Pulses:     Intact distal pulses.   Edema:     Feet: 1+ edema of the right foot.  Abdominal:       General: Bowel sounds are normal. There is no distension.      Palpations: Abdomen is soft. There is no hepatomegaly.      Tenderness: There is no abdominal tenderness.   Musculoskeletal: Normal range of motion.      Cervical back: Normal range of motion. Skin:     General: Skin is warm and dry. There is no cyanosis.      Coloration: Skin is not jaundiced.   Neurological:      Mental Status: Alert and oriented to person, place and time.      Motor: Motor function is intact.      Gait: Gait is intact.   Psychiatric:         Attention and Perception: Attention and perception normal.         Speech: Speech normal.         Behavior: Behavior is cooperative.         Cognition and Memory: Cognition and memory normal.         Judgment: Judgment normal.          Procedure   Procedures       Assessment & Plan     Diagnoses and all orders for this visit:    1. Hypertension, unspecified type (Primary)  -     Basic Metabolic Panel; Future    2. Postoperative atrial fibrillation  -     Home O2 Eval (Desaturation Screen)  -     Basic Metabolic Panel; Future    3. Shortness of breath  -     Home O2 Eval (Desaturation Screen)    4. SOB (shortness of breath) on exertion  -     Basic Metabolic Panel; Future    5. Multivessel CAD with remote ptca stent, now with stable anginal equivelent (dyspnea) (HCC)    6. S/P CABG x 3 on 3/21/2023    7. Mixed hyperlipidemia    8. Sleep apnea, unspecified type    Other orders  -     bumetanide (BUMEX) 2 MG tablet; Take 1 tablet by mouth 2 (Two) Times a Day for 7 days.  Dispense: 14 tablet; Refill: 12  -     potassium chloride 10 MEQ CR tablet; Use 1 pill daily for 7 days with Bumex  Dispense: 14 tablet; Refill: 0  -     lisinopril (PRINIVIL,ZESTRIL) 40 MG tablet; Take 1.5 tablets by mouth Daily.  Dispense: 90 tablet; Refill: 4  -     apixaban (ELIQUIS) 5 MG tablet tablet; Take 1 tablet by mouth 2 (Two) Times a Day.  Dispense: 60 tablet; Refill: 12          PLAN  #1 cardiac.  Patient with history  of coronary artery disease with bypass surgery in March.  Had A-fib post surgery and was taking amiodarone.  Patient converted to sinus rhythm.  Had EKG done yesterday which showed normal sinus rhythm.  Patient was seen in the emergency room with worsening shortness of breath currently requiring oxygen.  He has hypoxemia at night also.  Patient will need oxygen arranged as an outpatient.  Sleep study would need to be done also.  Need to call oxygen supply company for home oxygen also    We will give Bumex 2 mg twice daily for a week with potassium replacement.    Will DC amiodarone, DC amlodipine.    We will increase lisinopril to 60 mg daily.    Will have labs done in 4 weeks time.  #2 paroxysmal atrial fibrillation.  Patient has high Hardeep Vascor of 3 and would need anticoagulation.  We will get him off Plavix and start Eliquis instead           MEDS ORDERED DURING VISIT:    Medications Discontinued During This Encounter   Medication Reason   • amiodarone (PACERONE) 200 MG tablet *Therapy completed   • amLODIPine (NORVASC) 5 MG tablet *Therapy completed   • bumetanide (BUMEX) 2 MG tablet *Therapy completed   • lisinopril (PRINIVIL,ZESTRIL) 40 MG tablet *Therapy completed   • clopidogrel (PLAVIX) 75 MG tablet *Therapy completed        New Medications Ordered This Visit   Medications   • bumetanide (BUMEX) 2 MG tablet     Sig: Take 1 tablet by mouth 2 (Two) Times a Day for 7 days.     Dispense:  14 tablet     Refill:  12   • potassium chloride 10 MEQ CR tablet     Sig: Use 1 pill daily for 7 days with Bumex     Dispense:  14 tablet     Refill:  0   • lisinopril (PRINIVIL,ZESTRIL) 40 MG tablet     Sig: Take 1.5 tablets by mouth Daily.     Dispense:  90 tablet     Refill:  4   • apixaban (ELIQUIS) 5 MG tablet tablet     Sig: Take 1 tablet by mouth 2 (Two) Times a Day.     Dispense:  60 tablet     Refill:  12         Follow Up:   Return in about 6 weeks (around 6/19/2023).    Patient was given instructions and  counseling regarding his condition or for health maintenance advice. Please see specific information pulled into the AVS if appropriate.   As always, Jose M Simon MD  I appreciate very much the opportunity to participate in the cardiovascular care of your patients. Please do not hesitate to call me with any questions with regards to Wilbert Kelley evaluation and management.         This document has been electronically signed by Jarod Boyle MD [facility], Interventional Cardiology  May 8, 2023 16:06 EDT    Dictated Utilizing Dragon Dictation: Part of this note may be an electronic transcription/translation of spoken language to printed text using the Dragon Dictation System.

## 2023-05-09 ENCOUNTER — TELEPHONE (OUTPATIENT)
Dept: CARDIOLOGY | Facility: CLINIC | Age: 63
End: 2023-05-09
Payer: MEDICARE

## 2023-05-09 NOTE — TELEPHONE ENCOUNTER
[facility] regarding clarification of potassium prescription. They wanted to confirm that it was once per day for 7 days and dispense 7 (original script states dispense 14) . Contacted dr Boyle and he confirmed patient to take 1 per day for 7 days. Pharmacy notified.

## 2023-05-10 ENCOUNTER — APPOINTMENT (OUTPATIENT)
Dept: GENERAL RADIOLOGY | Facility: HOSPITAL | Age: 63
DRG: 194 | End: 2023-05-10
Payer: MEDICARE

## 2023-05-10 ENCOUNTER — HOSPITAL ENCOUNTER (INPATIENT)
Facility: HOSPITAL | Age: 63
LOS: 3 days | Discharge: HOME | DRG: 194 | End: 2023-05-13
Attending: EMERGENCY MEDICINE | Admitting: HOSPITALIST
Payer: MEDICARE

## 2023-05-10 ENCOUNTER — APPOINTMENT (OUTPATIENT)
Dept: CT IMAGING | Facility: HOSPITAL | Age: 63
DRG: 194 | End: 2023-05-10
Payer: MEDICARE

## 2023-05-10 ENCOUNTER — TELEPHONE (OUTPATIENT)
Dept: CARDIOLOGY | Facility: CLINIC | Age: 63
End: 2023-05-10
Payer: MEDICARE

## 2023-05-10 DIAGNOSIS — J18.9 PNEUMONIA OF BOTH LUNGS DUE TO INFECTIOUS ORGANISM, UNSPECIFIED PART OF LUNG: ICD-10-CM

## 2023-05-10 DIAGNOSIS — J96.01 ACUTE RESPIRATORY FAILURE WITH HYPOXIA: ICD-10-CM

## 2023-05-10 DIAGNOSIS — J90 PLEURAL EFFUSION, LEFT: ICD-10-CM

## 2023-05-10 DIAGNOSIS — I50.9 ACUTE ON CHRONIC CONGESTIVE HEART FAILURE, UNSPECIFIED HEART FAILURE TYPE: Primary | ICD-10-CM

## 2023-05-10 LAB
A-A DO2: 35.2 MMHG (ref 0–300)
ALBUMIN SERPL-MCNC: 3.1 G/DL (ref 3.5–5.2)
ALBUMIN/GLOB SERPL: 0.7 G/DL
ALP SERPL-CCNC: 105 U/L (ref 39–117)
ALT SERPL W P-5'-P-CCNC: 29 U/L (ref 1–41)
ANION GAP SERPL CALCULATED.3IONS-SCNC: 12.5 MMOL/L (ref 5–15)
ARTERIAL PATENCY WRIST A: POSITIVE
AST SERPL-CCNC: 18 U/L (ref 1–40)
ATMOSPHERIC PRESS: 729 MMHG
B PARAPERT DNA SPEC QL NAA+PROBE: NOT DETECTED
B PERT DNA SPEC QL NAA+PROBE: NOT DETECTED
BACTERIA UR QL AUTO: ABNORMAL /HPF
BASE EXCESS BLDA CALC-SCNC: 8 MMOL/L (ref 0–2)
BASOPHILS # BLD AUTO: 0.06 10*3/MM3 (ref 0–0.2)
BASOPHILS NFR BLD AUTO: 0.6 % (ref 0–1.5)
BDY SITE: ABNORMAL
BILIRUB SERPL-MCNC: 1.1 MG/DL (ref 0–1.2)
BILIRUB UR QL STRIP: ABNORMAL
BUN SERPL-MCNC: 21 MG/DL (ref 8–23)
BUN/CREAT SERPL: 21.4 (ref 7–25)
C PNEUM DNA NPH QL NAA+NON-PROBE: NOT DETECTED
CALCIUM SPEC-SCNC: 8.5 MG/DL (ref 8.6–10.5)
CHLORIDE SERPL-SCNC: 99 MMOL/L (ref 98–107)
CLARITY UR: CLEAR
CO2 BLDA-SCNC: 32.6 MMOL/L (ref 22–33)
CO2 SERPL-SCNC: 26.5 MMOL/L (ref 22–29)
COHGB MFR BLD: 1.8 % (ref 0–5)
COLOR UR: ABNORMAL
CREAT SERPL-MCNC: 0.98 MG/DL (ref 0.76–1.27)
CRP SERPL-MCNC: 24.14 MG/DL (ref 0–0.5)
D DIMER PPP FEU-MCNC: 5.09 MCGFEU/ML (ref 0–0.61)
D-LACTATE SERPL-SCNC: 1.8 MMOL/L (ref 0.5–2)
DEPRECATED RDW RBC AUTO: 50.9 FL (ref 37–54)
EGFRCR SERPLBLD CKD-EPI 2021: 87.7 ML/MIN/1.73
EOSINOPHIL # BLD AUTO: 0.29 10*3/MM3 (ref 0–0.4)
EOSINOPHIL NFR BLD AUTO: 2.8 % (ref 0.3–6.2)
ERYTHROCYTE [DISTWIDTH] IN BLOOD BY AUTOMATED COUNT: 16.8 % (ref 12.3–15.4)
FLUAV SUBTYP SPEC NAA+PROBE: NOT DETECTED
FLUBV RNA ISLT QL NAA+PROBE: NOT DETECTED
GEN 5 2HR TROPONIN T REFLEX: 34 NG/L
GLOBULIN UR ELPH-MCNC: 4.5 GM/DL
GLUCOSE SERPL-MCNC: 229 MG/DL (ref 65–99)
GLUCOSE UR STRIP-MCNC: NEGATIVE MG/DL
HADV DNA SPEC NAA+PROBE: NOT DETECTED
HCO3 BLDA-SCNC: 31.4 MMOL/L (ref 20–26)
HCOV 229E RNA SPEC QL NAA+PROBE: NOT DETECTED
HCOV HKU1 RNA SPEC QL NAA+PROBE: NOT DETECTED
HCOV NL63 RNA SPEC QL NAA+PROBE: NOT DETECTED
HCOV OC43 RNA SPEC QL NAA+PROBE: NOT DETECTED
HCT VFR BLD AUTO: 29.5 % (ref 37.5–51)
HCT VFR BLD CALC: 29.2 % (ref 38–51)
HGB BLD-MCNC: 9 G/DL (ref 13–17.7)
HGB BLDA-MCNC: 9.5 G/DL (ref 14–18)
HGB UR QL STRIP.AUTO: ABNORMAL
HMPV RNA NPH QL NAA+NON-PROBE: NOT DETECTED
HOLD SPECIMEN: NORMAL
HOLD SPECIMEN: NORMAL
HPIV1 RNA ISLT QL NAA+PROBE: NOT DETECTED
HPIV2 RNA SPEC QL NAA+PROBE: NOT DETECTED
HPIV3 RNA NPH QL NAA+PROBE: NOT DETECTED
HPIV4 P GENE NPH QL NAA+PROBE: NOT DETECTED
HYALINE CASTS UR QL AUTO: ABNORMAL /LPF
IMM GRANULOCYTES # BLD AUTO: 0.05 10*3/MM3 (ref 0–0.05)
IMM GRANULOCYTES NFR BLD AUTO: 0.5 % (ref 0–0.5)
INHALED O2 CONCENTRATION: 21 %
KETONES UR QL STRIP: NEGATIVE
L PNEUMO1 AG UR QL IA: NEGATIVE
LEUKOCYTE ESTERASE UR QL STRIP.AUTO: ABNORMAL
LYMPHOCYTES # BLD AUTO: 0.92 10*3/MM3 (ref 0.7–3.1)
LYMPHOCYTES NFR BLD AUTO: 8.7 % (ref 19.6–45.3)
Lab: ABNORMAL
M PNEUMO IGG SER IA-ACNC: NOT DETECTED
MCH RBC QN AUTO: 25.6 PG (ref 26.6–33)
MCHC RBC AUTO-ENTMCNC: 30.5 G/DL (ref 31.5–35.7)
MCV RBC AUTO: 84 FL (ref 79–97)
METHGB BLD QL: <-0.1 % (ref 0–3)
MODALITY: ABNORMAL
MONOCYTES # BLD AUTO: 0.7 10*3/MM3 (ref 0.1–0.9)
MONOCYTES NFR BLD AUTO: 6.6 % (ref 5–12)
NEUTROPHILS NFR BLD AUTO: 8.52 10*3/MM3 (ref 1.7–7)
NEUTROPHILS NFR BLD AUTO: 80.8 % (ref 42.7–76)
NITRITE UR QL STRIP: NEGATIVE
NOTE: ABNORMAL
NOTIFIED BY: ABNORMAL
NOTIFIED WHO: ABNORMAL
NRBC BLD AUTO-RTO: 0 /100 WBC (ref 0–0.2)
NT-PROBNP SERPL-MCNC: 3126 PG/ML (ref 0–900)
OXYHGB MFR BLDV: 93.5 % (ref 94–99)
PCO2 BLDA: 38.3 MM HG (ref 35–45)
PCO2 TEMP ADJ BLD: ABNORMAL MM[HG]
PH BLDA: 7.52 PH UNITS (ref 7.35–7.45)
PH UR STRIP.AUTO: 5.5 [PH] (ref 5–8)
PH, TEMP CORRECTED: ABNORMAL
PLATELET # BLD AUTO: 452 10*3/MM3 (ref 140–450)
PMV BLD AUTO: 9.4 FL (ref 6–12)
PO2 BLDA: 64.7 MM HG (ref 83–108)
PO2 TEMP ADJ BLD: ABNORMAL MM[HG]
POTASSIUM SERPL-SCNC: 3.9 MMOL/L (ref 3.5–5.2)
PROCALCITONIN SERPL-MCNC: 0.08 NG/ML (ref 0–0.25)
PROT SERPL-MCNC: 7.6 G/DL (ref 6–8.5)
PROT UR QL STRIP: ABNORMAL
QT INTERVAL: 418 MS
QTC INTERVAL: 438 MS
RBC # BLD AUTO: 3.51 10*6/MM3 (ref 4.14–5.8)
RBC # UR STRIP: ABNORMAL /HPF
REF LAB TEST METHOD: ABNORMAL
RHINOVIRUS RNA SPEC NAA+PROBE: NOT DETECTED
RSV RNA NPH QL NAA+NON-PROBE: NOT DETECTED
SAO2 % BLDCOA: 95 % (ref 94–99)
SARS-COV-2 RNA NPH QL NAA+NON-PROBE: NOT DETECTED
SODIUM SERPL-SCNC: 138 MMOL/L (ref 136–145)
SP GR UR STRIP: 1.02 (ref 1–1.03)
SQUAMOUS #/AREA URNS HPF: ABNORMAL /HPF
TROPONIN T DELTA: 0 NG/L
TROPONIN T SERPL HS-MCNC: 34 NG/L
UROBILINOGEN UR QL STRIP: ABNORMAL
VENTILATOR MODE: ABNORMAL
WBC # UR STRIP: ABNORMAL /HPF
WBC NRBC COR # BLD: 10.54 10*3/MM3 (ref 3.4–10.8)
WHOLE BLOOD HOLD COAG: NORMAL
WHOLE BLOOD HOLD SPECIMEN: NORMAL

## 2023-05-10 PROCEDURE — 83605 ASSAY OF LACTIC ACID: CPT | Performed by: PHYSICIAN ASSISTANT

## 2023-05-10 PROCEDURE — 93010 ELECTROCARDIOGRAM REPORT: CPT | Performed by: SPECIALIST

## 2023-05-10 PROCEDURE — 85025 COMPLETE CBC W/AUTO DIFF WBC: CPT | Performed by: PHYSICIAN ASSISTANT

## 2023-05-10 PROCEDURE — 84484 ASSAY OF TROPONIN QUANT: CPT | Performed by: PHYSICIAN ASSISTANT

## 2023-05-10 PROCEDURE — 84145 PROCALCITONIN (PCT): CPT | Performed by: HOSPITALIST

## 2023-05-10 PROCEDURE — 93005 ELECTROCARDIOGRAM TRACING: CPT | Performed by: PHYSICIAN ASSISTANT

## 2023-05-10 PROCEDURE — 71045 X-RAY EXAM CHEST 1 VIEW: CPT | Performed by: RADIOLOGY

## 2023-05-10 PROCEDURE — 36600 WITHDRAWAL OF ARTERIAL BLOOD: CPT

## 2023-05-10 PROCEDURE — 94799 UNLISTED PULMONARY SVC/PX: CPT

## 2023-05-10 PROCEDURE — 86140 C-REACTIVE PROTEIN: CPT | Performed by: PHYSICIAN ASSISTANT

## 2023-05-10 PROCEDURE — 82375 ASSAY CARBOXYHB QUANT: CPT

## 2023-05-10 PROCEDURE — 82948 REAGENT STRIP/BLOOD GLUCOSE: CPT

## 2023-05-10 PROCEDURE — 25010000002 CEFTRIAXONE PER 250 MG: Performed by: PHYSICIAN ASSISTANT

## 2023-05-10 PROCEDURE — 71275 CT ANGIOGRAPHY CHEST: CPT

## 2023-05-10 PROCEDURE — 99285 EMERGENCY DEPT VISIT HI MDM: CPT

## 2023-05-10 PROCEDURE — 83880 ASSAY OF NATRIURETIC PEPTIDE: CPT | Performed by: PHYSICIAN ASSISTANT

## 2023-05-10 PROCEDURE — 80053 COMPREHEN METABOLIC PANEL: CPT | Performed by: PHYSICIAN ASSISTANT

## 2023-05-10 PROCEDURE — 82805 BLOOD GASES W/O2 SATURATION: CPT

## 2023-05-10 PROCEDURE — 87449 NOS EACH ORGANISM AG IA: CPT | Performed by: HOSPITALIST

## 2023-05-10 PROCEDURE — 25510000001 IOPAMIDOL PER 1 ML: Performed by: EMERGENCY MEDICINE

## 2023-05-10 PROCEDURE — 99223 1ST HOSP IP/OBS HIGH 75: CPT

## 2023-05-10 PROCEDURE — 0202U NFCT DS 22 TRGT SARS-COV-2: CPT | Performed by: PHYSICIAN ASSISTANT

## 2023-05-10 PROCEDURE — 87040 BLOOD CULTURE FOR BACTERIA: CPT | Performed by: PHYSICIAN ASSISTANT

## 2023-05-10 PROCEDURE — 71275 CT ANGIOGRAPHY CHEST: CPT | Performed by: RADIOLOGY

## 2023-05-10 PROCEDURE — 81001 URINALYSIS AUTO W/SCOPE: CPT | Performed by: PHYSICIAN ASSISTANT

## 2023-05-10 PROCEDURE — 25010000002 FUROSEMIDE PER 20 MG: Performed by: EMERGENCY MEDICINE

## 2023-05-10 PROCEDURE — 85379 FIBRIN DEGRADATION QUANT: CPT | Performed by: PHYSICIAN ASSISTANT

## 2023-05-10 PROCEDURE — 71045 X-RAY EXAM CHEST 1 VIEW: CPT

## 2023-05-10 PROCEDURE — 83050 HGB METHEMOGLOBIN QUAN: CPT

## 2023-05-10 PROCEDURE — 36415 COLL VENOUS BLD VENIPUNCTURE: CPT

## 2023-05-10 RX ORDER — SODIUM CHLORIDE 0.9 % (FLUSH) 0.9 %
10 SYRINGE (ML) INJECTION EVERY 12 HOURS SCHEDULED
Status: DISCONTINUED | OUTPATIENT
Start: 2023-05-11 | End: 2023-05-13 | Stop reason: HOSPADM

## 2023-05-10 RX ORDER — FUROSEMIDE 10 MG/ML
80 INJECTION INTRAMUSCULAR; INTRAVENOUS ONCE
Status: COMPLETED | OUTPATIENT
Start: 2023-05-10 | End: 2023-05-10

## 2023-05-10 RX ORDER — SODIUM CHLORIDE 0.9 % (FLUSH) 0.9 %
10 SYRINGE (ML) INJECTION AS NEEDED
Status: DISCONTINUED | OUTPATIENT
Start: 2023-05-10 | End: 2023-05-13 | Stop reason: HOSPADM

## 2023-05-10 RX ORDER — SODIUM CHLORIDE 9 MG/ML
40 INJECTION, SOLUTION INTRAVENOUS AS NEEDED
Status: DISCONTINUED | OUTPATIENT
Start: 2023-05-10 | End: 2023-05-13 | Stop reason: HOSPADM

## 2023-05-10 RX ORDER — NITROGLYCERIN 0.4 MG/1
0.4 TABLET SUBLINGUAL
Status: DISCONTINUED | OUTPATIENT
Start: 2023-05-10 | End: 2023-05-13 | Stop reason: HOSPADM

## 2023-05-10 RX ORDER — SODIUM CHLORIDE AND POTASSIUM CHLORIDE 150; 450 MG/100ML; MG/100ML
75 INJECTION, SOLUTION INTRAVENOUS CONTINUOUS
Status: DISCONTINUED | OUTPATIENT
Start: 2023-05-11 | End: 2023-05-11

## 2023-05-10 RX ADMIN — CEFTRIAXONE 1 G: 1 INJECTION, POWDER, FOR SOLUTION INTRAMUSCULAR; INTRAVENOUS at 19:11

## 2023-05-10 RX ADMIN — IOPAMIDOL 85 ML: 755 INJECTION, SOLUTION INTRAVENOUS at 21:30

## 2023-05-10 RX ADMIN — FUROSEMIDE 80 MG: 10 INJECTION, SOLUTION INTRAMUSCULAR; INTRAVENOUS at 19:05

## 2023-05-10 NOTE — TELEPHONE ENCOUNTER
Patient wife has called in regarding her . Patient was seen in office on 05/08/23, Dr. Boyle. Patient had labs done with PCP as his symptoms had worsened, labs were faxed. Dr. Boyle has reviewed. Wife states patient is swelling and is sob. Patent is now on 5 liters of oxygen with 92% stat. Patient was refusing to go to the ED. I have informed Dr. Boyle of symptoms and called the patient back to let him know Dr. Boyle advised coming in to the ED to be evaluated for heart failure. Patient and wife are understanding, patient agrees to come in.

## 2023-05-10 NOTE — ED PROVIDER NOTES
Subjective   History of Present Illness  62-year-old male with past medical history of hyperlipidemia, coronary artery disease, MARIAH, GERD, hypertension, diabetes, congestive heart failure, BPH, and recent triple bypass surgery by Dr. Emanuel at [facility] on March 21, 2023 presents to the emergency room with shortness of breath and increasing edema which began early Sunday morning around 2 AM.  Patient is accompanied by his wife who states Sunday morning patient's O2 saturation was 72 to 84% on CPAP.  Wife states patient is supposed to be on CPAP, however his machine was taken, therefore she has been letting him use her machine.  She states later that day patient's oxygen continued to run low therefore was able to obtain an oxygen concentrator from a family friend.  She states on Monday patient was able to get in with his cardiologist Dr. Boyle who prescribed patient 3 L of oxygen and double his daily dose of Bumex x7 days.  Wife states on Monday patient continued to decline and last night had to increase patient's oxygen to 5 L to maintain an oxygen saturation of 92% at best.  She does state that patient seems to get worse during the night.  Patient does endorse a low-grade fever yesterday, however has been afebrile this day.  He was seen yesterday by his primary care provider and given a Rocephin injection and started on Augmentin and doxycycline and had some improvement.  Patient also endorses a productive cough of green sputum which has seem to get better after starting on Mucinex.  Aggravating factors include going to sleep and exertion.  Denies any alleviating factors despite oxygen therapy and nocturnal CPAP.  Denies any chest pain.  He has been exposed to a grandchild with unknown viral illness.  Denies any other complaints or concerns at this time.    History provided by:  Patient   used: No        Review of Systems   Constitutional: Positive for fever.   HENT: Negative.     Respiratory: Positive for cough and shortness of breath.    Cardiovascular: Positive for leg swelling. Negative for chest pain.   Gastrointestinal: Negative.  Negative for abdominal pain.   Endocrine: Negative.    Genitourinary: Negative.  Negative for dysuria.   Skin: Negative.    Neurological: Negative.    Psychiatric/Behavioral: Negative.    All other systems reviewed and are negative.      Past Medical History:   Diagnosis Date   • Arthritis    • BPH (benign prostatic hyperplasia)    • Cancer     basal and melanoma-  x2 - left side ear and elbow   • Constipation    • Coronary artery disease    • DDD (degenerative disc disease), lumbar    • Diabetes mellitus     couple times month check sugar   • Frozen shoulder     left   • GERD (gastroesophageal reflux disease)    • Hyperlipidemia    • Hypertension    • Limited mobility     left shoulder  - possible frozen shoulder-= please be aware for surgery   • Sleep apnea     doesnt use machine   • Tinnitus    • Wears glasses     small print       Allergies   Allergen Reactions   • Morphine Nausea And Vomiting     projectile   • Adhesive Tape Rash     Pt states sticky tabs cause irritation when left on long        Past Surgical History:   Procedure Laterality Date   • CARDIAC CATHETERIZATION     • CARDIAC CATHETERIZATION Right 03/16/2023    Procedure: Left Heart Cath;  Surgeon: Jarod Boyle MD;  Location:  [facility] CATH [facility]VASIVE [facility];  Service: Cardiovascular;  Laterality: Right;   • CATARACT EXTRACTION, BILATERAL Bilateral    • COLONOSCOPY     • CORONARY ARTERY BYPASS GRAFT N/A 3/21/2023    Procedure: MEDIAN STERNOTOMY, CORONARY ARTERY BYPASS GRAFTING X 3, UTILIZING THE LEFT INTERNAL MAMMARY ARTERY, ENDOSCOPIC VEIN HARVEST OF THE RIGHT GREATER SAPENOUSE VEIN;  Surgeon: Markus Emanuel MD;  Location: [facility] OR;  Service: Cardiothoracic;  Laterality: N/A;   • CORONARY STENT PLACEMENT      x4   • ENDOSCOPY     • FINGER SURGERY Left     middle finger - left side   •  SKIN CANCER EXCISION      x2   • WISDOM TOOTH EXTRACTION         Family History   Problem Relation Age of Onset   • COPD Mother    • Heart attack Father 45        passed   • Heart attack Paternal Grandfather 42        massive heart attack   • Heart disease Paternal Grandfather        Social History     Socioeconomic History   • Marital status:    • Number of children: 1   Tobacco Use   • Smoking status: Never     Passive exposure: Past   • Smokeless tobacco: Current     Types: Snuff   Vaping Use   • Vaping Use: Never used   Substance and Sexual Activity   • Alcohol use: Not Currently   • Drug use: Never   • Sexual activity: Defer           Objective   Physical Exam  Vitals and nursing note reviewed.   Constitutional:       General: He is not in acute distress.     Appearance: He is well-developed. He is not diaphoretic.      Interventions: Nasal cannula in place.      Comments: 2L   HENT:      Head: Normocephalic and atraumatic.      Right Ear: External ear normal.      Left Ear: External ear normal.      Nose: Nose normal.   Eyes:      Conjunctiva/sclera: Conjunctivae normal.      Pupils: Pupils are equal, round, and reactive to light.   Neck:      Vascular: No JVD.      Trachea: No tracheal deviation.   Cardiovascular:      Rate and Rhythm: Normal rate and regular rhythm.      Heart sounds: Normal heart sounds. No murmur heard.  Pulmonary:      Effort: Pulmonary effort is normal. No respiratory distress.      Breath sounds: Examination of the left-lower field reveals rales. Rales present. No wheezing.   Abdominal:      General: Bowel sounds are normal.      Palpations: Abdomen is soft.      Tenderness: There is no abdominal tenderness.   Musculoskeletal:         General: No deformity. Normal range of motion.      Cervical back: Normal range of motion and neck supple.      Right lower leg: 3+ Pitting Edema present.      Left lower leg: 3+ Pitting Edema present.   Skin:     General: Skin is warm and dry.       Coloration: Skin is not pale.      Findings: No erythema or rash.   Neurological:      Mental Status: He is alert and oriented to person, place, and time.      Cranial Nerves: No cranial nerve deficit.   Psychiatric:         Behavior: Behavior normal.         Thought Content: Thought content normal.         Procedures           ED Course  ED Course as of 05/10/23 2324   Wed May 10, 2023   1726 Pt refused ABG, as states he had on Saturday. [TK]   1727 XR Chest 1 View [TK]   1733 ECG 12 Lead Dyspnea  Vent. Rate :  66 BPM     Atrial Rate :  66 BPM     P-R Int : 156 ms          QRS Dur : 102 ms      QT Int : 418 ms       P-R-T Axes :   6  36 149 degrees     QTc Int : 438 ms     Normal sinus rhythm  Cannot rule out Inferior infarct (cited on or before 07-MAY-2023)  T wave abnormality, consider lateral ischemia  Abnormal ECG  When compared with ECG of 07-MAY-2023 00:28,  No significant change was found [ES]   1833 HS Troponin T(!): 34  Improved as compared to previous 4 days ago. [TK]   1840 D-Dimer, Quant(!): 5.09  Pt with recent CT PE protocol 4 days ago with negative study for PE. [TK]   1848 XR Chest 1 View  IMPRESSION:  Likely mild CHF [ES]   1849 Discussed patient with Dr. Munroe, recommends 80mg IV Lasix and awaiting second troponin. Will consult with Dr. Boyle who is patient's cardiologist for further recommendations. [TK]   1933 HS Troponin T(!): 34  stable [TK]   1937 Discussed pt with Dr. Boyle, he recommends admission for diuresis. [TK]   1940 Attending aware that I need hospitalist for admission. [TK]   2021 Spoke with Dr. Hull recommends obtaining ABG and getting CT chest PE protocol. [TK]   2101 Despite ABG, patient currently saturating at 88-90% at rest. [TK]   2249 CT Angiogram Chest Pulmonary Embolism [TK]   2310 Spoke with Dr. Hull he will admit patient to hospitalist services for further evaluation. [TK]      ED Course User Index  [ES] René Munroe MD  [TK] Josephine Wallis,  PA-C           Results for orders placed or performed during the hospital encounter of 05/10/23   Respiratory Panel PCR w/COVID-19(SARS-CoV-2) RAJNI/BALA/NAHUN/PAD/[facility]/MAD/NABEEL In-House, NP Swab in UTM/VTM, 3-4 HR TAT - Swab, Nasopharynx    Specimen: Nasopharynx; Swab   Result Value Ref Range    ADENOVIRUS, PCR Not Detected Not Detected    Coronavirus 229E Not Detected Not Detected    Coronavirus HKU1 Not Detected Not Detected    Coronavirus NL63 Not Detected Not Detected    Coronavirus OC43 Not Detected Not Detected    COVID19 Not Detected Not Detected - Ref. Range    Human Metapneumovirus Not Detected Not Detected    Human Rhinovirus/Enterovirus Not Detected Not Detected    Influenza A PCR Not Detected Not Detected    Influenza B PCR Not Detected Not Detected    Parainfluenza Virus 1 Not Detected Not Detected    Parainfluenza Virus 2 Not Detected Not Detected    Parainfluenza Virus 3 Not Detected Not Detected    Parainfluenza Virus 4 Not Detected Not Detected    RSV, PCR Not Detected Not Detected    Bordetella pertussis pcr Not Detected Not Detected    Bordetella parapertussis PCR Not Detected Not Detected    Chlamydophila pneumoniae PCR Not Detected Not Detected    Mycoplasma pneumo by PCR Not Detected Not Detected   Comprehensive Metabolic Panel    Specimen: Arm, Right; Blood   Result Value Ref Range    Glucose 229 (H) 65 - 99 mg/dL    BUN 21 8 - 23 mg/dL    Creatinine 0.98 0.76 - 1.27 mg/dL    Sodium 138 136 - 145 mmol/L    Potassium 3.9 3.5 - 5.2 mmol/L    Chloride 99 98 - 107 mmol/L    CO2 26.5 22.0 - 29.0 mmol/L    Calcium 8.5 (L) 8.6 - 10.5 mg/dL    Total Protein 7.6 6.0 - 8.5 g/dL    Albumin 3.1 (L) 3.5 - 5.2 g/dL    ALT (SGPT) 29 1 - 41 U/L    AST (SGOT) 18 1 - 40 U/L    Alkaline Phosphatase 105 39 - 117 U/L    Total Bilirubin 1.1 0.0 - 1.2 mg/dL    Globulin 4.5 gm/dL    A/G Ratio 0.7 g/dL    BUN/Creatinine Ratio 21.4 7.0 - 25.0    Anion Gap 12.5 5.0 - 15.0 mmol/L    eGFR 87.7 >60.0 mL/min/1.73   C-reactive  Protein    Specimen: Arm, Right; Blood   Result Value Ref Range    C-Reactive Protein 24.14 (H) 0.00 - 0.50 mg/dL   Urinalysis With Microscopic If Indicated (No Culture) - Urine, Clean Catch    Specimen: Urine, Clean Catch   Result Value Ref Range    Color, UA Dark Yellow (A) Yellow, Straw    Appearance, UA Clear Clear    pH, UA 5.5 5.0 - 8.0    Specific Gravity, UA 1.022 1.005 - 1.030    Glucose, UA Negative Negative    Ketones, UA Negative Negative    Bilirubin, UA Small (1+) (A) Negative    Blood, UA Large (3+) (A) Negative    Protein, UA 30 mg/dL (1+) (A) Negative    Leuk Esterase, UA Trace (A) Negative    Nitrite, UA Negative Negative    Urobilinogen, UA 4.0 E.U./dL (A) 0.2 - 1.0 E.U./dL   BNP    Specimen: Arm, Right; Blood   Result Value Ref Range    proBNP 3,126.0 (H) 0.0 - 900.0 pg/mL   D-dimer, Quantitative    Specimen: Arm, Right; Blood   Result Value Ref Range    D-Dimer, Quantitative 5.09 (H) 0.00 - 0.61 MCGFEU/mL   High Sensitivity Troponin T    Specimen: Arm, Right; Blood   Result Value Ref Range    HS Troponin T 34 (H) <15 ng/L   CBC Auto Differential    Specimen: Arm, Right; Blood   Result Value Ref Range    WBC 10.54 3.40 - 10.80 10*3/mm3    RBC 3.51 (L) 4.14 - 5.80 10*6/mm3    Hemoglobin 9.0 (L) 13.0 - 17.7 g/dL    Hematocrit 29.5 (L) 37.5 - 51.0 %    MCV 84.0 79.0 - 97.0 fL    MCH 25.6 (L) 26.6 - 33.0 pg    MCHC 30.5 (L) 31.5 - 35.7 g/dL    RDW 16.8 (H) 12.3 - 15.4 %    RDW-SD 50.9 37.0 - 54.0 fl    MPV 9.4 6.0 - 12.0 fL    Platelets 452 (H) 140 - 450 10*3/mm3    Neutrophil % 80.8 (H) 42.7 - 76.0 %    Lymphocyte % 8.7 (L) 19.6 - 45.3 %    Monocyte % 6.6 5.0 - 12.0 %    Eosinophil % 2.8 0.3 - 6.2 %    Basophil % 0.6 0.0 - 1.5 %    Immature Grans % 0.5 0.0 - 0.5 %    Neutrophils, Absolute 8.52 (H) 1.70 - 7.00 10*3/mm3    Lymphocytes, Absolute 0.92 0.70 - 3.10 10*3/mm3    Monocytes, Absolute 0.70 0.10 - 0.90 10*3/mm3    Eosinophils, Absolute 0.29 0.00 - 0.40 10*3/mm3    Basophils, Absolute 0.06 0.00  - 0.20 10*3/mm3    Immature Grans, Absolute 0.05 0.00 - 0.05 10*3/mm3    nRBC 0.0 0.0 - 0.2 /100 WBC   High Sensitivity Troponin T 2Hr    Specimen: Arm, Left; Blood   Result Value Ref Range    HS Troponin T 34 (H) <15 ng/L    Troponin T Delta 0 >=-4 - <+4 ng/L   Lactic Acid, Plasma    Specimen: Arm, Right; Blood   Result Value Ref Range    Lactate 1.8 0.5 - 2.0 mmol/L   Urinalysis, Microscopic Only - Urine, Clean Catch    Specimen: Urine, Clean Catch   Result Value Ref Range    RBC, UA Too Numerous to Count (A) None Seen, 0-2 /HPF    WBC, UA 6-12 (A) None Seen, 0-2 /HPF    Bacteria, UA None Seen None Seen /HPF    Squamous Epithelial Cells, UA None Seen None Seen, 0-2 /HPF    Hyaline Casts, UA None Seen None Seen /LPF    Methodology Automated Microscopy    Blood Gas, Arterial With Co-Ox    Specimen: Arterial Blood   Result Value Ref Range    Site Right Radial     Chano's Test Positive     pH, Arterial 7.522 (H) 7.350 - 7.450 pH units    pCO2, Arterial 38.3 35.0 - 45.0 mm Hg    pO2, Arterial 64.7 (L) 83.0 - 108.0 mm Hg    HCO3, Arterial 31.4 (H) 20.0 - 26.0 mmol/L    Base Excess, Arterial 8.0 (H) 0.0 - 2.0 mmol/L    O2 Saturation, Arterial 95.0 94.0 - 99.0 %    Hemoglobin, Blood Gas 9.5 (L) 14 - 18 g/dL    Hematocrit, Blood Gas 29.2 (L) 38.0 - 51.0 %    Oxyhemoglobin 93.5 (L) 94 - 99 %    Methemoglobin <-0.10 (L) 0.00 - 3.00 %    Carboxyhemoglobin 1.8 0 - 5 %    A-a DO2 35.2 0.0 - 300.0 mmHg    CO2 Content 32.6 22 - 33 mmol/L    Barometric Pressure for Blood Gas 729 mmHg    Modality Room Air     FIO2 21 %    Ventilator Mode NA     Note Read back and acknowledge     Notified Who HUSSEIN OROZCO AND RN     [facility]ified [facility]     Collected by [id number]     pH, Temp Corrected      pCO2, Temperature Corrected      pO2, Temperature Corrected     ECG 12 Lead Dyspnea   Result Value Ref Range    QT Interval 418 ms    QTC Interval 438 ms   Green Top (Gel)   Result Value Ref Range    Extra Tube Hold for add-ons.    Lavender Top    Result Value Ref Range    Extra Tube hold for add-on    Gold Top - SST   Result Value Ref Range    Extra Tube Hold for add-ons.    Light Blue Top   Result Value Ref Range    Extra Tube Hold for add-ons.        CT Angiogram Chest Pulmonary Embolism   Final Result   Impression:   1.  No CTA evidence of pulmonary embolism.   2.  Moderate left pleural effusion with adjacent atelectasis, similar to   the prior study.   3.  Interval development of patchy multifocal groundglass and alveolar   infiltrates predominately involving the perihilar regions and the right   upper lobe.  This could represent hemorrhage, pulmonary edema, or   infectious etiologies.       This report was finalized on 5/10/2023 10:34 PM by Robin Fairchild MD.          XR Chest 1 View   Final Result   Likely mild CHF       This report was finalized on 5/10/2023 5:22 PM by Dr. Oliver Renner MD.                                              Medical Decision Making  62-year-old male with past medical history of hyperlipidemia, coronary artery disease, MARIAH, GERD, hypertension, diabetes, congestive heart failure, BPH, and recent triple bypass surgery by Dr. Emanuel at [facility] on March 21, 2023 presents to the emergency room with shortness of breath and increasing edema which began early Sunday morning around 2 AM.  Patient is accompanied by his wife who states Sunday morning patient's O2 saturation was 72 to 84% on CPAP.  Wife states patient is supposed to be on CPAP, however his machine was taken, therefore she has been letting him use her machine.  She states later that day patient's oxygen continued to run low therefore was able to obtain an oxygen concentrator from a family friend.  She states on Monday patient was able to get in with his cardiologist Dr. Boyle who prescribed patient 3 L of oxygen and double his daily dose of Bumex x7 days.  Wife states on Monday patient continued to decline and last night had to increase patient's  oxygen to 5 L to maintain an oxygen saturation of 92% at best.  She does state that patient seems to get worse during the night.  Patient does endorse a low-grade fever yesterday, however has been afebrile this day.  He was seen yesterday by his primary care provider and given a Rocephin injection and started on Augmentin and doxycycline and had some improvement.  Patient also endorses a productive cough of green sputum which has seem to get better after starting on Mucinex.  Aggravating factors include going to sleep and exertion.  Denies any alleviating factors despite oxygen therapy and nocturnal CPAP.  Denies any chest pain.  He has been exposed to a grandchild with unknown viral illness.  Denies any other complaints or concerns at this time.      Acute on chronic congestive heart failure, unspecified heart failure type: acute illness or injury  Acute respiratory failure with hypoxia: acute illness or injury  Pleural effusion, left: acute illness or injury  Pneumonia of both lungs due to infectious organism, unspecified part of lung: acute illness or injury  Amount and/or Complexity of Data Reviewed  Labs: ordered. Decision-making details documented in ED Course.  Radiology: ordered. Decision-making details documented in ED Course.  ECG/medicine tests: ordered. Decision-making details documented in ED Course.  Discussion of management or test interpretation with external provider(s): Tamar Munroe Hammons Risk  Prescription drug management.  Decision regarding hospitalization.          Final diagnoses:   Acute on chronic congestive heart failure, unspecified heart failure type   Pleural effusion, left   Pneumonia of both lungs due to infectious organism, unspecified part of lung   Acute respiratory failure with hypoxia       ED Disposition  ED Disposition     ED Disposition   Decision to Admit    Condition   --    Comment   Level of Care: Telemetry [5]   Diagnosis: Bilateral pneumonia [177242]    Admitting Physician: AMY STEVENS [[id number]]   Attending Physician: AMY STEVENS [[id number]]   Certification: I Certify That Inpatient Hospital Services Are Medically Necessary For Greater Than 2 Midnights               No follow-up provider specified.       Medication List      No changes were made to your prescriptions during this visit.          Josephine Wallis PA-C  05/10/23 [id number]

## 2023-05-11 ENCOUNTER — APPOINTMENT (OUTPATIENT)
Dept: CARDIOLOGY | Facility: HOSPITAL | Age: 63
DRG: 194 | End: 2023-05-11
Payer: MEDICARE

## 2023-05-11 LAB
ALBUMIN SERPL-MCNC: 3.3 G/DL (ref 3.5–5.2)
ALBUMIN/GLOB SERPL: 0.7 G/DL
ALP SERPL-CCNC: 106 U/L (ref 39–117)
ALT SERPL W P-5'-P-CCNC: 27 U/L (ref 1–41)
ANION GAP SERPL CALCULATED.3IONS-SCNC: 13 MMOL/L (ref 5–15)
AST SERPL-CCNC: 16 U/L (ref 1–40)
BACTERIA SPEC AEROBE CULT: NORMAL
BACTERIA SPEC AEROBE CULT: NORMAL
BH CV ECHO MEAS - ACS: 1.8 CM
BH CV ECHO MEAS - AO MAX PG: 8.3 MMHG
BH CV ECHO MEAS - AO MEAN PG: 4 MMHG
BH CV ECHO MEAS - AO ROOT DIAM: 3.4 CM
BH CV ECHO MEAS - AO V2 MAX: 144 CM/SEC
BH CV ECHO MEAS - AO V2 VTI: 28.9 CM
BH CV ECHO MEAS - EDV(CUBED): 125 ML
BH CV ECHO MEAS - EDV(MOD-SP4): 43.8 ML
BH CV ECHO MEAS - EF(MOD-SP4): 62.6 %
BH CV ECHO MEAS - ESV(CUBED): 24.4 ML
BH CV ECHO MEAS - ESV(MOD-SP4): 16.4 ML
BH CV ECHO MEAS - FS: 42 %
BH CV ECHO MEAS - IVS/LVPW: 1 CM
BH CV ECHO MEAS - IVSD: 0.9 CM
BH CV ECHO MEAS - LAT PEAK E' VEL: 5.4 CM/SEC
BH CV ECHO MEAS - LV DIASTOLIC VOL/BSA (35-75): 16.7 CM2
BH CV ECHO MEAS - LV MASS(C)D: 158.2 GRAMS
BH CV ECHO MEAS - LV SYSTOLIC VOL/BSA (12-30): 6.3 CM2
BH CV ECHO MEAS - LVIDD: 5 CM
BH CV ECHO MEAS - LVIDS: 2.9 CM
BH CV ECHO MEAS - LVOT AREA: 3.1 CM2
BH CV ECHO MEAS - LVOT DIAM: 2 CM
BH CV ECHO MEAS - LVPWD: 0.9 CM
BH CV ECHO MEAS - MED PEAK E' VEL: 5.3 CM/SEC
BH CV ECHO MEAS - MV A MAX VEL: 79.7 CM/SEC
BH CV ECHO MEAS - MV E MAX VEL: 106 CM/SEC
BH CV ECHO MEAS - MV E/A: 1.33
BH CV ECHO MEAS - PA ACC TIME: 0.09 SEC
BH CV ECHO MEAS - PA PR(ACCEL): 37.6 MMHG
BH CV ECHO MEAS - RAP SYSTOLE: 10 MMHG
BH CV ECHO MEAS - RVSP: 43.6 MMHG
BH CV ECHO MEAS - SI(MOD-SP4): 10.5 ML/M2
BH CV ECHO MEAS - SV(MOD-SP4): 27.4 ML
BH CV ECHO MEAS - TAPSE (>1.6): 1.8 CM
BH CV ECHO MEAS - TR MAX PG: 33.6 MMHG
BH CV ECHO MEAS - TR MAX VEL: 290 CM/SEC
BH CV ECHO MEASUREMENTS AVERAGE E/E' RATIO: 19.81
BILIRUB SERPL-MCNC: 1 MG/DL (ref 0–1.2)
BUN SERPL-MCNC: 21 MG/DL (ref 8–23)
BUN/CREAT SERPL: 20 (ref 7–25)
CALCIUM SPEC-SCNC: 8.8 MG/DL (ref 8.6–10.5)
CHLORIDE SERPL-SCNC: 98 MMOL/L (ref 98–107)
CO2 SERPL-SCNC: 28 MMOL/L (ref 22–29)
CREAT SERPL-MCNC: 1.05 MG/DL (ref 0.76–1.27)
CRP SERPL-MCNC: 22.54 MG/DL (ref 0–0.5)
DEPRECATED RDW RBC AUTO: 50.5 FL (ref 37–54)
EGFRCR SERPLBLD CKD-EPI 2021: 80.8 ML/MIN/1.73
EOSINOPHIL # BLD MANUAL: 0.5 10*3/MM3 (ref 0–0.4)
EOSINOPHIL NFR BLD MANUAL: 5 % (ref 0.3–6.2)
ERYTHROCYTE [DISTWIDTH] IN BLOOD BY AUTOMATED COUNT: 16.6 % (ref 12.3–15.4)
GLOBULIN UR ELPH-MCNC: 4.7 GM/DL
GLUCOSE BLDC GLUCOMTR-MCNC: 211 MG/DL (ref 70–130)
GLUCOSE BLDC GLUCOMTR-MCNC: 259 MG/DL (ref 70–130)
GLUCOSE BLDC GLUCOMTR-MCNC: 264 MG/DL (ref 70–130)
GLUCOSE BLDC GLUCOMTR-MCNC: 277 MG/DL (ref 70–130)
GLUCOSE SERPL-MCNC: 235 MG/DL (ref 65–99)
HCT VFR BLD AUTO: 30.2 % (ref 37.5–51)
HGB BLD-MCNC: 8.9 G/DL (ref 13–17.7)
HYPOCHROMIA BLD QL: ABNORMAL
LEFT ATRIUM VOLUME INDEX: 13.7 ML/M2
LYMPHOCYTES # BLD MANUAL: 1.3 10*3/MM3 (ref 0.7–3.1)
LYMPHOCYTES NFR BLD MANUAL: 8 % (ref 5–12)
MACROCYTES BLD QL SMEAR: ABNORMAL
MAXIMAL PREDICTED HEART RATE: 159 BPM
MCH RBC QN AUTO: 24.8 PG (ref 26.6–33)
MCHC RBC AUTO-ENTMCNC: 29.5 G/DL (ref 31.5–35.7)
MCV RBC AUTO: 84.1 FL (ref 79–97)
MONOCYTES # BLD: 0.8 10*3/MM3 (ref 0.1–0.9)
NEUTROPHILS # BLD AUTO: 7.42 10*3/MM3 (ref 1.7–7)
NEUTROPHILS NFR BLD MANUAL: 71 % (ref 42.7–76)
NEUTS BAND NFR BLD MANUAL: 3 % (ref 0–5)
PLAT MORPH BLD: NORMAL
PLATELET # BLD AUTO: 467 10*3/MM3 (ref 140–450)
PMV BLD AUTO: 9.8 FL (ref 6–12)
POTASSIUM SERPL-SCNC: 3.6 MMOL/L (ref 3.5–5.2)
PROT SERPL-MCNC: 8 G/DL (ref 6–8.5)
RBC # BLD AUTO: 3.59 10*6/MM3 (ref 4.14–5.8)
SODIUM SERPL-SCNC: 139 MMOL/L (ref 136–145)
STRESS TARGET HR: 135 BPM
VARIANT LYMPHS NFR BLD MANUAL: 13 % (ref 19.6–45.3)
WBC NRBC COR # BLD: 10.03 10*3/MM3 (ref 3.4–10.8)

## 2023-05-11 PROCEDURE — 93306 TTE W/DOPPLER COMPLETE: CPT

## 2023-05-11 PROCEDURE — 85007 BL SMEAR W/DIFF WBC COUNT: CPT | Performed by: HOSPITALIST

## 2023-05-11 PROCEDURE — 82948 REAGENT STRIP/BLOOD GLUCOSE: CPT

## 2023-05-11 PROCEDURE — 80053 COMPREHEN METABOLIC PANEL: CPT | Performed by: HOSPITALIST

## 2023-05-11 PROCEDURE — 63710000001 INSULIN GLARGINE PER 5 UNITS

## 2023-05-11 PROCEDURE — 94799 UNLISTED PULMONARY SVC/PX: CPT

## 2023-05-11 PROCEDURE — 0 POTASSIUM CHLORIDE PER 2 MEQ: Performed by: INTERNAL MEDICINE

## 2023-05-11 PROCEDURE — 99232 SBSQ HOSP IP/OBS MODERATE 35: CPT | Performed by: FAMILY MEDICINE

## 2023-05-11 PROCEDURE — 25010000002 CEFTRIAXONE PER 250 MG: Performed by: HOSPITALIST

## 2023-05-11 PROCEDURE — 63710000001 INSULIN LISPRO (HUMAN) PER 5 UNITS

## 2023-05-11 PROCEDURE — 86140 C-REACTIVE PROTEIN: CPT | Performed by: HOSPITALIST

## 2023-05-11 PROCEDURE — 85027 COMPLETE CBC AUTOMATED: CPT | Performed by: HOSPITALIST

## 2023-05-11 RX ORDER — TAMSULOSIN HYDROCHLORIDE 0.4 MG/1
0.4 CAPSULE ORAL DAILY
Status: DISCONTINUED | OUTPATIENT
Start: 2023-05-11 | End: 2023-05-13 | Stop reason: HOSPADM

## 2023-05-11 RX ORDER — BUMETANIDE 1 MG/1
2 TABLET ORAL 2 TIMES DAILY
Status: CANCELLED | OUTPATIENT
Start: 2023-05-11

## 2023-05-11 RX ORDER — BUMETANIDE 1 MG/1
2 TABLET ORAL 2 TIMES DAILY
Status: DISCONTINUED | OUTPATIENT
Start: 2023-05-11 | End: 2023-05-11

## 2023-05-11 RX ORDER — GABAPENTIN 400 MG/1
800 CAPSULE ORAL EVERY 8 HOURS SCHEDULED
Status: DISCONTINUED | OUTPATIENT
Start: 2023-05-11 | End: 2023-05-13 | Stop reason: HOSPADM

## 2023-05-11 RX ORDER — PANTOPRAZOLE SODIUM 40 MG/1
40 TABLET, DELAYED RELEASE ORAL
Status: DISCONTINUED | OUTPATIENT
Start: 2023-05-11 | End: 2023-05-13 | Stop reason: HOSPADM

## 2023-05-11 RX ORDER — ONDANSETRON 4 MG/1
4 TABLET, ORALLY DISINTEGRATING ORAL EVERY 8 HOURS PRN
Status: DISCONTINUED | OUTPATIENT
Start: 2023-05-11 | End: 2023-05-13 | Stop reason: HOSPADM

## 2023-05-11 RX ORDER — LISINOPRIL 10 MG/1
40 TABLET ORAL DAILY
Status: DISCONTINUED | OUTPATIENT
Start: 2023-05-11 | End: 2023-05-13 | Stop reason: HOSPADM

## 2023-05-11 RX ORDER — POTASSIUM CHLORIDE 750 MG/1
10 TABLET, EXTENDED RELEASE ORAL DAILY
COMMUNITY
Start: 2023-05-08 | End: 2023-05-15

## 2023-05-11 RX ORDER — POTASSIUM CHLORIDE 20 MEQ/1
10 TABLET, EXTENDED RELEASE ORAL DAILY
Status: DISCONTINUED | OUTPATIENT
Start: 2023-05-11 | End: 2023-05-13 | Stop reason: HOSPADM

## 2023-05-11 RX ORDER — GLUCAGON 1 MG/ML
1 KIT INJECTION
Status: DISCONTINUED | OUTPATIENT
Start: 2023-05-11 | End: 2023-05-13 | Stop reason: HOSPADM

## 2023-05-11 RX ORDER — AMOXICILLIN AND CLAVULANATE POTASSIUM 875; 125 MG/1; MG/1
1 TABLET, FILM COATED ORAL 2 TIMES DAILY
Status: CANCELLED | OUTPATIENT
Start: 2023-05-11 | End: 2023-05-15

## 2023-05-11 RX ORDER — LISINOPRIL 40 MG/1
20 TABLET ORAL NIGHTLY
COMMUNITY

## 2023-05-11 RX ORDER — LISINOPRIL 40 MG/1
40 TABLET ORAL EVERY MORNING
COMMUNITY

## 2023-05-11 RX ORDER — DEXTROSE MONOHYDRATE 25 G/50ML
25 INJECTION, SOLUTION INTRAVENOUS
Status: DISCONTINUED | OUTPATIENT
Start: 2023-05-11 | End: 2023-05-13 | Stop reason: HOSPADM

## 2023-05-11 RX ORDER — NEBIVOLOL 5 MG/1
5 TABLET ORAL
Status: DISCONTINUED | OUTPATIENT
Start: 2023-05-11 | End: 2023-05-13 | Stop reason: HOSPADM

## 2023-05-11 RX ORDER — ATORVASTATIN CALCIUM 40 MG/1
40 TABLET, FILM COATED ORAL DAILY
Status: DISCONTINUED | OUTPATIENT
Start: 2023-05-11 | End: 2023-05-13 | Stop reason: HOSPADM

## 2023-05-11 RX ORDER — INSULIN LISPRO 100 [IU]/ML
3-14 INJECTION, SOLUTION INTRAVENOUS; SUBCUTANEOUS
Status: DISCONTINUED | OUTPATIENT
Start: 2023-05-11 | End: 2023-05-13 | Stop reason: HOSPADM

## 2023-05-11 RX ORDER — DOXYCYCLINE HYCLATE 100 MG
100 TABLET ORAL 2 TIMES DAILY
COMMUNITY
End: 2023-05-13 | Stop reason: HOSPADM

## 2023-05-11 RX ORDER — DOXYCYCLINE 100 MG/1
100 CAPSULE ORAL 2 TIMES DAILY
Status: CANCELLED | OUTPATIENT
Start: 2023-05-11 | End: 2023-05-15

## 2023-05-11 RX ORDER — AMOXICILLIN AND CLAVULANATE POTASSIUM 875; 125 MG/1; MG/1
1 TABLET, FILM COATED ORAL 2 TIMES DAILY
COMMUNITY
End: 2023-05-13 | Stop reason: HOSPADM

## 2023-05-11 RX ORDER — NICOTINE POLACRILEX 4 MG
15 LOZENGE BUCCAL
Status: DISCONTINUED | OUTPATIENT
Start: 2023-05-11 | End: 2023-05-13 | Stop reason: HOSPADM

## 2023-05-11 RX ORDER — LISINOPRIL 10 MG/1
20 TABLET ORAL NIGHTLY
Status: DISCONTINUED | OUTPATIENT
Start: 2023-05-11 | End: 2023-05-13 | Stop reason: HOSPADM

## 2023-05-11 RX ORDER — GABAPENTIN 400 MG/1
2400 CAPSULE ORAL NIGHTLY
Status: CANCELLED | OUTPATIENT
Start: 2023-05-11

## 2023-05-11 RX ORDER — ATORVASTATIN CALCIUM 40 MG/1
40 TABLET, FILM COATED ORAL DAILY
COMMUNITY

## 2023-05-11 RX ORDER — BUMETANIDE 1 MG/1
2 TABLET ORAL DAILY
Status: DISCONTINUED | OUTPATIENT
Start: 2023-05-12 | End: 2023-05-13 | Stop reason: HOSPADM

## 2023-05-11 RX ORDER — LANSOPRAZOLE 30 MG/1
30 CAPSULE, DELAYED RELEASE ORAL DAILY
COMMUNITY
End: 2023-06-19

## 2023-05-11 RX ADMIN — INSULIN LISPRO 8 UNITS: 100 INJECTION, SOLUTION INTRAVENOUS; SUBCUTANEOUS at 12:50

## 2023-05-11 RX ADMIN — DOXYCYCLINE 100 MG: 100 INJECTION, POWDER, LYOPHILIZED, FOR SOLUTION INTRAVENOUS at 00:43

## 2023-05-11 RX ADMIN — ATORVASTATIN CALCIUM 40 MG: 40 TABLET, FILM COATED ORAL at 09:07

## 2023-05-11 RX ADMIN — ASPIRIN 81 MG: 81 TABLET, COATED ORAL at 09:06

## 2023-05-11 RX ADMIN — POTASSIUM CHLORIDE AND SODIUM CHLORIDE 75 ML/HR: 450; 150 INJECTION, SOLUTION INTRAVENOUS at 05:03

## 2023-05-11 RX ADMIN — Medication 10 ML: at 01:35

## 2023-05-11 RX ADMIN — INSULIN GLARGINE 65 UNITS: 100 INJECTION, SOLUTION SUBCUTANEOUS at 09:06

## 2023-05-11 RX ADMIN — DOXYCYCLINE 100 MG: 100 INJECTION, POWDER, LYOPHILIZED, FOR SOLUTION INTRAVENOUS at 23:22

## 2023-05-11 RX ADMIN — GABAPENTIN 800 MG: 400 CAPSULE ORAL at 05:02

## 2023-05-11 RX ADMIN — GABAPENTIN 800 MG: 400 CAPSULE ORAL at 21:32

## 2023-05-11 RX ADMIN — Medication 10 ML: at 21:33

## 2023-05-11 RX ADMIN — TAMSULOSIN HYDROCHLORIDE 0.4 MG: 0.4 CAPSULE ORAL at 09:05

## 2023-05-11 RX ADMIN — INSULIN GLARGINE 65 UNITS: 100 INJECTION, SOLUTION SUBCUTANEOUS at 23:22

## 2023-05-11 RX ADMIN — BUMETANIDE 2 MG: 1 TABLET ORAL at 09:07

## 2023-05-11 RX ADMIN — GABAPENTIN 800 MG: 400 CAPSULE ORAL at 15:05

## 2023-05-11 RX ADMIN — APIXABAN 5 MG: 5 TABLET, FILM COATED ORAL at 21:32

## 2023-05-11 RX ADMIN — CEFTRIAXONE 1 G: 1 INJECTION, POWDER, FOR SOLUTION INTRAMUSCULAR; INTRAVENOUS at 18:23

## 2023-05-11 RX ADMIN — INSULIN LISPRO 8 UNITS: 100 INJECTION, SOLUTION INTRAVENOUS; SUBCUTANEOUS at 18:23

## 2023-05-11 RX ADMIN — POTASSIUM CHLORIDE 10 MEQ: 20 TABLET, EXTENDED RELEASE ORAL at 09:06

## 2023-05-11 RX ADMIN — APIXABAN 5 MG: 5 TABLET, FILM COATED ORAL at 09:06

## 2023-05-11 RX ADMIN — LISINOPRIL 20 MG: 10 TABLET ORAL at 21:32

## 2023-05-11 RX ADMIN — NEBIVOLOL HYDROCHLORIDE 5 MG: 5 TABLET ORAL at 09:07

## 2023-05-11 RX ADMIN — Medication 10 ML: at 09:07

## 2023-05-11 RX ADMIN — LISINOPRIL 40 MG: 10 TABLET ORAL at 09:07

## 2023-05-11 RX ADMIN — INSULIN LISPRO 8 UNITS: 100 INJECTION, SOLUTION INTRAVENOUS; SUBCUTANEOUS at 08:11

## 2023-05-11 RX ADMIN — DOXYCYCLINE 100 MG: 100 INJECTION, POWDER, LYOPHILIZED, FOR SOLUTION INTRAVENOUS at 15:06

## 2023-05-11 NOTE — CASE MANAGEMENT/SOCIAL WORK
Discharge Planning Assessment   Sunil     Patient Name: Wilbert Kelley  MRN: [id number]  Today's Date: 5/11/2023    Admit Date: 5/10/2023    Plan: This CM met with pt at bedside to discuss discharge planning.  Per pt report, from home with spouse Yasmin, adult daughter, and two grandchildren; DME as follows, glucometer, BP cuff, FWW, lift chair, rollator, shower chair, rollator, toilet riser, home oxygen from [employer] (prescribed 3 liters continuous), denies further DME needs; active with [facility] ([facility] aware and following for HH only); sees Estevan Renteria APRN; utillizes [employer] for 1x scripts and [employer] mail order for maintence scripts; verified payor; Yasmin to tranport home once medically stable; no expected discharge date discussed by physician.  Discharge plan is home [facility] for [facility].   Discharge Needs Assessment     Row Name 05/11/23 1222       Living Environment    People in Home child(rama), adult;grandchild(rama);spouse    Name(s) of People in Home spouse Yasmin    Current Living Arrangements home    Potentially Unsafe Housing Conditions none    Primary Care Provided by self    Provides Primary Care For no one    Family Caregiver if Needed spouse    Family Caregiver Names Yasmin    Quality of Family Relationships helpful;involved;supportive    Able to Return to Prior Arrangements yes       Resource/Environmental Concerns    Resource/Environmental Concerns none    Transportation Concerns none       Transition Planning    Patient/Family Anticipates Transition to home with family    Transportation Anticipated family or friend will provide       Discharge Needs Assessment    Readmission Within the Last 30 Days no previous admission in last 30 days    Equipment Currently Used at Home glucometer;walker, rolling;bp cuff;rollator;oxygen;shower chair    Concerns Comments SS following for active with [facility]    Anticipated Changes Related to Illness none    Equipment Needed After Discharge none    Discharge  Facility/Level of Care Needs home with home health  SS aware               Discharge Plan     Row Name 05/11/23 1240       Plan    Plan This CM met with pt at bedside to discuss discharge planning.  Per pt report, from home with spouse Yasmin, adult daughter, and two grandchildren; DME as follows, glucometer, BP cuff, FWW, lift chair, rollator, shower chair, rollator, toilet riser, home oxygen from [employer] (prescribed 3 liters continuous), denies further DME needs; active with [facility] (SS aware and following for HH only); sees Estevan Renteria APRN; [employer] for 1x scripts and [employer] mail order for maintence scripts; verified payor; Yasmin to tranport home once medically stable; no expected discharge date discussed by physician.  Discharge plan is home [facility] for [facility].    Patient/Family in Agreement with Plan yes    Plan Comments Pt arrived to ED with c/o SOB and edema; IV Rocephin, IV doxy, nebs Q 6 prn, blood cxs in process, resp panel with covid negative, urine legionella negative; pt recently started on Eliquis, Hg 8.9,  UA positive for RBC too numerous to count; x1 dose IV lasix 80 mg in ED, TTE to be obtained, cardiology consulted, awaiting note at time of review.  SS following for HH only.                   Expected Discharge Date and Time     Expected Discharge Date Expected Discharge Time    May 14, 2023         Lucinda Ashby

## 2023-05-11 NOTE — PLAN OF CARE
Goal Outcome Evaluation: Patient has been very pleasant. Compliant with care. Patient remains on 2L/NC, saturations remaining above 90%. Patient ambulated in room, steady gait noted. Patients family at bedside, updated on plan of care. Patient currently resting in bed. Will continue with plan of care.

## 2023-05-11 NOTE — CONSULTS
Cardiology  CONSULT NOTE    Consults    Patient Identification:  Name:  Wilbert Kelley  Age:  62 y.o.  Sex:  male  :  1960  MRN:  [id number]  Visit Number:  [id number]  Primary care provider:  Jose M Simon MD    Subjective       Reason for the consult:  CHF    Chief complaints:  Cough, and nausea/vomiting      History of presenting illness:    Wilbert Kelley is a 62 y.o. male presented to ED with history of dry cough, nasal discharge, nausea/vomiting and mild increasing shortness of breath.  He has bilateral leg edema for a long time.  proBNP level was more than 3000.  Chest x-ray-questionable CHF.  CT of the chest showed patchy infiltrate suggestive of multifocal pneumonia.  Left pleural effusion-moderate amount was present.    He has history of multivessel coronary artery disease and had CABG x3 3/2023 at [facility].  His postoperative course was complicated by worsening renal failure from which he recovered, pneumonia and postoperative atrial fibrillation.  His LVEF is normal.  No history of prior of recent MI.     He has seen Dr. Boyle recently.  Amiodarone was discontinued.  Eliquis was continued.  Amlodipine was discontinued due to chronic pedal edema.  Lisinopril dose was increased.    He denied history of increasing shortness of breath.  He was not in distress.  He has history suggestive of orthopnea but no PND.  He has chronic bilateral pedal edema.  No history of chest pain or angina.    Cardiac risk factors- DM, hypertension and hyperlipidemia.  --------------------------------------------------------------------------------------------------------------------  Review of Systems:  Constitutional-fatigue, ENT-none, cardiovascular-as above, respiratory-dyspnea GI-stomach problem, endocrine-lipid disorder and diabetes, musculoskeletal-frozen shoulder, genitourinary- kidney problem.  Complete review  done.    ---------------------------------------------------------------------------------------------------------------------   Past History:  Family History   Problem Relation Age of Onset   • COPD Mother    • Heart attack Father 45        passed   • Heart attack Paternal Grandfather 42        massive heart attack   • Heart disease Paternal Grandfather      Past Medical History:   Diagnosis Date   • Arthritis    • BPH (benign prostatic hyperplasia)    • Cancer     basal and melanoma-  x2 - left side ear and elbow   • Constipation    • Coronary artery disease    • DDD (degenerative disc disease), lumbar    • Diabetes mellitus     couple times month check sugar   • Frozen shoulder     left   • GERD (gastroesophageal reflux disease)    • Hyperlipidemia    • Hypertension    • Limited mobility     left shoulder  - possible frozen shoulder-= please be aware for surgery   • Sleep apnea     doesnt use machine   • Tinnitus    • Wears glasses     small print     Past Surgical History:   Procedure Laterality Date   • CARDIAC CATHETERIZATION     • CARDIAC CATHETERIZATION Right 03/16/2023    Procedure: Left Heart Cath;  Surgeon: Jarod Boyle MD;  Location:  [facility];  Service: Cardiovascular;  Laterality: Right;   • CATARACT EXTRACTION, BILATERAL Bilateral    • COLONOSCOPY     • CORONARY ARTERY BYPASS GRAFT N/A 3/21/2023    Procedure: MEDIAN STERNOTOMY, CORONARY ARTERY BYPASS GRAFTING X 3, UTILIZING THE LEFT INTERNAL MAMMARY ARTERY, ENDOSCOPIC VEIN HARVEST OF THE RIGHT GREATER SAPENOUSE VEIN;  Surgeon: Markus Emanuel MD;  Location: [facility] OR;  Service: Cardiothoracic;  Laterality: N/A;   • CORONARY STENT PLACEMENT      x4   • ENDOSCOPY     • FINGER SURGERY Left     middle finger - left side   • SKIN CANCER EXCISION      x2   • WISDOM TOOTH EXTRACTION       Social History     Socioeconomic History   • Marital status:    • Number of children: 1   Tobacco Use   • Smoking status: Never     Passive  exposure: Past   • Smokeless tobacco: Current     Types: Snuff   Vaping Use   • Vaping Use: Never used   Substance and Sexual Activity   • Alcohol use: Not Currently   • Drug use: Never   • Sexual activity: Defer     ---------------------------------------------------------------------------------------------------------------------   Allergies:  Morphine and Adhesive tape  ---------------------------------------------------------------------------------------------------------------------     Hospital Meds:  apixaban, 5 mg, Oral, BID  aspirin, 81 mg, Oral, Daily  atorvastatin, 40 mg, Oral, Daily  [START ON 5/12/2023] bumetanide, 2 mg, Oral, Daily  cefTRIAXone, 1 g, Intravenous, Q24H  doxycycline, 100 mg, Intravenous, Q12H  gabapentin, 800 mg, Oral, Q8H  insulin glargine, 65 Units, Subcutaneous, BID  insulin lispro, 3-14 Units, Subcutaneous, TID With Meals  lisinopril, 20 mg, Oral, Nightly  lisinopril, 40 mg, Oral, Daily  nebivolol, 5 mg, Oral, Q24H  pantoprazole, 40 mg, Oral, Q AM  potassium chloride, 10 mEq, Oral, Daily  sodium chloride, 10 mL, Intravenous, Q12H  tamsulosin, 0.4 mg, Oral, Daily         ---------------------------------------------------------------------------------------------------------------------     Objective     Vital Signs:  Temp:  [97.5 °F (36.4 °C)-98.2 °F (36.8 °C)] 98.2 °F (36.8 °C)  Heart Rate:  [59-70] 63  Resp:  [18-22] 20  BP: (128-172)/(68-98) 157/74      05/10/23  1623 05/10/23  2351 05/11/23  0500   Weight: (!) 144 kg (317 lb) (!) 144 kg (317 lb) (!) 144 kg (317 lb)     Body mass index is 41.82 kg/m².  ---------------------------------------------------------------------------------------------------------------------   Physical exam:       General Appearance:  HEENT:   Neck:     Alert, cooperative, in no acute distress  Grossly normal   No JVD    Lungs:    Diminished air entry on the left lower chest.  No rales.     Heart:   Regular rhythm.  Normal heart rate.  No murmurs.     Chest Wall:  No abnormalities observed.  Surgical scar appears good.    Abdomen   Soft, nontender, no masses, no organomegaly and bowel sounds are heard.     Extremities:  Bilateral pedal edema.  Right more than left.    Pulses: Pulses palpable and equal bilaterally    Neurologic: No focal deficits          ---------------------------------------------------------------------------------------------------------------------   ECG:   ECG/EMG Results (last 24 hours)     Procedure Component Value Units Date/Time    ECG 12 Lead Dyspnea [[id number]] Collected: 05/10/23 1618     Updated: 05/10/23 1810     QT Interval 418 ms      QTC Interval 438 ms     Narrative:      Test Reason : Dyspnea  Blood Pressure :   */*   mmHG  Vent. Rate :  66 BPM     Atrial Rate :  66 BPM     P-R Int : 156 ms          QRS Dur : 102 ms      QT Int : 418 ms       P-R-T Axes :   6  36 149 degrees     QTc Int : 438 ms    Normal sinus rhythm  Cannot rule out Inferior infarct (cited on or before 07-MAY-2023)  T wave abnormality, consider lateral ischemia  Abnormal ECG  When compared with ECG of 07-MAY-2023 00:28,  No significant change was found  Confirmed by Kendy Borrero ([id number]) on 5/10/2023 6:09:56 PM    Referred By: SYDNI           Confirmed By: Kendy Borrero    SCANNED - TELEMETRY   [[id number]] Resulted: 05/10/23     Updated: 05/11/23 0651    Adult Transthoracic Echo Complete w/ Color, Spectral and Contrast if necessary per protocol [[id number]] Resulted: 05/11/23 1433     Updated: 05/11/23 1440     Target HR (85%) 135 bpm      Max. Pred. HR (100%) 159 bpm      LVIDd 5.0 cm      LVIDs 2.9 cm      IVSd 0.90 cm      LVPWd 0.90 cm      FS 42.0 %      IVS/LVPW 1.00 cm      ESV(cubed) 24.4 ml      LV Sys Vol (BSA corrected) 6.3 cm2      EDV(cubed) 125.0 ml      LV Neri Vol (BSA corrected) 16.7 cm2      LVOT area 3.1 cm2      LV mass(C)d 158.2 grams      LVOT diam 2.00 cm      EDV(MOD-sp4) 43.8 ml      ESV(MOD-sp4) 16.4 ml      SV(MOD-sp4) 27.4 ml       SI(MOD-sp4) 10.5 ml/m2      EF(MOD-sp4) 62.6 %      MV E max benigno 106.0 cm/sec      MV A max benigno 79.7 cm/sec      MV E/A 1.33     LA ESV Index (BP) 13.7 ml/m2      Med Peak E' Benigno 5.3 cm/sec      Lat Peak E' Benigno 5.4 cm/sec      Avg E/e' ratio 19.81     TAPSE (>1.6) 1.80 cm      Ao pk benigno 144.0 cm/sec      Ao max PG 8.3 mmHg      Ao mean PG 4.0 mmHg      Ao V2 VTI 28.9 cm      TR max benigno 290.0 cm/sec      TR max PG 33.6 mmHg      RVSP(TR) 43.6 mmHg      RAP systole 10.0 mmHg      PA acc time 0.09 sec      PA pr(Accel) 37.6 mmHg      Ao root diam 3.4 cm      ACS 1.80 cm     Narrative:      •  The quality of the study is limited with poor acoustic windows.  •  Left ventricular systolic function is normal. Left ventricular ejection   fraction appears to be 61 - 65%.  •  Left ventricular diastolic function is consistent with (grade II w/high   LAP) pseudonormalization.  •  The cardiac chamber dimensions are normal.  •  No significant valvular heart disease is present.  •  Mild pulmonary hypertension is present.  •  There is no evidence of pericardial effusion.            --------------------------------------------------------------------------------------------------------------------   Results from last 7 days   Lab Units 05/11/23  0133 05/10/23  1856 05/10/23  1702 05/06/23  1955   CRP mg/dL 22.54*  --  24.14* 15.92*   LACTATE mmol/L  --  1.8  --  1.4   WBC 10*3/mm3 10.03  --  10.54 15.91*   HEMOGLOBIN g/dL 8.9*  --  9.0* 9.8*   HEMATOCRIT % 30.2*  --  29.5* 31.8*   MCV fL 84.1  --  84.0 84.8   MCHC g/dL 29.5*  --  30.5* 30.8*   PLATELETS 10*3/mm3 467*  --  452* 410   INR   --   --   --  1.07     Results from last 7 days   Lab Units 05/10/23  2056   PH, ARTERIAL pH units 7.522*   PO2 ART mm Hg 64.7*   PCO2, ARTERIAL mm Hg 38.3   HCO3 ART mmol/L 31.4*     Results from last 7 days   Lab Units 05/11/23  0133 05/10/23  1702 05/06/23  1955   SODIUM mmol/L 139 138 141   POTASSIUM mmol/L 3.6 3.9 4.0   MAGNESIUM mg/dL  --    --  1.6   CHLORIDE mmol/L 98 99 104   CO2 mmol/L 28.0 26.5 25.3   BUN mg/dL 21 21 14   CREATININE mg/dL 1.05 0.98 1.05   CALCIUM mg/dL 8.8 8.5* 9.0   GLUCOSE mg/dL 235* 229* 121*   ALBUMIN g/dL 3.3* 3.1* 3.7   BILIRUBIN mg/dL 1.0 1.1 0.8   ALK PHOS U/L 106 105 73   AST (SGOT) U/L 16 18 16   ALT (SGPT) U/L 27 29 12   Estimated Creatinine Clearance: 110.8 mL/min (by C-G formula based on SCr of 1.05 mg/dL).  No results found for: AMMONIA  Results from last 7 days   Lab Units 05/10/23  1856 05/10/23  1702 05/06/23  2228   HSTROP T ng/L 34* 34* 48*     Results from last 7 days   Lab Units 05/10/23  1702 05/06/23  1955   PROBNP pg/mL 3,126.0* 3,018.0*     Lab Results   Component Value Date    HGBA1C 7.70 (H) 03/20/2023     No results found for: TSH, FREET4  No results found for: PREGTESTUR, PREGSERUM, HCG, HCGQUANT  Pain Management Panel         Latest Ref Rng & Units 3/23/2023 3/20/2023   Pain Management Panel   Creatinine, Urine mg/dL 246.9      Amphetamine, Urine Qual Negative  Negative     Barbiturates Screen, Urine Negative  Negative     Benzodiazepine Screen, Urine Negative  Negative     Buprenorphine, Screen, Urine Negative  Negative     Cocaine Screen, Urine Negative  Negative     Methadone Screen , Urine Negative  Negative     Methamphetamine, Ur Negative  Negative                Blood Culture   Date Value Ref Range Status   05/06/2023 No growth at 4 days  Preliminary   05/06/2023 No growth at 4 days  Preliminary     No results found for: URINECX  No results found for: WOUNDCX  No results found for: STOOLCX      ---------------------------------------------------------------------------------------------------------------------   Radiology:    No results found for this or any previous visit.      Results for orders placed during the hospital encounter of 04/06/23    XR Chest PA & Lateral    Narrative  XR CHEST PA AND LATERAL    Date of Exam: 4/6/2023 4:36 PM EDT    Indication: shortness of breath,  cough.    Comparison: 3/29/2023    Findings:  Heart size appears normal for the projection. There are postoperative changes of prior sternotomy. There is a dependent left-sided pleural effusion. Pulmonary vascular markings appear normal. There is some scarring in the peripheral aspect of the left  lung. There is some patchy airspace disease in the right midlung.    Impression  Impression:    1. Postoperative changes of prior sternotomy.  2. Patchy right perihilar infiltrate.  3. Small left-sided dependent pleural effusion    Electronically Signed: Stephen RAZO Don  4/6/2023 4:48 PM EDT  Workstation ID: [id number]      Results for orders placed during the hospital encounter of 05/10/23    XR Chest 1 View    Narrative  XR CHEST 1 VW-    CLINICAL INDICATION: SOA      COMPARISON: 05/06/2023    TECHNIQUE: Single frontal view of the chest.    FINDINGS:    LUNGS: Probable CHF    HEART AND MEDIASTINUM: Heart and mediastinal contours are unremarkable      SKELETON: Bony and soft tissue structures are unremarkable.    Impression  Likely mild CHF    This report was finalized on 5/10/2023 5:22 PM by Dr. Oliver Renner MD.      Results for orders placed during the hospital encounter of 05/10/23    CT Angiogram Chest Pulmonary Embolism    Narrative  Contrast-enhanced CTA chest    Indications: Shortness of breath    Technique: Contrast-enhanced CTA images were obtained.  Limited exposure  control, adjustment of the MA and/or KV according to patient size or use  of an iterative reconstruction technique was utilized.  Three-dimensional reconstructions were done.    Findings CTA chest:    Comparison is made to prior study dated 5/6/2023.    There is no evidence of filling defect within the main or lobar  pulmonary arteries to suggest pulmonary embolism.    The heart is not enlarged.  There is no pericardial effusion.  The  thoracic aorta is normal in course and caliber.    There is an enlarged right paratracheal lymph node measuring 1.3  cm.  There is an enlarged right paratracheal/right paraesophageal lymph node  measuring 1.1 cm.  There is an enlarged right hilar lymph node measuring  1.1 cm.  There is a more inferior large right hilar lymph node measuring  1.1 cm.  An enlarged subcarinal lymph node measures 1.4 cm.  An AP  window lymph node measures 1.3 cm.    Limited images of the upper abdomen are normal.    There is a moderate left pleural effusion with adjacent compressive  atelectasis.  There are multifocal bilateral infiltrates predominantly  involving the perihilar regions and right upper lobe.  The infiltrates  are new since the prior study.  Left pleural effusion is stable.    The osseous structures are normal.    Impression  Impression:  1.  No CTA evidence of pulmonary embolism.  2.  Moderate left pleural effusion with adjacent atelectasis, similar to  the prior study.  3.  Interval development of patchy multifocal groundglass and alveolar  infiltrates predominately involving the perihilar regions and the right  upper lobe.  This could represent hemorrhage, pulmonary edema, or  infectious etiologies.    This report was finalized on 5/10/2023 10:34 PM by Robin Fairchild MD.      I have personally reviewed the radiology images and read the final radiology report.        Assessment      1.  Chronic heart failure with preserved EF.  Clinically stable.  2.  Multivessel CAD status post CABG x3-     3/2023  3.  Postoperative atrial fibrillation.  In sinus rhythm.  4.  CHADS2 vascular score-3.  On Eliquis.  5.  Left pleural effusion secondary to CABG surgery   6.  Bilateral multifocal pneumonia with acute hypoxic respiratory failure  7.  Multiple cardiac risk factors- DM, HTN and HLP    Recommendations     1.  Bumex 2 mg p.o. twice daily was changed to daily as heart failure is stable with no evidence of pulmonary edema.  Pedal edema is chronic and most likely due to amlodipine therapy which has been discontinued recently.    2.  Reviewed  echocardiographic study.  LVEF is preserved and no evidence of pericardial effusion.  Patient was notified.    3.  Reviewed his medications.  He is on appropriate drug therapy for IHD which include aspirin, atorvastatin, beta-blocker-Bystolic and lisinopril.  Risk factors modifications were discussed.    4.  He is anticoagulated on Eliquis for postop A-fib.     5.  Needs referral to cardiac rehabilitation after discharge.    6.  Monitor left pleural effusion.  If becomes symptomatic and progressive, may need thoracentesis.      Thank you for the opportunity to participate in the care of your patient. Please do not hesitate to call with any questions or concerns.  Dr. Barrett, his primary cardiologist will resume cardiac care in a..    Samson Calabrese MD, [facility],  05/11/23  15:08 EDT

## 2023-05-11 NOTE — PROGRESS NOTES
[facility] HOSPITALIST PROGRESS NOTE     Patient Identification:  Name:  Wilbert Kelley  Age:  62 y.o.  Sex:  male  :  1960  MRN:  [id number]  Visit Number:  [id number]  ROOM: [address] [place] [address]     Primary Care Provider:  Jose M Simon MD    Length of stay in inpatient status:  1    Subjective     Chief Compliant:    Chief Complaint   Patient presents with   • Shortness of Breath   • Edema       History of Presenting Illness: 62-year-old gentleman with a history of CAD with CABG x3 on 2023, postoperative atrial fibrillation, 6 obstructive sleep apnea, GERD, hyperlipidemia, hypertension, diabetes mellitus, BPH, obesity.  Patient presented to the hospital with shortness of breath and edema.  Patient apparently over the last few days and had increasing shortness of breath and required O2 at home.  Patient has been using CPAP at night as well.  Patient was seen by cardiology on Monday and was placed on 3 L of O2 and had Bumex increased.  Patient has had a cough productive of green sputum and did have a fever at home over the past week.  He states that he also has another family member who had recent infection.  When patient arrived patient did become hypoxic on room air.  CT angiogram was performed which still not evident no evidence of PE but did have a moderate left-sided pleural effusion as well as patchy multifocal groundglass infiltrates bilaterally.  This morning patient is sitting in a chair and states that he is doing fair at this time.  Denies chest pain or other difficulties.    Objective     Current Hospital Meds:apixaban, 5 mg, Oral, BID  aspirin, 81 mg, Oral, Daily  atorvastatin, 40 mg, Oral, Daily  bumetanide, 2 mg, Oral, BID  cefTRIAXone, 1 g, Intravenous, Q24H  doxycycline, 100 mg, Intravenous, Q12H  gabapentin, 800 mg, Oral, Q8H  insulin glargine, 65 Units, Subcutaneous, BID  insulin lispro, 3-14 Units, Subcutaneous, TID With Meals  lisinopril, 20 mg, Oral,  Nightly  lisinopril, 40 mg, Oral, Daily  nebivolol, 5 mg, Oral, Q24H  pantoprazole, 40 mg, Oral, Q AM  potassium chloride, 10 mEq, Oral, Daily  sodium chloride, 10 mL, Intravenous, Q12H  tamsulosin, 0.4 mg, Oral, Daily       ----------------------------------------------------------------------------------------------------------------------  Vital Signs:  Temp:  [97.5 °F (36.4 °C)-98.1 °F (36.7 °C)] 97.9 °F (36.6 °C)  Heart Rate:  [59-70] 64  Resp:  [18-22] 20  BP: (128-172)/(68-98) 150/68  SpO2:  [92 %-98 %] 97 %  on  Flow (L/min):  [2] 2;   Device (Oxygen Therapy): nasal cannula  Body mass index is 41.82 kg/m².    Wt Readings from Last 3 Encounters:   05/11/23 (!) 144 kg (317 lb)   05/08/23 (!) 141 kg (310 lb)   05/06/23 (!) 141 kg (310 lb)     Intake & Output (last 3 days)       05/08 0701  05/09 0700 05/09 0701  05/10 0700 05/10 0701 05/11 0700 05/11 0701  05/12 0700    P.O.   480     IV Piggyback   100     Total Intake(mL/kg)   580 (4)     Net   +580             Urine Unmeasured Occurrence   1 x         Diet: Cardiac Diets, Diabetic Diets; Healthy Heart (2-3 Na+); Consistent Carbohydrate; Texture: Regular Texture (IDDSI 7); Fluid Consistency: Thin (IDDSI 0)  ----------------------------------------------------------------------------------------------------------------------  Physical exam:  Constitutional:   Overweight gentleman in no acute distress  HEENT: Normocephalic atraumatic  Neck:    Supple  Cardiovascular: Regular rate and rhythm  Pulmonary/Chest: Decreased breath sounds in the bases but fair air movement otherwise.  Abdominal:  .  Positive bowel sounds soft  Musculoskeletal: No arthropathy  Neurological: No focal deficits  Skin: Surgical wound from recent CABG appears to have healed well  Peripheral vascular: 1+ edema  Genitourinary:  ----------------------------------------------------------------------------------------------------------------------    Last echocardiogram:  Results for orders  placed during the hospital encounter of 02/02/23    Adult Transthoracic Echo Complete w/ Color, Spectral and Contrast if necessary per protocol    Interpretation Summary  •  Left ventricular systolic function is normal. Left ventricular ejection fraction appears to be 66 - 70%.  •  Left ventricular wall thickness is consistent with mild concentric hypertrophy.  •  Left ventricular diastolic function is consistent with (grade II w/high LAP) pseudonormalization.  •  Technically very difficult study.  Right ventricle and right atrium is not well visualized for adequate evaluation.    ----------------------------------------------------------------------------------------------------------------------  Results from last 7 days   Lab Units 05/11/23  0133 05/10/23  1856 05/10/23  1702 05/06/23  1955   CRP mg/dL 22.54*  --  24.14* 15.92*   LACTATE mmol/L  --  1.8  --  1.4   WBC 10*3/mm3 10.03  --  10.54 15.91*   HEMOGLOBIN g/dL 8.9*  --  9.0* 9.8*   HEMATOCRIT % 30.2*  --  29.5* 31.8*   MCV fL 84.1  --  84.0 84.8   MCHC g/dL 29.5*  --  30.5* 30.8*   PLATELETS 10*3/mm3 467*  --  452* 410   INR   --   --   --  1.07     Results from last 7 days   Lab Units 05/10/23  2056   PH, ARTERIAL pH units 7.522*   PO2 ART mm Hg 64.7*   PCO2, ARTERIAL mm Hg 38.3   HCO3 ART mmol/L 31.4*     Results from last 7 days   Lab Units 05/11/23  0133 05/10/23  1702 05/06/23  1955   SODIUM mmol/L 139 138 141   POTASSIUM mmol/L 3.6 3.9 4.0   MAGNESIUM mg/dL  --   --  1.6   CHLORIDE mmol/L 98 99 104   CO2 mmol/L 28.0 26.5 25.3   BUN mg/dL 21 21 14   CREATININE mg/dL 1.05 0.98 1.05   CALCIUM mg/dL 8.8 8.5* 9.0   GLUCOSE mg/dL 235* 229* 121*   ALBUMIN g/dL 3.3* 3.1* 3.7   BILIRUBIN mg/dL 1.0 1.1 0.8   ALK PHOS U/L 106 105 73   AST (SGOT) U/L 16 18 16   ALT (SGPT) U/L 27 29 12   Estimated Creatinine Clearance: 110.8 mL/min (by C-G formula based on SCr of 1.05 mg/dL).  No results found for: AMMONIA  Results from last 7 days   Lab Units 05/10/23  7999  05/10/23  1702 05/06/23 2228   HSTROP T ng/L 34* 34* 48*     Results from last 7 days   Lab Units 05/10/23  1702 05/06/23 1955   PROBNP pg/mL 3,126.0* 3,018.0*         Glucose   Date/Time Value Ref Range Status   05/11/2023 0635 264 (H) 70 - 130 mg/dL Final     Comment:     Meter: [id number] : [id number] MARY LOU BILLY   05/10/2023 2355 211 (H) 70 - 130 mg/dL Final     Comment:     Meter: [id number] : [id number] Nazanin Coffey     No results found for: TSH, FREET4  No results found for: PREGTESTUR, PREGSERUM, HCG, HCGQUANT  Pain Management Panel         Latest Ref Rng & Units 3/23/2023 3/20/2023   Pain Management Panel   Creatinine, Urine mg/dL 246.9      Amphetamine, Urine Qual Negative  Negative     Barbiturates Screen, Urine Negative  Negative     Benzodiazepine Screen, Urine Negative  Negative     Buprenorphine, Screen, Urine Negative  Negative     Cocaine Screen, Urine Negative  Negative     Methadone Screen , Urine Negative  Negative     Methamphetamine, Ur Negative  Negative                Brief Urine Lab Results  (Last result in the past 365 days)      Color   Clarity   Blood   Leuk Est   Nitrite   Protein   CREAT   Urine HCG        05/10/23 1829 Dark Yellow   Clear   Large (3+)   Trace   Negative   30 mg/dL (1+)               Blood Culture   Date Value Ref Range Status   05/06/2023 No growth at 4 days  Preliminary   05/06/2023 No growth at 4 days  Preliminary     Results from last 7 days   Lab Units 05/10/23  1829   NITRITE UA  Negative   WBC UA /HPF 6-12*   BACTERIA UA /HPF None Seen   SQUAM EPITHEL UA /HPF None Seen     No results found for: URINECX  No results found for: WOUNDCX  No results found for: STOOLCX  Results from last 7 days   Lab Units 05/11/23  0133 05/10/23  1856 05/10/23  1702 05/06/23 1955   PROCALCITONIN ng/mL  --   --  0.08 0.07   LACTATE mmol/L  --  1.8  --  1.4   SED RATE mm/hr  --   --   --  69*   CRP mg/dL 22.54*  --  24.14* 15.92*       I have personally looked at the labs  and they are summarized above.  ----------------------------------------------------------------------------------------------------------------------  Detailed radiology reports for the last 24 hours:    Imaging Results (Last 24 Hours)     Procedure Component Value Units Date/Time    CT Angiogram Chest Pulmonary Embolism [[id number]] Collected: 05/10/23 2234     Updated: 05/10/23 2236    Narrative:      Contrast-enhanced CTA chest     Indications: Shortness of breath     Technique: Contrast-enhanced CTA images were obtained.  Limited exposure  control, adjustment of the MA and/or KV according to patient size or use  of an iterative reconstruction technique was utilized.   Three-dimensional reconstructions were done.     Findings CTA chest:     Comparison is made to prior study dated 5/6/2023.     There is no evidence of filling defect within the main or lobar  pulmonary arteries to suggest pulmonary embolism.     The heart is not enlarged.  There is no pericardial effusion.  The  thoracic aorta is normal in course and caliber.     There is an enlarged right paratracheal lymph node measuring 1.3 cm.   There is an enlarged right paratracheal/right paraesophageal lymph node  measuring 1.1 cm.  There is an enlarged right hilar lymph node measuring  1.1 cm.  There is a more inferior large right hilar lymph node measuring  1.1 cm.  An enlarged subcarinal lymph node measures 1.4 cm.  An AP  window lymph node measures 1.3 cm.     Limited images of the upper abdomen are normal.     There is a moderate left pleural effusion with adjacent compressive  atelectasis.  There are multifocal bilateral infiltrates predominantly  involving the perihilar regions and right upper lobe.  The infiltrates  are new since the prior study.  Left pleural effusion is stable.     The osseous structures are normal.       Impression:      Impression:  1.  No CTA evidence of pulmonary embolism.  2.  Moderate left pleural effusion with adjacent  atelectasis, similar to  the prior study.  3.  Interval development of patchy multifocal groundglass and alveolar  infiltrates predominately involving the perihilar regions and the right  upper lobe.  This could represent hemorrhage, pulmonary edema, or  infectious etiologies.     This report was finalized on 5/10/2023 10:34 PM by Robin Fairchild MD.       XR Chest 1 View [[id number]] Collected: 05/10/23 1721     Updated: 05/10/23 1724    Narrative:      XR CHEST 1 VW-     CLINICAL INDICATION: SOA        COMPARISON: 05/06/2023      TECHNIQUE: Single frontal view of the chest.     FINDINGS:      LUNGS: Probable CHF      HEART AND MEDIASTINUM: Heart and mediastinal contours are unremarkable        SKELETON: Bony and soft tissue structures are unremarkable.             Impression:      Likely mild CHF     This report was finalized on 5/10/2023 5:22 PM by Dr. Oliver Renner MD.           Final impressions for the last 30 days of radiology reports:    XR Chest 2 View    Result Date: 4/25/2023  Impression: 1. Stable small left pleural effusion. 2. No pneumothorax. Electronically Signed: Tom Banks  4/25/2023 3:25 PM EDT  Workstation ID: [id number]    XR Chest 1 View    Result Date: 5/10/2023  Likely mild CHF  This report was finalized on 5/10/2023 5:22 PM by Dr. Oliver Renner MD.      XR Chest 1 View    Result Date: 5/6/2023  Moderate pulmonary vascular congestion and small left pleural effusion.  This report was finalized on 5/6/2023 8:47 PM by Alex Pallas, DO.      CT Angiogram Chest Pulmonary Embolism    Result Date: 5/10/2023  Impression: 1.  No CTA evidence of pulmonary embolism. 2.  Moderate left pleural effusion with adjacent atelectasis, similar to the prior study. 3.  Interval development of patchy multifocal groundglass and alveolar infiltrates predominately involving the perihilar regions and the right upper lobe.  This could represent hemorrhage, pulmonary edema, or infectious etiologies.  This report was  finalized on 5/10/2023 10:34 PM by Robin Fairchild MD.      CT Angiogram Chest Pulmonary Embolism    Result Date: 5/6/2023  1.  Negative for pulmonary embolism. 2.  Postoperative changes from median sternotomy. No soft tissue gas. No walled off drainable fluid collection evident. Small left pleural effusion.  This report was finalized on 5/6/2023 9:53 PM by Alex Pallas, DO.      I have personally looked at the radiology images and read the final radiology report.    Assessment & Plan    Acute multifocal pneumonia--patient did have viral panel performed which was negative.  Urinary Legionella pending.  Patient was empirically started on IV Rocephin and doxycycline.  Does have elevated CRP level and will check procalcitonin level.    CHF with moderate-sized pleural effusion on the left--patient given IV Lasix 80 mg x 1 last night in the emergency department.  Continue Bumex 2 mg twice daily today.  Cardiology consultation will be obtained.  Did have echocardiogram performed in February which showed EF of 65 to 70%.  Patient is on ACE inhibitor as well.    Acute hypoxic respiratory failure secondary to above--patient currently on 2 L per nasal cannula.  Will treat underlying issues    BPH--Flomax    CAD with history of CABG x3 in March 2023--continue aspirin, Lipitor, beta-blocker    Diabetes mellitus fair control.    Obesity--BMI 41.82 kg per metered square, complicates all aspects of care.    Atrial fibrillation--currently in a sinus rhythm but has been continued on Eliquis.      VTE Prophylaxis:   Mechanical Order History:     None      Pharmalogical Order History:      Ordered     Dose Route Frequency Stop    05/11/23 0305  apixaban (ELIQUIS) tablet 5 mg         5 mg PO 2 Times Daily --                The patient is high risk due to the following diagnoses/reasons: Multifocal pneumonia/CHF  Disposition: Home  Chris Varghese MD  [facility]  05/11/23  08:13 EDT

## 2023-05-11 NOTE — PLAN OF CARE
"Goal Outcome Evaluation:      Pt is a new admit this shift from ED. Pt is sitting up in bed awake and watching television. No s/s of acute distress noted. No complaints verbalized at this time. Pt stated he was in pain because he has neuropathy, but refused any kind of meds for pain. Pt stated \"pain medication doesn't help me.\"                   "

## 2023-05-11 NOTE — H&P
[facility] Medicine Services  HISTORY & PHYSICAL    Patient Identification:  Name:  Wilbert Kelley  Age:  62 y.o.  Sex:  male  :  1960  MRN:  [id number]   Visit Number:  [id number]  Admit Date: 5/10/2023   Primary Care Physician:  Jose M Simon MD     Subjective     Chief complaint:   Chief Complaint   Patient presents with   • Shortness of Breath   • Edema     History of presenting illness:   Patient is a 62 y.o. male with past medical history significant for hyperlipidemia, CAD, MARIAH, GERD, hypertension, diabetes, BPH, obesity, and recent triple bypass surgery at [facility] on 2023 with postoperative atrial fibrillation that presented to the [facility] emergency department for evaluation of shortness of breath and edema.  Symptoms began approximately 2 AM on Wilbur morning.  Patient's wife states Wilbur morning the patient's O2 saturation was 72 to 84% on CPAP.  Patient is supposed to be on nightly CPAP, but machine was taken so he has been using wife's CPAP.  Wife states later that day patient's oxygen was low and they obtained oxygen from a family friend.  Wife states on Monday patient was able to see cardiologist Dr. Boyle who prescribed patient 3 L of oxygen and doubled his daily dose of Bumex for 7 days.  Wife states Monday patient's symptoms continue to worsen and she had to increase the patient's oxygen to 5 L to get his O2 sat to 92% at best.  Wife does state the patient seems to be worse at night.  Patient saw PCP yesterday and was given Rocephin injection and started on Augmentin and doxycycline and has had some improvement.  Patient endorses cough productive of green sputum.  Improved after starting on Mucinex.  Symptoms worsened by sleep and exertion.  Patient does admit that he has been exposed to grandchild with unknown viral illness.  On exam, patient is sitting up at bedside eating, wife at bedside.  Wife states patient has never had a  diagnosis of CHF.  She states she was not on any diuretics until he had a CABG in March.  She does states she has noted his shortness of breath and oxygen saturation has improved since starting the ABX from his PCP.  Wife states patient started Eliquis 2 days ago.  Upon chart review, it appears patient has had paroxysmal atrial fibrillation since CABG and has a high [id number] score therefore was switched from Plavix to Eliquis.    Upon arrival to the ED, vitals were temperature 97.5, HR 64, RR 18, /74, SPO2 98% on room air.  Labs significant for glucose 229, hemoglobin 9.0, hematocrit 29.5.  CRP 24.14 high-sensitivity troponin 34, repeat 34.  proBNP 3,126.  ABG with pH to 7.522 PO2 64.7 HCO3 31.4, base excess 8.0 on room air.  UA dark yellow, 3+ blood, trace leukocytes, small protein, small bilirubin,, 4.0 urobilinogen, too numerous RBC, 6-12 WBC.    CXR shows mild CHF.  CT angiogram chest shows no CTA evidence of pulmonary embolism, moderate left pleural effusion with adjacent atelectasis similar to prior study.  Interval development of patchy multifocal groundglass and alveolar infiltrates predominantly involving the perihilar regions in the right upper lobe.  This could represent hemorrhage, pulmonary edema, or infectious etiologies.    Patient has been admitted to the telemetry unit.   ---------------------------------------------------------------------------------------------------------------------   Review of Systems   Constitutional: Positive for fatigue. Negative for chills, diaphoresis and fever.   Respiratory: Positive for cough and shortness of breath. Negative for wheezing.    Cardiovascular: Positive for leg swelling. Negative for chest pain and palpitations.   Gastrointestinal: Negative for abdominal pain, constipation, diarrhea, nausea and vomiting.   Genitourinary: Negative for difficulty urinating, dysuria and flank pain.   Musculoskeletal: Negative for arthralgias, myalgias and neck  stiffness.   Skin: Negative for rash and wound.   Neurological: Negative for dizziness, weakness and light-headedness.   Psychiatric/Behavioral: Negative for agitation and confusion.      ---------------------------------------------------------------------------------------------------------------------   Past Medical History:   Diagnosis Date   • Arthritis    • BPH (benign prostatic hyperplasia)    • Cancer     basal and melanoma-  x2 - left side ear and elbow   • Constipation    • Coronary artery disease    • DDD (degenerative disc disease), lumbar    • Diabetes mellitus     couple times month check sugar   • Frozen shoulder     left   • GERD (gastroesophageal reflux disease)    • Hyperlipidemia    • Hypertension    • Limited mobility     left shoulder  - possible frozen shoulder-= please be aware for surgery   • Sleep apnea     doesnt use machine   • Tinnitus    • Wears glasses     small print     Past Surgical History:   Procedure Laterality Date   • CARDIAC CATHETERIZATION     • CARDIAC CATHETERIZATION Right 03/16/2023    Procedure: Left Heart Cath;  Surgeon: Jarod Boyle MD;  Location:  [facility] CATH [facility]VAS[facility]ATION;  Service: Cardiovascular;  Laterality: Right;   • CATARACT EXTRACTION, BILATERAL Bilateral    • COLONOSCOPY     • CORONARY ARTERY BYPASS GRAFT N/A 3/21/2023    Procedure: MEDIAN STERNOTOMY, CORONARY ARTERY BYPASS GRAFTING X 3, UTILIZING THE LEFT INTERNAL MAMMARY ARTERY, ENDOSCOPIC VEIN HARVEST OF THE RIGHT GREATER SAPENOUSE VEIN;  Surgeon: Markus Emanuel MD;  Location: [facility] OR;  Service: Cardiothoracic;  Laterality: N/A;   • CORONARY STENT PLACEMENT      x4   • ENDOSCOPY     • FINGER SURGERY Left     middle finger - left side   • SKIN CANCER EXCISION      x2   • WISDOM TOOTH EXTRACTION       Family History   Problem Relation Age of Onset   • COPD Mother    • Heart attack Father 45        passed   • Heart attack Paternal Grandfather 42        massive heart attack   • Heart disease Paternal  Grandfather      Social History     Socioeconomic History   • Marital status:    • Number of children: 1   Tobacco Use   • Smoking status: Never     Passive exposure: Past   • Smokeless tobacco: Current     Types: Snuff   Vaping Use   • Vaping Use: Never used   Substance and Sexual Activity   • Alcohol use: Not Currently   • Drug use: Never   • Sexual activity: Defer     ---------------------------------------------------------------------------------------------------------------------   Allergies:  Morphine and Adhesive tape  ---------------------------------------------------------------------------------------------------------------------   Medications below are reported home medications pulling from within the system; at this time, these medications have not been reconciled unless otherwise specified and are in the verification process for further verifcation as current home medications.    Prior to Admission Medications     Prescriptions Last Dose Informant Patient Reported? Taking?    apixaban (ELIQUIS) 5 MG tablet tablet   No No    Take 1 tablet by mouth 2 (Two) Times a Day.    aspirin 81 MG EC tablet   No No    Take 1 tablet by mouth Daily.    atorvastatin (LIPITOR) 40 MG tablet   No No    Take 1 tablet by mouth Every Night.    B-D ULTRAFINE III SHORT PEN 31G X 8 MM misc   Yes No    BD Insulin Syringe U-500 31G X 6MM 0.5 ML misc   Yes No    bumetanide (BUMEX) 2 MG tablet   No No    Take 1 tablet by mouth 2 (Two) Times a Day for 7 days.    fenofibrate 160 MG tablet  Self, Pharmacy Yes No    Take 1 tablet by mouth Daily.    gabapentin (NEURONTIN) 800 MG tablet   Yes No    1 tablet 3 (Three) Times a Day.    Insulin Glargine (TOUJEO MAX SOLOSTAR SC)  Self, Pharmacy Yes No    Inject 80 Units under the skin into the appropriate area as directed 2 (Two) Times a Day.    Insulin Regular Human, Conc, (HumuLIN R) 500 UNIT/ML solution pen-injector CONCENTRATED injection   Yes No    Inject 65 Units under the  skin into the appropriate area as directed 3 (Three) Times a Day Before Meals.    lisinopril (PRINIVIL,ZESTRIL) 40 MG tablet   No No    Take 1.5 tablets by mouth Daily.    metFORMIN (GLUCOPHAGE) 1000 MG tablet   No No    Take 1 tablet by mouth 2 (Two) Times a Day With Meals. Hold metformin for 2 days post-cath.  Restart on 3/19/2023    nebivolol (BYSTOLIC) 5 MG tablet   No No    Take 1 tablet by mouth Daily.    ondansetron ODT (ZOFRAN-ODT) 4 MG disintegrating tablet   No No    Place 1 tablet on the tongue Every 8 (Eight) Hours As Needed for Nausea or Vomiting.    potassium chloride 10 MEQ CR tablet   No No    Use 1 pill daily for 7 days with Bumex    tamsulosin (FLOMAX) 0.4 MG capsule 24 hr capsule   No No    Take 1 capsule by mouth Daily.        ---------------------------------------------------------------------------------------------------------------------    Objective     Hospital Scheduled Meds:  cefTRIAXone, 1 g, Intravenous, Q24H  doxycycline, 100 mg, Intravenous, Q12H  sodium chloride, 10 mL, Intravenous, Q12H      sodium chloride 0.45 % with KCl 20 mEq, 75 mL/hr        Current listed hospital scheduled medications may not yet reflect those currently placed in orders that are signed and held, awaiting patient's arrival to floor/unit.    ---------------------------------------------------------------------------------------------------------------------   Vital Signs:  Temp:  [97.5 °F (36.4 °C)-98.1 °F (36.7 °C)] 98.1 °F (36.7 °C)  Heart Rate:  [63-70] 68  Resp:  [18-20] 20  BP: (128-172)/(70-98) 172/90  Mean Arterial Pressure (Non-Invasive) for the past 24 hrs (Last 3 readings):   Noninvasive MAP (mmHg)   05/10/23 2351 110   05/10/23 1900 104   05/10/23 1744 103     SpO2 Percentage    05/10/23 2200 05/10/23 2324 05/10/23 2351   SpO2: 93% 97% 96%     SpO2:  [92 %-98 %] 96 %  on  Flow (L/min):  [2] 2;   Device (Oxygen Therapy): nasal cannula    Body mass index is 41.82 kg/m².  Wt Readings from Last 3  Encounters:   05/10/23 (!) 144 kg (317 lb)   05/08/23 (!) 141 kg (310 lb)   05/06/23 (!) 141 kg (310 lb)     ---------------------------------------------------------------------------------------------------------------------   Physical Exam:  Physical Exam  Constitutional:       General: He is not in acute distress.     Appearance: Normal appearance.   HENT:      Head: Normocephalic and atraumatic.      Right Ear: External ear normal.      Left Ear: External ear normal.      Nose: No nasal deformity.      Mouth/Throat:      Lips: Pink.      Mouth: Mucous membranes are moist.   Eyes:      Conjunctiva/sclera: Conjunctivae normal.      Pupils: Pupils are equal, round, and reactive to light.   Cardiovascular:      Rate and Rhythm: Normal rate and regular rhythm.      Pulses:           Dorsalis pedis pulses are 2+ on the right side and 2+ on the left side.      Heart sounds: Normal heart sounds.   Pulmonary:      Effort: Pulmonary effort is normal.      Breath sounds: Decreased air movement (In lower lung bases) present. Rhonchi (Upper lungs) present. No wheezing or rales.   Abdominal:      General: Abdomen is flat. Bowel sounds are normal.      Palpations: Abdomen is soft.      Tenderness: There is no guarding or rebound.   Genitourinary:     Comments: No mai catheter in place   Musculoskeletal:      Cervical back: Neck supple. Normal range of motion.      Right lower leg: Edema present.      Left lower leg: Edema present.   Skin:     General: Skin is warm and dry.   Neurological:      General: No focal deficit present.      Mental Status: He is alert and oriented to person, place, and time.   Psychiatric:         Mood and Affect: Mood normal.         Behavior: Behavior normal.       ---------------------------------------------------------------------------------------------------------------------  EKG:   Normal sinus rhythm, no acute ischemia.  Confirmed by cardiology.      Telemetry:         I have personally  reviewed the EKG/Telemetry strip  ---------------------------------------------------------------------------------------------------------------------   Results from last 7 days   Lab Units 05/10/23  1856 05/10/23  1702 05/06/23  2228   HSTROP T ng/L 34* 34* 48*     Results from last 7 days   Lab Units 05/10/23  1702 05/06/23  1955   PROBNP pg/mL 3,126.0* 3,018.0*       Results from last 7 days   Lab Units 05/10/23  2056   PH, ARTERIAL pH units 7.522*   PO2 ART mm Hg 64.7*   PCO2, ARTERIAL mm Hg 38.3   HCO3 ART mmol/L 31.4*     Results from last 7 days   Lab Units 05/10/23  1856 05/10/23  1702 05/06/23  1955   CRP mg/dL  --  24.14* 15.92*   LACTATE mmol/L 1.8  --  1.4   WBC 10*3/mm3  --  10.54 15.91*   HEMOGLOBIN g/dL  --  9.0* 9.8*   HEMATOCRIT %  --  29.5* 31.8*   MCV fL  --  84.0 84.8   MCHC g/dL  --  30.5* 30.8*   PLATELETS 10*3/mm3  --  452* 410   INR   --   --  1.07     Results from last 7 days   Lab Units 05/10/23  1702 05/06/23  1955   SODIUM mmol/L 138 141   POTASSIUM mmol/L 3.9 4.0   MAGNESIUM mg/dL  --  1.6   CHLORIDE mmol/L 99 104   CO2 mmol/L 26.5 25.3   BUN mg/dL 21 14   CREATININE mg/dL 0.98 1.05   CALCIUM mg/dL 8.5* 9.0   GLUCOSE mg/dL 229* 121*   ALBUMIN g/dL 3.1* 3.7   BILIRUBIN mg/dL 1.1 0.8   ALK PHOS U/L 105 73   AST (SGOT) U/L 18 16   ALT (SGPT) U/L 29 12   Estimated Creatinine Clearance: 118.7 mL/min (by C-G formula based on SCr of 0.98 mg/dL).  No results found for: AMMONIA    Glucose   Date/Time Value Ref Range Status   05/10/2023 2355 211 (H) 70 - 130 mg/dL Final     Comment:     Meter: [id number] : [id number] [facility]     Lab Results   Component Value Date    HGBA1C 7.70 (H) 03/20/2023     No results found for: TSH, FREET4    Blood Culture   Date Value Ref Range Status   05/06/2023 No growth at 4 days  Preliminary   05/06/2023 No growth at 4 days  Preliminary     No results found for: URINECX  No results found for: WOUNDCX  No results found for: STOOLCX  No results found for:  RESPCX  No results found for: MRSACX  Pain Management Panel         Latest Ref Rng & Units 3/23/2023 3/20/2023   Pain Management Panel   Creatinine, Urine mg/dL 246.9      Amphetamine, Urine Qual Negative  Negative     Barbiturates Screen, Urine Negative  Negative     Benzodiazepine Screen, Urine Negative  Negative     Buprenorphine, Screen, Urine Negative  Negative     Cocaine Screen, Urine Negative  Negative     Methadone Screen , Urine Negative  Negative     Methamphetamine, Ur Negative  Negative                I have personally reviewed the above laboratory results.   ---------------------------------------------------------------------------------------------------------------------  Imaging Results (Last 7 Days)     Procedure Component Value Units Date/Time    CT Angiogram Chest Pulmonary Embolism [[id number]] Collected: 05/10/23 2234     Updated: 05/10/23 2236    Narrative:      Contrast-enhanced CTA chest     Indications: Shortness of breath     Technique: Contrast-enhanced CTA images were obtained.  Limited exposure  control, adjustment of the MA and/or KV according to patient size or use  of an iterative reconstruction technique was utilized.   Three-dimensional reconstructions were done.     Findings CTA chest:     Comparison is made to prior study dated 5/6/2023.     There is no evidence of filling defect within the main or lobar  pulmonary arteries to suggest pulmonary embolism.     The heart is not enlarged.  There is no pericardial effusion.  The  thoracic aorta is normal in course and caliber.     There is an enlarged right paratracheal lymph node measuring 1.3 cm.   There is an enlarged right paratracheal/right paraesophageal lymph node  measuring 1.1 cm.  There is an enlarged right hilar lymph node measuring  1.1 cm.  There is a more inferior large right hilar lymph node measuring  1.1 cm.  An enlarged subcarinal lymph node measures 1.4 cm.  An AP  window lymph node measures 1.3 cm.     Limited  images of the upper abdomen are normal.     There is a moderate left pleural effusion with adjacent compressive  atelectasis.  There are multifocal bilateral infiltrates predominantly  involving the perihilar regions and right upper lobe.  The infiltrates  are new since the prior study.  Left pleural effusion is stable.     The osseous structures are normal.       Impression:      Impression:  1.  No CTA evidence of pulmonary embolism.  2.  Moderate left pleural effusion with adjacent atelectasis, similar to  the prior study.  3.  Interval development of patchy multifocal groundglass and alveolar  infiltrates predominately involving the perihilar regions and the right  upper lobe.  This could represent hemorrhage, pulmonary edema, or  infectious etiologies.     This report was finalized on 5/10/2023 10:34 PM by Robin Fairchild MD.       XR Chest 1 View [[id number]] Collected: 05/10/23 1721     Updated: 05/10/23 1724    Narrative:      XR CHEST 1 VW-     CLINICAL INDICATION: SOA        COMPARISON: 05/06/2023      TECHNIQUE: Single frontal view of the chest.     FINDINGS:      LUNGS: Probable CHF      HEART AND MEDIASTINUM: Heart and mediastinal contours are unremarkable        SKELETON: Bony and soft tissue structures are unremarkable.             Impression:      Likely mild CHF     This report was finalized on 5/10/2023 5:22 PM by Dr. Oliver Renner MD.           I have personally reviewed the above radiology results.     Last Echocardiogram:  Results for orders placed during the hospital encounter of 02/02/23    Adult Transthoracic Echo Complete w/ Color, Spectral and Contrast if necessary per protocol    Interpretation Summary  •  Left ventricular systolic function is normal. Left ventricular ejection fraction appears to be 66 - 70%.  •  Left ventricular wall thickness is consistent with mild concentric hypertrophy.  •  Left ventricular diastolic function is consistent with (grade II w/high LAP)  pseudonormalization.  •  Technically very difficult study.  Right ventricle and right atrium is not well visualized for adequate evaluation.    ---------------------------------------------------------------------------------------------------------------------    Assessment & Plan      Active Hospital Problems    Diagnosis  POA   • **Bilateral pneumonia [J18.9]  Yes     · Acute multifocal pneumonia, POA  · Acute hypoxic respiratory failure, POA  · Acute CHF, POA  · Left pleural effusion, POA  · Postoperative atrial fibrillation, POA  · CT chest shows moderate left-sided pleural effusion.  Multifocal patchy infiltrates.  Patchy multifocal groundglass and alveolar infiltrates of right upper lobe.  · Patient acutely hypoxic on room air in ED.  Normally does not have home oxygen requirement.  · Continue/add supplemental oxygen via nasal cannula to maintain O2 > or equal to 90%  · Order urinary legionella, respiratory PCR, respiratory panel. Check strep pneumo.   · PRN nebs every 6 hours.   · Antibiotic coverage with ceftriaxone and doxycycline, ceftriaxone given in ED  · Blood cultures obtained in ED  · 80 mg IV Lasix given in ED, good urine output. Can continue home dose of Bumex in a.m. pending med rec  · Patient and wife declined history of CHF less TTE in February 2023 shows EF 66 to 70%; obtain updated TTE  · Encourage incentive spirometry  · Repeat labs in the a.m.  · Continuous cardiac monitoring  · Daily weights  · Monitor strict I's and O's  · Inpatient cardiology consult, assistance appreciated  · Patient converted to normal sinus rhythm on amiodarone after operation, per chart review it appears cardiologist is concerned for paroxysmal atrial fibrillation and therefore started patient on Eliquis    CHRONIC MEDICAL PROBLEMS    Essential hypertension  BP appears moderately controlled   Plan to resume home antihypertensive regimen once reconciled per pharmacy with appropriate holding parameters to prevent  hypotension and/or bradycardia   Closely monitor BP per hospital protocol, titrate medications as necessary  · Recent CABG  · CAD  · Hyperlipidemia  · MARIAH  · BPH  · Restart home medications pending med rec  · Supportive care  Type II DM  Hemoglobin A1C ordered to evaluate glycemic control.   Start sliding scale insulin for now, titrate insulin therapy as necessary.   Hold any oral hypoglycemics to prevent hypoglycemia. Will review home diabetes medications once available per pharmacy.   Hypoglycemia protocol in place if necessary.   Accuchecks QAC and QHS.  · Obesity by BMI, Body mass index is 41.82 kg/m².  • Affecting all aspects of care      · F/E/N: No IVF at this time.  Continue to monitor electrolytes.  Cardiac/diabetic diet.    ---------------------------------------------------  DVT Prophylaxis: Restart home anticoagulation  Activity: Up with assistance    The patient is considered to be a high risk patient due to: Acute hypoxic respiratory failure, multifocal pneumonia, acute CHF, comorbid conditions, recent CABG    INPATIENT status due to the need for care which can only be reasonably provided in an hospital setting such as aggressive/expedited ancillary services and/or consultation services, the necessity for IV medications, close physician monitoring and/or the possible need for procedures.  In such, I feel patient’s risk for adverse outcomes and need for care warrant INPATIENT evaluation and predict the patient’s care encounter to likely last beyond 2 midnights.      Code Status: Full code    Disposition/Discharge planning: Pending clinical course    I have discussed the patient's assessment and plan with Dr. Hull.      Dora Membreno PA-C  Hospitalist Service -- [facility]       05/11/23  01:45 EDT    Attending Physician: Jake Hull MD

## 2023-05-11 NOTE — PAYOR COMM NOTE
"CONTACT:  MIK LAY MSN, APRN  UTILIZATION MANAGEMENT DEPT.  [facility]  [address] [facility], [address]  PHONE:  [phone]  FAX: [phone]    INPATIENT AUTHORIZATION REQUEST    PENDING REF # [id number]    Wilbert Kelley (62 y.o. Male)       Date of Birth   1960    Social Security Number       Address   [address] [address]    Home Phone   [phone]    MRN   [id number]       [facility]    Marital Status                               Admission Date   5/10/23    Admission Type   Emergency    Admitting Provider   Jake Hull MD    Attending Provider   Chris Varghese MD    Department, Room/Bed   [address], 3305/2S       Discharge Date       Discharge Disposition       Discharge Destination                                 Attending Provider: Chris Varghese MD    Allergies: Morphine, Adhesive Tape    Isolation: None   Infection: None   Code Status: CPR    Ht: 185.4 cm (73\")   Wt: 144 kg (317 lb)    Admission Cmt: None   Principal Problem: Bilateral pneumonia [J18.9]                   Active Insurance as of 5/10/2023       Primary Coverage       Payor Plan Insurance Group Employer/Plan Group    HUMANA MEDICARE REPLACEMENT HUMANA MEDICARE REPLACEMENT [id number]       Payor Plan Address Payor Plan Phone Number Payor Plan Fax Number Effective Dates    [address] [address] [phone]  1/1/2023 - None Entered    [facility]ly [facility] [id number]         Subscriber Name Subscriber Birth Date Member ID       WILBERT KELLEY 1960 [id number]                     Emergency Contacts        (Rel.) Home Phone Work Phone Mobile Phone    ANUP KELLEY (Spouse) -- -- [phone]                 History & Physical        Dora Membreno PA-C at 05/11/23 0052       Attestation signed by Jake Hull MD at 05/11/23 5421    I have discussed this patient with Dora Membreno PA-C, and have reviewed this documentation and agree. Patient " recently started on eliquis for paroxysmal afib; he has significant microscopic hematuria on UA but no gross hematuria reported. Hemoglobin is stable compared to recent blood draws. Continue to monitor for now.                        [facility]  HISTORY & PHYSICAL    Patient Identification:  Name:  Wilbert Kelley  Age:  62 y.o.  Sex:  male  :  1960  MRN:  [id number]   Visit Number:  [id number]  Admit Date: 5/10/2023   Primary Care Physician:  Jose M Simon MD     Subjective     Chief complaint:   Chief Complaint   Patient presents with    Shortness of Breath    Edema     History of presenting illness:   Patient is a 62 y.o. male with past medical history significant for hyperlipidemia, CAD, MARIAH, GERD, hypertension, diabetes, BPH, obesity, and recent triple bypass surgery at [facility] on 2023 with postoperative atrial fibrillation that presented to the [facility] emergency department for evaluation of shortness of breath and edema.  Symptoms began approximately 2 AM on Wilbur morning.  Patient's wife states Wilbur morning the patient's O2 saturation was 72 to 84% on CPAP.  Patient is supposed to be on nightly CPAP, but machine was taken so he has been using wife's CPAP.  Wife states later that day patient's oxygen was low and they obtained oxygen from a family friend.  Wife states on Monday patient was able to see cardiologist Dr. Boyle who prescribed patient 3 L of oxygen and doubled his daily dose of Bumex for 7 days.  Wife states Monday patient's symptoms continue to worsen and she had to increase the patient's oxygen to 5 L to get his O2 sat to 92% at best.  Wife does state the patient seems to be worse at night.  Patient saw PCP yesterday and was given Rocephin injection and started on Augmentin and doxycycline and has had some improvement.  Patient endorses cough productive of green sputum.  Improved after starting on Mucinex.  Symptoms  worsened by sleep and exertion.  Patient does admit that he has been exposed to grandchild with unknown viral illness.  On exam, patient is sitting up at bedside eating, wife at bedside.  Wife states patient has never had a diagnosis of CHF.  She states she was not on any diuretics until he had a CABG in March.  She does states she has noted his shortness of breath and oxygen saturation has improved since starting the ABX from his PCP.  Wife states patient started Eliquis 2 days ago.  Upon chart review, it appears patient has had paroxysmal atrial fibrillation since CABG and has a high [id number] score therefore was switched from Plavix to Eliquis.    Upon arrival to the ED, vitals were temperature 97.5, HR 64, RR 18, /74, SPO2 98% on room air.  Labs significant for glucose 229, hemoglobin 9.0, hematocrit 29.5.  CRP 24.14 high-sensitivity troponin 34, repeat 34.  proBNP 3,126.  ABG with pH to 7.522 PO2 64.7 HCO3 31.4, base excess 8.0 on room air.  UA dark yellow, 3+ blood, trace leukocytes, small protein, small bilirubin,, 4.0 urobilinogen, too numerous RBC, 6-12 WBC.    CXR shows mild CHF.  CT angiogram chest shows no CTA evidence of pulmonary embolism, moderate left pleural effusion with adjacent atelectasis similar to prior study.  Interval development of patchy multifocal groundglass and alveolar infiltrates predominantly involving the perihilar regions in the right upper lobe.  This could represent hemorrhage, pulmonary edema, or infectious etiologies.    Patient has been admitted to the telemetry unit.   ---------------------------------------------------------------------------------------------------------------------   Review of Systems   Constitutional: Positive for fatigue. Negative for chills, diaphoresis and fever.   Respiratory: Positive for cough and shortness of breath. Negative for wheezing.    Cardiovascular: Positive for leg swelling. Negative for chest pain and palpitations.    Gastrointestinal: Negative for abdominal pain, constipation, diarrhea, nausea and vomiting.   Genitourinary: Negative for difficulty urinating, dysuria and flank pain.   Musculoskeletal: Negative for arthralgias, myalgias and neck stiffness.   Skin: Negative for rash and wound.   Neurological: Negative for dizziness, weakness and light-headedness.   Psychiatric/Behavioral: Negative for agitation and confusion.      ---------------------------------------------------------------------------------------------------------------------   Past Medical History:   Diagnosis Date    Arthritis     BPH (benign prostatic hyperplasia)     Cancer     basal and melanoma-  x2 - left side ear and elbow    Constipation     Coronary artery disease     DDD (degenerative disc disease), lumbar     Diabetes mellitus     couple times month check sugar    Frozen shoulder     left    GERD (gastroesophageal reflux disease)     Hyperlipidemia     Hypertension     Limited mobility     left shoulder  - possible frozen shoulder-= please be aware for surgery    Sleep apnea     doesnt use machine    Tinnitus     Wears glasses     small print     Past Surgical History:   Procedure Laterality Date    CARDIAC CATHETERIZATION      CARDIAC CATHETERIZATION Right 03/16/2023    Procedure: Left Heart Cath;  Surgeon: Jarod Boyle MD;  Location:  [facility] CATH INVASIVE LOCATION;  Service: Cardiovascular;  Laterality: Right;    CATARACT EXTRACTION, BILATERAL Bilateral     COLONOSCOPY      CORONARY ARTERY BYPASS GRAFT N/A 3/21/2023    Procedure: MEDIAN STERNOTOMY, CORONARY ARTERY BYPASS GRAFTING X 3, UTILIZING THE LEFT INTERNAL MAMMARY ARTERY, ENDOSCOPIC VEIN HARVEST OF THE RIGHT GREATER SAPENOUSE VEIN;  Surgeon: Markus Emanuel MD;  Location: [facility] OR;  Service: Cardiothoracic;  Laterality: N/A;    CORONARY STENT PLACEMENT      x4    ENDOSCOPY      FINGER SURGERY Left     middle finger - left side    SKIN CANCER EXCISION      x2    WISDOM TOOTH  EXTRACTION       Family History   Problem Relation Age of Onset    COPD Mother     Heart attack Father 45        passed    Heart attack Paternal Grandfather 42        massive heart attack    Heart disease Paternal Grandfather      Social History     Socioeconomic History    Marital status:     Number of children: 1   Tobacco Use    Smoking status: Never     Passive exposure: Past    Smokeless tobacco: Current     Types: Snuff   Vaping Use    Vaping Use: Never used   Substance and Sexual Activity    Alcohol use: Not Currently    Drug use: Never    Sexual activity: Defer     ---------------------------------------------------------------------------------------------------------------------   Allergies:  Morphine and Adhesive tape  ---------------------------------------------------------------------------------------------------------------------   Medications below are reported home medications pulling from within the system; at this time, these medications have not been reconciled unless otherwise specified and are in the verification process for further verifcation as current home medications.    Prior to Admission Medications       Prescriptions Last Dose Informant Patient Reported? Taking?    apixaban (ELIQUIS) 5 MG tablet tablet   No No    Take 1 tablet by mouth 2 (Two) Times a Day.    aspirin 81 MG EC tablet   No No    Take 1 tablet by mouth Daily.    atorvastatin (LIPITOR) 40 MG tablet   No No    Take 1 tablet by mouth Every Night.    B-D ULTRAFINE III SHORT PEN 31G X 8 MM misc   Yes No    BD Insulin Syringe U-500 31G X 6MM 0.5 ML misc   Yes No    bumetanide (BUMEX) 2 MG tablet   No No    Take 1 tablet by mouth 2 (Two) Times a Day for 7 days.    fenofibrate 160 MG tablet  Self, Pharmacy Yes No    Take 1 tablet by mouth Daily.    gabapentin (NEURONTIN) 800 MG tablet   Yes No    1 tablet 3 (Three) Times a Day.    Insulin Glargine (TOUJEO MAX SOLOSTAR SC)  Self, Pharmacy Yes No    Inject 80 Units under  the skin into the appropriate area as directed 2 (Two) Times a Day.    Insulin Regular Human, Conc, (HumuLIN R) 500 UNIT/ML solution pen-injector CONCENTRATED injection   Yes No    Inject 65 Units under the skin into the appropriate area as directed 3 (Three) Times a Day Before Meals.    lisinopril (PRINIVIL,ZESTRIL) 40 MG tablet   No No    Take 1.5 tablets by mouth Daily.    metFORMIN (GLUCOPHAGE) 1000 MG tablet   No No    Take 1 tablet by mouth 2 (Two) Times a Day With Meals. Hold metformin for 2 days post-cath.  Restart on 3/19/2023    nebivolol (BYSTOLIC) 5 MG tablet   No No    Take 1 tablet by mouth Daily.    ondansetron ODT (ZOFRAN-ODT) 4 MG disintegrating tablet   No No    Place 1 tablet on the tongue Every 8 (Eight) Hours As Needed for Nausea or Vomiting.    potassium chloride 10 MEQ CR tablet   No No    Use 1 pill daily for 7 days with Bumex    tamsulosin (FLOMAX) 0.4 MG capsule 24 hr capsule   No No    Take 1 capsule by mouth Daily.          ---------------------------------------------------------------------------------------------------------------------    Objective     Hospital Scheduled Meds:  cefTRIAXone, 1 g, Intravenous, Q24H  doxycycline, 100 mg, Intravenous, Q12H  sodium chloride, 10 mL, Intravenous, Q12H      sodium chloride 0.45 % with KCl 20 mEq, 75 mL/hr        Current listed hospital scheduled medications may not yet reflect those currently placed in orders that are signed and held, awaiting patient's arrival to floor/unit.    ---------------------------------------------------------------------------------------------------------------------   Vital Signs:  Temp:  [97.5 °F (36.4 °C)-98.1 °F (36.7 °C)] 98.1 °F (36.7 °C)  Heart Rate:  [63-70] 68  Resp:  [18-20] 20  BP: (128-172)/(70-98) 172/90  Mean Arterial Pressure (Non-Invasive) for the past 24 hrs (Last 3 readings):   Noninvasive MAP (mmHg)   05/10/23 2351 110   05/10/23 1900 104   05/10/23 1744 103     SpO2 Percentage    05/10/23 2200  05/10/23 2324 05/10/23 4172   SpO2: 93% 97% 96%     SpO2:  [92 %-98 %] 96 %  on  Flow (L/min):  [2] 2;   Device (Oxygen Therapy): nasal cannula    Body mass index is 41.82 kg/m².  Wt Readings from Last 3 Encounters:   05/10/23 (!) 144 kg (317 lb)   05/08/23 (!) 141 kg (310 lb)   05/06/23 (!) 141 kg (310 lb)     ---------------------------------------------------------------------------------------------------------------------   Physical Exam:  Physical Exam  Constitutional:       General: He is not in acute distress.     Appearance: Normal appearance.   HENT:      Head: Normocephalic and atraumatic.      Right Ear: External ear normal.      Left Ear: External ear normal.      Nose: No nasal deformity.      Mouth/Throat:      Lips: Pink.      Mouth: Mucous membranes are moist.   Eyes:      Conjunctiva/sclera: Conjunctivae normal.      Pupils: Pupils are equal, round, and reactive to light.   Cardiovascular:      Rate and Rhythm: Normal rate and regular rhythm.      Pulses:           Dorsalis pedis pulses are 2+ on the right side and 2+ on the left side.      Heart sounds: Normal heart sounds.   Pulmonary:      Effort: Pulmonary effort is normal.      Breath sounds: Decreased air movement (In lower lung bases) present. Rhonchi (Upper lungs) present. No wheezing or rales.   Abdominal:      General: Abdomen is flat. Bowel sounds are normal.      Palpations: Abdomen is soft.      Tenderness: There is no guarding or rebound.   Genitourinary:     Comments: No mai catheter in place   Musculoskeletal:      Cervical back: Neck supple. Normal range of motion.      Right lower leg: Edema present.      Left lower leg: Edema present.   Skin:     General: Skin is warm and dry.   Neurological:      General: No focal deficit present.      Mental Status: He is alert and oriented to person, place, and time.   Psychiatric:         Mood and Affect: Mood normal.         Behavior: Behavior normal.        ---------------------------------------------------------------------------------------------------------------------  EKG:   Normal sinus rhythm, no acute ischemia.  Confirmed by cardiology.      Telemetry:         I have personally reviewed the EKG/Telemetry strip  ---------------------------------------------------------------------------------------------------------------------   Results from last 7 days   Lab Units 05/10/23  1856 05/10/23  1702 05/06/23  2228   HSTROP T ng/L 34* 34* 48*     Results from last 7 days   Lab Units 05/10/23  1702 05/06/23  1955   PROBNP pg/mL 3,126.0* 3,018.0*       Results from last 7 days   Lab Units 05/10/23  2056   PH, ARTERIAL pH units 7.522*   PO2 ART mm Hg 64.7*   PCO2, ARTERIAL mm Hg 38.3   HCO3 ART mmol/L 31.4*     Results from last 7 days   Lab Units 05/10/23  1856 05/10/23  1702 05/06/23  1955   CRP mg/dL  --  24.14* 15.92*   LACTATE mmol/L 1.8  --  1.4   WBC 10*3/mm3  --  10.54 15.91*   HEMOGLOBIN g/dL  --  9.0* 9.8*   HEMATOCRIT %  --  29.5* 31.8*   MCV fL  --  84.0 84.8   MCHC g/dL  --  30.5* 30.8*   PLATELETS 10*3/mm3  --  452* 410   INR   --   --  1.07     Results from last 7 days   Lab Units 05/10/23  1702 05/06/23 1955   SODIUM mmol/L 138 141   POTASSIUM mmol/L 3.9 4.0   MAGNESIUM mg/dL  --  1.6   CHLORIDE mmol/L 99 104   CO2 mmol/L 26.5 25.3   BUN mg/dL 21 14   CREATININE mg/dL 0.98 1.05   CALCIUM mg/dL 8.5* 9.0   GLUCOSE mg/dL 229* 121*   ALBUMIN g/dL 3.1* 3.7   BILIRUBIN mg/dL 1.1 0.8   ALK PHOS U/L 105 73   AST (SGOT) U/L 18 16   ALT (SGPT) U/L 29 12   Estimated Creatinine Clearance: 118.7 mL/min (by C-G formula based on SCr of 0.98 mg/dL).  No results found for: AMMONIA    Glucose   Date/Time Value Ref Range Status   05/10/2023 2355 211 (H) 70 - 130 mg/dL Final     Comment:     Meter: [id number] : [id number] [facility]     Lab Results   Component Value Date    HGBA1C 7.70 (H) 03/20/2023     No results found for: TSH, FREET4    Blood Culture    Date Value Ref Range Status   05/06/2023 No growth at 4 days  Preliminary   05/06/2023 No growth at 4 days  Preliminary     No results found for: URINECX  No results found for: WOUNDCX  No results found for: STOOLCX  No results found for: RESPCX  No results found for: MRSACX  Pain Management Panel           Latest Ref Rng & Units 3/23/2023 3/20/2023   Pain Management Panel   Creatinine, Urine mg/dL 246.9      Amphetamine, Urine Qual Negative  Negative     Barbiturates Screen, Urine Negative  Negative     Benzodiazepine Screen, Urine Negative  Negative     Buprenorphine, Screen, Urine Negative  Negative     Cocaine Screen, Urine Negative  Negative     Methadone Screen , Urine Negative  Negative     Methamphetamine, Ur Negative  Negative                I have personally reviewed the above laboratory results.   ---------------------------------------------------------------------------------------------------------------------  Imaging Results (Last 7 Days)       Procedure Component Value Units Date/Time    CT Angiogram Chest Pulmonary Embolism [[id number]] Collected: 05/10/23 2234     Updated: 05/10/23 2236    Narrative:      Contrast-enhanced CTA chest     Indications: Shortness of breath     Technique: Contrast-enhanced CTA images were obtained.  Limited exposure  control, adjustment of the MA and/or KV according to patient size or use  of an iterative reconstruction technique was utilized.   Three-dimensional reconstructions were done.     Findings CTA chest:     Comparison is made to prior study dated 5/6/2023.     There is no evidence of filling defect within the main or lobar  pulmonary arteries to suggest pulmonary embolism.     The heart is not enlarged.  There is no pericardial effusion.  The  thoracic aorta is normal in course and caliber.     There is an enlarged right paratracheal lymph node measuring 1.3 cm.   There is an enlarged right paratracheal/right paraesophageal lymph node  measuring 1.1 cm.   There is an enlarged right hilar lymph node measuring  1.1 cm.  There is a more inferior large right hilar lymph node measuring  1.1 cm.  An enlarged subcarinal lymph node measures 1.4 cm.  An AP  window lymph node measures 1.3 cm.     Limited images of the upper abdomen are normal.     There is a moderate left pleural effusion with adjacent compressive  atelectasis.  There are multifocal bilateral infiltrates predominantly  involving the perihilar regions and right upper lobe.  The infiltrates  are new since the prior study.  Left pleural effusion is stable.     The osseous structures are normal.       Impression:      Impression:  1.  No CTA evidence of pulmonary embolism.  2.  Moderate left pleural effusion with adjacent atelectasis, similar to  the prior study.  3.  Interval development of patchy multifocal groundglass and alveolar  infiltrates predominately involving the perihilar regions and the right  upper lobe.  This could represent hemorrhage, pulmonary edema, or  infectious etiologies.     This report was finalized on 5/10/2023 10:34 PM by Robin Fairchild MD.       XR Chest 1 View [[id number]] Collected: 05/10/23 1721     Updated: 05/10/23 1724    Narrative:      XR CHEST 1 VW-     CLINICAL INDICATION: SOA        COMPARISON: 05/06/2023      TECHNIQUE: Single frontal view of the chest.     FINDINGS:      LUNGS: Probable CHF      HEART AND MEDIASTINUM: Heart and mediastinal contours are unremarkable        SKELETON: Bony and soft tissue structures are unremarkable.             Impression:      Likely mild CHF     This report was finalized on 5/10/2023 5:22 PM by Dr. Oliver Renner MD.             I have personally reviewed the above radiology results.     Last Echocardiogram:  Results for orders placed during the hospital encounter of 02/02/23    Adult Transthoracic Echo Complete w/ Color, Spectral and Contrast if necessary per protocol    Interpretation Summary    Left ventricular systolic function is  normal. Left ventricular ejection fraction appears to be 66 - 70%.    Left ventricular wall thickness is consistent with mild concentric hypertrophy.    Left ventricular diastolic function is consistent with (grade II w/high LAP) pseudonormalization.    Technically very difficult study.  Right ventricle and right atrium is not well visualized for adequate evaluation.    ---------------------------------------------------------------------------------------------------------------------    Assessment & Plan      Active Hospital Problems    Diagnosis  POA    **Bilateral pneumonia [J18.9]  Yes     Acute multifocal pneumonia, POA  Acute hypoxic respiratory failure, POA  Acute CHF, POA  Left pleural effusion, POA  Postoperative atrial fibrillation, POA  CT chest shows moderate left-sided pleural effusion.  Multifocal patchy infiltrates.  Patchy multifocal groundglass and alveolar infiltrates of right upper lobe.  Patient acutely hypoxic on room air in ED.  Normally does not have home oxygen requirement.  Continue/add supplemental oxygen via nasal cannula to maintain O2 > or equal to 90%  Order urinary legionella, respiratory PCR, respiratory panel. Check strep pneumo.   PRN nebs every 6 hours.   Antibiotic coverage with ceftriaxone and doxycycline, ceftriaxone given in ED  Blood cultures obtained in ED  80 mg IV Lasix given in ED, good urine output. Can continue home dose of Bumex in a.m. pending med rec  Patient and wife declined history of CHF less TTE in February 2023 shows EF 66 to 70%; obtain updated TTE  Encourage incentive spirometry  Repeat labs in the a.m.  Continuous cardiac monitoring  Daily weights  Monitor strict I's and O's  Inpatient cardiology consult, assistance appreciated  Patient converted to normal sinus rhythm on amiodarone after operation, per chart review it appears cardiologist is concerned for paroxysmal atrial fibrillation and therefore started patient on Eliqu    CHRONIC MEDICAL  PROBLEMS    Essential hypertension  BP appears moderately controlled   Plan to resume home antihypertensive regimen once reconciled per pharmacy with appropriate holding parameters to prevent hypotension and/or bradycardia   Closely monitor BP per hospital protocol, titrate medications as necessary  Recent CABG  CAD  Hyperlipidemia  MARIAH  BPH  Restart home medications pending [facility]  Supportive care  Type II DM  Hemoglobin A1C ordered to evaluate glycemic control.   Start sliding scale insulin for now, titrate insulin therapy as necessary.   Hold any oral hypoglycemics to prevent hypoglycemia. Will review home diabetes medications once available per pharmacy.   Hypoglycemia protocol in place if necessary.   Accuchecks QAC and QHS.  Obesity by BMI, Body mass index is 41.82 kg/m².  Affecting all aspects of care      F/E/N: No IVF at this time.  Continue to monitor electrolytes.  Cardiac/diabetic diet.    ---------------------------------------------------  DVT Prophylaxis: Restart home anticoagulation  Activity: Up with assistance    The patient is considered to be a high risk patient due to: Acute hypoxic respiratory failure, multifocal pneumonia, acute CHF, comorbid conditions, recent CABG    INPATIENT status due to the need for care which can only be reasonably provided in an hospital setting such as aggressive/expedited ancillary services and/or consultation services, the necessity for IV medications, close physician monitoring and/or the possible need for procedures.  In such, I feel patient’s risk for adverse outcomes and need for care warrant INPATIENT evaluation and predict the patient’s care encounter to likely last beyond 2 midnights.      Code Status: Full code    Disposition/Discharge planning: Pending clinical course    I have discussed the patient's assessment and plan with Dr. Hull.      Dora Membreno PA-C  Hospitalist Service -- [facility]       05/11/23  01:45 EDT    Attending  Physician: Jake Hull MD      Electronically signed by Jake Hull MD at 05/11/23 0523          Emergency Department Notes        Oliver Whitney at 05/10/23 1618          Ekg performed @1618 given to  @1619    Electronically signed by Oliver Whitney at 05/10/23 1626       Josephine Wallis PA-C at 05/10/23 1634          Subjective   History of Present Illness  62-year-old male with past medical history of hyperlipidemia, coronary artery disease, MARIAH, GERD, hypertension, diabetes, congestive heart failure, BPH, and recent triple bypass surgery by Dr. Emanuel at [facility] on March 21, 2023 presents to the emergency room with shortness of breath and increasing edema which began early Sunday morning around 2 AM.  Patient is accompanied by his wife who states Sunday morning patient's O2 saturation was 72 to 84% on CPAP.  Wife states patient is supposed to be on CPAP, however his machine was taken, therefore she has been letting him use her machine.  She states later that day patient's oxygen continued to run low therefore was able to obtain an oxygen concentrator from a family friend.  She states on Monday patient was able to get in with his cardiologist Dr. Boyle who prescribed patient 3 L of oxygen and double his daily dose of Bumex x7 days.  Wife states on Monday patient continued to decline and last night had to increase patient's oxygen to 5 L to maintain an oxygen saturation of 92% at best.  She does state that patient seems to get worse during the night.  Patient does endorse a low-grade fever yesterday, however has been afebrile this day.  He was seen yesterday by his primary care provider and given a Rocephin injection and started on Augmentin and doxycycline and had some improvement.  Patient also endorses a productive cough of green sputum which has seem to get better after starting on Mucinex.  Aggravating factors include going to sleep and exertion.   Denies any alleviating factors despite oxygen therapy and nocturnal CPAP.  Denies any chest pain.  He has been exposed to a grandchild with unknown viral illness.  Denies any other complaints or concerns at this time.    History provided by:  Patient   used: No        Review of Systems   Constitutional: Positive for fever.   HENT: Negative.    Respiratory: Positive for cough and shortness of breath.    Cardiovascular: Positive for leg swelling. Negative for chest pain.   Gastrointestinal: Negative.  Negative for abdominal pain.   Endocrine: Negative.    Genitourinary: Negative.  Negative for dysuria.   Skin: Negative.    Neurological: Negative.    Psychiatric/Behavioral: Negative.    All other systems reviewed and are negative.      Past Medical History:   Diagnosis Date    Arthritis     BPH (benign prostatic hyperplasia)     Cancer     basal and melanoma-  x2 - left side ear and elbow    Constipation     Coronary artery disease     DDD (degenerative disc disease), lumbar     Diabetes mellitus     couple times month check sugar    Frozen shoulder     left    GERD (gastroesophageal reflux disease)     Hyperlipidemia     Hypertension     Limited mobility     left shoulder  - possible frozen shoulder-= please be aware for surgery    Sleep apnea     doesnt use machine    Tinnitus     Wears glasses     small print       Allergies   Allergen Reactions    Morphine Nausea And Vomiting     projectile    Adhesive Tape Rash     Pt states sticky tabs cause irritation when left on long        Past Surgical History:   Procedure Laterality Date    CARDIAC CATHETERIZATION      CARDIAC CATHETERIZATION Right 03/16/2023    Procedure: Left Heart Cath;  Surgeon: Jarod Boyle MD;  Location: [facility] CATH INVASIVE LOCATION;  Service: Cardiovascular;  Laterality: Right;    CATARACT EXTRACTION, BILATERAL Bilateral     COLONOSCOPY      CORONARY ARTERY BYPASS GRAFT N/A 3/21/2023    Procedure: MEDIAN STERNOTOMY, CORONARY  ARTERY BYPASS GRAFTING X 3, UTILIZING THE LEFT INTERNAL MAMMARY ARTERY, ENDOSCOPIC VEIN HARVEST OF THE RIGHT GREATER SAPENOUSE VEIN;  Surgeon: Markus Emanuel MD;  Location: [facility];  Service: Cardiothoracic;  Laterality: N/A;    CORONARY STENT PLACEMENT      x4    ENDOSCOPY      FINGER SURGERY Left     middle finger - left side    SKIN CANCER EXCISION      x2    WISDOM TOOTH EXTRACTION         Family History   Problem Relation Age of Onset    COPD Mother     Heart attack Father 45        passed    Heart attack Paternal Grandfather 42        massive heart attack    Heart disease Paternal Grandfather        Social History     Socioeconomic History    Marital status:     Number of children: 1   Tobacco Use    Smoking status: Never     Passive exposure: Past    Smokeless tobacco: Current     Types: Snuff   Vaping Use    Vaping Use: Never used   Substance and Sexual Activity    Alcohol use: Not Currently    Drug use: Never    Sexual activity: Defer           Objective   Physical Exam  Vitals and nursing note reviewed.   Constitutional:       General: He is not in acute distress.     Appearance: He is well-developed. He is not diaphoretic.      Interventions: Nasal cannula in place.      Comments: 2L   HENT:      Head: Normocephalic and atraumatic.      Right Ear: External ear normal.      Left Ear: External ear normal.      Nose: Nose normal.   Eyes:      Conjunctiva/sclera: Conjunctivae normal.      Pupils: Pupils are equal, round, and reactive to light.   Neck:      Vascular: No JVD.      Trachea: No tracheal deviation.   Cardiovascular:      Rate and Rhythm: Normal rate and regular rhythm.      Heart sounds: Normal heart sounds. No murmur heard.  Pulmonary:      Effort: Pulmonary effort is normal. No respiratory distress.      Breath sounds: Examination of the left-lower field reveals rales. Rales present. No wheezing.   Abdominal:      General: Bowel sounds are normal.      Palpations: Abdomen is soft.       Tenderness: There is no abdominal tenderness.   Musculoskeletal:         General: No deformity. Normal range of motion.      Cervical back: Normal range of motion and neck supple.      Right lower leg: 3+ Pitting Edema present.      Left lower leg: 3+ Pitting Edema present.   Skin:     General: Skin is warm and dry.      Coloration: Skin is not pale.      Findings: No erythema or rash.   Neurological:      Mental Status: He is alert and oriented to person, place, and time.      Cranial Nerves: No cranial nerve deficit.   Psychiatric:         Behavior: Behavior normal.         Thought Content: Thought content normal.         Procedures          ED Course  ED Course as of 05/10/23 2324   Wed May 10, 2023   1726 Pt refused ABG, as states he had on Saturday. [TK]   1727 XR Chest 1 View [TK]   1733 ECG 12 Lead Dyspnea  Vent. Rate :  66 BPM     Atrial Rate :  66 BPM     P-R Int : 156 ms          QRS Dur : 102 ms      QT Int : 418 ms       P-R-T Axes :   6  36 149 degrees     QTc Int : 438 ms     Normal sinus rhythm  Cannot rule out Inferior infarct (cited on or before 07-MAY-2023)  T wave abnormality, consider lateral ischemia  Abnormal ECG  When compared with ECG of 07-MAY-2023 00:28,  No significant change was found [ES]   1833 HS Troponin T(!): 34  Improved as compared to previous 4 days ago. [TK]   1840 D-Dimer, Quant(!): 5.09  Pt with recent CT PE protocol 4 days ago with negative study for PE. [TK]   1848 XR Chest 1 View  IMPRESSION:  Likely mild CHF [ES]   1849 Discussed patient with Dr. Munroe, recommends 80mg IV Lasix and awaiting second troponin. Will consult with Dr. Boyle who is patient's cardiologist for further recommendations. [TK]   1933 HS Troponin T(!): 34  stable [TK]   1937 Discussed pt with Dr. Boyle, he recommends admission for diuresis. [TK]   1940 Attending aware that I need hospitalist for admission. [TK]   2021 Spoke with Dr. Hull recommends obtaining ABG and getting CT chest PE  protocol. [TK]   2101 Despite ABG, patient currently saturating at 88-90% at rest. [TK]   2249 CT Angiogram Chest Pulmonary Embolism [TK]   2310 Spoke with Dr. Hull he will admit patient to hospitalist services for further evaluation. [TK]      ED Course User Index  [ES] René Munroe MD  [TK] Josephine Wallis, PAOzzyC           Results for orders placed or performed during the hospital encounter of 05/10/23   Respiratory Panel PCR w/COVID-19(SARS-CoV-2) RAJNI/BALA/NAHUN/PAD/COR/MAD/NABEEL In-House, NP Swab in UTM/VTM, 3-4 HR TAT - Swab, Nasopharynx    Specimen: Nasopharynx; Swab   Result Value Ref Range    ADENOVIRUS, PCR Not Detected Not Detected    Coronavirus 229E Not Detected Not Detected    Coronavirus HKU1 Not Detected Not Detected    Coronavirus NL63 Not Detected Not Detected    Coronavirus OC43 Not Detected Not Detected    COVID19 Not Detected Not Detected - Ref. Range    Human Metapneumovirus Not Detected Not Detected    Human Rhinovirus/Enterovirus Not Detected Not Detected    Influenza A PCR Not Detected Not Detected    Influenza B PCR Not Detected Not Detected    Parainfluenza Virus 1 Not Detected Not Detected    Parainfluenza Virus 2 Not Detected Not Detected    Parainfluenza Virus 3 Not Detected Not Detected    Parainfluenza Virus 4 Not Detected Not Detected    RSV, PCR Not Detected Not Detected    Bordetella pertussis pcr Not Detected Not Detected    Bordetella parapertussis PCR Not Detected Not Detected    Chlamydophila pneumoniae PCR Not Detected Not Detected    Mycoplasma pneumo by PCR Not Detected Not Detected   Comprehensive Metabolic Panel    Specimen: Arm, Right; Blood   Result Value Ref Range    Glucose 229 (H) 65 - 99 mg/dL    BUN 21 8 - 23 mg/dL    Creatinine 0.98 0.76 - 1.27 mg/dL    Sodium 138 136 - 145 mmol/L    Potassium 3.9 3.5 - 5.2 mmol/L    Chloride 99 98 - 107 mmol/L    CO2 26.5 22.0 - 29.0 mmol/L    Calcium 8.5 (L) 8.6 - 10.5 mg/dL    Total Protein 7.6 6.0 - 8.5 g/dL     Albumin 3.1 (L) 3.5 - 5.2 g/dL    ALT (SGPT) 29 1 - 41 U/L    AST (SGOT) 18 1 - 40 U/L    Alkaline Phosphatase 105 39 - 117 U/L    Total Bilirubin 1.1 0.0 - 1.2 mg/dL    Globulin 4.5 gm/dL    A/G Ratio 0.7 g/dL    BUN/Creatinine Ratio 21.4 7.0 - 25.0    Anion Gap 12.5 5.0 - 15.0 mmol/L    eGFR 87.7 >60.0 mL/min/1.73   C-reactive Protein    Specimen: Arm, Right; Blood   Result Value Ref Range    C-Reactive Protein 24.14 (H) 0.00 - 0.50 mg/dL   Urinalysis With Microscopic If Indicated (No Culture) - Urine, Clean Catch    Specimen: Urine, Clean Catch   Result Value Ref Range    Color, UA Dark Yellow (A) Yellow, Straw    Appearance, UA Clear Clear    pH, UA 5.5 5.0 - 8.0    Specific Gravity, UA 1.022 1.005 - 1.030    Glucose, UA Negative Negative    Ketones, UA Negative Negative    Bilirubin, UA Small (1+) (A) Negative    Blood, UA Large (3+) (A) Negative    Protein, UA 30 mg/dL (1+) (A) Negative    Leuk Esterase, UA Trace (A) Negative    Nitrite, UA Negative Negative    Urobilinogen, UA 4.0 E.U./dL (A) 0.2 - 1.0 E.U./dL   BNP    Specimen: Arm, Right; Blood   Result Value Ref Range    proBNP 3,126.0 (H) 0.0 - 900.0 pg/mL   D-dimer, Quantitative    Specimen: Arm, Right; Blood   Result Value Ref Range    D-Dimer, Quantitative 5.09 (H) 0.00 - 0.61 MCGFEU/mL   High Sensitivity Troponin T    Specimen: Arm, Right; Blood   Result Value Ref Range    HS Troponin T 34 (H) <15 ng/L   CBC Auto Differential    Specimen: Arm, Right; Blood   Result Value Ref Range    WBC 10.54 3.40 - 10.80 10*3/mm3    RBC 3.51 (L) 4.14 - 5.80 10*6/mm3    Hemoglobin 9.0 (L) 13.0 - 17.7 g/dL    Hematocrit 29.5 (L) 37.5 - 51.0 %    MCV 84.0 79.0 - 97.0 fL    MCH 25.6 (L) 26.6 - 33.0 pg    MCHC 30.5 (L) 31.5 - 35.7 g/dL    RDW 16.8 (H) 12.3 - 15.4 %    RDW-SD 50.9 37.0 - 54.0 fl    MPV 9.4 6.0 - 12.0 fL    Platelets 452 (H) 140 - 450 10*3/mm3    Neutrophil % 80.8 (H) 42.7 - 76.0 %    Lymphocyte % 8.7 (L) 19.6 - 45.3 %    Monocyte % 6.6 5.0 - 12.0 %     Eosinophil % 2.8 0.3 - 6.2 %    Basophil % 0.6 0.0 - 1.5 %    Immature Grans % 0.5 0.0 - 0.5 %    Neutrophils, Absolute 8.52 (H) 1.70 - 7.00 10*3/mm3    Lymphocytes, Absolute 0.92 0.70 - 3.10 10*3/mm3    Monocytes, Absolute 0.70 0.10 - 0.90 10*3/mm3    Eosinophils, Absolute 0.29 0.00 - 0.40 10*3/mm3    Basophils, Absolute 0.06 0.00 - 0.20 10*3/mm3    Immature Grans, Absolute 0.05 0.00 - 0.05 10*3/mm3    nRBC 0.0 0.0 - 0.2 /100 WBC   High Sensitivity Troponin T 2Hr    Specimen: Arm, Left; Blood   Result Value Ref Range    HS Troponin T 34 (H) <15 ng/L    Troponin T Delta 0 >=-4 - <+4 ng/L   Lactic Acid, Plasma    Specimen: Arm, Right; Blood   Result Value Ref Range    Lactate 1.8 0.5 - 2.0 mmol/L   Urinalysis, Microscopic Only - Urine, Clean Catch    Specimen: Urine, Clean Catch   Result Value Ref Range    RBC, UA Too Numerous to Count (A) None Seen, 0-2 /HPF    WBC, UA 6-12 (A) None Seen, 0-2 /HPF    Bacteria, UA None Seen None Seen /HPF    Squamous Epithelial Cells, UA None Seen None Seen, 0-2 /HPF    Hyaline Casts, UA None Seen None Seen /LPF    Methodology Automated Microscopy    Blood Gas, Arterial With Co-Ox    Specimen: Arterial Blood   Result Value Ref Range    Site Right Radial     Chano's Test Positive     pH, Arterial 7.522 (H) 7.350 - 7.450 pH units    pCO2, Arterial 38.3 35.0 - 45.0 mm Hg    pO2, Arterial 64.7 (L) 83.0 - 108.0 mm Hg    HCO3, Arterial 31.4 (H) 20.0 - 26.0 mmol/L    Base Excess, Arterial 8.0 (H) 0.0 - 2.0 mmol/L    O2 Saturation, Arterial 95.0 94.0 - 99.0 %    Hemoglobin, Blood Gas 9.5 (L) 14 - 18 g/dL    Hematocrit, Blood Gas 29.2 (L) 38.0 - 51.0 %    Oxyhemoglobin 93.5 (L) 94 - 99 %    Methemoglobin <-0.10 (L) 0.00 - 3.00 %    Carboxyhemoglobin 1.8 0 - 5 %    A-a DO2 35.2 0.0 - 300.0 mmHg    CO2 Content 32.6 22 - 33 mmol/L    Barometric Pressure for Blood Gas 729 mmHg    Modality Room Air     FIO2 21 %    Ventilator Mode NA     Note Read back and acknowledge     Notified Who HUSSEIN OROZCO  AND RN     Notified By [employer]     Collected by [id number]     pH, Temp Corrected      pCO2, Temperature Corrected      pO2, Temperature Corrected     ECG 12 Lead Dyspnea   Result Value Ref Range    QT Interval 418 ms    QTC Interval 438 ms   Green Top (Gel)   Result Value Ref Range    Extra Tube Hold for add-ons.    Lavender Top   Result Value Ref Range    Extra Tube hold for add-on    Gold Top - SST   Result Value Ref Range    Extra Tube Hold for add-ons.    Light Blue Top   Result Value Ref Range    Extra Tube Hold for add-ons.        CT Angiogram Chest Pulmonary Embolism   Final Result   Impression:   1.  No CTA evidence of pulmonary embolism.   2.  Moderate left pleural effusion with adjacent atelectasis, similar to   the prior study.   3.  Interval development of patchy multifocal groundglass and alveolar   infiltrates predominately involving the perihilar regions and the right   upper lobe.  This could represent hemorrhage, pulmonary edema, or   infectious etiologies.       This report was finalized on 5/10/2023 10:34 PM by Robin Fairchild MD.          XR Chest 1 View   Final Result   Likely mild CHF       This report was finalized on 5/10/2023 5:22 PM by Dr. Oliver Renner MD.                                              Medical Decision Making  62-year-old male with past medical history of hyperlipidemia, coronary artery disease, MARIAH, GERD, hypertension, diabetes, congestive heart failure, BPH, and recent triple bypass surgery by Dr. Emanuel at [facility] on March 21, 2023 presents to the emergency room with shortness of breath and increasing edema which began early Sunday morning around 2 AM.  Patient is accompanied by his wife who states Sunday morning patient's O2 saturation was 72 to 84% on CPAP.  Wife states patient is supposed to be on CPAP, however his machine was taken, therefore she has been letting him use her machine.  She states later that day patient's oxygen continued to run low  therefore was able to obtain an oxygen concentrator from a family friend.  She states on Monday patient was able to get in with his cardiologist Dr. Boyle who prescribed patient 3 L of oxygen and double his daily dose of Bumex x7 days.  Wife states on Monday patient continued to decline and last night had to increase patient's oxygen to 5 L to maintain an oxygen saturation of 92% at best.  She does state that patient seems to get worse during the night.  Patient does endorse a low-grade fever yesterday, however has been afebrile this day.  He was seen yesterday by his primary care provider and given a Rocephin injection and started on Augmentin and doxycycline and had some improvement.  Patient also endorses a productive cough of green sputum which has seem to get better after starting on Mucinex.  Aggravating factors include going to sleep and exertion.  Denies any alleviating factors despite oxygen therapy and nocturnal CPAP.  Denies any chest pain.  He has been exposed to a grandchild with unknown viral illness.  Denies any other complaints or concerns at this time.      Acute on chronic congestive heart failure, unspecified heart failure type: acute illness or injury  Acute respiratory failure with hypoxia: acute illness or injury  Pleural effusion, left: acute illness or injury  Pneumonia of both lungs due to infectious organism, unspecified part of lung: acute illness or injury  Amount and/or Complexity of Data Reviewed  Labs: ordered. Decision-making details documented in ED Course.  Radiology: ordered. Decision-making details documented in ED Course.  ECG/medicine tests: ordered. Decision-making details documented in ED Course.  Discussion of management or test interpretation with external provider(s): Tamar Munroe Hammons Risk  Prescription drug management.  Decision regarding hospitalization.          Final diagnoses:   Acute on chronic congestive heart failure, unspecified heart failure type    Pleural effusion, left   Pneumonia of both lungs due to infectious organism, unspecified part of lung   Acute respiratory failure with hypoxia       ED Disposition  ED Disposition       ED Disposition   Decision to Admit    Condition   --    Comment   Level of Care: Telemetry [5]   Diagnosis: Bilateral pneumonia [220254]   Admitting Physician: AMY STEVENS [[id number]]   Attending Physician: AMY STEVENS [[id number]]   Certification: I Certify That Inpatient Hospital Services Are Medically Necessary For Greater Than 2 Midnights                 No follow-up provider specified.       Medication List      No changes were made to your prescriptions during this visit.          Josephine Wallis PA-C  05/10/23 2324      Electronically signed by Josephine Wallis PA-C at 05/10/23 2324       Nuha Antunez, RRT at 05/10/23 1728          Pt refused ABG at this time    Electronically signed by Nuha Antunez, CARINE at 05/10/23 1728       Marixa Coombs, RN at 05/10/23 2028          Patient placed on room air at this time by provider in order to get blood gas on room air     Electronically signed by Marixa Coombs, RN at 05/10/23 2030       Marixa Coombs, RN at 05/10/23 2038          Waiting for  to place a ultrasound guided IV for PE protocol.    Electronically signed by Marixa Coombs, RN at 05/10/23 2038       Marixa Coombs, RN at 05/10/23 2107          Dr. Wang at bedside for ultrasound guided IV.     Electronically signed by Marixa Coombs, RN at 05/10/23 2107       Vital Signs (last day)       Date/Time Temp Temp src Pulse Resp BP Patient Position SpO2    05/11/23 0629 97.9 (36.6) Oral 64 20 150/68 Sitting 97    05/11/23 0237 97.9 (36.6) Oral 59 22 166/80 Sitting 96    05/10/23 2351 98.1 (36.7) Oral 68 20 172/90 Sitting 96    05/10/23 2324 97.8 (36.6) Tympanic 67 20 134/76 Sitting 97    05/10/23 2200 -- -- 70 -- -- -- 93    05/10/23 2100 -- -- 67 -- -- -- 92    05/10/23  2054 -- -- -- -- -- -- 94    05/10/23 2002 -- -- 66 -- -- -- 93    05/10/23 1955 -- -- 68 -- -- -- 96    05/10/23 1905 -- -- 68 -- 149/80 -- --    05/10/23 1900 -- -- 64 -- 141/75 -- 98    05/10/23 1803 -- -- 68 -- -- -- 96    05/10/23 1744 -- -- 66 -- 128/98 -- 95    05/10/23 1651 -- -- 63 -- 140/70 -- 97    05/10/23 1623 97.5 (36.4) Infrared 64 18 145/74 Sitting 98          Oxygen Therapy (since admission)       Date/Time SpO2 Device (Oxygen Therapy) Flow (L/min) Oxygen Concentration (%) ETCO2 (mmHg)    05/11/23 0629 97 -- -- -- --    05/11/23 0237 96 nasal cannula 2 -- --    05/11/23 0001 -- nasal cannula 2 -- --    05/10/23 2351 96 nasal cannula 2 -- --    05/10/23 2324 97 nasal cannula 2 -- --    05/10/23 2200 93 -- -- -- --    05/10/23 2100 92 -- -- -- --    05/10/23 2054 94 room air -- -- --    05/10/23 2002 93 -- -- -- --    05/10/23 1955 96 -- -- -- --    05/10/23 1900 98 -- -- -- --    05/10/23 1803 96 -- -- -- --    05/10/23 1744 95 -- -- -- --    05/10/23 1651 97 -- -- -- --    05/10/23 1623 98 room air -- -- --          Medication Administration Report for Wilbert Kelley as of 05/11/23 0745         Medications 05/10/23 05/11/23      apixaban (ELIQUIS) tablet 5 mg  Dose: 5 mg  Freq: 2 Times Daily Route: PO  Indications of Use: Other - full anticoagulation  Start: 05/11/23 0900   Admin Instructions:   Tablet may be crushed and suspended in 60 mL of water or D5W and immediately delivered via NG tube.     0900     2100              aspirin EC tablet 81 mg  Dose: 81 mg  Freq: Daily Route: PO  Start: 05/11/23 0900   Admin Instructions:   Herbal/drug interaction: Avoid use with ginkgo biloba. Do not crush or chew. Based on patient request - if ordered for moderate or severe pain, provider allows for administration of a medication prescribed for a lower pain scale.  Do not exceed 4 grams of aspirin in a 24 hr period.    If given for pain, use the following pain scale:   Mild Pain = Pain Score of 1-3, CPOT  1-2  Moderate Pain = Pain Score of 4-6, CPOT 3-4  Severe Pain = Pain Score of 7-10, CPOT 5-8     0900               atorvastatin (LIPITOR) tablet 40 mg  Dose: 40 mg  Freq: Daily Route: PO  Start: 05/11/23 0900   Admin Instructions:   Avoid grapefruit juice.     0900               bumetanide (BUMEX) tablet 2 mg  Dose: 2 mg  Freq: 2 Times Daily Route: PO  Start: 05/11/23 0900   Admin Instructions:   Take with food if GI upset occurs.     0900 2100              doxycycline (VIBRAMYCIN) 100 mg in sodium chloride 0.9 % 100 mL IVPB-VTB  Dose: 100 mg  Freq: Every 12 Hours Route: IV  Indications of Use: PNEUMONIA  Start: 05/11/23 0000   End: 05/17/23 2359   Admin Instructions:   Protect from light.     0043-New Bag     1200              gabapentin (NEURONTIN) capsule 800 mg  Dose: 800 mg  Freq: Every 8 Hours Scheduled Route: PO  Start: 05/11/23 0600   Admin Instructions:        0502-Given     1400     2200             insulin glargine (LANTUS, SEMGLEE) injection 65 Units  Dose: 65 Units  Freq: 2 Times Daily Route: SC  Start: 05/11/23 0900   Admin Instructions:        0900     2100              Insulin Lispro (humaLOG) injection 3-14 Units  Dose: 3-14 Units  Freq: 3 Times Daily With Meals Route: SC  Start: 05/11/23 0800   Admin Instructions:   Correction - Moderate-High Dose. 60-80 units/day total insulin dose or patients taking insulin at home    Blood Glucose 150-199 mg/dL - 3 units  Blood Glucose 200-249 mg/dL - 5 units  Blood Glucose 250-299 mg/dL - 8 units  Blood Glucose 300-349 mg/dL - 10 units  Blood Glucose 350-400 mg/dL - 12 units  Blood Glucose Greater Than 400 mg/dL - 14 units & Call Provider   Caution: Look alike/sound alike drug alert     0800     1200     1800             lisinopril (PRINIVIL,ZESTRIL) tablet 40 mg  Dose: 40 mg  Freq: Daily Route: PO  Start: 05/11/23 0900     0900               nebivolol (BYSTOLIC) tablet 5 mg  Dose: 5 mg  Freq: Every 24 Hours Scheduled Route: PO  Start: 05/11/23 0900      0900               pantoprazole (PROTONIX) EC tablet 40 mg  Dose: 40 mg  Freq: Every Early Morning Route: PO  Start: 05/11/23 0600   Admin Instructions:   Swallow whole; do not crush, split, or chew.     (0503)-Not Given               potassium chloride (K-DUR,KLOR-CON) CR tablet 10 mEq  Dose: 10 mEq  Freq: Daily Route: PO  Start: 05/11/23 0900     0900               sodium chloride 0.9 % flush 10 mL  Dose: 10 mL  Freq: Every 12 Hours Scheduled Route: IV  Start: 05/11/23 0045     0135-Given     0900 2100             tamsulosin (FLOMAX) 24 hr capsule 0.4 mg  Dose: 0.4 mg  Freq: Daily Route: PO  Start: 05/11/23 0900   Admin Instructions:   Swallow whole. Do not crush or chew.     0900              Future Medications  Medications 05/10/23 05/11/23       cefTRIAXone (ROCEPHIN) 1 g in sodium chloride 0.9 % 100 mL IVPB-VTB  Dose: 1 g  Freq: Every 24 Hours Route: IV  Indications of Use: PNEUMONIA  Start: 05/11/23 1800   End: 05/16/23 1759   Admin Instructions:   Caution: Look alike/sound alike drug alert     1800               lisinopril (PRINIVIL,ZESTRIL) tablet 20 mg  Dose: 20 mg  Freq: Nightly Route: PO  Start: 05/11/23 2100 2100              Completed Medications  Medications 05/10/23 05/11/23       cefTRIAXone (ROCEPHIN) 1 g in sodium chloride 0.9 % 100 mL IVPB-VTB  Dose: 1 g  Freq: Once Route: IV  Indications of Use: UPPER RESPIRATORY TRACT INFECTION  Start: 05/10/23 1834   End: 05/10/23 1941   Admin Instructions:   LR should be paused and flushing of the line with NS is recommended prior to and after completion of ceftriaxone infusion due to incompatibility. Do not co-adminster with calcium-containing solutions.  Caution: Look alike/sound alike drug alert    1911-New Bag     1941-Stopped               furosemide (LASIX) injection 80 mg  Dose: 80 mg  Freq: Once Route: IV  Start: 05/10/23 1851   End: 05/10/23 1905    1905-Given                iopamidol (ISOVUE-370) 76 % injection 100 mL  Dose: 100 mL  Freq:  Once in Imaging Route: IV  Start: 05/10/23 2132   End: 05/10/23 2130    2130-Given                          and   Medication Administration Report for Wilbert Kelley as of 05/11/23 0746     Legend:    Given Hold Not Given Due Canceled Entry Other Actions    Time Time (Time) Time Time-Action         Discontinued     Completed     Future     MAR Hold     Linked             Medications 05/10/23 05/11/23     Discontinued Medications    sodium chloride 0.45 % with KCl 20 mEq/L infusion  Rate: 75 mL/hr Dose: 75 mL/hr  Freq: Continuous Route: IV  Start: 05/11/23 0600   End: 05/11/23 0549     0503-New Bag                          Lab Results (all)       Procedure Component Value Units Date/Time    POC Glucose Once [[id number]]  (Abnormal) Collected: 05/11/23 0635    Specimen: Blood Updated: 05/11/23 0649     Glucose 264 mg/dL      Comment: Meter: [id number] : [id number] MARY LOU BILLY       CBC & Differential [[id number]]  (Abnormal) Collected: 05/11/23 0133    Specimen: Blood Updated: 05/11/23 0551    Narrative:      The following orders were created for panel order CBC & Differential.  Procedure                               Abnormality         Status                     ---------                               -----------         ------                     Manual Differential[[id number]]          Abnormal            Final result               CBC Auto Differential[[id number]]        Abnormal            Final result                 Please view results for these tests on the individual orders.    CBC Auto Differential [[id number]]  (Abnormal) Collected: 05/11/23 0133    Specimen: Blood Updated: 05/11/23 0551     WBC 10.03 10*3/mm3      RBC 3.59 10*6/mm3      Hemoglobin 8.9 g/dL      Hematocrit 30.2 %      MCV 84.1 fL      MCH 24.8 pg      MCHC 29.5 g/dL      RDW 16.6 %      RDW-SD 50.5 fl      MPV 9.8 fL      Platelets 467 10*3/mm3     Manual Differential [[id number]]  (Abnormal) Collected: 05/11/23 0133    Specimen: Blood  Updated: 05/11/23 0551     Neutrophil % 71.0 %      Lymphocyte % 13.0 %      Monocyte % 8.0 %      Eosinophil % 5.0 %      Bands %  3.0 %      Neutrophils Absolute 7.42 10*3/mm3      Lymphocytes Absolute 1.30 10*3/mm3      Monocytes Absolute 0.80 10*3/mm3      Eosinophils Absolute 0.50 10*3/mm3      Hypochromia Slight/1+     Macrocytes Slight/1+     Platelet Morphology Normal    Comprehensive Metabolic Panel [[id number]]  (Abnormal) Collected: 05/11/23 0133    Specimen: Blood Updated: 05/11/23 0212     Glucose 235 mg/dL      BUN 21 mg/dL      Creatinine 1.05 mg/dL      Sodium 139 mmol/L      Potassium 3.6 mmol/L      Chloride 98 mmol/L      CO2 28.0 mmol/L      Calcium 8.8 mg/dL      Total Protein 8.0 g/dL      Albumin 3.3 g/dL      ALT (SGPT) 27 U/L      AST (SGOT) 16 U/L      Alkaline Phosphatase 106 U/L      Total Bilirubin 1.0 mg/dL      Globulin 4.7 gm/dL      A/G Ratio 0.7 g/dL      BUN/Creatinine Ratio 20.0     Anion Gap 13.0 mmol/L      eGFR 80.8 mL/min/1.73     Narrative:      GFR Normal >60  Chronic Kidney Disease <60  Kidney Failure <15      C-reactive Protein [[id number]]  (Abnormal) Collected: 05/11/23 0133    Specimen: Blood Updated: 05/11/23 0212     C-Reactive Protein 22.54 mg/dL     POC Glucose Once [[id number]]  (Abnormal) Collected: 05/10/23 2355    Specimen: Blood Updated: 05/11/23 0001     Glucose 211 mg/dL      Comment: Meter: [id number] : [id number] [facility]       Legionella Antigen, Urine - Urine, Urine, Clean Catch [[id number]]  (Normal) Collected: 05/10/23 1829    Specimen: Urine, Clean Catch Updated: 05/10/23 2350     LEGIONELLA ANTIGEN, URINE Negative    Narrative:      Presumptive negative for L. pneumophilia serogroup 1 antigen, suggesting no recent or current infection.    Procalcitonin [[id number]]  (Normal) Collected: 05/10/23 1702    Specimen: Blood from Arm, Right Updated: 05/10/23 2328     Procalcitonin 0.08 ng/mL     Narrative:      As a Marker for Sepsis  "(Non-Neonates):    1. <0.5 ng/mL represents a low risk of severe sepsis and/or septic shock.  2. >2 ng/mL represents a high risk of severe sepsis and/or septic shock.    As a Marker for Lower Respiratory Tract Infections that require antibiotic therapy:    PCT on Admission    Antibiotic Therapy       6-12 Hrs later    >0.5                Strongly Recommended  >0.25 - <0.5        Recommended   0.1 - 0.25          Discouraged              Remeasure/reassess PCT  <0.1                Strongly Discouraged     Remeasure/reassess PCT    As 28 day mortality risk marker: \"Change in Procalcitonin Result\" (>80% or <=80%) if Day 0 (or Day 1) and Day 4 values are available. Refer to http://www.[employer]pct-calculator.com    Change in PCT <=80%  A decrease of PCT levels below or equal to 80% defines a positive change in PCT test result representing a higher risk for 28-day all-cause mortality of patients diagnosed with severe sepsis for septic shock.    Change in PCT >80%  A decrease of PCT levels of more than 80% defines a negative change in PCT result representing a lower risk for 28-day all-cause mortality of patients diagnosed with severe sepsis or septic shock.       Blood Gas, Arterial With Co-Ox [[id number]]  (Abnormal) Collected: 05/10/23 2056    Specimen: Arterial Blood Updated: 05/10/23 2058     Site Right Radial     Chano's Test Positive     pH, Arterial 7.522 pH units      Comment: 83 Value above reference range        pCO2, Arterial 38.3 mm Hg      pO2, Arterial 64.7 mm Hg      Comment: 84 Value below reference range        HCO3, Arterial 31.4 mmol/L      Comment: 83 Value above reference range        Base Excess, Arterial 8.0 mmol/L      O2 Saturation, Arterial 95.0 %      Hemoglobin, Blood Gas 9.5 g/dL      Comment: 84 Value below reference range        Hematocrit, Blood Gas 29.2 %      Comment: 84 Value below reference range        Oxyhemoglobin 93.5 %      Comment: 84 Value below reference range        Methemoglobin " <-0.10 %      Comment: 94 Value below reportable range < _0.1        Carboxyhemoglobin 1.8 %      A-a DO2 35.2 mmHg      CO2 Content 32.6 mmol/L      Barometric Pressure for Blood Gas 729 mmHg      Modality Room Air     FIO2 21 %      Ventilator Mode NA     Note Read back and acknowledge     Notified Who HUSSEIN OROZCO AND RN     Notified By PEPPER     Collected by [id number]     Comment: Meter: [id number]     :  [id number]        pH, Temp Corrected --     pCO2, Temperature Corrected --     pO2, Temperature Corrected --    High Sensitivity Troponin T 2Hr [[id number]]  (Abnormal) Collected: 05/10/23 1856    Specimen: Blood from Arm, Left Updated: 05/10/23 1928     HS Troponin T 34 ng/L      Troponin T Delta 0 ng/L     Narrative:      High Sensitive Troponin T Reference Range:  <10.0 ng/L- Negative Female for AMI  <15.0 ng/L- Negative Male for AMI  >=10 - Abnormal Female indicating possible myocardial injury.  >=15 - Abnormal Male indicating possible myocardial injury.   Clinicians would have to utilize clinical acumen, EKG, Troponin, and serial changes to determine if it is an Acute Myocardial Infarction or myocardial injury due to an underlying chronic condition.         Lactic Acid, Plasma [[id number]]  (Normal) Collected: 05/10/23 1856    Specimen: Blood from Arm, Right Updated: 05/10/23 1926     Lactate 1.8 mmol/L     Blood Culture - Blood, Arm, Left [[id number]] Collected: 05/10/23 1856    Specimen: Blood from Arm, Left Updated: 05/10/23 1900    Blood Culture - Blood, Arm, Right [[id number]] Collected: 05/10/23 1856    Specimen: Blood from Arm, Right Updated: 05/10/23 1900    Urinalysis With Microscopic If Indicated (No Culture) - Urine, Clean Catch [[id number]]  (Abnormal) Collected: 05/10/23 1829    Specimen: Urine, Clean Catch Updated: 05/10/23 1845     Color, UA Dark Yellow     Appearance, UA Clear     pH, UA 5.5     Specific Gravity, UA 1.022     Glucose, UA Negative     Ketones, UA Negative     Bilirubin,  "UA Small (1+)     Blood, UA Large (3+)     Protein, UA 30 mg/dL (1+)     Leuk Esterase, UA Trace     Nitrite, UA Negative     Urobilinogen, UA 4.0 E.U./dL    Urinalysis, Microscopic Only - Urine, Clean Catch [[id number]]  (Abnormal) Collected: 05/10/23 1829    Specimen: Urine, Clean Catch Updated: 05/10/23 1845     RBC, UA Too Numerous to Count /HPF      WBC, UA 6-12 /HPF      Bacteria, UA None Seen /HPF      Squamous Epithelial Cells, UA None Seen /HPF      Hyaline Casts, UA None Seen /LPF      Methodology Automated Microscopy    D-dimer, Quantitative [[id number]]  (Abnormal) Collected: 05/10/23 1702    Specimen: Blood from Arm, Right Updated: 05/10/23 1836     D-Dimer, Quantitative 5.09 MCGFEU/mL     Narrative:      According to the assay 's published package insert, a normal (<0.50 MCGFEU/mL) D-dimer result in conjunction with a non-high clinical probability assessment, excludes deep vein thrombosis (DVT) and pulmonary embolism (PE) with high sensitivity.    D-dimer values increase with age and this can make VTE exclusion of an older population difficult. To address this, the [employer], based on best available evidence and recent guidelines, recommends that clinicians use age-adjusted D-dimer thresholds in patients greater than 50 years of age with: a) a low probability of PE who do not meet all Pulmonary Embolism Rule Out Criteria, or b) in those with intermediate probability of PE.   The formula for an age-adjusted D-dimer cut-off is \"age/100\".  For example, a 60 year old patient would have an age-adjusted cut-off of 0.60 MCGFEU/mL and an 80 year old 0.80 MCGFEU/mL.    [facility] Draw [[id number]] Collected: 05/10/23 1702    Specimen: Blood from Arm, Right Updated: 05/10/23 1815    Narrative:      The following orders were created for panel order [facility] Draw.  Procedure                               Abnormality         Status                     ---------                              "  -----------         ------                     Green Top (Gel)[[id number]]                                  Final result               Lavender Top[[id number]]                                     Final result               Gold Top - SST[[id number]]                                   Final result               Light Blue Top[[id number]]                                   Final result                 Please view results for these tests on the individual orders.    Green Top (Gel) [[id number]] Collected: 05/10/23 1702    Specimen: Blood from Arm, Right Updated: 05/10/23 1815     Extra Tube Hold for add-ons.     Comment: Auto resulted.       Lavender Top [[id number]] Collected: 05/10/23 1702    Specimen: Blood from Arm, Right Updated: 05/10/23 1815     Extra Tube hold for add-on     Comment: Auto resulted       Gold Top - SST [[id number]] Collected: 05/10/23 1702    Specimen: Blood from Arm, Right Updated: 05/10/23 1815     Extra Tube Hold for add-ons.     Comment: Auto resulted.       Light Blue Top [[id number]] Collected: 05/10/23 1702    Specimen: Blood from Arm, Right Updated: 05/10/23 1815     Extra Tube Hold for add-ons.     Comment: Auto resulted       Respiratory Panel PCR w/COVID-19(SARS-CoV-2) RAJNI/BALA/NAHUN/PAD/COR/MAD/NABEEL In-House, NP Swab in UTM/VTM, 3-4 HR TAT - Swab, Nasopharynx [[id number]]  (Normal) Collected: 05/10/23 1651    Specimen: Swab from Nasopharynx Updated: 05/10/23 1747     ADENOVIRUS, PCR Not Detected     Coronavirus 229E Not Detected     Coronavirus HKU1 Not Detected     Coronavirus NL63 Not Detected     Coronavirus OC43 Not Detected     COVID19 Not Detected     Human Metapneumovirus Not Detected     Human Rhinovirus/Enterovirus Not Detected     Influenza A PCR Not Detected     Influenza B PCR Not Detected     Parainfluenza Virus 1 Not Detected     Parainfluenza Virus 2 Not Detected     Parainfluenza Virus 3 Not Detected     Parainfluenza Virus 4 Not Detected     RSV, PCR Not Detected     Bordetella  pertussis pcr Not Detected     Bordetella parapertussis PCR Not Detected     Chlamydophila pneumoniae PCR Not Detected     Mycoplasma pneumo by PCR Not Detected    Narrative:      In the setting of a positive respiratory panel with a viral infection PLUS a negative procalcitonin without other underlying concern for bacterial infection, consider observing off antibiotics or discontinuation of antibiotics and continue supportive care. If the respiratory panel is positive for atypical bacterial infection (Bordetella pertussis, Chlamydophila pneumoniae, or Mycoplasma pneumoniae), consider antibiotic de-escalation to target atypical bacterial infection.    Comprehensive Metabolic Panel [[id number]]  (Abnormal) Collected: 05/10/23 1702    Specimen: Blood from Arm, Right Updated: 05/10/23 1736     Glucose 229 mg/dL      BUN 21 mg/dL      Creatinine 0.98 mg/dL      Sodium 138 mmol/L      Potassium 3.9 mmol/L      Chloride 99 mmol/L      CO2 26.5 mmol/L      Calcium 8.5 mg/dL      Total Protein 7.6 g/dL      Albumin 3.1 g/dL      ALT (SGPT) 29 U/L      AST (SGOT) 18 U/L      Alkaline Phosphatase 105 U/L      Total Bilirubin 1.1 mg/dL      Globulin 4.5 gm/dL      A/G Ratio 0.7 g/dL      BUN/Creatinine Ratio 21.4     Anion Gap 12.5 mmol/L      eGFR 87.7 mL/min/1.73     Narrative:      GFR Normal >60  Chronic Kidney Disease <60  Kidney Failure <15      C-reactive Protein [[id number]]  (Abnormal) Collected: 05/10/23 1702    Specimen: Blood from Arm, Right Updated: 05/10/23 1732     C-Reactive Protein 24.14 mg/dL     High Sensitivity Troponin T [[id number]]  (Abnormal) Collected: 05/10/23 1702    Specimen: Blood from Arm, Right Updated: 05/10/23 1732     HS Troponin T 34 ng/L     Narrative:      High Sensitive Troponin T Reference Range:  <10.0 ng/L- Negative Female for AMI  <15.0 ng/L- Negative Male for AMI  >=10 - Abnormal Female indicating possible myocardial injury.  >=15 - Abnormal Male indicating possible myocardial injury.    Clinicians would have to utilize clinical acumen, EKG, Troponin, and serial changes to determine if it is an Acute Myocardial Infarction or myocardial injury due to an underlying chronic condition.         BNP [[id number]]  (Abnormal) Collected: 05/10/23 1702    Specimen: Blood from Arm, Right Updated: 05/10/23 1729     proBNP 3,126.0 pg/mL     Narrative:      Among patients with dyspnea, NT-proBNP is highly sensitive for the detection of acute congestive heart failure. In addition NT-proBNP of <300 pg/ml effectively rules out acute congestive heart failure with 99% negative predictive value.      CBC & Differential [[id number]]  (Abnormal) Collected: 05/10/23 1702    Specimen: Blood from Arm, Right Updated: 05/10/23 1710    Narrative:      The following orders were created for panel order CBC & Differential.  Procedure                               Abnormality         Status                     ---------                               -----------         ------                     CBC Auto Differential[[id number]]        Abnormal            Final result                 Please view results for these tests on the individual orders.    CBC Auto Differential [[id number]]  (Abnormal) Collected: 05/10/23 1702    Specimen: Blood from Arm, Right Updated: 05/10/23 1710     WBC 10.54 10*3/mm3      RBC 3.51 10*6/mm3      Hemoglobin 9.0 g/dL      Hematocrit 29.5 %      MCV 84.0 fL      MCH 25.6 pg      MCHC 30.5 g/dL      RDW 16.8 %      RDW-SD 50.9 fl      MPV 9.4 fL      Platelets 452 10*3/mm3      Neutrophil % 80.8 %      Lymphocyte % 8.7 %      Monocyte % 6.6 %      Eosinophil % 2.8 %      Basophil % 0.6 %      Immature Grans % 0.5 %      Neutrophils, Absolute 8.52 10*3/mm3      Lymphocytes, Absolute 0.92 10*3/mm3      Monocytes, Absolute 0.70 10*3/mm3      Eosinophils, Absolute 0.29 10*3/mm3      Basophils, Absolute 0.06 10*3/mm3      Immature Grans, Absolute 0.05 10*3/mm3      nRBC 0.0 /100 WBC           Imaging Results  (All)       Procedure Component Value Units Date/Time    CT Angiogram Chest Pulmonary Embolism [[id number]] Collected: 05/10/23 2234     Updated: 05/10/23 2236    Narrative:      Contrast-enhanced CTA chest     Indications: Shortness of breath     Technique: Contrast-enhanced CTA images were obtained.  Limited exposure  control, adjustment of the MA and/or KV according to patient size or use  of an iterative reconstruction technique was utilized.   Three-dimensional reconstructions were done.     Findings CTA chest:     Comparison is made to prior study dated 5/6/2023.     There is no evidence of filling defect within the main or lobar  pulmonary arteries to suggest pulmonary embolism.     The heart is not enlarged.  There is no pericardial effusion.  The  thoracic aorta is normal in course and caliber.     There is an enlarged right paratracheal lymph node measuring 1.3 cm.   There is an enlarged right paratracheal/right paraesophageal lymph node  measuring 1.1 cm.  There is an enlarged right hilar lymph node measuring  1.1 cm.  There is a more inferior large right hilar lymph node measuring  1.1 cm.  An enlarged subcarinal lymph node measures 1.4 cm.  An AP  window lymph node measures 1.3 cm.     Limited images of the upper abdomen are normal.     There is a moderate left pleural effusion with adjacent compressive  atelectasis.  There are multifocal bilateral infiltrates predominantly  involving the perihilar regions and right upper lobe.  The infiltrates  are new since the prior study.  Left pleural effusion is stable.     The osseous structures are normal.       Impression:      Impression:  1.  No CTA evidence of pulmonary embolism.  2.  Moderate left pleural effusion with adjacent atelectasis, similar to  the prior study.  3.  Interval development of patchy multifocal groundglass and alveolar  infiltrates predominately involving the perihilar regions and the right  upper lobe.  This could represent hemorrhage,  pulmonary edema, or  infectious etiologies.     This report was finalized on 5/10/2023 10:34 PM by Robin Fairchild MD.       XR Chest 1 View [[id number]] Collected: 05/10/23 1721     Updated: 05/10/23 1724    Narrative:      XR CHEST 1 VW-     CLINICAL INDICATION: SOA        COMPARISON: 05/06/2023      TECHNIQUE: Single frontal view of the chest.     FINDINGS:      LUNGS: Probable CHF      HEART AND MEDIASTINUM: Heart and mediastinal contours are unremarkable        SKELETON: Bony and soft tissue structures are unremarkable.             Impression:      Likely mild CHF     This report was finalized on 5/10/2023 5:22 PM by Dr. Oliver Renner MD.             Orders (all)        Start     Ordered    05/12/23 0600  CBC & Differential  Morning Draw         05/11/23 0726 05/12/23 0600  Basic Metabolic Panel  Morning Draw         05/11/23 0726 05/11/23 2100  lisinopril (PRINIVIL,ZESTRIL) tablet 20 mg  Nightly         05/11/23 0307    05/11/23 1800  cefTRIAXone (ROCEPHIN) 1 g in sodium chloride 0.9 % 100 mL IVPB-VTB  Every 24 Hours         05/10/23 2257    05/11/23 0900  aspirin EC tablet 81 mg  Daily         05/11/23 0307    05/11/23 0900  nebivolol (BYSTOLIC) tablet 5 mg  Every 24 Hours Scheduled         05/11/23 0307    05/11/23 0900  tamsulosin (FLOMAX) 24 hr capsule 0.4 mg  Daily         05/11/23 0307    05/11/23 0900  apixaban (ELIQUIS) tablet 5 mg  2 Times Daily         05/11/23 0307    05/11/23 0900  atorvastatin (LIPITOR) tablet 40 mg  Daily         05/11/23 0307    05/11/23 0900  lisinopril (PRINIVIL,ZESTRIL) tablet 40 mg  Daily         05/11/23 0307    05/11/23 0900  potassium chloride (K-DUR,KLOR-CON) CR tablet 10 mEq  Daily         05/11/23 0307    05/11/23 0900  insulin glargine (LANTUS, SEMGLEE) injection 65 Units  2 Times Daily         05/11/23 0307    05/11/23 0900  bumetanide (BUMEX) tablet 2 mg  2 Times Daily         05/11/23 0307    05/11/23 0800  Oral Care  2 Times Daily       05/10/23 8666     05/11/23 0800  Insulin Lispro (humaLOG) injection 3-14 Units  3 Times Daily With Meals         05/11/23 0334    05/11/23 0727  ReDs Vest  Once         05/11/23 0726    05/11/23 0700  Adult Transthoracic Echo Complete w/ Color, Spectral and Contrast if necessary per protocol  Once         05/10/23 2351 05/11/23 0700  POC Glucose Finger 4x Daily AC & at Bedtime  4 Times Daily Before Meals & at Bedtime       05/11/23 0334    05/11/23 0700  POC Glucose TID AC  3 Times Daily Before Meals       05/11/23 0334    05/11/23 0650  POC Glucose Once  PROCEDURE ONCE         05/11/23 0635    05/11/23 0600  CBC & Differential  Morning Draw         05/10/23 2351    05/11/23 0600  Comprehensive Metabolic Panel  Morning Draw         05/10/23 2351 05/11/23 0600  C-reactive Protein  Morning Draw         05/10/23 2351 05/11/23 0600  Incentive Spirometry  Every 2 Hours While Awake       05/10/23 2351    05/11/23 0600  sodium chloride 0.45 % with KCl 20 mEq/L infusion  Continuous,   Status:  Discontinued         05/10/23 2351    05/11/23 0600  Manual Differential  PROCEDURE ONCE         05/10/23 2351    05/11/23 0600  CBC Auto Differential  PROCEDURE ONCE         05/10/23 2351    05/11/23 0600  pantoprazole (PROTONIX) EC tablet 40 mg  Every Early Morning         05/11/23 0307    05/11/23 0600  gabapentin (NEURONTIN) capsule 800 mg  Every 8 Hours Scheduled         05/11/23 0309    05/11/23 0331  dextrose (GLUTOSE) oral gel 15 g  Every 15 Minutes PRN         05/11/23 0334    05/11/23 0331  dextrose (D50W) (25 g/50 mL) IV injection 25 g  Every 15 Minutes PRN         05/11/23 0334    05/11/23 0331  glucagon HCl (Diagnostic) injection 1 mg  Every 15 Minutes PRN         05/11/23 0334    05/11/23 0306  ondansetron ODT (ZOFRAN-ODT) disintegrating tablet 4 mg  Every 8 Hours PRN         05/11/23 0307    05/11/23 0045  sodium chloride 0.9 % flush 10 mL  Every 12 Hours Scheduled         05/10/23 2351    05/11/23 0002  POC Glucose Once   PROCEDURE ONCE         05/10/23 2355    05/11/23 0000  doxycycline (VIBRAMYCIN) 100 mg in sodium chloride 0.9 % 100 mL IVPB-VTB  Every 12 Hours         05/10/23 2257    05/11/23 0000  Vital Signs  Every 8 Hours        Comments: Per per hospital policy    05/10/23 2351    05/11/23 0000  Strict Intake & Output  Every 6 Hours         05/10/23 2351    05/10/23 2359  Inpatient Case Management  Consult  Once        Provider:  (Not yet assigned)    05/10/23 2358    05/10/23 2352  Notify Physician (with Parameters)  Until Discontinued         05/10/23 2351    05/10/23 2352  Oxygen Therapy- Nasal Cannula; Titrate for SPO2: 90% - 95%  Continuous         05/10/23 2351    05/10/23 2352  Insert Peripheral IV  Once         05/10/23 2351    05/10/23 2352  Saline Lock & Maintain IV Access  Continuous,   Status:  Canceled         05/10/23 2351    05/10/23 2352  VTE Prophylaxis Not Indicated: Other: Patient Currently Anticoagulated / Receiving Prophylaxis  Once         05/10/23 2351    05/10/23 2352  Telemetry - Maintain IV Access  Continuous         05/10/23 2351    05/10/23 2352  Continuous Cardiac Monitoring  Continuous,   Status:  Canceled        Comments: Follow Standing Orders As Outlined in Process Instructions (Open Order Report to View Full Instructions)    05/10/23 2351    05/10/23 2352  May Be Off Telemetry for Tests  Continuous         05/10/23 2351    05/10/23 2352  Pulse Oximetry, Continuous  Continuous         05/10/23 2351    05/10/23 2352  Cardiac Monitoring  Continuous        Comments: Follow Standing Orders As Outlined in Process Instructions (Open Order Report to View Full Instructions)    05/10/23 2351    05/10/23 2352  Activity - Bed Rest With Exceptions (Specify)  Until Discontinued         05/10/23 2351    05/10/23 2352  Daily Weights  Daily       05/10/23 2351    05/10/23 2352  Diet: Cardiac Diets, Diabetic Diets; Healthy Heart (2-3 Na+); Consistent Carbohydrate; Texture: Regular Texture (IDDSI  7); Fluid Consistency: Thin (IDDSI 0)  Diet Effective Now         05/10/23 2351    05/10/23 2352  Inpatient Cardiology Consult  Once        Specialty:  Cardiology  Provider:  (Not yet assigned)    05/10/23 2351    05/10/23 2351  sodium chloride 0.9 % flush 10 mL  As Needed         05/10/23 2351    05/10/23 2351  sodium chloride 0.9 % infusion 40 mL  As Needed         05/10/23 2351    05/10/23 2351  nitroglycerin (NITROSTAT) SL tablet 0.4 mg  Every 5 Minutes PRN         05/10/23 2351    05/10/23 2308  Hospitalist (on-call MD unless specified)  Once        Specialty:  Hospitalist  Provider:  (Not yet assigned)    05/10/23 2307    05/10/23 2300  Code Status and Medical Interventions:  Continuous         05/10/23 2301    05/10/23 2259  Inpatient Admission  Once         05/10/23 2301    05/10/23 2258  Procalcitonin  STAT         05/10/23 2257    05/10/23 2258  Legionella Antigen, Urine - Urine, Urine, Clean Catch  Once         05/10/23 2257    05/10/23 2132  iopamidol (ISOVUE-370) 76 % injection 100 mL  Once in Imaging         05/10/23 2130    05/10/23 2059  Blood Gas, Arterial With Co-Ox  PROCEDURE ONCE         05/10/23 2056    05/10/23 2041  Blood Gas, Arterial -With Co-Ox Panel: Yes  Once         05/10/23 2040    05/10/23 2034  CT Angiogram Chest Pulmonary Embolism  1 Time Imaging         05/10/23 2033    05/10/23 1902  High Sensitivity Troponin T 2Hr  PROCEDURE ONCE         05/10/23 1732    05/10/23 1851  furosemide (LASIX) injection 80 mg  Once         05/10/23 1849    05/10/23 1838  Urinalysis, Microscopic Only - Urine, Clean Catch  Once         05/10/23 1837    05/10/23 1834  cefTRIAXone (ROCEPHIN) 1 g in sodium chloride 0.9 % 100 mL IVPB-VTB  Once         05/10/23 1832    05/10/23 1833  Blood Culture - Blood, Arm, Right  Once         05/10/23 1832    05/10/23 1833  Blood Culture - Blood, Arm, Left  Once         05/10/23 1832    05/10/23 1833  Lactic Acid, Plasma  Once         05/10/23 1832    05/10/23 1652   Respiratory Panel PCR w/COVID-19(SARS-CoV-2) RAJNI/BALA/NAHUN/PAD/COR/MAD/NABEEL In-House, NP Swab in UTM/VTM, 3-4 HR TAT - Swab, Nasopharynx  STAT         05/10/23 1649    05/10/23 1649  Blood Gas, Arterial -With Co-Ox Panel: Yes  Once,   Status:  Canceled         05/10/23 1648    05/10/23 1636  COVID-19 and FLU A/B PCR - Swab, Nasopharynx  Once,   Status:  Canceled         05/10/23 1635    05/10/23 1636  Monitor Blood Pressure  Per Hospital Policy         05/10/23 1635    05/10/23 1636  Cardiac Monitoring  Continuous,   Status:  Canceled        Comments: Follow Standing Orders As Outlined in Process Instructions (Open Order Report to View Full Instructions)    05/10/23 1635    05/10/23 1636  Pulse Oximetry, Continuous  Continuous,   Status:  Canceled         05/10/23 1635    05/10/23 1635  [place] Draw  Once         05/10/23 1635    05/10/23 1635  Insert Peripheral IV  Once        See [facility] for full Linked Orders Report.    05/10/23 1635    05/10/23 1635  CBC & Differential  Once         05/10/23 1635    05/10/23 1635  Comprehensive Metabolic Panel  Once         05/10/23 1635    05/10/23 1635  C-reactive Protein  STAT         05/10/23 1635    05/10/23 1635  Urinalysis With Microscopic If Indicated (No Culture) - Urine, Clean Catch  Once         05/10/23 1635    05/10/23 1635  BNP  STAT         05/10/23 1635    05/10/23 1635  D-dimer, Quantitative  Once         05/10/23 1635    05/10/23 1635  High Sensitivity Troponin T  Once         05/10/23 1635    05/10/23 1635  XR Chest 1 View  1 Time Imaging         05/10/23 1635    05/10/23 1635  Green Top (Gel)  PROCEDURE ONCE         05/10/23 1635    05/10/23 1635  Lavender Top  PROCEDURE ONCE         05/10/23 1635    05/10/23 1635  Gold Top - SST  PROCEDURE ONCE         05/10/23 1635    05/10/23 1635  Light Blue Top  PROCEDURE ONCE         05/10/23 1635    05/10/23 1635  CBC Auto Differential  PROCEDURE ONCE         05/10/23 1635    05/10/23 1634  sodium chloride 0.9 % flush  10 mL  As Needed        See [facility] for full Linked Orders Report.    05/10/23 1635    05/10/23 1633  ECG 12 Lead Dyspnea  Once         05/10/23 1632    05/08/23 0000  potassium chloride (K-DUR,KLOR-CON) 10 MEQ CR tablet  Daily         05/11/23 0109    Unscheduled  Telemetry - Pulse Oximetry  Continuous PRN      Comments: If Patient Develops Unresponsiveness, Acute Dyspnea, Cyanosis or Suspected Hypoxemia Start Continuous Pulse Ox Monitoring, Apply Oxygen & Notify Provider    05/10/23 2351    Unscheduled  Oxygen Therapy- Nasal Cannula; Titrate for SPO2: 90% - 95%  Continuous PRN      Comments: If Patient Develops Unresponsiveness, Acute Dyspnea, Cyanosis or Suspected Hypoxemia Start Continuous Pulse Ox Monitoring, Apply Oxygen & Notify Provider    05/10/23 2351    Unscheduled  ECG 12 Lead Chest Pain  As Needed      Comments: Nurse to Release if Patient Expericences Acute Chest Pain or Dysrhythmias    05/10/23 2351    Unscheduled  Potassium  As Needed      Comments: For Ventricular Arrhythmias      05/10/23 2351    Unscheduled  Magnesium  As Needed      Comments: For Ventricular Arrhythmias      05/10/23 2351    Unscheduled  High Sensitivity Troponin T  As Needed      Comments: For Chest Pain      05/10/23 2351    Unscheduled  Blood Gas, Arterial -With Co-Ox Panel: Yes  As Needed      Comments: Draw for Acute Dyspnea, Cyanosis, Suspected Hypoxemia or UnresponsivenessNotify Provider of Results      05/10/23 2351    Unscheduled  Up With Assistance  As Needed       05/10/23 2351    Unscheduled  Follow Hypoglycemia Standing Orders For Blood Glucose <70 & Notify Provider of Treatment  As Needed      Comments: Follow Hypoglycemia Orders As Outlined in Process Instructions (Open Order Report to View Full Instructions)  Notify Provider Any Time Hypoglycemia Treatment is Administered    05/11/23 0334    --  atorvastatin (LIPITOR) 40 MG tablet  Daily         05/11/23 0058    --  lisinopril (PRINIVIL,ZESTRIL) 40 MG tablet   Every Morning         05/11/23 0105    --  lisinopril (PRINIVIL,ZESTRIL) 40 MG tablet  Nightly         05/11/23 0105    --  metFORMIN (GLUCOPHAGE) 1000 MG tablet  2 Times Daily With Meals         05/11/23 0109    --  lansoprazole (PREVACID) 30 MG capsule  Daily         05/11/23 0113    --  doxycycline (VIBRAMYICN) 100 MG tablet  2 Times Daily         05/11/23 0113    --  amoxicillin-clavulanate (AUGMENTIN) 875-125 MG per tablet  2 Times Daily         05/11/23 0113    --  SCANNED - TELEMETRY           05/10/23 0000                  Physician Progress Notes (all)    No notes of this type exist for this encounter.       Consult Notes (all)    No notes of this type exist for this encounter.

## 2023-05-12 ENCOUNTER — APPOINTMENT (OUTPATIENT)
Dept: GENERAL RADIOLOGY | Facility: HOSPITAL | Age: 63
DRG: 194 | End: 2023-05-12
Payer: MEDICARE

## 2023-05-12 LAB
ANION GAP SERPL CALCULATED.3IONS-SCNC: 12.8 MMOL/L (ref 5–15)
BASOPHILS # BLD AUTO: 0.07 10*3/MM3 (ref 0–0.2)
BASOPHILS NFR BLD AUTO: 0.8 % (ref 0–1.5)
BUN SERPL-MCNC: 22 MG/DL (ref 8–23)
BUN/CREAT SERPL: 19 (ref 7–25)
CALCIUM SPEC-SCNC: 9.3 MG/DL (ref 8.6–10.5)
CHLORIDE SERPL-SCNC: 100 MMOL/L (ref 98–107)
CO2 SERPL-SCNC: 27.2 MMOL/L (ref 22–29)
CREAT SERPL-MCNC: 1.16 MG/DL (ref 0.76–1.27)
DEPRECATED RDW RBC AUTO: 47.1 FL (ref 37–54)
EGFRCR SERPLBLD CKD-EPI 2021: 71.7 ML/MIN/1.73
EOSINOPHIL # BLD AUTO: 0.45 10*3/MM3 (ref 0–0.4)
EOSINOPHIL NFR BLD AUTO: 4.9 % (ref 0.3–6.2)
ERYTHROCYTE [DISTWIDTH] IN BLOOD BY AUTOMATED COUNT: 16.6 % (ref 12.3–15.4)
GLUCOSE BLDC GLUCOMTR-MCNC: 143 MG/DL (ref 70–130)
GLUCOSE BLDC GLUCOMTR-MCNC: 245 MG/DL (ref 70–130)
GLUCOSE BLDC GLUCOMTR-MCNC: 256 MG/DL (ref 70–130)
GLUCOSE BLDC GLUCOMTR-MCNC: 283 MG/DL (ref 70–130)
GLUCOSE SERPL-MCNC: 190 MG/DL (ref 65–99)
HCT VFR BLD AUTO: 28.9 % (ref 37.5–51)
HGB BLD-MCNC: 9 G/DL (ref 13–17.7)
IMM GRANULOCYTES # BLD AUTO: 0.07 10*3/MM3 (ref 0–0.05)
IMM GRANULOCYTES NFR BLD AUTO: 0.8 % (ref 0–0.5)
LYMPHOCYTES # BLD AUTO: 1.29 10*3/MM3 (ref 0.7–3.1)
LYMPHOCYTES NFR BLD AUTO: 14.2 % (ref 19.6–45.3)
MCH RBC QN AUTO: 24.9 PG (ref 26.6–33)
MCHC RBC AUTO-ENTMCNC: 31.1 G/DL (ref 31.5–35.7)
MCV RBC AUTO: 79.8 FL (ref 79–97)
MONOCYTES # BLD AUTO: 0.57 10*3/MM3 (ref 0.1–0.9)
MONOCYTES NFR BLD AUTO: 6.3 % (ref 5–12)
NEUTROPHILS NFR BLD AUTO: 6.66 10*3/MM3 (ref 1.7–7)
NEUTROPHILS NFR BLD AUTO: 73 % (ref 42.7–76)
NRBC BLD AUTO-RTO: 0 /100 WBC (ref 0–0.2)
NT-PROBNP SERPL-MCNC: 1693 PG/ML (ref 0–900)
PLATELET # BLD AUTO: 492 10*3/MM3 (ref 140–450)
PMV BLD AUTO: 9.9 FL (ref 6–12)
POTASSIUM SERPL-SCNC: 4.7 MMOL/L (ref 3.5–5.2)
RBC # BLD AUTO: 3.62 10*6/MM3 (ref 4.14–5.8)
SODIUM SERPL-SCNC: 140 MMOL/L (ref 136–145)
WBC NRBC COR # BLD: 9.11 10*3/MM3 (ref 3.4–10.8)

## 2023-05-12 PROCEDURE — 63710000001 INSULIN LISPRO (HUMAN) PER 5 UNITS

## 2023-05-12 PROCEDURE — 94799 UNLISTED PULMONARY SVC/PX: CPT

## 2023-05-12 PROCEDURE — 83880 ASSAY OF NATRIURETIC PEPTIDE: CPT | Performed by: STUDENT IN AN ORGANIZED HEALTH CARE EDUCATION/TRAINING PROGRAM

## 2023-05-12 PROCEDURE — 82948 REAGENT STRIP/BLOOD GLUCOSE: CPT

## 2023-05-12 PROCEDURE — 85025 COMPLETE CBC W/AUTO DIFF WBC: CPT | Performed by: FAMILY MEDICINE

## 2023-05-12 PROCEDURE — 99232 SBSQ HOSP IP/OBS MODERATE 35: CPT | Performed by: SPECIALIST

## 2023-05-12 PROCEDURE — 80048 BASIC METABOLIC PNL TOTAL CA: CPT | Performed by: FAMILY MEDICINE

## 2023-05-12 PROCEDURE — 71045 X-RAY EXAM CHEST 1 VIEW: CPT

## 2023-05-12 PROCEDURE — 71045 X-RAY EXAM CHEST 1 VIEW: CPT | Performed by: RADIOLOGY

## 2023-05-12 PROCEDURE — 99222 1ST HOSP IP/OBS MODERATE 55: CPT | Performed by: INTERNAL MEDICINE

## 2023-05-12 PROCEDURE — 99233 SBSQ HOSP IP/OBS HIGH 50: CPT | Performed by: STUDENT IN AN ORGANIZED HEALTH CARE EDUCATION/TRAINING PROGRAM

## 2023-05-12 PROCEDURE — 94664 DEMO&/EVAL PT USE INHALER: CPT

## 2023-05-12 PROCEDURE — 94761 N-INVAS EAR/PLS OXIMETRY MLT: CPT

## 2023-05-12 PROCEDURE — 63710000001 INSULIN GLARGINE PER 5 UNITS

## 2023-05-12 RX ORDER — IPRATROPIUM BROMIDE AND ALBUTEROL SULFATE 2.5; .5 MG/3ML; MG/3ML
3 SOLUTION RESPIRATORY (INHALATION)
Status: DISCONTINUED | OUTPATIENT
Start: 2023-05-12 | End: 2023-05-13 | Stop reason: HOSPADM

## 2023-05-12 RX ADMIN — LISINOPRIL 40 MG: 10 TABLET ORAL at 10:08

## 2023-05-12 RX ADMIN — INSULIN LISPRO 8 UNITS: 100 INJECTION, SOLUTION INTRAVENOUS; SUBCUTANEOUS at 18:58

## 2023-05-12 RX ADMIN — LISINOPRIL 20 MG: 10 TABLET ORAL at 20:24

## 2023-05-12 RX ADMIN — IPRATROPIUM BROMIDE AND ALBUTEROL SULFATE 3 ML: .5; 2.5 SOLUTION RESPIRATORY (INHALATION) at 14:21

## 2023-05-12 RX ADMIN — GABAPENTIN 800 MG: 400 CAPSULE ORAL at 13:18

## 2023-05-12 RX ADMIN — NEBIVOLOL HYDROCHLORIDE 5 MG: 5 TABLET ORAL at 10:07

## 2023-05-12 RX ADMIN — Medication 10 ML: at 10:08

## 2023-05-12 RX ADMIN — ATORVASTATIN CALCIUM 40 MG: 40 TABLET, FILM COATED ORAL at 10:08

## 2023-05-12 RX ADMIN — TAMSULOSIN HYDROCHLORIDE 0.4 MG: 0.4 CAPSULE ORAL at 10:08

## 2023-05-12 RX ADMIN — BUMETANIDE 2 MG: 1 TABLET ORAL at 10:08

## 2023-05-12 RX ADMIN — GABAPENTIN 800 MG: 400 CAPSULE ORAL at 06:02

## 2023-05-12 RX ADMIN — PANTOPRAZOLE SODIUM 40 MG: 40 TABLET, DELAYED RELEASE ORAL at 06:01

## 2023-05-12 RX ADMIN — GABAPENTIN 800 MG: 400 CAPSULE ORAL at 20:39

## 2023-05-12 RX ADMIN — APIXABAN 5 MG: 5 TABLET, FILM COATED ORAL at 21:13

## 2023-05-12 RX ADMIN — INSULIN LISPRO 5 UNITS: 100 INJECTION, SOLUTION INTRAVENOUS; SUBCUTANEOUS at 13:18

## 2023-05-12 RX ADMIN — ASPIRIN 81 MG: 81 TABLET, COATED ORAL at 10:08

## 2023-05-12 RX ADMIN — INSULIN GLARGINE 65 UNITS: 100 INJECTION, SOLUTION SUBCUTANEOUS at 20:39

## 2023-05-12 RX ADMIN — POTASSIUM CHLORIDE 10 MEQ: 20 TABLET, EXTENDED RELEASE ORAL at 10:07

## 2023-05-12 RX ADMIN — Medication 10 ML: at 20:24

## 2023-05-12 RX ADMIN — INSULIN GLARGINE 65 UNITS: 100 INJECTION, SOLUTION SUBCUTANEOUS at 10:07

## 2023-05-12 RX ADMIN — APIXABAN 5 MG: 5 TABLET, FILM COATED ORAL at 10:08

## 2023-05-12 RX ADMIN — IPRATROPIUM BROMIDE AND ALBUTEROL SULFATE 3 ML: .5; 2.5 SOLUTION RESPIRATORY (INHALATION) at 19:22

## 2023-05-12 NOTE — PROGRESS NOTES
[facility]  PROGRESS NOTE      Patient information:  Name: Wilbert Kelley  Age/Sex: 62 y.o. male  :  1960        PCP: Jose M Simon MD  Attending: Jake Hull MD  MRN:  [id number]  Visit Number:  [id number]    LOS:  LOS: 2 days     PROBLEM LIST:Principal Problem:    Bilateral pneumonia      Reason for Cardiology follow-up: CHF exacerbation    Subjective   ADMISSION INFORMATION:  Chief Complaint   Patient presents with   • Shortness of Breath   • Edema       HPI:  Wilbert Kelley is a 62 y.o. male presented to [facility] ([facility]) emergency room (ER) with history of dry cough, nasal discharge, nausea/vomiting and mild increasing shortness of breath.  He has bilateral leg edema for a long time.  proBNP level was 3,126.0.  CXR was questionable CHF.  CT of the chest showed patchy infiltrate suggestive of multifocal pneumonia with left pleural effusion-moderate amount was present.  He has past medical history of multivessel coronary artery disease and had CABG x3 3/2023 at [facility].  His postoperative course was complicated by worsening renal failure from which he recovered, pneumonia and postoperative atrial fibrillation.  His LVEF is normal.  No history of prior or recent MI.      He has seen Dr. Boyle recently.  Amiodarone was discontinued, Eliquis was continued, Amlodipine was discontinued due to chronic pedal edema and Lisinopril dose was increased.      Patient is also followed by General Cardiology and last seen MALINDA Farley, on 2023.    Interval History:   Patient is in room 325 and was examined by Dr. Larry.  Patient's hemoglobin is 9.0 which appears to be stable for him since 2023.  His platelet count is 492.  His creatinine is 1.16. HS Troponin was 34-34 which is down from 2023 at 43-48.  Patient's diuretic Bumex 2 mg was decreased to daily on 2023.  I/O flowsheet does not  "reflect any diuresing overnight.  Telemetry reveals SB 50s.  Patient is sitting up in BSC resting quietly.  No acute distress noted at this time.  Patient denies any shortness of breath, chest pain, or palpitations.  Patient reports, \"I am feeling better\".  Patient admits to being diagnosed with MARIAH.  However he has been using his wife's equipment.    Vital Signs  Temp:  [97.7 °F (36.5 °C)-98.3 °F (36.8 °C)] 97.7 °F (36.5 °C)  Heart Rate:  [58-72] 58  Resp:  [20] 20  BP: (131-161)/(68-82) 159/76  Vital Signs (last 72 hrs)       05/09 0700  05/10 0659 05/10 0700  05/11 0659 05/11 0700  05/12 0659 05/12 0700  05/12 0756   Most Recent      Temp (°F)   97.5 -  98.1    97.7 -  98.3       97.7 (36.5) 05/12 0629    Heart Rate   59 -  70    58 -  72       58 05/12 0629    Resp   18 -  22      20       20 05/12 0629    BP   128/98 -  172/90    131/70 -  161/82       159/76 05/12 0629    SpO2 (%)   92 -  98    94 -  98       94 05/12 0629        Body mass index is 41.82 kg/m².    Intake/Output Summary (Last 24 hours) at 5/12/2023 0756  Last data filed at 5/11/2023 1345  Gross per 24 hour   Intake 240 ml   Output --   Net 240 ml                     Objective  Objective     Physical Exam:      General Appearance:    Alert, cooperative, in no acute distress.   Head:    Normocephalic, without obvious abnormality, atraumatic.   Eyes:                          Conjunctivae and sclerae normal, no icterus, no pallor, corneas clear.   Neck:   No adenopathy, supple, trachea midline, no thyromegaly, no carotid bruit, no JVD.   Lungs:     Clear to auscultation, respirations regular, even and             unlabored.    Heart:    Regular rhythm and normal rate, normal S1 and S2, no          murmur, no gallop, no rub, no click   Chest Wall:    No abnormalities observed.  Surgical scar appears well-healed.   Abdomen:     Normal bowel sounds, no masses, no organomegaly, soft nontender, nondistended, no guarding, no rebound tenderness. "   Extremities:   Moves all extremities well, BLE edema R> L, no cyanosis, no redness.   Pulses:   Pulses palpable and equal bilaterally.   Skin:   No bleeding, bruising or rash.   Neurologic:   Alert and Oriented x 3, Speech Clear & comprehensive.       Results review   Results Review:     Results from last 7 days   Lab Units 05/12/23  0130 05/11/23  0133 05/10/23  1702 05/06/23  1955   WBC 10*3/mm3 9.11 10.03 10.54 15.91*   HEMOGLOBIN g/dL 9.0* 8.9* 9.0* 9.8*   PLATELETS 10*3/mm3 492* 467* 452* 410     Results from last 7 days   Lab Units 05/12/23  0130 05/11/23  0133 05/10/23  1702 05/06/23  1955   SODIUM mmol/L 140 139 138 141   POTASSIUM mmol/L 4.7 3.6 3.9 4.0   CHLORIDE mmol/L 100 98 99 104   CO2 mmol/L 27.2 28.0 26.5 25.3   BUN mg/dL 22 21 21 14   CREATININE mg/dL 1.16 1.05 0.98 1.05   CALCIUM mg/dL 9.3 8.8 8.5* 9.0   GLUCOSE mg/dL 190* 235* 229* 121*   ALT (SGPT) U/L  --  27 29 12   AST (SGOT) U/L  --  16 18 16     Results from last 7 days   Lab Units 05/10/23  1856 05/10/23  1702 05/06/23 2228 05/06/23 1955   HSTROP T ng/L 34* 34* 48* 43*     Lab Results   Component Value Date    PROBNP 3,126.0 (H) 05/10/2023    PROBNP 3,018.0 (H) 05/06/2023     No results found.     Lab Results   Component Value Date    INR 1.07 05/06/2023    INR 1.17 (H) 03/22/2023    INR 1.29 (H) 03/21/2023    INR 0.98 03/20/2023     Lab Results   Component Value Date    MG 1.6 05/06/2023    MG 2.2 03/26/2023    MG 2.3 03/25/2023     Lab Results   Component Value Date    TRIG 185 (H) 03/22/2023    HDL 18 (L) 03/22/2023    LDL 23 03/22/2023      Pain Management Panel         Latest Ref Rng & Units 3/23/2023 3/20/2023   Pain Management Panel   Creatinine, Urine mg/dL 246.9      Amphetamine, Urine Qual Negative  Negative     Barbiturates Screen, Urine Negative  Negative     Benzodiazepine Screen, Urine Negative  Negative     Buprenorphine, Screen, Urine Negative  Negative     Cocaine Screen, Urine Negative  Negative     Methadone Screen ,  Urine Negative  Negative     Methamphetamine, Ur Negative  Negative                Microbiology Results (last 10 days)     Procedure Component Value - Date/Time    Blood Culture - Blood, Arm, Right [[id number]]  (Normal) Collected: 05/10/23 1856    Lab Status: Preliminary result Specimen: Blood from Arm, Right Updated: 05/11/23 1915     Blood Culture No growth at 24 hours    Blood Culture - Blood, Arm, Left [[id number]]  (Normal) Collected: 05/10/23 1856    Lab Status: Preliminary result Specimen: Blood from Arm, Left Updated: 05/11/23 1915     Blood Culture No growth at 24 hours    Legionella Antigen, Urine - Urine, Urine, Clean Catch [[id number]]  (Normal) Collected: 05/10/23 1829    Lab Status: Final result Specimen: Urine, Clean Catch Updated: 05/10/23 2350     LEGIONELLA ANTIGEN, URINE Negative    Narrative:      Presumptive negative for L. pneumophilia serogroup 1 antigen, suggesting no recent or current infection.    Respiratory Panel PCR w/COVID-19(SARS-CoV-2) RAJNI/BALA/NAHUN/PAD/COR/MAD/NABEEL In-House, NP Swab in UTM/VTM, 3-4 HR TAT - Swab, Nasopharynx [[id number]]  (Normal) Collected: 05/10/23 1651    Lab Status: Final result Specimen: Swab from Nasopharynx Updated: 05/10/23 1747     ADENOVIRUS, PCR Not Detected     Coronavirus 229E Not Detected     Coronavirus HKU1 Not Detected     Coronavirus NL63 Not Detected     Coronavirus OC43 Not Detected     COVID19 Not Detected     Human Metapneumovirus Not Detected     Human Rhinovirus/Enterovirus Not Detected     Influenza A PCR Not Detected     Influenza B PCR Not Detected     Parainfluenza Virus 1 Not Detected     Parainfluenza Virus 2 Not Detected     Parainfluenza Virus 3 Not Detected     Parainfluenza Virus 4 Not Detected     RSV, PCR Not Detected     Bordetella pertussis pcr Not Detected     Bordetella parapertussis PCR Not Detected     Chlamydophila pneumoniae PCR Not Detected     Mycoplasma pneumo by PCR Not Detected    Narrative:      In the setting of a positive  respiratory panel with a viral infection PLUS a negative procalcitonin without other underlying concern for bacterial infection, consider observing off antibiotics or discontinuation of antibiotics and continue supportive care. If the respiratory panel is positive for atypical bacterial infection (Bordetella pertussis, Chlamydophila pneumoniae, or Mycoplasma pneumoniae), consider antibiotic de-escalation to target atypical bacterial infection.    COVID-19 and FLU A/B PCR - Swab, Nasopharynx [[id number]]  (Normal) Collected: 05/06/23 2005    Lab Status: Final result Specimen: Swab from Nasopharynx Updated: 05/06/23 2032     COVID19 Not Detected     Influenza A PCR Not Detected     Influenza B PCR Not Detected    Narrative:      Fact sheet for providers: https://www.fda.gov/media/770274/download    Fact sheet for patients: https://www.fda.gov/media/070030/download    Test performed by PCR.    Blood Culture - Blood, Wrist, Right [[id number]]  (Normal) Collected: 05/06/23 2003    Lab Status: Final result Specimen: Blood from Wrist, Right Updated: 05/11/23 2015     Blood Culture No growth at 5 days    Blood Culture - Blood, Arm, Left [[id number]]  (Normal) Collected: 05/06/23 1955    Lab Status: Final result Specimen: Blood from Arm, Left Updated: 05/11/23 2015     Blood Culture No growth at 5 days         Imaging Results (Last 48 Hours)     Procedure Component Value Units Date/Time    CT Angiogram Chest Pulmonary Embolism [[id number]] Collected: 05/10/23 2234     Updated: 05/10/23 2236    Narrative:      Contrast-enhanced CTA chest     Indications: Shortness of breath     Technique: Contrast-enhanced CTA images were obtained.  Limited exposure  control, adjustment of the MA and/or KV according to patient size or use  of an iterative reconstruction technique was utilized.   Three-dimensional reconstructions were done.     Findings CTA chest:     Comparison is made to prior study dated 5/6/2023.     There is no evidence of  filling defect within the main or lobar  pulmonary arteries to suggest pulmonary embolism.     The heart is not enlarged.  There is no pericardial effusion.  The  thoracic aorta is normal in course and caliber.     There is an enlarged right paratracheal lymph node measuring 1.3 cm.   There is an enlarged right paratracheal/right paraesophageal lymph node  measuring 1.1 cm.  There is an enlarged right hilar lymph node measuring  1.1 cm.  There is a more inferior large right hilar lymph node measuring  1.1 cm.  An enlarged subcarinal lymph node measures 1.4 cm.  An AP  window lymph node measures 1.3 cm.     Limited images of the upper abdomen are normal.     There is a moderate left pleural effusion with adjacent compressive  atelectasis.  There are multifocal bilateral infiltrates predominantly  involving the perihilar regions and right upper lobe.  The infiltrates  are new since the prior study.  Left pleural effusion is stable.     The osseous structures are normal.       Impression:      Impression:  1.  No CTA evidence of pulmonary embolism.  2.  Moderate left pleural effusion with adjacent atelectasis, similar to  the prior study.  3.  Interval development of patchy multifocal groundglass and alveolar  infiltrates predominately involving the perihilar regions and the right  upper lobe.  This could represent hemorrhage, pulmonary edema, or  infectious etiologies.     This report was finalized on 5/10/2023 10:34 PM by Robin Fairchild MD.       XR Chest 1 View [[id number]] Collected: 05/10/23 1721     Updated: 05/10/23 1724    Narrative:      XR CHEST 1 VW-     CLINICAL INDICATION: SOA        COMPARISON: 05/06/2023      TECHNIQUE: Single frontal view of the chest.     FINDINGS:      LUNGS: Probable CHF      HEART AND MEDIASTINUM: Heart and mediastinal contours are unremarkable        SKELETON: Bony and soft tissue structures are unremarkable.             Impression:      Likely mild CHF     This report was  finalized on 5/10/2023 5:22 PM by Dr. Oliver Renner MD.             ECHO:  Results for orders placed during the hospital encounter of 05/10/23    Adult Transthoracic Echo Complete w/ Color, Spectral and Contrast if necessary per protocol    Interpretation Summary  •  The quality of the study is limited with poor acoustic windows.  •  Left ventricular systolic function is normal. Left ventricular ejection fraction appears to be 61 - 65%.  •  Left ventricular diastolic function is consistent with (grade II w/high LAP) pseudonormalization.  •  The cardiac chamber dimensions are normal.  •  No significant valvular heart disease is present.  •  Mild pulmonary hypertension is present.  •  There is no evidence of pericardial effusion.      STRESS TEST:  Results for orders placed during the hospital encounter of 23    Stress Test With Myocardial Perfusion (1 Day)    Interpretation Summary  •  Left ventricular ejection fraction is normal (Calculated EF = 57%).  •  Impressions are consistent with an intermediate risk study.  •  There is no prior study available for comparison.  •  Findings consistent with a normal ECG stress test.  •  Basal to mid inferior infarction with basal to mid gibran-infarction ischemia extending also to the inferoseptal walls with also small apical septal ischemia, TID 1.51.       HEART CATH:  Results for orders placed during the hospital encounter of 23    Cardiac Catheterization/Vascular Study    Narrative  •  Mid RCA lesion is 100% stenosed.  •  1st Mrg lesion is 80% stenosed.  •  Prox LAD lesion is 80% stenosed.  •  Mid LAD lesion is 70% stenosed.    Cardiac.  Patient with significant coronary artery disease involving multiple vessels.  Patient is diabetic with proximal LAD disease also.  Will refer for bypass surgery.  Discussed with Dr. Swanson at [facility].  Patient is going to follow-up on Monday in their clinic      EK/10/2023          TELEMETRY: SB 50's                  I reviewed the patient's new clinical results.    Medication Review:   Current list of medications may not reflect those currently placed in orders that are not signed or are being held.   apixaban, 5 mg, Oral, BID  aspirin, 81 mg, Oral, Daily  atorvastatin, 40 mg, Oral, Daily  bumetanide, 2 mg, Oral, Daily  cefTRIAXone, 1 g, Intravenous, Q24H  doxycycline, 100 mg, Intravenous, Q12H  gabapentin, 800 mg, Oral, Q8H  insulin glargine, 65 Units, Subcutaneous, BID  insulin lispro, 3-14 Units, Subcutaneous, TID With Meals  lisinopril, 20 mg, Oral, Nightly  lisinopril, 40 mg, Oral, Daily  nebivolol, 5 mg, Oral, Q24H  pantoprazole, 40 mg, Oral, Q AM  potassium chloride, 10 mEq, Oral, Daily  sodium chloride, 10 mL, Intravenous, Q12H  tamsulosin, 0.4 mg, Oral, Daily         Assessment    Assessment:  1.  History of chronic HFpEF, compensated  2.  ASCVD status post CABG x3 on 3/20/2023  3.  Obstructive sleep apnea not treated  4.  Paroxysmal atrial fibrillation CHADS VASc score of 3, currently patient in sinus rhythm  5.  Left pleural effusion following CABG  6.  Bilateral multifocal pneumonia pneumonia  7.  Hypertension  8.  Diabetes mellitus type 2  9.  Hyperlipidemia                Plan   Recommendations:  1.  Patient clinically not in CHF most likely his symptoms are related to pneumonia continue current medications including p.o. diuretics  2.  Patient will need sleep study as an outpatient and using CPAP   3.  Continue with Eliquis for now and follow-up with cardiology outpatient patient can be discharged home once okay with medicine service                I have discussed the patients findings and my recommendations with patient.    MALINDA Amos   [facility] 07:56 EDT 5/12/2023    Electronically signed by MALINDA Bolivar, 05/12/23, 11:43 AM EDT.  Electronically signed by Gissell Larry MD, 05/12/23, 12:14 PM EDT.                      Please note that portions of this  note were completed with a voice recognition program.    Please note that portions of this note were copied and has been reviewed and is accurate as of 5/12/2023 .

## 2023-05-12 NOTE — CASE MANAGEMENT/SOCIAL WORK
Discharge Planning Assessment   [place]     Patient Name: Wilbert Kelley  MRN: [id number]  Today's Date: 5/12/2023    Admit Date: 5/10/2023    Plan: [facility] will need a resumption order at discharge.  SS will follow and assist with dsicharge needs.     Discharge Plan     Row Name 05/12/23 1518       Plan    Plan [facility] will need a resumption order at discharge.  SS will follow and assist with dsicharge needs.    Row Name 05/12/23 0114       Plan    Plan Pt lives at home with spouse, daughter & two grandchildren. Pt has needed DME at home, utilizes [facility], [facility]. Family to provide transportation home at [place]. CM will follow & assist as needed.    Row Name 05/12/23 4617                Sharmin Silva, ROXIW

## 2023-05-12 NOTE — PROGRESS NOTES
Antibiotic length of therapy :    Rocephin           Day          3    Doxycycline           Day           2

## 2023-05-12 NOTE — PLAN OF CARE
Goal Outcome Evaluation: Patient has been very pleasant today. Compliant with all medications and care as ordered. He remains on 2L/NC, saturations maintaining well above 90%. Family has been at bedside, updated on plan of care. He has been ambulating to the bathroom, steady gait noted. No distress noted at this time. Will continue with plan of care.

## 2023-05-12 NOTE — CONSULTS
[facility]  Dr. Cornel Huang  I can be contacted @ [phone]    Subjective   LOS: 2 days     Thank you for this consultation.  62-year-old male who presents with shortness of breath.  10 days ago cough with runny nose fever and shortness of breath.  His home health nurse noted his oxygen saturation to be low and prescribed oxygen therapy.  Due to worsening condition came into the hospital. He was seen in the ED 5/6/2023 and had a CT chest.  He was on Augmentin been admitted to this facility. He return for worsening symptoms on 5/10/2023.  A CT chest was repeated and showed diffuse infiltrates.  A respiratory panel was not done on 6 May ED visit and was negative on this visit.  We were asked to consult.     Patient had a CABG 7 weeks ago.  He was asked not to lay flat in bed for 12 weeks or so to prevent sternal dehiscence.  He denies any chest pain.  He has been cleared from physical therapy rehab.  His surgeon did ask him to get a sleep study evaluation and possible CPAP device.  He had postop A-fib and is on Eliquis.    He is no longer having any fever.  He feels better since admission and has been diuresed.  He is on antibiotics.  I discussed his case with hospitalist.  Patient is a never smoker.  He has chronic pain but does not take any pain medications as he states they do not help him.    Wilbert Kelley  reports that he does not currently use alcohol.,  reports that he has never smoked. He has been exposed to tobacco smoke. His smokeless tobacco use includes snuff.     Past Hx:  has a past medical history of Arthritis, BPH (benign prostatic hyperplasia), Cancer, Constipation, Coronary artery disease, DDD (degenerative disc disease), lumbar, Diabetes mellitus, Frozen shoulder, GERD (gastroesophageal reflux disease), Hyperlipidemia, Hypertension, Limited mobility, Sleep apnea, Tinnitus, and Wears glasses.  Surg Hx:  has a past surgical history that includes Coronary stent placement;  Cataract extraction, bilateral (Bilateral); Skin cancer excision; Cardiac catheterization; Cardiac catheterization (Right, 03/16/2023); Finger surgery (Left); Colonoscopy; Esophagogastroduodenoscopy; [place] tooth extraction; and Coronary artery bypass graft (N/A, 3/21/2023).  FH: family history includes COPD in his mother; Heart attack (age of onset: 42) in his paternal grandfather; Heart attack (age of onset: 45) in his father; Heart disease in his paternal grandfather.  SH:  reports that he has never smoked. He has been exposed to tobacco smoke. His smokeless tobacco use includes snuff. He reports that he does not currently use alcohol. He reports that he does not use drugs.    Medications Prior to Admission   Medication Sig Dispense Refill Last Dose   • amoxicillin-clavulanate (AUGMENTIN) 875-125 MG per tablet Take 1 tablet by mouth 2 (Two) Times a Day.   5/10/2023 at 0900   • apixaban (ELIQUIS) 5 MG tablet tablet Take 1 tablet by mouth 2 (Two) Times a Day. 60 tablet 12 5/10/2023 at 0900   • aspirin 81 MG EC tablet Take 1 tablet by mouth Daily. 90 tablet 3 5/10/2023 at 0900   • atorvastatin (LIPITOR) 40 MG tablet Take 1 tablet by mouth Daily.   5/10/2023 at 0900   • bumetanide (BUMEX) 2 MG tablet Take 1 tablet by mouth 2 (Two) Times a Day for 7 days. 14 tablet 12 5/10/2023 at 0900   • doxycycline (VIBRAMYICN) 100 MG tablet Take 1 tablet by mouth 2 (Two) Times a Day.   5/10/2023 at 0900   • fenofibrate 160 MG tablet Take 1 tablet by mouth Daily.   5/10/2023 at 0900   • gabapentin (NEURONTIN) 800 MG tablet Take 3 tablets by mouth Every Night.   5/9/2023   • Insulin Glargine (TOUJEO MAX SOLOSTAR SC) Inject 80 Units under the skin into the appropriate area as directed 2 (Two) Times a Day.   5/8/2023   • lansoprazole (PREVACID) 30 MG capsule Take 1 capsule by mouth Daily.   5/10/2023 at 0900   • lisinopril (PRINIVIL,ZESTRIL) 40 MG tablet Take 1 tablet by mouth Every Morning.   5/10/2023 at 0900   • metFORMIN  (GLUCOPHAGE) 1000 MG tablet Take 1 tablet by mouth 2 (Two) Times a Day With Meals.   5/10/2023 at 0900   • nebivolol (BYSTOLIC) 5 MG tablet Take 1 tablet by mouth Daily. 30 tablet 5 5/10/2023 at 0900   • ondansetron ODT (ZOFRAN-ODT) 4 MG disintegrating tablet Place 1 tablet on the tongue Every 8 (Eight) Hours As Needed for Nausea or Vomiting. 20 tablet 0 2023   • potassium chloride (K-DUR,KLOR-CON) 10 MEQ CR tablet Take 1 tablet by mouth Daily.   5/10/2023 at 0900   • tamsulosin (FLOMAX) 0.4 MG capsule 24 hr capsule Take 1 capsule by mouth Daily. 30 capsule 0 2023   • Insulin Regular Human, Conc, (HumuLIN R) 500 UNIT/ML solution pen-injector CONCENTRATED injection Inject 25-50 Units under the skin into the appropriate area as directed 3 (Three) Times a Day Before Meals.   2023   • lisinopril (PRINIVIL,ZESTRIL) 40 MG tablet Take 20 mg by mouth Every Night.   2023     Allergies   Allergen Reactions   • Morphine Nausea And Vomiting     projectile   • Adhesive Tape Rash     Pt states sticky tabs cause irritation when left on long        Review of Systems   Constitutional: Positive for fever. Negative for chills.   HENT: Positive for congestion, rhinorrhea and sore throat.    Respiratory: Positive for cough and shortness of breath. Negative for wheezing.    Cardiovascular: Positive for leg swelling. Negative for chest pain.   Gastrointestinal: Negative for abdominal pain, nausea and vomiting.   Genitourinary: Negative for dysuria and hematuria.   Musculoskeletal: Negative for arthralgias and back pain.   Skin: Negative for pallor and rash.   Neurological: Negative for seizures and headaches.   Psychiatric/Behavioral: Negative for agitation and confusion.     Vital Signs past 24hrs  BP range: BP: (131-161)/(68-82) 159/76  Pulse range: Heart Rate:  [58-72] 58  Resp rate range: Resp:  [20] 20  Temp range: Temp (24hrs), Av °F (36.7 °C), Min:97.7 °F (36.5 °C), Max:98.3 °F (36.8 °C)    Oxygen range: SpO2:   [94 %-98 %] 94 %; Flow (L/min):  [2] 2;   Device (Oxygen Therapy): nasal cannula  (!) 144 kg (317 lb); Body mass index is 41.82 kg/m².  No intake/output data recorded.    Adult male sitting in a recliner.  On low-flow oxygen.  Pupils equal and react light.  Large tongue class IV Mallampati airway.  No posterior pharyngeal discharge seen by me but difficult to see posterior pharynx.  Nasopharynx no rhinorrhea present.  Septum midline.  JVP cannot be assessed trachea midline thyroid not enlarged.  Lungs reveal bilateral air entry somewhat diminished due to obesity.  Fine rales in lung bases.  No wheeze.  Percussion note resonant chest expansion equal no chest wall deformity or tenderness.  Heart examination S1-S2 present rhythm regular no murmurs.  Mild edema in the lower extremities left slightly more than the right.  Abdomen is obese soft nontender bowel sounds present no liver spleen enlargement.  No peripheral cyanosis clubbing.  Moves all 4 extremities sensorimotor intact.  No cervical, axillary, inguinal adenopathy.  All exam limited by morbid obesity.    Results Review:    I have reviewed the laboratory and imaging data from current admission. My annotations are as noted in assessment and plan.    Medication Review:  I have reviewed the current MAR. My annotations are as noted in assessment and plan.    Plan   PCCM Problems  Hypoxia  Bilateral pulmonary infiltrates  Recent pulmonary infection likely viral  Acute on chronic diastolic congestive heart failure  Acute pulmonary edema due to above  Morbid obesity  Suspected obstructive sleep apnea  Relevant Medical Diagnoses  CAD with recent CABG  Postop A-fib on Eliquis  Cellulitis of the sternal incision treated 3/2023   Diabetes mellitus type 2  Anemia      Plan of Treatment    Continue low-flow supplemental oxygen as needed    Bilateral pulmonary infiltrates noted.  Multifactorial.  Agree component of acute diastolic congestive heart failure and diuresed as per  you.  However recent viral infection is likely.  Symptoms seem to match up.  Bacterial infection cannot be completely excluded as patient was given antibiotics recently.  However I think this is much less likely.  No evidence of hemoptysis.  Recommend follow-up imaging in 6 to 8 weeks.  Ideally with HRCT with supine and prone images.    May benefit from addition of nebs to clear his secretions.  I have added DuoNebs 4 times daily    Acute on chronic diastolic congestive heart failure and appropriately diuresed as per you.  Cardiology also following.    Obstructive sleep apnea work-up ongoing outpatient.  ABG on admission did not show hypercapnia.    Please note patient had sternal wound cellulitis treated 3/2023 and has been instructed he cannot sleep supine for 12 weeks after surgery.    Discussed with hospitalist.      Electronically signed by Cornel Huang MD, 05/12/23, 9:18 AM EDT.      Part of this note may be an electronic transcription/translation of spoken language to printed text using the Dragon Dictation System.

## 2023-05-12 NOTE — PROGRESS NOTES
[facility] HOSPITALIST PROGRESS NOTE     Patient Identification:  Name:  Wilbert Kelley  Age:  62 y.o.  Sex:  male  :  1960  MRN:  [id number]  Visit Number:  [id number]  ROOM: [address] [place] [address]     Primary Care Provider:  Jose M Simon MD     Date of Admission: 5/10/2023    Length of stay in inpatient status:  2    Subjective     Chief Compliant:    Chief Complaint   Patient presents with   • Shortness of Breath   • Edema       Patient significantly improved since admission ambulating in room independently this am on RA, using O2 on as needed basis. Spent significant time discussing clinical hx with patient and wife at bedside and appears to be all sequela of recent significant rhinovirus inf. Discussed discharge home this afternoon vs observation off abx overnight and given his medical complexity, she feels more comfortable with him being monitored overnight.        Objective     Current Hospital Meds:  apixaban, 5 mg, Oral, BID  aspirin, 81 mg, Oral, Daily  atorvastatin, 40 mg, Oral, Daily  bumetanide, 2 mg, Oral, Daily  gabapentin, 800 mg, Oral, Q8H  insulin glargine, 65 Units, Subcutaneous, BID  insulin lispro, 3-14 Units, Subcutaneous, TID With Meals  ipratropium-albuterol, 3 mL, Nebulization, 4x Daily - RT  lisinopril, 20 mg, Oral, Nightly  lisinopril, 40 mg, Oral, Daily  nebivolol, 5 mg, Oral, Q24H  pantoprazole, 40 mg, Oral, Q AM  potassium chloride, 10 mEq, Oral, Daily  sodium chloride, 10 mL, Intravenous, Q12H  tamsulosin, 0.4 mg, Oral, Daily       Current Antimicrobial Therapy:  Anti-Infectives (From admission, onward)    Ordered     Dose/Rate Route Frequency Start Stop    05/10/23 1832  cefTRIAXone (ROCEPHIN) 1 g in sodium chloride 0.9 % 100 mL IVPB-VTB        Ordering Provider: Josephine Wallis PA-C    1 g  200 mL/hr over 30 Minutes Intravenous Once 05/10/23 1834 05/10/23 1941        Current Diuretic Therapy:  Diuretics (From admission, onward)    Ordered     Dose/Rate Route  Frequency Start Stop    05/11/23 1508  bumetanide (BUMEX) tablet 2 mg        Ordering Provider: Samson Calabrese MD    2 mg Oral Daily 05/12/23 0900      05/10/23 1849  furosemide (LASIX) injection 80 mg        Ordering Provider: René Munroe MD    80 mg Intravenous Once 05/10/23 1851 05/10/23 1905        ----------------------------------------------------------------------------------------------------------------------  Vital Signs:  Temp:  [97.7 °F (36.5 °C)-98.6 °F (37 °C)] 97.7 °F (36.5 °C)  Heart Rate:  [58-72] 68  Resp:  [16-20] 16  BP: (131-161)/(67-82) 147/67  SpO2:  [91 %-98 %] 94 %  on  Flow (L/min):  [2] 2;   Device (Oxygen Therapy): room air  Body mass index is 41.82 kg/m².    Wt Readings from Last 3 Encounters:   05/11/23 (!) 144 kg (317 lb)   05/08/23 (!) 141 kg (310 lb)   05/06/23 (!) 141 kg (310 lb)     Intake & Output (last 3 days)       05/09 0701  05/10 0700 05/10 0701  05/11 0700 05/11 0701  05/12 0700 05/12 0701  05/13 0700    P.O.  480 240 520    IV Piggyback  100      Total Intake(mL/kg)  580 (4) 240 (1.7) 520 (3.6)    Net  +580 +240 +520            Urine Unmeasured Occurrence  1 x 1 x 4 x        Diet: Cardiac Diets, Diabetic Diets; Healthy Heart (2-3 Na+); Consistent Carbohydrate; Texture: Regular Texture (IDDSI 7); Fluid Consistency: Thin (IDDSI 0)  ----------------------------------------------------------------------------------------------------------------------  Physical Exam  Vitals and nursing note reviewed.   Constitutional:       General: He is not in acute distress.     Appearance: He is obese.   HENT:      Mouth/Throat:      Mouth: Mucous membranes are dry.      Pharynx: Oropharynx is clear.   Eyes:      General: No scleral icterus.     Extraocular Movements: Extraocular movements intact.   Cardiovascular:      Rate and Rhythm: Normal rate.   Pulmonary:      Effort: Pulmonary effort is normal. No respiratory distress.      Breath sounds: No wheezing.   Abdominal:       Palpations: Abdomen is soft.      Tenderness: There is no abdominal tenderness.   Musculoskeletal:      Right lower leg: Edema present.   Skin:     General: Skin is warm and dry.      Capillary Refill: Capillary refill takes less than 2 seconds.   Neurological:      Mental Status: He is alert. Mental status is at baseline.      Motor: No weakness.   Psychiatric:         Mood and Affect: Mood normal.         Behavior: Behavior normal.       ----------------------------------------------------------------------------------------------------------------------    ----------------------------------------------------------------------------------------------------------------------  LABS:    CBC and coagulation:  Results from last 7 days   Lab Units 05/12/23  0130 05/11/23  0133 05/10/23  1856 05/10/23  1702 05/06/23  1955   PROCALCITONIN ng/mL  --   --   --  0.08 0.07   LACTATE mmol/L  --   --  1.8  --  1.4   SED RATE mm/hr  --   --   --   --  69*   CRP mg/dL  --  22.54*  --  24.14* 15.92*   WBC 10*3/mm3 9.11 10.03  --  10.54 15.91*   HEMOGLOBIN g/dL 9.0* 8.9*  --  9.0* 9.8*   HEMATOCRIT % 28.9* 30.2*  --  29.5* 31.8*   MCV fL 79.8 84.1  --  84.0 84.8   MCHC g/dL 31.1* 29.5*  --  30.5* 30.8*   PLATELETS 10*3/mm3 492* 467*  --  452* 410   INR   --   --   --   --  1.07   D DIMER QUANT MCGFEU/mL  --   --   --  5.09*  --      Acid/base balance:  Results from last 7 days   Lab Units 05/10/23  2056 05/06/23  1917   PH, ARTERIAL pH units 7.522* 7.509*   PO2 ART mm Hg 64.7* 61.0*   PCO2, ARTERIAL mm Hg 38.3 32.2*   HCO3 ART mmol/L 31.4* 25.7     Renal and electrolytes:  Results from last 7 days   Lab Units 05/12/23  0130 05/11/23  0133 05/10/23  1702 05/06/23  1955   SODIUM mmol/L 140 139 138 141   POTASSIUM mmol/L 4.7 3.6 3.9 4.0   MAGNESIUM mg/dL  --   --   --  1.6   CHLORIDE mmol/L 100 98 99 104   CO2 mmol/L 27.2 28.0 26.5 25.3   BUN mg/dL 22 21 21 14   CREATININE mg/dL 1.16 1.05 0.98 1.05   CALCIUM mg/dL 9.3 8.8 8.5*  9.0   GLUCOSE mg/dL 190* 235* 229* 121*     Estimated Creatinine Clearance: 100.3 mL/min (by C-G formula based on SCr of 1.16 mg/dL).    Liver and pancreatic function:  Results from last 7 days   Lab Units 05/11/23  0133 05/10/23  1702 05/06/23 1955   ALBUMIN g/dL 3.3* 3.1* 3.7   BILIRUBIN mg/dL 1.0 1.1 0.8   ALK PHOS U/L 106 105 73   AST (SGOT) U/L 16 18 16   ALT (SGPT) U/L 27 29 12     Endocrine function:  Lab Results   Component Value Date    HGBA1C 7.70 (H) 03/20/2023     Point of care bedside glucose levels:  Results from last 7 days   Lab Units 05/12/23  1557 05/12/23  1006 05/12/23  0755 05/11/23  1615 05/11/23  1055 05/11/23  0635 05/10/23  2355   GLUCOSE mg/dL 256* 245* 143* 277* 259* 264* 211*     Glucose levels from the [facility]:  Results from last 7 days   Lab Units 05/12/23  0130 05/11/23  0133 05/10/23  1702 05/06/23 1955   GLUCOSE mg/dL 190* 235* 229* 121*     No results found for: TSH, FREET4  Cardiac:  Results from last 7 days   Lab Units 05/12/23  0130 05/10/23  1856 05/10/23  1702 05/06/23  2228 05/06/23 1955   HSTROP T ng/L  --  34* 34* 48* 43*   PROBNP pg/mL 1,693.0*  --  3,126.0*  --  3,018.0*       Cultures:  Lab Results   Component Value Date    COLORU Dark Yellow (A) 05/10/2023    CLARITYU Clear 05/10/2023    PHUR 5.5 05/10/2023    PROTEINUR 57.6 03/23/2023    GLUCOSEU Negative 05/10/2023    KETONESU Negative 05/10/2023    BLOODU Large (3+) (A) 05/10/2023    NITRITEU Negative 05/10/2023    LEUKOCYTESUR Trace (A) 05/10/2023    BILIRUBINUR Small (1+) (A) 05/10/2023    UROBILINOGEN 4.0 E.U./dL (A) 05/10/2023    RBCUA Too Numerous to Count (A) 05/10/2023    WBCUA 6-12 (A) 05/10/2023    BACTERIA None Seen 05/10/2023     Microbiology Results (last 10 days)     Procedure Component Value - Date/Time    Blood Culture - Blood, Arm, Right [[id number]]  (Normal) Collected: 05/10/23 1856    Lab Status: Preliminary result Specimen: Blood from Arm, Right Updated: 05/11/23 1915     Blood Culture No growth at  24 hours    Blood Culture - Blood, Arm, Left [[id number]]  (Normal) Collected: 05/10/23 1856    Lab Status: Preliminary result Specimen: Blood from Arm, Left Updated: 05/11/23 1915     Blood Culture No growth at 24 hours    Legionella Antigen, Urine - Urine, Urine, Clean Catch [[id number]]  (Normal) Collected: 05/10/23 1829    Lab Status: Final result Specimen: Urine, Clean Catch Updated: 05/10/23 2350     LEGIONELLA ANTIGEN, URINE Negative    Narrative:      Presumptive negative for L. pneumophilia serogroup 1 antigen, suggesting no recent or current infection.    Respiratory Panel PCR w/COVID-19(SARS-CoV-2) RAJNI/BALA/NAHUN/PAD/COR/MAD/NABEEL In-House, NP Swab in UTM/VTM, 3-4 HR TAT - Swab, Nasopharynx [[id number]]  (Normal) Collected: 05/10/23 1651    Lab Status: Final result Specimen: Swab from Nasopharynx Updated: 05/10/23 1747     ADENOVIRUS, PCR Not Detected     Coronavirus 229E Not Detected     Coronavirus HKU1 Not Detected     Coronavirus NL63 Not Detected     Coronavirus OC43 Not Detected     COVID19 Not Detected     Human Metapneumovirus Not Detected     Human Rhinovirus/Enterovirus Not Detected     Influenza A PCR Not Detected     Influenza B PCR Not Detected     Parainfluenza Virus 1 Not Detected     Parainfluenza Virus 2 Not Detected     Parainfluenza Virus 3 Not Detected     Parainfluenza Virus 4 Not Detected     RSV, PCR Not Detected     Bordetella pertussis pcr Not Detected     Bordetella parapertussis PCR Not Detected     Chlamydophila pneumoniae PCR Not Detected     Mycoplasma pneumo by PCR Not Detected    Narrative:      In the setting of a positive respiratory panel with a viral infection PLUS a negative procalcitonin without other underlying concern for bacterial infection, consider observing off antibiotics or discontinuation of antibiotics and continue supportive care. If the respiratory panel is positive for atypical bacterial infection (Bordetella pertussis, Chlamydophila pneumoniae, or Mycoplasma  pneumoniae), consider antibiotic de-escalation to target atypical bacterial infection.    COVID-19 and FLU A/B PCR - Swab, Nasopharynx [[id number]]  (Normal) Collected: 05/06/23 2005    Lab Status: Final result Specimen: Swab from Nasopharynx Updated: 05/06/23 2032     COVID19 Not Detected     Influenza A PCR Not Detected     Influenza B PCR Not Detected    Narrative:      Fact sheet for providers: https://www.fda.gov/media/836470/download    Fact sheet for patients: https://www.fda.gov/media/430632/download    Test performed by PCR.    Blood Culture - Blood, Wrist, Right [[id number]]  (Normal) Collected: 05/06/23 2003    Lab Status: Final result Specimen: Blood from Wrist, Right Updated: 05/11/23 2015     Blood Culture No growth at 5 days    Blood Culture - Blood, Arm, Left [[id number]]  (Normal) Collected: 05/06/23 1955    Lab Status: Final result Specimen: Blood from Arm, Left Updated: 05/11/23 2015     Blood Culture No growth at 5 days          No results found for: PREGTESTUR, PREGSERUM, HCG, HCGQUANT  Pain Management Panel         Latest Ref Rng & Units 3/23/2023 3/20/2023   Pain Management Panel   Creatinine, Urine mg/dL 246.9      Amphetamine, Urine Qual Negative  Negative     Barbiturates Screen, Urine Negative  Negative     Benzodiazepine Screen, Urine Negative  Negative     Buprenorphine, Screen, Urine Negative  Negative     Cocaine Screen, Urine Negative  Negative     Methadone Screen , Urine Negative  Negative     Methamphetamine, Ur Negative  Negative                  I have personally looked at the labs and they are summarized above.  ----------------------------------------------------------------------------------------------------------------------  Detailed radiology reports for the last 24 hours:    Imaging Results (Last 24 Hours)     Procedure Component Value Units Date/Time    XR Chest 1 View [[id number]] Collected: 05/12/23 0829     Updated: 05/12/23 0832    Narrative:      XR CHEST 1 VW-      CLINICAL INDICATION: followup of pneumonia; I50.9-Heart failure,  unspecified; I02-Iprjwiu effusion, not elsewhere classified;  J18.9-Pneumonia, unspecified organism; J96.01-Acute respiratory failure  with hypoxia        COMPARISON: 05/10/2023      TECHNIQUE: Single frontal view of the chest.     FINDINGS:      LUNGS: Lungs are adequately aerated.      HEART AND MEDIASTINUM: Heart and mediastinal contours are unremarkable        SKELETON: Bony and soft tissue structures are unremarkable.             Impression:      No radiographic evidence of acute cardiac or pulmonary disease.     This report was finalized on 5/12/2023 8:29 AM by Dr. Oliver Renner MD.               CXR with only small left sided effusion, improved from admission    Assessment & Plan      #Acute on chronic hypoxic respiratory failure  #Multifocal PNA  #Recent Rhinovirus inf  -CT reviewed and likely edema however infiltrates with increased opacification and attenuation than I typically expect for edema. Consulted pulmonology for review as patient at risk for DAH with DOAC usage and recent significant viral inf but given clinical improvement and after review of imaging, suspect infiltrates from old PNA  -O2 usage improved and now down to prn only. Was placed on 2L NC after outpatient rhinovirus dx with two ED visits since for dyspnea, steroids deferred given recent CABG w/wound dehiscence. Added duonebs today  -Cont supportive care for PNA, procal wnl and crp elevated but improving so likely viral PNA without superimposed bacterial inf. Stopped CTX and doxycycline and will monitor overnight off abx  -Likely to still need prn O2 atr discharge  -Cont PO diuretic, had significant diuresis on admit with IV diuretic and now near euvolemia although no I&Os measured    #HFpEF w/exacerbation  #CAD s/p recent CABG (03/2023)  -Mild exacerbation secondary to viral inf noted above and now back on home PO diuretic  -Cardiology consulted and recs noted  -Repeat  TTE with unchanged EF and no pericardial effusions noted  -Cont asa, statin, beta blocker  -Eliquis and asa for AC, had been on DAPT but no stents placed and no true MI prior to CABG so changed to DOAC/ASA by outpatient physician    #BPH  #Obesity: Body mass index is 41.82 kg/m².  #Paroxysmal Atrial fibrillation: DOAC as above, BB (bystolic) for rate control      VTE Prophylaxis:   Mechanical Order History:     None      Pharmalogical Order History:      Ordered     Dose Route Frequency Stop    05/11/23 0307  apixaban (ELIQUIS) tablet 5 mg         5 mg PO 2 Times Daily --                Code Status and Medical Interventions:   Ordered at: 05/10/23 2301     Level Of Support Discussed With:    Patient     Code Status (Patient has no pulse and is not breathing):    CPR (Attempt to Resuscitate)     Medical Interventions (Patient has pulse or is breathing):    Full Support         Disposition: Cont monitoring overnight, discharge in am if condition unchanged and afebrile    I have reviewed any copied/forwarded text or data, verified its accuracy, and updated as necessary above.    Matias Houser MD  [facility] Hospitalist  05/12/23  18:35 EDT

## 2023-05-13 ENCOUNTER — READMISSION MANAGEMENT (OUTPATIENT)
Dept: CALL CENTER | Facility: HOSPITAL | Age: 63
End: 2023-05-13
Payer: MEDICARE

## 2023-05-13 VITALS
BODY MASS INDEX: 41.75 KG/M2 | DIASTOLIC BLOOD PRESSURE: 68 MMHG | RESPIRATION RATE: 18 BRPM | HEIGHT: 73 IN | WEIGHT: 315 LBS | OXYGEN SATURATION: 99 % | TEMPERATURE: 98.4 F | SYSTOLIC BLOOD PRESSURE: 150 MMHG | HEART RATE: 71 BPM

## 2023-05-13 PROBLEM — I50.33 ACUTE ON CHRONIC HEART FAILURE WITH PRESERVED EJECTION FRACTION (HFPEF): Status: ACTIVE | Noted: 2023-05-13

## 2023-05-13 LAB
ANION GAP SERPL CALCULATED.3IONS-SCNC: 6.8 MMOL/L (ref 5–15)
BUN SERPL-MCNC: 19 MG/DL (ref 8–23)
BUN/CREAT SERPL: 18.3 (ref 7–25)
CALCIUM SPEC-SCNC: 9.2 MG/DL (ref 8.6–10.5)
CHLORIDE SERPL-SCNC: 100 MMOL/L (ref 98–107)
CO2 SERPL-SCNC: 30.2 MMOL/L (ref 22–29)
CREAT SERPL-MCNC: 1.04 MG/DL (ref 0.76–1.27)
DEPRECATED RDW RBC AUTO: 50.4 FL (ref 37–54)
EGFRCR SERPLBLD CKD-EPI 2021: 81.7 ML/MIN/1.73
ERYTHROCYTE [DISTWIDTH] IN BLOOD BY AUTOMATED COUNT: 16.8 % (ref 12.3–15.4)
GLUCOSE BLDC GLUCOMTR-MCNC: 206 MG/DL (ref 70–130)
GLUCOSE SERPL-MCNC: 201 MG/DL (ref 65–99)
HCT VFR BLD AUTO: 31.2 % (ref 37.5–51)
HGB BLD-MCNC: 9.3 G/DL (ref 13–17.7)
MCH RBC QN AUTO: 25.2 PG (ref 26.6–33)
MCHC RBC AUTO-ENTMCNC: 29.8 G/DL (ref 31.5–35.7)
MCV RBC AUTO: 84.6 FL (ref 79–97)
PLATELET # BLD AUTO: 456 10*3/MM3 (ref 140–450)
PMV BLD AUTO: 9.6 FL (ref 6–12)
POTASSIUM SERPL-SCNC: 3.9 MMOL/L (ref 3.5–5.2)
RBC # BLD AUTO: 3.69 10*6/MM3 (ref 4.14–5.8)
SODIUM SERPL-SCNC: 137 MMOL/L (ref 136–145)
WBC NRBC COR # BLD: 9.21 10*3/MM3 (ref 3.4–10.8)

## 2023-05-13 PROCEDURE — 99239 HOSP IP/OBS DSCHRG MGMT >30: CPT | Performed by: STUDENT IN AN ORGANIZED HEALTH CARE EDUCATION/TRAINING PROGRAM

## 2023-05-13 PROCEDURE — 94799 UNLISTED PULMONARY SVC/PX: CPT

## 2023-05-13 PROCEDURE — 80048 BASIC METABOLIC PNL TOTAL CA: CPT | Performed by: STUDENT IN AN ORGANIZED HEALTH CARE EDUCATION/TRAINING PROGRAM

## 2023-05-13 PROCEDURE — 82948 REAGENT STRIP/BLOOD GLUCOSE: CPT

## 2023-05-13 PROCEDURE — 85027 COMPLETE CBC AUTOMATED: CPT | Performed by: STUDENT IN AN ORGANIZED HEALTH CARE EDUCATION/TRAINING PROGRAM

## 2023-05-13 PROCEDURE — 94761 N-INVAS EAR/PLS OXIMETRY MLT: CPT

## 2023-05-13 PROCEDURE — 63710000001 INSULIN LISPRO (HUMAN) PER 5 UNITS

## 2023-05-13 PROCEDURE — 63710000001 INSULIN GLARGINE PER 5 UNITS

## 2023-05-13 RX ORDER — BUMETANIDE 2 MG/1
2 TABLET ORAL DAILY
Start: 2023-05-13 | End: 2023-06-02

## 2023-05-13 RX ORDER — IPRATROPIUM BROMIDE AND ALBUTEROL SULFATE 2.5; .5 MG/3ML; MG/3ML
3 SOLUTION RESPIRATORY (INHALATION) EVERY 6 HOURS PRN
Qty: 18 ML | Refills: 0 | Status: SHIPPED | OUTPATIENT
Start: 2023-05-13 | End: 2023-06-19

## 2023-05-13 RX ADMIN — NEBIVOLOL HYDROCHLORIDE 5 MG: 5 TABLET ORAL at 09:35

## 2023-05-13 RX ADMIN — PANTOPRAZOLE SODIUM 40 MG: 40 TABLET, DELAYED RELEASE ORAL at 06:30

## 2023-05-13 RX ADMIN — IPRATROPIUM BROMIDE AND ALBUTEROL SULFATE 3 ML: .5; 2.5 SOLUTION RESPIRATORY (INHALATION) at 07:18

## 2023-05-13 RX ADMIN — GABAPENTIN 800 MG: 400 CAPSULE ORAL at 06:34

## 2023-05-13 RX ADMIN — APIXABAN 5 MG: 5 TABLET, FILM COATED ORAL at 09:35

## 2023-05-13 RX ADMIN — Medication 10 ML: at 09:35

## 2023-05-13 RX ADMIN — BUMETANIDE 2 MG: 1 TABLET ORAL at 09:35

## 2023-05-13 RX ADMIN — INSULIN GLARGINE 65 UNITS: 100 INJECTION, SOLUTION SUBCUTANEOUS at 09:34

## 2023-05-13 RX ADMIN — TAMSULOSIN HYDROCHLORIDE 0.4 MG: 0.4 CAPSULE ORAL at 09:35

## 2023-05-13 RX ADMIN — POTASSIUM CHLORIDE 10 MEQ: 20 TABLET, EXTENDED RELEASE ORAL at 09:35

## 2023-05-13 RX ADMIN — ATORVASTATIN CALCIUM 40 MG: 40 TABLET, FILM COATED ORAL at 09:35

## 2023-05-13 RX ADMIN — LISINOPRIL 40 MG: 10 TABLET ORAL at 09:35

## 2023-05-13 RX ADMIN — ASPIRIN 81 MG: 81 TABLET, COATED ORAL at 09:35

## 2023-05-13 RX ADMIN — INSULIN LISPRO 8 UNITS: 100 INJECTION, SOLUTION INTRAVENOUS; SUBCUTANEOUS at 09:34

## 2023-05-13 NOTE — PLAN OF CARE
Goal Outcome Evaluation: Patient has been very pleasant today. Compliant with all medications and care as ordered. He remains on 2L/NC, saturations maintaining well above 90%. He has been ambulating to the bathroom, steady gait noted. Family has been at bedside, updated on plan of care. He is currently sitting in a chair at bedside. No distress noted. Will continue with plan of care.     Faxed AVS, Face Sheet, and Order for Home Nebulizer to [facility] in [place]. DC Papers reviewed with patient and wife, understanding verbalized. Stated Nebulizer will be delivered to patient's home. Home 02 concentrator at bedside for ride home. Telemetry monitor and IVs removed as ordered. Awaiting transport.

## 2023-05-13 NOTE — PLAN OF CARE
Goal Outcome Evaluation:            Pt resting sitting up in chair, denies needs, pt states prefers to sleep in recliner, call bell in reach

## 2023-05-13 NOTE — DISCHARGE INSTR - APPOINTMENTS
Dr grayson benjamin  and  pulmonology   closed on the  weekend  will call on  Monday  and  get apointment  and  call pt  with  apointment   evs  and  order  for  nebulir sent  to  a  and  will deliver  it to  pt  at  his  home

## 2023-05-14 NOTE — OUTREACH NOTE
Prep Survey    Flowsheet Row Responses   [facility] facility patient discharged from? [facility]   Is LACE score < 7 ? No   Eligibility Readm Mgmt   Discharge diagnosis Shortness of Breath   Does the patient have one of the following disease processes/diagnoses(primary or secondary)? Other   Does the patient have Home health ordered? Yes   What is the Home health agency?  [facility]    Is there a DME ordered? No   Prep survey completed? Yes          JUDE CABRERA - Registered Nurse

## 2023-05-14 NOTE — PAYOR COMM NOTE
"[facility]  [place]  PHONE  [phone]  FAX  [phone]  NPI:  [id number]    PATIENT D/C 5/13/2023      Wilbert Lopez (62 y.o. Male)     Date of Birth   1960    Social Security Number       Address   [address] [place] [address]    Home Phone   [phone]    MRN   [id number]       Worship   Anab[employer]ist    Marital Status                               Admission Date   5/10/23    Admission Type   Emergency    Admitting Provider   Jake Hull MD    Attending Provider       Department, Room/Bed   [facility], 3325/       Discharge Date   5/13/2023    Discharge Disposition   Home or Self Care    Discharge Destination                               Attending Provider: (none)   Allergies: Morphine, Adhesive Tape    Isolation: None   Infection: None   Code Status: Prior    Ht: 185.4 cm (73\")   Wt: 145 kg (318 lb 14.4 oz)    Admission Cmt: None   Principal Problem: Bilateral pneumonia [J18.9]                 Active Insurance as of 5/10/2023     Primary Coverage     Payor Plan Insurance Group Employer/Plan Group    HUMANA MEDICARE REPLACEMENT HU[employer]NA MEDICARE REPLACEMENT [id number]     Payor Plan Address Payor Plan Phone Number Payor Plan Fax Number Effective Dates    [address] [address] [phone]  1/1/2023 - None Entered    [facility] [id number]       Subscriber Name Subscriber Birth Date Member ID       WILBERT LOPEZ 1960 [id number]                 Emergency Contacts      (Rel.) Home Phone Work Phone Mobile Phone    ANUP LOPEZ (Spouse) -- -- [phone]              "

## 2023-05-15 ENCOUNTER — TELEPHONE (OUTPATIENT)
Dept: TELEMETRY | Facility: HOSPITAL | Age: 63
End: 2023-05-15
Payer: MEDICARE

## 2023-05-15 LAB
BACTERIA SPEC AEROBE CULT: NORMAL
BACTERIA SPEC AEROBE CULT: NORMAL

## 2023-05-15 NOTE — CASE MANAGEMENT/SOCIAL WORK
Discharge Planning Assessment  BRAULIO Barber     Patient Name: Wilbert Kelley  MRN: [id number]  Today's Date: 5/15/2023    Admit Date: 5/10/2023       Discharge Plan     Row Name 05/15/23 1702       Plan    Final Discharge Disposition Code 06 - home with home health care    Final Note [facility] per [facility] will contact pt's PCP to obtained HH resuption order.  SS faxed pt discharge information to agency.              ROXI ShieldsW

## 2023-05-15 NOTE — DISCHARGE PLACEMENT REQUEST
"Shabnam Lopez (62 y.o. Male)     Date of Birth   1960    Social Security Number       Address   [address] [id number]    Home Phone   [phone]    MRN   [id number]       [facility]    Marital Status                               Admission Date   5/10/23    Admission Type   Emergency    Admitting Provider   Jake Hull MD    Attending Provider       Department, Room/Bed   [address], 3325/1P       Discharge Date   2023    Discharge Disposition   Home or Self Care    Discharge Destination                               Attending Provider: (none)   Allergies: Morphine, Adhesive Tape    Isolation: None   Infection: None   Code Status: Prior    Ht: 185.4 cm (73\")   Wt: 145 kg (318 lb 14.4 oz)    Admission Cmt: None   Principal Problem: Bilateral pneumonia [J18.9]                 Active Insurance as of 5/10/2023     Primary Coverage     Payor Plan Insurance Group Employer/Plan Group    HUMANA MEDICARE REPLACEMENT HUMANA MEDICARE REPLACEMENT [id number]     Payor Plan Address Payor Plan Phone Number Payor Plan Fax Number Effective Dates    [address] [address] [phone]  2023 - None Entered    [facility], located within [facility] [address]       Subscriber Name Subscriber Birth Date Member ID       SHABNAM LOPEZ 1960 [id number]                 Emergency Contacts      (Rel.) Home Phone Work Phone Mobile Phone    ANUP LOPEZ (Spouse) -- -- [phone]        [address] [address]  Dept. Phone:  [phone]  Dept. Fax:  [phone] Date Ordered: May 13, 2023         Patient:  Shabnam Lopez MRN:  [id number]   [address] [facility] [address] :  1960  SSN:    Phone: [phone] Sex:  M     Weight: 145 kg (318 lb 14.4 oz)         Ht Readings from Last 1 Encounters:   05/10/23 185.4 cm (73\")         Home Nebulizer          (Order ID: [id number])    Diagnosis:  Acute respiratory failure " with hypoxia (J96.01 [ICD-10-CM] 518.81 [ICD-9-CM])   Quantity:  1     Nebulizer Equipment:  Nebulizer w/ Compressor  Nebulizer Accessories:  Nebulizer Kit - Administration Set, Non-Disposable  Length of Need (99 Months = Lifetime): 99 Months = Lifetime        Authorizing Provider's Phone: [phone]  Authorizing Provider:Matias Houser MD  Authorizing Provider's NPI: [id number]  Order Entered By: Matias Houser MD 2023  9:56 AM     Electronically signed by: Matias Houser MD 2023  9:56 AM         History & Physical      Dora Membreno PA-C at 23 0052     Attestation signed by Jake Hull MD at 23 0556    I have discussed this patient with Dora Membreno PA-C, and have reviewed this documentation and agree. Patient recently started on eliquis for paroxysmal afib; he has significant microscopic hematuria on UA but no gross hematuria reported. Hemoglobin is stable compared to recent blood draws. Continue to monitor for now.                        [facility]  HISTORY & PHYSICAL    Patient Identification:  Name:  Wilbert Kelley  Age:  62 y.o.  Sex:  male  :  1960  MRN:  [id number]   Visit Number:  [id number]  Admit Date: 5/10/2023   Primary Care Physician:  Jsoe M Simon MD     Subjective     Chief complaint:   Chief Complaint   Patient presents with   • Shortness of Breath   • Edema     History of presenting illness:   Patient is a 62 y.o. male with past medical history significant for hyperlipidemia, CAD, MARIAH, GERD, hypertension, diabetes, BPH, obesity, and recent triple bypass surgery at [facility] on 2023 with postoperative atrial fibrillation that presented to the [facility] emergency department for evaluation of shortness of breath and edema.  Symptoms began approximately 2 AM on  morning.  Patient's wife states  morning the patient's O2 saturation was 72 to 84% on CPAP.   Patient is supposed to be on nightly CPAP, but machine was taken so he has been using wife's CPAP.  Wife states later that day patient's oxygen was low and they obtained oxygen from a family friend.  Wife states on Monday patient was able to see cardiologist Dr. Boyle who prescribed patient 3 L of oxygen and doubled his daily dose of Bumex for 7 days.  Wife states Monday patient's symptoms continue to worsen and she had to increase the patient's oxygen to 5 L to get his O2 sat to 92% at best.  Wife does state the patient seems to be worse at night.  Patient saw PCP yesterday and was given Rocephin injection and started on Augmentin and doxycycline and has had some improvement.  Patient endorses cough productive of green sputum.  Improved after starting on Mucinex.  Symptoms worsened by sleep and exertion.  Patient does admit that he has been exposed to grandchild with unknown viral illness.  On exam, patient is sitting up at bedside eating, wife at bedside.  Wife states patient has never had a diagnosis of CHF.  She states she was not on any diuretics until he had a CABG in March.  She does states she has noted his shortness of breath and oxygen saturation has improved since starting the ABX from his PCP.  Wife states patient started Eliquis 2 days ago.  Upon chart review, it appears patient has had paroxysmal atrial fibrillation since CABG and has a high [id number] score therefore was switched from Plavix to Eliquis.    Upon arrival to the ED, vitals were temperature 97.5, HR 64, RR 18, /74, SPO2 98% on room air.  Labs significant for glucose 229, hemoglobin 9.0, hematocrit 29.5.  CRP 24.14 high-sensitivity troponin 34, repeat 34.  proBNP 3,126.  ABG with pH to 7.522 PO2 64.7 HCO3 31.4, base excess 8.0 on room air.  UA dark yellow, 3+ blood, trace leukocytes, small protein, small bilirubin,, 4.0 urobilinogen, too numerous RBC, 6-12 WBC.    CXR shows mild CHF.  CT angiogram chest shows no CTA evidence of  pulmonary embolism, moderate left pleural effusion with adjacent atelectasis similar to prior study.  Interval development of patchy multifocal groundglass and alveolar infiltrates predominantly involving the perihilar regions in the right upper lobe.  This could represent hemorrhage, pulmonary edema, or infectious etiologies.    Patient has been admitted to the telemetry unit.   ---------------------------------------------------------------------------------------------------------------------   Review of Systems   Constitutional: Positive for fatigue. Negative for chills, diaphoresis and fever.   Respiratory: Positive for cough and shortness of breath. Negative for wheezing.    Cardiovascular: Positive for leg swelling. Negative for chest pain and palpitations.   Gastrointestinal: Negative for abdominal pain, constipation, diarrhea, nausea and vomiting.   Genitourinary: Negative for difficulty urinating, dysuria and flank pain.   Musculoskeletal: Negative for arthralgias, myalgias and neck stiffness.   Skin: Negative for rash and wound.   Neurological: Negative for dizziness, weakness and light-headedness.   Psychiatric/Behavioral: Negative for agitation and confusion.      ---------------------------------------------------------------------------------------------------------------------   Past Medical History:   Diagnosis Date   • Arthritis    • BPH (benign prostatic hyperplasia)    • Cancer     basal and melanoma-  x2 - left side ear and elbow   • Constipation    • Coronary artery disease    • DDD (degenerative disc disease), lumbar    • Diabetes mellitus     couple times month check sugar   • Frozen shoulder     left   • GERD (gastroesophageal reflux disease)    • Hyperlipidemia    • Hypertension    • Limited mobility     left shoulder  - possible frozen shoulder-= please be aware for surgery   • Sleep apnea     doesnt use machine   • Tinnitus    • Wears glasses     small print     Past Surgical History:    Procedure Laterality Date   • CARDIAC CATHETERIZATION     • CARDIAC CATHETERIZATION Right 03/16/2023    Procedure: Left Heart Cath;  Surgeon: Jarod Boyle MD;  Location:  [facility];  Service: Cardiovascular;  Laterality: Right;   • CATARACT EXTRACTION, BILATERAL Bilateral    • COLONOSCOPY     • CORONARY ARTERY BYPASS GRAFT N/A 3/21/2023    Procedure: MEDIAN STERNOTOMY, CORONARY ARTERY BYPASS GRAFTING X 3, UTILIZING THE LEFT INTERNAL MAMMARY ARTERY, ENDOSCOPIC VEIN HARVEST OF THE RIGHT GREATER SAPENOUSE VEIN;  Surgeon: Markus Emanuel MD;  Location:  [facility];  Service: Cardiothoracic;  Laterality: N/A;   • CORONARY STENT PLACEMENT      x4   • ENDOSCOPY     • FINGER SURGERY Left     middle finger - left side   • SKIN CANCER EXCISION      x2   • WISDOM TOOTH EXTRACTION       Family History   Problem Relation Age of Onset   • COPD Mother    • Heart attack Father 45        passed   • Heart attack Paternal Grandfather 42        massive heart attack   • Heart disease Paternal Grandfather      Social History     Socioeconomic History   • Marital status:    • Number of children: 1   Tobacco Use   • Smoking status: Never     Passive exposure: Past   • Smokeless tobacco: Current     Types: Snuff   Vaping Use   • Vaping Use: Never used   Substance and Sexual Activity   • Alcohol use: Not Currently   • Drug use: Never   • Sexual activity: Defer     ---------------------------------------------------------------------------------------------------------------------   Allergies:  Morphine and Adhesive tape  ---------------------------------------------------------------------------------------------------------------------   Medications below are reported home medications pulling from within the system; at this time, these medications have not been reconciled unless otherwise specified and are in the verification process for further verifcation as current home medications.    Prior to Admission  Medications     Prescriptions Last Dose Informant Patient Reported? Taking?    apixaban (ELIQUIS) 5 MG tablet tablet   No No    Take 1 tablet by mouth 2 (Two) Times a Day.    aspirin 81 MG EC tablet   No No    Take 1 tablet by mouth Daily.    atorvastatin (LIPITOR) 40 MG tablet   No No    Take 1 tablet by mouth Every Night.    B-D ULTRAFINE III SHORT PEN 31G X 8 MM misc   Yes No    BD Insulin Syringe U-500 31G X 6MM 0.5 ML misc   Yes No    bumetanide (BUMEX) 2 MG tablet   No No    Take 1 tablet by mouth 2 (Two) Times a Day for 7 days.    fenofibrate 160 MG tablet  Self, Pharmacy Yes No    Take 1 tablet by mouth Daily.    gabapentin (NEURONTIN) 800 MG tablet   Yes No    1 tablet 3 (Three) Times a Day.    Insulin Glargine (TOUJEO MAX SOLOSTAR SC)  Self, Pharmacy Yes No    Inject 80 Units under the skin into the appropriate area as directed 2 (Two) Times a Day.    Insulin Regular Human, Conc, (HumuLIN R) 500 UNIT/ML solution pen-injector CONCENTRATED injection   Yes No    Inject 65 Units under the skin into the appropriate area as directed 3 (Three) Times a Day Before Meals.    lisinopril (PRINIVIL,ZESTRIL) 40 MG tablet   No No    Take 1.5 tablets by mouth Daily.    metFORMIN (GLUCOPHAGE) 1000 MG tablet   No No    Take 1 tablet by mouth 2 (Two) Times a Day With Meals. Hold metformin for 2 days post-cath.  Restart on 3/19/2023    nebivolol (BYSTOLIC) 5 MG tablet   No No    Take 1 tablet by mouth Daily.    ondansetron ODT (ZOFRAN-ODT) 4 MG disintegrating tablet   No No    Place 1 tablet on the tongue Every 8 (Eight) Hours As Needed for Nausea or Vomiting.    potassium chloride 10 MEQ CR tablet   No No    Use 1 pill daily for 7 days with Bumex    tamsulosin (FLOMAX) 0.4 MG capsule 24 hr capsule   No No    Take 1 capsule by mouth Daily.        ---------------------------------------------------------------------------------------------------------------------    Objective     Hospital Scheduled Meds:  cefTRIAXone, 1 g,  Intravenous, Q24H  doxycycline, 100 mg, Intravenous, Q12H  sodium chloride, 10 mL, Intravenous, Q12H      sodium chloride 0.45 % with KCl 20 mEq, 75 mL/hr        Current listed hospital scheduled medications may not yet reflect those currently placed in orders that are signed and held, awaiting patient's arrival to floor/unit.    ---------------------------------------------------------------------------------------------------------------------   Vital Signs:  Temp:  [97.5 °F (36.4 °C)-98.1 °F (36.7 °C)] 98.1 °F (36.7 °C)  Heart Rate:  [63-70] 68  Resp:  [18-20] 20  BP: (128-172)/(70-98) 172/90  Mean Arterial Pressure (Non-Invasive) for the past 24 hrs (Last 3 readings):   Noninvasive MAP (mmHg)   05/10/23 2351 110   05/10/23 1900 104   05/10/23 1744 103     SpO2 Percentage    05/10/23 2200 05/10/23 2324 05/10/23 2351   SpO2: 93% 97% 96%     SpO2:  [92 %-98 %] 96 %  on  Flow (L/min):  [2] 2;   Device (Oxygen Therapy): nasal cannula    Body mass index is 41.82 kg/m².  Wt Readings from Last 3 Encounters:   05/10/23 (!) 144 kg (317 lb)   05/08/23 (!) 141 kg (310 lb)   05/06/23 (!) 141 kg (310 lb)     ---------------------------------------------------------------------------------------------------------------------   Physical Exam:  Physical Exam  Constitutional:       General: He is not in acute distress.     Appearance: Normal appearance.   HENT:      Head: Normocephalic and atraumatic.      Right Ear: External ear normal.      Left Ear: External ear normal.      Nose: No nasal deformity.      Mouth/Throat:      Lips: Pink.      Mouth: Mucous membranes are moist.   Eyes:      Conjunctiva/sclera: Conjunctivae normal.      Pupils: Pupils are equal, round, and reactive to light.   Cardiovascular:      Rate and Rhythm: Normal rate and regular rhythm.      Pulses:           Dorsalis pedis pulses are 2+ on the right side and 2+ on the left side.      Heart sounds: Normal heart sounds.   Pulmonary:      Effort: Pulmonary  effort is normal.      Breath sounds: Decreased air movement (In lower lung bases) present. Rhonchi (Upper lungs) present. No wheezing or rales.   Abdominal:      General: Abdomen is flat. Bowel sounds are normal.      Palpations: Abdomen is soft.      Tenderness: There is no guarding or rebound.   Genitourinary:     Comments: No mai catheter in place   Musculoskeletal:      Cervical back: Neck supple. Normal range of motion.      Right lower leg: Edema present.      Left lower leg: Edema present.   Skin:     General: Skin is warm and dry.   Neurological:      General: No focal deficit present.      Mental Status: He is alert and oriented to person, place, and time.   Psychiatric:         Mood and Affect: Mood normal.         Behavior: Behavior normal.       ---------------------------------------------------------------------------------------------------------------------  EKG:   Normal sinus rhythm, no acute ischemia.  Confirmed by cardiology.      Telemetry:         I have personally reviewed the EKG/Telemetry strip  ---------------------------------------------------------------------------------------------------------------------   Results from last 7 days   Lab Units 05/10/23  1856 05/10/23  1702 05/06/23  2228   HSTROP T ng/L 34* 34* 48*     Results from last 7 days   Lab Units 05/10/23  1702 05/06/23  1955   PROBNP pg/mL 3,126.0* 3,018.0*       Results from last 7 days   Lab Units 05/10/23  2056   PH, ARTERIAL pH units 7.522*   PO2 ART mm Hg 64.7*   PCO2, ARTERIAL mm Hg 38.3   HCO3 ART mmol/L 31.4*     Results from last 7 days   Lab Units 05/10/23  1856 05/10/23  1702 05/06/23  1955   CRP mg/dL  --  24.14* 15.92*   LACTATE mmol/L 1.8  --  1.4   WBC 10*3/mm3  --  10.54 15.91*   HEMOGLOBIN g/dL  --  9.0* 9.8*   HEMATOCRIT %  --  29.5* 31.8*   MCV fL  --  84.0 84.8   MCHC g/dL  --  30.5* 30.8*   PLATELETS 10*3/mm3  --  452* 410   INR   --   --  1.07     Results from last 7 days   Lab Units 05/10/23  1702  05/06/23 1955   SODIUM mmol/L 138 141   POTASSIUM mmol/L 3.9 4.0   MAGNESIUM mg/dL  --  1.6   CHLORIDE mmol/L 99 104   CO2 mmol/L 26.5 25.3   BUN mg/dL 21 14   CREATININE mg/dL 0.98 1.05   CALCIUM mg/dL 8.5* 9.0   GLUCOSE mg/dL 229* 121*   ALBUMIN g/dL 3.1* 3.7   BILIRUBIN mg/dL 1.1 0.8   ALK PHOS U/L 105 73   AST (SGOT) U/L 18 16   ALT (SGPT) U/L 29 12   Estimated Creatinine Clearance: 118.7 mL/min (by C-G formula based on SCr of 0.98 mg/dL).  No results found for: AMMONIA    Glucose   Date/Time Value Ref Range Status   05/10/2023 2355 211 (H) 70 - 130 mg/dL Final     Comment:     Meter: [id number] : [id number] [facility]     Lab Results   Component Value Date    HGBA1C 7.70 (H) 03/20/2023     No results found for: TSH, FREET4    Blood Culture   Date Value Ref Range Status   05/06/2023 No growth at 4 days  Preliminary   05/06/2023 No growth at 4 days  Preliminary     No results found for: URINECX  No results found for: WOUNDCX  No results found for: STOOLCX  No results found for: RESPCX  No results found for: MRSACX  Pain Management Panel         Latest Ref Rng & Units 3/23/2023 3/20/2023   Pain Management Panel   Creatinine, Urine mg/dL 246.9      Amphetamine, Urine Qual Negative  Negative     Barbiturates Screen, Urine Negative  Negative     Benzodiazepine Screen, Urine Negative  Negative     Buprenorphine, Screen, Urine Negative  Negative     Cocaine Screen, Urine Negative  Negative     Methadone Screen , Urine Negative  Negative     Methamphetamine, Ur Negative  Negative                I have personally reviewed the above laboratory results.   ---------------------------------------------------------------------------------------------------------------------  Imaging Results (Last 7 Days)     Procedure Component Value Units Date/Time    CT Angiogram Chest Pulmonary Embolism [[id number]] Collected: 05/10/23 2234     Updated: 05/10/23 2236    Narrative:      Contrast-enhanced CTA chest     Indications:  Shortness of breath     Technique: Contrast-enhanced CTA images were obtained.  Limited exposure  control, adjustment of the MA and/or KV according to patient size or use  of an iterative reconstruction technique was utilized.   Three-dimensional reconstructions were done.     Findings CTA chest:     Comparison is made to prior study dated 5/6/2023.     There is no evidence of filling defect within the main or lobar  pulmonary arteries to suggest pulmonary embolism.     The heart is not enlarged.  There is no pericardial effusion.  The  thoracic aorta is normal in course and caliber.     There is an enlarged right paratracheal lymph node measuring 1.3 cm.   There is an enlarged right paratracheal/right paraesophageal lymph node  measuring 1.1 cm.  There is an enlarged right hilar lymph node measuring  1.1 cm.  There is a more inferior large right hilar lymph node measuring  1.1 cm.  An enlarged subcarinal lymph node measures 1.4 cm.  An AP  window lymph node measures 1.3 cm.     Limited images of the upper abdomen are normal.     There is a moderate left pleural effusion with adjacent compressive  atelectasis.  There are multifocal bilateral infiltrates predominantly  involving the perihilar regions and right upper lobe.  The infiltrates  are new since the prior study.  Left pleural effusion is stable.     The osseous structures are normal.       Impression:      Impression:  1.  No CTA evidence of pulmonary embolism.  2.  Moderate left pleural effusion with adjacent atelectasis, similar to  the prior study.  3.  Interval development of patchy multifocal groundglass and alveolar  infiltrates predominately involving the perihilar regions and the right  upper lobe.  This could represent hemorrhage, pulmonary edema, or  infectious etiologies.     This report was finalized on 5/10/2023 10:34 PM by Robin Fairchild MD.       XR Chest 1 View [[id number]] Collected: 05/10/23 1721     Updated: 05/10/23 1724    Narrative:       XR CHEST 1 VW-     CLINICAL INDICATION: SOA        COMPARISON: 05/06/2023      TECHNIQUE: Single frontal view of the chest.     FINDINGS:      LUNGS: Probable CHF      HEART AND MEDIASTINUM: Heart and mediastinal contours are unremarkable        SKELETON: Bony and soft tissue structures are unremarkable.             Impression:      Likely mild CHF     This report was finalized on 5/10/2023 5:22 PM by Dr. Oliver Renner MD.           I have personally reviewed the above radiology results.     Last Echocardiogram:  Results for orders placed during the hospital encounter of 02/02/23    Adult Transthoracic Echo Complete w/ Color, Spectral and Contrast if necessary per protocol    Interpretation Summary  •  Left ventricular systolic function is normal. Left ventricular ejection fraction appears to be 66 - 70%.  •  Left ventricular wall thickness is consistent with mild concentric hypertrophy.  •  Left ventricular diastolic function is consistent with (grade II w/high LAP) pseudonormalization.  •  Technically very difficult study.  Right ventricle and right atrium is not well visualized for adequate evaluation.    ---------------------------------------------------------------------------------------------------------------------    Assessment & Plan      Active Hospital Problems    Diagnosis  POA   • **Bilateral pneumonia [J18.9]  Yes     · Acute multifocal pneumonia, POA  · Acute hypoxic respiratory failure, POA  · Acute CHF, POA  · Left pleural effusion, POA  · Postoperative atrial fibrillation, POA  · CT chest shows moderate left-sided pleural effusion.  Multifocal patchy infiltrates.  Patchy multifocal groundglass and alveolar infiltrates of right upper lobe.  · Patient acutely hypoxic on room air in ED.  Normally does not have home oxygen requirement.  · Continue/add supplemental oxygen via nasal cannula to maintain O2 > or equal to 90%  · Order urinary legionella, respiratory PCR, respiratory panel. Check strep  pneumo.   · PRN nebs every 6 hours.   · Antibiotic coverage with ceftriaxone and doxycycline, ceftriaxone given in ED  · Blood cultures obtained in ED  · 80 mg IV Lasix given in ED, good urine output. Can continue home dose of Bumex in a.m. pending med rec  · Patient and wife declined history of CHF less TTE in February 2023 shows EF 66 to 70%; obtain updated TTE  · Encourage incentive spirometry  · Repeat labs in the a.m.  · Continuous cardiac monitoring  · Daily weights  · Monitor strict I's and O's  · Inpatient cardiology consult, assistance appreciated  · Patient converted to normal sinus rhythm on amiodarone after operation, per chart review it appears cardiologist is concerned for paroxysmal atrial fibrillation and therefore started patient on Eliquis    CHRONIC MEDICAL PROBLEMS    Essential hypertension  BP appears moderately controlled   Plan to resume home antihypertensive regimen once reconciled per pharmacy with appropriate holding parameters to prevent hypotension and/or bradycardia   Closely monitor BP per hospital protocol, titrate medications as necessary  · Recent CABG  · CAD  · Hyperlipidemia  · MARIAH  · BPH  · Restart home medications pending med rec  · Supportive care  Type II DM  Hemoglobin A1C ordered to evaluate glycemic control.   Start sliding scale insulin for now, titrate insulin therapy as necessary.   Hold any oral hypoglycemics to prevent hypoglycemia. Will review home diabetes medications once available per pharmacy.   Hypoglycemia protocol in place if necessary.   Accuchecks QAC and QHS.  · Obesity by BMI, Body mass index is 41.82 kg/m².  • Affecting all aspects of care      · F/E/N: No IVF at this time.  Continue to monitor electrolytes.  Cardiac/diabetic diet.    ---------------------------------------------------  DVT Prophylaxis: Restart home anticoagulation  Activity: Up with assistance    The patient is considered to be a high risk patient due to: Acute hypoxic respiratory failure,  multifocal pneumonia, acute CHF, comorbid conditions, recent CABG    INPATIENT status due to the need for care which can only be reasonably provided in an hospital setting such as aggressive/expedited ancillary services and/or consultation services, the necessity for IV medications, close physician monitoring and/or the possible need for procedures.  In such, I feel patient’s risk for adverse outcomes and need for care warrant INPATIENT evaluation and predict the patient’s care encounter to likely last beyond 2 midnights.      Code Status: Full code    Disposition/Discharge planning: Pending clinical course    I have discussed the patient's assessment and plan with Dr. Hull.      Dora Membreno PA-C  Hospitalist Service -- [facility] [facility]       05/11/23  01:45 EDT    Attending Physician: Jake Hull MD      Electronically signed by Jake Hull MD at 05/11/23 0556

## 2023-05-15 NOTE — CASE MANAGEMENT/SOCIAL WORK
Discharge Planning Assessment   Sunil     Patient Name: Wilbert Kelley  MRN: [id number]  Today's Date: 5/15/2023    Admit Date: 5/10/2023       Discharge Needs Assessment    No documentation.                Discharge Plan     Row Name 05/15/23 1028       Plan    Plan Pt dc'd home over weekend with MD referral for nebulizer. CM followed up with pt on this date, via phone, pt reports he has not rec'd his home nebulizer machine and voices DME provider pref.for [facility]. CM faxed MD referral to [facility] and spoke with Ashlie, nebulizer to be delivered to pts home.    Row Name 05/15/23 0738       Plan    Final Discharge Disposition Code --              Continued Care and Services - Discharged on 5/13/2023 Admission date: 5/10/2023 - Discharge disposition: Home or Self Care   Coordination has not been started for this encounter.     Selected Continued Care - Prior Encounters Includes continued care and service providers with selected services from prior encounters from 2/9/2023 to 5/13/2023    Discharged on 3/30/2023 Admission date: 3/21/2023 - Discharge disposition: Home-Health Care [facility]    Home Medical Care     Service Provider Selected Services Address Phone Fax Patient Preferred    [facility] [address] [id number] [phone] --                    Expected Discharge Date and Time     Expected Discharge Date Expected Discharge Time    May 13, 2023          Demographic Summary    No documentation.                Functional Status    No documentation.                Psychosocial    No documentation.                Abuse/Neglect    No documentation.                Legal    No documentation.                Substance Abuse    No documentation.                Patient Forms    No documentation.                   Nimo Arceo RN

## 2023-05-15 NOTE — DISCHARGE PLACEMENT REQUEST
"Shabnam Lopez (62 y.o. Male)     Date of Birth   1960    Social Security Number       Address   [address] [facility] [address] [facility] [address]    Home Phone   [phone]    MRN   [id number]       Muslim   Latter day    Marital Status                               Admission Date   5/10/23    Admission Type   Emergency    Admitting Provider   Jake Hull MD    Attending Provider       Department, Room/Bed   [address], 3325/1P       Discharge Date   5/13/2023    Discharge Disposition   Home or Self Care    Discharge Destination                               Attending Provider: (none)   Allergies: Morphine, Adhesive Tape    Isolation: None   Infection: None   Code Status: Prior    Ht: 185.4 cm (73\")   Wt: 145 kg (318 lb 14.4 oz)    Admission Cmt: None   Principal Problem: Bilateral pneumonia [J18.9]                 Active Insurance as of 5/10/2023     Primary Coverage     Payor Plan Insurance Group Employer/Plan Group    HUMANA MEDICARE REPLACEMENT HUMANA MEDICARE REPLACEMENT [id number]     Payor Plan Address Payor Plan Phone Number Payor Plan Fax Number Effective Dates    [address] [address] [phone]  1/1/2023 - None Entered    [facility] [address]       Subscriber Name Subscriber Birth Date Member ID       SHABNAM LOPEZ 1960 [id number]                 Emergency Contacts      (Rel.) Home Phone Work Phone Mobile Phone    ANUP LOPEZ (Spouse) -- -- [phone]            Shabnam Lopez  MRN: [id number]    Icon primary diagnosis          Bilateral pneumonia   Icon Date   5/10/2023 - 5/13/2023   Icon Location   [address]   Icon Checklist header    Your Next Steps    Icon ask where to get these medications   Ask     Icon Checkbox    Ask how to get these medications  1 bumetanide  Icon do   Do     Icon Checkbox     these medications from [facility] [id number] - [address] - [phone] [facility] [phone] " FX  1 ipratropium-albuterol  Icon read   Read     Icon Checkbox    Read these attachments  1 Heart Failure  Diagnosis  Easy-to-Read (English)  2 Community-Acquired Pneumonia  Adult  Easy-to-Read (English)  3 Diabetes Mellitus and Exercise (English)  Icon Location   Go    Jun 19 Follow Up 1:45 PM   Jarod Boyle MD  [facility]   [address] [facility] [address]   [facility] [address] [phone]  -Bring photo ID, insurance card, and list of medications to appointment -If testing was completed outside of [facility] then patient must bring images on a disc -Copay will be collected at time of appointment -Established patients should arrive at time of appointment   You have more future appointments. Please review your full appointment list.  [email or web address]    View your After Visit Summary and more online at [email or web address]   After Visit Summary  Instructions     Icon Orders placed today                  Your Care at Home    Home Nebulizer    Icon medication changes this visit           Your medications have changed    Icon medications to start taking   START taking:   ipratropium-albuterol (DUO-NEB)   Icon medications to change how you take   CHANGE how you take:   bumetanide (BUMEX) -- when to take this  Icon medications to stop taking   STOP taking:   amoxicillin-clavulanate 875-125 MG per tablet (AUGMENTIN)    doxycycline 100 MG tablet (VIBRAMYICN)   Review your updated medication list below.  Your Next Steps  Icon ask where to get these medications   Ask  Icon do   Do  Icon read   Read  Icon Location   Go      COVID-19 Vaccination Information  Why Get Vaccinated?  Building defenses against COVID-19 is a team effort, and you are a tellez part of that team. Getting the COVID-19 vaccine adds one more layer of protection for you, your coworkers, and family. Here are ways you can build people’s confidence in the COVID-19 vaccines in your community and at home.     • Get  vaccinated and enroll in the v-safe text messaging program to help CDC monitor vaccine safety.   • Tell others why you are getting vaccinated and encourage them to get vaccinated. Share your success story.    • Learn how to have conversations about COVID-19 vaccine with coworkers, family, and friends.  • https://www.cdc.gov/coronavirus/2019-ncov/vaccines/index.html     How do I schedule an appointment for a vaccine?  https://www.vaccines.gov/ helps you find locations that carry COVID-19 vaccines and their contact information. Because every location handles appointments differently, you will need to schedule your appointment directly with the location you choose.        If you have any questions about your recovery, please call the [facility] Nurse Call Center at [phone]. A registered nurse is available 24 hours a day 7 days a week to assist you.   If you have any COVID-19 related questions, please call [phone].     Conversations that Matter: Have you thought about who would speak for you if you could not speak for yourself?     Consider appointing someone to make healthcare decisions for you if you are unable to do so. We can help! Call our Advance Care Planning helpline at [phone], or visit   [email or web address].   Icon activity    Activity instructions    As tolerated. No restrictions.      Icon diet    Diet instructions    Cardiac and Consistent Carb.       Icon Orders placed today                  Other instructions    Discharge Follow-up with PCP   Currently Documented PCP:   Jose M Simon MD   PCP Phone Number:   [phone]   Discharge Follow-up with Specified Provider: Cardiology w/Dr. Boyle as previously scheduled; 1 Month   Discharge Follow-up with Specified Provider: Pulmonology; 2 Months   Home Nebulizer   What's Next    What's Next          Follow up with Jose M Simon MD  Within 2 weeks discharge [address] [facility] [address] [phone]           Follow up with MALINDA Hackett in 2 week(s)  for evaluation and treatment for MARIA[address] [facility] [address]   [facility] [address]  [phone]   Jun 19 Follow Up with Jarod Boyle MD  Monday Jun 19, 2023 1:45 PM  -Bring photo ID, insurance card, and list of medications to appointment   -If testing was completed outside of [facility] then patient must bring images on a disc   -Copay will be collected at time of appointment   -Established patients should arrive at time of appointment [facility] CARDI[facility]  [address]   [facility] [address]  [phone]   Jul 12 New Patient with Nicole Birch PA-C  Wednesday Jul 12, 2023 10:00 AM  New: Please bring list of medications and outside images or reports.  [facility] PULMONARY & CRITICAL CARE MEDICINE  [address] [facility] [address]   [place] [address]  [phone]    Icon Orders placed today                    Additional Information        Dr grayson benjamin  and  pulmonology   closed on the  weekend  will call on  Monday  and  get apointment  and  call pt  with  apointment   evs  and  order  for  nebulir sent  to  [facility]  and  will deliver  it to  pt  at  his  home               Your Allergies  Date Reviewed: 5/11/2023  Your Allergies   Allergen Reactions   Morphine Nausea And Vomiting   projectile       Adhesive Tape Rash   Pt states sticky tabs cause irritation when left on long      Patient Belongings Returned    Document Return of Belongings Flowsheet    Were the patient bedside belongings sent home? --   Medication Retrieved from Unit & Sent Home --   Belongings Sent to Safe --   Belongings sent with: --   Belongings Retrieved from Security & Sent Home --   Medication Retrieved from Unit & Sent Home --   Medications Retrieved from Pharmacy & Sent Home --         MyChart Signup    Our records indicate that you have an active [facility] Rapid Vocabulary account.     You can view your After Visit Summary by going to  [email or web address] and logging in with your [employer] username and password.  If you don't have a [employer] username and password but a parent or guardian has access to your record, the parent or guardian should login with their own [employer] username and password and access your record to view the After Visit Summary.     If you have questions, you can email [email or web address] or call [phone] or toll free number [phone] to talk to our [employer] staff.  Remember, [employer] is NOT to be used for urgent needs.  For medical emergencies, dial 911.     Medication List  Medication List     Morning Afternoon Evening Bedtime As Needed    apixaban 5 MG tablet tablet  Commonly known as: ELIQUIS  Take 1 tablet by mouth 2 (Two) Times a Day.  Last time this was given: 5 mg on May 13, 2023  9:35 AM  Signed by: Jarod Boyle MD         aspirin 81 MG EC tablet  Take 1 tablet by mouth Daily.  Last time this was given: 81 mg on May 13, 2023  9:35 AM  Signed by: ERNESTO Joshi         atorvastatin 40 MG tablet  Commonly known as: LIPITOR  Take 1 tablet by mouth Daily.  Last time this was given: 40 mg on May 13, 2023  9:35 AM        Icon medications to change how you take   bumetanide 2 MG tablet  Commonly known as: BUMEX  Take 1 tablet by mouth Daily.  What changed: when to take this  Last time this was given: 2 mg on May 13, 2023  9:35 AM  Signed by: Matias Houser MD         fenofibrate 160 MG tablet  Take 1 tablet by mouth Daily.  Signed by: Jose M Simon MD         gabapentin 800 MG tablet  Commonly known as: NEURONTIN  Take 3 tablets by mouth Every Night.  Last time this was given: Ask your nurse or doctor  Signed by: Jose M Simon MD         Insulin Regular Human (Conc) 500 UNIT/ML solution pen-injector CONCENTRATED injection  Commonly known as: HumuLIN R  Inject 25-50 Units under the skin into the appropriate area as directed 3 (Three) Times a Day Before Meals.        Icon medications to start  taking   ipratropium-albuterol 0.5-2.5 mg/3 ml nebulizer  Commonly known as: DUO-NEB  Take 3 mL by nebulization Every 6 (Six) Hours As Needed for Wheezing or Shortness of Air.  Last time this was given: 3 mL on May 13, 2023  7:18 AM  Signed by: Matias Houser MD     Take 3 mL by nebulization Every 6 (Six) Hours As Needed for Wheezing or Shortness of Air    lansoprazole 30 MG capsule  Commonly known as: PREVACID  Take 1 capsule by mouth Daily.         * lisinopril 40 MG tablet  Commonly known as: PRINIVIL,ZESTRIL  Take 1 tablet by mouth Every Morning.  Last time this was given: Ask your nurse or doctor         * lisinopril 40 MG tablet  Commonly known as: PRINIVIL,ZESTRIL  Take 20 mg by mouth Every Night.  Last time this was given: Ask your nurse or doctor         metFORMIN 1000 MG tablet  Commonly known as: GLUCOPHAGE  Take 1 tablet by mouth 2 (Two) Times a Day With Meals.         nebivolol 5 MG tablet  Commonly known as: BYSTOLIC  Take 1 tablet by mouth Daily.  Last time this was given: 5 mg on May 13, 2023  9:35 AM  Signed by: MALINDA Aldana         ondansetron ODT 4 MG disintegrating tablet  Commonly known as: ZOFRAN-ODT  Place 1 tablet on the tongue Every 8 (Eight) Hours As Needed for Nausea or Vomiting.  Signed by: Morena Sidhu DO     Place 1 tablet on the tongue Every 8 (Eight) Hours As Needed for Nausea or Vomiting    potassium chloride 10 MEQ CR tablet  Commonly known as: K-DUR,KLOR-CON  Take 1 tablet by mouth Daily.  Last time this was given: 10 mEq on May 13, 2023  9:35 AM         tamsulosin 0.4 MG capsule 24 hr capsule  Commonly known as: FLOMAX  Take 1 capsule by mouth Daily.  Last time this was given: 0.4 mg on May 13, 2023  9:35 AM  Signed by: Corin Jermaine, APRN         TOUJEO MAX SOLOSTAR SC  Inject 80 Units under the skin into the appropriate area as directed 2 (Two) Times a Day.  Last time this was given: Ask your nurse or doctor         very important  * This list has 2 medication(s)  that are the same as other medications prescribed for you. Read the directions carefully, and ask your doctor or other care provider to review them with you.       Where to  your medications     Icon medication  information                   these medications at [facility] [id number] - [place], [place] - [address] - [phone]  - [phone] FX     ipratropium-albuterol  Address: [address] [address]   Phone: [phone]      Icon ask where to get these medications    Ask your doctor where to  these medications     • bumetanide 2 MG tablet  Always carry an updated list of your medications with you. If there is an emergency, a responder can quickly see what medications you are taking. Take this paperwork with you the next time you see your health care provider.        [employer]  Icon List of Attachments     Attached Information  Heart Failure  Diagnosis  Easy-to-Read (English)  Heart Failure, Diagnosis  The heart inside the body.  ?    Heart failure means that your heart is not able to pump blood in the right way. This makes it hard for your body to work well. Heart failure is usually a long-term (chronic) condition. You must take good care of yourself and follow your treatment plan from your doctor.  Different stages of heart failure have different treatment plans. The stages are:  • Stage A: At risk for heart failure.  • Stage B: Pre-heart failure.  • Stage C: Symptomatic heart failure.  • Stage D: Advanced heart failure.  What are the causes?  • High blood pressure.  • Buildup of cholesterol and fat in the arteries.  • Heart attack. This injures the heart muscle.  • Heart valves that do not open and close properly.  • Damage of the heart muscle. This is also called cardiomyopathy.  • Infection of the heart muscle. This is also called myocarditis.  • Lung disease.  What increases the risk?  • Getting older. The risk of heart failure goes up as a person ages.  • Being  overweight.  • Using tobacco or nicotine products.  • Abusing alcohol or drugs.  • Having taken medicines that can damage the heart.  • Having any of these conditions:  ? Diabetes.  ? Abnormal heart rhythms.  ? Thyroid problems.  ? Low blood counts (anemia).  • Having a family history of heart failure.  What are the signs or symptoms?  • Shortness of breath.  • Coughing.  • Swelling of the feet, ankles, legs, or belly.  • Losing or gaining weight for no reason.  • Trouble breathing.  • Waking from sleep because of the need to sit up and get more air.  • Fast heartbeat.  Other symptoms may include:  • Being very tired.  • Feeling dizzy, or feeling like you may pass out (faint).  • Having no desire to eat.  • Feeling like you may vomit (nauseous).  • Peeing (urinating) more at night.  • Feeling confused.  How is this treated?  This condition may be treated with:  • Medicines. These can be given to treat blood pressure and to make the heart muscles stronger.  • Changes in your daily life. These may include:  ? Eating a healthy diet.  ? Staying at a healthy body weight.  ? Quitting tobacco, alcohol, and drug use.  ? Doing exercises.  ? Participating in a cardiac rehabilitation program. This program helps you improve your health through exercise, education, and counseling.  • Surgery. Surgery can be done to open blocked valves or to put devices in the heart, such as pacemakers.  • A donor heart (heart transplant). You will receive a healthy heart from a donor.  Follow these instructions at home:  • Treat other conditions as told by your doctor. These may include high blood pressure, diabetes, thyroid disease, or abnormal heart rhythms.  • Learn as much as you can about heart failure.  • Get support as you need it.  • Keep all follow-up visits.  Where to find more information  • American Heart Association: www.heart.org  • Centers for Disease Control and Prevention: www.cdc.gov  • National [place] on Aging:  www.kuldip.nih.gov  Summary  • Heart failure means that your heart is not able to pump blood in the right way.  • This condition is often caused by high blood pressure, heart attack, or damage of the heart muscle.  • Symptoms of this condition include shortness of breath and swelling of the feet, ankles, legs, or belly. You may also feel very tired or feel like you may vomit.  • You may be treated with medicines, surgery, or changes in your daily life.  • Treat other health conditions as told by your doctor.  This information is not intended to replace advice given to you by your health care provider. Make sure you discuss any questions you have with your health care provider.  Document Revised: 06/16/2022 Document Reviewed: 07/10/2021  [employer] Patient Education © 2022 Elsevier Inc.           Icon List of Attachments     Attached Information  Community-Acquired Pneumonia  Adult  Easy-to-Read (English)  Community-Acquired Pneumonia, Adult  Pneumonia is an infection of the lungs. It causes irritation and swelling in the airways of the lungs. Mucus and fluid may also build up inside the airways. This may cause coughing and trouble breathing.  One type of pneumonia can happen while you are in a hospital. A different type can happen when you are not in a hospital (community-acquired pneumonia).  What are the causes?  ?    This condition is caused by germs (viruses, bacteria, or fungi). Some types of germs can spread from person to person. Pneumonia is not thought to spread from person to person.  What increases the risk?  You are more likely to develop this condition if:  • You have a long-term (chronic) disease, such as:  ? Disease of the lungs. This may be chronic obstructive pulmonary disease (COPD) or asthma.  ? Heart failure.  ? Cystic fibrosis.  ? Diabetes.  ? Kidney disease.  ? Sickle cell disease.  ? HIV.  • You have other health problems, such as:  ? Your body's defense system (immune system) is weak.  ? A  condition that may cause you to breathe in fluids from your mouth and nose.  • You had your spleen taken out.  • You do not take good care of your teeth and mouth (poor dental hygiene).  • You use or have used tobacco products.  • You travel where the germs that cause this illness are common.  • You are near certain animals or the places they live.  • You are older than 65 years of age.  What are the signs or symptoms?  Symptoms of this condition include:  • A cough.  • A fever.  • Sweating or chills.  • Chest pain, often when you breathe deeply or cough.  • Breathing problems, such as:  ? Fast breathing.  ? Trouble breathing.  ? Shortness of breath.  • Feeling tired (fatigued).  • Muscle aches.  How is this treated?  Treatment for this condition depends on many things, such as:  • The cause of your illness.  • Your medicines.  • Your other health problems.  Most adults can be treated at home. Sometimes, treatment must happen in a hospital.  • Treatment may include medicines to kill germs.  • Medicines may depend on which germ caused your illness.  Very bad pneumonia is rare. If you get it, you may:  • Have a machine to help you breathe.  • Have fluid taken away from around your lungs.  Follow these instructions at home:  Medicines  • Take over-the-counter and prescription medicines only as told by your doctor.  • Take cough medicine only if you are losing sleep. Cough medicine can keep your body from taking mucus away from your lungs.  • If you were prescribed an antibiotic medicine, take it as told by your doctor. Do not stop taking the antibiotic even if you start to feel better.  Lifestyle  ?       • ?    Do not drink alcohol.  • Do not use any products that contain nicotine or tobacco, such as cigarettes, e-cigarettes, and chewing tobacco. If you need help quitting, ask your doctor.  • Eat a healthy diet. This includes a lot of vegetables, fruits, whole grains, low-fat dairy products, and low-fat (lean)  protein.  General instructions  ?    • Rest a lot. Sleep for at least 8 hours each night.  • Sleep with your head and neck raised. Put a few pillows under your head or sleep in a reclining chair.  • Return to your normal activities as told by your doctor. Ask your doctor what activities are safe for you.  • Drink enough fluid to keep your pee (urine) pale yellow.  • If your throat is sore, rinse your mouth often with salt water. To make salt water, dissolve ½-1 tsp (3-6 g) of salt in 1 cup (237 mL) of warm water.  • Keep all follow-up visits as told by your doctor. This is important.  How is this prevented?  You can lower your risk of pneumonia by:  • Getting the pneumonia shot (vaccine). These shots have different types and schedules. Ask your doctor what works best for you. Think about getting this shot if:  ? You are older than 65 years of age.  ? You are 19-65 years of age and:  - You are being treated for cancer.  - You have long-term lung disease.  - You have other problems that affect your body's defense system. Ask your doctor if you have one of these.  • Getting your flu shot every year. Ask your doctor which type of shot is best for you.  • Going to the dentist as often as told.  • Washing your hands often with soap and water for at least 20 seconds. If you cannot use soap and water, use hand .  Contact a doctor if:  • You have a fever.  • You lose sleep because your cough medicine does not help.  Get help right away if:  • You are short of breath and this gets worse.  • You have more chest pain.  • Your sickness gets worse. This is very serious if:  ? You are an older adult.  ? Your body's defense system is weak.  • You cough up blood.  These symptoms may be an emergency. Do not wait to see if the symptoms will go away. Get medical help right away. Call your local emergency services (911 in the [place] Do not drive yourself to the hospital.  Summary  • Pneumonia is an infection of the  "lungs.  • Community-acquired pneumonia affects people who have not been in the hospital. Certain germs can cause this infection.  • This condition may be treated with medicines that kill germs.  • For very bad pneumonia, you may need a hospital stay and treatment to help with breathing.  This information is not intended to replace advice given to you by your health care provider. Make sure you discuss any questions you have with your health care provider.  Document Revised: 09/29/2020 Document Reviewed: 09/29/2020  [employer] Patient Education © 2022 Elsevier Inc.           Icon List of Attachments     Attached Information  Diabetes Mellitus and Exercise (English)  Diabetes Mellitus and Exercise  Exercising regularly is important for overall health, especially for people who have diabetes mellitus. Exercising is not only about losing weight. It has many other health benefits, such as increasing muscle strength and bone density and reducing body fat and stress. This leads to improved fitness, flexibility, and endurance, all of which result in better overall health.  What are the benefits of exercise if I have diabetes?  Exercise has many benefits for people with diabetes. They include:  • Helping to lower and control blood sugar (glucose).  • Helping the body to respond better to the hormone insulin by improving insulin sensitivity.  • Reducing how much insulin the body needs.  • Lowering the risk for heart disease by:  ? Lowering \"bad\" cholesterol and triglyceride levels.  ? Increasing \"good\" cholesterol levels.  ? Lowering blood pressure.  ? Lowering blood glucose levels.  What is my activity plan?  ?    Your health care provider or certified diabetes educator can help you make a plan for the type and frequency of exercise that works for you. This is called your activity plan. Be sure to:  • Get at least 150 minutes of medium-intensity or high-intensity exercise each week. Exercises may include brisk walking, biking, " or water aerobics.  • Do stretching and strengthening exercises, such as yoga or weight lifting, at least 2 times a week.  • Spread out your activity over at least 3 days of the week.  • Get some form of physical activity each day.  ? Do not go more than 2 days in a row without some kind of physical activity.  ? Avoid being inactive for more than 90 minutes at a time. Take frequent breaks to walk or stretch.  • Choose exercises or activities that you enjoy. Set realistic goals.  • Start slowly and gradually increase your exercise intensity over time.  How do I manage my diabetes during exercise?  ?    Monitor your blood glucose  • Check your blood glucose before and after exercising. If your blood glucose is:  ? 240 mg/dL (13.3 mmol/L) or higher before you exercise, check your urine for ketones. These are chemicals created by the liver. If you have ketones in your urine, do not exercise until your blood glucose returns to normal.  ? 100 mg/dL (5.6 mmol/L) or lower, eat a snack containing 15-20 grams of carbohydrate. Check your blood glucose 15 minutes after the snack to make sure that your glucose level is above 100 mg/dL (5.6 mmol/L) before you start your exercise.  • Know the symptoms of low blood glucose (hypoglycemia) and how to treat it. Your risk for hypoglycemia increases during and after exercise.  Follow these tips and your health care provider's instructions  • Keep a carbohydrate snack that is fast-acting for use before, during, and after exercise to help prevent or treat hypoglycemia.  • Avoid injecting insulin into areas of the body that are going to be exercised. For example, avoid injecting insulin into:  ? Your arms, when you are about to play tennis.  ? Your legs, when you are about to go jogging.  • Keep records of your exercise habits. Doing this can help you and your health care provider adjust your diabetes management plan as needed. Write down:  ? Food that you eat before and after you  exercise.  ? Blood glucose levels before and after you exercise.  ? The type and amount of exercise you have done.  • Work with your health care provider when you start a new exercise or activity. He or she may need to:  ? Make sure that the activity is safe for you.  ? Adjust your insulin, other medicines, and food that you eat.  • Drink plenty of water while you exercise. This prevents loss of water (dehydration) and problems caused by a lot of heat in the body (heat stroke).  Where to find more information  • American Diabetes Association: www.diabetes.org  Summary  • Exercising regularly is important for overall health, especially for people who have diabetes mellitus.  • Exercising has many health benefits. It increases muscle strength and bone density and reduces body fat and stress. It also lowers and controls blood glucose.  • Your health care provider or certified diabetes educator can help you make an activity plan for the type and frequency of exercise that works for you.  • Work with your health care provider to make sure any new activity is safe for you. Also work with your health care provider to adjust your insulin, other medicines, and the food you eat.  This information is not intended to replace advice given to you by your health care provider. Make sure you discuss any questions you have with your health care provider.  Document Revised: 09/14/2020 Document Reviewed: 09/14/2020  [employer] Patient Education © 2022 [employer]                 Opioid Resource    If you or someone you know needs information on substance abuse, please visit   https://www.findhelpnowky.org/ for listings of facilities and resources across [place].  Stroke Symptoms    • Call 911 or have someone take you to the Emergency Department if you have any of the following:  • Sudden numbness or weakness of your face, arm or leg especially on one side of the body  • Sudden confusion, difficulty speaking or trouble understanding    • Changes in your vision or loss of sight in one eye  • Sudden severe headache with no known cause  • Sudden dizziness, trouble walking, loss of balance or coordination     It is important to seek emergency care right away if you have further stroke symptoms. If you get emergency help quickly, the powerful clot-dissolving medicines can reduce the disabilities caused by a stroke.      For more information:  American Stroke Association  0-043-7-STROKE  www.strokeassociation.org  Smoking Cessation    IF YOU SMOKE OR USE TOBACCO PLEASE READ THE FOLLOWING:  Why is smoking bad for me?  Smoking increases the risk of heart disease, lung disease, vascular disease, stroke, and cancer. If you smoke, STOP!     For more information:  Quit Now [place]  1-800-QUIT-NOW  https://[facility].[employer].org/en-US/  Suicidal Feelings    If you feel like life is too tough and are thinking of suicide or injuring yourself, get help right away!  • Call or text [phone] to speak to someone.     Patient Experience    Thank you for choosing [facility]. You may receive a survey following your visit. Please take a moment to share what went well, where we need improvement, and which staff members deserve recognition. We value your input.           ? ?              YOU ARE THE MOST IMPORTANT FACTOR IN YOUR RECOVERY.      Follow all instructions carefully.      I have reviewed my discharge instructions with my nurse, including the following information, if applicable:                 Information about my illness and diagnosis              Follow up appointments (including lab draws)              Wound Care              Equipment Needs              Medications (new and continuing) along with side effects              Preventative information such as vaccines and smoking cessations              Diet              Pain              I know when to contact my Doctor's office or seek emergency care        I want my nurse to describe the side effects of my  medications: YES NO   If the answer is no, I understand the side effects of my medications: YES NO   My nurse described the side effects of my medications in a way that I could understand: YES NO   I have taken my personal belongings and my own medications with me at discharge: YES NO      I have received this information and my questions have been answered. I have discussed any concerns I see with this plan with the nurse or physician. I understand these instructions.     Signature of Patient or Responsible Person: _____________________________________     Date: _________________  Time: __________________     Signature of Healthcare Provider: _______________________________________  Date: _________________  Time: __________________       Discharge Summary    No notes of this type exist for this encounter.         Discharge Order (From admission, onward)     Start     Ordered    05/13/23 0952  Discharge patient  Once        Expected Discharge Date: 05/13/23    Discharge Disposition: Home or Self Care    Physician of Record for Attribution - Please select from Treatment Team: EDWIGE CASTRO [[id number]]    Review needed by CMO to determine Physician of Record: No       Question Answer Comment   Physician of Record for Attribution - Please select from Treatment Team EDWIGE CASTRO    Review needed by CMO to determine Physician of Record No        05/13/23 0956

## 2023-05-16 NOTE — PROGRESS NOTES
Enter Query Response Below      Query Response:   >>No Exacerbation, chronic only            If applicable, please update the problem list.     Patient: Wilbert Kelley        : 1960  Account: [id number]           Admit Date: 5/10/2023        How to Respond to this query:       a. Click New Note     b. Answer query within the yellow box.                c. Update the Problem List, if applicable.      If you have any questions about this query contact me at: [email or web address]     ,     63 y/o noted with Acute on Chronic Hypoxic Respiratory Failure, Pneumonia, Heart Failure with preserved EF Exacerbation, Atrial Fibrillation and Coronary Artery Disease. Cardiology Consult noted Chronic Heart Failure with preserved EF, heart failure is stable with no evidence of pulmonary edema, pedal edema is chronic and most likely due to amlodipine therapy which has been discontinued recently. Cardiology progress note  noted patient clinically not in CHF, most likely symptoms are related to pneumonia. On admit, proBNP 3126.0. Patient treated with IV Lasix 05/10, oral Bumex, and Lisinopril.     After study, can the Heart Failure with preserved EF be clarified as:     >>Exacerbation   >>No Exacerbation, chronic only    >>Other (please specify):___________  >>Unable to determine     By submitting this query, we are merely seeking further clarification of documentation to accurately reflect all conditions that you are monitoring, evaluating, treating or that extend the hospitalization or utilize additional resources of care. Please utilize your independent clinical judgment when addressing the question(s) above.     This query and your response, once completed, will be entered into the legal medical record.    Sincerely,  Brittnee Brumfield RN CCDS   Clinical Documentation Integrity Program

## 2023-05-16 NOTE — DISCHARGE SUMMARY
[facility]    Patient Identification:  Name:  Wilbert Kelley  Age:  62 y.o.  Sex:  male  :  1960  MRN:  [id number]  Visit Number:  [id number]    Date of Admission: 5/10/2023  Date of Discharge: 2023    PCP: Jose M Simon MD    DISCHARGE DIAGNOSES  #Acute on chronic hypoxic respiratory failure  #Multifocal PNA  #Recent Rhinovirus inf  #HFpEF w/exacerbation  #CAD s/p recent CABG (2023)  #pAF    CONSULTS   Consults     Date and Time Order Name Status Description    2023  8:42 AM Inpatient Pulmonology Consult Completed           PROCEDURES PERFORMED  None    HOSPITAL COURSE  Patient is a 62 yoM with recent CABG 7 weeks prior c/b sternotomy wound inf now resolved presenting with acute hypoxic respiratory failure. Patient noted to have recently been dx with rhinovirus inf as outpatient and had since had 2 ED visits for dyspnea w/o hypoxia at those visits but noted significant crp elevation now improving. Underwent CT chest here with diffuse infiltrates consistent with recent viral inf vs edema. He was diuresed on arrival with IV diuretics, was net negative 2L within 48hrs and O2 requirement did improve to only prn needs. Pulmnology consulted as patient did have infiltrates that were more opacified and bright on imaging that I would expect for edema or consolidation from PNA, had concern for DAH but after more imaging review, rec outpatient repeat HRCT 6-8 weeks once acute viral PNA resolved and patient near euvolemia. He was initially also given CTX/doxycycline but procal noted wnl and monitored 36 hours off abx without fever, leukocytosis, or clinical change and was discharged without abx. Steroids were differed given recent CABG. Patient will follow up as previously scheduled with his cardiologist and CT surgeon as well as outpatient pulmonologist. Will also see pcp within 2 weeks of discharge for close f/u. At time of discharge patient  provided nebulizer, home O2 to be used prn. He can also go back to his once daily bumex now that he is euvolemic.          VITAL SIGNS:        on   ;        Body mass index is 42.07 kg/m².  Wt Readings from Last 3 Encounters:   05/13/23 (!) 145 kg (318 lb 14.4 oz)   05/08/23 (!) 141 kg (310 lb)   05/06/23 (!) 141 kg (310 lb)       PHYSICAL EXAM:  Physical Exam  Vitals and nursing note reviewed.   Constitutional:       General: He is not in acute distress.     Appearance: He is obese.   HENT:      Mouth/Throat:      Mouth: Mucous membranes are dry.      Pharynx: Oropharynx is clear.   Eyes:      General: No scleral icterus.     Extraocular Movements: Extraocular movements intact.   Cardiovascular:      Rate and Rhythm: Normal rate.   Pulmonary:      Effort: Pulmonary effort is normal. No respiratory distress.      Breath sounds: No wheezing.   Abdominal:      Palpations: Abdomen is soft.      Tenderness: There is no abdominal tenderness.   Musculoskeletal:      Right lower leg: Edema present.      Left lower leg: Edema present.   Skin:     General: Skin is warm and dry.      Capillary Refill: Capillary refill takes less than 2 seconds.   Neurological:      Mental Status: He is alert. Mental status is at baseline.      Motor: No weakness.   Psychiatric:         Mood and Affect: Mood normal.         Behavior: Behavior normal.          DISCHARGE DISPOSITION   Stable       Discharge Medications      New Medications      Instructions Start Date   ipratropium-albuterol 0.5-2.5 mg/3 ml nebulizer  Commonly known as: DUO-NEB   3 mL, Nebulization, Every 6 Hours PRN         Changes to Medications      Instructions Start Date   bumetanide 2 MG tablet  Commonly known as: BUMEX  What changed: when to take this   2 mg, Oral, Daily         Continue These Medications      Instructions Start Date   apixaban 5 MG tablet tablet  Commonly known as: ELIQUIS   5 mg, Oral, 2 Times Daily      aspirin 81 MG EC tablet   81 mg, Oral, Daily       atorvastatin 40 MG tablet  Commonly known as: LIPITOR   40 mg, Oral, Daily      fenofibrate 160 MG tablet   160 mg, Oral, Daily      gabapentin 800 MG tablet  Commonly known as: NEURONTIN   2,400 mg, Oral, Nightly      Insulin Regular Human (Conc) 500 UNIT/ML solution pen-injector CONCENTRATED injection  Commonly known as: HumuLIN R   25-50 Units, Subcutaneous, 3 Times Daily Before Meals      lansoprazole 30 MG capsule  Commonly known as: PREVACID   30 mg, Oral, Daily      lisinopril 40 MG tablet  Commonly known as: PRINIVIL,ZESTRIL   40 mg, Oral, Every Morning      lisinopril 40 MG tablet  Commonly known as: PRINIVIL,ZESTRIL   20 mg, Oral, Nightly      metFORMIN 1000 MG tablet  Commonly known as: GLUCOPHAGE   1,000 mg, Oral, 2 Times Daily With Meals      nebivolol 5 MG tablet  Commonly known as: BYSTOLIC   5 mg, Oral, Every 24 Hours Scheduled      ondansetron ODT 4 MG disintegrating tablet  Commonly known as: ZOFRAN-ODT   4 mg, Translingual, Every 8 Hours PRN      potassium chloride 10 MEQ CR tablet  Commonly known as: K-DUR,KLOR-CON   10 mEq, Oral, Daily      tamsulosin 0.4 MG capsule 24 hr capsule  Commonly known as: FLOMAX   0.4 mg, Oral, Daily      TOUJEO MAX SOLOSTAR SC   80 Units, Subcutaneous, 2 Times Daily         Stop These Medications    amoxicillin-clavulanate 875-125 MG per tablet  Commonly known as: AUGMENTIN     doxycycline 100 MG tablet  Commonly known as: VIBRAMYICN            Diet Instructions    Cardiac and Consistent Carb.          Activity Instructions    As tolerated. No restrictions.         Additional Instructions for the Follow-ups that You Need to Schedule     Discharge Follow-up with PCP   As directed       Currently Documented PCP:    Jose M Simon MD    PCP Phone Number:    [phone]     Follow Up Details: Within 2 weeks discharge         Discharge Follow-up with Specified Provider: Cardiology w/Dr. Boyle as previously scheduled; 1 Month   As directed      To: Cardiology w/  Boyle as previously scheduled    Follow Up: 1 Month         Discharge Follow-up with Specified Provider: Pulmonology; 2 Months   As directed      To: Pulmonology    Follow Up: 2 Months    Follow Up Details: repeat HRCT chest for multifocal infiltrates            Contact information for follow-up providers     Yuli Nye APRN Follow up in 2 week(s).    Specialties: Nurse Practitioner, Pulmonary Disease  Why: for evaluation and treatment for MARIAH  Contact information:  [address] [facility]  [address]  [facility] [address]  [phone]             Jose M Simon MD .    Specialty: Geriatric Medicine  Why: Within 2 weeks discharge  Contact information:  [address]  [address] [address]  [facility] [id number] [phone]                   Contact information for after-discharge care     Home Medical Care     [facility] .    Service: Home Rehabilitation  Contact information:  [address]  [facility] [id number] [phone]                              TEST  RESULTS PENDING AT DISCHARGE      I will notify patient via phone-call should above lab work result with any significant abnormal findings     CODE STATUS  Code Status and Medical Interventions:   Ordered at: 05/10/23 1531     Level Of Support Discussed With:    Patient     Code Status (Patient has no pulse and is not breathing):    CPR (Attempt to Resuscitate)     Medical Interventions (Patient has pulse or is breathing):    Full Support       The ASCVD Risk score (Douglas DK, et al., 2019) failed to calculate for the following reasons:    The valid HDL cholesterol range is 20 to 100 mg/dL    The valid total cholesterol range is 130 to 320 mg/dL       Matias Houser MD  [facility]  05/15/23  20:17 EDT    Please note that this discharge summary required more than 30 minutes to complete.

## 2023-05-17 ENCOUNTER — READMISSION MANAGEMENT (OUTPATIENT)
Dept: CALL CENTER | Facility: HOSPITAL | Age: 63
End: 2023-05-17
Payer: MEDICARE

## 2023-05-17 NOTE — OUTREACH NOTE
Medical Week 1 Survey    Flowsheet Row Responses   [facility] patient discharged from? [facility]   Does the patient have one of the following disease processes/diagnoses(primary or secondary)? Other   Week 1 attempt successful? Yes   Call start time 0817   Call end time 0820   Discharge diagnosis Shortness of Breath   Person spoke with today (if not patient) and relationship wife   Meds reviewed with patient/caregiver? Yes   Is the patient having any side effects they believe may be caused by any medication additions or changes? No   Does the patient have all medications ordered at discharge? Yes   Is the patient taking all medications as directed (includes completed medication regime)? Yes   Does the patient have a primary care provider?  Yes   Does the patient have an appointment with their PCP within 7 days of discharge? Yes   Has the patient kept scheduled appointments due by today? N/A   What is the Home health agency?  [facility]    Has home health visited the patient within 72 hours of discharge? Yes   Psychosocial issues? No   Did the patient receive a copy of their discharge instructions? Yes   Nursing interventions Reviewed instructions with patient   What is the patient's perception of their health status since discharge? Improving   Is the patient/caregiver able to teach back signs and symptoms related to disease process for when to call PCP? Yes   Is the patient/caregiver able to teach back signs and symptoms related to disease process for when to call 911? Yes   Is the patient/caregiver able to teach back the hierarchy of who to call/visit for symptoms/problems? PCP, Specialist, Home health nurse, Urgent Care, ED, 911 Yes   Week 1 call completed? Yes   Wrap up additional comments Wife reports Pt is doing better. SHe now is keeping track of wt every am and is aware of when to call the Dr. BRITANY OSBORNE - Registered Nurse

## 2023-06-02 RX ORDER — AMIODARONE HYDROCHLORIDE 200 MG/1
TABLET ORAL
Qty: 30 TABLET | Refills: 2 | Status: SHIPPED | OUTPATIENT
Start: 2023-06-02

## 2023-06-02 RX ORDER — BUMETANIDE 2 MG/1
TABLET ORAL
Qty: 30 TABLET | Refills: 2 | Status: SHIPPED | OUTPATIENT
Start: 2023-06-02

## 2023-06-19 ENCOUNTER — OFFICE VISIT (OUTPATIENT)
Dept: CARDIOLOGY | Facility: CLINIC | Age: 63
End: 2023-06-19
Payer: MEDICARE

## 2023-06-19 VITALS
HEIGHT: 73 IN | SYSTOLIC BLOOD PRESSURE: 124 MMHG | DIASTOLIC BLOOD PRESSURE: 74 MMHG | OXYGEN SATURATION: 95 % | WEIGHT: 315 LBS | HEART RATE: 77 BPM | BODY MASS INDEX: 41.75 KG/M2

## 2023-06-19 DIAGNOSIS — E78.2 MIXED HYPERLIPIDEMIA: ICD-10-CM

## 2023-06-19 DIAGNOSIS — I25.118 CORONARY ARTERY DISEASE OF NATIVE ARTERY OF NATIVE HEART WITH STABLE ANGINA PECTORIS: Primary | ICD-10-CM

## 2023-06-19 DIAGNOSIS — Z95.1 S/P CABG X 3: ICD-10-CM

## 2023-06-19 DIAGNOSIS — I48.91 POSTOPERATIVE ATRIAL FIBRILLATION: ICD-10-CM

## 2023-06-19 DIAGNOSIS — I10 HYPERTENSION, UNSPECIFIED TYPE: ICD-10-CM

## 2023-06-19 DIAGNOSIS — I97.89 POSTOPERATIVE ATRIAL FIBRILLATION: ICD-10-CM

## 2023-06-19 RX ORDER — PANTOPRAZOLE SODIUM 20 MG/1
20 TABLET, DELAYED RELEASE ORAL DAILY
COMMUNITY

## 2023-06-19 NOTE — PROGRESS NOTES
Office Note    Subjective     Wilbert Kelley is a 62 y.o. male who presents to day for follow-up      Active Problems:  Problem List Items Addressed This Visit          Cardiac and Vasculature    Coronary artery disease of native artery of native heart with stable angina pectoris - Primary    S/P CABG x 3 on 3/21/2023    Hypertension    Hyperlipidemia    Postoperative atrial fibrillation       HPI  Patient is a pleasant 62-year-old gentleman patient underwent three-vessel bypass in March 21, 2023.  Patient had postop A-fib.  Currently sinus rhythm.  Patient had to be admitted to the hospital with fluid overload status last month.  Patient is feeling better.  He takes Bumex as needed.  Denies any chest pain.  No palpitation no dizziness lightheadedness or blackouts.    PRIOR MEDS  Current Outpatient Medications on File Prior to Visit   Medication Sig Dispense Refill    apixaban (ELIQUIS) 5 MG tablet tablet Take 1 tablet by mouth 2 (Two) Times a Day. 60 tablet 12    aspirin 81 MG EC tablet Take 1 tablet by mouth Daily. 90 tablet 3    atorvastatin (LIPITOR) 40 MG tablet Take 1 tablet by mouth Daily.      bumetanide (BUMEX) 2 MG tablet TAKE ONE TABLET BY MOUTH DAILY 30 tablet 2    fenofibrate 160 MG tablet Take 1 tablet by mouth Daily.      gabapentin (NEURONTIN) 800 MG tablet Take 3 tablets by mouth Every Night.      Insulin Glargine (TOUJEO MAX SOLOSTAR SC) Inject 80 Units under the skin into the appropriate area as directed 2 (Two) Times a Day.      Insulin Regular Human, Conc, (HumuLIN R) 500 UNIT/ML solution pen-injector CONCENTRATED injection Inject 25-50 Units under the skin into the appropriate area as directed 3 (Three) Times a Day Before Meals.      lisinopril (PRINIVIL,ZESTRIL) 40 MG tablet Take 1 tablet by mouth Every Morning.      lisinopril (PRINIVIL,ZESTRIL) 40 MG tablet Take 20 mg by mouth Every Night.      metFORMIN (GLUCOPHAGE) 1000 MG tablet Take 1 tablet by mouth 2 (Two) Times a Day With Meals.       ondansetron ODT (ZOFRAN-ODT) 4 MG disintegrating tablet Place 1 tablet on the tongue Every 8 (Eight) Hours As Needed for Nausea or Vomiting. 20 tablet 0    pantoprazole (PROTONIX) 20 MG EC tablet Take 1 tablet by mouth Daily.      tamsulosin (FLOMAX) 0.4 MG capsule 24 hr capsule Take 1 capsule by mouth Daily. 30 capsule 0    nebivolol (BYSTOLIC) 5 MG tablet Take 1 tablet by mouth Daily. 30 tablet 5    [DISCONTINUED] amiodarone (PACERONE) 200 MG tablet TAKE ONE TABLET BY MOUTH DAILY 30 tablet 2    [DISCONTINUED] ipratropium-albuterol (DUO-NEB) 0.5-2.5 mg/3 ml nebulizer Take 3 mL by nebulization Every 6 (Six) Hours As Needed for Wheezing or Shortness of Air. (Patient not taking: Reported on 6/19/2023) 18 mL 0    [DISCONTINUED] lansoprazole (PREVACID) 30 MG capsule Take 1 capsule by mouth Daily. (Patient not taking: Reported on 6/19/2023)       No current facility-administered medications on file prior to visit.       ALLERGIES  Morphine and Adhesive tape    HISTORY  Past Medical History:   Diagnosis Date    Arthritis     BPH (benign prostatic hyperplasia)     Cancer     basal and melanoma-  x2 - left side ear and elbow    Constipation     Coronary artery disease     DDD (degenerative disc disease), lumbar     Diabetes mellitus     couple times month check sugar    Frozen shoulder     left    GERD (gastroesophageal reflux disease)     Hyperlipidemia     Hypertension     Limited mobility     left shoulder  - possible frozen shoulder-= please be aware for surgery    Sleep apnea     doesnt use machine    Tinnitus     Wears glasses     small print       Social History     Socioeconomic History    Marital status:     Number of children: 1   Tobacco Use    Smoking status: Never     Passive exposure: Past    Smokeless tobacco: Current     Types: Snuff   Vaping Use    Vaping Use: Never used   Substance and Sexual Activity    Alcohol use: Not Currently    Drug use: Never    Sexual activity: Defer       Family History  "  Problem Relation Age of Onset    COPD Mother     Heart attack Father 45        passed    Heart attack Paternal Grandfather 42        massive heart attack    Heart disease Paternal Grandfather        Review of Systems   Respiratory:  Negative for cough, choking, chest tightness, shortness of breath and stridor.    Cardiovascular:  Positive for leg swelling. Negative for chest pain and palpitations.     Objective     VITALS: /74   Pulse 77   Ht 185.4 cm (73\")   Wt (!) 143 kg (315 lb 3.2 oz)   SpO2 95%   BMI 41.59 kg/m²     LABS:   No results displayed because visit has over 200 results.      Admission on 05/06/2023, Discharged on 05/07/2023   Component Date Value Ref Range Status    QT Interval 05/06/2023 346  ms Final    QTC Interval 05/06/2023 418  ms Final    COVID19 05/06/2023 Not Detected  Not Detected - Ref. Range Final    Influenza A PCR 05/06/2023 Not Detected  Not Detected Final    Influenza B PCR 05/06/2023 Not Detected  Not Detected Final    Glucose 05/06/2023 121 (H)  65 - 99 mg/dL Final    BUN 05/06/2023 14  8 - 23 mg/dL Final    Creatinine 05/06/2023 1.05  0.76 - 1.27 mg/dL Final    Sodium 05/06/2023 141  136 - 145 mmol/L Final    Potassium 05/06/2023 4.0  3.5 - 5.2 mmol/L Final    Chloride 05/06/2023 104  98 - 107 mmol/L Final    CO2 05/06/2023 25.3  22.0 - 29.0 mmol/L Final    Calcium 05/06/2023 9.0  8.6 - 10.5 mg/dL Final    Total Protein 05/06/2023 8.2  6.0 - 8.5 g/dL Final    Albumin 05/06/2023 3.7  3.5 - 5.2 g/dL Final    ALT (SGPT) 05/06/2023 12  1 - 41 U/L Final    AST (SGOT) 05/06/2023 16  1 - 40 U/L Final    Alkaline Phosphatase 05/06/2023 73  39 - 117 U/L Final    Total Bilirubin 05/06/2023 0.8  0.0 - 1.2 mg/dL Final    Globulin 05/06/2023 4.5  gm/dL Final    A/G Ratio 05/06/2023 0.8  g/dL Final    BUN/Creatinine Ratio 05/06/2023 13.3  7.0 - 25.0 Final    Anion Gap 05/06/2023 11.7  5.0 - 15.0 mmol/L Final    eGFR 05/06/2023 80.8  >60.0 mL/min/1.73 Final    Protime 05/06/2023 14.4  " 12.1 - 14.7 Seconds Final    INR 05/06/2023 1.07  0.90 - 1.10 Final    PTT 05/06/2023 40.0 (H)  26.5 - 34.5 seconds Final    Color, UA 05/06/2023 Yellow  Yellow, Straw Final    Appearance, UA 05/06/2023 Clear  Clear Final    pH, UA 05/06/2023 >=9.0 (H)  5.0 - 8.0 Final    Specific Gravity, UA 05/06/2023 1.018  1.005 - 1.030 Final    Glucose, UA 05/06/2023 Negative  Negative Final    Ketones, UA 05/06/2023 Negative  Negative Final    Bilirubin, UA 05/06/2023 Negative  Negative Final    Blood, UA 05/06/2023 Small (1+) (A)  Negative Final    Protein, UA 05/06/2023 30 mg/dL (1+) (A)  Negative Final    Leuk Esterase, UA 05/06/2023 Negative  Negative Final    Nitrite, UA 05/06/2023 Negative  Negative Final    Urobilinogen, UA 05/06/2023 1.0 E.U./dL  0.2 - 1.0 E.U./dL Final    proBNP 05/06/2023 3,018.0 (H)  0.0 - 900.0 pg/mL Final    HS Troponin T 05/06/2023 43 (H)  <15 ng/L Final    Blood Culture 05/06/2023 No growth at 5 days   Final    Blood Culture 05/06/2023 No growth at 5 days   Final    Lactate 05/06/2023 1.4  0.5 - 2.0 mmol/L Final    Procalcitonin 05/06/2023 0.07  0.00 - 0.25 ng/mL Final    Sed Rate 05/06/2023 69 (H)  0 - 20 mm/hr Final    C-Reactive Protein 05/06/2023 15.92 (H)  0.00 - 0.50 mg/dL Final    Magnesium 05/06/2023 1.6  1.6 - 2.4 mg/dL Final    WBC 05/06/2023 15.91 (H)  3.40 - 10.80 10*3/mm3 Final    RBC 05/06/2023 3.75 (L)  4.14 - 5.80 10*6/mm3 Final    Hemoglobin 05/06/2023 9.8 (L)  13.0 - 17.7 g/dL Final    Hematocrit 05/06/2023 31.8 (L)  37.5 - 51.0 % Final    MCV 05/06/2023 84.8  79.0 - 97.0 fL Final    MCH 05/06/2023 26.1 (L)  26.6 - 33.0 pg Final    MCHC 05/06/2023 30.8 (L)  31.5 - 35.7 g/dL Final    RDW 05/06/2023 17.0 (H)  12.3 - 15.4 % Final    RDW-SD 05/06/2023 51.4  37.0 - 54.0 fl Final    MPV 05/06/2023 9.8  6.0 - 12.0 fL Final    Platelets 05/06/2023 410  140 - 450 10*3/mm3 Final    Neutrophil % 05/06/2023 84.8 (H)  42.7 - 76.0 % Final    Lymphocyte % 05/06/2023 5.3 (L)  19.6 - 45.3 %  Final    Monocyte % 05/06/2023 8.0  5.0 - 12.0 % Final    Eosinophil % 05/06/2023 1.1  0.3 - 6.2 % Final    Basophil % 05/06/2023 0.4  0.0 - 1.5 % Final    Immature Grans % 05/06/2023 0.4  0.0 - 0.5 % Final    Neutrophils, Absolute 05/06/2023 13.47 (H)  1.70 - 7.00 10*3/mm3 Final    Lymphocytes, Absolute 05/06/2023 0.84  0.70 - 3.10 10*3/mm3 Final    Monocytes, Absolute 05/06/2023 1.28 (H)  0.10 - 0.90 10*3/mm3 Final    Eosinophils, Absolute 05/06/2023 0.18  0.00 - 0.40 10*3/mm3 Final    Basophils, Absolute 05/06/2023 0.07  0.00 - 0.20 10*3/mm3 Final    Immature Grans, Absolute 05/06/2023 0.07 (H)  0.00 - 0.05 10*3/mm3 Final    nRBC 05/06/2023 0.0  0.0 - 0.2 /100 WBC Final    Extra Tube 05/06/2023 Hold for add-ons.   Final    Auto resulted.    Extra Tube 05/06/2023 hold for add-on   Final    Auto resulted    Extra Tube 05/06/2023 Hold for add-ons.   Final    Auto resulted.    Extra Tube 05/06/2023 Hold for add-ons.   Final    Auto resulted    Site 05/06/2023 Left Radial   Final    Chano's Test 05/06/2023 Positive   Final    pH, Arterial 05/06/2023 7.509 (H)  7.350 - 7.450 pH units Final    83 Value above reference range    pCO2, Arterial 05/06/2023 32.2 (L)  35.0 - 45.0 mm Hg Final    pO2, Arterial 05/06/2023 61.0 (L)  83.0 - 108.0 mm Hg Final    84 Value below reference range    HCO3, Arterial 05/06/2023 25.7  20.0 - 26.0 mmol/L Final    Base Excess, Arterial 05/06/2023 2.8 (H)  0.0 - 2.0 mmol/L Final    O2 Saturation, Arterial 05/06/2023 93.8 (L)  94.0 - 99.0 % Final    84 Value below reference range    Hemoglobin, Blood Gas 05/06/2023 9.9 (L)  14 - 18 g/dL Final    84 Value below reference range    Hematocrit, Blood Gas 05/06/2023 30.3 (L)  38.0 - 51.0 % Final    84 Value below reference range    Oxyhemoglobin 05/06/2023 92.0 (L)  94 - 99 % Final    84 Value below reference range    Methemoglobin 05/06/2023 0.00  0.00 - 3.00 % Final    84 Value below reference range    Carboxyhemoglobin 05/06/2023 2.0  0 - 5 %  Final    A-a DO2 05/06/2023 46.1  0.0 - 300.0 mmHg Final    CO2 Content 05/06/2023 26.6  22 - 33 mmol/L Final    Barometric Pressure for Blood Gas 05/06/2023 730  mmHg Final    Modality 05/06/2023 Room Air   Final    FIO2 05/06/2023 21  % Final    Ventilator Mode 05/06/2023 NA   Final    Notified Who 05/06/2023 Whitney PA   Final    Notified By 05/06/2023 [id number]   Final    Collected by 05/06/2023 [id number]   Final    Meter: [id number]     :  [id number]    RBC, UA 05/06/2023 6-12 (A)  None Seen, 0-2 /HPF Final    WBC, UA 05/06/2023 3-5 (A)  None Seen, 0-2 /HPF Final    Urine culture not indicated.    Bacteria, UA 05/06/2023 None Seen  None Seen /HPF Final    Squamous Epithelial Cells, UA 05/06/2023 None Seen  None Seen, 0-2 /HPF Final    Hyaline Casts, UA 05/06/2023 None Seen  None Seen /LPF Final    Methodology 05/06/2023 Automated Microscopy   Final    HS Troponin T 05/06/2023 48 (H)  <15 ng/L Final    Troponin T Delta 05/06/2023 5 (C)  >=-4 - <+4 ng/L Final    QT Interval 05/07/2023 402  ms Final    QTC Interval 05/07/2023 446  ms Final   Hospital Outpatient Visit on 04/06/2023   Component Date Value Ref Range Status    QT Interval 04/06/2023 448  ms Final    QTC Interval 04/06/2023 504  ms Final   No results displayed because visit has over 200 results.      Pre-Admission Testing on 03/20/2023   Component Date Value Ref Range Status    Glucose 03/20/2023 84  65 - 99 mg/dL Final    BUN 03/20/2023 25 (H)  8 - 23 mg/dL Final    Creatinine 03/20/2023 1.20  0.76 - 1.27 mg/dL Final    Sodium 03/20/2023 140  136 - 145 mmol/L Final    Potassium 03/20/2023 4.2  3.5 - 5.2 mmol/L Final    Chloride 03/20/2023 99  98 - 107 mmol/L Final    CO2 03/20/2023 28.0  22.0 - 29.0 mmol/L Final    Calcium 03/20/2023 9.6  8.6 - 10.5 mg/dL Final    Total Protein 03/20/2023 7.6  6.0 - 8.5 g/dL Final    Albumin 03/20/2023 4.5  3.5 - 5.2 g/dL Final    ALT (SGPT) 03/20/2023 19  1 - 41 U/L Final    AST (SGOT) 03/20/2023 20  1 - 40 U/L  Final    Alkaline Phosphatase 03/20/2023 76  39 - 117 U/L Final    Total Bilirubin 03/20/2023 0.4  0.0 - 1.2 mg/dL Final    Globulin 03/20/2023 3.1  gm/dL Final    Calculated Result    A/G Ratio 03/20/2023 1.5  g/dL Final    BUN/Creatinine Ratio 03/20/2023 20.8  7.0 - 25.0 Final    Anion Gap 03/20/2023 13.0  5.0 - 15.0 mmol/L Final    eGFR 03/20/2023 68.8  >60.0 mL/min/1.73 Final    Magnesium 03/20/2023 1.7  1.6 - 2.4 mg/dL Final    Hemoglobin A1C 03/20/2023 7.70 (H)  4.80 - 5.60 % Final    PTT 03/20/2023 28.7  22.0 - 39.0 seconds Final    Protime 03/20/2023 12.9  11.4 - 14.4 Seconds Final    INR 03/20/2023 0.98  0.84 - 1.13 Final    P2Y12 Reactivity Unit 03/20/2023 155  PRU Final    THC, Screen, Urine 03/20/2023 Negative  Negative Final    Phencyclidine (PCP), Urine 03/20/2023 Negative  Negative Final    Cocaine Screen, Urine 03/20/2023 Negative  Negative Final    Methamphetamine, Ur 03/20/2023 Negative  Negative Final    Opiate Screen 03/20/2023 Negative  Negative Final    Amphetamine Screen, Urine 03/20/2023 Negative  Negative Final    Benzodiazepine Screen, Urine 03/20/2023 Negative  Negative Final    Tricyclic Antidepressants Screen 03/20/2023 Negative  Negative Final    Methadone Screen, Urine 03/20/2023 Negative  Negative Final    Barbiturates Screen, Urine 03/20/2023 Negative  Negative Final    Oxycodone Screen, Urine 03/20/2023 Negative  Negative Final    Propoxyphene Screen 03/20/2023 Negative  Negative Final    Buprenorphine, Screen, Urine 03/20/2023 Negative  Negative Final    ABO Type 03/20/2023 O   Final    RH type 03/20/2023 Positive   Final    Antibody Screen 03/20/2023 Negative   Final    T&S Expiration Date 03/20/2023 3/23/2023 11:59:59 PM   Final    WBC 03/20/2023 8.79  3.40 - 10.80 10*3/mm3 Final    RBC 03/20/2023 5.45  4.14 - 5.80 10*6/mm3 Final    Hemoglobin 03/20/2023 14.4  13.0 - 17.7 g/dL Final    Hematocrit 03/20/2023 45.1  37.5 - 51.0 % Final    MCV 03/20/2023 82.8  79.0 - 97.0 fL Final     MCH 03/20/2023 26.4 (L)  26.6 - 33.0 pg Final    MCHC 03/20/2023 31.9  31.5 - 35.7 g/dL Final    RDW 03/20/2023 16.0 (H)  12.3 - 15.4 % Final    RDW-SD 03/20/2023 48.2  37.0 - 54.0 fl Final    MPV 03/20/2023 9.8  6.0 - 12.0 fL Final    Platelets 03/20/2023 385  140 - 450 10*3/mm3 Final    Neutrophil % 03/20/2023 70.7  42.7 - 76.0 % Final    Lymphocyte % 03/20/2023 17.1 (L)  19.6 - 45.3 % Final    Monocyte % 03/20/2023 7.3  5.0 - 12.0 % Final    Eosinophil % 03/20/2023 4.0  0.3 - 6.2 % Final    Basophil % 03/20/2023 0.6  0.0 - 1.5 % Final    Immature Grans % 03/20/2023 0.3  0.0 - 0.5 % Final    Neutrophils, Absolute 03/20/2023 6.22  1.70 - 7.00 10*3/mm3 Final    Lymphocytes, Absolute 03/20/2023 1.50  0.70 - 3.10 10*3/mm3 Final    Monocytes, Absolute 03/20/2023 0.64  0.10 - 0.90 10*3/mm3 Final    Eosinophils, Absolute 03/20/2023 0.35  0.00 - 0.40 10*3/mm3 Final    Basophils, Absolute 03/20/2023 0.05  0.00 - 0.20 10*3/mm3 Final    Immature Grans, Absolute 03/20/2023 0.03  0.00 - 0.05 10*3/mm3 Final    nRBC 03/20/2023 0.0  0.0 - 0.2 /100 WBC Final    Cotinine 03/20/2023 Positive (A)  Loejjo=843 ng/mL Final    Drug Screen Comment: 03/20/2023 Comment   Final    This analysis is performed by immunoassay. Positive findings are  unconfirmed analytical test results; if results do not support  expected clinical finding, confirmation by an alternate methodology is  recommended. Patient metabolic variables, specific drug chemistry, and  specimen characteristics can affect test outcome.  Technical consultation is available at [email or web address], or call  toll free [phone].         IMAGING:   XR Chest 2 View    Result Date: 4/25/2023  Impression: 1. Stable small left pleural effusion. 2. No pneumothorax. Electronically Signed: Tom Michaudr  4/25/2023 3:25 PM EDT  Workstation ID: [id number]    US Renal Limited    Result Date: 3/24/2023  Impression: No hydronephrosis or acute sonographic abnormality. Electronically Signed:  Tom Banks  3/24/2023 7:55 AM EDT  Workstation ID: [id number]    XR Chest 1 View    Result Date: 5/12/2023  No radiographic evidence of acute cardiac or pulmonary disease.  This report was finalized on 5/12/2023 8:29 AM by Dr. Oliver Renner MD.      XR Chest 1 View    Result Date: 5/10/2023  Likely mild CHF  This report was finalized on 5/10/2023 5:22 PM by Dr. Oliver Renner MD.      XR Chest 1 View    Result Date: 5/6/2023  Moderate pulmonary vascular congestion and small left pleural effusion.  This report was finalized on 5/6/2023 8:47 PM by Alex Pallas, DO.      XR Chest 1 View    Result Date: 3/29/2023  Impression: 1.Continued decreasing patchy left basilar opacities, likely atelectasis. 2.No definite pneumothorax. Electronically Signed: Kirti Cordero  3/29/2023 8:50 AM EDT  Workstation ID: [id number]    XR Chest 1 View    Result Date: 3/28/2023  Impression: Improved aeration and decreased atelectasis in the left lung base. No definite pneumothorax. Electronically Signed: Jung Salazar  3/28/2023 8:39 AM EDT  Workstation ID: [id number]    XR Chest 1 View    Result Date: 3/26/2023  Impression: Removal of medical devices as above. Removal of left thoracostomy tube. Unchanged trace left apical pneumothorax. Linear streaky opacities in the mid and lower left lung likely reflect atelectasis. Electronically Signed: Jung Salazar  3/26/2023 7:57 AM EDT  Workstation ID: [id number]    XR Chest 1 View    Result Date: 3/24/2023  Impression: 1. Stable lines and support tubes. 2. Suspected 10 mm left apical pneumothorax. Left-sided chest tube in stable position. 3. Stable cardiomegaly. 4. Hypoinflated lungs with unchanged bibasilar airspace disease likely atelectasis. Electronically Signed: Tom Banks  3/24/2023 7:24 AM EDT  Workstation ID: [id number]    XR Chest 1 View    Result Date: 3/23/2023  Impression: Interval placement of a nasogastric tube which is noted terminating in the stomach. Remaining support hardware projects  unchanged. Lung volumes are mildly diminished in comparison, with some increased basilar atelectasis on the left. There is no enlarging effusion or distinct pneumothorax. Electronically Signed: Prudencio Mendoza  3/23/2023 8:39 AM EDT  Workstation ID: [id number]    XR Chest 1 View    Result Date: 3/22/2023  Impression: 1. Interval extubation and removal of esophagogastric tube. 2. Right IJ central venous catheter tip at the mid SVC. 3. Small left apical pneumothorax measuring 10 mm. Left chest tube in place. 4. Hypoinflated lungs with bibasilar atelectasis left greater than right, stable. Electronically Signed: Tom Banks  3/22/2023 7:27 AM EDT  Workstation ID: [id number]    XR Chest 1 View    Result Date: 3/21/2023  Impression: Postoperative changes noted from interval median sternotomy. Endotracheal tube terminates in good position above the shelley. A nasogastric tube is noted entering the stomach. Right IJ introducer is present in the SVC. Mediastinal and pleural drains noted  in place. Lung volumes are diminished with associated atelectasis. There is otherwise no focal opacity. There is no distinct pneumothorax. Prominent cardiac and mediastinal contours are noted, likely postoperative. Electronically Signed: Prudencio Mendoza  3/21/2023 12:55 PM EDT  Workstation ID: [id number]    CT Angiogram Chest Pulmonary Embolism    Result Date: 5/10/2023  Impression: 1.  No CTA evidence of pulmonary embolism. 2.  Moderate left pleural effusion with adjacent atelectasis, similar to the prior study. 3.  Interval development of patchy multifocal groundglass and alveolar infiltrates predominately involving the perihilar regions and the right upper lobe.  This could represent hemorrhage, pulmonary edema, or infectious etiologies.  This report was finalized on 5/10/2023 10:34 PM by Robin Fairchild MD.      CT Angiogram Chest Pulmonary Embolism    Result Date: 5/6/2023  1.  Negative for pulmonary embolism. 2.  Postoperative changes from  median sternotomy. No soft tissue gas. No walled off drainable fluid collection evident. Small left pleural effusion.  This report was finalized on 5/6/2023 9:53 PM by Alex Pallas, DO.      US venous doppler lower extremity right (duplex)    Result Date: 3/2/2023  No DVT.  This report was finalized on 3/2/2023 2:16 PM by Dr. Kemar Felix MD.      XR Abdomen KUB    Result Date: 3/22/2023  Impression: 1. Interval placement of nasogastric tube with tip projecting over the expected location of the fundus of the stomach. Electronically Signed: Boni Maher  3/22/2023 9:58 PM EDT  Workstation ID: [id number]    XR Abdomen KUB    Result Date: 3/22/2023  Impression: Nonspecific gas-filled segments of small bowel and colon are seen throughout the abdomen, with appearance favoring ileus over obstruction. There is no overt pneumoperitoneum. Electronically Signed: Prudencio Mendoza  3/22/2023 4:20 PM EDT  Workstation ID: [id number]    XR Chest PA & Lateral    Result Date: 4/6/2023  Impression: 1. Postoperative changes of prior sternotomy. 2. Patchy right perihilar infiltrate. 3. Small left-sided dependent pleural effusion Electronically Signed: Stephen Ochoa  4/6/2023 4:48 PM EDT  Workstation ID: [id number]    Chest X-Ray PA & Lateral    Result Date: 3/21/2023  No acute chest finding. Electronically Signed: Sylvia Duckworth  3/21/2023 12:51 PM EDT  Workstation ID: [id number]    Results for orders placed during the hospital encounter of 02/02/23    Stress Test With Myocardial Perfusion (1 Day)    Interpretation Summary    Left ventricular ejection fraction is normal (Calculated EF = 57%).    Impressions are consistent with an intermediate risk study.    There is no prior study available for comparison.    Findings consistent with a normal ECG stress test.    Basal to mid inferior infarction with basal to mid gibran-infarction ischemia extending also to the inferoseptal walls with also small apical septal ischemia, TID 1.51.     No results found  for this or any previous visit.          EXAM:  Constitutional:       General: Awake.      Appearance: Healthy appearance. Not in distress.   Eyes:      Conjunctiva/sclera: Conjunctivae normal.   HENT:      Head: Normocephalic and atraumatic.      Nose: Nose normal.    Mouth/Throat:      Pharynx: Oropharynx is clear.   Neck:      Thyroid: Thyroid normal.      Vascular: No carotid bruit or JVD.   Pulmonary:      Effort: Pulmonary effort is normal.      Breath sounds: Normal breath sounds.   Chest:      Chest wall: Not tender to palpatation.   Cardiovascular:      PMI at left midclavicular line. Normal rate. Regular rhythm. Normal S1 with normal intensity. Normal S2 with normal intensity.       Murmurs: There is no murmur.      No gallop.  No rub.   Pulses:     Intact distal pulses.   Edema:     Peripheral edema absent.   Abdominal:      General: Bowel sounds are normal. There is no distension.      Palpations: Abdomen is soft. There is no hepatomegaly.      Tenderness: There is no abdominal tenderness.   Musculoskeletal: Normal range of motion.      Cervical back: Normal range of motion. Skin:     General: Skin is warm and dry. There is no cyanosis.      Coloration: Skin is not jaundiced.   Neurological:      Mental Status: Alert and oriented to person, place and time.      Motor: Motor function is intact.      Gait: Gait is intact.   Psychiatric:         Attention and Perception: Attention and perception normal.         Speech: Speech normal.         Behavior: Behavior is cooperative.         Cognition and Memory: Cognition and memory normal.         Judgment: Judgment normal.        Procedure   Procedures       Assessment & Plan     Diagnoses and all orders for this visit:    1. Coronary artery disease of native artery of native heart with stable angina pectoris (Primary)    2. S/P CABG x 3 on 3/21/2023    3. Postoperative atrial fibrillation    4. Hypertension, unspecified type    5. Mixed  hyperlipidemia          PLAN  1 cardiac.  Patient with bypass surgery patient doing well.  Denies any chest pains.  Denies any dyspnea on exertion.  Patient had postop A-fib which is now sinus.  Will DC his amiodarone.  Continue anticoagulation for now.  Rest of the medications to continue.  Continue to use Bumex at a lower dose.  #2 hyperlipidemia.  Current medication to continue  #3 hypertension.  Blood pressure stable.  Initial blood pressure 150/80, post rest systolic pressure 124/74           MEDS ORDERED DURING VISIT:    Medications Discontinued During This Encounter   Medication Reason    ipratropium-albuterol (DUO-NEB) 0.5-2.5 mg/3 ml nebulizer *Therapy completed    lansoprazole (PREVACID) 30 MG capsule Historical Med - Therapy completed    amiodarone (PACERONE) 200 MG tablet *Therapy completed        No orders of the defined types were placed in this encounter.        Follow Up:   Return in about 4 months (around 10/19/2023) for Recheck.    Patient was given instructions and counseling regarding his condition or for health maintenance advice. Please see specific information pulled into the AVS if appropriate.   As always, Jose M Simon MD  I appreciate very much the opportunity to participate in the cardiovascular care of your patients. Please do not hesitate to call me with any questions with regards to Wilbert Kelley evaluation and management.         This document has been electronically signed by Jarod Boyle MD [facility], Interventional Cardiology  June 19, 2023 13:36 EDT    Dictated Utilizing Dragon Dictation: Part of this note may be an electronic transcription/translation of spoken language to printed text using the Dragon Dictation System.

## 2023-07-25 ENCOUNTER — OFFICE VISIT (OUTPATIENT)
Dept: PULMONOLOGY | Facility: CLINIC | Age: 63
End: 2023-07-25
Payer: MEDICARE

## 2023-07-25 VITALS
OXYGEN SATURATION: 96 % | WEIGHT: 314 LBS | DIASTOLIC BLOOD PRESSURE: 98 MMHG | HEART RATE: 111 BPM | SYSTOLIC BLOOD PRESSURE: 156 MMHG | BODY MASS INDEX: 41.62 KG/M2 | HEIGHT: 73 IN | TEMPERATURE: 97.8 F

## 2023-07-25 DIAGNOSIS — G47.34 NOCTURNAL HYPOXIA: ICD-10-CM

## 2023-07-25 DIAGNOSIS — Z87.01 HISTORY OF RECENT PNEUMONIA: ICD-10-CM

## 2023-07-25 DIAGNOSIS — J30.9 ALLERGIC RHINITIS, UNSPECIFIED SEASONALITY, UNSPECIFIED TRIGGER: ICD-10-CM

## 2023-07-25 DIAGNOSIS — G47.33 OSA (OBSTRUCTIVE SLEEP APNEA): Primary | ICD-10-CM

## 2023-07-25 DIAGNOSIS — J96.11 CHRONIC RESPIRATORY FAILURE WITH HYPOXIA: ICD-10-CM

## 2023-07-25 DIAGNOSIS — E66.01 MORBID OBESITY WITH BMI OF 40.0-44.9, ADULT: ICD-10-CM

## 2023-07-25 DIAGNOSIS — Z72.0 SMOKELESS TOBACCO USE: ICD-10-CM

## 2023-07-25 RX ORDER — AMLODIPINE BESYLATE 5 MG/1
TABLET ORAL
COMMUNITY
Start: 2023-06-28

## 2023-07-25 RX ORDER — FEXOFENADINE HCL 60 MG/1
60 TABLET, FILM COATED ORAL DAILY
Qty: 30 TABLET | Refills: 2 | Status: SHIPPED | OUTPATIENT
Start: 2023-07-25

## 2023-07-25 RX ORDER — METOPROLOL TARTRATE 75 MG/1
TABLET, FILM COATED ORAL
COMMUNITY
Start: 2023-05-10

## 2023-07-25 RX ORDER — HYDROCHLOROTHIAZIDE 25 MG/1
TABLET ORAL
COMMUNITY
Start: 2023-07-05

## 2023-07-25 RX ORDER — POTASSIUM CHLORIDE 750 MG/1
TABLET, FILM COATED, EXTENDED RELEASE ORAL
COMMUNITY
Start: 2023-07-21

## 2023-07-25 NOTE — PROGRESS NOTES
Subjective      Chief Complaint  Sleep Apnea and NOCTURNAL HYPOXIA    Subjective      History of Present Illness  Wilbert Kelley is a 62 y.o. male who presents today to [facility] with past medical history of HFpEF, CAD s/p CABG (03/2023), paroxysmal atrial fibrillation, and MARIAH who presents today for Sleep Apnea and NOCTURNAL HYPOXIA. This visit is a initial evaluation  appointment.     Sleep Apnea and NOCTURNAL HYPOXIA:  Patient was recently hospitalized from 05/10/2023 to 05/13/2023 for acute on chronic hypoxic respiratory failure due to multifocal pneumonia. He was also noted to have recently been diagnosed with rhinovirus as an outpatient. His CT imaging revealed moderate left pleural effusion with adjacent atelectasis and interval development of patchy multifocal groundglass and alveolar infiltrates predominately involving the perihilar regions and right upper lobe which could be representative of hemorrhage, pulmonary edema, or infection. The findings were thought to be due to recent viral infection versus pulmonary edema. Bacterial pneumonia was felt to be less likely with normal procalcitonin. He did receive a short course of IV antibiotics with IV Rocephin and Doxycycline initially but this was discontinued. Pulmonologist was consulted and recommended to have repeat HRCT scan in 6-8 weeks once acute viral pneumonia resolved and patient near euvolemia.     Patient states that he feels improved since being discharged from the hospital. He states that over the past few weeks he has been having some congestion. He does report a productive cough with green sputum production. He denies any hemoptysis. He states that he had some antibiotics left over from when he was discharged that he did not complete (Augmentin/doxycycline) that he has started taking again.     He was previously diagnosed with MARIAH over 10 years ago. He received a CPAP device but had an  issue with the Nellix so it was returned. He states that he has been using his wifes CPAP device at night. He states that he has had issues with taking the mask off during the night. He reports that he has been prescribed oxygen to wear at night as his oxygen levels were dropping into the 70/80's when he was sleeping. He reports that he has been having difficulty sleeping due to neuropathy and back pain and averages about 3 hours of sleep per night. He does note that he has significant snoring and often feels fatigued when he wakes up.     He denies any significant smoking history but does report smokeless tobacco use. He does have second hand smoke exposure.       Current Outpatient Medications:     amLODIPine (NORVASC) 5 MG tablet, , Disp: , Rfl:     apixaban (ELIQUIS) 5 MG tablet tablet, Take 1 tablet by mouth 2 (Two) Times a Day., Disp: 60 tablet, Rfl: 12    aspirin 81 MG EC tablet, Take 1 tablet by mouth Daily., Disp: 90 tablet, Rfl: 3    atorvastatin (LIPITOR) 40 MG tablet, Take 1 tablet by mouth Daily., Disp: , Rfl:     bumetanide (BUMEX) 2 MG tablet, TAKE ONE TABLET BY MOUTH DAILY, Disp: 30 tablet, Rfl: 2    fenofibrate 160 MG tablet, Take 1 tablet by mouth Daily., Disp: , Rfl:     gabapentin (NEURONTIN) 800 MG tablet, Take 3 tablets by mouth Every Night., Disp: , Rfl:     hydroCHLOROthiazide (HYDRODIURIL) 25 MG tablet, , Disp: , Rfl:     Insulin Glargine (TOUJEO MAX SOLOSTAR SC), Inject 80 Units under the skin into the appropriate area as directed 2 (Two) Times a Day., Disp: , Rfl:     Insulin Regular Human, Conc, (HumuLIN R) 500 UNIT/ML solution pen-injector CONCENTRATED injection, Inject 25-50 Units under the skin into the appropriate area as directed 3 (Three) Times a Day Before Meals., Disp: , Rfl:     lisinopril (PRINIVIL,ZESTRIL) 40 MG tablet, Take 1 tablet by mouth Every Morning., Disp: , Rfl:     lisinopril (PRINIVIL,ZESTRIL) 40 MG tablet, Take 20 mg by mouth Every Night., Disp: , Rfl:      "metFORMIN (GLUCOPHAGE) 1000 MG tablet, Take 1 tablet by mouth 2 (Two) Times a Day With Meals., Disp: , Rfl:     Metoprolol Tartrate 75 MG tablet, , Disp: , Rfl:     ondansetron ODT (ZOFRAN-ODT) 4 MG disintegrating tablet, Place 1 tablet on the tongue Every 8 (Eight) Hours As Needed for Nausea or Vomiting., Disp: 20 tablet, Rfl: 0    pantoprazole (PROTONIX) 20 MG EC tablet, Take 1 tablet by mouth Daily., Disp: , Rfl:     potassium chloride 10 MEQ CR tablet, , Disp: , Rfl:     tamsulosin (FLOMAX) 0.4 MG capsule 24 hr capsule, Take 1 capsule by mouth Daily., Disp: 30 capsule, Rfl: 0    fexofenadine (Allegra Allergy) 60 MG tablet, Take 1 tablet by mouth Daily., Disp: 30 tablet, Rfl: 2      Allergies   Allergen Reactions    Morphine Nausea And Vomiting     projectile    Adhesive Tape Rash     Pt states sticky tabs cause irritation when left on long      Objective     Objective   Vital Signs:  /98 (BP Location: Left arm, Patient Position: Sitting)   Pulse 111   Temp 97.8 °F (36.6 °C)   Ht 185.4 cm (73\")   Wt (!) 142 kg (314 lb)   SpO2 96%   BMI 41.43 kg/m²   Estimated body mass index is 41.43 kg/m² as calculated from the following:    Height as of this encounter: 185.4 cm (73\").    Weight as of this encounter: 142 kg (314 lb).    Past Medical History:   Diagnosis Date    Arthritis     BPH (benign prostatic hyperplasia)     Cancer     basal and melanoma-  x2 - left side ear and elbow    Constipation     Coronary artery disease     DDD (degenerative disc disease), lumbar     Diabetes mellitus     couple times month check sugar    Frozen shoulder     left    GERD (gastroesophageal reflux disease)     Hyperlipidemia     Hypertension     Limited mobility     left shoulder  - possible frozen shoulder-= please be aware for surgery    Sleep apnea     doesnt use machine    Tinnitus     Wears glasses     small print     Past Surgical History:   Procedure Laterality Date    CARDIAC CATHETERIZATION      CARDIAC " CATHETERIZATION Right 03/16/2023    Procedure: Left Heart Cath;  Surgeon: Jarod Boyle MD;  Location:  [facility];  Service: Cardiovascular;  Laterality: Right;    CATARACT EXTRACTION, BILATERAL Bilateral     COLONOSCOPY      CORONARY ARTERY BYPASS GRAFT N/A 3/21/2023    Procedure: MEDIAN STERNOTOMY, CORONARY ARTERY BYPASS GRAFTING X 3, UTILIZING THE LEFT INTERNAL MAMMARY ARTERY, ENDOSCOPIC VEIN HARVEST OF THE RIGHT GREATER SAPENOUSE VEIN;  Surgeon: Markus Emanuel MD;  Location:  [facility];  Service: Cardiothoracic;  Laterality: N/A;    CORONARY STENT PLACEMENT      x4    ENDOSCOPY      FINGER SURGERY Left     middle finger - left side    SKIN CANCER EXCISION      x2    WISDOM TOOTH EXTRACTION       Social History     Socioeconomic History    Marital status:     Number of children: 1   Tobacco Use    Smoking status: Never     Passive exposure: Past    Smokeless tobacco: Current     Types: Snuff   Vaping Use    Vaping Use: Never used   Substance and Sexual Activity    Alcohol use: Not Currently    Drug use: Never    Sexual activity: Defer     Physical Exam  Constitutional:       General: He is awake.      Appearance: Normal appearance. He is morbidly obese.   HENT:      Head: Normocephalic and atraumatic.      Nose: Nose normal.      Mouth/Throat:      Mouth: Mucous membranes are moist.      Pharynx: Oropharynx is clear.   Eyes:      Conjunctiva/sclera: Conjunctivae normal.      Pupils: Pupils are equal, round, and reactive to light.   Cardiovascular:      Rate and Rhythm: Normal rate and regular rhythm.      Pulses: Normal pulses.      Heart sounds: Normal heart sounds. No murmur heard.    No friction rub. No gallop.   Pulmonary:      Effort: Pulmonary effort is normal. No tachypnea, accessory muscle usage or respiratory distress.      Breath sounds: Normal breath sounds. No decreased breath sounds, wheezing, rhonchi or rales.   Musculoskeletal:         General: Normal range of motion.       Cervical back: Full passive range of motion without pain and normal range of motion.   Skin:     General: Skin is warm and dry.   Neurological:      General: No focal deficit present.      Mental Status: He is alert. Mental status is at baseline.   Psychiatric:         Mood and Affect: Mood normal.         Behavior: Behavior normal. Behavior is cooperative.         Thought Content: Thought content normal.      Result Review :  The following labs and radiology results have been reviewed.    Lab Review:   No results found for: FEV1, FVC, LLL5KDU, TLC, DLCO  No results displayed because visit has over 200 results.      Admission on 05/06/2023, Discharged on 05/07/2023   Component Date Value Ref Range Status    QT Interval 05/06/2023 346  ms Final    QTC Interval 05/06/2023 418  ms Final    COVID19 05/06/2023 Not Detected  Not Detected - Ref. Range Final    Influenza A PCR 05/06/2023 Not Detected  Not Detected Final    Influenza B PCR 05/06/2023 Not Detected  Not Detected Final    Glucose 05/06/2023 121 (H)  65 - 99 mg/dL Final    BUN 05/06/2023 14  8 - 23 mg/dL Final    Creatinine 05/06/2023 1.05  0.76 - 1.27 mg/dL Final    Sodium 05/06/2023 141  136 - 145 mmol/L Final    Potassium 05/06/2023 4.0  3.5 - 5.2 mmol/L Final    Chloride 05/06/2023 104  98 - 107 mmol/L Final    CO2 05/06/2023 25.3  22.0 - 29.0 mmol/L Final    Calcium 05/06/2023 9.0  8.6 - 10.5 mg/dL Final    Total Protein 05/06/2023 8.2  6.0 - 8.5 g/dL Final    Albumin 05/06/2023 3.7  3.5 - 5.2 g/dL Final    ALT (SGPT) 05/06/2023 12  1 - 41 U/L Final    AST (SGOT) 05/06/2023 16  1 - 40 U/L Final    Alkaline Phosphatase 05/06/2023 73  39 - 117 U/L Final    Total Bilirubin 05/06/2023 0.8  0.0 - 1.2 mg/dL Final    Globulin 05/06/2023 4.5  gm/dL Final    A/G Ratio 05/06/2023 0.8  g/dL Final    BUN/Creatinine Ratio 05/06/2023 13.3  7.0 - 25.0 Final    Anion Gap 05/06/2023 11.7  5.0 - 15.0 mmol/L Final    eGFR 05/06/2023 80.8  >60.0 mL/min/1.73 Final     Protime 05/06/2023 14.4  12.1 - 14.7 Seconds Final    INR 05/06/2023 1.07  0.90 - 1.10 Final    PTT 05/06/2023 40.0 (H)  26.5 - 34.5 seconds Final    Color, UA 05/06/2023 Yellow  Yellow, Straw Final    Appearance, UA 05/06/2023 Clear  Clear Final    pH, UA 05/06/2023 >=9.0 (H)  5.0 - 8.0 Final    Specific Gravity, UA 05/06/2023 1.018  1.005 - 1.030 Final    Glucose, UA 05/06/2023 Negative  Negative Final    Ketones, UA 05/06/2023 Negative  Negative Final    Bilirubin, UA 05/06/2023 Negative  Negative Final    Blood, UA 05/06/2023 Small (1+) (A)  Negative Final    Protein, UA 05/06/2023 30 mg/dL (1+) (A)  Negative Final    Leuk Esterase, UA 05/06/2023 Negative  Negative Final    Nitrite, UA 05/06/2023 Negative  Negative Final    Urobilinogen, UA 05/06/2023 1.0 E.U./dL  0.2 - 1.0 E.U./dL Final    proBNP 05/06/2023 3,018.0 (H)  0.0 - 900.0 pg/mL Final    HS Troponin T 05/06/2023 43 (H)  <15 ng/L Final    Blood Culture 05/06/2023 No growth at 5 days   Final    Blood Culture 05/06/2023 No growth at 5 days   Final    Lactate 05/06/2023 1.4  0.5 - 2.0 mmol/L Final    Procalcitonin 05/06/2023 0.07  0.00 - 0.25 ng/mL Final    Sed Rate 05/06/2023 69 (H)  0 - 20 mm/hr Final    C-Reactive Protein 05/06/2023 15.92 (H)  0.00 - 0.50 mg/dL Final    Magnesium 05/06/2023 1.6  1.6 - 2.4 mg/dL Final    WBC 05/06/2023 15.91 (H)  3.40 - 10.80 10*3/mm3 Final    RBC 05/06/2023 3.75 (L)  4.14 - 5.80 10*6/mm3 Final    Hemoglobin 05/06/2023 9.8 (L)  13.0 - 17.7 g/dL Final    Hematocrit 05/06/2023 31.8 (L)  37.5 - 51.0 % Final    MCV 05/06/2023 84.8  79.0 - 97.0 fL Final    MCH 05/06/2023 26.1 (L)  26.6 - 33.0 pg Final    MCHC 05/06/2023 30.8 (L)  31.5 - 35.7 g/dL Final    RDW 05/06/2023 17.0 (H)  12.3 - 15.4 % Final    RDW-SD 05/06/2023 51.4  37.0 - 54.0 fl Final    MPV 05/06/2023 9.8  6.0 - 12.0 fL Final    Platelets 05/06/2023 410  140 - 450 10*3/mm3 Final    Neutrophil % 05/06/2023 84.8 (H)  42.7 - 76.0 % Final    Lymphocyte % 05/06/2023  5.3 (L)  19.6 - 45.3 % Final    Monocyte % 05/06/2023 8.0  5.0 - 12.0 % Final    Eosinophil % 05/06/2023 1.1  0.3 - 6.2 % Final    Basophil % 05/06/2023 0.4  0.0 - 1.5 % Final    Immature Grans % 05/06/2023 0.4  0.0 - 0.5 % Final    Neutrophils, Absolute 05/06/2023 13.47 (H)  1.70 - 7.00 10*3/mm3 Final    Lymphocytes, Absolute 05/06/2023 0.84  0.70 - 3.10 10*3/mm3 Final    Monocytes, Absolute 05/06/2023 1.28 (H)  0.10 - 0.90 10*3/mm3 Final    Eosinophils, Absolute 05/06/2023 0.18  0.00 - 0.40 10*3/mm3 Final    Basophils, Absolute 05/06/2023 0.07  0.00 - 0.20 10*3/mm3 Final    Immature Grans, Absolute 05/06/2023 0.07 (H)  0.00 - 0.05 10*3/mm3 Final    nRBC 05/06/2023 0.0  0.0 - 0.2 /100 WBC Final    Extra Tube 05/06/2023 Hold for add-ons.   Final    Auto resulted.    Extra Tube 05/06/2023 hold for add-on   Final    Auto resulted    Extra Tube 05/06/2023 Hold for add-ons.   Final    Auto resulted.    Extra Tube 05/06/2023 Hold for add-ons.   Final    Auto resulted    Site 05/06/2023 Left Radial   Final    Chano's Test 05/06/2023 Positive   Final    pH, Arterial 05/06/2023 7.509 (H)  7.350 - 7.450 pH units Final    83 Value above reference range    pCO2, Arterial 05/06/2023 32.2 (L)  35.0 - 45.0 mm Hg Final    pO2, Arterial 05/06/2023 61.0 (L)  83.0 - 108.0 mm Hg Final    84 Value below reference range    HCO3, Arterial 05/06/2023 25.7  20.0 - 26.0 mmol/L Final    Base Excess, Arterial 05/06/2023 2.8 (H)  0.0 - 2.0 mmol/L Final    O2 Saturation, Arterial 05/06/2023 93.8 (L)  94.0 - 99.0 % Final    84 Value below reference range    Hemoglobin, Blood Gas 05/06/2023 9.9 (L)  14 - 18 g/dL Final    84 Value below reference range    Hematocrit, Blood Gas 05/06/2023 30.3 (L)  38.0 - 51.0 % Final    84 Value below reference range    Oxyhemoglobin 05/06/2023 92.0 (L)  94 - 99 % Final    84 Value below reference range    Methemoglobin 05/06/2023 0.00  0.00 - 3.00 % Final    84 Value below reference range    Carboxyhemoglobin  05/06/2023 2.0  0 - 5 % Final    A-a DO2 05/06/2023 46.1  0.0 - 300.0 mmHg Final    CO2 Content 05/06/2023 26.6  22 - 33 mmol/L Final    Barometric Pressure for Blood Gas 05/06/2023 730  mmHg Final    Modality 05/06/2023 Room Air   Final    FIO2 05/06/2023 21  % Final    Ventilator Mode 05/06/2023 NA   Final    Notified Who 05/06/2023 Whitney PA   Final    Notified By 05/06/2023 [id number]   Final    Collected by 05/06/2023 [id number]   Final    Meter: [id number]     :  [id number]    RBC, UA 05/06/2023 6-12 (A)  None Seen, 0-2 /HPF Final    WBC, UA 05/06/2023 3-5 (A)  None Seen, 0-2 /HPF Final    Urine culture not indicated.    Bacteria, UA 05/06/2023 None Seen  None Seen /HPF Final    Squamous Epithelial Cells, UA 05/06/2023 None Seen  None Seen, 0-2 /HPF Final    Hyaline Casts, UA 05/06/2023 None Seen  None Seen /LPF Final    Methodology 05/06/2023 Automated Microscopy   Final    HS Troponin T 05/06/2023 48 (H)  <15 ng/L Final    Troponin T Delta 05/06/2023 5 (C)  >=-4 - <+4 ng/L Final    QT Interval 05/07/2023 402  ms Final    QTC Interval 05/07/2023 446  ms Final         Reviewed CT PE protocol from 05/10/2023  Narrative & Impression   Contrast-enhanced CTA chest     Indications: Shortness of breath     Technique: Contrast-enhanced CTA images were obtained.  Limited exposure  control, adjustment of the MA and/or KV according to patient size or use  of an iterative reconstruction technique was utilized.   Three-dimensional reconstructions were done.     Findings CTA chest:     Comparison is made to prior study dated 5/6/2023.     There is no evidence of filling defect within the main or lobar  pulmonary arteries to suggest pulmonary embolism.     The heart is not enlarged.  There is no pericardial effusion.  The  thoracic aorta is normal in course and caliber.     There is an enlarged right paratracheal lymph node measuring 1.3 cm.   There is an enlarged right paratracheal/right paraesophageal lymph  node  measuring 1.1 cm.  There is an enlarged right hilar lymph node measuring  1.1 cm.  There is a more inferior large right hilar lymph node measuring  1.1 cm.  An enlarged subcarinal lymph node measures 1.4 cm.  An AP  window lymph node measures 1.3 cm.     Limited images of the upper abdomen are normal.     There is a moderate left pleural effusion with adjacent compressive  atelectasis.  There are multifocal bilateral infiltrates predominantly  involving the perihilar regions and right upper lobe.  The infiltrates  are new since the prior study.  Left pleural effusion is stable.     The osseous structures are normal.     IMPRESSION:  Impression:  1.  No CTA evidence of pulmonary embolism.  2.  Moderate left pleural effusion with adjacent atelectasis, similar to  the prior study.  3.  Interval development of patchy multifocal groundglass and alveolar  infiltrates predominately involving the perihilar regions and the right  upper lobe.  This could represent hemorrhage, pulmonary edema, or  infectious etiologies.     This report was finalized on 5/10/2023 10:34 PM by Robin Fairchild MD.        Reviewed chest XR from 05/12/2023    Narrative & Impression   XR CHEST 1 VW-     CLINICAL INDICATION: followup of pneumonia; I50.9-Heart failure,  unspecified; Y19-Wdjxqth effusion, not elsewhere classified;  J18.9-Pneumonia, unspecified organism; J96.01-Acute respiratory failure  with hypoxia        COMPARISON: 05/10/2023      TECHNIQUE: Single frontal view of the chest.     FINDINGS:      LUNGS: Lungs are adequately aerated.      HEART AND MEDIASTINUM: Heart and mediastinal contours are unremarkable        SKELETON: Bony and soft tissue structures are unremarkable.           IMPRESSION:  No radiographic evidence of acute cardiac or pulmonary disease.     This report was finalized on 5/12/2023 8:29 AM by Dr. Oliver Renner MD.        Reviewed previous spirometry from 03/20/2023      Assessment / Plan         Assessment    Diagnoses and all orders for this visit:    1. MARIAH (obstructive sleep apnea) (Primary)  2. Nocturnal hypoxia  3. Morbid obesity with BMI of 40.0-44.9, adult  Previously diagnosed with MARIAH and prescribed CPAP >10 years ago. Had issues with DME company and reports that he returned the machine.   Has nocturnal oxygen contents to use at night as he reported his saturations would drop into 70/80's while sleeping, but states that he has been using his wifes CPAP so has not been using.   Will order home sleep study to re qualify for PAP therapy.     -     Home Sleep Study; Future    Management obstructive sleep apnea also includes lifestyle modifications including weight loss, avoidance of alcohol, sedated, caffeine especially before bedtime, allowing adequate sleep time, and body position during sleep such as side versus back. 10% weight loss can result in a 50% improvement of the apnea-hypopnea index. Untreated sleep apnea can lead to cardiovascular/metabolic disorder, neurocognitive deficit, daytime sleepiness, motor vehicle accidents, depression, mood disorders and reduced quality of life.  Patient understands the risk of untreated obstructive sleep apnea and benefit benefits of the treatment. Recommended at least 4 hours of use per night for 70% of every month (approximately 21 out of 30 days) per CMS guidelines.      4. History of recent pneumonia  Recently hospitalized from 05/10/2023 to 05/13/2023 for acute on chronic hypoxic respiratory failure due to multifocal pneumonia and HFpEF with exacerbation.   CT PE protocol revealed moderate left pleural effusion with adjacent atelectasis and interval development of groundglass and alveolar infiltrates predominantly involving perihilar regions and right upper lobe which could represent hemorrhage, edema, or infection.  Imaging was thought to be consistent with recent viral infection (diagnosed with rhinovirus in outpatient setting prior to admission) and less likely  bacterial due to negative procalcitonin (received short course of IV antibiotics and prescribed oral antibiotics on discharge). Was also diuresed per cardiology due to concerns for pulmonary edema and pleural effusion. Concerns for DAH as well (patient denied hemoptysis).   Will order repeat HRCT with supine and prone positions as recommended.     -     CT Chest Hi Resolution Diagnostic; Future    5. Chronic respiratory failure with hypoxia  Prescribed oxygen therapy due to worsening shortness of breath prior to hospitalization and felt to be due to multifocal pneumonia and acute HFpEF.   Continue to use supplemental oxygen as needed.     6. Smokeless tobacco use  Encouraged cessation.     7. Allergic rhinitis, unspecified seasonality, unspecified trigger  Used Flonase in the past but did not feel that it was effective.   Will start on Allegra daily.     -     fexofenadine (Allegra Allergy) 60 MG tablet; Take 1 tablet by mouth Daily.  Dispense: 30 tablet; Refill: 2    I spent 45 minutes caring for Wilbert on this date of service. This time includes time spent by me in the following activities:preparing for the visit, reviewing tests, obtaining and/or reviewing a separately obtained history, performing a medically appropriate examination and/or evaluation , counseling and educating the patient/family/caregiver, ordering medications, tests, or procedures, referring and communicating with other health care professionals , documenting information in the medical record, independently interpreting results and communicating that information with the patient/family/caregiver, and care coordination    Follow Up   Return in about 3 months (around 10/25/2023), or if symptoms worsen or fail to improve, for Next scheduled follow up.    Patient was given instructions and counseling regarding his condition or for health maintenance advice. Please see specific information pulled into the AVS if appropriate.       This document has  been electronically signed by Nicole Birch PA-C   July 26, 2023 13:52 EDT    Dictated Utilizing Dragon Dictation: Part of this note may be an electronic transcription/translation of spoken language to printed text using the Dragon Dictation System.

## 2023-08-25 ENCOUNTER — TELEPHONE (OUTPATIENT)
Dept: PULMONOLOGY | Facility: CLINIC | Age: 63
End: 2023-08-25
Payer: MEDICARE

## 2023-08-25 DIAGNOSIS — G47.34 NOCTURNAL HYPOXIA: ICD-10-CM

## 2023-08-25 DIAGNOSIS — G47.33 OSA (OBSTRUCTIVE SLEEP APNEA): Primary | ICD-10-CM

## 2023-08-25 NOTE — TELEPHONE ENCOUNTER
Notified patient of home sleep study results. Sleep study revealed moderate MARIAH with AHI 20. Will send AutoPAP order to [facility] [place]. Patient currently wears nocturnal oxygen and HST revealed hypoxia for approximately 1 hour so will order overnight pulse oximetry to evaluate if oxygen needs bled into device.

## 2023-09-18 DIAGNOSIS — I50.33 ACUTE ON CHRONIC HEART FAILURE WITH PRESERVED EJECTION FRACTION (HFPEF): ICD-10-CM

## 2023-09-18 DIAGNOSIS — I10 HYPERTENSION, UNSPECIFIED TYPE: Primary | ICD-10-CM

## 2023-09-18 RX ORDER — NEBIVOLOL 5 MG/1
5 TABLET ORAL DAILY
Qty: 90 TABLET | Refills: 3 | Status: SHIPPED | OUTPATIENT
Start: 2023-09-18

## 2023-09-18 RX ORDER — BUMETANIDE 2 MG/1
2 TABLET ORAL DAILY
Qty: 30 TABLET | Refills: 2 | Status: SHIPPED | OUTPATIENT
Start: 2023-09-18

## 2023-09-25 ENCOUNTER — TELEPHONE (OUTPATIENT)
Dept: PULMONOLOGY | Facility: CLINIC | Age: 63
End: 2023-09-25

## 2023-09-25 DIAGNOSIS — G47.34 NOCTURNAL HYPOXIA: Primary | ICD-10-CM

## 2023-09-25 NOTE — TELEPHONE ENCOUNTER
Attempted to contact patient to discuss overnight pulse oximetry results but call went to voicemail. Left message requesting call back.     Results revealed episodes of hypoxia while using the AutoPAP device. Will send order to [facility] to have supplemental oxygen bled into his PAP device.

## 2023-10-12 ENCOUNTER — TELEPHONE (OUTPATIENT)
Dept: HOSPITALIST | Facility: HOSPITAL | Age: 63
End: 2023-10-12
Payer: MEDICARE

## 2023-10-12 NOTE — TELEPHONE ENCOUNTER
Tried to call pt back to let him know I re sent orders to [facility],       Hub can relay.

## 2023-10-12 NOTE — TELEPHONE ENCOUNTER
Hub staff attempted to follow warm transfer process and was unsuccessful     Caller: Wilbert Kelley    Relationship to patient: Self    Best call back number: [phone]    Patient is needing: PATIENT CALLED REGARDING THE ORDERS FOR HIS OXYGEN TO GO WITH HIS CPAP MACHINE. AN ORDER WAS PLACED ON 9/25/23 TO [facility]cc[facility], BUT THEY STATED THEY HAVE NOT RECEIVED THE ORDER. THE PATIENT CALLED ON 9/28/23 AND A TE WAS SENT REGARDING THIS ORDER. HOWEVER THE PATIENT DID NOT RECEIVE A CALL BACK. HE WOULD LIKE SOMEONE TO DISCUSS THIS ORDER WITH HIM. OKAY TO LEAVE INFORMATION ON HIS VOICEMAIL.

## 2023-10-17 ENCOUNTER — TELEPHONE (OUTPATIENT)
Dept: PULMONOLOGY | Facility: CLINIC | Age: 63
End: 2023-10-17

## 2023-10-17 NOTE — TELEPHONE ENCOUNTER
Caller: Wilbert Kelley    Relationship to patient: Self    Best call back number: [phone]    Patient is needing:  STAT[facility] NEVER RECEIVED AN ORDER FOR HIS OXYGEN AND CPAP. FAX #: [phone]

## 2023-10-24 ENCOUNTER — OFFICE VISIT (OUTPATIENT)
Dept: CARDIOLOGY | Facility: CLINIC | Age: 63
End: 2023-10-24
Payer: MEDICARE

## 2023-10-24 VITALS
WEIGHT: 315 LBS | BODY MASS INDEX: 41.75 KG/M2 | HEART RATE: 84 BPM | HEIGHT: 73 IN | OXYGEN SATURATION: 96 % | DIASTOLIC BLOOD PRESSURE: 77 MMHG | SYSTOLIC BLOOD PRESSURE: 149 MMHG

## 2023-10-24 DIAGNOSIS — I15.0 RENOVASCULAR HYPERTENSION: ICD-10-CM

## 2023-10-24 DIAGNOSIS — Z95.1 S/P CABG X 3: Primary | ICD-10-CM

## 2023-10-24 DIAGNOSIS — G47.33 OBSTRUCTIVE SLEEP APNEA SYNDROME: ICD-10-CM

## 2023-10-24 DIAGNOSIS — E78.2 MIXED HYPERLIPIDEMIA: ICD-10-CM

## 2023-10-24 DIAGNOSIS — I48.0 PAROXYSMAL ATRIAL FIBRILLATION: ICD-10-CM

## 2023-10-24 PROCEDURE — 3078F DIAST BP <80 MM HG: CPT | Performed by: INTERNAL MEDICINE

## 2023-10-24 PROCEDURE — 99212 OFFICE O/P EST SF 10 MIN: CPT | Performed by: INTERNAL MEDICINE

## 2023-10-24 PROCEDURE — 3077F SYST BP >= 140 MM HG: CPT | Performed by: INTERNAL MEDICINE

## 2023-10-24 RX ORDER — NEBIVOLOL 10 MG/1
10 TABLET ORAL DAILY
Qty: 30 TABLET | Refills: 12 | Status: SHIPPED | OUTPATIENT
Start: 2023-10-24

## 2023-10-24 NOTE — PROGRESS NOTES
Office Note    Subjective     Wilbert Kelley is a 63 y.o. male who presents to day for 4-month follow-up      Active Problems:  Problem List Items Addressed This Visit          Cardiac and Vasculature    S/P CABG x 3 on 3/21/2023 - Primary    Hypertension    Relevant Medications    nebivolol (Bystolic) 10 MG tablet    Hyperlipidemia    Paroxysmal atrial fibrillation    Relevant Medications    nebivolol (Bystolic) 10 MG tablet       Sleep    Sleep apnea    Overview     doesnt use machine            HPI  Patient is a 63-year-old gentleman with past medical history significant for three-vessel bypass on March 21, 2023.  Patient is here for follow-up.  Patient has been doing well.  Patient denies any chest pains.  Symptom has lower extremity edema.  Blood pressure is stable sometimes he cuts back on his lisinopril if blood pressure is low.  Has had no palpitation denies any blackouts.    PRIOR MEDS  Current Outpatient Medications on File Prior to Visit   Medication Sig Dispense Refill    apixaban (ELIQUIS) 5 MG tablet tablet Take 1 tablet by mouth 2 (Two) Times a Day. 60 tablet 12    aspirin 81 MG EC tablet Take 1 tablet by mouth Daily. 90 tablet 3    atorvastatin (LIPITOR) 40 MG tablet Take 1 tablet by mouth Daily.      bumetanide (BUMEX) 2 MG tablet Take 1 tablet by mouth Daily. 30 tablet 2    fenofibrate 160 MG tablet Take 1 tablet by mouth Daily.      fexofenadine (Allegra Allergy) 60 MG tablet Take 1 tablet by mouth Daily. 30 tablet 2    gabapentin (NEURONTIN) 800 MG tablet Take 3 tablets by mouth Every Night.      hydroCHLOROthiazide (HYDRODIURIL) 25 MG tablet       Insulin Glargine (TOUJEO MAX SOLOSTAR SC) Inject 80 Units under the skin into the appropriate area as directed 2 (Two) Times a Day.      Insulin Regular Human, Conc, (HumuLIN R) 500 UNIT/ML solution pen-injector CONCENTRATED injection Inject 25-50 Units under the skin into the appropriate area as directed 3 (Three) Times a Day Before Meals.       lisinopril (PRINIVIL,ZESTRIL) 40 MG tablet Take 1 tablet by mouth Every Morning.      lisinopril (PRINIVIL,ZESTRIL) 40 MG tablet Take 0.5 tablets by mouth Every Night.      metFORMIN (GLUCOPHAGE) 1000 MG tablet Take 1 tablet by mouth 2 (Two) Times a Day With Meals.      ondansetron ODT (ZOFRAN-ODT) 4 MG disintegrating tablet Place 1 tablet on the tongue Every 8 (Eight) Hours As Needed for Nausea or Vomiting. 20 tablet 0    pantoprazole (PROTONIX) 20 MG EC tablet Take 1 tablet by mouth Daily.      potassium chloride 10 MEQ CR tablet       tamsulosin (FLOMAX) 0.4 MG capsule 24 hr capsule Take 1 capsule by mouth Daily. 30 capsule 0    [DISCONTINUED] amLODIPine (NORVASC) 5 MG tablet       [DISCONTINUED] nebivolol (BYSTOLIC) 5 MG tablet Take 1 tablet by mouth Daily. 90 tablet 3     No current facility-administered medications on file prior to visit.       ALLERGIES  Morphine and Adhesive tape    HISTORY  Past Medical History:   Diagnosis Date    Arthritis     BPH (benign prostatic hyperplasia)     Cancer     basal and melanoma-  x2 - left side ear and elbow    Constipation     Coronary artery disease     DDD (degenerative disc disease), lumbar     Diabetes mellitus     couple times month check sugar    Frozen shoulder     left    GERD (gastroesophageal reflux disease)     Hyperlipidemia     Hypertension     Limited mobility     left shoulder  - possible frozen shoulder-= please be aware for surgery    Sleep apnea     doesnt use machine    Tinnitus     Wears glasses     small print       Social History     Socioeconomic History    Marital status:     Number of children: 1   Tobacco Use    Smoking status: Never     Passive exposure: Past    Smokeless tobacco: Current     Types: Snuff   Vaping Use    Vaping Use: Never used   Substance and Sexual Activity    Alcohol use: Not Currently    Drug use: Never    Sexual activity: Defer       Family History   Problem Relation Age of Onset    COPD Mother     Heart attack  "Father 45        passed    Heart attack Paternal Grandfather 42        massive heart attack    Heart disease Paternal Grandfather        Review of Systems   Cardiovascular:  Positive for leg swelling. Negative for chest pain and palpitations.   Neurological:  Negative for dizziness, seizures, syncope and light-headedness.       Objective     VITALS: /77 (BP Location: Right arm, Patient Position: Sitting, Cuff Size: Large Adult)   Pulse 84   Ht 185.4 cm (73\")   Wt (!) 143 kg (316 lb 3.2 oz)   SpO2 96%   BMI 41.72 kg/m²     LABS:   No visits with results within 3 Month(s) from this visit.   Latest known visit with results is:   No results displayed because visit has over 200 results.            IMAGING:   CT chest high resolution    Result Date: 9/11/2023  1. No evidence of diffuse interstitial lung disease. 2. No evidence of active pneumonia. This study was performed using dose reduction techniques to achieve radiation exposure as low as reasonably achievable (ALARA).    Results for orders placed during the hospital encounter of 02/02/23    Stress Test With Myocardial Perfusion (1 Day)    Interpretation Summary    Left ventricular ejection fraction is normal (Calculated EF = 57%).    Impressions are consistent with an intermediate risk study.    There is no prior study available for comparison.    Findings consistent with a normal ECG stress test.    Basal to mid inferior infarction with basal to mid gibran-infarction ischemia extending also to the inferoseptal walls with also small apical septal ischemia, TID 1.51.     No results found for this or any previous visit.          EXAM:  Constitutional:       General: Awake.      Appearance: Healthy appearance. Not in distress.   Eyes:      Conjunctiva/sclera: Conjunctivae normal.   HENT:      Head: Normocephalic and atraumatic.      Nose: Nose normal.    Mouth/Throat:      Pharynx: Oropharynx is clear.   Neck:      Thyroid: Thyroid normal.      Vascular: No " carotid bruit or JVD.   Pulmonary:      Effort: Pulmonary effort is normal.      Breath sounds: Normal breath sounds.   Chest:      Chest wall: Not tender to palpatation.   Cardiovascular:      PMI at left midclavicular line. Normal rate. Regular rhythm. Normal S1 with normal intensity. Normal S2 with normal intensity.       Murmurs: There is no murmur.      No gallop.  No rub.   Pulses:     Intact distal pulses.   Edema:     Peripheral edema absent.   Abdominal:      General: Bowel sounds are normal. There is no distension.      Palpations: Abdomen is soft. There is no hepatomegaly.      Tenderness: There is no abdominal tenderness.   Musculoskeletal: Normal range of motion.      Cervical back: Normal range of motion. Skin:     General: Skin is warm and dry. There is no cyanosis.      Coloration: Skin is not jaundiced.   Neurological:      Mental Status: Alert and oriented to person, place and time.      Motor: Motor function is intact.      Gait: Gait is intact.   Psychiatric:         Attention and Perception: Attention and perception normal.         Speech: Speech normal.         Behavior: Behavior is cooperative.         Cognition and Memory: Cognition and memory normal.         Judgment: Judgment normal.          Procedure   Procedures       Assessment & Plan     Diagnoses and all orders for this visit:    1. S/P CABG x 3 on 3/21/2023 (Primary)    2. Renovascular hypertension    3. Mixed hyperlipidemia    4. Obstructive sleep apnea syndrome    5. Paroxysmal atrial fibrillation    Other orders  -     nebivolol (Bystolic) 10 MG tablet; Take 1 tablet by mouth Daily.  Dispense: 30 tablet; Refill: 12          PLAN  1 cardiac.  Patient with history of coronary artery disease with three-vessel bypass in March 21, 2023.  Patient stable.  We will continue to follow closely and modify risk factors for heart disease  #2 hypertension.  Patient blood pressure is running on the high side.  Will take him off amlodipine and go  up on the Bystolic.  Patient does have lower extremity edema also.  Will advise stockings to wear on lower extremities also.  We will have him check pressures at home for a week and call us back.  #3 A-fib.  Patient on anticoagulation.  Also on beta-blockers.  Will increase the dose for better blood pressure control and better rate control also.  #4 hyperlipidemia.  Continue lipid-lowering medication for now           MEDS ORDERED DURING VISIT:    Medications Discontinued During This Encounter   Medication Reason    amLODIPine (NORVASC) 5 MG tablet *Therapy completed    nebivolol (BYSTOLIC) 5 MG tablet *Therapy completed        New Medications Ordered This Visit   Medications    nebivolol (Bystolic) 10 MG tablet     Sig: Take 1 tablet by mouth Daily.     Dispense:  30 tablet     Refill:  12         Follow Up:   Return in about 6 months (around 4/24/2024) for Recheck.    Patient was given instructions and counseling regarding his condition or for health maintenance advice. Please see specific information pulled into the AVS if appropriate.   As always, Jose M Simon MD  I appreciate very much the opportunity to participate in the cardiovascular care of your patients. Please do not hesitate to call me with any questions with regards to Wilbert Kelley evaluation and management.         This document has been electronically signed by Jarod Boyle MD [facility], Interventional Cardiology  October 24, 2023 15:51 EDT    Dictated Utilizing Dragon Dictation: Part of this note may be an electronic transcription/translation of spoken language to printed text using the Dragon Dictation System.

## 2023-10-25 ENCOUNTER — TELEPHONE (OUTPATIENT)
Dept: CARDIOLOGY | Facility: CLINIC | Age: 63
End: 2023-10-25
Payer: MEDICARE

## 2023-10-25 ENCOUNTER — OFFICE VISIT (OUTPATIENT)
Dept: PULMONOLOGY | Facility: CLINIC | Age: 63
End: 2023-10-25
Payer: MEDICARE

## 2023-10-25 VITALS
HEIGHT: 73 IN | SYSTOLIC BLOOD PRESSURE: 138 MMHG | BODY MASS INDEX: 41.75 KG/M2 | DIASTOLIC BLOOD PRESSURE: 58 MMHG | HEART RATE: 79 BPM | TEMPERATURE: 97.2 F | OXYGEN SATURATION: 97 % | WEIGHT: 315 LBS

## 2023-10-25 DIAGNOSIS — J30.9 ALLERGIC RHINITIS, UNSPECIFIED SEASONALITY, UNSPECIFIED TRIGGER: ICD-10-CM

## 2023-10-25 DIAGNOSIS — G47.33 OSA (OBSTRUCTIVE SLEEP APNEA): Primary | ICD-10-CM

## 2023-10-25 DIAGNOSIS — G47.34 NOCTURNAL HYPOXIA: ICD-10-CM

## 2023-10-25 DIAGNOSIS — E66.01 MORBID OBESITY WITH BMI OF 40.0-44.9, ADULT: ICD-10-CM

## 2023-10-25 DIAGNOSIS — Z87.01 HISTORY OF RECENT PNEUMONIA: ICD-10-CM

## 2023-10-25 DIAGNOSIS — J96.11 CHRONIC RESPIRATORY FAILURE WITH HYPOXIA: ICD-10-CM

## 2023-10-25 PROCEDURE — 1159F MED LIST DOCD IN RCRD: CPT | Performed by: PHYSICIAN ASSISTANT

## 2023-10-25 PROCEDURE — 3075F SYST BP GE 130 - 139MM HG: CPT | Performed by: PHYSICIAN ASSISTANT

## 2023-10-25 PROCEDURE — 1160F RVW MEDS BY RX/DR IN RCRD: CPT | Performed by: PHYSICIAN ASSISTANT

## 2023-10-25 PROCEDURE — 99214 OFFICE O/P EST MOD 30 MIN: CPT | Performed by: PHYSICIAN ASSISTANT

## 2023-10-25 PROCEDURE — 3078F DIAST BP <80 MM HG: CPT | Performed by: PHYSICIAN ASSISTANT

## 2023-10-25 NOTE — PROGRESS NOTES
Subjective      Chief Complaint  Sleep Apnea    Subjective      History of Present Illness  Wilbert Kelley is a 63 y.o. male who presents today to [facility] with past medical history of HFpEF, CAD s/p CABG (03/2023), paroxysmal atrial fibrillation, and MARIAH who presents today for Sleep Apnea. This visit is a initial evaluation  appointment.     Sleep Apnea:  Patient was previously hospitalized from 05/10/2023 to 05/13/2023 for acute on chronic hypoxic respiratory failure due to multifocal pneumonia. He was also noted to have recently been diagnosed with rhinovirus as an outpatient. His CT imaging revealed moderate left pleural effusion with adjacent atelectasis and interval development of patchy multifocal groundglass and alveolar infiltrates predominately involving the perihilar regions and right upper lobe which could be representative of hemorrhage, pulmonary edema, or infection. The findings were thought to be due to recent viral infection versus pulmonary edema. Bacterial pneumonia was felt to be less likely with normal procalcitonin. He did receive a short course of IV antibiotics with IV Rocephin and Doxycycline initially but this was discontinued. Pulmonologist was consulted and recommended to have repeat HRCT scan in 6-8 weeks once acute viral pneumonia resolved and patient near euvolemia.     Interval history:  Patient reports that he has been doing well. He states that his breathing is currently at baseline. He received his AutoPAP device and has been using it at night. He has the nasal facial mask and doesn't have any issues tolerating it. He reports that he occasionally removes the mask unintentionally when he's sleeping. Otherwise, he states that he benefits from using it. He has supplemental oxygen bled into the device.     He was prescribed Allegra during his previous visit. He states this didn't help with his symptoms. He reports that he has  still been having issues with congestion. He has tried Flonase and saline nasal spray in the past but these weren't effective.       Current Outpatient Medications:     apixaban (ELIQUIS) 5 MG tablet tablet, Take 1 tablet by mouth 2 (Two) Times a Day., Disp: 60 tablet, Rfl: 12    aspirin 81 MG EC tablet, Take 1 tablet by mouth Daily., Disp: 90 tablet, Rfl: 3    atorvastatin (LIPITOR) 40 MG tablet, Take 1 tablet by mouth Daily., Disp: , Rfl:     bumetanide (BUMEX) 2 MG tablet, Take 1 tablet by mouth Daily., Disp: 30 tablet, Rfl: 2    fenofibrate 160 MG tablet, Take 1 tablet by mouth Daily., Disp: , Rfl:     fexofenadine (Allegra Allergy) 60 MG tablet, Take 1 tablet by mouth Daily., Disp: 30 tablet, Rfl: 2    gabapentin (NEURONTIN) 800 MG tablet, Take 3 tablets by mouth Every Night., Disp: , Rfl:     hydroCHLOROthiazide (HYDRODIURIL) 25 MG tablet, , Disp: , Rfl:     Insulin Glargine (TOUJEO MAX SOLOSTAR SC), Inject 80 Units under the skin into the appropriate area as directed 2 (Two) Times a Day., Disp: , Rfl:     Insulin Regular Human, Conc, (HumuLIN R) 500 UNIT/ML solution pen-injector CONCENTRATED injection, Inject 25-50 Units under the skin into the appropriate area as directed 3 (Three) Times a Day Before Meals., Disp: , Rfl:     lisinopril (PRINIVIL,ZESTRIL) 40 MG tablet, Take 1 tablet by mouth Every Morning., Disp: , Rfl:     lisinopril (PRINIVIL,ZESTRIL) 40 MG tablet, Take 0.5 tablets by mouth Every Night., Disp: , Rfl:     metFORMIN (GLUCOPHAGE) 1000 MG tablet, Take 1 tablet by mouth 2 (Two) Times a Day With Meals., Disp: , Rfl:     nebivolol (Bystolic) 10 MG tablet, Take 1 tablet by mouth Daily., Disp: 30 tablet, Rfl: 12    ondansetron ODT (ZOFRAN-ODT) 4 MG disintegrating tablet, Place 1 tablet on the tongue Every 8 (Eight) Hours As Needed for Nausea or Vomiting., Disp: 20 tablet, Rfl: 0    pantoprazole (PROTONIX) 20 MG EC tablet, Take 1 tablet by mouth Daily., Disp: , Rfl:     potassium chloride 10 MEQ CR  "tablet, , Disp: , Rfl:     tamsulosin (FLOMAX) 0.4 MG capsule 24 hr capsule, Take 1 capsule by mouth Daily., Disp: 30 capsule, Rfl: 0      Allergies   Allergen Reactions    Morphine Nausea And Vomiting     projectile    Adhesive Tape Rash     Pt states sticky tabs cause irritation when left on long      Objective     Objective   Vital Signs:  /58   Pulse 79   Temp 97.2 °F (36.2 °C)   Ht 185.4 cm (72.99\")   Wt (!) 143 kg (316 lb)   SpO2 97%   BMI 41.70 kg/m²   Estimated body mass index is 41.7 kg/m² as calculated from the following:    Height as of this encounter: 185.4 cm (72.99\").    Weight as of this encounter: 143 kg (316 lb).    Past Medical History:   Diagnosis Date    Arthritis     BPH (benign prostatic hyperplasia)     Cancer     basal and melanoma-  x2 - left side ear and elbow    Constipation     Coronary artery disease     DDD (degenerative disc disease), lumbar     Diabetes mellitus     couple times month check sugar    Frozen shoulder     left    GERD (gastroesophageal reflux disease)     Hyperlipidemia     Hypertension     Limited mobility     left shoulder  - possible frozen shoulder-= please be aware for surgery    Sleep apnea     doesnt use machine    Tinnitus     Wears glasses     small print     Past Surgical History:   Procedure Laterality Date    CARDIAC CATHETERIZATION      CARDIAC CATHETERIZATION Right 03/16/2023    Procedure: Left Heart Cath;  Surgeon: Jarod Boyle MD;  Location:  [facility];  Service: Cardiovascular;  Laterality: Right;    CATARACT EXTRACTION, BILATERAL Bilateral     COLONOSCOPY      CORONARY ARTERY BYPASS GRAFT N/A 3/21/2023    Procedure: MEDIAN STERNOTOMY, CORONARY ARTERY BYPASS GRAFTING X 3, UTILIZING THE LEFT INTERNAL MAMMARY ARTERY, ENDOSCOPIC VEIN HARVEST OF THE RIGHT GREATER SAPENOUSE VEIN;  Surgeon: Markus Emanuel MD;  Location: [facility] OR;  Service: Cardiothoracic;  Laterality: N/A;    CORONARY STENT PLACEMENT      x4    ENDOSCOPY  " "    FINGER SURGERY Left     middle finger - left side    SKIN CANCER EXCISION      x2    WISDOM TOOTH EXTRACTION       Social History     Socioeconomic History    Marital status:     Number of children: 1   Tobacco Use    Smoking status: Never     Passive exposure: Past    Smokeless tobacco: Current     Types: Snuff   Vaping Use    Vaping Use: Never used   Substance and Sexual Activity    Alcohol use: Not Currently    Drug use: Never    Sexual activity: Defer     Physical Exam  Constitutional:       General: He is awake.      Appearance: Normal appearance. He is morbidly obese.   HENT:      Head: Normocephalic and atraumatic.      Nose: Nose normal.      Mouth/Throat:      Mouth: Mucous membranes are moist.      Pharynx: Oropharynx is clear.   Eyes:      Conjunctiva/sclera: Conjunctivae normal.      Pupils: Pupils are equal, round, and reactive to light.   Cardiovascular:      Rate and Rhythm: Normal rate and regular rhythm.      Pulses: Normal pulses.      Heart sounds: Normal heart sounds. No murmur heard.     No friction rub. No gallop.   Pulmonary:      Effort: Pulmonary effort is normal. No tachypnea, accessory muscle usage or respiratory distress.      Breath sounds: Normal breath sounds. No decreased breath sounds, wheezing, rhonchi or rales.   Musculoskeletal:         General: Normal range of motion.      Cervical back: Full passive range of motion without pain and normal range of motion.   Skin:     General: Skin is warm and dry.   Neurological:      General: No focal deficit present.      Mental Status: He is alert. Mental status is at baseline.   Psychiatric:         Mood and Affect: Mood normal.         Behavior: Behavior normal. Behavior is cooperative.         Thought Content: Thought content normal.        Result Review :  The following labs and radiology results have been reviewed.    Lab Review:   No results found for: \"FEV1\", \"FVC\", \"VRH4POE\", \"TLC\", \"DLCO\"  No visits with results within 3 " Month(s) from this visit.   Latest known visit with results is:   No results displayed because visit has over 200 results.         Reviewed HRCT imaging report from 09/11/2023       [facility]  Outside Information  Results  CT chest high resolution (Order [id number])      suggestion  Information displayed in this report may not trend or trigger automated decision support.     CT chest high resolution  Order: [id number]  Impression    1. No evidence of diffuse interstitial lung disease.  2. No evidence of active pneumonia.      This study was performed using dose reduction techniques to achieve  radiation exposure as low as reasonably achievable (ALARA).  Narrative    CT CHEST WITHOUT CONTRAST, HIGH-RESOLUTION PROTOCOL.    HISTORY:  History of pneumonia, shortness of breath.    TECHNIQUE: Thin section axial CT using high resolution technique.  Routine imaging was also supplemented with the high-resolution images.    FINDINGS:  No acute lung disease is present. No bronchiectasis is  present.There is no diffuse interstitial change to indicate interstitial  lung disease.Minimal linear scarring is scattered within the left lung.  There is evidence of old calcified granulomatous disease.    No pleural or pericardial effusion is seen. No obvious adenopathy or  mass lesion is present. Patient is status post CABG.  Exam End: 09/11/23 16:09    Specimen Collected: 09/11/23 16:56 Last Resulted: 09/11/23 21:20   Received From: [employer] [address]  Result Received: 10/24/23 15:09    View Encounter        Received Information        Reviewed CT PE protocol from 05/10/2023  Narrative & Impression   Contrast-enhanced CTA chest     Indications: Shortness of breath     Technique: Contrast-enhanced CTA images were obtained.  Limited exposure  control, adjustment of the MA and/or KV according to patient size or use  of an iterative reconstruction technique was utilized.   Three-dimensional reconstructions were  done.     Findings CTA chest:     Comparison is made to prior study dated 5/6/2023.     There is no evidence of filling defect within the main or lobar  pulmonary arteries to suggest pulmonary embolism.     The heart is not enlarged.  There is no pericardial effusion.  The  thoracic aorta is normal in course and caliber.     There is an enlarged right paratracheal lymph node measuring 1.3 cm.   There is an enlarged right paratracheal/right paraesophageal lymph node  measuring 1.1 cm.  There is an enlarged right hilar lymph node measuring  1.1 cm.  There is a more inferior large right hilar lymph node measuring  1.1 cm.  An enlarged subcarinal lymph node measures 1.4 cm.  An AP  window lymph node measures 1.3 cm.     Limited images of the upper abdomen are normal.     There is a moderate left pleural effusion with adjacent compressive  atelectasis.  There are multifocal bilateral infiltrates predominantly  involving the perihilar regions and right upper lobe.  The infiltrates  are new since the prior study.  Left pleural effusion is stable.     The osseous structures are normal.     IMPRESSION:  Impression:  1.  No CTA evidence of pulmonary embolism.  2.  Moderate left pleural effusion with adjacent atelectasis, similar to  the prior study.  3.  Interval development of patchy multifocal groundglass and alveolar  infiltrates predominately involving the perihilar regions and the right  upper lobe.  This could represent hemorrhage, pulmonary edema, or  infectious etiologies.     This report was finalized on 5/10/2023 10:34 PM by Robin Fairchild MD.        Reviewed previous spirometry from 03/20/2023      Reviewed home sleep study from 08/22/2023    Reviewed overnight pulse oximetry results from 09/19/2023    Assessment / Plan         Assessment   Diagnoses and all orders for this visit:    1. MARIAH (obstructive sleep apnea) (Primary)  2. Nocturnal hypoxia  3. Morbid obesity with BMI of 40.0-44.9, adult  Home sleep study  revealed moderate MARIAH with AHI 20.   Overnight pulse oximetry revealed hypoxia with lowest saturation of 82% and saturations remaining </=88% for 4 minutes 24 seconds  Reports compliance with using AutoPAP device and tolerates well. Notes benefit from use.   Has 2 L supplemental oxygen bled into PAP device.     Management obstructive sleep apnea also includes lifestyle modifications including weight loss, avoidance of alcohol, sedated, caffeine especially before bedtime, allowing adequate sleep time, and body position during sleep such as side versus back. 10% weight loss can result in a 50% improvement of the apnea-hypopnea index. Untreated sleep apnea can lead to cardiovascular/metabolic disorder, neurocognitive deficit, daytime sleepiness, motor vehicle accidents, depression, mood disorders and reduced quality of life.  Patient understands the risk of untreated obstructive sleep apnea and benefit benefits of the treatment. Recommended at least 4 hours of use per night for 70% of every month (approximately 21 out of 30 days) per CMS guidelines.      4. History of recent pneumonia  Recently hospitalized from 05/10/2023 to 05/13/2023 for acute on chronic hypoxic respiratory failure due to multifocal pneumonia and HFpEF with exacerbation.   CT PE protocol during hospitalization revealed moderate left pleural effusion with adjacent atelectasis and interval development of groundglass and alveolar infiltrates predominantly involving perihilar regions and right upper lobe which could represent hemorrhage, edema, or infection.  Pulmonology was consulted while hospitalized and provided additional input. Imaging was thought to be consistent with recent viral infection (diagnosed with rhinovirus in outpatient setting prior to admission) and less likely bacterial due to negative procalcitonin (received short course of IV antibiotics and prescribed oral antibiotics on discharge). Was also diuresed per cardiology due to  concerns for pulmonary edema and pleural effusion. Concerns for DAH as well (patient denied hemoptysis).   Follow-up HRCT completed at [facility] which revealed minimal linear scarring of left lung but otherwise unremarkable.     5. Chronic respiratory failure with hypoxia  Prescribed oxygen therapy prior to hospitalization due to worsening shortness of breath and felt to be due to multifocal pneumonia and acute HFpEF.   Continue to use supplemental oxygen as needed (doesn't require frequent use).   Tolerating room air during exam with saturations 97%.     6. Allergic rhinitis, unspecified seasonality, unspecified trigger  Complains of persistent congestion that alternates nostrils.   Tried and failed Flonase, saline nasal spray, and antihistamine.   Offered to refer to ENT for evaluation of possible nasal polyps but patient declined at this time.     I spent 30 minutes caring for Wilbert on this date of service. This time includes time spent by me in the following activities:preparing for the visit, reviewing tests, obtaining and/or reviewing a separately obtained history, performing a medically appropriate examination and/or evaluation , counseling and educating the patient/family/caregiver, ordering medications, tests, or procedures, referring and communicating with other health care professionals , documenting information in the medical record, independently interpreting results and communicating that information with the patient/family/caregiver, and care coordination    Follow Up   Return in about 6 months (around 4/25/2024), or if symptoms worsen or fail to improve, for Next scheduled follow up.    Patient was given instructions and counseling regarding his condition or for health maintenance advice. Please see specific information pulled into the AVS if appropriate.       This document has been electronically signed by Nicole Birch PA-C   October 25, 2023 14:44 EDT    Dictated Utilizing Dragon  Dictation: Part of this note may be an electronic transcription/translation of spoken language to printed text using the Dragon Dictation System.

## 2023-10-25 NOTE — TELEPHONE ENCOUNTER
Patient called in with questions regarding his medication changes. Spoke to dr Boyle to confirm update and relayed information to patient. He will keep home log of his blood pressure readings and call us in one week per Dr Boyle's instructions.

## 2023-11-02 ENCOUNTER — TELEPHONE (OUTPATIENT)
Dept: CARDIOLOGY | Facility: CLINIC | Age: 63
End: 2023-11-02
Payer: MEDICARE

## 2023-11-02 RX ORDER — LISINOPRIL 40 MG/1
40 TABLET ORAL 2 TIMES DAILY
Qty: 60 TABLET | Refills: 11 | Status: SHIPPED | OUTPATIENT
Start: 2023-11-02

## 2023-11-02 NOTE — TELEPHONE ENCOUNTER
Romank to Dr. Boyle concerning the patients BP log, and he said to have the patient take Lisinopril 40mg BID, script was sent to patient pharmacy.

## 2023-11-20 DIAGNOSIS — I48.0 PAROXYSMAL ATRIAL FIBRILLATION: Primary | ICD-10-CM

## 2024-01-15 RX ORDER — BUMETANIDE 2 MG/1
2 TABLET ORAL DAILY
Qty: 30 TABLET | Refills: 2 | Status: SHIPPED | OUTPATIENT
Start: 2024-01-15

## 2024-01-24 DIAGNOSIS — I48.0 PAROXYSMAL ATRIAL FIBRILLATION: ICD-10-CM

## 2024-02-27 DIAGNOSIS — I48.0 PAROXYSMAL ATRIAL FIBRILLATION: ICD-10-CM

## 2024-03-25 RX ORDER — ASPIRIN 81 MG/1
81 TABLET ORAL DAILY
Qty: 90 TABLET | Refills: 3 | OUTPATIENT
Start: 2024-03-25

## 2024-04-01 DIAGNOSIS — I48.0 PAROXYSMAL ATRIAL FIBRILLATION: ICD-10-CM

## 2024-04-15 RX ORDER — BUMETANIDE 2 MG/1
2 TABLET ORAL DAILY
Qty: 30 TABLET | Refills: 2 | Status: SHIPPED | OUTPATIENT
Start: 2024-04-15

## 2024-04-29 DIAGNOSIS — I48.0 PAROXYSMAL ATRIAL FIBRILLATION: ICD-10-CM

## 2024-06-03 ENCOUNTER — OFFICE VISIT (OUTPATIENT)
Dept: CARDIOLOGY | Facility: CLINIC | Age: 64
End: 2024-06-03
Payer: MEDICARE

## 2024-06-03 VITALS
OXYGEN SATURATION: 95 % | SYSTOLIC BLOOD PRESSURE: 160 MMHG | DIASTOLIC BLOOD PRESSURE: 82 MMHG | WEIGHT: 314 LBS | HEIGHT: 73 IN | BODY MASS INDEX: 41.62 KG/M2 | HEART RATE: 80 BPM

## 2024-06-03 DIAGNOSIS — Z95.1 S/P CABG X 3: ICD-10-CM

## 2024-06-03 DIAGNOSIS — I48.0 PAROXYSMAL ATRIAL FIBRILLATION: ICD-10-CM

## 2024-06-03 DIAGNOSIS — E78.2 MIXED HYPERLIPIDEMIA: ICD-10-CM

## 2024-06-03 DIAGNOSIS — I25.118 CORONARY ARTERY DISEASE OF NATIVE ARTERY OF NATIVE HEART WITH STABLE ANGINA PECTORIS: Primary | ICD-10-CM

## 2024-06-03 DIAGNOSIS — I15.9 SECONDARY HYPERTENSION: ICD-10-CM

## 2024-06-03 PROCEDURE — 1160F RVW MEDS BY RX/DR IN RCRD: CPT | Performed by: INTERNAL MEDICINE

## 2024-06-03 PROCEDURE — 3079F DIAST BP 80-89 MM HG: CPT | Performed by: INTERNAL MEDICINE

## 2024-06-03 PROCEDURE — 3077F SYST BP >= 140 MM HG: CPT | Performed by: INTERNAL MEDICINE

## 2024-06-03 PROCEDURE — 1159F MED LIST DOCD IN RCRD: CPT | Performed by: INTERNAL MEDICINE

## 2024-06-03 PROCEDURE — 99214 OFFICE O/P EST MOD 30 MIN: CPT | Performed by: INTERNAL MEDICINE

## 2024-06-03 RX ORDER — SEMAGLUTIDE 1.34 MG/ML
0.25 INJECTION, SOLUTION SUBCUTANEOUS WEEKLY
COMMUNITY

## 2024-06-03 NOTE — PROGRESS NOTES
Office Note    Subjective     Wilbert Kelley is a 63 y.o. male who presents to day for follow-up      Active Problems:  Problem List Items Addressed This Visit          Cardiac and Vasculature    Coronary artery disease of native artery of native heart with stable angina pectoris - Primary    S/P CABG x 3 on 3/21/2023    Hypertension    Hyperlipidemia    Paroxysmal atrial fibrillation       HPI  Patient is a pleasant 63-year-old gentleman with past medical history significant for coronary artery disease status post three-vessel bypass in March 21, 2023.  Patient has been doing well.  Denies any chest pains.  Nuys any additional exertion or proximal nocturnal dyspnea.  Denies any palpitation denies any blackouts.  No lower extremity edema.      PRIOR MEDS  Current Outpatient Medications on File Prior to Visit   Medication Sig Dispense Refill    apixaban (ELIQUIS) 5 MG tablet tablet Take 1 tablet by mouth 2 (Two) Times a Day. 56 tablet 0    aspirin 81 MG EC tablet Take 1 tablet by mouth Daily. 90 tablet 3    atorvastatin (LIPITOR) 40 MG tablet Take 1 tablet by mouth Daily.      bumetanide (BUMEX) 2 MG tablet TAKE 1 TABLET BY MOUTH DAILY 30 tablet 2    fenofibrate 160 MG tablet Take 1 tablet by mouth Daily.      gabapentin (NEURONTIN) 800 MG tablet Take 3 tablets by mouth Every Night.      insulin aspart (novoLOG FLEXPEN) 100 UNIT/ML solution pen-injector sc pen Inject 60 Units under the skin into the appropriate area as directed 3 (Three) Times a Day With Meals.      lisinopril (PRINIVIL,ZESTRIL) 40 MG tablet Take 1 tablet by mouth 2 (Two) Times a Day. 60 tablet 11    metFORMIN (GLUCOPHAGE) 1000 MG tablet Take 1 tablet by mouth 2 (Two) Times a Day With Meals.      nebivolol (Bystolic) 10 MG tablet Take 1 tablet by mouth Daily. 30 tablet 12    pantoprazole (PROTONIX) 20 MG EC tablet Take 1 tablet by mouth Daily.      potassium chloride 10 MEQ CR tablet       Semaglutide,0.25 or 0.5MG/DOS, (Ozempic, 0.25 or 0.5  MG/DOSE,) 2 MG/1.5ML solution pen-injector Inject 0.25 mg under the skin into the appropriate area as directed 1 (One) Time Per Week.      tamsulosin (FLOMAX) 0.4 MG capsule 24 hr capsule Take 1 capsule by mouth Daily. 30 capsule 0    Insulin Glargine (TOUJEO MAX SOLOSTAR SC) Inject 80 Units under the skin into the appropriate area as directed 2 (Two) Times a Day. (Patient not taking: Reported on 6/3/2024)      Insulin Regular Human, Conc, (HumuLIN R) 500 UNIT/ML solution pen-injector CONCENTRATED injection Inject 25-50 Units under the skin into the appropriate area as directed 3 (Three) Times a Day Before Meals. (Patient not taking: Reported on 6/3/2024)      [DISCONTINUED] fexofenadine (Allegra Allergy) 60 MG tablet Take 1 tablet by mouth Daily. (Patient not taking: Reported on 6/3/2024) 30 tablet 2    [DISCONTINUED] hydroCHLOROthiazide (HYDRODIURIL) 25 MG tablet  (Patient not taking: Reported on 6/3/2024)      [DISCONTINUED] ondansetron ODT (ZOFRAN-ODT) 4 MG disintegrating tablet Place 1 tablet on the tongue Every 8 (Eight) Hours As Needed for Nausea or Vomiting. (Patient not taking: Reported on 6/3/2024) 20 tablet 0     No current facility-administered medications on file prior to visit.       ALLERGIES  Morphine and Adhesive tape    HISTORY  Past Medical History:   Diagnosis Date    Arthritis     BPH (benign prostatic hyperplasia)     Cancer     basal and melanoma-  x2 - left side ear and elbow    Constipation     Coronary artery disease     DDD (degenerative disc disease), lumbar     Diabetes mellitus     couple times month check sugar    Frozen shoulder     left    GERD (gastroesophageal reflux disease)     Hyperlipidemia     Hypertension     Limited mobility     left shoulder  - possible frozen shoulder-= please be aware for surgery    Sleep apnea     doesnt use machine    Tinnitus     Wears glasses     small print       Social History     Socioeconomic History    Marital status:     Number of  "children: 1   Tobacco Use    Smoking status: Never     Passive exposure: Past    Smokeless tobacco: Current     Types: Snuff   Vaping Use    Vaping status: Never Used   Substance and Sexual Activity    Alcohol use: Not Currently    Drug use: Never    Sexual activity: Defer       Family History   Problem Relation Age of Onset    COPD Mother     Heart attack Father 45        passed    Heart attack Paternal Grandfather 42        massive heart attack    Heart disease Paternal Grandfather        Review of Systems   Respiratory:  Negative for chest tightness and shortness of breath.    Cardiovascular:  Negative for chest pain, palpitations and leg swelling.   Neurological:  Negative for seizures, syncope, speech difficulty and headaches.       Objective     VITALS: /82 (BP Location: Right arm, Patient Position: Sitting, Cuff Size: Large Adult)   Pulse 80   Ht 185.4 cm (73\")   Wt (!) 142 kg (314 lb)   SpO2 95%   BMI 41.43 kg/m²     LABS:   No visits with results within 3 Month(s) from this visit.   Latest known visit with results is:   No results displayed because visit has over 200 results.            IMAGING:   No Images in the past 120 days found..  Results for orders placed during the hospital encounter of 02/02/23    Stress Test With Myocardial Perfusion (1 Day)    Interpretation Summary    Left ventricular ejection fraction is normal (Calculated EF = 57%).    Impressions are consistent with an intermediate risk study.    There is no prior study available for comparison.    Findings consistent with a normal ECG stress test.    Basal to mid inferior infarction with basal to mid gibran-infarction ischemia extending also to the inferoseptal walls with also small apical septal ischemia, TID 1.51.     No results found for this or any previous visit.          EXAM:  Constitutional:       General: Awake.      Appearance: Healthy appearance. Not in distress.   Eyes:      Conjunctiva/sclera: Conjunctivae normal. "   HENT:      Head: Normocephalic and atraumatic.      Nose: Nose normal.    Mouth/Throat:      Pharynx: Oropharynx is clear.   Neck:      Thyroid: Thyroid normal.      Vascular: No carotid bruit or JVD.   Pulmonary:      Effort: Pulmonary effort is normal.      Breath sounds: Normal breath sounds.   Chest:      Chest wall: Not tender to palpatation.   Cardiovascular:      PMI at left midclavicular line. Normal rate. Regular rhythm. Normal S1 with normal intensity. Normal S2 with normal intensity.       Murmurs: There is no murmur.      No gallop.  No rub.   Pulses:     Intact distal pulses.   Edema:     Peripheral edema present.     Feet: 1+ edema of the left foot and 2+ edema of the right foot.  Abdominal:      General: Bowel sounds are normal. There is no distension.      Palpations: Abdomen is soft. There is no hepatomegaly.      Tenderness: There is no abdominal tenderness.   Musculoskeletal: Normal range of motion.      Cervical back: Normal range of motion. Skin:     General: Skin is warm and dry. There is no cyanosis.      Coloration: Skin is not jaundiced.   Neurological:      Mental Status: Alert and oriented to person, place and time.      Motor: Motor function is intact.      Gait: Gait is intact.   Psychiatric:         Attention and Perception: Attention and perception normal.         Speech: Speech normal.         Behavior: Behavior is cooperative.         Cognition and Memory: Cognition and memory normal.         Judgment: Judgment normal.          Procedure   Procedures       Assessment & Plan     Diagnoses and all orders for this visit:    1. Coronary artery disease of native artery of native heart with stable angina pectoris (Primary)    2. S/P CABG x 3 on 3/21/2023    3. Secondary hypertension    4. Mixed hyperlipidemia    5. Paroxysmal atrial fibrillation          PLAN  #1 cardiac.  Patient with history of coronary disease with bypass surgery in the past.  He is doing well.  Will continue to  modify risk factors for heart disease  #2 hypertension.  Patient blood pressure was initially high when he came to clinic but repeat check was normal.  Will continue current therapy  #3 hyperlipidemia.  Will need to continue lipid-lowering medication  #4 paroxysmal atrial fibrillation.  Continue Eliquis.  Rate controlled.           MEDS ORDERED DURING VISIT:    Medications Discontinued During This Encounter   Medication Reason    fexofenadine (Allegra Allergy) 60 MG tablet *Therapy completed    hydroCHLOROthiazide (HYDRODIURIL) 25 MG tablet *Therapy completed    ondansetron ODT (ZOFRAN-ODT) 4 MG disintegrating tablet *Therapy completed        No orders of the defined types were placed in this encounter.        Follow Up:   Return in about 6 months (around 12/3/2024) for Recheck.    Patient was given instructions and counseling regarding his condition or for health maintenance advice. Please see specific information pulled into the AVS if appropriate.   As always, Jose M Simon MD  I appreciate very much the opportunity to participate in the cardiovascular care of your patients. Please do not hesitate to call me with any questions with regards to Wilbert Kelley evaluation and management.         This document has been electronically signed by Jarod Boyle MD [facility], Interventional Cardiology  Lacy 3, 2024 15:36 EDT    Dictated Utilizing Dragon Dictation: Part of this note may be an electronic transcription/translation of spoken language to printed text using the Dragon Dictation System.

## 2024-07-03 DIAGNOSIS — I48.0 PAROXYSMAL ATRIAL FIBRILLATION: ICD-10-CM

## 2024-07-27 RX ORDER — BUMETANIDE 2 MG/1
2 TABLET ORAL DAILY
Qty: 90 TABLET | Refills: 3 | Status: SHIPPED | OUTPATIENT
Start: 2024-07-27

## 2024-08-01 ENCOUNTER — TELEPHONE (OUTPATIENT)
Dept: CARDIOLOGY | Facility: CLINIC | Age: 64
End: 2024-08-01
Payer: MEDICARE

## 2024-08-01 DIAGNOSIS — I48.0 PAROXYSMAL ATRIAL FIBRILLATION: ICD-10-CM

## 2024-09-04 DIAGNOSIS — I48.0 PAROXYSMAL ATRIAL FIBRILLATION: ICD-10-CM

## 2024-10-09 DIAGNOSIS — I48.0 PAROXYSMAL ATRIAL FIBRILLATION: ICD-10-CM

## 2024-10-24 RX ORDER — LISINOPRIL 40 MG/1
40 TABLET ORAL 2 TIMES DAILY
Qty: 60 TABLET | Refills: 11 | Status: SHIPPED | OUTPATIENT
Start: 2024-10-24

## 2024-11-18 RX ORDER — NEBIVOLOL 10 MG/1
10 TABLET ORAL DAILY
Qty: 30 TABLET | Refills: 12 | Status: SHIPPED | OUTPATIENT
Start: 2024-11-18

## 2024-12-05 DIAGNOSIS — I48.0 PAROXYSMAL ATRIAL FIBRILLATION: ICD-10-CM

## 2024-12-19 ENCOUNTER — OFFICE VISIT (OUTPATIENT)
Dept: CARDIOLOGY | Facility: CLINIC | Age: 64
End: 2024-12-19
Payer: MEDICARE

## 2024-12-19 VITALS
HEART RATE: 87 BPM | HEIGHT: 73 IN | BODY MASS INDEX: 41.75 KG/M2 | DIASTOLIC BLOOD PRESSURE: 74 MMHG | OXYGEN SATURATION: 94 % | WEIGHT: 315 LBS | SYSTOLIC BLOOD PRESSURE: 134 MMHG

## 2024-12-19 DIAGNOSIS — E78.2 MIXED HYPERLIPIDEMIA: ICD-10-CM

## 2024-12-19 DIAGNOSIS — I48.0 PAROXYSMAL ATRIAL FIBRILLATION: ICD-10-CM

## 2024-12-19 DIAGNOSIS — I25.118 CORONARY ARTERY DISEASE OF NATIVE ARTERY OF NATIVE HEART WITH STABLE ANGINA PECTORIS: Primary | ICD-10-CM

## 2024-12-19 DIAGNOSIS — Z95.1 S/P CABG X 3: ICD-10-CM

## 2024-12-19 DIAGNOSIS — I15.9 SECONDARY HYPERTENSION: ICD-10-CM

## 2024-12-19 PROCEDURE — 1159F MED LIST DOCD IN RCRD: CPT | Performed by: INTERNAL MEDICINE

## 2024-12-19 PROCEDURE — 3078F DIAST BP <80 MM HG: CPT | Performed by: INTERNAL MEDICINE

## 2024-12-19 PROCEDURE — 3075F SYST BP GE 130 - 139MM HG: CPT | Performed by: INTERNAL MEDICINE

## 2024-12-19 PROCEDURE — 1160F RVW MEDS BY RX/DR IN RCRD: CPT | Performed by: INTERNAL MEDICINE

## 2024-12-19 RX ORDER — NEBIVOLOL 10 MG/1
10 TABLET ORAL DAILY
Qty: 30 TABLET | Refills: 12 | Status: SHIPPED | OUTPATIENT
Start: 2024-12-19

## 2024-12-19 RX ORDER — ASPIRIN 81 MG/1
81 TABLET ORAL DAILY
Qty: 90 TABLET | Refills: 3 | Status: SHIPPED | OUTPATIENT
Start: 2024-12-19

## 2024-12-19 RX ORDER — FENOFIBRATE 160 MG/1
160 TABLET ORAL DAILY
Qty: 90 TABLET | Refills: 12 | Status: SHIPPED | OUTPATIENT
Start: 2024-12-19

## 2024-12-19 RX ORDER — LISINOPRIL 40 MG/1
40 TABLET ORAL 2 TIMES DAILY
Qty: 60 TABLET | Refills: 11 | Status: SHIPPED | OUTPATIENT
Start: 2024-12-19

## 2024-12-19 RX ORDER — ATORVASTATIN CALCIUM 40 MG/1
40 TABLET, FILM COATED ORAL DAILY
Qty: 90 TABLET | Refills: 12 | Status: SHIPPED | OUTPATIENT
Start: 2024-12-19

## 2024-12-19 NOTE — PROGRESS NOTES
Office Note    Subjective     Wilbert Kelley is a 64 y.o. male who presents to day for follow-up      Active Problems:  Problem List Items Addressed This Visit          Cardiac and Vasculature    Coronary artery disease of native artery of native heart with stable angina pectoris - Primary    Relevant Medications    nebivolol (BYSTOLIC) 10 MG tablet    S/P CABG x 3 on 3/21/2023    Hypertension    Relevant Medications    lisinopril (PRINIVIL,ZESTRIL) 40 MG tablet    nebivolol (BYSTOLIC) 10 MG tablet    Hyperlipidemia    Relevant Medications    atorvastatin (LIPITOR) 40 MG tablet    fenofibrate 160 MG tablet    Paroxysmal atrial fibrillation    Relevant Medications    apixaban (ELIQUIS) 5 MG tablet tablet    nebivolol (BYSTOLIC) 10 MG tablet       HPI  Patient is a pleasant 64-year-old gentleman past medical history significant for coronary artery disease status post three-vessel bypass on March 21, 2023.  Patient has been doing well.  Denies any chest pains or breathing difficulties.  Patient has had some charting no problems.  Otherwise able to ambulate.  Has not done cardiac rehab.  Denies any dyspnea on exertion or paroxysmal nocturnal dyspnea.  Palpitation no lower extremity edema.  Patient stopped taking his diuretics and potassium    PRIOR MEDS  Current Outpatient Medications on File Prior to Visit   Medication Sig Dispense Refill    insulin aspart (novoLOG FLEXPEN) 100 UNIT/ML solution pen-injector sc pen Inject 60 Units under the skin into the appropriate area as directed 3 (Three) Times a Day With Meals.      metFORMIN (GLUCOPHAGE) 1000 MG tablet Take 1 tablet by mouth 2 (Two) Times a Day With Meals.      pantoprazole (PROTONIX) 20 MG EC tablet Take 1 tablet by mouth Daily.      Semaglutide,0.25 or 0.5MG/DOS, (Ozempic, 0.25 or 0.5 MG/DOSE,) 2 MG/1.5ML solution pen-injector Inject 0.25 mg under the skin into the appropriate area as directed 1 (One) Time Per Week.      tamsulosin (FLOMAX) 0.4 MG capsule 24 hr  capsule Take 1 capsule by mouth Daily. 30 capsule 0    [DISCONTINUED] apixaban (ELIQUIS) 5 MG tablet tablet Take 1 tablet by mouth 2 (Two) Times a Day. 56 tablet 0    [DISCONTINUED] aspirin 81 MG EC tablet Take 1 tablet by mouth Daily. 90 tablet 3    [DISCONTINUED] atorvastatin (LIPITOR) 40 MG tablet Take 1 tablet by mouth Daily.      [DISCONTINUED] fenofibrate 160 MG tablet Take 1 tablet by mouth Daily.      [DISCONTINUED] gabapentin (NEURONTIN) 800 MG tablet Take 3 tablets by mouth Every Night.      [DISCONTINUED] lisinopril (PRINIVIL,ZESTRIL) 40 MG tablet TAKE 1 TABLET BY MOUTH TWICE A DAY 60 tablet 11    [DISCONTINUED] nebivolol (BYSTOLIC) 10 MG tablet TAKE 1 TABLET BY MOUTH DAILY 30 tablet 12    [DISCONTINUED] bumetanide (BUMEX) 2 MG tablet TAKE 1 TABLET BY MOUTH DAILY (Patient not taking: Reported on 12/19/2024) 90 tablet 3    [DISCONTINUED] Insulin Glargine (TOUJEO MAX SOLOSTAR SC) Inject 80 Units under the skin into the appropriate area as directed 2 (Two) Times a Day. (Patient not taking: Reported on 6/3/2024)      [DISCONTINUED] Insulin Regular Human, Conc, (HumuLIN R) 500 UNIT/ML solution pen-injector CONCENTRATED injection Inject 25-50 Units under the skin into the appropriate area as directed 3 (Three) Times a Day Before Meals. (Patient not taking: Reported on 6/3/2024)      [DISCONTINUED] potassium chloride 10 MEQ CR tablet  (Patient not taking: Reported on 12/19/2024)       No current facility-administered medications on file prior to visit.       ALLERGIES  Morphine and Adhesive tape    HISTORY  Past Medical History:   Diagnosis Date    Arthritis     BPH (benign prostatic hyperplasia)     Cancer     basal and melanoma-  x2 - left side ear and elbow    Constipation     Coronary artery disease     DDD (degenerative disc disease), lumbar     Diabetes mellitus     couple times month check sugar    Frozen shoulder     left    GERD (gastroesophageal reflux disease)     Hyperlipidemia     Hypertension      "Limited mobility     left shoulder  - possible frozen shoulder-= please be aware for surgery    Sleep apnea     doesnt use machine    Tinnitus     Wears glasses     small print       Social History     Socioeconomic History    Marital status:     Number of children: 1   Tobacco Use    Smoking status: Never     Passive exposure: Past    Smokeless tobacco: Current     Types: Snuff   Vaping Use    Vaping status: Never Used   Substance and Sexual Activity    Alcohol use: Not Currently    Drug use: Never    Sexual activity: Defer       Family History   Problem Relation Age of Onset    COPD Mother     Heart attack Father 45        passed    Heart attack Paternal Grandfather 42        massive heart attack    Heart disease Paternal Grandfather        Review of Systems   Respiratory:  Negative for apnea, chest tightness and shortness of breath.    Cardiovascular:  Negative for chest pain, palpitations and leg swelling.   Neurological:  Negative for dizziness, syncope and light-headedness.       Objective     VITALS: /74 (BP Location: Right arm, Patient Position: Sitting, Cuff Size: Large Adult)   Pulse 87   Ht 185.4 cm (73\")   Wt (!) 143 kg (315 lb)   SpO2 94%   BMI 41.56 kg/m²     LABS:   No visits with results within 3 Month(s) from this visit.   Latest known visit with results is:   No results displayed because visit has over 200 results.            IMAGING:   No Images in the past 120 days found..  Results for orders placed during the hospital encounter of 02/02/23    Stress Test With Myocardial Perfusion (1 Day)    Interpretation Summary    Left ventricular ejection fraction is normal (Calculated EF = 57%).    Impressions are consistent with an intermediate risk study.    There is no prior study available for comparison.    Findings consistent with a normal ECG stress test.    Basal to mid inferior infarction with basal to mid gibran-infarction ischemia extending also to the inferoseptal walls with also " small apical septal ischemia, TID 1.51.     No results found for this or any previous visit.          EXAM:  Constitutional:       General: Awake.      Appearance: Healthy appearance. Not in distress.   Eyes:      Conjunctiva/sclera: Conjunctivae normal.   HENT:      Head: Normocephalic and atraumatic.      Nose: Nose normal.    Mouth/Throat:      Pharynx: Oropharynx is clear.   Neck:      Thyroid: Thyroid normal.      Vascular: No carotid bruit or JVD.   Pulmonary:      Effort: Pulmonary effort is normal.      Breath sounds: Normal breath sounds.   Chest:      Chest wall: Not tender to palpatation.   Cardiovascular:      PMI at left midclavicular line. Normal rate. Regular rhythm. Normal S1 with normal intensity. Normal S2 with normal intensity.       Murmurs: There is no murmur.      No gallop.  No rub.   Pulses:     Intact distal pulses.   Edema:     Peripheral edema absent.   Abdominal:      General: Bowel sounds are normal. There is no distension.      Palpations: Abdomen is soft. There is no hepatomegaly.      Tenderness: There is no abdominal tenderness.   Musculoskeletal: Normal range of motion.      Cervical back: Normal range of motion. Skin:     General: Skin is warm and dry. There is no cyanosis.      Coloration: Skin is not jaundiced.   Neurological:      Mental Status: Alert and oriented to person, place and time.      Motor: Motor function is intact.      Gait: Gait is intact.   Psychiatric:         Attention and Perception: Attention and perception normal.         Speech: Speech normal.         Behavior: Behavior is cooperative.         Cognition and Memory: Cognition and memory normal.         Judgment: Judgment normal.          Procedure     ECG 12 Lead    Date/Time: 12/19/2024 4:01 PM  Performed by: Jarod Boyle MD    Authorized by: Jarod Boyle MD  Comparison: compared with previous ECG from 5/10/2023  Similar to previous ECG  Comments: Sinus rhythm at 85, normal axis, normal intervals, T  inversion noted in inferior leads and flattening in the lateral leads             Assessment & Plan     Diagnoses and all orders for this visit:    1. Coronary artery disease of native artery of native heart with stable angina pectoris (Primary)    2. S/P CABG x 3 on 3/21/2023    3. Secondary hypertension    4. Mixed hyperlipidemia    5. Paroxysmal atrial fibrillation  -     apixaban (ELIQUIS) 5 MG tablet tablet; Take 1 tablet by mouth 2 (Two) Times a Day.  Dispense: 56 tablet; Refill: 0    Other orders  -     aspirin 81 MG EC tablet; Take 1 tablet by mouth Daily.  Dispense: 90 tablet; Refill: 3  -     atorvastatin (LIPITOR) 40 MG tablet; Take 1 tablet by mouth Daily.  Dispense: 90 tablet; Refill: 12  -     fenofibrate 160 MG tablet; Take 1 tablet by mouth Daily.  Dispense: 90 tablet; Refill: 12  -     lisinopril (PRINIVIL,ZESTRIL) 40 MG tablet; Take 1 tablet by mouth 2 (Two) Times a Day.  Dispense: 60 tablet; Refill: 11  -     nebivolol (BYSTOLIC) 10 MG tablet; Take 1 tablet by mouth Daily.  Dispense: 30 tablet; Refill: 12          PLAN  1 cardiac.  Patient with history of coronary artery with three-vessel bypass in 2023.  Risk factor modification for coronary disease to continue.  No angina symptoms.  EKG does not show any new changes on comparison with old EKG from May 2023.  Normal echo in May 2023.  #2 hypertension.  Continue current antihypertensive medications  3 hyperlipidemia.  Continue lipid-lowering medications  #4 paroxysmal atrial fibrillation.  Currently sinus rhythm.  Continue anticoagulation.           MEDS ORDERED DURING VISIT:    Medications Discontinued During This Encounter   Medication Reason    bumetanide (BUMEX) 2 MG tablet *Therapy completed    gabapentin (NEURONTIN) 800 MG tablet *Therapy completed    Insulin Glargine (TOUJEO MAX SOLOSTAR SC) *Therapy completed    Insulin Regular Human, Conc, (HumuLIN R) 500 UNIT/ML solution pen-injector CONCENTRATED injection *Therapy completed    potassium  chloride 10 MEQ CR tablet *Therapy completed    fenofibrate 160 MG tablet Reorder    aspirin 81 MG EC tablet Reorder    atorvastatin (LIPITOR) 40 MG tablet Reorder    lisinopril (PRINIVIL,ZESTRIL) 40 MG tablet Reorder    nebivolol (BYSTOLIC) 10 MG tablet Reorder    apixaban (ELIQUIS) 5 MG tablet tablet Reorder        New Medications Ordered This Visit   Medications    apixaban (ELIQUIS) 5 MG tablet tablet     Sig: Take 1 tablet by mouth 2 (Two) Times a Day.     Dispense:  56 tablet     Refill:  0     Order Specific Question:   Lot Number?     Answer:   [id number]     Order Specific Question:   Expiration Date?     Answer:   1/30/2026     Order Specific Question:   Quantity     Answer:   56    aspirin 81 MG EC tablet     Sig: Take 1 tablet by mouth Daily.     Dispense:  90 tablet     Refill:  3    atorvastatin (LIPITOR) 40 MG tablet     Sig: Take 1 tablet by mouth Daily.     Dispense:  90 tablet     Refill:  12    fenofibrate 160 MG tablet     Sig: Take 1 tablet by mouth Daily.     Dispense:  90 tablet     Refill:  12    lisinopril (PRINIVIL,ZESTRIL) 40 MG tablet     Sig: Take 1 tablet by mouth 2 (Two) Times a Day.     Dispense:  60 tablet     Refill:  11    nebivolol (BYSTOLIC) 10 MG tablet     Sig: Take 1 tablet by mouth Daily.     Dispense:  30 tablet     Refill:  12         Follow Up:   Return in about 6 months (around 6/19/2025) for Recheck.    Patient was given instructions and counseling regarding his condition or for health maintenance advice. Please see specific information pulled into the AVS if appropriate.   As always, Jose M Simon MD  I appreciate very much the opportunity to participate in the cardiovascular care of your patients. Please do not hesitate to call me with any questions with regards to Wilbert MELANY Bazzis evaluation and management.         This document has been electronically signed by Jarod Boyle MD [facility], Interventional Cardiology  December 19, 2024 12:14 EST    Dictated Utilizing  Dragon Dictation: Part of this note may be an electronic transcription/translation of spoken language to printed text using the Dragon Dictation System.

## 2025-01-29 ENCOUNTER — TELEPHONE (OUTPATIENT)
Dept: PHARMACY | Facility: HOSPITAL | Age: 65
End: 2025-01-29
Payer: MEDICARE

## 2025-02-24 ENCOUNTER — TELEPHONE (OUTPATIENT)
Dept: CARDIOLOGY | Facility: CLINIC | Age: 65
End: 2025-02-24
Payer: MEDICARE

## 2025-02-24 NOTE — TELEPHONE ENCOUNTER
Patient called in today and was requesting samples of Eliquis as he no longer qualifies for Medicare Extra Help per Sandy THOMAS in medication clinic. Advised patient he could also apply for patient assistance through [employer]. Patient states he will bring in his proof of income and apply when he picks up samples.

## 2025-02-25 DIAGNOSIS — I48.0 PAROXYSMAL ATRIAL FIBRILLATION: ICD-10-CM

## 2025-03-28 ENCOUNTER — HOSPITAL ENCOUNTER (OUTPATIENT)
Dept: GENERAL RADIOLOGY | Facility: HOSPITAL | Age: 65
Discharge: HOME | End: 2025-03-28
Payer: MEDICARE

## 2025-03-28 ENCOUNTER — PRE-ADMISSION TESTING (OUTPATIENT)
Dept: PREADMISSION TESTING | Facility: HOSPITAL | Age: 65
End: 2025-03-28
Payer: MEDICARE

## 2025-03-28 VITALS — BODY MASS INDEX: 41.75 KG/M2 | HEIGHT: 73 IN | WEIGHT: 315 LBS

## 2025-03-28 LAB
ANION GAP SERPL CALCULATED.3IONS-SCNC: 12 MMOL/L (ref 5–15)
BUN SERPL-MCNC: 15 MG/DL (ref 8–23)
BUN/CREAT SERPL: 15 (ref 7–25)
CALCIUM SPEC-SCNC: 9.2 MG/DL (ref 8.6–10.5)
CHLORIDE SERPL-SCNC: 100 MMOL/L (ref 98–107)
CO2 SERPL-SCNC: 25 MMOL/L (ref 22–29)
CREAT SERPL-MCNC: 1 MG/DL (ref 0.76–1.27)
DEPRECATED RDW RBC AUTO: 50.3 FL (ref 37–54)
EGFRCR SERPLBLD CKD-EPI 2021: 84.6 ML/MIN/1.73
ERYTHROCYTE [DISTWIDTH] IN BLOOD BY AUTOMATED COUNT: 17.8 % (ref 12.3–15.4)
GLUCOSE SERPL-MCNC: 107 MG/DL (ref 65–99)
HBA1C MFR BLD: 6.8 % (ref 4.8–5.6)
HCT VFR BLD AUTO: 39.9 % (ref 37.5–51)
HGB BLD-MCNC: 12.2 G/DL (ref 13–17.7)
INR PPP: 0.95 (ref 0.89–1.12)
MCH RBC QN AUTO: 23.8 PG (ref 26.6–33)
MCHC RBC AUTO-ENTMCNC: 30.6 G/DL (ref 31.5–35.7)
MCV RBC AUTO: 77.8 FL (ref 79–97)
PLATELET # BLD AUTO: 348 10*3/MM3 (ref 140–450)
PMV BLD AUTO: 9.9 FL (ref 6–12)
POTASSIUM SERPL-SCNC: 4.4 MMOL/L (ref 3.5–5.2)
PROTHROMBIN TIME: 12.8 SECONDS (ref 12.2–14.5)
RBC # BLD AUTO: 5.13 10*6/MM3 (ref 4.14–5.8)
SODIUM SERPL-SCNC: 137 MMOL/L (ref 136–145)
WBC NRBC COR # BLD AUTO: 9 10*3/MM3 (ref 3.4–10.8)

## 2025-03-28 PROCEDURE — 80048 BASIC METABOLIC PNL TOTAL CA: CPT

## 2025-03-28 PROCEDURE — 85027 COMPLETE CBC AUTOMATED: CPT

## 2025-03-28 PROCEDURE — 83036 HEMOGLOBIN GLYCOSYLATED A1C: CPT

## 2025-03-28 PROCEDURE — 71046 X-RAY EXAM CHEST 2 VIEWS: CPT

## 2025-03-28 PROCEDURE — 36415 COLL VENOUS BLD VENIPUNCTURE: CPT

## 2025-03-28 PROCEDURE — 85610 PROTHROMBIN TIME: CPT

## 2025-03-28 RX ORDER — BUMETANIDE 2 MG/1
2 TABLET ORAL DAILY
COMMUNITY

## 2025-03-28 RX ORDER — DULOXETIN HYDROCHLORIDE 60 MG/1
60 CAPSULE, DELAYED RELEASE ORAL DAILY
COMMUNITY

## 2025-03-28 RX ORDER — HYDROCHLOROTHIAZIDE 12.5 MG/1
12.5 CAPSULE ORAL DAILY
COMMUNITY

## 2025-03-28 NOTE — PAT
Patient did not review general PAT education video as instructed in their preoperative information received from their surgeon.  One-on-one Pre Admission Testing general education provided during PAT visit.  Copies of PAT general education handouts (Incentive Spirometry, Meds to Beds Program, Patient Belongings, Pre-op skin preparation instructions, Blood Glucose testing, Visitor policy, Surgery FAQ, Code H) distributed to patient. Encouraged patient/family to read PAT general education handouts thoroughly and notify PAT staff with any questions or concerns. Patient instructed to bring PAT pass and completed skin prep sheet (if applicable) on the day of procedure. Patient verbalized understanding of all information and priority content.     Patient's surgeon called in a prescription for Benzol Peroxide 5% wash to [facility].  Patient instructed to  from [facility] that was submitted electronically.  Verbal and written instructions given regarding proper use of the Benzoyl Peroxide wash were provided to patient and/or famlily during PAT visit. Patient/family also instructed to complete Benzol Peroxide checklist and return it to Pre-op on the day of surgery.  Patient and/or family verbalized understanding.      Additionally, reinforced with patient to acquire this prescription from the [facility] pharmacy before leaving the hospital after PAT visit due to the potential unavailability at local pharmacies.    Patient instructed to drink 20 ounces of Gatorade or Gatorlyte (if diabetic) and it needs to be completed 1 hour (for Main OR patients) or 2 hours (scheduled  section & [facility] patients) before given arrival time for procedure (NO RED Gatorade and NO Gatorade Zero).    Patient verbalized understanding.    Per Anesthesia Request, patient instructed not to take their ACE/ARB medications on the AM of surgery.    Lisa, surgery scheduler, states she requested cardiac clearance and permission  to hold Eliquis 3- from Dr. Mcdermott in [place].    Pt reports last Eliquis taken 3-.

## 2025-03-31 NOTE — PAT
Called and spoke with [facility] regarding cardiac clearance and eliquis statement. Also informed staff that patient has been using oxygen at 2 liters at night since having pneumonia in February 2025.  Not sure the cardiologist is aware.      Dr Mcdermott is no longer at [facility].  Thus the Nurse Practioner Camila Castorena will see patient in June 2025.  Per Camila Castorena APRN, patient will need to be seen by her before she will be cleared given his extensive history (CAD, atrial fib, Elqiuis, MARIAH) and recent oxygen dependency at night.     Notified iLsa at Dr Mckeon's office that patient needs to be seen by cardiologist before the patient needs to be cleared before shoulder surgery.     UPDATE:  Surgery cancelled until patient can be cleared by cardiology.  Chart forwarded to HIM for scanning on 3/31/25.

## 2025-04-01 ENCOUNTER — OFFICE VISIT (OUTPATIENT)
Dept: CARDIOLOGY | Facility: CLINIC | Age: 65
End: 2025-04-01
Payer: MEDICARE

## 2025-04-01 VITALS
HEIGHT: 73 IN | DIASTOLIC BLOOD PRESSURE: 79 MMHG | BODY MASS INDEX: 41.75 KG/M2 | RESPIRATION RATE: 18 BRPM | SYSTOLIC BLOOD PRESSURE: 144 MMHG | HEART RATE: 79 BPM | OXYGEN SATURATION: 96 % | WEIGHT: 315 LBS

## 2025-04-01 DIAGNOSIS — I48.0 PAROXYSMAL ATRIAL FIBRILLATION: Chronic | ICD-10-CM

## 2025-04-01 DIAGNOSIS — Z01.810 PREOP CARDIOVASCULAR EXAM: Primary | ICD-10-CM

## 2025-04-01 DIAGNOSIS — I25.118 CORONARY ARTERY DISEASE OF NATIVE ARTERY OF NATIVE HEART WITH STABLE ANGINA PECTORIS: Chronic | ICD-10-CM

## 2025-04-01 DIAGNOSIS — I50.33 ACUTE ON CHRONIC HEART FAILURE WITH PRESERVED EJECTION FRACTION (HFPEF): ICD-10-CM

## 2025-04-01 DIAGNOSIS — E78.2 MIXED HYPERLIPIDEMIA: Chronic | ICD-10-CM

## 2025-04-01 DIAGNOSIS — I15.9 SECONDARY HYPERTENSION: Chronic | ICD-10-CM

## 2025-04-01 PROCEDURE — 1159F MED LIST DOCD IN RCRD: CPT | Performed by: NURSE PRACTITIONER

## 2025-04-01 PROCEDURE — 93000 ELECTROCARDIOGRAM COMPLETE: CPT | Performed by: NURSE PRACTITIONER

## 2025-04-01 PROCEDURE — 3078F DIAST BP <80 MM HG: CPT | Performed by: NURSE PRACTITIONER

## 2025-04-01 PROCEDURE — 1160F RVW MEDS BY RX/DR IN RCRD: CPT | Performed by: NURSE PRACTITIONER

## 2025-04-01 PROCEDURE — 99214 OFFICE O/P EST MOD 30 MIN: CPT | Performed by: NURSE PRACTITIONER

## 2025-04-01 PROCEDURE — 3077F SYST BP >= 140 MM HG: CPT | Performed by: NURSE PRACTITIONER

## 2025-04-01 NOTE — PROGRESS NOTES
"Chief Complaint  No chief complaint on file.    Subjective          Wilbert Kelley presents to [facility] for follow up.    History of Present Illness    Mr Kelley presents for continued follow-up of ASCVD, paroxysmal atrial fibrillation, hypertension, and hyperlipidemia.  His last visit to clinic was 12/19/2024 with Dr. Boyle.  At that visit no changes were made to his medication regimen.    He reports that he fell 2 days ago and acquired preoperative clearance for shoulder surgery.    [phone] - Dr. Mckeon  History of Present Illness  The patient presents for preoperative clearance.    He is scheduled for a left shoulder surgery on 05/20/2025, which was postponed due to a fall he experienced 2 days ago. He reports no chest pain or exacerbation of his shortness of breath.  He is currently on Eliquis and aspirin, with the latter being held in anticipation of the surgery. He also reports no new onset of leg swelling, although he does experience intermittent swelling in his right leg. He has no history of knee-related issues. He recently recovered from a bout of pneumonia.           Tobacco Use: High Risk (4/1/2025)    Patient History     Smoking Tobacco Use: Never     Smokeless Tobacco Use: Current     Passive Exposure: Past       Objective     Vital Signs:   /79 (BP Location: Left arm, Patient Position: Sitting, Cuff Size: Adult)   Pulse 79   Resp 18   Ht 185.4 cm (73\")   Wt (!) 144 kg (316 lb 9.6 oz)   SpO2 96%   BMI 41.77 kg/m²       Physical Exam  Vitals and nursing note reviewed.   Constitutional:       General: He is not in acute distress.     Appearance: He is morbidly obese.   HENT:      Head: Normocephalic and atraumatic.   Eyes:      Conjunctiva/sclera: Conjunctivae normal.   Neck:      Vascular: No carotid bruit.   Cardiovascular:      Rate and Rhythm: Normal rate and regular rhythm.      Pulses: Normal pulses.   Pulmonary:      Effort: Pulmonary effort is " normal.      Breath sounds: Normal breath sounds.   Musculoskeletal:      Cervical back: Neck supple.      Right lower leg: No edema.      Left lower leg: No edema.      Comments: Holding right arm close to his body   Skin:     General: Skin is warm and dry.   Neurological:      General: No focal deficit present.   Psychiatric:         Mood and Affect: Mood normal.         Behavior: Behavior normal.             Result Review :   The following data was reviewed by: MALINDA Schaeffer on 04/01/2025:  Common labs          3/28/2025    12:23   Common Labs   Glucose 107    BUN 15    Creatinine 1.00    Sodium 137    Potassium 4.4    Chloride 100    Calcium 9.2    WBC 9.00    Hemoglobin 12.2    Hematocrit 39.9    Platelets 348    Hemoglobin A1C 6.80      Last lipid panel on record 06/18/2024 total cholesterol 88, triglycerides 101, HDL 21, LDL 47 CMP with normal liver enzymes  Data reviewed : Cardiology studies as detailed below      Last Cardiac Cath  Results for orders placed during the hospital encounter of 03/16/23    Cardiac Catheterization/Vascular Study    Conclusion    Mid RCA lesion is 100% stenosed.    1st Mrg lesion is 80% stenosed.    Prox LAD lesion is 80% stenosed.    Mid LAD lesion is 70% stenosed.    Cardiac.  Patient with significant coronary artery disease involving multiple vessels.  Patient is diabetic with proximal LAD disease also.  Will refer for bypass surgery.  Discussed with Dr. Swanson at [facility].  Patient is going to follow-up on Monday in their clinic      Last Stress test  Results for orders placed during the hospital encounter of 02/02/23    Stress Test With Myocardial Perfusion (1 Day)    Interpretation Summary    Left ventricular ejection fraction is normal (Calculated EF = 57%).    Impressions are consistent with an intermediate risk study.    There is no prior study available for comparison.    Findings consistent with a normal ECG stress test.    Basal to mid inferior  infarction with basal to mid gibran-infarction ischemia extending also to the inferoseptal walls with also small apical septal ischemia, TID 1.51.       Last Echo  Results for orders placed during the hospital encounter of 05/10/23    Adult Transthoracic Echo Complete w/ Color, Spectral and Contrast if necessary per protocol    Interpretation Summary    The quality of the study is limited with poor acoustic windows.    Left ventricular systolic function is normal. Left ventricular ejection fraction appears to be 61 - 65%.    Left ventricular diastolic function is consistent with (grade II w/high LAP) pseudonormalization.    The cardiac chamber dimensions are normal.    No significant valvular heart disease is present.    Mild pulmonary hypertension is present.    There is no evidence of pericardial effusion.         ECG 12 Lead    Date/Time: 4/1/2025 11:29 AM  Performed by: Camila Castorena APRN    Authorized by: Camila Castorena APRN  Comparison: compared with previous ECG from 12/19/2024  Similar to previous ECG  Comparison to previous ECG: Sinus rhythm with Q waves in lead II, lead III, aVF, V5 and V6  Rhythm: sinus rhythm  Rate: normal  BPM: 80  Conduction: 1st degree AV block  Q waves: III and aVR    T inversion: I  QRS axis: normal  Other findings: non-specific ST-T wave changes    Clinical impression: abnormal EKG           Current Outpatient Medications   Medication Sig Dispense Refill    apixaban (ELIQUIS) 5 MG tablet tablet Take 1 tablet by mouth 2 (Two) Times a Day. 56 tablet 0    aspirin 81 MG EC tablet Take 1 tablet by mouth Daily. 90 tablet 3    atorvastatin (LIPITOR) 40 MG tablet Take 1 tablet by mouth Daily. 90 tablet 12    benzoyl peroxide 5 % external liquid Apply 1 application topically to the appropriate area 3 times prior to surgery as directed. 148 mL 0    bumetanide (BUMEX) 2 MG tablet Take 1 tablet by mouth Daily.      DULoxetine (CYMBALTA) 60 MG capsule Take 1 capsule by mouth Daily.       fenofibrate 160 MG tablet Take 1 tablet by mouth Daily. 90 tablet 12    hydroCHLOROthiazide (MICROZIDE) 12.5 MG capsule Take 1 capsule by mouth Daily.      Insulin Degludec (TRESIBA SC) Inject 80 Units under the skin into the appropriate area as directed 2 (Two) Times a Day.      Insulin Lispro (HUMALOG SC) Inject 60 Units under the skin into the appropriate area as directed 3 (Three) Times a Day Before Meals.      lisinopril (PRINIVIL,ZESTRIL) 40 MG tablet Take 1 tablet by mouth 2 (Two) Times a Day. 60 tablet 11    metFORMIN (GLUCOPHAGE) 1000 MG tablet Take 1 tablet by mouth 2 (Two) Times a Day With Meals.      nebivolol (BYSTOLIC) 10 MG tablet Take 1 tablet by mouth Daily. 30 tablet 12    pantoprazole (PROTONIX) 40 MG EC tablet Take 1 tablet by mouth Daily.      Semaglutide,0.25 or 0.5MG/DOS, (Ozempic, 0.25 or 0.5 MG/DOSE,) 2 MG/1.5ML solution pen-injector Inject 0.25 mg under the skin into the appropriate area as directed 1 (One) Time Per Week.      tamsulosin (FLOMAX) 0.4 MG capsule 24 hr capsule Take 1 capsule by mouth Daily. 30 capsule 0     No current facility-administered medications for this visit.            Assessment and Plan    Problem List Items Addressed This Visit       Coronary artery disease of native artery of native heart with stable angina pectoris (Chronic)    Overview   Goals of care-aggressive risk factor modification and GDMT to mitigate disease progression  02/02/2023-stress test-Basal to mid inferior infarction with basal to mid gibran-infarction ischemia extending also to the inferoseptal walls with also small apical septal ischemia, TID 1.51.   03/16/2023-[facility]-severe multivessel disease with proximal LAD lesion  03/21/2023-CABG x 3-LIMA-LAD, SVG-OM and circumflex, SVG-RPDA  05/10/4287-UYY-XMAA 61 to 65%, grade 2 diastolic dysfunction with high LAP, mild pulmonary hypertension         Current Assessment & Plan   Currently asymptomatic         Relevant Orders    ECG 12 Lead    Hypertension  (Chronic)    Overview   Goal of care-maintain systolic blood pressure less than 130 and diastolic blood pressure less than 80         Hyperlipidemia (Chronic)    Overview   Goals of care-LDL less than 55  06/18/2024-total cholesterol 88, triglycerides 101, HDL 21, LDL 47         Paroxysmal atrial fibrillation (Chronic)    Overview   Goals of care-maintain adequate anticoagulation and rate controlled sinus rhythm         Relevant Orders    ECG 12 Lead    Acute on chronic heart failure with preserved ejection fraction (HFpEF)    Current Assessment & Plan   No evidence of exacerbation.  Will need addition of SGLT2 inhibitor              Other Visit Diagnoses         Preop cardiovascular exam    -  Primary    Relevant Orders    ECG 12 Lead          Diagnoses and all orders for this visit:    1. Preop cardiovascular exam (Primary)  -     ECG 12 Lead    2. Coronary artery disease of native artery of native heart with stable angina pectoris  Assessment & Plan:  Currently asymptomatic    Orders:  -     ECG 12 Lead    3. Paroxysmal atrial fibrillation  -     ECG 12 Lead    4. Secondary hypertension    5. Mixed hyperlipidemia    6. Acute on chronic heart failure with preserved ejection fraction (HFpEF)  Assessment & Plan:  No evidence of exacerbation.  Will need addition of SGLT2 inhibitor            Assessment & Plan  1. Preoperative clearance.  His EKG results are within normal limits. He reports no chest pain, increased shortness of breath, or new swelling in his legs. He is currently taking Eliquis and aspirin. He has been advised to resume Eliquis through Saturday.  Discontinue Eliquis Sunday and Monday, and proceed with the surgery on Tuesday. The necessary paperwork will be completed and Dr. Mckeon will be contacted today.    Follow-up  The patient will follow up in 6 months.         Follow Up     Return in about 6 months (around 10/1/2025).    Patient was given instructions and counseling regarding his condition or  for health maintenance advice. Please see specific information pulled into the AVS if appropriate.       Electronically signed by MALINDA Schaeffer, 04/01/25, 11:36 AM EDT.    Patient or patient representative verbalized consent for the use of Ambient Listening during the visit with  MALINDA Schaeffer for chart documentation. 4/1/2025  11:36 EDT     Dictated Utilizing Dragon Dictation: Part of this note may be an electronic transcription/translation of spoken language to printed text using the Dragon Dictation System

## 2025-04-28 ENCOUNTER — OFFICE VISIT (OUTPATIENT)
Dept: ENDOCRINOLOGY | Facility: CLINIC | Age: 65
End: 2025-04-28
Payer: MEDICARE

## 2025-04-28 ENCOUNTER — SPECIALTY PHARMACY (OUTPATIENT)
Dept: PHARMACY | Facility: HOSPITAL | Age: 65
End: 2025-04-28
Payer: MEDICARE

## 2025-04-28 ENCOUNTER — ANESTHESIA EVENT (OUTPATIENT)
Dept: PERIOP | Facility: HOSPITAL | Age: 65
End: 2025-04-28
Payer: MEDICARE

## 2025-04-28 VITALS
HEART RATE: 81 BPM | OXYGEN SATURATION: 94 % | SYSTOLIC BLOOD PRESSURE: 173 MMHG | WEIGHT: 315 LBS | BODY MASS INDEX: 41.75 KG/M2 | HEIGHT: 73 IN | DIASTOLIC BLOOD PRESSURE: 77 MMHG

## 2025-04-28 DIAGNOSIS — E11.65 TYPE 2 DIABETES MELLITUS WITH HYPERGLYCEMIA, WITH LONG-TERM CURRENT USE OF INSULIN: Primary | ICD-10-CM

## 2025-04-28 DIAGNOSIS — Z79.4 TYPE 2 DIABETES MELLITUS WITH HYPERGLYCEMIA, WITH LONG-TERM CURRENT USE OF INSULIN: Primary | ICD-10-CM

## 2025-04-28 PROCEDURE — 99214 OFFICE O/P EST MOD 30 MIN: CPT | Performed by: NURSE PRACTITIONER

## 2025-04-28 PROCEDURE — 1160F RVW MEDS BY RX/DR IN RCRD: CPT | Performed by: NURSE PRACTITIONER

## 2025-04-28 PROCEDURE — 3044F HG A1C LEVEL LT 7.0%: CPT | Performed by: NURSE PRACTITIONER

## 2025-04-28 PROCEDURE — 95251 CONT GLUC MNTR ANALYSIS I&R: CPT | Performed by: NURSE PRACTITIONER

## 2025-04-28 PROCEDURE — 3077F SYST BP >= 140 MM HG: CPT | Performed by: NURSE PRACTITIONER

## 2025-04-28 PROCEDURE — 1159F MED LIST DOCD IN RCRD: CPT | Performed by: NURSE PRACTITIONER

## 2025-04-28 PROCEDURE — 3078F DIAST BP <80 MM HG: CPT | Performed by: NURSE PRACTITIONER

## 2025-04-28 RX ORDER — FAMOTIDINE 10 MG/ML
20 INJECTION, SOLUTION INTRAVENOUS ONCE
Status: CANCELLED | OUTPATIENT
Start: 2025-04-28 | End: 2025-04-28

## 2025-04-28 NOTE — PROGRESS NOTES
"Chief Complaint   Patient presents with    inital visit     T2DM, last A1c 3/28/25 (6.8)        Referring Provider  Jose M Simon MD     HPI   Wilbert Kelley is a 64 y.o. male had concerns including inital visit (T2DM, last A1c 3/28/25 (6.8)).    Seen as a new patient.  T2DM.     Diabetes was diagnosed [id number].  Complications include neuropathy, CABG x4 in 2023.  Last ophtho exam was 2025-Dr Darby.  Current medications for diabetes include Tresiba 80 units BID, Humalog 60 units TID with meals, Metformin 1000 mg BID, Ozempic 1 mg weekly.  Previous meds: none  Glucagon: Last use: none  He checks his blood sugar Dexcom CGM.   Hypos:rarely    ACE/ARB:yes, Statin: yes  Labs:  Lipid Panel:utd   RICHARD: utd    The following portions of the patient's history were reviewed and updated as appropriate: allergies, current medications, past family history, past medical history, past social history, past surgical history, and problem list.    Diet:  Breakfast: skips usually  Lunch: sandwich or something else small  Dinner: meat, potatoes  Drinks: zero sugar pop, water  Snacks:chips, crackers  His schedule is mostly night-time.  He eats first meal of the day at 8 pm then another meal 12-1 am. Snacks some.  Past Medical History:   Diagnosis Date    Arthritis     BPH (benign prostatic hyperplasia)     Cancer     basal and melanoma-  x2 - left side ear and elbow    Constipation     Coronary artery disease     DDD (degenerative disc disease), lumbar     Diabetes mellitus     couple times month check sugar    Frozen shoulder     left    GERD (gastroesophageal reflux disease)     History of pneumonia 02/2025    Hyperlipidemia     Hypertension     Limited mobility     left shoulder  - possible frozen shoulder-= please be aware for surgery    Oxygen dependent     \"2 liters at night since pneumonia\" per wife    Rotator cuff injury     right shoulder 3/14/2025    Sleep apnea     insurance took cpap machine due to pt not using 6 hours " per night.    Tinnitus     Wears glasses     small print     Past Surgical History:   Procedure Laterality Date    CARDIAC CATHETERIZATION      CARDIAC CATHETERIZATION Right 03/16/2023    Procedure: Left Heart Cath;  Surgeon: Jarod Boyle MD;  Location:  [facility];  Service: Cardiovascular;  Laterality: Right;    CATARACT EXTRACTION, BILATERAL Bilateral     COLONOSCOPY      CORONARY ARTERY BYPASS GRAFT N/A 3/21/2023    Procedure: MEDIAN STERNOTOMY, CORONARY ARTERY BYPASS GRAFTING X 3, UTILIZING THE LEFT INTERNAL MAMMARY ARTERY, ENDOSCOPIC VEIN HARVEST OF THE RIGHT GREATER SAPENOUSE VEIN;  Surgeon: Markus Emanuel MD;  Location:  [facility];  Service: Cardiothoracic;  Laterality: N/A;    CORONARY STENT PLACEMENT      x4    ENDOSCOPY      FINGER SURGERY Left     middle finger - left side    SKIN CANCER EXCISION      x2    WISDOM TOOTH EXTRACTION        Family History   Problem Relation Age of Onset    COPD Mother     Heart attack Father 45        passed    Diabetes Paternal Grandmother     Heart attack Paternal Grandfather 42        massive heart attack    Heart disease Paternal Grandfather       Social History     Socioeconomic History    Marital status:     Number of children: 1   Tobacco Use    Smoking status: Never     Passive exposure: Past    Smokeless tobacco: Current     Types: Snuff   Vaping Use    Vaping status: Never Used   Substance and Sexual Activity    Alcohol use: Not Currently    Drug use: Never    Sexual activity: Defer      Allergies   Allergen Reactions    Morphine Nausea And Vomiting     projectile    Adhesive Tape Rash     Pt states sticky tabs cause irritation when left on long.  PAPER TAPE IS OK.       Current Outpatient Medications on File Prior to Visit   Medication Sig Dispense Refill    apixaban (ELIQUIS) 5 MG tablet tablet Take 1 tablet by mouth 2 (Two) Times a Day. 56 tablet 0    aspirin 81 MG EC tablet Take 1 tablet by mouth Daily. 90 tablet 3     atorvastatin (LIPITOR) 40 MG tablet Take 1 tablet by mouth Daily. 90 tablet 12    benzoyl peroxide 5 % external liquid Apply 1 application topically to the appropriate area 3 times prior to surgery as directed. 148 mL 0    bumetanide (BUMEX) 2 MG tablet Take 1 tablet by mouth Daily. (Patient taking differently: Take 1 tablet by mouth Daily. Takes prn)      DULoxetine (CYMBALTA) 60 MG capsule Take 1 capsule by mouth Daily.      fenofibrate 160 MG tablet Take 1 tablet by mouth Daily. 90 tablet 12    hydroCHLOROthiazide (MICROZIDE) 12.5 MG capsule Take 1 capsule by mouth Daily.      Insulin Degludec (TRESIBA SC) Inject 80 Units under the skin into the appropriate area as directed 2 (Two) Times a Day.      Insulin Lispro (HUMALOG SC) Inject 60 Units under the skin into the appropriate area as directed 3 (Three) Times a Day Before Meals.      lisinopril (PRINIVIL,ZESTRIL) 40 MG tablet Take 1 tablet by mouth 2 (Two) Times a Day. 60 tablet 11    metFORMIN (GLUCOPHAGE) 1000 MG tablet Take 1 tablet by mouth 2 (Two) Times a Day With Meals.      nebivolol (BYSTOLIC) 10 MG tablet Take 1 tablet by mouth Daily. 30 tablet 12    pantoprazole (PROTONIX) 40 MG EC tablet Take 1 tablet by mouth Daily.      Semaglutide,0.25 or 0.5MG/DOS, (Ozempic, 0.25 or 0.5 MG/DOSE,) 2 MG/1.5ML solution pen-injector Inject 0.25 mg under the skin into the appropriate area as directed 1 (One) Time Per Week. (Patient taking differently: Inject 1 mg under the skin into the appropriate area as directed 1 (One) Time Per Week.)      tamsulosin (FLOMAX) 0.4 MG capsule 24 hr capsule Take 1 capsule by mouth Daily. 30 capsule 0     No current facility-administered medications on file prior to visit.        Review of Systems   Constitutional:  Positive for fatigue and unexpected weight gain.   Eyes: Negative.    Endocrine: Positive for polydipsia, polyphagia and polyuria.   Musculoskeletal:  Positive for back pain and neck pain.   Psychiatric/Behavioral:   "Positive for sleep disturbance.    All other systems reviewed and are negative.       /77 (BP Location: Left arm, Patient Position: Sitting, Cuff Size: Adult) Comment (BP Location): forearm  Pulse 81   Ht 185.4 cm (73\")   Wt (!) 149 kg (328 lb 8 oz)   SpO2 94%   BMI 43.34 kg/m²      Physical Exam  Vitals reviewed.   Constitutional:       Appearance: Normal appearance.      Comments: Using cane to ambulate d/t pain.   Eyes:      Extraocular Movements: Extraocular movements intact.   Neck:      Thyroid: No thyroid mass, thyromegaly or thyroid tenderness.   Cardiovascular:      Rate and Rhythm: Normal rate.   Pulmonary:      Effort: Pulmonary effort is normal.   Feet:      Right foot:      Skin integrity: Skin integrity normal.      Left foot:      Skin integrity: Skin integrity normal.   Skin:     Findings: No abrasion or wound.   Neurological:      General: No focal deficit present.      Mental Status: He is alert and oriented to person, place, and time.   Psychiatric:         Mood and Affect: Mood normal.         Behavior: Behavior normal.         Thought Content: Thought content normal.         Judgment: Judgment normal.       CMP:  Lab Results   Component Value Date    BUN 15 03/28/2025    CREATININE 1.00 03/28/2025    BCR 15.0 03/28/2025     03/28/2025    K 4.4 03/28/2025    CO2 25.0 03/28/2025    CALCIUM 9.2 03/28/2025    ALBUMIN 3.3 (L) 05/11/2023    AGRATIO 0.7 05/11/2023    BILITOT 1.0 05/11/2023    ALKPHOS 106 05/11/2023    AST 16 05/11/2023    ALT 27 05/11/2023     Lipid Panel:  Lab Results   Component Value Date    CHOL 71 03/22/2023    TRIG 185 (H) 03/22/2023    HDL 18 (L) 03/22/2023    VLDL 30 03/22/2023    LDL 23 03/22/2023     HbA1c:  Lab Results   Component Value Date    HGBA1C 6.80 (H) 03/28/2025      Glucose:  Lab Results   Component Value Date    POCGLU 206 (H) 05/13/2023     Microalbumin:  No results found for: \"MALBCRERATIO\"  TSH:  No results found for: \"TSH\"    Assessment and " Plan    Diagnoses and all orders for this visit:    1. Type 2 diabetes mellitus with hyperglycemia, with long-term current use of insulin (Primary)  Assessment & Plan:  -Diabetes is at goal with A1c 6.8; however, has not had his meds for the last month and BG's are elevated with estimated GMI 8.6.  -Discussed dietary and exercise guidelines with patient and family.  -Discussed the importance of yearly eye exams.  -Discussed the importance of checking BG's regularly.  Continue using CGM.  2 week data download is as follows:  Very High: 31%  High:40%  In Range:29%  Low: 0%  Very Low:0%  Trend shows overall hypers with post meal hypers as well.   -Start back Tresiba 80 units BID.  -Start back on Humalog 60 units TID with meals.  -Continue Metformin 1000 mg BID.  -Start back on Ozempic 0.25 mg weekly. Patient has no personal history of pancreatitis, no family history of MEN syndrome or medullary thyroid cancer. Possible side effects including nausea, bloating, other GI upset and rarely pancreatitis were discussed. He was advised to call the office with any symptoms or concerns.   -Discussed long term risks of untreated/undertreated diabetes including retinopathy, neuropathy, nephropathy, amputations, hospitalizations, MI, CVA.  -Discussed Glucagon use and appropriate timing for this.   -S/S hypoglycemia reviewed with Rule of 15's advised.  -Follow-up in 1 month.           Return in about 4 weeks (around 5/26/2025) for Follow-up appointment. The patient was instructed to contact the clinic with any interval questions or concerns.        This document has been electronically signed by MALINDA Lara  April 28, 2025 14:57 EDT   Endocrinology    Please note that portions of this document were completed with a voice recognition program. Efforts were made to edit the dictations, but occasionally words are mis-transcribed.

## 2025-04-28 NOTE — ASSESSMENT & PLAN NOTE
-Diabetes is at goal with A1c 6.8; however, has not had his meds for the last month and BG's are elevated with estimated GMI 8.6.  -Discussed dietary and exercise guidelines with patient and family.  -Discussed the importance of yearly eye exams.  -Discussed the importance of checking BG's regularly.  Continue using CGM.  2 week data download is as follows:  Very High: 31%  High:40%  In Range:29%  Low: 0%  Very Low:0%  Trend shows overall hypers with post meal hypers as well.   -Start back Tresiba 80 units BID.  -Start back on Humalog 60 units TID with meals.  -Continue Metformin 1000 mg BID.  -Start back on Ozempic 0.25 mg weekly. Patient has no personal history of pancreatitis, no family history of MEN syndrome or medullary thyroid cancer. Possible side effects including nausea, bloating, other GI upset and rarely pancreatitis were discussed. He was advised to call the office with any symptoms or concerns.   -Discussed long term risks of untreated/undertreated diabetes including retinopathy, neuropathy, nephropathy, amputations, hospitalizations, MI, CVA.  -Discussed Glucagon use and appropriate timing for this.   -S/S hypoglycemia reviewed with Rule of 15's advised.  -Follow-up in 1 month.

## 2025-04-28 NOTE — PROGRESS NOTES
Specialty Pharmacy Patient Management Program  Endocrinology Initial Assessment       Wilbert Kelley is a 64 y.o. male with Type 2 Diabetes seen by an Endocrinology provider and enrolled in the Endocrinology Patient Management program offered by [facility].  An initial outreach was conducted, including assessment of therapy appropriateness and specialty medication education for Ozempic, Metformin, and Insulin therapy. The patient was introduced to services offered by [facility], including: regular assessments, refill coordination, curbside pick-up or mail order delivery options, prior authorization maintenance, and financial assistance programs as applicable. The patient was also provided with contact information for the pharmacy team.     Patient is currently taking Metformin 1000 mg BID, Novolog 60 units TID, and Tresiba 80 units BID. Patient was taking Ozempic 1 mg but has not taken it in about 1 month and reports not taking Tresiba for the last 2 days. He reports feeling as though the Novolog is not working for him. He denies side effects other than tolerable belching with metformin therapy.     Patient checks BG with Dexcom G7 with readings in the 200's and higher. Patient denies low BG <70. When BG is <70, the only symptom the patient experiences is vision changes.    Complications include: h/o HTN, HLD, CAD    Patient denies personal/family history of thyroid cancer, denies history of pancreatitis, and denies issues with recurrent UTI/yeast infections. Patient denies gastroparesis.     In the past, the patient has tried:     Drug Dose Reason for Discontinuation Notes   Lantus   Did not control BG    Levemir  Did not control BG    Glyburide  Did not control BG                                                Insurance Coverage & Financial Support  [place] [employer]     Relevant Past Medical History and Comorbidities  Relevant medical history and concomitant health  "conditions were discussed with the patient. The patient's chart has been reviewed for relevant past medical history and comorbid health conditions and updated as necessary.   Past Medical History:   Diagnosis Date    Arthritis     BPH (benign prostatic hyperplasia)     Cancer     basal and melanoma-  x2 - left side ear and elbow    Constipation     Coronary artery disease     DDD (degenerative disc disease), lumbar     Diabetes mellitus     couple times month check sugar    Frozen shoulder     left    GERD (gastroesophageal reflux disease)     History of pneumonia 02/2025    Hyperlipidemia     Hypertension     Limited mobility     left shoulder  - possible frozen shoulder-= please be aware for surgery    Oxygen dependent     \"2 liters at night since pneumonia\" per wife    Rotator cuff injury     right shoulder 3/14/2025    Sleep apnea     insurance took cpap machine due to pt not using 6 hours per night.    Tinnitus     Wears glasses     small print     Social History     Socioeconomic History    Marital status:     Number of children: 1   Tobacco Use    Smoking status: Never     Passive exposure: Past    Smokeless tobacco: Current     Types: Snuff   Vaping Use    Vaping status: Never Used   Substance and Sexual Activity    Alcohol use: Not Currently    Drug use: Never    Sexual activity: Defer       Problem list reviewed by Patricia Mcgraw RPH on 4/28/2025 at  1:00 PM  Problem list reviewed by Patricia Mcgraw RPH on 4/28/2025 at  1:36 PM    Allergies  Known allergies and reactions were discussed with the patient. The patient's chart has been reviewed for  allergy information and updated as necessary.   Allergies   Allergen Reactions    Morphine Nausea And Vomiting     projectile    Adhesive Tape Rash     Pt states sticky tabs cause irritation when left on long.  PAPER TAPE IS OK.        Allergies reviewed by Patricia Mcgraw RPH on 4/28/2025 at  1:00 PM  Allergies reviewed by Patricia Mcgraw RPH on " 4/28/2025 at  1:12 PM  Allergies reviewed by Patricia Mcgraw RP on 4/28/2025 at  1:36 PM    Relevant Laboratory Values  A1C Last 3 Results          3/28/2025    12:23   HGBA1C Last 3 Results   Hemoglobin A1C 6.80      Lab Results   Component Value Date    HGBA1C 6.80 (H) 03/28/2025     Lab Results   Component Value Date    GLUCOSE 107 (H) 03/28/2025    CALCIUM 9.2 03/28/2025     03/28/2025    K 4.4 03/28/2025    CO2 25.0 03/28/2025     03/28/2025    BUN 15 03/28/2025    CREATININE 1.00 03/28/2025    BCR 15.0 03/28/2025    ANIONGAP 12.0 03/28/2025     Lab Results   Component Value Date    CHOL 71 03/22/2023    TRIG 185 (H) 03/22/2023    HDL 18 (L) 03/22/2023    LDL 23 03/22/2023         Current Medication List  This medication list has been reviewed with the patient and evaluated for any interactions or necessary modifications/recommendations, and updated to include all prescription medications, OTC medications, and supplements the patient is currently taking.  This list reflects what is contained in the patient's profile, which has also been marked as reviewed to communicate to other providers it is the most up to date version of the patient's current medication therapy.     Current Outpatient Medications:     apixaban (ELIQUIS) 5 MG tablet tablet, Take 1 tablet by mouth 2 (Two) Times a Day., Disp: 56 tablet, Rfl: 0    aspirin 81 MG EC tablet, Take 1 tablet by mouth Daily., Disp: 90 tablet, Rfl: 3    atorvastatin (LIPITOR) 40 MG tablet, Take 1 tablet by mouth Daily., Disp: 90 tablet, Rfl: 12    benzoyl peroxide 5 % external liquid, Apply 1 application topically to the appropriate area 3 times prior to surgery as directed., Disp: 148 mL, Rfl: 0    bumetanide (BUMEX) 2 MG tablet, Take 1 tablet by mouth Daily. (Patient taking differently: Take 1 tablet by mouth Daily. Takes prn), Disp: , Rfl:     DULoxetine (CYMBALTA) 60 MG capsule, Take 1 capsule by mouth Daily., Disp: , Rfl:     fenofibrate 160 MG  tablet, Take 1 tablet by mouth Daily., Disp: 90 tablet, Rfl: 12    hydroCHLOROthiazide (MICROZIDE) 12.5 MG capsule, Take 1 capsule by mouth Daily., Disp: , Rfl:     Insulin Degludec (TRESIBA SC), Inject 80 Units under the skin into the appropriate area as directed 2 (Two) Times a Day., Disp: , Rfl:     Insulin Lispro (HUMALOG SC), Inject 60 Units under the skin into the appropriate area as directed 3 (Three) Times a Day Before Meals., Disp: , Rfl:     lisinopril (PRINIVIL,ZESTRIL) 40 MG tablet, Take 1 tablet by mouth 2 (Two) Times a Day., Disp: 60 tablet, Rfl: 11    metFORMIN (GLUCOPHAGE) 1000 MG tablet, Take 1 tablet by mouth 2 (Two) Times a Day With Meals., Disp: , Rfl:     nebivolol (BYSTOLIC) 10 MG tablet, Take 1 tablet by mouth Daily., Disp: 30 tablet, Rfl: 12    pantoprazole (PROTONIX) 40 MG EC tablet, Take 1 tablet by mouth Daily., Disp: , Rfl:     Semaglutide,0.25 or 0.5MG/DOS, (Ozempic, 0.25 or 0.5 MG/DOSE,) 2 MG/1.5ML solution pen-injector, Inject 0.25 mg under the skin into the appropriate area as directed 1 (One) Time Per Week. (Patient taking differently: Inject 1 mg under the skin into the appropriate area as directed 1 (One) Time Per Week.), Disp: , Rfl:     tamsulosin (FLOMAX) 0.4 MG capsule 24 hr capsule, Take 1 capsule by mouth Daily., Disp: 30 capsule, Rfl: 0    Insulin Glargine (LANTUS SOLOSTAR) 100 UNIT/ML injection pen, Inject 100 Units under the skin into the appropriate area as directed Daily., Disp: 30 mL, Rfl: 0    Medicines reviewed by Patricia Mcgraw RPH on 4/28/2025 at  1:00 PM  Medicines reviewed by Patricia Mcgraw RPH on 4/28/2025 at  1:23 PM  Medicines reviewed by Patricia Mcgraw RPH on 4/28/2025 at  1:36 PM    Drug Interactions  No significant drug-drug interactions with diabetes medications expected according to literature.    Recommended Medications Assessment  Aspirin -  Currently Taking   Statin -  Currently Taking   ACEi/ARB - Currently Taking     Adherence and  Self-Administration  Adherence related to the patient's specialty therapy was discussed with the patient. The Adherence segment of this outreach has been reviewed and updated.   Is there a concern with patient's ability to self administer the medication correctly and without issue?: No  Were any potential barriers to adherence identified during the initial assessment or patient education?: No  Are there any concerns regarding the patient's understanding of the importance of medication adherence?: No    Barriers to Patient Adherence and/or Self-Administration: N/A   Methods for Supporting Patient Adherence and/or Self-Administration: N/A      Goals of Therapy  Goals related to the patient's specialty therapy were discussed with the patient. The Patient Goals segment of this outreach has been reviewed and updated.    Goals Addressed Today        HEMOGLOBIN A1C < 7      Specialty Pharmacy General Goal      Prevent hypoglycemia              Reassessment Plan & Follow-Up  Patient's diabetes is uncontrolled with A1C of 6.8%.  Medication Therapy Changes:  Per verbal order from MALINDA Lara:   Order Lantus 100 units daily for patient to use until patient assistance application approved and medication received.   Related Plans, Therapy Recommendations or Therapy Problems to Be Addressed:   Care coordinator is working with patient and provider to complete patient assistance application for the following medications:  Tresiba 80 units BID  Novolog 60 units TID  Ozempic 0.5 mg weekly   Zegalogue auto-injector  Patient reports issues with affordability and adherence at this time.       Patient does not wish to use [facility] at this time due to: loyalty to pharmacy    Attestation  I attest the patient was actively involved in and has agreed to the above plan of care. If the prescribed therapy is at any point deemed not appropriate based on the current or future assessments, a consultation will be initiated  with the patient's specialty care provider to determine the best course of action. The revised plan of therapy will be documented along with any required assessments and/or additional patient education provided..    Patricia Mcgraw RPH   4/28/2025  14:23 EDT

## 2025-04-29 ENCOUNTER — ANESTHESIA EVENT CONVERTED (OUTPATIENT)
Dept: ANESTHESIOLOGY | Facility: HOSPITAL | Age: 65
End: 2025-04-29
Payer: MEDICARE

## 2025-04-29 ENCOUNTER — APPOINTMENT (OUTPATIENT)
Dept: GENERAL RADIOLOGY | Facility: HOSPITAL | Age: 65
End: 2025-04-29
Payer: MEDICARE

## 2025-04-29 ENCOUNTER — ANESTHESIA (OUTPATIENT)
Dept: PERIOP | Facility: HOSPITAL | Age: 65
End: 2025-04-29
Payer: MEDICARE

## 2025-04-29 ENCOUNTER — HOSPITAL ENCOUNTER (OUTPATIENT)
Facility: HOSPITAL | Age: 65
Discharge: HOME | End: 2025-05-06
Attending: ORTHOPAEDIC SURGERY | Admitting: ORTHOPAEDIC SURGERY
Payer: MEDICARE

## 2025-04-29 DIAGNOSIS — Z96.611 S/P REVERSE TOTAL SHOULDER ARTHROPLASTY, RIGHT: Primary | ICD-10-CM

## 2025-04-29 PROBLEM — E66.01 OBESITY, CLASS III, BMI 40-49.9 (MORBID OBESITY): Status: ACTIVE | Noted: 2025-04-29

## 2025-04-29 PROBLEM — E66.813 OBESITY, CLASS III, BMI 40-49.9 (MORBID OBESITY): Status: ACTIVE | Noted: 2025-04-29

## 2025-04-29 LAB
GLUCOSE BLDC GLUCOMTR-MCNC: 227 MG/DL (ref 70–130)
GLUCOSE BLDC GLUCOMTR-MCNC: 244 MG/DL (ref 70–130)
GLUCOSE BLDC GLUCOMTR-MCNC: 256 MG/DL (ref 70–130)
GLUCOSE BLDC GLUCOMTR-MCNC: 266 MG/DL (ref 70–130)
GLUCOSE BLDC GLUCOMTR-MCNC: 290 MG/DL (ref 70–130)
GLUCOSE SERPL-MCNC: 221 MG/DL (ref 65–99)
POTASSIUM SERPL-SCNC: 4.3 MMOL/L (ref 3.5–5.2)

## 2025-04-29 PROCEDURE — 97535 SELF CARE MNGMENT TRAINING: CPT

## 2025-04-29 PROCEDURE — 25010000002 ESMOLOL 100 MG/10ML SOLUTION

## 2025-04-29 PROCEDURE — C1776 JOINT DEVICE (IMPLANTABLE): HCPCS | Performed by: ORTHOPAEDIC SURGERY

## 2025-04-29 PROCEDURE — 97162 PT EVAL MOD COMPLEX 30 MIN: CPT

## 2025-04-29 PROCEDURE — 25010000002 PROPOFOL 10 MG/ML EMULSION

## 2025-04-29 PROCEDURE — 63710000001 INSULIN LISPRO (HUMAN) PER 5 UNITS: Performed by: INTERNAL MEDICINE

## 2025-04-29 PROCEDURE — 25010000002 CEFTRIAXONE PER 250 MG: Performed by: ORTHOPAEDIC SURGERY

## 2025-04-29 PROCEDURE — 97116 GAIT TRAINING THERAPY: CPT

## 2025-04-29 PROCEDURE — 25010000002 BUPIVACAINE (PF) 0.25 % SOLUTION: Performed by: NURSE ANESTHETIST, CERTIFIED REGISTERED

## 2025-04-29 PROCEDURE — 25010000002 PHENYLEPHRINE 10 MG/ML SOLUTION 1 ML VIAL

## 2025-04-29 PROCEDURE — 25010000002 CEFAZOLIN 3 G RECONSTITUTED SOLUTION 1 EACH VIAL: Performed by: ORTHOPAEDIC SURGERY

## 2025-04-29 PROCEDURE — 73030 X-RAY EXAM OF SHOULDER: CPT

## 2025-04-29 PROCEDURE — 63710000001 INSULIN GLARGINE PER 5 UNITS: Performed by: INTERNAL MEDICINE

## 2025-04-29 PROCEDURE — 97550 CAREGIVER TRAING 1ST 30 MIN: CPT

## 2025-04-29 PROCEDURE — 25810000003 SODIUM CHLORIDE 0.9 % SOLUTION 250 ML FLEX CONT

## 2025-04-29 PROCEDURE — 25010000002 ONDANSETRON PER 1 MG

## 2025-04-29 PROCEDURE — 82947 ASSAY GLUCOSE BLOOD QUANT: CPT

## 2025-04-29 PROCEDURE — 25010000002 FENTANYL CITRATE (PF) 50 MCG/ML SOLUTION

## 2025-04-29 PROCEDURE — 63710000001 INSULIN LISPRO (HUMAN) PER 5 UNITS

## 2025-04-29 PROCEDURE — C1713 ANCHOR/SCREW BN/BN,TIS/BN: HCPCS | Performed by: ORTHOPAEDIC SURGERY

## 2025-04-29 PROCEDURE — 25810000003 LACTATED RINGERS PER 1000 ML: Performed by: ANESTHESIOLOGY

## 2025-04-29 PROCEDURE — 97110 THERAPEUTIC EXERCISES: CPT

## 2025-04-29 PROCEDURE — 25010000002 ROPIVACAINE HCL-NACL 0.2-0.9 % SOLUTION: Performed by: NURSE ANESTHETIST, CERTIFIED REGISTERED

## 2025-04-29 PROCEDURE — 84132 ASSAY OF SERUM POTASSIUM: CPT | Performed by: ANESTHESIOLOGY

## 2025-04-29 PROCEDURE — C1755 CATHETER, INTRASPINAL: HCPCS | Performed by: ORTHOPAEDIC SURGERY

## 2025-04-29 PROCEDURE — 25010000002 DROPERIDOL PER 5 MG

## 2025-04-29 PROCEDURE — 25010000002 DEXAMETHASONE PER 1 MG

## 2025-04-29 PROCEDURE — 25010000002 HYDROMORPHONE 1 MG/ML SOLUTION

## 2025-04-29 PROCEDURE — 97166 OT EVAL MOD COMPLEX 45 MIN: CPT

## 2025-04-29 PROCEDURE — 25010000002 LIDOCAINE 1 % SOLUTION

## 2025-04-29 PROCEDURE — 25010000002 DEXAMETHASONE SODIUM PHOSPHATE 10 MG/ML SOLUTION: Performed by: NURSE ANESTHETIST, CERTIFIED REGISTERED

## 2025-04-29 PROCEDURE — 25010000002 FENTANYL CITRATE (PF) 50 MCG/ML SOLUTION: Performed by: NURSE ANESTHETIST, CERTIFIED REGISTERED

## 2025-04-29 DEVICE — IMPLANTABLE DEVICE: Type: IMPLANTABLE DEVICE | Site: SHOULDER | Status: FUNCTIONAL

## 2025-04-29 DEVICE — SUT NONABS BONE DYNACORD UHMWPE W/OS/6 NDL 2PK STRIP/BLU: Type: IMPLANTABLE DEVICE | Site: SHOULDER | Status: FUNCTIONAL

## 2025-04-29 DEVICE — GLENOID, HEAD W/RETAINING SCREW, RSP, 36MM, NEUTRAL
Type: IMPLANTABLE DEVICE | Site: SHOULDER | Status: FUNCTIONAL
Brand: DJO SURGICAL

## 2025-04-29 DEVICE — RSP BASEPLATE, 30MM, W/P2 COATING
Type: IMPLANTABLE DEVICE | Site: SHOULDER | Status: FUNCTIONAL
Brand: DJO SURGICAL

## 2025-04-29 DEVICE — RSP MONOBLOCK SOCKET INSERT, 36MM +4MM, SEMICONSTRAINED, HXE-PLUS
Type: IMPLANTABLE DEVICE | Site: SHOULDER | Status: FUNCTIONAL
Brand: DJO SURGICAL

## 2025-04-29 DEVICE — TOTL REV SHLDR DJO: Type: IMPLANTABLE DEVICE | Site: SHOULDER | Status: FUNCTIONAL

## 2025-04-29 DEVICE — ALTIVATE REVERSE, HUMERAL STEM, STANDARD SHELL, SZ 8X48MM
Type: IMPLANTABLE DEVICE | Site: SHOULDER | Status: FUNCTIONAL
Brand: DJO SURGICAL

## 2025-04-29 RX ORDER — SODIUM CHLORIDE 0.9 % (FLUSH) 0.9 %
10 SYRINGE (ML) INJECTION AS NEEDED
Status: DISCONTINUED | OUTPATIENT
Start: 2025-04-29 | End: 2025-04-29 | Stop reason: HOSPADM

## 2025-04-29 RX ORDER — INSULIN LISPRO 100 [IU]/ML
INJECTION, SOLUTION INTRAVENOUS; SUBCUTANEOUS
Status: COMPLETED
Start: 2025-04-29 | End: 2025-04-29

## 2025-04-29 RX ORDER — PROPOFOL 10 MG/ML
VIAL (ML) INTRAVENOUS AS NEEDED
Status: DISCONTINUED | OUTPATIENT
Start: 2025-04-29 | End: 2025-04-29 | Stop reason: SURG

## 2025-04-29 RX ORDER — TAMSULOSIN HYDROCHLORIDE 0.4 MG/1
0.4 CAPSULE ORAL DAILY
Status: DISCONTINUED | OUTPATIENT
Start: 2025-04-29 | End: 2025-05-06 | Stop reason: HOSPADM

## 2025-04-29 RX ORDER — SODIUM CHLORIDE, SODIUM LACTATE, POTASSIUM CHLORIDE, CALCIUM CHLORIDE 600; 310; 30; 20 MG/100ML; MG/100ML; MG/100ML; MG/100ML
9 INJECTION, SOLUTION INTRAVENOUS CONTINUOUS
Status: ACTIVE | OUTPATIENT
Start: 2025-04-30 | End: 2025-04-30

## 2025-04-29 RX ORDER — EPHEDRINE SULFATE 50 MG/ML
INJECTION INTRAVENOUS AS NEEDED
Status: DISCONTINUED | OUTPATIENT
Start: 2025-04-29 | End: 2025-04-29 | Stop reason: SURG

## 2025-04-29 RX ORDER — DOCUSATE SODIUM 100 MG/1
100 CAPSULE, LIQUID FILLED ORAL 2 TIMES DAILY
Status: DISCONTINUED | OUTPATIENT
Start: 2025-04-29 | End: 2025-05-06 | Stop reason: HOSPADM

## 2025-04-29 RX ORDER — MIDAZOLAM HYDROCHLORIDE 1 MG/ML
1 INJECTION, SOLUTION INTRAMUSCULAR; INTRAVENOUS
Status: DISCONTINUED | OUTPATIENT
Start: 2025-04-29 | End: 2025-04-29 | Stop reason: HOSPADM

## 2025-04-29 RX ORDER — ONDANSETRON 4 MG/1
4 TABLET, ORALLY DISINTEGRATING ORAL EVERY 6 HOURS PRN
Status: DISCONTINUED | OUTPATIENT
Start: 2025-04-29 | End: 2025-05-06 | Stop reason: HOSPADM

## 2025-04-29 RX ORDER — ONDANSETRON 2 MG/ML
4 INJECTION INTRAMUSCULAR; INTRAVENOUS EVERY 6 HOURS PRN
Status: DISCONTINUED | OUTPATIENT
Start: 2025-04-29 | End: 2025-05-06 | Stop reason: HOSPADM

## 2025-04-29 RX ORDER — NEBIVOLOL 5 MG/1
10 TABLET ORAL DAILY
Status: DISCONTINUED | OUTPATIENT
Start: 2025-04-29 | End: 2025-05-06 | Stop reason: HOSPADM

## 2025-04-29 RX ORDER — GABAPENTIN 400 MG/1
800 CAPSULE ORAL DAILY
Status: DISCONTINUED | OUTPATIENT
Start: 2025-04-29 | End: 2025-05-06 | Stop reason: HOSPADM

## 2025-04-29 RX ORDER — PANTOPRAZOLE SODIUM 40 MG/1
40 TABLET, DELAYED RELEASE ORAL DAILY
Status: DISCONTINUED | OUTPATIENT
Start: 2025-04-29 | End: 2025-05-06 | Stop reason: HOSPADM

## 2025-04-29 RX ORDER — DULOXETIN HYDROCHLORIDE 60 MG/1
60 CAPSULE, DELAYED RELEASE ORAL DAILY
Status: DISCONTINUED | OUTPATIENT
Start: 2025-04-29 | End: 2025-05-06 | Stop reason: HOSPADM

## 2025-04-29 RX ORDER — INSULIN LISPRO 100 [IU]/ML
45 INJECTION, SOLUTION INTRAVENOUS; SUBCUTANEOUS
Status: DISCONTINUED | OUTPATIENT
Start: 2025-04-29 | End: 2025-05-03

## 2025-04-29 RX ORDER — ROCURONIUM BROMIDE 10 MG/ML
INJECTION, SOLUTION INTRAVENOUS AS NEEDED
Status: DISCONTINUED | OUTPATIENT
Start: 2025-04-29 | End: 2025-04-29 | Stop reason: SURG

## 2025-04-29 RX ORDER — TRANEXAMIC ACID 10 MG/ML
1000 INJECTION, SOLUTION INTRAVENOUS ONCE
Status: DISCONTINUED | OUTPATIENT
Start: 2025-04-29 | End: 2025-04-29 | Stop reason: HOSPADM

## 2025-04-29 RX ORDER — BUPIVACAINE HYDROCHLORIDE 2.5 MG/ML
INJECTION, SOLUTION EPIDURAL; INFILTRATION; INTRACAUDAL; PERINEURAL
Status: COMPLETED | OUTPATIENT
Start: 2025-04-29 | End: 2025-04-29

## 2025-04-29 RX ORDER — FENTANYL CITRATE 50 UG/ML
INJECTION, SOLUTION INTRAMUSCULAR; INTRAVENOUS
Status: COMPLETED | OUTPATIENT
Start: 2025-04-29 | End: 2025-04-29

## 2025-04-29 RX ORDER — ESMOLOL HYDROCHLORIDE 10 MG/ML
INJECTION INTRAVENOUS AS NEEDED
Status: DISCONTINUED | OUTPATIENT
Start: 2025-04-29 | End: 2025-04-29 | Stop reason: SURG

## 2025-04-29 RX ORDER — HYDROCHLOROTHIAZIDE 25 MG/1
12.5 TABLET ORAL DAILY
Status: DISCONTINUED | OUTPATIENT
Start: 2025-04-29 | End: 2025-05-06 | Stop reason: HOSPADM

## 2025-04-29 RX ORDER — INSULIN LISPRO 100 [IU]/ML
15 INJECTION, SOLUTION INTRAVENOUS; SUBCUTANEOUS ONCE
Status: COMPLETED | OUTPATIENT
Start: 2025-04-29 | End: 2025-04-29

## 2025-04-29 RX ORDER — DEXAMETHASONE SODIUM PHOSPHATE 4 MG/ML
INJECTION, SOLUTION INTRA-ARTICULAR; INTRALESIONAL; INTRAMUSCULAR; INTRAVENOUS; SOFT TISSUE AS NEEDED
Status: DISCONTINUED | OUTPATIENT
Start: 2025-04-29 | End: 2025-04-29 | Stop reason: SURG

## 2025-04-29 RX ORDER — NICOTINE POLACRILEX 4 MG
15 LOZENGE BUCCAL
Status: DISCONTINUED | OUTPATIENT
Start: 2025-04-29 | End: 2025-05-06 | Stop reason: HOSPADM

## 2025-04-29 RX ORDER — DEXAMETHASONE SODIUM PHOSPHATE 10 MG/ML
INJECTION, SOLUTION INTRAMUSCULAR; INTRAVENOUS
Status: COMPLETED | OUTPATIENT
Start: 2025-04-29 | End: 2025-04-29

## 2025-04-29 RX ORDER — DEXTROSE MONOHYDRATE 25 G/50ML
25 INJECTION, SOLUTION INTRAVENOUS
Status: DISCONTINUED | OUTPATIENT
Start: 2025-04-29 | End: 2025-05-06 | Stop reason: HOSPADM

## 2025-04-29 RX ORDER — IBUPROFEN 600 MG/1
1 TABLET ORAL
Status: DISCONTINUED | OUTPATIENT
Start: 2025-04-29 | End: 2025-05-06 | Stop reason: HOSPADM

## 2025-04-29 RX ORDER — ONDANSETRON 2 MG/ML
INJECTION INTRAMUSCULAR; INTRAVENOUS AS NEEDED
Status: DISCONTINUED | OUTPATIENT
Start: 2025-04-29 | End: 2025-04-29 | Stop reason: SURG

## 2025-04-29 RX ORDER — HYDROMORPHONE HYDROCHLORIDE 1 MG/ML
0.5 INJECTION, SOLUTION INTRAMUSCULAR; INTRAVENOUS; SUBCUTANEOUS
Status: DISCONTINUED | OUTPATIENT
Start: 2025-04-29 | End: 2025-05-02

## 2025-04-29 RX ORDER — FAMOTIDINE 20 MG/1
20 TABLET, FILM COATED ORAL ONCE
Status: COMPLETED | OUTPATIENT
Start: 2025-04-29 | End: 2025-04-29

## 2025-04-29 RX ORDER — LIDOCAINE HYDROCHLORIDE 10 MG/ML
INJECTION, SOLUTION INFILTRATION; PERINEURAL AS NEEDED
Status: DISCONTINUED | OUTPATIENT
Start: 2025-04-29 | End: 2025-04-29 | Stop reason: SURG

## 2025-04-29 RX ORDER — GABAPENTIN 800 MG/1
TABLET ORAL
COMMUNITY
Start: 2025-04-02

## 2025-04-29 RX ORDER — FENTANYL CITRATE 50 UG/ML
50 INJECTION, SOLUTION INTRAMUSCULAR; INTRAVENOUS
Status: DISCONTINUED | OUTPATIENT
Start: 2025-04-29 | End: 2025-04-29 | Stop reason: HOSPADM

## 2025-04-29 RX ORDER — NALOXONE HCL 0.4 MG/ML
0.1 VIAL (ML) INJECTION
Status: DISCONTINUED | OUTPATIENT
Start: 2025-04-29 | End: 2025-05-06 | Stop reason: HOSPADM

## 2025-04-29 RX ORDER — DROPERIDOL 2.5 MG/ML
0.62 INJECTION, SOLUTION INTRAMUSCULAR; INTRAVENOUS
Status: DISCONTINUED | OUTPATIENT
Start: 2025-04-29 | End: 2025-04-29 | Stop reason: HOSPADM

## 2025-04-29 RX ORDER — ACETAMINOPHEN 325 MG/1
325 TABLET ORAL EVERY 6 HOURS
Status: DISCONTINUED | OUTPATIENT
Start: 2025-04-29 | End: 2025-05-02 | Stop reason: SDUPTHER

## 2025-04-29 RX ORDER — INSULIN LISPRO 100 [IU]/ML
4-24 INJECTION, SOLUTION INTRAVENOUS; SUBCUTANEOUS
Status: DISCONTINUED | OUTPATIENT
Start: 2025-04-29 | End: 2025-05-06 | Stop reason: HOSPADM

## 2025-04-29 RX ORDER — TRANEXAMIC ACID 10 MG/ML
1000 INJECTION, SOLUTION INTRAVENOUS ONCE
Status: COMPLETED | OUTPATIENT
Start: 2025-04-29 | End: 2025-04-29

## 2025-04-29 RX ORDER — HYDROMORPHONE HYDROCHLORIDE 1 MG/ML
0.5 INJECTION, SOLUTION INTRAMUSCULAR; INTRAVENOUS; SUBCUTANEOUS
Status: DISCONTINUED | OUTPATIENT
Start: 2025-04-29 | End: 2025-04-29 | Stop reason: HOSPADM

## 2025-04-29 RX ORDER — ASPIRIN 325 MG
325 TABLET ORAL DAILY
Status: DISCONTINUED | OUTPATIENT
Start: 2025-04-30 | End: 2025-05-02

## 2025-04-29 RX ORDER — FENTANYL CITRATE 50 UG/ML
INJECTION, SOLUTION INTRAMUSCULAR; INTRAVENOUS
Status: COMPLETED
Start: 2025-04-29 | End: 2025-04-29

## 2025-04-29 RX ORDER — ATORVASTATIN CALCIUM 40 MG/1
40 TABLET, FILM COATED ORAL DAILY
Status: DISCONTINUED | OUTPATIENT
Start: 2025-04-29 | End: 2025-05-06 | Stop reason: HOSPADM

## 2025-04-29 RX ORDER — LISINOPRIL 40 MG/1
40 TABLET ORAL 2 TIMES DAILY
Status: DISCONTINUED | OUTPATIENT
Start: 2025-04-29 | End: 2025-05-06 | Stop reason: HOSPADM

## 2025-04-29 RX ORDER — HYDROCODONE BITARTRATE AND ACETAMINOPHEN 7.5; 325 MG/1; MG/1
1 TABLET ORAL EVERY 4 HOURS PRN
Status: DISCONTINUED | OUTPATIENT
Start: 2025-04-29 | End: 2025-04-30

## 2025-04-29 RX ORDER — ROPIVACAINE HYDROCHLORIDE 2 MG/ML
INJECTION, SOLUTION EPIDURAL; INFILTRATION; PERINEURAL CONTINUOUS
Status: DISCONTINUED | OUTPATIENT
Start: 2025-04-29 | End: 2025-05-06 | Stop reason: HOSPADM

## 2025-04-29 RX ORDER — LIDOCAINE HYDROCHLORIDE 10 MG/ML
0.5 INJECTION, SOLUTION EPIDURAL; INFILTRATION; INTRACAUDAL; PERINEURAL ONCE AS NEEDED
Status: DISCONTINUED | OUTPATIENT
Start: 2025-04-29 | End: 2025-04-29 | Stop reason: HOSPADM

## 2025-04-29 RX ORDER — LABETALOL HYDROCHLORIDE 5 MG/ML
10 INJECTION, SOLUTION INTRAVENOUS EVERY 4 HOURS PRN
Status: DISCONTINUED | OUTPATIENT
Start: 2025-04-29 | End: 2025-05-06 | Stop reason: HOSPADM

## 2025-04-29 RX ORDER — ONDANSETRON 2 MG/ML
4 INJECTION INTRAMUSCULAR; INTRAVENOUS ONCE AS NEEDED
Status: DISCONTINUED | OUTPATIENT
Start: 2025-04-29 | End: 2025-04-29 | Stop reason: HOSPADM

## 2025-04-29 RX ORDER — DROPERIDOL 2.5 MG/ML
INJECTION, SOLUTION INTRAMUSCULAR; INTRAVENOUS
Status: COMPLETED
Start: 2025-04-29 | End: 2025-04-29

## 2025-04-29 RX ORDER — SODIUM CHLORIDE 0.9 % (FLUSH) 0.9 %
10 SYRINGE (ML) INJECTION EVERY 12 HOURS SCHEDULED
Status: DISCONTINUED | OUTPATIENT
Start: 2025-04-29 | End: 2025-04-29 | Stop reason: HOSPADM

## 2025-04-29 RX ORDER — INSULIN LISPRO 100 [IU]/ML
4 INJECTION, SOLUTION INTRAVENOUS; SUBCUTANEOUS ONCE
Status: COMPLETED | OUTPATIENT
Start: 2025-04-29 | End: 2025-04-29

## 2025-04-29 RX ADMIN — GABAPENTIN 800 MG: 400 CAPSULE ORAL at 16:26

## 2025-04-29 RX ADMIN — SODIUM CHLORIDE 3000 MG: 900 INJECTION INTRAVENOUS at 17:24

## 2025-04-29 RX ADMIN — EPHEDRINE SULFATE 10 MG: 50 INJECTION INTRAVENOUS at 11:44

## 2025-04-29 RX ADMIN — INSULIN LISPRO 15 UNITS: 100 INJECTION, SOLUTION INTRAVENOUS; SUBCUTANEOUS at 15:17

## 2025-04-29 RX ADMIN — HYDROMORPHONE HYDROCHLORIDE 0.5 MG: 1 INJECTION, SOLUTION INTRAMUSCULAR; INTRAVENOUS; SUBCUTANEOUS at 13:06

## 2025-04-29 RX ADMIN — DROPERIDOL 0.62 MG: 2.5 INJECTION, SOLUTION INTRAMUSCULAR; INTRAVENOUS at 12:14

## 2025-04-29 RX ADMIN — BUPIVACAINE HYDROCHLORIDE 15 ML: 2.5 INJECTION, SOLUTION EPIDURAL; INFILTRATION; INTRACAUDAL; PERINEURAL at 09:36

## 2025-04-29 RX ADMIN — BUPIVACAINE HYDROCHLORIDE 30 ML: 2.5 INJECTION, SOLUTION EPIDURAL; INFILTRATION; INTRACAUDAL; PERINEURAL at 09:41

## 2025-04-29 RX ADMIN — EPHEDRINE SULFATE 10 MG: 50 INJECTION INTRAVENOUS at 11:04

## 2025-04-29 RX ADMIN — ACETAMINOPHEN 325 MG: 325 TABLET, FILM COATED ORAL at 16:26

## 2025-04-29 RX ADMIN — FENTANYL CITRATE 50 MCG: 50 INJECTION, SOLUTION INTRAMUSCULAR; INTRAVENOUS at 12:46

## 2025-04-29 RX ADMIN — LIDOCAINE HYDROCHLORIDE 100 MG: 10 INJECTION, SOLUTION INFILTRATION; PERINEURAL at 10:10

## 2025-04-29 RX ADMIN — INSULIN LISPRO 45 UNITS: 100 INJECTION, SOLUTION INTRAVENOUS; SUBCUTANEOUS at 17:40

## 2025-04-29 RX ADMIN — NEBIVOLOL 10 MG: 5 TABLET ORAL at 16:25

## 2025-04-29 RX ADMIN — INSULIN LISPRO 12 UNITS: 100 INJECTION, SOLUTION INTRAVENOUS; SUBCUTANEOUS at 21:34

## 2025-04-29 RX ADMIN — INSULIN GLARGINE 46 UNITS: 100 INJECTION, SOLUTION SUBCUTANEOUS at 21:34

## 2025-04-29 RX ADMIN — FAMOTIDINE 20 MG: 20 TABLET, FILM COATED ORAL at 09:27

## 2025-04-29 RX ADMIN — DEXAMETHASONE SODIUM PHOSPHATE 2 MG: 10 INJECTION INTRAMUSCULAR; INTRAVENOUS at 09:41

## 2025-04-29 RX ADMIN — DULOXETINE 60 MG: 60 CAPSULE, DELAYED RELEASE ORAL at 16:26

## 2025-04-29 RX ADMIN — INSULIN LISPRO 4 UNITS: 100 INJECTION, SOLUTION INTRAVENOUS; SUBCUTANEOUS at 13:28

## 2025-04-29 RX ADMIN — PANTOPRAZOLE SODIUM 40 MG: 40 TABLET, DELAYED RELEASE ORAL at 16:26

## 2025-04-29 RX ADMIN — DOCUSATE SODIUM 100 MG: 100 CAPSULE, LIQUID FILLED ORAL at 21:33

## 2025-04-29 RX ADMIN — LISINOPRIL 40 MG: 40 TABLET ORAL at 21:33

## 2025-04-29 RX ADMIN — SODIUM CHLORIDE 3000 MG: 900 INJECTION INTRAVENOUS at 10:06

## 2025-04-29 RX ADMIN — ONDANSETRON 4 MG: 2 INJECTION INTRAMUSCULAR; INTRAVENOUS at 11:46

## 2025-04-29 RX ADMIN — PROPOFOL INJECTABLE EMULSION 300 MG: 10 INJECTION, EMULSION INTRAVENOUS at 10:10

## 2025-04-29 RX ADMIN — TRANEXAMIC ACID 1000 MG: 10 INJECTION, SOLUTION INTRAVENOUS at 11:46

## 2025-04-29 RX ADMIN — HYDROCODONE BITARTRATE AND ACETAMINOPHEN 1 TABLET: 7.5; 325 TABLET ORAL at 16:25

## 2025-04-29 RX ADMIN — SODIUM CHLORIDE, POTASSIUM CHLORIDE, SODIUM LACTATE AND CALCIUM CHLORIDE 9 ML/HR: 600; 310; 30; 20 INJECTION, SOLUTION INTRAVENOUS at 09:27

## 2025-04-29 RX ADMIN — TRANEXAMIC ACID 1000 MG: 10 INJECTION, SOLUTION INTRAVENOUS at 10:15

## 2025-04-29 RX ADMIN — Medication 1000 MG: at 12:15

## 2025-04-29 RX ADMIN — EPHEDRINE SULFATE 10 MG: 50 INJECTION INTRAVENOUS at 11:21

## 2025-04-29 RX ADMIN — PHENYLEPHRINE HYDROCHLORIDE 0.1 MCG/KG/MIN: 10 INJECTION INTRAVENOUS at 10:19

## 2025-04-29 RX ADMIN — INSULIN LISPRO 12 UNITS: 100 INJECTION, SOLUTION INTRAVENOUS; SUBCUTANEOUS at 17:41

## 2025-04-29 RX ADMIN — HYDROCHLOROTHIAZIDE 12.5 MG: 25 TABLET ORAL at 16:26

## 2025-04-29 RX ADMIN — TAMSULOSIN HYDROCHLORIDE 0.4 MG: 0.4 CAPSULE ORAL at 16:26

## 2025-04-29 RX ADMIN — ACETAMINOPHEN 325 MG: 325 TABLET, FILM COATED ORAL at 23:35

## 2025-04-29 RX ADMIN — ATORVASTATIN CALCIUM 40 MG: 40 TABLET, FILM COATED ORAL at 16:26

## 2025-04-29 RX ADMIN — DEXAMETHASONE SODIUM PHOSPHATE 8 MG: 4 INJECTION INTRA-ARTICULAR; INTRALESIONAL; INTRAMUSCULAR; INTRAVENOUS; SOFT TISSUE at 10:20

## 2025-04-29 RX ADMIN — FENTANYL CITRATE 100 MCG: 50 INJECTION, SOLUTION INTRAMUSCULAR; INTRAVENOUS at 09:26

## 2025-04-29 RX ADMIN — ROCURONIUM BROMIDE 100 MG: 10 INJECTION INTRAVENOUS at 10:10

## 2025-04-29 RX ADMIN — ESMOLOL HYDROCHLORIDE 30 MG: 100 INJECTION, SOLUTION INTRAVENOUS at 10:10

## 2025-04-29 NOTE — ANESTHESIA PROCEDURE NOTES
Peripheral Block      Patient reassessed immediately prior to procedure    Patient location during procedure: OR  Start time: 4/29/2025 9:38 AM  Stop time: 4/29/2025 9:41 AM  Reason for block: at surgeon's request and post-op pain management  Performed by  CRNA/CAA: Jimenez Greer CRNA  Assisted by: Nazanin Zurita RN  Preanesthetic Checklist  Completed: patient identified, IV checked, site marked, risks and benefits discussed, surgical consent, monitors and equipment checked, pre-op evaluation and timeout performed  Prep:  Pt Position: supine  Sterile barriers:cap, gloves, mask and washed/disinfected hands  Prep: ChloraPrep  Patient monitoring: blood pressure monitoring, continuous pulse oximetry and EKG  Procedure  Performed under: general  Guidance:ultrasound guided and landmark technique  Images:still images obtained, printed/placed on chart    Laterality:right  Block Type:PECS I and PECS II  Injection Technique:single-shot  Needle Type:short-bevel  Needle Gauge:20 G  Resistance on Injection: none    Medications Used: dexamethasone sodium phosphate injection - Injection   2 mg - 4/29/2025 9:41:00 AM  bupivacaine PF (MARCAINE) 0.25 % injection - Injection   30 mL - 4/29/2025 9:41:00 AM      Medications  Preservative Free Saline:5ml    Post Assessment  Injection Assessment: negative aspiration for heme, incremental injection and no paresthesia on injection  Patient Tolerance:comfortable throughout block  Complications:no  Additional Notes  Interpectoral-Pectoserratus Plane   A high-frequency linear transducer, with sterile cover, was placed medial to the coracoid process in the paramedian sagittal plane. The transducer was moved caudally to the 4th rib and rotated slightly to allow an in-plane needle trajectory from medial to lateral. Pectoralis Major Muscle (PMM), Pectoralis Minor Muscle (PmM), Thoracoacromial Artery, Ribs, and Pleura were identified under ultrasound. The insertion site was prepped in  "sterile fashion and then localized with 2-5 ml of 1% Lidocaine. Using ultrasound-guidance, a 20-gauge B-Rodgers 4\" Ultraplex 360 non-stimulating echogenic needle was advanced in plane until the tip of the needle was in the fascial plane between the PMM and PmM, lateral to the Thoracoacromial Artery. 1-3ml of preservative free normal saline was used to hydro-dissect the fascial planes. After the fascial plane was verified, 10ml local anesthetic (LA) was injected for Interpectoral fascial plane block. The needle was continued along the same path to the level of the 4th rib below PmM.  Initially preservative free normal saline was used to confirm needle position and then 20 ml of LA was injected for Pectoserratus fascial plane block. Aspiration every 5 ml to prevent intravascular injection. Injection was completed with negative aspiration of blood and negative intravascular injection. Injection pressures were normal with minimal resistance.     Performed by: Jimenez Greer, PEDRO            "

## 2025-04-29 NOTE — ANESTHESIA PROCEDURE NOTES
Airway  Reason: elective    Date/Time: 4/29/2025 10:11 AM  Airway not difficult    General Information and Staff    Patient location during procedure: OR  CRNA/CAA: Renata Montes CRNA    Indications and Patient Condition  Indications for airway management: airway protection    Preoxygenated: yes  MILS not maintained throughout    Mask difficulty assessment: 0 - not attempted    Final Airway Details    Final airway type: endotracheal airway      Successful airway: ETT  Cuffed: yes   Successful intubation technique: video laryngoscopy  Endotracheal tube insertion site: oral  Blade: Walter  Blade size: 4  ETT size (mm): 8.0  Cormack-Lehane Classification: grade I - full view of glottis  Placement verified by: chest auscultation and capnometry   Cuff volume (mL): 6  Measured from: lips  ETT/EBT  to lips (cm): 22  Number of attempts at approach: 1  Assessment: lips, teeth, and gum same as pre-op and atraumatic intubation    Additional Comments  Negative epigastric sounds, Breath sound equal bilaterally with symmetric chest rise and fall

## 2025-04-29 NOTE — OP NOTE
DATE OF OPERATION: 04/29/25  PREOPERATIVE DIAGNOSIS: right shoulder rotator cuff arthropathy.    POSTOPERATIVE DIAGNOSES:  1. right shoulder rotator cuff arthropathy  2. Biceps tenosynovitis.    PROCEDURES PERFORMED:  1. right reverse total shoulder arthroplasty.    2. right biceps tenodesis.      Procedure/CPT® Codes:  NC ARTHROPLASTY GLENOHUMERAL JOINT TOTAL SHOULDER [30022]    SURGICAL APPROACH: Deltopectoral      SURGICAL TECHNIQUE: Tenotomy         Procedure(s):  TOTAL SHOULDER REVERSE ARTHROPLASTY WITH BICEPS TENODESIS RIGHT    Staff:  Surgeon(s):  Enzo Mckeon Jr., MD    Circulator: Margo Plata RN; Diomedes Curtis RN  Scrub Person: Raven Umana; Willard Urena  Vendor Representative: Kaila Pak  Nursing Assistant: Elizabeth Molina  Orientee: Nuha Vines RN           Anesthesia: General with Block    Estimated Blood Loss: 200ml    Implants:    Implant Name Type Inv. Item Serial No.  Lot No. LRB No. Used Action   SUT NONABS BONE DYNACORD UHMWPE W/OS/6 NDL 2PK STRIP/LIVIER - [id number] Implant SUT NONABS BONE DYNACORD UHMWPE W/OS/6 NDL 2PK STRIP/LIVIER  DEPUY [id number] Right 2 Implanted   STEM HUM/SHLDR ALTIVATE REV STD SZ8 48MM - [id number] Implant STEM HUM/SHLDR ALTIVATE REV STD SZ8 48MM  [employer] [id number] Right 1 Implanted   SCRW PERIPH ALTIVATE/REVERSE TORX 22MM - [id number] - [id number] Implant SCRW PERIPH ALTIVATE/REVERSE TORX 22MM [id number] [employer] [id number] Right 1 Implanted   BASEPLT MAN REV RSP TI [id number] - [id number] Implant BASEPLT MAN REV RSP TI [id number]  [employer] [id number] Right 1 Implanted   HD MAN SHLDR REV NTRL 36MM - [id number] Implant HD MAN SHLDR REV NTRL 36MM  [employer] [id number] Right 1 Implanted   SCRW PERIPH ALTIVATE/REVERSE TORX 14MM - [id number] - [id number] Implant SCRW PERIPH ALTIVATE/REVERSE TORX 14MM [id number] [employer] [id number] Right 2 Implanted   INSRT SOCKT HUM/SHLDR ALTIVATE/REVERSE SEMI/CONSTR SZ36 - [id number] Implant INSRT  SOCKT HUM/SHLDR ALTIVATE/REVERSE SEMI/CONSTR SZ36  [employer] [id number] Right 1 Explanted   INSRT SOCKT HUM/SHLDR ALTIVATE/REVERSE SEMI/CONSTR SZ36 PLS4 - [id number] Implant INSRT SOCKT HUM/SHLDR ALTIVATE/REVERSE SEMI/CONSTR SZ36 PLS4  [employer] [id number] Right 1 Implanted   SCRW PERIPH ALTIVATE/REVERSE TORX 34MM - [id number] Implant SCRW PERIPH ALTIVATE/REVERSE TORX 34MM  [employer] [id number] Right 1 Implanted       Specimen:                None    INDICATIONS: This is a 64-year-old male with right shoulder pain and limited function and motion secondary to rotator cuff arthropathy. They have failed conservative treatment and after a discussion of risks, benefits, and alternatives, wished to proceed with shoulder arthroplasty.  DESCRIPTION OF PROCEDURE: On the day of surgery, the patient identified the right shoulder as the correct operative extremity. This was initialed by the surgeon with the patient's acknowledgment. The patient underwent placement of an interscalene block and was taken to the operating room and placed in the supine position. Upon induction of adequate anesthesia, the patient was brought up to the beach chair position and the shoulder and upper extremity were prepped and draped in the usual sterile fashion. Timeout confirmed the correct patient and operative extremity as well as that antibiotics were on board. A standard deltopectoral approach to the shoulder was carried out. It was carried sharply through the skin and subcutaneous tissue. Medial and lateral flaps were developed over the deltopectoral fascia. The cephalic vein was identified and mobilized laterally with the deltoid. The subdeltoid and subpectoral spaces were mobilized and a blunt retractor was placed deep to this. The clavipectoral fascia was opened on the lateral edge of the conjoined tendon and the retractor was moved deep to this. The leading edge of the pectoralis was released exposing the long head of the biceps. This  was tenosynovitic. It was tenodesed to the pectoralis and released proximal to this. The 3 sisters were identified and coagulated. A subscapularis tenotomy was performed 1 cm medial to its insertion on the lesser tuberosity and rotator interval was released to the glenoid exposing the humeral head. The inferior capsule was released directly off the humerus to allow greater than 90° of external rotation. The anatomic neck was exposed and the humeral head osteotomy was performed in approximately 30° of retroversion. The remainder of the osteophytes were removed. The humerus was subluxated posteriorly. The glenoid exposed. Circumferential labral excision and capsular release were performed. A 270° mobilization of the subscapularis was carried out as well.  A centering hole was drilled and a 6.5 mm tap was placed. The glenoid was gently reamed and then the tap was removed. A standard baseplate was then screwed into place, achieving excellent bite.  We then placed the drill guide for the locking screws, then drilled and placed 4 peripheral locking screws.  The glenosphere was then inserted and locked into place with a set screw.  The humerus was carefully subluxed back anteriorly. The canal was then entered, reamed, and broached. The final stem impacted in in approximately 30° of retroversion. A trial polyethylene was placed and trialing was carried out. The appropriate final size polyethylene component chosen and malleted into place.  The shoulder was then reduced. We then repaired the subscapularis with 3 - #2 nonabsorbable sutures passed in a figure of 8 fashion. This allowed nearly full passive range of motion with no instability. The joint was copiously irrigated with normal saline irrigation mixed with Rocephin after the final implants were assembled and locked into place.  Passive range of motion will be 120 degrees but external rotation will be limited to 0° in the perioperative period. The deltopectoral interval  was approximated with 0 Vicryl, the subcutaneous tissue with 2-0 Vicryl, and the skin with an exofin dressing. Anesthesia was reversed and the patient was taken to the recovery room in stable condition. All instrument, needle, and sponge counts were correct.      Enzo Mckeon Jr., MD  04/29/25  14:20 EDT

## 2025-04-29 NOTE — PLAN OF CARE
Problem: Adult Inpatient Plan of Care  Goal: Plan of Care Review  Recent Flowsheet Documentation  Taken 4/29/2025 1604 by Kaylyn Sorensen OT  Progress: (IE) --  Outcome Evaluation: OT IE completed, pt requested OT come to PACU for assessment. Pt is up with Min Ax2 and limited by generalized weakness, ROBLES (86% on 2L), and balance deficits. Education initiated for R shoulder precautions, sling teaching, and ADL retraining including instruction for nerve catheter precautions and mgmt to prevent dislodgement. Pt is Dependent with UB ADLs and sling mgmt this session. OT will follow IP. Given pt's weakness and balance deficits with recurrent fall history, recommend IRF at d/c for best outcome if medically appropriate.

## 2025-04-29 NOTE — ANESTHESIA POSTPROCEDURE EVALUATION
Patient: Wilbert Kelley    Procedure Summary       Date: 04/29/25 Room / Location:  [facility]  /  [facility]    Anesthesia Start: 1002 Anesthesia Stop: 1206    Procedure: TOTAL SHOULDER REVERSE ARTHROPLASTY WITH BICEPS TENODESIS RIGHT (Right: Shoulder) Diagnosis:     Surgeons: Enzo Mckeon Jr., MD Provider: Matias Mcintosh MD    Anesthesia Type: general ASA Status: 4            Anesthesia Type: general    Vitals  Vitals Value Taken Time   /70 04/29/25 12:06   Temp 97.5 °F (36.4 °C) 04/29/25 12:06   Pulse 80 04/29/25 12:06   Resp 14 04/29/25 12:06   SpO2 92 % 04/29/25 12:06           Post Anesthesia Care and Evaluation    Patient location during evaluation: PACU  Patient participation: complete - patient participated  Level of consciousness: awake and alert  Pain management: adequate    Airway patency: patent  Anesthetic complications: No anesthetic complications  PONV Status: none  Cardiovascular status: hemodynamically stable and acceptable  Respiratory status: nonlabored ventilation, acceptable, nasal cannula and spontaneous ventilation  Hydration status: acceptable  No anesthesia care post op

## 2025-04-29 NOTE — ANESTHESIA PROCEDURE NOTES
Peripheral Block      Patient reassessed immediately prior to procedure    Patient location during procedure: pre-op  Start time: 4/29/2025 9:26 AM  Stop time: 4/29/2025 9:36 AM  Reason for block: at surgeon's request and post-op pain management  Performed by  CRNA/CAA: Jimenez Greer CRNA  Assisted by: Nazanin Zurita RN  Preanesthetic Checklist  Completed: patient identified, IV checked, site marked, risks and benefits discussed, surgical consent, monitors and equipment checked, pre-op evaluation and timeout performed  Prep:  Pt Position: left lateral decubitus  Sterile barriers:cap, gloves, mask and washed/disinfected hands  Prep: ChloraPrep  Patient monitoring: blood pressure monitoring, continuous pulse oximetry and EKG  Procedure    Sedation: yes  Performed under: local infiltration  Guidance:ultrasound guided    ULTRASOUND INTERPRETATION.  Using ultrasound guidance a 20 G gauge needle was placed in close proximity to the nerve, at which point, under ultrasound guidance anesthetic was injected in the area of the nerve and spread of the anesthesia was seen on ultrasound in close proximity thereto.  There were no abnormalities seen on ultrasound; a digital image was taken; and the patient tolerated the procedure with no complications. Images:still images obtained, printed/placed on chart    Laterality:right  Block Type:interscalene  Injection Technique:catheter  Needle Type:Tuohy and echogenic  Needle Gauge:18 G  Resistance on Injection: none  Catheter Size:20 G (20g)  Cath Depth at skin: 9 cm    Medications Used: fentaNYL citrate (PF) (SUBLIMAZE) injection - Intravenous   100 mcg - 4/29/2025 9:26:00 AM  bupivacaine PF (MARCAINE) 0.25 % injection - Injection   15 mL - 4/29/2025 9:36:00 AM      Medications  Preservative Free Saline:5ml    Post Assessment  Injection Assessment: negative aspiration for heme, no paresthesia on injection and incremental injection  Patient Tolerance:comfortable throughout  "block  Complications:no  Additional Notes  CATHETER  A high-frequency linear transducer, with sterile cover, was placed in the supraclavicular fossa to identify the subclavian artery and trunks and divisions of the brachial plexus. The transducer was then moved in a cephalad orientation with a slight rotation to continue visualization of the brachial plexus from the trunks and divisions, on to the C5-C7 roots. The insertion site was prepped and draped in sterile fashion. Skin and cutaneous tissue was infiltrated with 2-5 ml of 1% Lidocaine. Using ultrasound-guidance, an 18-gauge Contiplex Ultra 360 Touhy needle was advanced in plane from lateral to medial. Preservative-free normal saline was utilized for hydro-dissection of tissue, advancement of Touhy, and to confirm final needle placement at the fascial plane between the middle scalene muscle and sheath of the brachial plexus (C5-C7). A 20-gauge Contiplex Echo catheter was placed through the needle and advance out the tip of the Touhy 3-5 cm with the \"Slaughter Flip\". The Touhy needle was then removed, and final catheter position verified lateral to the brachial plexus with local anesthetic (LA) and ultrasound visualization. The catheter was secured in the usual fashion with skin glue, benzoin, steri-strips, CHG tegaderm and label noting \"Nerve Block Catheter\". Jerk tape applied at yellow connector and catheter connection. All LA was injected in increments of 3-5 ml after catheter secured. Aspiration every 5 ml to prevent intravascular injection. Injection was completed with negative aspiration of blood and negative intravascular injection. Injection pressures were normal with minimal resistance.   Performed by: Jimenez Greer, CRNA            "

## 2025-04-29 NOTE — H&P
Pre-Op H&P  Wilbert Kelley  [id number]  1960      Chief complaint: Right shoulder pain      Subjective:  Patient is a 64 y.o.male presents for scheduled surgery by Dr. Mckeon. He anticipates a TOTAL SHOULDER REVERSE ARTHROPLASTY WITH BICEPS TENODESIS RIGHT  today.  He has a long history of right shoulder pain and limited range of motion.  He had a fall on 3/24/2025 and landed on the right shoulder injuring it further.  He has had numbness, tingling and difficulty with  right hand.  Conservative treatments failed to provide lasting benefit.      Review of Systems:  Constitutional-- No fever, chills or sweats. No fatigue.  CV-- No chest pain, palpitation or syncope. +HTN, HLD, CAD  Resp-- No cough, hemoptysis. +SOB, home O2  Skin--No rashes or lesions      Allergies:   Allergies   Allergen Reactions    Morphine Nausea And Vomiting     projectile    Adhesive Tape Rash     Pt states sticky tabs cause irritation when left on long.  PAPER TAPE IS OK.          Home Meds:  Medications Prior to Admission   Medication Sig Dispense Refill Last Dose/Taking    atorvastatin (LIPITOR) 40 MG tablet Take 1 tablet by mouth Daily. 90 tablet 12 4/28/2025 Morning    benzoyl peroxide 5 % external liquid Apply 1 application topically to the appropriate area 3 times prior to surgery as directed. 148 mL 0 4/29/2025 Morning    DULoxetine (CYMBALTA) 60 MG capsule Take 1 capsule by mouth Daily.   4/28/2025 Morning    fenofibrate 160 MG tablet Take 1 tablet by mouth Daily. 90 tablet 12 4/28/2025 Morning    gabapentin (NEURONTIN) 800 MG tablet    4/28/2025 Bedtime    hydroCHLOROthiazide (MICROZIDE) 12.5 MG capsule Take 1 capsule by mouth Daily.   4/28/2025 Morning    Insulin Glargine (LANTUS SOLOSTAR) 100 UNIT/ML injection pen Inject 100 Units under the skin into the appropriate area as directed Daily. 30 mL 0 4/28/2025 Evening    Insulin Lispro (HUMALOG SC) Inject 60 Units under the skin into the appropriate area as directed 3  (Three) Times a Day Before Meals.   4/28/2025 Bedtime    lisinopril (PRINIVIL,ZESTRIL) 40 MG tablet Take 1 tablet by mouth 2 (Two) Times a Day. 60 tablet 11 4/28/2025 Bedtime    metFORMIN (GLUCOPHAGE) 1000 MG tablet Take 1 tablet by mouth 2 (Two) Times a Day With Meals.   4/28/2025 Bedtime    nebivolol (BYSTOLIC) 10 MG tablet Take 1 tablet by mouth Daily. 30 tablet 12 4/28/2025 at  5:00 AM    pantoprazole (PROTONIX) 40 MG EC tablet Take 1 tablet by mouth Daily.   4/28/2025 Morning    tamsulosin (FLOMAX) 0.4 MG capsule 24 hr capsule Take 1 capsule by mouth Daily. 30 capsule 0 4/28/2025 Morning    apixaban (ELIQUIS) 5 MG tablet tablet Take 1 tablet by mouth 2 (Two) Times a Day. 56 tablet 0 4/26/2025 at 10:00 PM    aspirin 81 MG EC tablet Take 1 tablet by mouth Daily. 90 tablet 3 4/22/2025 Morning    bumetanide (BUMEX) 2 MG tablet Take 1 tablet by mouth Daily. (Patient taking differently: Take 1 tablet by mouth Daily. Takes prn)   4/25/2025    Insulin Degludec (TRESIBA SC) Inject 80 Units under the skin into the appropriate area as directed 2 (Two) Times a Day. (Patient not taking: Reported on 4/29/2025)   Not Taking    Semaglutide,0.25 or 0.5MG/DOS, (Ozempic, 0.25 or 0.5 MG/DOSE,) 2 MG/1.5ML solution pen-injector Inject 0.25 mg under the skin into the appropriate area as directed 1 (One) Time Per Week. (Patient taking differently: Inject 1 mg under the skin into the appropriate area as directed 1 (One) Time Per Week.)   2/24/2025         PMH:   Past Medical History:   Diagnosis Date    Arthritis     BPH (benign prostatic hyperplasia)     Cancer     basal and melanoma-  x2 - left side ear and elbow    Constipation     Coronary artery disease     DDD (degenerative disc disease), lumbar     Diabetes mellitus     couple times month check sugar    Frozen shoulder     left    GERD (gastroesophageal reflux disease)     History of pneumonia 02/2025    Hyperlipidemia     Hypertension     Limited mobility     left shoulder  -  "possible frozen shoulder-= please be aware for surgery    Oxygen dependent     \"2 liters at night since pneumonia\" per wife    Rotator cuff injury     right shoulder 3/14/2025    Sleep apnea     insurance took cpap machine due to pt not using 6 hours per night.    Tinnitus     Wears glasses     small print     PSH:    Past Surgical History:   Procedure Laterality Date    CARDIAC CATHETERIZATION      CARDIAC CATHETERIZATION Right 03/16/2023    Procedure: Left Heart Cath;  Surgeon: Jarod Boyle MD;  Location:  [facility];  Service: Cardiovascular;  Laterality: Right;    CATARACT EXTRACTION, BILATERAL Bilateral     COLONOSCOPY      CORONARY ARTERY BYPASS GRAFT N/A 3/21/2023    Procedure: MEDIAN STERNOTOMY, CORONARY ARTERY BYPASS GRAFTING X 3, UTILIZING THE LEFT INTERNAL MAMMARY ARTERY, ENDOSCOPIC VEIN HARVEST OF THE RIGHT GREATER SAPENOUSE VEIN;  Surgeon: Markus Emanuel MD;  Location:  [facility];  Service: Cardiothoracic;  Laterality: N/A;    CORONARY STENT PLACEMENT      x4    ENDOSCOPY      FINGER SURGERY Left     middle finger - left side    SKIN CANCER EXCISION      x2    WISDOM TOOTH EXTRACTION         Immunization History:  Influenza: No  Pneumococcal: No  Tetanus: No    Social History:   Tobacco:   Social History     Tobacco Use   Smoking Status Never    Passive exposure: Past   Smokeless Tobacco Current    Types: Snuff      Alcohol:     Social History     Substance and Sexual Activity   Alcohol Use Not Currently         Physical Exam:/89 (BP Location: Left arm, Patient Position: Lying)   Pulse 68   Temp 98.6 °F (37 °C) (Temporal)   Resp 18   SpO2 94%       General Appearance:    Alert, cooperative, no distress, appears stated age   Head:    Normocephalic, without obvious abnormality, atraumatic   Lungs:     Clear to auscultation bilaterally, respirations unlabored    Heart:   Regular rate and rhythm, S1 and S2 normal    Abdomen:    Soft without tenderness. Obese   Extremities:  "  Extremities normal, atraumatic, no cyanosis or edema   Skin:   Skin color, texture, turgor normal, no rashes or lesions   Neurologic:   Grossly intact     Results Review:     LABS:  Lab Results   Component Value Date    WBC 9.00 03/28/2025    HGB 12.2 (L) 03/28/2025    HCT 39.9 03/28/2025    MCV 77.8 (L) 03/28/2025     03/28/2025    NEUTROABS 6.66 05/12/2023    GLUCOSE 107 (H) 03/28/2025    BUN 15 03/28/2025    CREATININE 1.00 03/28/2025     03/28/2025    K 4.4 03/28/2025     03/28/2025    CO2 25.0 03/28/2025    MG 1.6 05/06/2023    PHOS 3.0 03/29/2023    CALCIUM 9.2 03/28/2025    ALBUMIN 3.3 (L) 05/11/2023    AST 16 05/11/2023    ALT 27 05/11/2023    BILITOT 1.0 05/11/2023       RADIOLOGY:  Imaging Results (Last 72 Hours)       ** No results found for the last 72 hours. **            I reviewed the patient's new clinical results.    Cancer Staging (if applicable)  Cancer Patient: __ yes __no __unknown; If yes, clinical stage T:__ N:__M:__, stage group or __N/A      Impression: Degenerative joint disease right shoulder; right shoulder pain      Plan: TOTAL SHOULDER REVERSE ARTHROPLASTY WITH BICEPS TENODESIS RIGHT       MALINAD Tamayo   4/29/2025   09:02 EDT

## 2025-04-29 NOTE — H&P
Patient Name: Wilbert Kelley  MRN: [id number]  : 1960  DOS: 2025    Attending: Reece Moreira MD    Primary Care Provider: Jose M Simon MD      Chief complaint:  Right Shoulder pain    Subjective   Patient is a pleasant 64 y.o. male presented for scheduled surgery by .     He has a long history of right shoulder pain and limited range of motion.  He had a fall on 3/24/2025 and landed on the right shoulder injuring it further.  He has had numbness, tingling and difficulty with  right hand.  Conservative treatments failed to provide lasting benefit.     He underwent right reverse total shoulder arthroplasty and right biceps tenodesis, surgery was done under GA and a block, he tolerated surgery well, and was admitted for further management.  I saw him in PACU he is doing fairly well.  No complaints of nausea, vomiting, or shortness of breath.  He has some postoperative pain.      Reviewed with patient his past medical history and home medications.  He is on a large dose of insulin both long-acting and short acting.  His long-acting is Tresiba 80 units twice daily.  He is short acting lispro is 60 units with meals 3 times daily.  He is on chronic anticoagulation.  This is on hold for surgery.  He has peripheral neuropathy.  He has history of sleep apnea but not on CPAP currently.  He is on home oxygen at 2 L.  He has coronary artery disease with prior CABG.  This is stable currently.  He is on tamsulosin for BPH.       Allergies   Allergen Reactions    Morphine Nausea And Vomiting     projectile    Adhesive Tape Rash     Pt states sticky tabs cause irritation when left on long.  PAPER TAPE IS OK.        Meds:  Medications Prior to Admission   Medication Sig Dispense Refill Last Dose/Taking    atorvastatin (LIPITOR) 40 MG tablet Take 1 tablet by mouth Daily. 90 tablet 12 2025 Morning    DULoxetine (CYMBALTA) 60 MG capsule Take 1 capsule by mouth Daily.   2025 Morning     fenofibrate 160 MG tablet Take 1 tablet by mouth Daily. 90 tablet 12 4/28/2025 Morning    gabapentin (NEURONTIN) 800 MG tablet    4/28/2025 Bedtime    hydroCHLOROthiazide (MICROZIDE) 12.5 MG capsule Take 1 capsule by mouth Daily.   4/28/2025 Morning    Insulin Glargine (LANTUS SOLOSTAR) 100 UNIT/ML injection pen Inject 100 Units under the skin into the appropriate area as directed Daily. 30 mL 0 4/28/2025 Evening    Insulin Lispro (HUMALOG SC) Inject 60 Units under the skin into the appropriate area as directed 3 (Three) Times a Day Before Meals.   4/28/2025 Bedtime    lisinopril (PRINIVIL,ZESTRIL) 40 MG tablet Take 1 tablet by mouth 2 (Two) Times a Day. 60 tablet 11 4/28/2025 Bedtime    metFORMIN (GLUCOPHAGE) 1000 MG tablet Take 1 tablet by mouth 2 (Two) Times a Day With Meals.   4/28/2025 Bedtime    nebivolol (BYSTOLIC) 10 MG tablet Take 1 tablet by mouth Daily. 30 tablet 12 4/28/2025 at  5:00 AM    pantoprazole (PROTONIX) 40 MG EC tablet Take 1 tablet by mouth Daily.   4/28/2025 Morning    tamsulosin (FLOMAX) 0.4 MG capsule 24 hr capsule Take 1 capsule by mouth Daily. 30 capsule 0 4/28/2025 Morning    apixaban (ELIQUIS) 5 MG tablet tablet Take 1 tablet by mouth 2 (Two) Times a Day. 56 tablet 0 4/26/2025 at 10:00 PM    aspirin 81 MG EC tablet Take 1 tablet by mouth Daily. 90 tablet 3 4/22/2025 Morning    bumetanide (BUMEX) 2 MG tablet Take 1 tablet by mouth Daily. (Patient taking differently: Take 1 tablet by mouth Daily. Takes prn)   4/25/2025    Insulin Degludec (TRESIBA SC) Inject 80 Units under the skin into the appropriate area as directed 2 (Two) Times a Day. (Patient not taking: Reported on 4/29/2025)   Not Taking    Semaglutide,0.25 or 0.5MG/DOS, (Ozempic, 0.25 or 0.5 MG/DOSE,) 2 MG/1.5ML solution pen-injector Inject 0.25 mg under the skin into the appropriate area as directed 1 (One) Time Per Week. (Patient taking differently: Inject 1 mg under the skin into the appropriate area as directed 1 (One) Time  "Per Week.)   2/24/2025            Past Medical History:   Diagnosis Date    Arthritis     BPH (benign prostatic hyperplasia)     Cancer     basal and melanoma-  x2 - left side ear and elbow    Constipation     Coronary artery disease     DDD (degenerative disc disease), lumbar     Diabetes mellitus     couple times month check sugar    Frozen shoulder     left    GERD (gastroesophageal reflux disease)     History of pneumonia 02/2025    Hyperlipidemia     Hypertension     Limited mobility     left shoulder  - possible frozen shoulder-= please be aware for surgery    Oxygen dependent     \"2 liters at night since pneumonia\" per wife    Rotator cuff injury     right shoulder 3/14/2025    Sleep apnea     insurance took cpap machine due to pt not using 6 hours per night.    Tinnitus     Wears glasses     small print     Past Surgical History:   Procedure Laterality Date    CARDIAC CATHETERIZATION      CARDIAC CATHETERIZATION Right 03/16/2023    Procedure: Left Heart Cath;  Surgeon: Jarod Boyle MD;  Location:  [facility];  Service: Cardiovascular;  Laterality: Right;    CATARACT EXTRACTION, BILATERAL Bilateral     COLONOSCOPY      CORONARY ARTERY BYPASS GRAFT N/A 3/21/2023    Procedure: MEDIAN STERNOTOMY, CORONARY ARTERY BYPASS GRAFTING X 3, UTILIZING THE LEFT INTERNAL MAMMARY ARTERY, ENDOSCOPIC VEIN HARVEST OF THE RIGHT GREATER SAPENOUSE VEIN;  Surgeon: Markus Emanuel MD;  [facility];  Service: Cardiothoracic;  Laterality: N/A;    CORONARY STENT PLACEMENT      x4    ENDOSCOPY      FINGER SURGERY Left     middle finger - left side    SKIN CANCER EXCISION      x2    WISDOM TOOTH EXTRACTION       Family History   Problem Relation Age of Onset    COPD Mother     Heart attack Father 45        passed    Diabetes Paternal Grandmother     Heart attack Paternal Grandfather 42        massive heart attack    Heart disease Paternal Grandfather      Social History     Tobacco Use    Smoking status: " "Never     Passive exposure: Past    Smokeless tobacco: Current     Types: Snuff   Vaping Use    Vaping status: Never Used   Substance Use Topics    Alcohol use: Not Currently    Drug use: Never       Review of Systems  Pertinent items are noted in HPI    Vital Signs  BP (!) 181/64 (BP Location: Left arm, Patient Position: Lying)   Pulse 79   Temp 97.6 °F (36.4 °C) (Oral)   Resp 20   Ht 185.4 cm (73\")   Wt (!) 150 kg (331 lb 2.1 oz)   SpO2 93%   BMI 43.69 kg/m²     Physical Exam:    General Appearance:    Alert, cooperative, in no acute distress   Head:    Normocephalic, without obvious abnormality, atraumatic   Eyes:            Lids and lashes normal, conjunctivae and sclerae normal, no   icterus    Ears:    Ears appear intact with no abnormalities noted   Throat:   No oral lesions, no thrush, oral mucosa moist   Neck:   No adenopathy, supple, trachea midline, no thyromegaly         Lungs:     Clear to auscultation,respirations regular, even and        unlabored    Heart:    Regular rhythm and normal rate, normal S1 and S2, no      murmur    Abdomen:     Normal bowel sounds, no masses, no organomegaly, soft        nontender, nondistended, no guarding, no rebound                 tenderness   Genitalia:    Deferred   Extremities: Right UE in a sling, CDI  dressing . Interscalene nerve block cath present.  Distal pulses, cap refill, movements of fingers, wrist, intact.     Pulses:   Pulses palpable and equal bilaterally   Skin:   No bleeding, bruising or rash   Neurologic:   Cranial nerves 2 - 12 grossly intact      I reviewed the patient's new clinical results.             Invalid input(s): \"NEUTOPHILPCT\"  Results from last 7 days   Lab Units 04/29/25  0918   POTASSIUM mmol/L 4.3   GLUCOSE mg/dL 221*     Lab Results   Component Value Date    HGBA1C 6.80 (H) 03/28/2025      Latest Reference Range & Units 03/28/25 12:23   Sodium 136 - 145 mmol/L 137   Potassium 3.5 - 5.2 mmol/L 4.4   Chloride 98 - 107 " mmol/L 100   CO2 22.0 - 29.0 mmol/L 25.0   Anion Gap 5.0 - 15.0 mmol/L 12.0   BUN 8 - 23 mg/dL 15   Creatinine 0.76 - 1.27 mg/dL 1.00   BUN/Creatinine Ratio 7.0 - 25.0  15.0   eGFR >60.0 mL/min/1.73 84.6   Glucose 65 - 99 mg/dL 107 (H)   Calcium 8.6 - 10.5 mg/dL 9.2   Hemoglobin A1C 4.80 - 5.60 % 6.80 (H)   (H): Data is abnormally high   Latest Reference Range & Units 03/28/25 12:23   WBC 3.40 - 10.80 10*3/mm3 9.00   RBC 4.14 - 5.80 10*6/mm3 5.13   Hemoglobin 13.0 - 17.7 g/dL 12.2 (L)   Hematocrit 37.5 - 51.0 % 39.9   Platelets 140 - 450 10*3/mm3 348   RDW 12.3 - 15.4 % 17.8 (H)   MCV 79.0 - 97.0 fL 77.8 (L)   MCH 26.6 - 33.0 pg 23.8 (L)   MCHC 31.5 - 35.7 g/dL 30.6 (L)   MPV 6.0 - 12.0 fL 9.9   RDW-SD 37.0 - 54.0 fl 50.3   (L): Data is abnormally low  (H): Data is abnormally high    Assessment and Plan:       S/P reverse total shoulder arthroplasty, right    Type 2 diabetes mellitus with hyperglycemia, with long-term current use of insulin    Sleep apnea    S/P CABG x 3 on 3/21/2023    Hypertension    Hyperlipidemia    Paroxysmal atrial fibrillation    Obesity, Class III, BMI 40-49.9 (morbid obesity)      Plan    1. PT/OT. NWB, RUE, ROM hand, wrist, elbow.  2. Pain control-prns, interscalene nerve block cath with ropivacaine infusion.   3. IS-encourage  4. DVT proph- Mech/ mobilize.  5. Bowel regimen  6. Resume home medications as appropriate  7. Monitor post-op labs  8. DC planning .    - Hypertension:  Resume home medications as appropriate, formulary substitution when indicated.  Holding parameters.  PRN medications for elevated blood pressure.    -DM type 2, insulin resistance.  Hgb A1C   Hold OHA as appropriate  FSBG before meals/bedtime, ( q 6 when NPO), along with correction Humalog.  Long acting and prandial insulin    -MARIAH:  Continue O2 supplement  Monitor O2 sats.    -PAF:  Resume BB. DOAC to resume when ok with . likely POD2.    Dragon disclaimer:  Part of this encounter note is an electronic  transcription/translation of spoken language to printed text. The electronic translation of spoken language may permit erroneous, or at times, nonsensical words or phrases to be inadvertently transcribed; Although I have reviewed the note for such errors, some may still exist.    Reece Moreira MD  04/29/25  16:53 EDT

## 2025-04-29 NOTE — PLAN OF CARE
Goal Outcome Evaluation:  Plan of Care Reviewed With: patient        Progress: no change  Outcome Evaluation: Patient ambulated 60 feet with min assist x2, limited by fatigue, weakness, and SOA. O2 sats dropped to 86% on 2 L O2/NC with ambulation but recovered quickly to >90% with seated rest and PLB. Required significant assist to t/f in and out of bed. Patient currently below functional baseline, demonstrating decreased functional mobility status, impaired balance, decreased endurance, and decreased strength. Will address these deficits to promote return to PLOF. Recommend IRF at d/c, if medically appropriate.    Anticipated Discharge Disposition (PT): inpatient rehabilitation facility

## 2025-04-29 NOTE — THERAPY EVALUATION
"Patient Name: Wilbert Kelley  : 1960    MRN: [id number]                              Today's Date: 2025       Admit Date: 2025    Visit Dx: No diagnosis found.  Patient Active Problem List   Diagnosis    Shortness of breath    Type 2 diabetes mellitus with hyperglycemia, with long-term current use of insulin    Sleep apnea    Coronary artery disease of native artery of native heart with stable angina pectoris    S/P CABG x 3 on 3/21/2023    Hypertension    Hyperlipidemia    EMANUEL (acute kidney injury)    Paroxysmal atrial fibrillation    Bilateral pneumonia    Acute on chronic heart failure with preserved ejection fraction (HFpEF)    S/P reverse total shoulder arthroplasty, right     Past Medical History:   Diagnosis Date    Arthritis     BPH (benign prostatic hyperplasia)     Cancer     basal and melanoma-  x2 - left side ear and elbow    Constipation     Coronary artery disease     DDD (degenerative disc disease), lumbar     Diabetes mellitus     couple times month check sugar    Frozen shoulder     left    GERD (gastroesophageal reflux disease)     History of pneumonia 2025    Hyperlipidemia     Hypertension     Limited mobility     left shoulder  - possible frozen shoulder-= please be aware for surgery    Oxygen dependent     \"2 liters at night since pneumonia\" per wife    Rotator cuff injury     right shoulder 3/14/2025    Sleep apnea     insurance took cpap machine due to pt not using 6 hours per night.    Tinnitus     Wears glasses     small print     Past Surgical History:   Procedure Laterality Date    CARDIAC CATHETERIZATION      CARDIAC CATHETERIZATION Right 2023    Procedure: Left Heart Cath;  Surgeon: Jarod Boyle MD;  Location: [facility] CATH INVASIVE LOCATION;  Service: Cardiovascular;  Laterality: Right;    CATARACT EXTRACTION, BILATERAL Bilateral     COLONOSCOPY      CORONARY ARTERY BYPASS GRAFT N/A 3/21/2023    Procedure: MEDIAN STERNOTOMY, CORONARY ARTERY BYPASS GRAFTING " X 3, UTILIZING THE LEFT INTERNAL MAMMARY ARTERY, ENDOSCOPIC VEIN HARVEST OF THE RIGHT GREATER SAPENOUSE VEIN;  Surgeon: Markus Emanuel MD;  Location: [facility];  Service: Cardiothoracic;  Laterality: N/A;    CORONARY STENT PLACEMENT      x4    ENDOSCOPY      FINGER SURGERY Left     middle finger - left side    SKIN CANCER EXCISION      x2    WISDOM TOOTH EXTRACTION        General Information       Row Name 04/29/25 1459          Physical Therapy Time and Intention    Document Type evaluation  -LR     Mode of Treatment physical therapy  -LR       Row Name 04/29/25 1459          General Information    Patient Profile Reviewed yes  -LR     Prior Level of Function independent:;all household mobility;gait;transfer;bed mobility  patient did not use AD PTA, but has recent h/o multiple falls  -LR     Existing Precautions/Restrictions fall;oxygen therapy device and L/min;non-weight bearing;right;shoulder;brace on at all times;other (see comments)  s/p R reverse TSA, R interscalene nerve catheter, sling to R UE at all times, L frozen shoulder  -LR     Barriers to Rehab medically complex;previous functional deficit  -LR       Row Name 04/29/25 1459          Living Environment    Current Living Arrangements home  -LR     People in Home spouse  -LR       Row Name 04/29/25 1459          Home Main Entrance    Number of Stairs, Main Entrance five;other (see comments)  3+5  -LR     Stair Railings, Main Entrance none  -LR       Row Name 04/29/25 1459          Stairs Within Home, Primary    Stairs, Within Home, Primary all needs met on main floor  -LR     Number of Stairs, Within Home, Primary none  -LR       Row Name 04/29/25 1459          Cognition    Orientation Status (Cognition) other (see comments);oriented x 4  mildly lethargic post operatively, required cues to keep his eyes open  -LR       Row Name 04/29/25 1459          Safety Issues/Impairments Affecting Functional Mobility    Safety Issues Affecting Function  (Mobility) awareness of need for assistance;insight into deficits/self-awareness;judgment;safety precautions follow-through/compliance;safety precaution awareness  -LR     Impairments Affecting Function (Mobility) balance;strength;pain;endurance/activity tolerance;postural/trunk control;shortness of breath;range of motion (ROM);sensation/sensory awareness  -LR               User Key  (r) = Recorded By, (t) = Taken By, (c) = Cosigned By      Initials Name Provider Type    Kika Grace, PT Physical Therapist                   Mobility       Row Name 04/29/25 1459          Bed Mobility    Bed Mobility supine-sit;sit-supine  -LR     Supine-Sit [place] (Bed Mobility) verbal cues;moderate assist (50% patient effort);2 person assist  -LR     Sit-Supine [place] (Bed Mobility) moderate assist (50% patient effort);2 person assist  -LR     Assistive Device (Bed Mobility) head of bed elevated;other (see comments)  -LR     Comment, (Bed Mobility) Verbal cues to move LEs towards EOB and to use core muscle to assist in raising trunk into sitting. Patient required mod assist at trunk and at hips/LEs. Required increased time to perform. Denied dizziness upon sitting up. Mod assist to control trunk and LEs with return to supine in bed as well.  -LR       Row Name 04/29/25 8900          Transfers    Comment, (Transfers) Verbal cues for correct placement of feet prior to t/f. Performed sit<->stand x3.  -LR       Row Name 04/29/25 1459          Bed-Chair Transfer    Bed-Chair [place] (Transfers) not tested  -LR       Row Name 04/29/25 1454          Sit-Stand Transfer    Sit-Stand [place] (Transfers) verbal cues;contact guard;2 person assist  -LR     Assistive Device (Sit-Stand Transfers) other (see comments)  no AD  -LR       Row Name 04/29/25 3001          Gait/Stairs (Locomotion)    [place] Level (Gait) verbal cues;minimum assist (75% patient effort);2 person assist  -LR     Assistive Device  (Gait) other (see comments)  no AD, patient declined support via HHA  -LR     Patient was able to Ambulate yes  -LR     Distance in Feet (Gait) 60  -LR     Deviations/Abnormal Patterns (Gait) bilateral deviations;pamela decreased;gait speed decreased;stride length decreased;base of support, wide  -LR     Bilateral Gait Deviations forward flexed posture;heel strike decreased  -LR     [place] Level (Stairs) not tested  -LR     Comment, (Gait/Stairs) Patient ambulated with step through gait pattern at slow pace with short steps and wide REAGAN. Verbal cues for upright posture, increased step length, and PLB. Mildly SOA with ambulation, O2 sats 86% upon sitting after ambulation on 2 L O2/NC. Recovered quickly to >90% with seated rest. Gait limited by fatigue weakness, and SOA.  -LR       Row Name 04/29/25 1459          Mobility    Extremity Weight-bearing Status right upper extremity  -LR     Right Upper Extremity (Weight-bearing Status) non weight-bearing (NWB)   -LR               User Key  (r) = Recorded By, (t) = Taken By, (c) = Cosigned By      Initials Name Provider Type    LR Kika Browning, PT Physical Therapist                   Obj/Interventions       Row Name 04/29/25 1459          Range of Motion Comprehensive    General Range of Motion bilateral lower extremity ROM WFL  -LR       Row Name 04/29/25 1459          Strength Comprehensive (MMT)    General Manual Muscle Testing (MMT) Assessment lower extremity strength deficits identified  -LR       Row Name 04/29/25 1459          Balance    Balance Assessment sitting static balance;sitting dynamic balance;standing static balance;standing dynamic balance  -LR     Static Sitting Balance contact guard  -LR     Dynamic Sitting Balance minimal assist  -LR     Position, Sitting Balance supported;sitting edge of bed  -LR     Static Standing Balance contact guard;2-person assist  -LR     Dynamic Standing Balance minimal assist;2-person assist  -LR      Position/Device Used, Standing Balance unsupported  -LR       Row Name 04/29/25 1459          Sensory Assessment (Somatosensory)    Sensory Assessment (Somatosensory) LE sensation intact;other (see comments)  reports neuropathy in B feet, reports light touch equal in feet, reports decreased light touch sensation in toes and plantar aspect of feet compared to dorsum of feet  -LR       Row Name 04/29/25 1459          Lower Extremity (Manual Muscle Testing)    Lower Extremity: Manual Muscle Testing (MMT) other (see comments)  -LR     Comment, MMT: Lower Extremity B hip flexors: 4/5, B knee flexors/extensors: 4+/5, B ankle DFs: 5/5  -LR               User Key  (r) = Recorded By, (t) = Taken By, (c) = Cosigned By      Initials Name Provider Type    LR Kika Browning, PT Physical Therapist                   Goals/Plan       Row Name 04/29/25 1459          Bed Mobility Goal 1 (PT)    Activity/Assistive Device (Bed Mobility Goal 1, PT) sit to supine/supine to sit  -LR     [place] Level/Cues Needed (Bed Mobility Goal 1, PT) minimum assist (75% or more patient effort);other (see comments)  x2  -LR     Time Frame (Bed Mobility Goal 1, PT) short term goal (STG);3 days  -LR     Progress/Outcomes (Bed Mobility Goal 1, PT) goal ongoing  -LR       Row Name 04/29/25 1459          Transfer Goal 1 (PT)    Activity/Assistive Device (Transfer Goal 1, PT) sit-to-stand/stand-to-sit;bed-to-chair/chair-to-bed;cane, straight  -LR     [place] Level/Cues Needed (Transfer Goal 1, PT) standby assist  -LR     Time Frame (Transfer Goal 1, PT) long term goal (LTG);5 days  -LR     Progress/Outcome (Transfer Goal 1, PT) goal ongoing  -LR       Row Name 04/29/25 1459          Gait Training Goal 1 (PT)    Activity/Assistive Device (Gait Training Goal 1, PT) gait (walking locomotion);cane, straight  -LR     [place] Level (Gait Training Goal 1, PT) standby assist  -LR     Distance (Gait Training Goal 1, PT) 150 feet  -LR     Time  Frame (Gait Training Goal 1, PT) long term goal (LTG);5 days  -LR     Progress/Outcome (Gait Training Goal 1, PT) goal ongoing  -LR       Row Name 04/29/25 1459          Stairs Goal 1 (PT)    Activity/Assistive Device (Stairs Goal 1, PT) ascending stairs;descending stairs;step-to-step;cane, straight  -LR     [place] Level/Cues Needed (Stairs Goal 1, PT) contact guard required  -LR     Number of Stairs (Stairs Goal 1, PT) 5  -LR     Time Frame (Stairs Goal 1, PT) long term goal (LTG);5 days  -LR     Progress/Outcome (Stairs Goal 1, PT) goal ongoing  goal appropriate if d/c home instead of IRF  -LR       Row Name 04/29/25 1459          Therapy Assessment/Plan (PT)    Planned Therapy Interventions (PT) balance training;bed mobility training;gait training;home exercise program;patient/family education;transfer training;strengthening;stair training;ROM (range of motion)  -LR               User Key  (r) = Recorded By, (t) = Taken By, (c) = Cosigned By      Initials Name Provider Type    LR Kkia Browning, PT Physical Therapist                   Clinical Impression       Row Name 04/29/25 1451          Pain    Pretreatment Pain Rating 5/10  -LR     Posttreatment Pain Rating 7/10  -LR     Pain Location shoulder  -LR     Pain Side/Orientation right  -LR     Pain Management Interventions exercise or physical activity utilized;movement retraining implemented  -LR     Response to Pain Interventions activity level improved;mobility function improved;functional ability improved;activity participation with increased pain  -LR       Row Name 04/29/25 1454          Plan of Care Review    Plan of Care Reviewed With patient  -LR     Progress no change  -LR     Outcome Evaluation Patient ambulated 60 feet with min assist x2, limited by fatigue, weakness, and SOA. O2 sats dropped to 86% on 2 L O2/NC with ambulation but recovered quickly to >90% with seated rest and PLB. Required significant assist to t/f in and out of bed.  Patient currently below functional baseline, demonstrating decreased functional mobility status, impaired balance, decreased endurance, and decreased strength. Will address these deficits to promote return to PLOF. Recommend IRF at d/c, if medically appropriate.  -LR       Row Name 04/29/25 145          Therapy Assessment/Plan (PT)    Patient/Family Therapy Goals Statement (PT) decrease pain, get better  -LR     Rehab Potential (PT) fair  -LR     Criteria for Skilled Interventions Met (PT) yes;meets criteria;skilled treatment is necessary  -LR     Therapy Frequency (PT) daily  -LR     Predicted Duration of Therapy Intervention (PT) 5 days  TBD  -LR       Row Name 04/29/25 1452          Vital Signs    Pre Systolic BP Rehab 161  -LR     Pre Treatment Diastolic BP 56  -LR     Intra Systolic BP Rehab 178  -LR     Intra Treatment Diastolic BP 89  -LR     Post Systolic BP Rehab 162  -LR     Post Treatment Diastolic BP 89  -LR     Pretreatment Heart Rate (beats/min) 79  -LR     Pre SpO2 (%) 96  -LR     O2 Delivery Pre Treatment supplemental O2  -LR     Intra SpO2 (%) 86  94% [place] after seated rest  -LR     O2 Delivery Intra Treatment supplemental O2  -LR     Post SpO2 (%) 95  -LR     O2 Delivery Post Treatment supplemental O2  -LR     Pre Patient Position Supine  -LR     Intra Patient Position Sitting  -LR     Post Patient Position Supine  -LR       Row Name 04/29/25 5302          Positioning and Restraints    Pre-Treatment Position in bed  -LR     Post Treatment Position bed  -LR     In Bed notified nsg;supine;call light within reach;encouraged to call for assist;side rails up x2  patient in PACU stretcher, no exit alarm available  -LR               User Key  (r) = Recorded By, (t) = Taken By, (c) = Cosigned By      Initials Name Provider Type    LR Kika Browning, PT Physical Therapist                   Outcome Measures       Row Name 04/29/25 7230          How much help from another person do you currently  need...    Turning from your back to your side while in flat bed without using bedrails? 2  -LR     Moving from lying on back to sitting on the side of a flat bed without bedrails? 2  -LR     Moving to and from a bed to a chair (including a wheelchair)? 2  -LR     Standing up from a chair using your arms (e.g., wheelchair, bedside chair)? 3  -LR     Climbing 3-5 steps with a railing? 1  -LR     To walk in hospital room? 2  -LR     AM-PAC 6 Clicks Score (PT) 12  -LR     Highest Level of Mobility Goal 4 --> Transfer to chair/commode  -LR       Row Name 04/29/25 1459          Functional Assessment    Outcome Measure Options AM-PAC 6 Clicks Basic Mobility (PT)  -LR               User Key  (r) = Recorded By, (t) = Taken By, (c) = Cosigned By      Initials Name Provider Type    LR Kika Browning, PT Physical Therapist                                 Physical Therapy Education       Title: PT OT SLP Therapies (Done)       Topic: Physical Therapy (Done)       Point: Mobility training (Done)       Learning Progress Summary            Patient Acceptance, E,D, VU,NR by LR at 4/29/2025 1459    Comment: Educated on NWB status of R UE, precautions, safety with mobility, correct supine<->sit t/f technique, correct sit<->stand t/f technique, correct gait mechanics, and progression of POC.                      Point: Home exercise program (Done)       Learning Progress Summary            Patient Acceptance, E,D, VU,NR by LR at 4/29/2025 1459    Comment: Educated on NWB status of R UE, precautions, safety with mobility, correct supine<->sit t/f technique, correct sit<->stand t/f technique, correct gait mechanics, and progression of POC.                      Point: Body mechanics (Done)       Learning Progress Summary            Patient Acceptance, E,D, VU,NR by LR at 4/29/2025 1459    Comment: Educated on NWB status of R UE, precautions, safety with mobility, correct supine<->sit t/f technique, correct sit<->stand t/f  technique, correct gait mechanics, and progression of POC.                      Point: Precautions (Done)       Learning Progress Summary            Patient Acceptance, E,D, VU,NR by LR at 4/29/2025 3697    Comment: Educated on NWB status of R UE, precautions, safety with mobility, correct supine<->sit t/f technique, correct sit<->stand t/f technique, correct gait mechanics, and progression of POC.                                      User Key       Initials Effective Dates Name Provider Type Discipline    LR 02/03/23 -  Kika Browning, PT Physical Therapist PT                  PT Recommendation and Plan  Planned Therapy Interventions (PT): balance training, bed mobility training, gait training, home exercise program, patient/family education, transfer training, strengthening, stair training, ROM (range of motion)  Progress: no change  Outcome Evaluation: Patient ambulated 60 feet with min assist x2, limited by fatigue, weakness, and SOA. O2 sats dropped to 86% on 2 L O2/NC with ambulation but recovered quickly to >90% with seated rest and PLB. Required significant assist to t/f in and out of bed. Patient currently below functional baseline, demonstrating decreased functional mobility status, impaired balance, decreased endurance, and decreased strength. Will address these deficits to promote return to PLOF. Recommend IRF at d/c, if medically appropriate.     Time Calculation:   PT Evaluation Complexity  History, PT Evaluation Complexity: 3 or more personal factors and/or comorbidities  Examination of Body Systems (PT Eval Complexity): total of 3 or more elements  Clinical Presentation (PT Evaluation Complexity): evolving  Clinical Decision Making (PT Evaluation Complexity): moderate complexity  Overall Complexity (PT Evaluation Complexity): moderate complexity     PT Charges       Row Name 04/29/25 5698             Time Calculation    Start Time 1459  -LR      PT Received On 04/29/25  -      PT Goal  Re-Cert Due Date 05/09/25  -LR         Timed Charges    32653 - Gait Training Minutes  8  -LR         Untimed Charges    PT Eval/Re-eval Minutes 54  -LR         Total Minutes    Timed Charges Total Minutes 8  -LR      Untimed Charges Total Minutes 54  -LR       Total Minutes 62  -LR                User Key  (r) = Recorded By, (t) = Taken By, (c) = Cosigned By      Initials Name Provider Type    LR Kika Browning, PT Physical Therapist                  Therapy Charges for Today       Code Description Service Date Service Provider Modifiers Qty    01764389629 HC GAIT TRAINING EA 15 MIN 4/29/2025 Kika Browning, PT GP 1    90864504591 HC PT EVAL MOD COMPLEXITY 4 4/29/2025 Kika Browning, PT GP 1            PT G-Codes  Outcome Measure Options: AM-PAC 6 Clicks Basic Mobility (PT)  AM-PAC 6 Clicks Score (PT): 12  PT Discharge Summary  Anticipated Discharge Disposition (PT): inpatient rehabilitation facility    Kika Browning, PT  4/29/2025

## 2025-04-29 NOTE — ANESTHESIA PREPROCEDURE EVALUATION
Anesthesia Evaluation     Patient summary reviewed and Nursing notes reviewed                Airway   Mallampati: II  TM distance: >3 FB  Neck ROM: full  No difficulty expected  Dental - normal exam     Comment: NO UPPER TEETH, ONLY A FEW LOWER TEETH, NONE LOOSE    Pulmonary - normal exam   (+) pneumonia ,home oxygen (2L O2 AT NIGHT), shortness of breath, sleep apnea  Cardiovascular - normal exam    (+) hypertension, CAD, CABG, cardiac stents , dysrhythmias Atrial Fib, angina, CHF , hyperlipidemia      Neuro/Psych- negative ROS  GI/Hepatic/Renal/Endo    (+) morbid obesity, GERD, renal disease- ARF, diabetes mellitus    Musculoskeletal     Abdominal  - normal exam    Bowel sounds: normal.   Substance History - negative use     OB/GYN negative ob/gyn ROS         Other   arthritis,   history of cancer (SKIN)                Anesthesia Plan    ASA 4     general     (INTERSCALENE CATHETER FOR POSTOP PAIN)  intravenous induction     Anesthetic plan, risks, benefits, and alternatives have been provided, discussed and informed consent has been obtained with: patient.    Plan discussed with CRNA.    CODE STATUS:

## 2025-04-29 NOTE — THERAPY EVALUATION
"Patient Name: Wilbert Kelley  : 1960    MRN: [id number]                              Today's Date: 2025       Admit Date: 2025    Visit Dx: No diagnosis found.  Patient Active Problem List   Diagnosis    Shortness of breath    Type 2 diabetes mellitus with hyperglycemia, with long-term current use of insulin    Sleep apnea    Coronary artery disease of native artery of native heart with stable angina pectoris    S/P CABG x 3 on 3/21/2023    Hypertension    Hyperlipidemia    EMANUEL (acute kidney injury)    Paroxysmal atrial fibrillation    Bilateral pneumonia    Acute on chronic heart failure with preserved ejection fraction (HFpEF)    S/P reverse total shoulder arthroplasty, right     Past Medical History:   Diagnosis Date    Arthritis     BPH (benign prostatic hyperplasia)     Cancer     basal and melanoma-  x2 - left side ear and elbow    Constipation     Coronary artery disease     DDD (degenerative disc disease), lumbar     Diabetes mellitus     couple times month check sugar    Frozen shoulder     left    GERD (gastroesophageal reflux disease)     History of pneumonia 2025    Hyperlipidemia     Hypertension     Limited mobility     left shoulder  - possible frozen shoulder-= please be aware for surgery    Oxygen dependent     \"2 liters at night since pneumonia\" per wife    Rotator cuff injury     right shoulder 3/14/2025    Sleep apnea     insurance took cpap machine due to pt not using 6 hours per night.    Tinnitus     Wears glasses     small print     Past Surgical History:   Procedure Laterality Date    CARDIAC CATHETERIZATION      CARDIAC CATHETERIZATION Right 2023    Procedure: Left Heart Cath;  Surgeon: Jarod Boyle MD;  Location: [facility] CATH INVASIVE LOCATION;  Service: Cardiovascular;  Laterality: Right;    CATARACT EXTRACTION, BILATERAL Bilateral     COLONOSCOPY      CORONARY ARTERY BYPASS GRAFT N/A 3/21/2023    Procedure: MEDIAN STERNOTOMY, CORONARY ARTERY BYPASS GRAFTING " X 3, UTILIZING THE LEFT INTERNAL MAMMARY ARTERY, ENDOSCOPIC VEIN HARVEST OF THE RIGHT GREATER SAPENOUSE VEIN;  Surgeon: Markus Emanuel MD;  Location: [facility];  Service: Cardiothoracic;  Laterality: N/A;    CORONARY STENT PLACEMENT      x4    ENDOSCOPY      FINGER SURGERY Left     middle finger - left side    SKIN CANCER EXCISION      x2    WISDOM TOOTH EXTRACTION        General Information       Row Name 04/29/25 1549          OT Time and Intention    Subjective Information no complaints  -TB     Document Type evaluation;therapy note (daily note)  -TB     Mode of Treatment occupational therapy  -TB     Patient Effort adequate  -TB     Symptoms Noted During/After Treatment increased pain  -TB       Row Name 04/29/25 1549          General Information    Patient Profile Reviewed yes  -TB     Prior Level of Function independent:;all household mobility;transfer;min assist:;ADL's  No AD prior. Recurrent falls. Pt sleeps in recliner chair x several years.  -TB     Existing Precautions/Restrictions fall;right;shoulder;non-weight bearing;brace on at all times;other (see comments)  s/p R rTSA with NWB precautions, simple sling, IS nerve catheter, home O2 prn  -TB     Barriers to Rehab medically complex;previous functional deficit  -TB       Row Name 04/29/25 1549          Occupational Profile    Reason for Services/Referral (Occupational Profile) Pt caregiver training was provided face-to-face on strategies and techniques related to activities of daily living, transfers, mobility, home safety, durable medical equipment, and brace management for 15 minutes without the patient present.  -TB       Row Name 04/29/25 1549          Living Environment    Current Living Arrangements home  -TB     People in Home spouse  -TB     Name(s) of People in Home Spouse, Janki.  -TB       Row Name 04/29/25 1549          Home Main Entrance    Number of Stairs, Main Entrance five  3+2  -TB     Stair Railings, Main Entrance none  -TB        Row Name 04/29/25 1549          Stairs Within Home, Primary    Stairs, Within Home, Primary Single story home on a basement. All pt needs met on main floor.  -TB     Number of Stairs, Within Home, Primary none  -TB       Row Name 04/29/25 1549          Cognition    Orientation Status (Cognition) oriented x 4  mildly lethargic post operatively, required cues to keep his eyes open  -TB       Row Name 04/29/25 1544          Safety Issues/Impairments Affecting Functional Mobility    Safety Issues Affecting Function (Mobility) awareness of need for assistance;insight into deficits/self-awareness;safety precaution awareness;safety precautions follow-through/compliance;sequencing abilities  -TB     Impairments Affecting Function (Mobility) balance;endurance/activity tolerance;pain;strength;sensation/sensory awareness;range of motion (ROM);postural/trunk control  -TB     Comment, Safety Issues/Impairments (Mobility) Pt is up with Min Ax2. Unsteady without true LOB.  -TB               User Key  (r) = Recorded By, (t) = Taken By, (c) = Cosigned By      Initials Name Provider Type    TB Kaylyn Sorensen OT Occupational Therapist                     Mobility/ADL's       Row Name 04/29/25 3128          Bed Mobility    Bed Mobility supine-sit;sit-supine  -TB     Supine-Sit [place] (Bed Mobility) moderate assist (50% patient effort);2 person assist;verbal cues  -TB     Sit-Supine [place] (Bed Mobility) moderate assist (50% patient effort);2 person assist;verbal cues  -TB     Bed Mobility, Safety Issues decreased use of arms for pushing/pulling;decreased use of legs for bridging/pushing;impaired trunk control for bed mobility  -TB     Assistive Device (Bed Mobility) head of bed elevated  -TB     Comment, (Bed Mobility) Education, cues, and assist to maintain RUE NWB precautions and sequence transition in/out of bed. Assist to manage nerve catheter to prevent dislodgement.  -TB       Row Name 04/29/25 5736           Transfers    Transfers sit-stand transfer;stand-sit transfer  -TB     Comment, (Transfers) Education, cues, and assist for hand placement, sequencing, and safety. Assist to manage nerve catheter to prevent dislodgement. Cues to keep eyes open.  -TB       Row Name 04/29/25 1554          Sit-Stand Transfer    Sit-Stand [place] (Transfers) contact guard;2 person assist;verbal cues  -TB     Comment, (Sit-Stand Transfer) LUE support  -TB       Row Name 04/29/25 1554          Stand-Sit Transfer    Stand-Sit [place] (Transfers) contact guard;2 person assist;verbal cues  -TB       Row Name 04/29/25 1554          Functional Mobility    Functional Mobility- Ind. Level minimum assist (75% patient effort);verbal cues required  -TB     Functional Mobility- Device --  LUE support  -TB     Functional Mobility-Maintain WBing cues to maintain weight bearing status  -TB     Functional Mobility- Safety Issues balance decreased during turns  -TB     Functional Mobility- Comment Unsteady without true LOB  -TB     Patient was able to Ambulate yes  -       Row Name 04/29/25 1554          Activities of Daily Living    BADL Assessment/Intervention bathing;upper body dressing;toileting  -       Row Name 04/29/25 1554          Mobility    Extremity Weight-bearing Status right upper extremity  NWB  -TB       Row Name 04/29/25 1554          Bathing Assessment/Intervention    [place] Level (Bathing) upper body;bathing skills;dependent (less than 25% patient effort)  -     Position (Bathing) edge of bed sitting  -TB     Comment, (Bathing) Patient educated on shoulder precautions with axilla care. Patient educated that nerve catheter cannot get wet. No family teaching. Pt seen in PACU at his request. Will be admitted to floor.  -       Row Name 04/29/25 1554          Upper Body Dressing Assessment/Training    [place] Level (Upper Body Dressing) patangela/iggy;dependent (less than 25% patient effort);verbal cues   sling mgmt and proper fit  -TB     Position (Upper Body Dressing) edge of bed sitting  -TB     Comment, (Upper Body Dressing) Patient educated on shoulder precautions with ADL retraining and sling management. Patient educated on nerve catheter care to avoid dislodgement. No family teaching. Pt seen in PACU at his request. Will be admitted to floor.  -TB       Row Name 04/29/25 1553          Toileting Assessment/Training    Comment, (Toileting) Pt unable to void (urinary retention at baseline)  -               User Key  (r) = Recorded By, (t) = Taken By, (c) = Cosigned By      Initials Name Provider Type    TB Kaylyn Sorensen, OT Occupational Therapist                   Obj/Interventions       Row Name 04/29/25 7824          Sensory Assessment (Somatosensory)    Sensory Assessment (Somatosensory) right UE  -TB     Sensory Subjective Reports numbness;tingling  -     Sensory Assessment RUE IS nerve catheter infusing  -       Row Name 04/29/25 1557          Sensory Interventions    Sensory Re-education (Sensation) visual observation of sensory input  -       Row Name 04/29/25 9758          Vision Assessment/Intervention    Visual Impairment/Limitations WFL  -TB     Vision Assessment Comment Pt mildly lethargic at IE. Cues to keep eyes open with activity.  -TB       Row Name 04/29/25 5646          Range of Motion Comprehensive    Comment, General Range of Motion Pt presents with L frozen shoulder and very limited ROM. L hand, wrist, elbow AROM WFL. RUE deferred.  -       Row Name 04/29/25 9295          Strength Comprehensive (MMT)    Comment, General Manual Muscle Testing (MMT) Assessment Generalized weakness/deconditioned. MMT deferred.  -       Row Name 04/29/25 2708          Shoulder (Therapeutic Exercise)    Shoulder (Therapeutic Exercise) AAROM (active assistive range of motion)  -     Shoulder AAROM (Therapeutic Exercise) right;flexion;extension;sitting;10 repetitions  Pt tolerates AAFE to 100   -TB       Row Name 04/29/25 1559          Elbow/Forearm (Therapeutic Exercise)    Elbow/Forearm (Therapeutic Exercise) AAROM (active assistive range of motion)  -TB     Elbow/Forearm AAROM (Therapeutic Exercise) right;flexion;extension;supination;pronation;sitting;10 repetitions  -TB       Row Name 04/29/25 1559          Wrist (Therapeutic Exercise)    Wrist (Therapeutic Exercise) AROM (active range of motion)  -TB     Wrist AROM (Therapeutic Exercise) right;flexion;extension;10 repetitions  -TB       Row Name 04/29/25 1559          Hand (Therapeutic Exercise)    Hand (Therapeutic Exercise) AROM (active range of motion)  -TB     Hand AROM/AAROM (Therapeutic Exercise) right;AROM (active range of motion);finger flexion;finger extension;10 repetitions  -TB       Row Name 04/29/25 1559          Motor Skills    Therapeutic Exercise hand;wrist;elbow/forearm;shoulder  Education initiated for CHEKO HEP per MD parameters: AROM @ hand, wrist, elbow; AAFE to 150, no ER past 0. No family teaching. Pt seen in PACU at his request. Will be admitted to floor.  -TB       Row Name 04/29/25 1559          Balance    Balance Assessment sitting static balance;sitting dynamic balance;sit to stand dynamic balance;standing static balance;standing dynamic balance  -TB     Static Sitting Balance contact guard  -TB     Dynamic Sitting Balance minimal assist  -TB     Position, Sitting Balance supported;sitting edge of bed  -TB     Sit to Stand Dynamic Balance contact guard;2-person assist;verbal cues  -TB     Static Standing Balance contact guard;2-person assist;verbal cues  -TB     Dynamic Standing Balance minimal assist;2-person assist;verbal cues  -TB     Balance Interventions sitting;standing;sit to stand;static;dynamic;occupation based/functional task  -TB     Comment, Balance Unsteady without  overt LOB  -TB               User Key  (r) = Recorded By, (t) = Taken By, (c) = Cosigned By      Initials Name Provider Type    TB Kaylyn Sorensen  LISA Barcenas Occupational Therapist                   Goals/Plan       Row Name 04/29/25 1611          Transfer Goal 1 (OT)    Activity/Assistive Device (Transfer Goal 1, OT) toilet  -TB     [place] Level/Cues Needed (Transfer Goal 1, OT) minimum assist (75% or more patient effort);verbal cues required  -TB     Time Frame (Transfer Goal 1, OT) short term goal (STG);3 days  -TB     Progress/Outcome (Transfer Goal 1, OT) goal ongoing  -TB       Row Name 04/29/25 1611          Dressing Goal 1 (OT)    Activity/Device (Dressing Goal 1, OT) upper body dressing  -TB     [place]/Cues Needed (Dressing Goal 1, OT) moderate assist (50-74% patient effort);verbal cues required  -TB     Time Frame (Dressing Goal 1, OT) long term goal (LTG);1 week  -TB     Progress/Outcome (Dressing Goal 1, OT) goal ongoing  -TB       Row Name 04/29/25 1611          ROM Goal 1 (OT)    ROM Goal 1 (OT) Pt and spouse demonstrate [place] with daily RUE HEP per MD parameters: AROM @ hand, wrist, and elbow; AAFE to 150, No ER past 0  -TB     Time Frame (ROM Goal 1, OT) long term goal (LTG);1 week  -TB     Progress/Outcome (ROM Goal 1, OT) goal ongoing  -TB               User Key  (r) = Recorded By, (t) = Taken By, (c) = Cosigned By      Initials Name Provider Type    TB Kaylyn Sorensen OT Occupational Therapist                   Clinical Impression       Row Name 04/29/25 1604          Pain Assessment    Pretreatment Pain Rating 5/10  -TB     Posttreatment Pain Rating 7/10  -TB     Pain Location shoulder  -TB     Pain Side/Orientation right  -TB     Pain Management Interventions activity modification encouraged;exercise or physical activity utilized;positioning techniques utilized;cold applied;other (see comments)  RUE IS nerve catheter infusing  -TB     Response to Pain Interventions activity participation with tolerable pain;activity level improved;mobility function improved  -TB       Row Name 04/29/25 1602          Plan of Care  Review    Plan of Care Reviewed With patient  -TB     Progress --  IE  -TB     Outcome Evaluation OT IE completed, requested OT come to PACU for assessment. Pt is up with Min Ax2 and limited by generalized weakness, ROBLES (86% on 2L), and balance deficits. Education initiated for R shoulder precautions, sling teaching, and ADL retraining including instruction for nerve catheter precautions and mgmt to prevent dislodgement. Pt is Dependent with UB ADLs and sling mgmt this session. OT will follow IP. Given pt's weakness and balance deficits with recurrent fall history, recommend IRF at d/c for best outcome if medically appropriate.  -TB       Row Name 04/29/25 1604          Therapy Assessment/Plan (OT)    Rehab Potential (OT) fair  -TB     Criteria for Skilled Therapeutic Interventions Met (OT) yes;meets criteria;skilled treatment is necessary  -TB     Therapy Frequency (OT) daily  -TB       Row Name 04/29/25 1604          Therapy Plan Review/Discharge Plan (OT)    Anticipated Discharge Disposition (OT) inpatient rehabilitation facility  -TB       Row Name 04/29/25 1604          Vital Signs    Pre Systolic BP Rehab 161  RN cleared OT  -TB     Pre Treatment Diastolic BP 56  -TB     Intra Systolic BP Rehab 178  -TB     Intra Treatment Diastolic BP 89  -TB     Post Systolic BP Rehab 162  -TB     Post Treatment Diastolic BP 89  -TB     Pretreatment Heart Rate (beats/min) 79  -TB     Intratreatment Heart Rate (beats/min) 87  -TB     Posttreatment Heart Rate (beats/min) 80  -TB     Pre SpO2 (%) 96  -TB     O2 Delivery Pre Treatment supplemental O2  -TB     Intra SpO2 (%) 86  -TB     O2 Delivery Intra Treatment supplemental O2  -TB     Post SpO2 (%) 95  -TB     O2 Delivery Post Treatment supplemental O2  -TB     Pre Patient Position Supine  -TB     Intra Patient Position Standing  -TB     Post Patient Position Sitting  -TB       Row Name 04/29/25 1608          Positioning and Restraints    Pre-Treatment Position in bed  -TB      Post Treatment Position bed  -TB     In Bed notified nsg;fowlers;with nsg;side rails up x2;call light within reach  -TB               User Key  (r) = Recorded By, (t) = Taken By, (c) = Cosigned By      Initials Name Provider Type    TB Kaylyn Sorensen OT Occupational Therapist                   Outcome Measures       Row Name 04/29/25 1612          How much help from another is currently needed...    Putting on and taking off regular lower body clothing? 1  -TB     Bathing (including washing, rinsing, and drying) 2  -TB     Toileting (which includes using toilet bed pan or urinal) 2  -TB     Putting on and taking off regular upper body clothing 2  -TB     Taking care of personal grooming (such as brushing teeth) 3  -TB     Eating meals 3  -TB     AM-PAC 6 Clicks Score (OT) 13  -TB       Row Name 04/29/25 1459          How much help from another person do you currently need...    Turning from your back to your side while in flat bed without using bedrails? 2  -LR     Moving from lying on back to sitting on the side of a flat bed without bedrails? 2  -LR     Moving to and from a bed to a chair (including a wheelchair)? 2  -LR     Standing up from a chair using your arms (e.g., wheelchair, bedside chair)? 3  -LR     Climbing 3-5 steps with a railing? 1  -LR     To walk in hospital room? 2  -LR     AM-PAC 6 Clicks Score (PT) 12  -LR     Highest Level of Mobility Goal 4 --> Transfer to chair/commode  -LR       Row Name 04/29/25 1612 04/29/25 1459       Functional Assessment    Outcome Measure Options AM-PAC 6 Clicks Daily Activity (OT)  -TB AM-PAC 6 Clicks Basic Mobility (PT)  -LR              User Key  (r) = Recorded By, (t) = Taken By, (c) = Cosigned By      Initials Name Provider Type    Kaylyn Owen OT Occupational Therapist    LR Kika Browning, PT Physical Therapist                    Occupational Therapy Education       Title: PT OT SLP Therapies (In Progress)       Topic:  Occupational Therapy (In Progress)       Point: ADL training (Done)       Learning Progress Summary            Patient Acceptance, E,D, VU,NR by TB at 4/29/2025 1613                      Point: Home exercise program (Done)       Learning Progress Summary            Patient Acceptance, E,D, VU,NR by TB at 4/29/2025 1613                      Point: Precautions (Done)       Learning Progress Summary            Patient Acceptance, E,D, VU,NR by TB at 4/29/2025 1613                                      User Key       Initials Effective Dates Name Provider Type Discipline     07/11/23 -  Kaylyn Sorensen OT Occupational Therapist OT                  OT Recommendation and Plan  Therapy Frequency (OT): daily  Plan of Care Review  Plan of Care Reviewed With: patient  Progress:  (IE)  Outcome Evaluation: OT IE completed, requested OT come to PACU for assessment. Pt is up with Min Ax2 and limited by generalized weakness, ROBLES (86% on 2L), and balance deficits. Education initiated for R shoulder precautions, sling teaching, and ADL retraining including instruction for nerve catheter precautions and mgmt to prevent dislodgement. Pt is Dependent with UB ADLs and sling mgmt this session. OT will follow IP. Given pt's weakness and balance deficits with recurrent fall history, recommend IRF at d/c for best outcome if medically appropriate.     Time Calculation:   Evaluation Complexity (OT)  Review Occupational Profile/Medical/Therapy History Complexity: expanded/moderate complexity  Assessment, Occupational Performance/Identification of Deficit Complexity: 3-5 performance deficits  Clinical Decision Making Complexity (OT): detailed assessment/moderate complexity  Overall Complexity of Evaluation (OT): moderate complexity     Time Calculation- OT       Row Name 04/29/25 1459 04/29/25 1458          Time Calculation- OT    OT Start Time -- 1458  -TB     OT Received On -- 04/29/25  -TB     OT Goal Re-Cert Due Date -- 05/09/25   -TB        Timed Charges    60820 - OT Therapeutic Exercise Minutes -- 15  -TB     60907 - Gait Training Minutes  8  -LR --     94080 - OT Self Care/Mgmt Minutes -- 20  -TB        Untimed Charges    OT Eval/Re-eval Minutes -- 38  -TB        Total Minutes    Timed Charges Total Minutes 8  -LR 35  -TB     Untimed Charges Total Minutes -- 38  -TB      Total Minutes 8  -LR 73  -TB               User Key  (r) = Recorded By, (t) = Taken By, (c) = Cosigned By      Initials Name Provider Type    TB Kaylyn Sorensen, OT Occupational Therapist    LR Kika Browning, PT Physical Therapist                  Therapy Charges for Today       Code Description Service Date Service Provider Modifiers Qty    [id number]  OT THER PROC EA 15 MIN 4/29/2025 Kaylyn Sorensen OT GO 1    [id number]  OT SELF CARE/MGMT/TRAIN EA 15 MIN 4/29/2025 Kaylyn Sorensen OT GO 1    [id number]  OT EVAL MOD COMPLEXITY 3 4/29/2025 Kaylyn Sorensen OT GO 1                 Kaylyn Sorensen OT  4/29/2025

## 2025-04-29 NOTE — PLAN OF CARE
Problem: Adult Inpatient Plan of Care  Goal: Plan of Care Review  Outcome: Progressing  Flowsheets  Taken 4/29/2025 1824 by Suzette Asencio RN  Progress: no change  Taken 4/29/2025 1459 by Kika Browning PT  Plan of Care Reviewed With: patient  Goal: Patient-Specific Goal (Individualized)  Outcome: Progressing  Goal: Absence of Hospital-Acquired Illness or Injury  Outcome: Progressing  Intervention: Identify and Manage Fall Risk  Recent Flowsheet Documentation  Taken 4/29/2025 1800 by Suzette Asencio RN  Safety Promotion/Fall Prevention:   activity supervised   assistive device/personal items within reach   clutter free environment maintained   room organization consistent   safety round/check completed   toileting scheduled  Taken 4/29/2025 1554 by Suzette Asencio RN  Safety Promotion/Fall Prevention:   activity supervised   assistive device/personal items within reach   clutter free environment maintained   room organization consistent   safety round/check completed   toileting scheduled  Intervention: Prevent Skin Injury  Recent Flowsheet Documentation  Taken 4/29/2025 1800 by Suzette Asencio RN  Body Position: legs elevated  Taken 4/29/2025 1554 by Suzette Asencio RN  Body Position: position changed independently  Intervention: Prevent and Manage VTE (Venous Thromboembolism) Risk  Recent Flowsheet Documentation  Taken 4/29/2025 1800 by Suzette Asencio RN  VTE Prevention/Management:   bilateral   SCDs (sequential compression devices) on  Taken 4/29/2025 1554 by Suzette Asencio RN  VTE Prevention/Management:   bilateral   SCDs (sequential compression devices) on  Intervention: Prevent Infection  Recent Flowsheet Documentation  Taken 4/29/2025 1800 by Suzette Asencio RN  Infection Prevention:   environmental surveillance performed   rest/sleep promoted  Taken 4/29/2025 1554 by Suzette Asencio RN  Infection Prevention:   environmental surveillance performed   rest/sleep promoted  Goal: Optimal Comfort and  Wellbeing  Outcome: Progressing  Intervention: Provide Person-Centered Care  Recent Flowsheet Documentation  Taken 4/29/2025 1554 by Suzette Asencio RN  Trust Relationship/Rapport: care explained  Goal: Readiness for Transition of Care  Outcome: Progressing  Intervention: Mutually Develop Transition Plan  Recent Flowsheet Documentation  Taken 4/29/2025 1544 by Suzette Asencio RN  Transportation Anticipated: family or friend will provide  Transportation Concerns: none  Patient/Family Anticipated Services at Transition:   Patient/Family Anticipates Transition to: home with family     Problem: Comorbidity Management  Goal: Blood Glucose Level Within Target Range  Outcome: Progressing  Intervention: Monitor and Manage Glycemia  Recent Flowsheet Documentation  Taken 4/29/2025 1800 by Suzette Asencio RN  Medication Review/Management: medications reviewed  Taken 4/29/2025 1554 by Suzette Asencio RN  Medication Review/Management: medications reviewed  Goal: Blood Pressure in Desired Range  Outcome: Progressing  Intervention: Maintain Blood Pressure Management  Recent Flowsheet Documentation  Taken 4/29/2025 1800 by Suzette Asencio RN  Medication Review/Management: medications reviewed  Taken 4/29/2025 1554 by Suzette Asencio RN  Medication Review/Management: medications reviewed     Problem: Shoulder Arthroplasty (Total, Mono, Reverse)  Goal: Optimal Coping  Outcome: Progressing  Goal: Absence of Bleeding  Outcome: Progressing  Goal: Effective Bowel Elimination  Outcome: Progressing  Goal: Fluid and Electrolyte Balance  Outcome: Progressing  Goal: Optimal Functional Ability  Outcome: Progressing  Intervention: Promote Optimal Functional Status  Recent Flowsheet Documentation  Taken 4/29/2025 1800 by Suzette Asencio RN  Activity Management: up in chair  Taken 4/29/2025 1554 by Suzette Asencio RN  Activity Management: activity encouraged  Goal: Absence of Infection Signs and Symptoms  Outcome: Progressing  Goal:  Intact Neurovascular Status  Outcome: Progressing  Goal: Anesthesia/Sedation Recovery  Outcome: Progressing  Intervention: Optimize Anesthesia Recovery  Recent Flowsheet Documentation  Taken 4/29/2025 1800 by Suzette Asencio RN  Safety Promotion/Fall Prevention:   activity supervised   assistive device/personal items within reach   clutter free environment maintained   room organization consistent   safety round/check completed   toileting scheduled  Taken 4/29/2025 1554 by Suzette Asencio RN  Safety Promotion/Fall Prevention:   activity supervised   assistive device/personal items within reach   clutter free environment maintained   room organization consistent   safety round/check completed   toileting scheduled  Administration (IS):   instruction provided, initial   proper technique demonstrated   self-administered  Goal: Optimal Pain Control and Function  Outcome: Progressing  Goal: Nausea and Vomiting Relief  Outcome: Progressing  Goal: Effective Urinary Elimination  Outcome: Progressing     Problem: Fall Injury Risk  Goal: Absence of Fall and Fall-Related Injury  Outcome: Progressing  Intervention: Identify and Manage Contributors  Recent Flowsheet Documentation  Taken 4/29/2025 1800 by Suzette Asencio RN  Medication Review/Management: medications reviewed  Taken 4/29/2025 1554 by Suzette Asencio RN  Medication Review/Management: medications reviewed  Intervention: Promote Injury-Free Environment  Recent Flowsheet Documentation  Taken 4/29/2025 1800 by Suzette Asencio RN  Safety Promotion/Fall Prevention:   activity supervised   assistive device/personal items within reach   clutter free environment maintained   room organization consistent   safety round/check completed   toileting scheduled  Taken 4/29/2025 1554 by Suzette Asencio RN  Safety Promotion/Fall Prevention:   activity supervised   assistive device/personal items within reach   clutter free environment maintained   room organization consistent    "safety round/check completed   toileting scheduled   Goal Outcome Evaluation:           Progress: no change        Pt alert and oriented x4. VSS. 3L O2 per NC. Incision CDI. Sling in place. Patient complained of shortness of breath and stated he felt like he was \"Smothering\". Infublock stopped, anesthesia contacted. Anesthesiologist came to see patient and agreed infublock should remain off throughout the night. Patient's breathing has improved after infublock stopped. Pt up in chair. Chair alarm on. PRN pain medication administered as ordered.                         "

## 2025-04-30 ENCOUNTER — PATIENT ROUNDING (BHMG ONLY) (OUTPATIENT)
Dept: ENDOCRINOLOGY | Facility: CLINIC | Age: 65
End: 2025-04-30
Payer: MEDICARE

## 2025-04-30 LAB
ANION GAP SERPL CALCULATED.3IONS-SCNC: 11 MMOL/L (ref 5–15)
BUN SERPL-MCNC: 29 MG/DL (ref 8–23)
BUN/CREAT SERPL: 24.2 (ref 7–25)
CALCIUM SPEC-SCNC: 9 MG/DL (ref 8.6–10.5)
CHLORIDE SERPL-SCNC: 98 MMOL/L (ref 98–107)
CO2 SERPL-SCNC: 27 MMOL/L (ref 22–29)
CREAT SERPL-MCNC: 1.2 MG/DL (ref 0.76–1.27)
EGFRCR SERPLBLD CKD-EPI 2021: 68 ML/MIN/1.73
GLUCOSE BLDC GLUCOMTR-MCNC: 126 MG/DL (ref 70–130)
GLUCOSE BLDC GLUCOMTR-MCNC: 187 MG/DL (ref 70–130)
GLUCOSE BLDC GLUCOMTR-MCNC: 209 MG/DL (ref 70–130)
GLUCOSE BLDC GLUCOMTR-MCNC: 269 MG/DL (ref 70–130)
GLUCOSE SERPL-MCNC: 103 MG/DL (ref 65–99)
HCT VFR BLD AUTO: 35 % (ref 37.5–51)
HGB BLD-MCNC: 10.3 G/DL (ref 13–17.7)
POTASSIUM SERPL-SCNC: 3.8 MMOL/L (ref 3.5–5.2)
SODIUM SERPL-SCNC: 136 MMOL/L (ref 136–145)

## 2025-04-30 PROCEDURE — 97110 THERAPEUTIC EXERCISES: CPT

## 2025-04-30 PROCEDURE — 85014 HEMATOCRIT: CPT | Performed by: ORTHOPAEDIC SURGERY

## 2025-04-30 PROCEDURE — 63710000001 INSULIN LISPRO (HUMAN) PER 5 UNITS: Performed by: INTERNAL MEDICINE

## 2025-04-30 PROCEDURE — 63710000001 INSULIN GLARGINE PER 5 UNITS: Performed by: INTERNAL MEDICINE

## 2025-04-30 PROCEDURE — 80048 BASIC METABOLIC PNL TOTAL CA: CPT | Performed by: ORTHOPAEDIC SURGERY

## 2025-04-30 PROCEDURE — 25010000002 HYDROMORPHONE PER 4 MG: Performed by: ORTHOPAEDIC SURGERY

## 2025-04-30 PROCEDURE — 25010000002 CEFAZOLIN 3 G RECONSTITUTED SOLUTION 1 EACH VIAL: Performed by: ORTHOPAEDIC SURGERY

## 2025-04-30 PROCEDURE — 82948 REAGENT STRIP/BLOOD GLUCOSE: CPT

## 2025-04-30 PROCEDURE — 97535 SELF CARE MNGMENT TRAINING: CPT

## 2025-04-30 PROCEDURE — 85018 HEMOGLOBIN: CPT | Performed by: ORTHOPAEDIC SURGERY

## 2025-04-30 PROCEDURE — 97116 GAIT TRAINING THERAPY: CPT

## 2025-04-30 RX ORDER — HYDROCODONE BITARTRATE AND ACETAMINOPHEN 7.5; 325 MG/1; MG/1
1 TABLET ORAL EVERY 4 HOURS PRN
Refills: 0 | Status: DISCONTINUED | OUTPATIENT
Start: 2025-04-30 | End: 2025-05-02

## 2025-04-30 RX ORDER — HYDROCODONE BITARTRATE AND ACETAMINOPHEN 7.5; 325 MG/1; MG/1
2 TABLET ORAL EVERY 4 HOURS PRN
Refills: 0 | Status: DISCONTINUED | OUTPATIENT
Start: 2025-04-30 | End: 2025-05-02

## 2025-04-30 RX ADMIN — INSULIN LISPRO 4 UNITS: 100 INJECTION, SOLUTION INTRAVENOUS; SUBCUTANEOUS at 12:11

## 2025-04-30 RX ADMIN — INSULIN GLARGINE 46 UNITS: 100 INJECTION, SOLUTION SUBCUTANEOUS at 21:07

## 2025-04-30 RX ADMIN — DOCUSATE SODIUM 100 MG: 100 CAPSULE, LIQUID FILLED ORAL at 21:07

## 2025-04-30 RX ADMIN — PANTOPRAZOLE SODIUM 40 MG: 40 TABLET, DELAYED RELEASE ORAL at 08:35

## 2025-04-30 RX ADMIN — SODIUM CHLORIDE 3000 MG: 900 INJECTION INTRAVENOUS at 01:32

## 2025-04-30 RX ADMIN — GABAPENTIN 800 MG: 400 CAPSULE ORAL at 08:27

## 2025-04-30 RX ADMIN — LISINOPRIL 40 MG: 40 TABLET ORAL at 21:07

## 2025-04-30 RX ADMIN — HYDROCODONE BITARTRATE AND ACETAMINOPHEN 1 TABLET: 7.5; 325 TABLET ORAL at 01:37

## 2025-04-30 RX ADMIN — INSULIN LISPRO 8 UNITS: 100 INJECTION, SOLUTION INTRAVENOUS; SUBCUTANEOUS at 18:03

## 2025-04-30 RX ADMIN — HYDROCODONE BITARTRATE AND ACETAMINOPHEN 2 TABLET: 7.5; 325 TABLET ORAL at 22:52

## 2025-04-30 RX ADMIN — HYDROMORPHONE HYDROCHLORIDE 0.5 MG: 1 INJECTION, SOLUTION INTRAMUSCULAR; INTRAVENOUS; SUBCUTANEOUS at 21:17

## 2025-04-30 RX ADMIN — INSULIN LISPRO 45 UNITS: 100 INJECTION, SOLUTION INTRAVENOUS; SUBCUTANEOUS at 12:11

## 2025-04-30 RX ADMIN — HYDROMORPHONE HYDROCHLORIDE 0.5 MG: 1 INJECTION, SOLUTION INTRAMUSCULAR; INTRAVENOUS; SUBCUTANEOUS at 08:43

## 2025-04-30 RX ADMIN — ASPIRIN 325 MG: 325 TABLET ORAL at 08:35

## 2025-04-30 RX ADMIN — LISINOPRIL 40 MG: 40 TABLET ORAL at 08:35

## 2025-04-30 RX ADMIN — NEBIVOLOL 10 MG: 5 TABLET ORAL at 08:34

## 2025-04-30 RX ADMIN — HYDROCHLOROTHIAZIDE 12.5 MG: 25 TABLET ORAL at 08:34

## 2025-04-30 RX ADMIN — HYDROMORPHONE HYDROCHLORIDE 0.5 MG: 1 INJECTION, SOLUTION INTRAMUSCULAR; INTRAVENOUS; SUBCUTANEOUS at 16:35

## 2025-04-30 RX ADMIN — INSULIN LISPRO 45 UNITS: 100 INJECTION, SOLUTION INTRAVENOUS; SUBCUTANEOUS at 08:26

## 2025-04-30 RX ADMIN — INSULIN GLARGINE 46 UNITS: 100 INJECTION, SOLUTION SUBCUTANEOUS at 08:25

## 2025-04-30 RX ADMIN — INSULIN LISPRO 45 UNITS: 100 INJECTION, SOLUTION INTRAVENOUS; SUBCUTANEOUS at 18:04

## 2025-04-30 RX ADMIN — DULOXETINE 60 MG: 60 CAPSULE, DELAYED RELEASE ORAL at 08:34

## 2025-04-30 RX ADMIN — DOCUSATE SODIUM 100 MG: 100 CAPSULE, LIQUID FILLED ORAL at 08:35

## 2025-04-30 RX ADMIN — TAMSULOSIN HYDROCHLORIDE 0.4 MG: 0.4 CAPSULE ORAL at 08:35

## 2025-04-30 RX ADMIN — HYDROCODONE BITARTRATE AND ACETAMINOPHEN 2 TABLET: 7.5; 325 TABLET ORAL at 12:22

## 2025-04-30 RX ADMIN — HYDROCODONE BITARTRATE AND ACETAMINOPHEN 1 TABLET: 7.5; 325 TABLET ORAL at 06:05

## 2025-04-30 RX ADMIN — ATORVASTATIN CALCIUM 40 MG: 40 TABLET, FILM COATED ORAL at 08:34

## 2025-04-30 RX ADMIN — INSULIN LISPRO 12 UNITS: 100 INJECTION, SOLUTION INTRAVENOUS; SUBCUTANEOUS at 08:25

## 2025-04-30 NOTE — DISCHARGE PLACEMENT REQUEST
"Wilbert Kelley (64 y.o. Male)       Gayathri Rasheed RN   [phone]    Looking for short term rehab            Date of Birth   1960    Social Security Number       Address   [address] [place] [address]    Home Phone   [phone]    MRN   [id number]       [facility]    Marital Status                               Admission Date   4/29/2025    Admission Type   Elective    Admitting Provider   Enzo Mckeon Jr., MD    Attending Provider   Reece Moreira MD    Department, Room/Bed   [address], S382/1       Discharge Date       Discharge Disposition       Discharge Destination                                 Attending Provider: Reece Moreira MD    Allergies: Morphine, Adhesive Tape    Isolation: None   Infection: None   Code Status: Prior    Ht: 185.4 cm (73\")   Wt: 150 kg (331 lb 2.1 oz)    Admission Cmt: None   Principal Problem: S/P reverse total shoulder arthroplasty, right [Z96.611]                   Active Insurance as of 4/29/2025       Primary Coverage       Payor Plan Insurance Group Employer/Plan Group    ANTHEM MEDICARE REPLACEMENT [facility] MEDICARE ADVANTAGE O [id number]       Payor Plan Address Payor Plan Phone Number Payor Plan Fax Number Effective Dates    [address] [phone]  1/1/2025 - None Entered    [facility] [id number]         Subscriber Name Subscriber Birth Date Member ID       WILBERT KELLEY 1960 [id number]                     Emergency Contacts        (Rel.) Home Phone Work Phone Mobile Phone    ANUP KELLEY (Spouse) -- -- [phone]              Insurance Information                  ANTHEM MEDICARE REPLACEMENT/ANTHEM MEDICARE ADVANTAGE O Phone: [phone]    Subscriber: Wilbert Kelley Subscriber#: [id number]    Group#: [id number] Precert#: --    Authorization#: [id number] Effective Date: --             History & Physical        Reece Moreira MD at 04/29/25 1653          Patient " Name: Wilbert Kelley  MRN: [id number]  : 1960  DOS: 2025    Attending: Reece Moreira MD    Primary Care Provider: Jose M Simon MD      Chief complaint:  Right Shoulder pain    Subjective  Patient is a pleasant 64 y.o. male presented for scheduled surgery by .     He has a long history of right shoulder pain and limited range of motion.  He had a fall on 3/24/2025 and landed on the right shoulder injuring it further.  He has had numbness, tingling and difficulty with  right hand.  Conservative treatments failed to provide lasting benefit.     He underwent right reverse total shoulder arthroplasty and right biceps tenodesis, surgery was done under GA and a block, he tolerated surgery well, and was admitted for further management.  I saw him in PACU he is doing fairly well.  No complaints of nausea, vomiting, or shortness of breath.  He has some postoperative pain.      Reviewed with patient his past medical history and home medications.  He is on a large dose of insulin both long-acting and short acting.  His long-acting is Tresiba 80 units twice daily.  He is short acting lispro is 60 units with meals 3 times daily.  He is on chronic anticoagulation.  This is on hold for surgery.  He has peripheral neuropathy.  He has history of sleep apnea but not on CPAP currently.  He is on home oxygen at 2 L.  He has coronary artery disease with prior CABG.  This is stable currently.  He is on tamsulosin for BPH.       Allergies   Allergen Reactions    Morphine Nausea And Vomiting     projectile    Adhesive Tape Rash     Pt states sticky tabs cause irritation when left on long.  PAPER TAPE IS OK.        Meds:  Medications Prior to Admission   Medication Sig Dispense Refill Last Dose/Taking    atorvastatin (LIPITOR) 40 MG tablet Take 1 tablet by mouth Daily. 90 tablet 12 2025 Morning    DULoxetine (CYMBALTA) 60 MG capsule Take 1 capsule by mouth Daily.   2025 Morning     fenofibrate 160 MG tablet Take 1 tablet by mouth Daily. 90 tablet 12 4/28/2025 Morning    gabapentin (NEURONTIN) 800 MG tablet    4/28/2025 Bedtime    hydroCHLOROthiazide (MICROZIDE) 12.5 MG capsule Take 1 capsule by mouth Daily.   4/28/2025 Morning    Insulin Glargine (LANTUS SOLOSTAR) 100 UNIT/ML injection pen Inject 100 Units under the skin into the appropriate area as directed Daily. 30 mL 0 4/28/2025 Evening    Insulin Lispro (HUMALOG SC) Inject 60 Units under the skin into the appropriate area as directed 3 (Three) Times a Day Before Meals.   4/28/2025 Bedtime    lisinopril (PRINIVIL,ZESTRIL) 40 MG tablet Take 1 tablet by mouth 2 (Two) Times a Day. 60 tablet 11 4/28/2025 Bedtime    metFORMIN (GLUCOPHAGE) 1000 MG tablet Take 1 tablet by mouth 2 (Two) Times a Day With Meals.   4/28/2025 Bedtime    nebivolol (BYSTOLIC) 10 MG tablet Take 1 tablet by mouth Daily. 30 tablet 12 4/28/2025 at  5:00 AM    pantoprazole (PROTONIX) 40 MG EC tablet Take 1 tablet by mouth Daily.   4/28/2025 Morning    tamsulosin (FLOMAX) 0.4 MG capsule 24 hr capsule Take 1 capsule by mouth Daily. 30 capsule 0 4/28/2025 Morning    apixaban (ELIQUIS) 5 MG tablet tablet Take 1 tablet by mouth 2 (Two) Times a Day. 56 tablet 0 4/26/2025 at 10:00 PM    aspirin 81 MG EC tablet Take 1 tablet by mouth Daily. 90 tablet 3 4/22/2025 Morning    bumetanide (BUMEX) 2 MG tablet Take 1 tablet by mouth Daily. (Patient taking differently: Take 1 tablet by mouth Daily. Takes prn)   4/25/2025    Insulin Degludec (TRESIBA SC) Inject 80 Units under the skin into the appropriate area as directed 2 (Two) Times a Day. (Patient not taking: Reported on 4/29/2025)   Not Taking    Semaglutide,0.25 or 0.5MG/DOS, (Ozempic, 0.25 or 0.5 MG/DOSE,) 2 MG/1.5ML solution pen-injector Inject 0.25 mg under the skin into the appropriate area as directed 1 (One) Time Per Week. (Patient taking differently: Inject 1 mg under the skin into the appropriate area as directed 1 (One) Time  "Per Week.)   2/24/2025            Past Medical History:   Diagnosis Date    Arthritis     BPH (benign prostatic hyperplasia)     Cancer     basal and melanoma-  x2 - left side ear and elbow    Constipation     Coronary artery disease     DDD (degenerative disc disease), lumbar     Diabetes mellitus     couple times month check sugar    Frozen shoulder     left    GERD (gastroesophageal reflux disease)     History of pneumonia 02/2025    Hyperlipidemia     Hypertension     Limited mobility     left shoulder  - possible frozen shoulder-= please be aware for surgery    Oxygen dependent     \"2 liters at night since pneumonia\" per wife    Rotator cuff injury     right shoulder 3/14/2025    Sleep apnea     insurance took cpap machine due to pt not using 6 hours per night.    Tinnitus     Wears glasses     small print     Past Surgical History:   Procedure Laterality Date    CARDIAC CATHETERIZATION      CARDIAC CATHETERIZATION Right 03/16/2023    Procedure: Left Heart Cath;  Surgeon: Jarod Boyle MD;  Location:  [facility]ATH [facility]VASIVE LOCATION;  Service: Cardiovascular;  Laterality: Right;    CATARACT EXTRACTION, BILATERAL Bilateral     COLONOSCOPY      CORONARY ARTERY BYPASS GRAFT N/A 3/21/2023    Procedure: MEDIAN STERNOTOMY, CORONARY ARTERY BYPASS GRAFTING X 3, UTILIZING THE LEFT INTERNAL MAMMARY ARTERY, ENDOSCOPIC VEIN HARVEST OF THE RIGHT GREATER SAPENOUSE VEIN;  Surgeon: Markus Emanuel MD;  [facility]: [facility];  Service: Cardiothoracic;  Laterality: N/A;    CORONARY STENT PLACEMENT      x4    ENDOSCOPY      FINGER SURGERY Left     middle finger - left side    SKIN CANCER EXCISION      x2    WISDOM TOOTH EXTRACTION       Family History   Problem Relation Age of Onset    COPD Mother     Heart attack Father 45        passed    Diabetes Paternal Grandmother     Heart attack Paternal Grandfather 42        massive heart attack    Heart disease Paternal Grandfather      Social History     Tobacco Use    Smoking status: " "Never     Passive exposure: Past    Smokeless tobacco: Current     Types: Snuff   Vaping Use    Vaping status: Never Used   Substance Use Topics    Alcohol use: Not Currently    Drug use: Never       Review of Systems  Pertinent items are noted in HPI    Vital Signs  BP (!) 181/64 (BP Location: Left arm, Patient Position: Lying)   Pulse 79   Temp 97.6 °F (36.4 °C) (Oral)   Resp 20   Ht 185.4 cm (73\")   Wt (!) 150 kg (331 lb 2.1 oz)   SpO2 93%   BMI 43.69 kg/m²     Physical Exam:    General Appearance:    Alert, cooperative, in no acute distress   Head:    Normocephalic, without obvious abnormality, atraumatic   Eyes:            Lids and lashes normal, conjunctivae and sclerae normal, no   icterus    Ears:    Ears appear intact with no abnormalities noted   Throat:   No oral lesions, no thrush, oral mucosa moist   Neck:   No adenopathy, supple, trachea midline, no thyromegaly         Lungs:     Clear to auscultation,respirations regular, even and        unlabored    Heart:    Regular rhythm and normal rate, normal S1 and S2, no      murmur    Abdomen:     Normal bowel sounds, no masses, no organomegaly, soft        nontender, nondistended, no guarding, no rebound                 tenderness   Genitalia:    Deferred   Extremities: Right UE in a sling, CDI  dressing . Interscalene nerve block cath present.  Distal pulses, cap refill, movements of fingers, wrist, intact.     Pulses:   Pulses palpable and equal bilaterally   Skin:   No bleeding, bruising or rash   Neurologic:   Cranial nerves 2 - 12 grossly intact      I reviewed the patient's new clinical results.             Invalid input(s): \"NEUTOPHILPCT\"  Results from last 7 days   Lab Units 04/29/25  0918   POTASSIUM mmol/L 4.3   GLUCOSE mg/dL 221*     Lab Results   Component Value Date    HGBA1C 6.80 (H) 03/28/2025      Latest Reference Range & Units 03/28/25 12:23   Sodium 136 - 145 mmol/L 137   Potassium 3.5 - 5.2 mmol/L 4.4   Chloride 98 - 107 " mmol/L 100   CO2 22.0 - 29.0 mmol/L 25.0   Anion Gap 5.0 - 15.0 mmol/L 12.0   BUN 8 - 23 mg/dL 15   Creatinine 0.76 - 1.27 mg/dL 1.00   BUN/Creatinine Ratio 7.0 - 25.0  15.0   eGFR >60.0 mL/min/1.73 84.6   Glucose 65 - 99 mg/dL 107 (H)   Calcium 8.6 - 10.5 mg/dL 9.2   Hemoglobin A1C 4.80 - 5.60 % 6.80 (H)   (H): Data is abnormally high   Latest Reference Range & Units 03/28/25 12:23   WBC 3.40 - 10.80 10*3/mm3 9.00   RBC 4.14 - 5.80 10*6/mm3 5.13   Hemoglobin 13.0 - 17.7 g/dL 12.2 (L)   Hematocrit 37.5 - 51.0 % 39.9   Platelets 140 - 450 10*3/mm3 348   RDW 12.3 - 15.4 % 17.8 (H)   MCV 79.0 - 97.0 fL 77.8 (L)   MCH 26.6 - 33.0 pg 23.8 (L)   MCHC 31.5 - 35.7 g/dL 30.6 (L)   MPV 6.0 - 12.0 fL 9.9   RDW-SD 37.0 - 54.0 fl 50.3   (L): Data is abnormally low  (H): Data is abnormally high    Assessment and Plan:       S/P reverse total shoulder arthroplasty, right    Type 2 diabetes mellitus with hyperglycemia, with long-term current use of insulin    Sleep apnea    S/P CABG x 3 on 3/21/2023    Hypertension    Hyperlipidemia    Paroxysmal atrial fibrillation    Obesity, Class III, BMI 40-49.9 (morbid obesity)      Plan    1. PT/OT. NWB, RUE, ROM hand, wrist, elbow.  2. Pain control-prns, interscalene nerve block cath with ropivacaine infusion.   3. IS-encourage  4. DVT proph- Mech/ mobilize.  5. Bowel regimen  6. Resume home medications as appropriate  7. Monitor post-op labs  8. DC planning .    - Hypertension:  Resume home medications as appropriate, formulary substitution when indicated.  Holding parameters.  PRN medications for elevated blood pressure.    -DM type 2, insulin resistance.  Hgb A1C   Hold OHA as appropriate  FSBG before meals/bedtime, ( q 6 when NPO), along with correction Humalog.  Long acting and prandial insulin    -MARIAH:  Continue O2 supplement  Monitor O2 sats.    -PAF:  Resume BB. DOAC to resume when ok with . likely POD2.    Dragon disclaimer:  Part of this encounter note is an electronic  transcription/translation of spoken language to printed text. The electronic translation of spoken language may permit erroneous, or at times, nonsensical words or phrases to be inadvertently transcribed; Although I have reviewed the note for such errors, some may still exist.    Reece Moreira MD  04/29/25  16:53 EDT            Electronically signed by Reece Moreira MD at 04/30/25 0856       Dione Don APRN at 04/29/25 0902       Attestation signed by Enzo Mckeon Jr., MD at 04/29/25 0939      I have reviewed this documentation and agree.  Plan on R RTSA with biceps tenodesis. NPO. Posted/consented/marked. To OR today.                      Pre-Op H&P  Wilbert Kelley  [id number]  1960      Chief complaint: Right shoulder pain      Subjective:  Patient is a 64 y.o.male presents for scheduled surgery by Dr. Mckeon. He anticipates a TOTAL SHOULDER REVERSE ARTHROPLASTY WITH BICEPS TENODESIS RIGHT  today.  He has a long history of right shoulder pain and limited range of motion.  He had a fall on 3/24/2025 and landed on the right shoulder injuring it further.  He has had numbness, tingling and difficulty with  right hand.  Conservative treatments failed to provide lasting benefit.      Review of Systems:  Constitutional-- No fever, chills or sweats. No fatigue.  CV-- No chest pain, palpitation or syncope. +HTN, HLD, CAD  Resp-- No cough, hemoptysis. +SOB, home O2  Skin--No rashes or lesions      Allergies:   Allergies   Allergen Reactions    Morphine Nausea And Vomiting     projectile    Adhesive Tape Rash     Pt states sticky tabs cause irritation when left on long.  PAPER TAPE IS OK.          Home Meds:  Medications Prior to Admission   Medication Sig Dispense Refill Last Dose/Taking    atorvastatin (LIPITOR) 40 MG tablet Take 1 tablet by mouth Daily. 90 tablet 12 4/28/2025 Morning    benzoyl peroxide 5 % external liquid Apply 1 application topically to the appropriate area 3 times  prior to surgery as directed. 148 mL 0 4/29/2025 Morning    DULoxetine (CYMBALTA) 60 MG capsule Take 1 capsule by mouth Daily.   4/28/2025 Morning    fenofibrate 160 MG tablet Take 1 tablet by mouth Daily. 90 tablet 12 4/28/2025 Morning    gabapentin (NEURONTIN) 800 MG tablet    4/28/2025 Bedtime    hydroCHLOROthiazide (MICROZIDE) 12.5 MG capsule Take 1 capsule by mouth Daily.   4/28/2025 Morning    Insulin Glargine (LANTUS SOLOSTAR) 100 UNIT/ML injection pen Inject 100 Units under the skin into the appropriate area as directed Daily. 30 mL 0 4/28/2025 Evening    Insulin Lispro (HUMALOG SC) Inject 60 Units under the skin into the appropriate area as directed 3 (Three) Times a Day Before Meals.   4/28/2025 Bedtime    lisinopril (PRINIVIL,ZESTRIL) 40 MG tablet Take 1 tablet by mouth 2 (Two) Times a Day. 60 tablet 11 4/28/2025 Bedtime    metFORMIN (GLUCOPHAGE) 1000 MG tablet Take 1 tablet by mouth 2 (Two) Times a Day With Meals.   4/28/2025 Bedtime    nebivolol (BYSTOLIC) 10 MG tablet Take 1 tablet by mouth Daily. 30 tablet 12 4/28/2025 at  5:00 AM    pantoprazole (PROTONIX) 40 MG EC tablet Take 1 tablet by mouth Daily.   4/28/2025 Morning    tamsulosin (FLOMAX) 0.4 MG capsule 24 hr capsule Take 1 capsule by mouth Daily. 30 capsule 0 4/28/2025 Morning    apixaban (ELIQUIS) 5 MG tablet tablet Take 1 tablet by mouth 2 (Two) Times a Day. 56 tablet 0 4/26/2025 at 10:00 PM    aspirin 81 MG EC tablet Take 1 tablet by mouth Daily. 90 tablet 3 4/22/2025 Morning    bumetanide (BUMEX) 2 MG tablet Take 1 tablet by mouth Daily. (Patient taking differently: Take 1 tablet by mouth Daily. Takes prn)   4/25/2025    Insulin Degludec (TRESIBA SC) Inject 80 Units under the skin into the appropriate area as directed 2 (Two) Times a Day. (Patient not taking: Reported on 4/29/2025)   Not Taking    Semaglutide,0.25 or 0.5MG/DOS, (Ozempic, 0.25 or 0.5 MG/DOSE,) 2 MG/1.5ML solution pen-injector Inject 0.25 mg under the skin into the  "appropriate area as directed 1 (One) Time Per Week. (Patient taking differently: Inject 1 mg under the skin into the appropriate area as directed 1 (One) Time Per Week.)   2/24/2025         PMH:   Past Medical History:   Diagnosis Date    Arthritis     BPH (benign prostatic hyperplasia)     Cancer     basal and melanoma-  x2 - left side ear and elbow    Constipation     Coronary artery disease     DDD (degenerative disc disease), lumbar     Diabetes mellitus     couple times month check sugar    Frozen shoulder     left    GERD (gastroesophageal reflux disease)     History of pneumonia 02/2025    Hyperlipidemia     Hypertension     Limited mobility     left shoulder  - possible frozen shoulder-= please be aware for surgery    Oxygen dependent     \"2 liters at night since pneumonia\" per wife    Rotator cuff injury     right shoulder 3/14/2025    Sleep apnea     insurance took cpap machine due to pt not using 6 hours per night.    Tinnitus     Wears glasses     small print     PSH:    Past Surgical History:   Procedure Laterality Date    CARDIAC CATHETERIZATION      CARDIAC CATHETERIZATION Right 03/16/2023    Procedure: Left Heart Cath;  Surgeon: Jarod Boyle MD;  Location:  [facility]VASIVE LOCATION;  Service: Cardiovascular;  Laterality: Right;    CATARACT EXTRACTION, BILATERAL Bilateral     COLONOSCOPY      CORONARY ARTERY BYPASS GRAFT N/A 3/21/2023    Procedure: MEDIAN STERNOTOMY, CORONARY ARTERY BYPASS GRAFTING X 3, UTILIZING THE LEFT INTERNAL MAMMARY ARTERY, ENDOSCOPIC VEIN HARVEST OF THE RIGHT GREATER SAPENOUSE VEIN;  Surgeon: Markus Emanuel MD;  Location: [facility] OR;  Service: Cardiothoracic;  Laterality: N/A;    CORONARY STENT PLACEMENT      x4    ENDOSCOPY      FINGER SURGERY Left     middle finger - left side    SKIN CANCER EXCISION      x2    WISDOM TOOTH EXTRACTION         Immunization History:  Influenza: No  Pneumococcal: No  Tetanus: No    Social History:   Tobacco:   Social History "     Tobacco Use   Smoking Status Never    Passive exposure: Past   Smokeless Tobacco Current    Types: Snuff      Alcohol:     Social History     Substance and Sexual Activity   Alcohol Use Not Currently         Physical Exam:/89 (BP Location: Left arm, Patient Position: Lying)   Pulse 68   Temp 98.6 °F (37 °C) (Temporal)   Resp 18   SpO2 94%       General Appearance:    Alert, cooperative, no distress, appears stated age   Head:    Normocephalic, without obvious abnormality, atraumatic   Lungs:     Clear to auscultation bilaterally, respirations unlabored    Heart:   Regular rate and rhythm, S1 and S2 normal    Abdomen:    Soft without tenderness. Obese   Extremities:   Extremities normal, atraumatic, no cyanosis or edema   Skin:   Skin color, texture, turgor normal, no rashes or lesions   Neurologic:   Grossly intact     Results Review:     LABS:  Lab Results   Component Value Date    WBC 9.00 03/28/2025    HGB 12.2 (L) 03/28/2025    HCT 39.9 03/28/2025    MCV 77.8 (L) 03/28/2025     03/28/2025    NEUTROABS 6.66 05/12/2023    GLUCOSE 107 (H) 03/28/2025    BUN 15 03/28/2025    CREATININE 1.00 03/28/2025     03/28/2025    K 4.4 03/28/2025     03/28/2025    CO2 25.0 03/28/2025    MG 1.6 05/06/2023    PHOS 3.0 03/29/2023    CALCIUM 9.2 03/28/2025    ALBUMIN 3.3 (L) 05/11/2023    AST 16 05/11/2023    ALT 27 05/11/2023    BILITOT 1.0 05/11/2023       RADIOLOGY:  Imaging Results (Last 72 Hours)       ** No results found for the last 72 hours. **            I reviewed the patient's new clinical results.    Cancer Staging (if applicable)  Cancer Patient: __ yes __no __unknown; If yes, clinical stage T:__ N:__M:__, stage group or __N/A      Impression: Degenerative joint disease right shoulder; right shoulder pain      Plan: TOTAL SHOULDER REVERSE ARTHROPLASTY WITH BICEPS TENODESIS RIGHT       Dione MALINDA Don   4/29/2025   09:02 EDT    Electronically signed by Enzo Mckeon Jr., MD at  04/29/25 0939       Current Facility-Administered Medications   Medication Dose Route Frequency Provider Last Rate Last Admin    acetaminophen (TYLENOL) tablet 325 mg  325 mg Oral Q6H Enzo Mckeon Jr., MD   325 mg at 04/29/25 2335    aspirin tablet 325 mg  325 mg Oral Daily Enzo Mckeon Jr., MD   325 mg at 04/30/25 0835    atorvastatin (LIPITOR) tablet 40 mg  40 mg Oral Daily Enzo Mckeon Jr., MD   40 mg at 04/30/25 0834    dextrose (D50W) (25 g/50 mL) IV injection 25 g  25 g Intravenous Q15 Min PRN Reece Moreira MD        dextrose (GLUTOSE) oral gel 15 g  15 g Oral Q15 Min PRN Reece Moreira MD        docusate sodium (COLACE) capsule 100 mg  100 mg Oral BID Enzo Mckeon Jr., MD   100 mg at 04/30/25 0835    DULoxetine (CYMBALTA) DR capsule 60 mg  60 mg Oral Daily Enzo Mckeon Jr., MD   60 mg at 04/30/25 0834    gabapentin (NEURONTIN) capsule 800 mg  800 mg Oral Daily Enzo Mckeon Jr., MD   800 mg at 04/30/25 0827    glucagon (GLUCAGEN) injection 1 mg  1 mg Intramuscular Q15 Min PRN Reece Moreira MD        hydroCHLOROthiazide tablet 12.5 mg  12.5 mg Oral Daily Enzo Mckeon Jr., MD   12.5 mg at 04/30/25 0834    HYDROcodone-acetaminophen (NORCO) 7.5-325 MG per tablet 1 tablet  1 tablet Oral Q4H PRN Reece Moreira MD        Or    HYDROcodone-acetaminophen (NORCO) 7.5-325 MG per tablet 2 tablet  2 tablet Oral Q4H PRN Reece Moreira MD   2 tablet at 04/30/25 1222    HYDROmorphone (DILAUDID) injection 0.5 mg  0.5 mg Intravenous Q2H PRN Enzo Mckeon Jr., MD   0.5 mg at 04/30/25 0843    And    naloxone (NARCAN) injection 0.1 mg  0.1 mg Intravenous Q5 Min PRN Emir Mckeonel J Jr., MD        insulin glargine (LANTUS, SEMGLEE) injection 46 Units  46 Units Subcutaneous Q12H Reece Moreira MD   46 Units at 04/30/25 0825    Insulin Lispro (humaLOG) injection 4-24 Units  4-24 Units Subcutaneous 4x Daily AC & at Bedtime Reece Moreira MD   4 Units  "at 04/30/25 1211    Insulin Lispro (humaLOG) injection 45 Units  45 Units Subcutaneous TID With Meals Reece Moreira MD   45 Units at 04/30/25 1211    labetalol (NORMODYNE,TRANDATE) injection 10 mg  10 mg Intravenous Q4H PRN Reece Moreira MD        lisinopril (PRINIVIL,ZESTRIL) tablet 40 mg  40 mg Oral BID Reece Moreira MD   40 mg at 04/30/25 0835    nebivolol (BYSTOLIC) tablet 10 mg  10 mg Oral Daily Enzo Mckeon Jr., MD   10 mg at 04/30/25 0834    ondansetron ODT (ZOFRAN-ODT) disintegrating tablet 4 mg  4 mg Oral Q6H PRN Enzo Mckeon Jr., MD        Or    ondansetron (ZOFRAN) injection 4 mg  4 mg Intravenous Q6H PRN Enzo Mckeon Jr., MD        pantoprazole (PROTONIX) EC tablet 40 mg  40 mg Oral Daily Enzo Mckeon Jr., MD   40 mg at 04/30/25 0835    ropivacaine (NAROPIN) 0.2 % infusion (INFUSYSTEM)   Peripheral Nerve Continuous Enzo Mckeon Jr., MD   Stopped at 04/29/25 1649    sodium chloride 0.9 % bolus 500 mL  500 mL Intravenous TID PRN Reece Moreira MD        tamsulosin (FLOMAX) 24 hr capsule 0.4 mg  0.4 mg Oral Daily Enzo Mckeon Jr., MD   0.4 mg at 04/30/25 0835        Physician Progress Notes (most recent note)        Enzo Mckeon Jr., MD at 04/30/25 0731          Orthopedic Daily Progress Note      CC: SHERLEY overnight    Pain well controlled  General: no fevers, chills  Abdomen: no nausea, vomiting, or diarrhea    No other complaints    Physical Exam:  I have reviewed the vital signs.  Temp:  [97.4 °F (36.3 °C)-98.6 °F (37 °C)] 98 °F (36.7 °C)  Heart Rate:  [67-86] 70  Resp:  [14-21] 18  BP: (123-181)/(56-89) 138/84    Objective:  Vital signs: (most recent): Blood pressure 138/84, pulse 70, temperature 98 °F (36.7 °C), temperature source Oral, resp. rate 18, height 185.4 cm (73\"), weight (!) 150 kg (331 lb 2.1 oz), SpO2 99%.              General Appearance:    Alert, cooperative, no distress  Extremities: No clubbing, cyanosis, or edema to lower " extremities  Pulses:  2+ in distal surgical extremity  Skin: Incision Clean/dry/intact      Results Review:    I have reviewed the labs, radiology results and diagnostic studies:no new labs this AM        Results from last 7 days   Lab Units 25  0918   POTASSIUM mmol/L 4.3   GLUCOSE mg/dL 221*       I have reviewed the medications.    Assessment/Problem List  POD# 1 Day Post-Op   S/p R RTSA with biceps tenodesis    Plan  NWB RUE with AAFE to 150. No ER past zero.AROM at elbow and wrist as tolerated  PT/OT      Discharge Planning: I expect patient to be discharged to TBD in TBD days.    Enzo Mckeon Jr., MD  25  07:32 EDT              Electronically signed by Enzo Mckeon Jr., MD at 25 0732          Physical Therapy Notes (most recent note)        Keara Jane, PT at 25 0952  Version 1 of 1         Patient Name: Wilbert Kelley  : 1960    MRN: [id number]                              Today's Date: 2025       Admit Date: 2025    Visit Dx: No diagnosis found.  Patient Active Problem List   Diagnosis    Shortness of breath    Type 2 diabetes mellitus with hyperglycemia, with long-term current use of insulin    Sleep apnea    Coronary artery disease of native artery of native heart with stable angina pectoris    S/P CABG x 3 on 3/21/2023    Hypertension    Hyperlipidemia    EMANUEL (acute kidney injury)    Paroxysmal atrial fibrillation    Bilateral pneumonia    Acute on chronic heart failure with preserved ejection fraction (HFpEF)    S/P reverse total shoulder arthroplasty, right    Obesity, Class III, BMI 40-49.9 (morbid obesity)     Past Medical History:   Diagnosis Date    Arthritis     BPH (benign prostatic hyperplasia)     Cancer     basal and melanoma-  x2 - left side ear and elbow    Constipation     Coronary artery disease     DDD (degenerative disc disease), lumbar     Diabetes mellitus     couple times month check sugar    Frozen shoulder     left    GERD  "(gastroesophageal reflux disease)     History of pneumonia 02/2025    Hyperlipidemia     Hypertension     Limited mobility     left shoulder  - possible frozen shoulder-= please be aware for surgery    Oxygen dependent     \"2 liters at night since pneumonia\" per wife    Rotator cuff injury     right shoulder 3/14/2025    Sleep apnea     insurance took cpap machine due to pt not using 6 hours per night.    Tinnitus     Wears glasses     small print     Past Surgical History:   Procedure Laterality Date    CARDIAC CATHETERIZATION      CARDIAC CATHETERIZATION Right 03/16/2023    Procedure: Left Heart Cath;  Surgeon: Jarod Boyle MD;  [facility];  Service: Cardiovascular;  Laterality: Right;    CATARACT EXTRACTION, BILATERAL Bilateral     COLONOSCOPY      CORONARY ARTERY BYPASS GRAFT N/A 3/21/2023    Procedure: MEDIAN STERNOTOMY, CORONARY ARTERY BYPASS GRAFTING X 3, UTILIZING THE LEFT INTERNAL MAMMARY ARTERY, ENDOSCOPIC VEIN HARVEST OF THE RIGHT GREATER SAPENOUSE VEIN;  Surgeon: Markus Emanuel MD;  Location:  [facility];  Service: Cardiothoracic;  Laterality: N/A;    CORONARY STENT PLACEMENT      x4    ENDOSCOPY      FINGER SURGERY Left     middle finger - left side    SKIN CANCER EXCISION      x2    TOTAL SHOULDER ARTHROPLASTY W/ DISTAL CLAVICLE EXCISION Right 4/29/2025    Procedure: TOTAL SHOULDER REVERSE ARTHROPLASTY WITH BICEPS TENODESIS RIGHT;  Surgeon: Enzo Mckeon Jr., MD;  Location:  [facility];  Service: Orthopedics;  Laterality: Right;    WISDOM TOOTH EXTRACTION        General Information       Row Name 04/30/25 1145          Physical Therapy Time and Intention    Document Type therapy note (daily note)  -AC     Mode of Treatment physical therapy;individual therapy  -AC       Row Name 04/30/25 9100          General Information    Patient Profile Reviewed yes  -AC     Existing Precautions/Restrictions fall;right;shoulder;non-weight bearing;brace on at all times;other (see " comments)  s/p R rTSA with NWB precautions, simple sling, IS nerve catheter, home 2L O2 prn  -AC     Barriers to Rehab medically complex;previous functional deficit;physical barrier  -AC       Row Name 04/30/25 1145          Cognition    Orientation Status (Cognition) oriented x 4  -AC       Row Name 04/30/25 1145          Safety Issues/Impairments Affecting Functional Mobility    Safety Issues Affecting Function (Mobility) awareness of need for assistance;insight into deficits/self-awareness;safety precaution awareness;safety precautions follow-through/compliance;sequencing abilities  -AC     Impairments Affecting Function (Mobility) balance;endurance/activity tolerance;pain;strength;sensation/sensory awareness;range of motion (ROM);postural/trunk control  -               User Key  (r) = Recorded By, (t) = Taken By, (c) = Cosigned By      Initials Name Provider Type    AC Keara Jane, PT Physical Therapist                   Mobility       Row Name 04/30/25 1145          Bed Mobility    Comment, (Bed Mobility) Pt up in room w/ tech  -AC       Row Name 04/30/25 1145          Gait/Stairs (Locomotion)    [place] Level (Gait) verbal cues;minimum assist (75% patient effort);2 person assist  -AC     Assistive Device (Gait) other (see comments)  no AD  -AC     Patient was able to Ambulate yes  -AC     Distance in Feet (Gait) 60  -AC     Deviations/Abnormal Patterns (Gait) bilateral deviations;pamela decreased;gait speed decreased;stride length decreased;base of support, wide  -AC     Bilateral Gait Deviations forward flexed posture;heel strike decreased  -AC     Comment, (Gait/Stairs) Pt ambulated in hallway w/ minAx2, noAD, and 2L NC. Pt demonstrated unsteadiness throughout and required 1 standing rest break due to fatigue. O2 sats reamined>90% on 2L NC however pt reported LE weakness while ambulating. In addition pt required frequent cues for object navigation to protect RUE. No overt LOB or buckling noted  however pt unsteady throughout  -       Row Name 04/30/25 1145          Mobility    Extremity Weight-bearing Status right upper extremity  -     Right Upper Extremity (Weight-bearing Status) non weight-bearing (NWB)   -               User Key  (r) = Recorded By, (t) = Taken By, (c) = Cosigned By      Initials Name Provider Type     Keara Jane, PT Physical Therapist                   Obj/Interventions       [facility] Name 04/30/25 1151          Motor Skills    Therapeutic Exercise hip;knee;ankle  -[facility] Name 04/30/25 1151          Hip (Therapeutic Exercise)    Hip (Therapeutic Exercise) strengthening exercise  -     Hip Strengthening (Therapeutic Exercise) bilateral;flexion;extension;aBduction;aDduction;heel slides;10 repetitions;marching while seated  -       Row Name 04/30/25 1151          Knee (Therapeutic Exercise)    Knee (Therapeutic Exercise) strengthening exercise  -     Knee Strengthening (Therapeutic Exercise) bilateral;LAQ (long arc quad);10 repetitions  -[facility] Name 04/30/25 1151          Ankle (Therapeutic Exercise)    Ankle (Therapeutic Exercise) AROM (active range of motion)  -     Ankle AROM (Therapeutic Exercise) bilateral;dorsiflexion;plantarflexion;10 repetitions  -[facility] Name 04/30/25 1151          Balance    Balance Assessment sitting static balance;sitting dynamic balance;standing static balance;standing dynamic balance  -     Static Sitting Balance standby assist  -     Dynamic Sitting Balance contact guard  -     Position, Sitting Balance unsupported;sitting in chair  -     Static Standing Balance minimal assist;1-person assist;verbal cues;non-verbal cues (demo/gesture)  -     Dynamic Standing Balance minimal assist;2-person assist;verbal cues;non-verbal cues (demo/gesture)  -     Position/Device Used, Standing Balance unsupported  -     Balance Interventions sitting;standing;sit to stand;supported;static;dynamic  -               User Key  (r) =  Recorded By, (t) = Taken By, (c) = Cosigned By      Initials Name Provider Type    AC Keara Jane, PT Physical Therapist                   Goals/Plan    No documentation.                  Clinical Impression       Row Name 04/30/25 1153          Pain    Pretreatment Pain Rating 8/10  -AC     Posttreatment Pain Rating 8/10  -AC     Pain Location shoulder  -AC     Pain Side/Orientation right;generalized  -AC     Pain Management Interventions exercise or physical activity utilized;positioning techniques utilized;cold applied  -AC     Response to Pain Interventions activity participation with tolerable pain  -AC       Row Name 04/30/25 1153          Plan of Care Review    Plan of Care Reviewed With patient  -AC     Progress improving  -AC     Outcome Evaluation Pt continues to demonstrate good progress towards goals this date. Pt ambulated 60ft in hallway w/ minAx2, no AD, and 2L NC. No overt LOB noted however pt unsteady throughout. In addition pt required 1 standing rest break due to fatigue and reported LE weakness after a short distance of mobility. Pt required frequent cues for safety and object navigation to protect RUE. Continue to progress poc as able. D/c rec is IRF for best outcome.  -       Row Name 04/30/25 1153          Therapy Assessment/Plan (PT)    Rehab Potential (PT) fair  -     Criteria for Skilled Interventions Met (PT) yes;meets criteria;skilled treatment is necessary  -     Therapy Frequency (PT) daily  -     Predicted Duration of Therapy Intervention (PT) 5 days  -       Row Name 04/30/25 1153          Vital Signs    O2 Delivery Pre Treatment nasal cannula  -AC     O2 Delivery Intra Treatment nasal cannula  -AC     O2 Delivery Post Treatment nasal cannula  -AC     Pre Patient Position Standing  -AC     Intra Patient Position Standing  -AC     Post Patient Position Sitting  -       Row Name 04/30/25 1153          Positioning and Restraints    Pre-Treatment Position other (comment)  w/  nsg staff  -AC     Post Treatment Position chair  -AC     In Chair notified nsg;reclined;sitting;call light within reach;encouraged to call for assist;exit alarm on;waffle cushion;legs elevated  ice applied  -               User Key  (r) = Recorded By, (t) = Taken By, (c) = Cosigned By      Initials Name Provider Type    AC Keara Jane, PT Physical Therapist                   Outcome Measures       Row Name 04/30/25 1159 04/30/25 0800       How much help from another person do you currently need...    Turning from your back to your side while in flat bed without using bedrails? 2  -AC 2  -BM    Moving from lying on back to sitting on the side of a flat bed without bedrails? 2  -AC 2  -BM    Moving to and from a bed to a chair (including a wheelchair)? 3  -AC 3  -BM    Standing up from a chair using your arms (e.g., wheelchair, bedside chair)? 3  -AC 3  -BM    Climbing 3-5 steps with a railing? 2  -AC 2  -BM    To walk in hospital room? 2  -AC 2  -BM    AM-PAC 6 Clicks Score (PT) 14  -AC 14  -BM    Highest Level of Mobility Goal 4 --> Transfer to chair/commode  -AC 4 --> Transfer to chair/commode  -BM      Row Name 04/30/25 1159          Functional Assessment    Outcome Measure Options AM-PAC 6 Clicks Basic Mobility (PT)  -               User Key  (r) = Recorded By, (t) = Taken By, (c) = Cosigned By      Initials Name Provider Type     Michelle Slade, RN Registered Nurse    Keara Nicole, PT Physical Therapist                                 Physical Therapy Education       Title: PT OT SLP Therapies (In Progress)       Topic: Physical Therapy (Done)       Point: Mobility training (Done)       Learning Progress Summary            Patient Acceptance, E, VU by AC at 4/30/2025 1159    Acceptance, E,D, VU,NR by LR at 4/29/2025 1454    Comment: Educated on NWB status of R UE, precautions, safety with mobility, correct supine<->sit t/f technique, correct sit<->stand t/f technique, correct gait mechanics, and  progression of POC.                      Point: Home exercise program (Done)       Learning Progress Summary            Patient Acceptance, E, VU by AC at 4/30/2025 1159    Acceptance, E,D, VU,NR by LR at 4/29/2025 1459    Comment: Educated on NWB status of R UE, precautions, safety with mobility, correct supine<->sit t/f technique, correct sit<->stand t/f technique, correct gait mechanics, and progression of POC.                      Point: Body mechanics (Done)       Learning Progress Summary            Patient Acceptance, E, VU by AC at 4/30/2025 1159    Acceptance, E,D, VU,NR by LR at 4/29/2025 1459    Comment: Educated on NWB status of R UE, precautions, safety with mobility, correct supine<->sit t/f technique, correct sit<->stand t/f technique, correct gait mechanics, and progression of POC.                      Point: Precautions (Done)       Learning Progress Summary            Patient Acceptance, E, VU by AC at 4/30/2025 1159    Acceptance, E,D, VU,NR by LR at 4/29/2025 1459    Comment: Educated on NWB status of R UE, precautions, safety with mobility, correct supine<->sit t/f technique, correct sit<->stand t/f technique, correct gait mechanics, and progression of POC.                                      User Key       Initials Effective Dates Name Provider Type Discipline     02/03/23 -  Kika Browning, PT Physical Therapist PT     07/11/24 -  Keara Jaen PT Physical Therapist PT                  PT Recommendation and Plan     Progress: improving  Outcome Evaluation: Pt continues to demonstrate good progress towards goals this date. Pt ambulated 60ft in hallway w/ minAx2, no AD, and 2L NC. No overt LOB noted however pt unsteady throughout. In addition pt required 1 standing rest break due to fatigue and reported LE weakness after a short distance of mobility. Pt required frequent cues for safety and object navigation to protect RUE. Continue to progress poc as able. D/c rec is IRF for best  outcome.     Time Calculation:         PT Charges       Row Name 25 1159             Time Calculation    Start Time 0952  -AC      PT Received On 25  -AC      PT Goal Re-Cert Due Date 25  -AC         Time Calculation- PT    Total Timed Code Minutes- PT 23 minute(s)  -AC         Timed Charges    24793 - PT Therapeutic Exercise Minutes 10  -AC      44553 - Gait Training Minutes  13  -AC         Total Minutes    Timed Charges Total Minutes 23  -AC       Total Minutes 23  -AC                User Key  (r) = Recorded By, (t) = Taken By, (c) = Cosigned By      Initials Name Provider Type    AC Keara Jane, PT Physical Therapist                  Therapy Charges for Today       Code Description Service Date Service Provider Modifiers Qty    34603482265 HC PT THER PROC EA 15 MIN 2025 Keara Jane, PT GP 1    31468821812 HC GAIT TRAINING EA 15 MIN 2025 Keara Jane, PT GP 1    90507559687 HC PT THER SUPP EA 15 MIN 2025 Keara Jane, PT GP 2            PT G-Codes  Outcome Measure Options: AM-PAC 6 Clicks Basic Mobility (PT)  AM-PAC 6 Clicks Score (PT): 14  AM-PAC 6 Clicks Score (OT): 13  PT Discharge Summary  Anticipated Discharge Disposition (PT): inpatient rehabilitation facility    Keara Jane PT  2025      Electronically signed by Keara Jane, PT at 25 1200          Occupational Therapy Notes (most recent note)        Kaylyn Sorensen, OT at 25 0846          Patient Name: Wilbert Kelley  : 1960    MRN: [id number]                              Today's Date: 2025       Admit Date: 2025    Visit Dx: No diagnosis found.  Patient Active Problem List   Diagnosis    Shortness of breath    Type 2 diabetes mellitus with hyperglycemia, with long-term current use of insulin    Sleep apnea    Coronary artery disease of native artery of native heart with stable angina pectoris    S/P CABG x 3 on 3/21/2023    Hypertension    Hyperlipidemia    EMANUEL (acute kidney  "injury)    Paroxysmal atrial fibrillation    Bilateral pneumonia    Acute on chronic heart failure with preserved ejection fraction (HFpEF)    S/P reverse total shoulder arthroplasty, right    Obesity, Class III, BMI 40-49.9 (morbid obesity)     Past Medical History:   Diagnosis Date    Arthritis     BPH (benign prostatic hyperplasia)     Cancer     basal and melanoma-  x2 - left side ear and elbow    Constipation     Coronary artery disease     DDD (degenerative disc disease), lumbar     Diabetes mellitus     couple times month check sugar    Frozen shoulder     left    GERD (gastroesophageal reflux disease)     History of pneumonia 02/2025    Hyperlipidemia     Hypertension     Limited mobility     left shoulder  - possible frozen shoulder-= please be aware for surgery    Oxygen dependent     \"2 liters at night since pneumonia\" per wife    Rotator cuff injury     right shoulder 3/14/2025    Sleep apnea     insurance took cpap machine due to pt not using 6 hours per night.    Tinnitus     Wears glasses     small print     Past Surgical History:   Procedure Laterality Date    CARDIAC CATHETERIZATION      CARDIAC CATHETERIZATION Right 03/16/2023    Procedure: Left Heart Cath;  Surgeon: Jarod Boyle MD;  [facility];  Service: Cardiovascular;  Laterality: Right;    CATARACT EXTRACTION, BILATERAL Bilateral     COLONOSCOPY      CORONARY ARTERY BYPASS GRAFT N/A 3/21/2023    Procedure: MEDIAN STERNOTOMY, CORONARY ARTERY BYPASS GRAFTING X 3, UTILIZING THE LEFT INTERNAL MAMMARY ARTERY, ENDOSCOPIC VEIN HARVEST OF THE RIGHT GREATER SAPENOUSE VEIN;  Surgeon: Markus Emanuel MD;  Location: [facility] OR;  Service: Cardiothoracic;  Laterality: N/A;    CORONARY STENT PLACEMENT      x4    ENDOSCOPY      FINGER SURGERY Left     middle finger - left side    SKIN CANCER EXCISION      x2    TOTAL SHOULDER ARTHROPLASTY W/ DISTAL CLAVICLE EXCISION Right 4/29/2025    Procedure: TOTAL SHOULDER REVERSE " ARTHROPLASTY WITH BICEPS TENODESIS RIGHT;  Surgeon: Enzo Mckeon Jr., MD;  Location: [facility];  Service: Orthopedics;  Laterality: Right;    WISDOM TOOTH EXTRACTION        General Information       Row Name 04/30/25 0967          OT Time and Intention    Subjective Information complains of;pain  -TB     Document Type therapy note (daily note)  -TB     Mode of Treatment occupational therapy;individual therapy  -TB     Patient Effort good  -TB     Symptoms Noted During/After Treatment increased pain  -TB       Row Name 04/30/25 0955          General Information    Existing Precautions/Restrictions fall;right;shoulder;non-weight bearing;brace on at all times;other (see comments)  s/p R rTSA with NWB precautions, simple sling, IS nerve catheter, home 2L O2 prn  -TB     Barriers to Rehab medically complex;previous functional deficit;physical barrier  -TB       Row Name 04/30/25 0924          Cognition    Orientation Status (Cognition) oriented x 4  -TB       Row Name 04/30/25 0921          Safety Issues/Impairments Affecting Functional Mobility    Safety Issues Affecting Function (Mobility) awareness of need for assistance;insight into deficits/self-awareness;safety precaution awareness;safety precautions follow-through/compliance;sequencing abilities  -TB     Impairments Affecting Function (Mobility) balance;endurance/activity tolerance;pain;strength;sensation/sensory awareness;range of motion (ROM);postural/trunk control  -TB               User Key  (r) = Recorded By, (t) = Taken By, (c) = Cosigned By      Initials Name Provider Type    TB Kaylyn Sorensen, OT Occupational Therapist                     Mobility/ADL's       Row Name 04/30/25 0918          Bed Mobility    Comment, (Bed Mobility) UI. Pt sleeps in recliner at baseline for past several years  -TB       Row Name 04/30/25 0981          Transfers    Transfers sit-stand transfer;stand-sit transfer  -TB     Comment, (Transfers) Education and cues  for hand sequencing and safety. Assist to manage nerve catheter to prevent dislodgement.  -       Row Name 04/30/25 0950          Sit-Stand Transfer    Sit-Stand [place] (Transfers) moderate assist (50% patient effort);verbal cues  -TB     Comment, (Sit-Stand Transfer) Assist to boost to full upright standing  -       Row Name 04/30/25 0950          Functional Mobility    Patient was able to Ambulate no, other medical factors prevent ambulation  -     Reason Patient was unable to Ambulate --  lethargic from IV pain meds  -       Row Name 04/30/25 0950          Activities of Daily Living    BADL Assessment/Intervention bathing;upper body dressing;lower body dressing;grooming;feeding  -       Row Name 04/30/25 0950          Mobility    Extremity Weight-bearing Status right upper extremity  -TB     Right Upper Extremity (Weight-bearing Status) non weight-bearing (NWB)   -       Row Name 04/30/25 0950          Bathing Assessment/Intervention    [place] Level (Bathing) maximum assist (25% patient effort);upper body;bathing skills  -TB     Position (Bathing) unsupported sitting  -TB     Comment, (Bathing) Education reinforced for R shoulder precautions and ADL retraining to maintain, including instruction that nerve catheter cannot get wet. No family present for teaching.  -       Row Name 04/30/25 0950          Upper Body Dressing Assessment/Training    [place] Level (Upper Body Dressing) doff;don;pajama/robe;maximum assist (25% patient effort);verbal cues  -TB     Position (Upper Body Dressing) unsupported sitting  -TB     Comment, (Upper Body Dressing) Education reinforced for R shoulder precautions and ADL retraining to maintain, including instruction for nerve catheter mgmt to prevent dislodgement.  -       Row Name 04/30/25 0950          Lower Body Dressing Assessment/Training    [place] Level (Lower Body Dressing) don;socks;dependent (less than 25% patient effort)  -TB      Position (Lower Body Dressing) unsupported sitting  -       Row Name 04/30/25 0950          Grooming Assessment/Training    [place] Level (Grooming) set up;wash face, hands  -     Position (Grooming) supported sitting  -[facility] Name 04/30/25 0950          Self-Feeding Assessment/Training    [place] Level (Feeding) independent;liquids to mouth  -     Position (Feeding) supported sitting  -               User Key  (r) = Recorded By, (t) = Taken By, (c) = Cosigned By      Initials Name Provider Type     Kaylyn Sorensen, OT Occupational Therapist                   Obj/Interventions       [facility] Name 04/30/25 0955          Sensory Assessment (Somatosensory)    Sensory Subjective Reports numbness;tingling  -     Sensory Assessment RUE IS nerve catheter infusing  -[facility] Name 04/30/25 0955          Sensory Interventions    Sensory Re-education (Sensation) visual observation of sensory input  -[facility] Name 04/30/25 0955          Shoulder (Therapeutic Exercise)    Shoulder (Therapeutic Exercise) AAROM (active assistive range of motion)  -     Shoulder AAROM (Therapeutic Exercise) right;flexion;extension;sitting;10 repetitions  Pt tolerates AAFE to 100  -[facility] Name 04/30/25 0955          Elbow/Forearm (Therapeutic Exercise)    Elbow/Forearm (Therapeutic Exercise) AAROM (active assistive range of motion)  -     Elbow/Forearm AAROM (Therapeutic Exercise) left assist right;flexion;extension;supination;pronation;sitting;10 repetitions  -[facility] Name 04/30/25 0955          Wrist (Therapeutic Exercise)    Wrist (Therapeutic Exercise) AROM (active range of motion)  -     Wrist AROM (Therapeutic Exercise) right;flexion;extension;10 repetitions  -[facility] Name 04/30/25 0955          Hand (Therapeutic Exercise)    Hand (Therapeutic Exercise) AROM (active range of motion)  -     Hand AROM/AAROM (Therapeutic Exercise) right;AROM (active range of motion);finger  flexion;finger extension;10 repetitions  -TB       Row Name 04/30/25 0955          Motor Skills    Therapeutic Exercise hand;wrist;elbow/forearm;shoulder  Education reinforced for RUE HEP per MD parameters: AROM @ hand, wrist, elbow; AAFE to 150, no ER past 0. No present for family teaching.  -TB       Row Name 04/30/25 0955          Balance    Balance Assessment sitting dynamic balance;sitting static balance;sit to stand dynamic balance;standing static balance  -TB     Static Sitting Balance standby assist  -TB     Dynamic Sitting Balance contact guard  -TB     Position, Sitting Balance sitting in chair  -TB     Sit to Stand Dynamic Balance moderate assist;1-person assist;verbal cues  -TB     Static Standing Balance minimal assist;1-person assist;verbal cues  -TB     Position/Device Used, Standing Balance supported  -TB     Balance Interventions sitting;standing;sit to stand;supported;occupation based/functional task  -TB               User Key  (r) = Recorded By, (t) = Taken By, (c) = Cosigned By      Initials Name Provider Type    TB Kaylyn Sorensen OT Occupational Therapist                   Goals/Plan    No documentation.                  Clinical Impression       Row Name 04/30/25 0958          Pain Assessment    Pretreatment Pain Rating 7/10  -TB     Posttreatment Pain Rating 8/10  -TB     Pain Location shoulder  -TB     Pain Side/Orientation right;generalized  -TB     Pain Management Interventions activity modification encouraged;exercise or physical activity utilized;premedicated for activity;positioning techniques utilized;cold applied;other (see comments)  RUE IS nerve catheter infusing  -TB     Response to Pain Interventions activity level improved;activity participation with increased pain  -TB       Row Name 04/30/25 0958          Plan of Care Review    Plan of Care Reviewed With patient  -TB     Progress improving  -TB     Outcome Evaluation Pt participates in therapy with good effort. Able to  stand and reposition in chair for comfort and activity with Mod Ax1. Education reinforced for R shoulder precautions, sling teaching, and ADL retraining including instruction for nerve catheter mgmt. Max A UB ADLs and sling application. RUE HEP completed with AROM hand/wrist and AAROM elbow. Tolerates AAFE to 100. Pt remains on 3L to maintain sats >90%. Reports 8/10 pain despite pre-medication. OT will continue to follow IP. Recommend IRF for best outcome if pt is medically appropriate.  -TB       Row Name 04/30/25 0958          Therapy Plan Review/Discharge Plan (OT)    Anticipated Discharge Disposition (OT) inpatient rehabilitation facility  -TB       Row Name 04/30/25 0958          Vital Signs    Pre Systolic BP Rehab --  RN cleared OT  -TB     Pre SpO2 (%) 99  -TB     O2 Delivery Pre Treatment supplemental O2  -TB     Intra SpO2 (%) 86  -TB     O2 Delivery Intra Treatment room air  -TB     Post SpO2 (%) 93  -TB     O2 Delivery Post Treatment supplemental O2  -TB     Pre Patient Position Sitting  -TB     Intra Patient Position Standing  -TB     Post Patient Position Sitting  -TB       Row Name 04/30/25 0958          Positioning and Restraints    Pre-Treatment Position sitting in chair/recliner  -TB     Post Treatment Position chair  -TB     In Chair notified nsg;reclined;call light within reach;encouraged to call for assist;exit alarm on;with brace;legs elevated  -TB               User Key  (r) = Recorded By, (t) = Taken By, (c) = Cosigned By      Initials Name Provider Type    TB Kaylyn Sorensen, OT Occupational Therapist                   Outcome Measures    No documentation.                   Occupational Therapy Education       Title: PT OT SLP Therapies (In Progress)       Topic: Occupational Therapy (In Progress)       Point: ADL training (Done)       Learning Progress Summary            Patient Acceptance, E,D, VU,NR by TB at 4/30/2025 1003    Acceptance, E,D, VU,NR by TB at 4/29/2025 1613                       Point: Home exercise program (Done)       Learning Progress Summary            Patient Acceptance, E,D, VU,NR by TB at 4/30/2025 1003    Acceptance, E,D, VU,NR by TB at 4/29/2025 1613                      Point: Precautions (Done)       Learning Progress Summary            Patient Acceptance, E,D, VU,NR by TB at 4/30/2025 1003    Acceptance, E,D, VU,NR by TB at 4/29/2025 1613                                      User Key       Initials Effective Dates Name Provider Type Discipline     07/11/23 -  Kaylyn Sorensen, LISA Occupational Therapist OT                  OT Recommendation and Plan  Therapy Frequency (OT): daily  Plan of Care Review  Plan of Care Reviewed With: patient  Progress: improving  Outcome Evaluation: Pt participates in therapy with good effort. Able to stand and reposition in chair for comfort and activity with Mod Ax1. Education reinforced for R shoulder precautions, sling teaching, and ADL retraining including instruction for nerve catheter mgmt. Max A UB ADLs and sling application. RUE HEP completed with AROM hand/wrist and AAROM elbow. Tolerates AAFE to 100. Pt remains on 3L to maintain sats >90%. Reports 8/10 pain despite pre-medication. OT will continue to follow IP. Recommend IRF for best outcome if pt is medically appropriate.     Time Calculation:   Evaluation Complexity (OT)  Review Occupational Profile/Medical/Therapy History Complexity: expanded/moderate complexity  Assessment, Occupational Performance/Identification of Deficit Complexity: 3-5 performance deficits  Clinical Decision Making Complexity (OT): detailed assessment/moderate complexity  Overall Complexity of Evaluation (OT): moderate complexity     Time Calculation- OT       Row Name 04/30/25 0846             Time Calculation- OT    OT Start Time 0846  -TB      OT Received On 04/30/25  -      OT Goal Re-Cert Due Date 05/09/25  -         Timed Charges    92750 - OT Therapeutic Exercise Minutes 15  -TB       67322 - OT Self Care/Mgmt Minutes 25  -TB         Total Minutes    Timed Charges Total Minutes 40  -TB       Total Minutes 40  -TB                User Key  (r) = Recorded By, (t) = Taken By, (c) = Cosigned By      Initials Name Provider Type    TB Kaylyn Sorensen OT Occupational Therapist                  Therapy Charges for Today       Code Description Service Date Service Provider Modifiers Qty    28279789605 HC OT THER PROC EA 15 MIN 4/29/2025 Kaylyn Sorensen OT GO 1    79091135066 HC OT SELF CARE/MGMT/TRAIN EA 15 MIN 4/29/2025 Kaylyn Sorensen OT GO 1    [id number]  OT EVAL MOD COMPLEXITY 3 4/29/2025 Kaylyn Sorensen OT GO 1    [id number]  CAREGIVER TRAINING STRATEGIES & TQ 1ST 30 MINUTES 4/29/2025 Kaylyn Sorensen OT  1    [id number]  OT THER PROC EA 15 MIN 4/30/2025 Kaylyn Sorensen OT GO 1    [id number]  OT SELF CARE/MGMT/TRAIN EA 15 MIN 4/30/2025 Kaylyn Sorensen OT GO 2                 Kaylyn Sorensen OT  4/30/2025    Electronically signed by Kaylyn Sorensen OT at 04/30/25 100

## 2025-04-30 NOTE — PROGRESS NOTES
"Orthopedic Daily Progress Note      CC: SHERLEY overnight    Pain well controlled  General: no fevers, chills  Abdomen: no nausea, vomiting, or diarrhea    No other complaints    Physical Exam:  I have reviewed the vital signs.  Temp:  [97.4 °F (36.3 °C)-98.6 °F (37 °C)] 98 °F (36.7 °C)  Heart Rate:  [67-86] 70  Resp:  [14-21] 18  BP: (123-181)/(56-89) 138/84    Objective:  Vital signs: (most recent): Blood pressure 138/84, pulse 70, temperature 98 °F (36.7 °C), temperature source Oral, resp. rate 18, height 185.4 cm (73\"), weight (!) 150 kg (331 lb 2.1 oz), SpO2 99%.              General Appearance:    Alert, cooperative, no distress  Extremities: No clubbing, cyanosis, or edema to lower extremities  Pulses:  2+ in distal surgical extremity  Skin: Incision Clean/dry/intact      Results Review:    I have reviewed the labs, radiology results and diagnostic studies:no new labs this AM        Results from last 7 days   Lab Units 04/29/25  0918   POTASSIUM mmol/L 4.3   GLUCOSE mg/dL 221*       I have reviewed the medications.    Assessment/Problem List  POD# 1 Day Post-Op   S/p R RTSA with biceps tenodesis    Plan  NWB RUE with AAFE to 150. No ER past zero.AROM at elbow and wrist as tolerated  PT/OT      Discharge Planning: I expect patient to be discharged to TBD in TBD days.    Enzo Mckeon Jr., MD  04/30/25  07:32 EDT            "

## 2025-04-30 NOTE — PLAN OF CARE
Problem: Adult Inpatient Plan of Care  Goal: Plan of Care Review  Recent Flowsheet Documentation  Taken 4/30/2025 0958 by Kaylyn Sorensen OT  Progress: improving  Outcome Evaluation: Pt participates in therapy with good effort. Able to stand and reposition in chair for comfort and activity with Mod Ax1. Education reinforced for R shoulder precautions, sling teaching, and ADL retraining including instruction for nerve catheter mgmt. Max A UB ADLs and sling application. RUE HEP completed with AROM hand/wrist and AAROM elbow. Tolerates AAFE to 100. Pt remains on 3L to maintain sats >90%. Reports 8/10 pain despite pre-medication. OT will continue to follow IP. Recommend IRF for best outcome if pt is medically appropriate.

## 2025-04-30 NOTE — PROGRESS NOTES
"IM progress note      Wilbert Kelley  [id number]  1960     LOS: 0 days     Attending: Reece Moreira MD    Primary Care Provider: Jose M Simon MD      Chief Complaint/Reason for visit:  Right shoulder pain    Subjective   Doing ok. Having increased pain this morning, unable to tolerate nerve block d/t respiratory distress. Denies f/c/n/v/sob/cp.    Objective     Vital Signs  Visit Vitals  /83   Pulse 64   Temp 98 °F (36.7 °C) (Oral)   Resp 18   Ht 185.4 cm (73\")   Wt (!) 150 kg (331 lb 2.1 oz)   SpO2 99%   BMI 43.69 kg/m²     Temp (24hrs), Av.6 °F (36.4 °C), Min:97.4 °F (36.3 °C), Max:98 °F (36.7 °C)      Nutrition: PO    Respiratory: per NC, wean as able    Physical Therapy: (25) Patient ambulated 60 feet with min assist x2, limited by fatigue, weakness, and SOA. O2 sats dropped to 86% on 2 L O2/NC with ambulation but recovered quickly to >90% with seated rest and PLB. Required significant assist to t/f in and out of bed. Patient currently below functional baseline, demonstrating decreased functional mobility status, impaired balance, decreased endurance, and decreased strength. Will address these deficits to promote return to PLOF. Recommend IRF at d/c, if medically appropriate.     Occupational Therapy: Pt participates in therapy with good effort. Able to stand and reposition in chair for comfort and activity with Mod Ax1. Education reinforced for R shoulder precautions, sling teaching, and ADL retraining including instruction for nerve catheter mgmt. Max A UB ADLs and sling application. RUE HEP completed with AROM hand/wrist and AAROM elbow. Tolerates AAFE to 100. Pt remains on 3L to maintain sats >90%. Reports 8/10 pain despite pre-medication. OT will continue to follow IP. Recommend IRF for best outcome if pt is medically appropriate.     Physical Exam:     General Appearance:    Alert, cooperative, in no acute distress   Head:    Normocephalic, without obvious abnormality, " "atraumatic    Lungs:     Normal effort, symmetric chest rise, no crepitus, clear to      auscultation bilaterally             Heart:    Regular rhythm and normal rate, normal S1 and S2   Abdomen:     Normal bowel sounds, no masses, no organomegaly, soft        non-tender, non-distended, no guarding, no rebound                tenderness   Extremities:   Right UE in a sling, CDI  dressing . Interscalene nerve block cath present.  Distal pulses, cap refill, movements of fingers, wrist, intact.   Pulses:   Pulses palpable and equal bilaterally   Skin:   No bleeding, bruising or rash   Neurologic:   Cranial nerves 2 - 12 grossly intact     Results Review:     I reviewed the patient's new clinical results.         Invalid input(s): \"NEUTOPHILPCT\"  Results from last 7 days   Lab Units 04/29/25  0918   POTASSIUM mmol/L 4.3   GLUCOSE mg/dL 221*      Latest Reference Range & Units 04/29/25 16:32 04/29/25 19:56 04/30/25 07:33   Glucose 70 - 130 mg/dL 266 (H) 290 (H) 269 (H)   (H): Data is abnormally high    I reviewed the patient's new imaging including images and reports.    All medications reviewed.   acetaminophen, 325 mg, Oral, Q6H  aspirin, 325 mg, Oral, Daily  atorvastatin, 40 mg, Oral, Daily  docusate sodium, 100 mg, Oral, BID  DULoxetine, 60 mg, Oral, Daily  gabapentin, 800 mg, Oral, Daily  hydroCHLOROthiazide, 12.5 mg, Oral, Daily  insulin glargine, 46 Units, Subcutaneous, Q12H  insulin lispro, 4-24 Units, Subcutaneous, 4x Daily AC & at Bedtime  Insulin Lispro, 45 Units, Subcutaneous, TID With Meals  lisinopril, 40 mg, Oral, BID  nebivolol, 10 mg, Oral, Daily  pantoprazole, 40 mg, Oral, Daily  tamsulosin, 0.4 mg, Oral, Daily        Assessment & Plan     S/P reverse total shoulder arthroplasty, right    Type 2 diabetes mellitus with hyperglycemia, with long-term current use of insulin    Sleep apnea    S/P CABG x 3 on 3/21/2023    Hypertension    Hyperlipidemia    Paroxysmal atrial fibrillation    Obesity, Class III, " BMI 40-49.9 (morbid obesity)      Plan  1. PT/OT- CHEKO pardo, NWB  2. Pain control-prns. Dc nerve block. Increased norco dose  3. IS-encouraged  [facility]  5. Bowel regimen  6. Monitor post-op labs  7. DC planning for rehab. CM following    - Hypertension:  Resume home medications as appropriate, formulary substitution when indicated.  Holding parameters.  PRN medications for elevated blood pressure.     -DM type 2, insulin resistance.  Hgb A1C   Hold OHA as appropriate  FSBG before meals/bedtime, ( q 6 when NPO), along with correction Humalog.  Long acting and prandial insulin     -MARIAH:  Continue O2 supplement  Monitor O2 sats.     -PAF:  Resume BB. DOAC to resume when ok with . likely POD2.      Dione Don, MALINDA  04/30/25  10:51 EDT

## 2025-04-30 NOTE — THERAPY TREATMENT NOTE
"Patient Name: Wilbert Kelley  : 1960    MRN: [id number]                              Today's Date: 2025       Admit Date: 2025    Visit Dx: No diagnosis found.  Patient Active Problem List   Diagnosis    Shortness of breath    Type 2 diabetes mellitus with hyperglycemia, with long-term current use of insulin    Sleep apnea    Coronary artery disease of native artery of native heart with stable angina pectoris    S/P CABG x 3 on 3/21/2023    Hypertension    Hyperlipidemia    EMANUEL (acute kidney injury)    Paroxysmal atrial fibrillation    Bilateral pneumonia    Acute on chronic heart failure with preserved ejection fraction (HFpEF)    S/P reverse total shoulder arthroplasty, right    Obesity, Class III, BMI 40-49.9 (morbid obesity)     Past Medical History:   Diagnosis Date    Arthritis     BPH (benign prostatic hyperplasia)     Cancer     basal and melanoma-  x2 - left side ear and elbow    Constipation     Coronary artery disease     DDD (degenerative disc disease), lumbar     Diabetes mellitus     couple times month check sugar    Frozen shoulder     left    GERD (gastroesophageal reflux disease)     History of pneumonia 2025    Hyperlipidemia     Hypertension     Limited mobility     left shoulder  - possible frozen shoulder-= please be aware for surgery    Oxygen dependent     \"2 liters at night since pneumonia\" per wife    Rotator cuff injury     right shoulder 3/14/2025    Sleep apnea     insurance took cpap machine due to pt not using 6 hours per night.    Tinnitus     Wears glasses     small print     Past Surgical History:   Procedure Laterality Date    CARDIAC CATHETERIZATION      CARDIAC CATHETERIZATION Right 2023    Procedure: Left Heart Cath;  Surgeon: Jarod Boyle MD;  [facility]:  [facility];  Service: Cardiovascular;  Laterality: Right;    CATARACT EXTRACTION, BILATERAL Bilateral     COLONOSCOPY      CORONARY ARTERY BYPASS GRAFT N/A 3/21/2023    Procedure: " MEDIAN STERNOTOMY, CORONARY ARTERY BYPASS GRAFTING X 3, UTILIZING THE LEFT INTERNAL MAMMARY ARTERY, ENDOSCOPIC VEIN HARVEST OF THE RIGHT GREATER SAPENOUSE VEIN;  Surgeon: Markus Emanuel MD;  Location: [facility];  Service: Cardiothoracic;  Laterality: N/A;    CORONARY STENT PLACEMENT      x4    ENDOSCOPY      FINGER SURGERY Left     middle finger - left side    SKIN CANCER EXCISION      x2    TOTAL SHOULDER ARTHROPLASTY W/ DISTAL CLAVICLE EXCISION Right 4/29/2025    Procedure: TOTAL SHOULDER REVERSE ARTHROPLASTY WITH BICEPS TENODESIS RIGHT;  Surgeon: Enzo Mckeon Jr., MD;  Location: [facility];  Service: Orthopedics;  Laterality: Right;    WISDOM TOOTH EXTRACTION        General Information       Row Name 04/30/25 1145          Physical Therapy Time and Intention    Document Type therapy note (daily note)  -     Mode of Treatment physical therapy;individual therapy  -       Row Name 04/30/25 1145          General Information    Patient Profile Reviewed yes  -AC     Existing Precautions/Restrictions fall;right;shoulder;non-weight bearing;brace on at all times;other (see comments)  s/p R rTSA with NWB precautions, simple sling, IS nerve catheter, home 2L O2 prn  -     Barriers to Rehab medically complex;previous functional deficit;physical barrier  -AC       Row Name 04/30/25 1145          Cognition    Orientation Status (Cognition) oriented x 4  -AC       Row Name 04/30/25 1145          Safety Issues/Impairments Affecting Functional Mobility    Safety Issues Affecting Function (Mobility) awareness of need for assistance;insight into deficits/self-awareness;safety precaution awareness;safety precautions follow-through/compliance;sequencing abilities  -AC     Impairments Affecting Function (Mobility) balance;endurance/activity tolerance;pain;strength;sensation/sensory awareness;range of motion (ROM);postural/trunk control  -               User Key  (r) = Recorded By, (t) = Taken By, (c) = Cosigned By       Initials Name Provider Type     Keara Jane, PT Physical Therapist                   Mobility       Row Name 04/30/25 1145          Bed Mobility    Comment, (Bed Mobility) Pt up in room w/ tech  -       Row Name 04/30/25 1145          Gait/Stairs (Locomotion)    [place] Level (Gait) verbal cues;minimum assist (75% patient effort);2 person assist  -     Assistive Device (Gait) other (see comments)  no AD  -AC     Patient was able to Ambulate yes  -AC     Distance in Feet (Gait) 60  -AC     Deviations/Abnormal Patterns (Gait) bilateral deviations;pamela decreased;gait speed decreased;stride length decreased;base of support, wide  -AC     Bilateral Gait Deviations forward flexed posture;heel strike decreased  -AC     Comment, (Gait/Stairs) Pt ambulated in hallway w/ minAx2, noAD, and 2L NC. Pt demonstrated unsteadiness throughout and required 1 standing rest break due to fatigue. O2 sats reamined>90% on 2L NC however pt reported LE weakness while ambulating. In addition pt required frequent cues for object navigation to protect RUE. No overt LOB or buckling noted however pt unsteady throughout  -       Row Name 04/30/25 1145          Mobility    Extremity Weight-bearing Status right upper extremity  -     Right Upper Extremity (Weight-bearing Status) non weight-bearing (NWB)   -               User Key  (r) = Recorded By, (t) = Taken By, (c) = Cosigned By      Initials Name Provider Type     Keara Jane PT Physical Therapist                   Obj/Interventions       Row Name 04/30/25 1151          Motor Skills    Therapeutic Exercise hip;knee;ankle  -       Row Name 04/30/25 1151          Hip (Therapeutic Exercise)    Hip (Therapeutic Exercise) strengthening exercise  -     Hip Strengthening (Therapeutic Exercise) bilateral;flexion;extension;aBduction;aDduction;heel slides;10 repetitions;marching while seated  -       Row Name 04/30/25 1151          Knee (Therapeutic Exercise)    Knee  (Therapeutic Exercise) strengthening exercise  -AC     Knee Strengthening (Therapeutic Exercise) bilateral;LAQ (long arc quad);10 repetitions  -AC       Row Name 04/30/25 1151          Ankle (Therapeutic Exercise)    Ankle (Therapeutic Exercise) AROM (active range of motion)  -     Ankle AROM (Therapeutic Exercise) bilateral;dorsiflexion;plantarflexion;10 repetitions  -AC       Row Name 04/30/25 1151          Balance    Balance Assessment sitting static balance;sitting dynamic balance;standing static balance;standing dynamic balance  -     Static Sitting Balance standby assist  -     Dynamic Sitting Balance contact guard  -AC     Position, Sitting Balance unsupported;sitting in chair  -AC     Static Standing Balance minimal assist;1-person assist;verbal cues;non-verbal cues (demo/gesture)  -AC     Dynamic Standing Balance minimal assist;2-person assist;verbal cues;non-verbal cues (demo/gesture)  -AC     Position/Device Used, Standing Balance unsupported  -     Balance Interventions sitting;standing;sit to stand;supported;static;dynamic  -               User Key  (r) = Recorded By, (t) = Taken By, (c) = Cosigned By      Initials Name Provider Type    AC Keara Jane, PT Physical Therapist                   Goals/Plan    No documentation.                  Clinical Impression       Row Name 04/30/25 1153          Pain    Pretreatment Pain Rating 8/10  -     Posttreatment Pain Rating 8/10  -     Pain Location shoulder  -AC     Pain Side/Orientation right;generalized  -AC     Pain Management Interventions exercise or physical activity utilized;positioning techniques utilized;cold applied  -AC     Response to Pain Interventions activity participation with tolerable pain  -       Row Name 04/30/25 1153          Plan of Care Review    Plan of Care Reviewed With patient  -AC     Progress improving  -     Outcome Evaluation Pt continues to demonstrate good progress towards goals this date. Pt ambulated 60ft  in hallway w/ minAx2, no AD, and 2L NC. No overt LOB noted however pt unsteady throughout. In addition pt required 1 standing rest break due to fatigue and reported LE weakness after a short distance of mobility. Pt required frequent cues for safety and object navigation to protect RUE. Continue to progress poc as able. D/c rec is IRF for best outcome.  -       Row Name 04/30/25 1153          Therapy Assessment/Plan (PT)    Rehab Potential (PT) fair  -AC     Criteria for Skilled Interventions Met (PT) yes;meets criteria;skilled treatment is necessary  -     Therapy Frequency (PT) daily  -AC     Predicted Duration of Therapy Intervention (PT) 5 days  -       Row Name 04/30/25 1153          Vital Signs    O2 Delivery Pre Treatment nasal cannula  -AC     O2 Delivery Intra Treatment nasal cannula  -AC     O2 Delivery Post Treatment nasal cannula  -AC     Pre Patient Position Standing  -AC     Intra Patient Position Standing  -AC     Post Patient Position Sitting  -       Row Name 04/30/25 1153          Positioning and Restraints    Pre-Treatment Position other (comment)  w/ nsg staff  -AC     Post Treatment Position chair  -AC     In Chair notified nsg;reclined;sitting;call light within reach;encouraged to call for assist;exit alarm on;waffle cushion;legs elevated  ice applied  -               User Key  (r) = Recorded By, (t) = Taken By, (c) = Cosigned By      Initials Name Provider Type    AC Keara Jane, PT Physical Therapist                   Outcome Measures       Row Name 04/30/25 1159 04/30/25 0800       How much help from another person do you currently need...    Turning from your back to your side while in flat bed without using bedrails? 2  -AC 2  -BM    Moving from lying on back to sitting on the side of a flat bed without bedrails? 2  -AC 2  -BM    Moving to and from a bed to a chair (including a wheelchair)? 3  -AC 3  -BM    Standing up from a chair using your arms (e.g., wheelchair, bedside  chair)? 3  -AC 3  -BM    Climbing 3-5 steps with a railing? 2  -AC 2  -BM    To walk in hospital room? 2  -AC 2  -BM    AM-PAC 6 Clicks Score (PT) 14  -AC 14  -BM    Highest Level of Mobility Goal 4 --> Transfer to chair/commode  -AC 4 --> Transfer to chair/commode  -BM      Row Name 04/30/25 1159          Functional Assessment    Outcome Measure Options AM-PAC 6 Clicks Basic Mobility (PT)  -               User Key  (r) = Recorded By, (t) = Taken By, (c) = Cosigned By      Initials Name Provider Type    BM Michelle Slade, RN Registered Nurse    Keara Nicole, MANOHAR Physical Therapist                                 Physical Therapy Education       Title: PT OT SLP Therapies (In Progress)       Topic: Physical Therapy (Done)       Point: Mobility training (Done)       Learning Progress Summary            Patient Acceptance, E, VU by  at 4/30/2025 1159    Acceptance, E,D, VU,NR by LR at 4/29/2025 1459    Comment: Educated on NWB status of R UE, precautions, safety with mobility, correct supine<->sit t/f technique, correct sit<->stand t/f technique, correct gait mechanics, and progression of POC.                      Point: Home exercise program (Done)       Learning Progress Summary            Patient Acceptance, E, VU by  at 4/30/2025 1159    Acceptance, E,D, VU,NR by LR at 4/29/2025 1459    Comment: Educated on NWB status of R UE, precautions, safety with mobility, correct supine<->sit t/f technique, correct sit<->stand t/f technique, correct gait mechanics, and progression of POC.                      Point: Body mechanics (Done)       Learning Progress Summary            Patient Acceptance, E, VU by  at 4/30/2025 1159    Acceptance, E,D, VU,NR by LR at 4/29/2025 1459    Comment: Educated on NWB status of R UE, precautions, safety with mobility, correct supine<->sit t/f technique, correct sit<->stand t/f technique, correct gait mechanics, and progression of POC.                      Point: Precautions  (Done)       Learning Progress Summary            Patient Acceptance, E, VU by AC at 4/30/2025 1159    Acceptance, E,D, VU,NR by LR at 4/29/2025 1459    Comment: Educated on NWB status of R UE, precautions, safety with mobility, correct supine<->sit t/f technique, correct sit<->stand t/f technique, correct gait mechanics, and progression of POC.                                      User Key       Initials Effective Dates Name Provider Type Discipline    LR 02/03/23 -  Kika Browning, PT Physical Therapist PT     07/11/24 -  Keara Jane PT Physical Therapist PT                  PT Recommendation and Plan     Progress: improving  Outcome Evaluation: Pt continues to demonstrate good progress towards goals this date. Pt ambulated 60ft in hallway w/ minAx2, no AD, and 2L NC. No overt LOB noted however pt unsteady throughout. In addition pt required 1 standing rest break due to fatigue and reported LE weakness after a short distance of mobility. Pt required frequent cues for safety and object navigation to protect RUE. Continue to progress poc as able. D/c rec is IRF for best outcome.     Time Calculation:         PT Charges       Row Name 04/30/25 1159             Time Calculation    Start Time 0952  -AC      PT Received On 04/30/25  -      PT Goal Re-Cert Due Date 05/09/25  -         Time Calculation- PT    Total Timed Code Minutes- PT 23 minute(s)  -AC         Timed Charges    36994 - PT Therapeutic Exercise Minutes 10  -AC      89794 - Gait Training Minutes  13  -AC         Total Minutes    Timed Charges Total Minutes 23  -AC       Total Minutes 23  -AC                User Key  (r) = Recorded By, (t) = Taken By, (c) = Cosigned By      Initials Name Provider Type    AC Keara Jane, PT Physical Therapist                  Therapy Charges for Today       Code Description Service Date Service Provider Modifiers Qty    13530811525 HC PT THER PROC EA 15 MIN 4/30/2025 Keara Jane, PT GP 1    37023088243 HC GAIT  TRAINING EA 15 MIN 4/30/2025 Keara Jane, PT GP 1    48130265920 HC PT THER SUPP EA 15 MIN 4/30/2025 Keara Jane PT GP 2            PT G-Codes  Outcome Measure Options: AM-PAC 6 Clicks Basic Mobility (PT)  AM-PAC 6 Clicks Score (PT): 14  AM-PAC 6 Clicks Score (OT): 13  PT Discharge Summary  Anticipated Discharge Disposition (PT): inpatient rehabilitation facility    Keara Jane PT  4/30/2025

## 2025-04-30 NOTE — PROGRESS NOTES
A [employer] message has been sent to the patient for patient rounding with [facility]

## 2025-04-30 NOTE — THERAPY TREATMENT NOTE
"Patient Name: Wilbert Kelley  : 1960    MRN: [id number]                              Today's Date: 2025       Admit Date: 2025    Visit Dx: No diagnosis found.  Patient Active Problem List   Diagnosis    Shortness of breath    Type 2 diabetes mellitus with hyperglycemia, with long-term current use of insulin    Sleep apnea    Coronary artery disease of native artery of native heart with stable angina pectoris    S/P CABG x 3 on 3/21/2023    Hypertension    Hyperlipidemia    EMANUEL (acute kidney injury)    Paroxysmal atrial fibrillation    Bilateral pneumonia    Acute on chronic heart failure with preserved ejection fraction (HFpEF)    S/P reverse total shoulder arthroplasty, right    Obesity, Class III, BMI 40-49.9 (morbid obesity)     Past Medical History:   Diagnosis Date    Arthritis     BPH (benign prostatic hyperplasia)     Cancer     basal and melanoma-  x2 - left side ear and elbow    Constipation     Coronary artery disease     DDD (degenerative disc disease), lumbar     Diabetes mellitus     couple times month check sugar    Frozen shoulder     left    GERD (gastroesophageal reflux disease)     History of pneumonia 2025    Hyperlipidemia     Hypertension     Limited mobility     left shoulder  - possible frozen shoulder-= please be aware for surgery    Oxygen dependent     \"2 liters at night since pneumonia\" per wife    Rotator cuff injury     right shoulder 3/14/2025    Sleep apnea     insurance took cpap machine due to pt not using 6 hours per night.    Tinnitus     Wears glasses     small print     Past Surgical History:   Procedure Laterality Date    CARDIAC CATHETERIZATION      CARDIAC CATHETERIZATION Right 2023    Procedure: Left Heart Cath;  Surgeon: Jarod Boyle MD;  Location:  [facility];  Service: Cardiovascular;  Laterality: Right;    CATARACT EXTRACTION, BILATERAL Bilateral     COLONOSCOPY      CORONARY ARTERY BYPASS GRAFT N/A 3/21/2023    Procedure: " MEDIAN STERNOTOMY, CORONARY ARTERY BYPASS GRAFTING X 3, UTILIZING THE LEFT INTERNAL MAMMARY ARTERY, ENDOSCOPIC VEIN HARVEST OF THE RIGHT GREATER SAPENOUSE VEIN;  Surgeon: Markus Emanuel MD;  Location: [facility] OR;  Service: Cardiothoracic;  Laterality: N/A;    CORONARY STENT PLACEMENT      x4    ENDOSCOPY      FINGER SURGERY Left     middle finger - left side    SKIN CANCER EXCISION      x2    TOTAL SHOULDER ARTHROPLASTY W/ DISTAL CLAVICLE EXCISION Right 4/29/2025    Procedure: TOTAL SHOULDER REVERSE ARTHROPLASTY WITH BICEPS TENODESIS RIGHT;  Surgeon: Enzo Mckeon Jr., MD;  Location: [facility] OR;  Service: Orthopedics;  Laterality: Right;    WISDOM TOOTH EXTRACTION        General Information       Row Name 04/30/25 0949          OT Time and Intention    Subjective Information complains of;pain  -TB     Document Type therapy note (daily note)  -TB     Mode of Treatment occupational therapy;individual therapy  -TB     Patient Effort good  -TB     Symptoms Noted During/After Treatment increased pain  -TB       Row Name 04/30/25 0949          General Information    Existing Precautions/Restrictions fall;right;shoulder;non-weight bearing;brace on at all times;other (see comments)  s/p R rTSA with NWB precautions, simple sling, IS nerve catheter, home 2L O2 prn  -TB     Barriers to Rehab medically complex;previous functional deficit;physical barrier  -TB       Row Name 04/30/25 0949          Cognition    Orientation Status (Cognition) oriented x 4  -TB       Row Name 04/30/25 0949          Safety Issues/Impairments Affecting Functional Mobility    Safety Issues Affecting Function (Mobility) awareness of need for assistance;insight into deficits/self-awareness;safety precaution awareness;safety precautions follow-through/compliance;sequencing abilities  -TB     Impairments Affecting Function (Mobility) balance;endurance/activity tolerance;pain;strength;sensation/sensory awareness;range of motion (ROM);postural/trunk  control  -TB               User Key  (r) = Recorded By, (t) = Taken By, (c) = Cosigned By      Initials Name Provider Type     Kaylyn Sorensen, OT Occupational Therapist                     Mobility/ADL's       Row Name 04/30/25 0950          Bed Mobility    Comment, (Bed Mobility) UI. Pt sleeps in recliner at baseline for past several years  -       Row Name 04/30/25 0950          Transfers    Transfers sit-stand transfer;stand-sit transfer  -TB     Comment, (Transfers) Education and cues for hand sequencing and safety. Assist to manage nerve catheter to prevent dislodgement.  -       Row Name 04/30/25 0950          Sit-Stand Transfer    Sit-Stand [place] (Transfers) moderate assist (50% patient effort);verbal cues  -TB     Comment, (Sit-Stand Transfer) Assist to boost to full upright standing  -       Row Name 04/30/25 0983          Functional Mobility    Patient was able to Ambulate no, other medical factors prevent ambulation  -TB     Reason Patient was unable to Ambulate --  lethargic from IV pain meds  -       Row Name 04/30/25 0906          Activities of Daily Living    BADL Assessment/Intervention bathing;upper body dressing;lower body dressing;grooming;feeding  -       Row Name 04/30/25 0950          Mobility    Extremity Weight-bearing Status right upper extremity  -TB     Right Upper Extremity (Weight-bearing Status) non weight-bearing (NWB)   -       Row Name 04/30/25 0950          Bathing Assessment/Intervention    [place] Level (Bathing) maximum assist (25% patient effort);upper body;bathing skills  -TB     Position (Bathing) unsupported sitting  -TB     Comment, (Bathing) Education reinforced for R shoulder precautions and ADL retraining to maintain, including instruction that nerve catheter cannot get wet. No family present for teaching.  -       Row Name 04/30/25 0950          Upper Body Dressing Assessment/Training    [place] Level (Upper Body Dressing)  doff;don;pajama/robe;maximum assist (25% patient effort);verbal cues  -TB     Position (Upper Body Dressing) unsupported sitting  -TB     Comment, (Upper Body Dressing) Education reinforced for R shoulder precautions and ADL retraining to maintain, including instruction for nerve catheter mgmt to prevent dislodgement.  -TB       Row Name 04/30/25 0939          Lower Body Dressing Assessment/Training    [place] Level (Lower Body Dressing) don;socks;dependent (less than 25% patient effort)  -TB     Position (Lower Body Dressing) unsupported sitting  -TB       Row Name 04/30/25 0950          Grooming Assessment/Training    [place] Level (Grooming) set up;wash face, hands  -TB     Position (Grooming) supported sitting  -TB       Row Name 04/30/25 0957          Self-Feeding Assessment/Training    [place] Level (Feeding) independent;liquids to mouth  -TB     Position (Feeding) supported sitting  -TB               User Key  (r) = Recorded By, (t) = Taken By, (c) = Cosigned By      Initials Name Provider Type    Kaylyn Owen OT Occupational Therapist                   Obj/Interventions       Row Name 04/30/25 0938          Sensory Assessment (Somatosensory)    Sensory Subjective Reports numbness;tingling  -TB     Sensory Assessment RUE IS nerve catheter infusing  -TB       Row Name 04/30/25 0910          Sensory Interventions    Sensory Re-education (Sensation) visual observation of sensory input  -       Row Name 04/30/25 5849          Shoulder (Therapeutic Exercise)    Shoulder (Therapeutic Exercise) AAROM (active assistive range of motion)  -TB     Shoulder AAROM (Therapeutic Exercise) right;flexion;extension;sitting;10 repetitions  Pt tolerates AAFE to 100  -TB       Row Name 04/30/25 3116          Elbow/Forearm (Therapeutic Exercise)    Elbow/Forearm (Therapeutic Exercise) AAROM (active assistive range of motion)  -TB     Elbow/Forearm AAROM (Therapeutic Exercise) left assist  right;flexion;extension;supination;pronation;sitting;10 repetitions  -TB       Row Name 04/30/25 0955          Wrist (Therapeutic Exercise)    Wrist (Therapeutic Exercise) AROM (active range of motion)  -TB     Wrist AROM (Therapeutic Exercise) right;flexion;extension;10 repetitions  -TB       Row Name 04/30/25 0955          Hand (Therapeutic Exercise)    Hand (Therapeutic Exercise) AROM (active range of motion)  -TB     Hand AROM/AAROM (Therapeutic Exercise) right;AROM (active range of motion);finger flexion;finger extension;10 repetitions  -TB       Row Name 04/30/25 0955          Motor Skills    Therapeutic Exercise hand;wrist;elbow/forearm;shoulder  Education reinforced for RUE HEP per MD parameters: AROM @ hand, wrist, elbow; AAFE to 150, no ER past 0. No present for family teaching.  -       Row Name 04/30/25 0955          Balance    Balance Assessment sitting dynamic balance;sitting static balance;sit to stand dynamic balance;standing static balance  -TB     Static Sitting Balance standby assist  -TB     Dynamic Sitting Balance contact guard  -TB     Position, Sitting Balance sitting in chair  -TB     Sit to Stand Dynamic Balance moderate assist;1-person assist;verbal cues  -TB     Static Standing Balance minimal assist;1-person assist;verbal cues  -TB     Position/Device Used, Standing Balance supported  -TB     Balance Interventions sitting;standing;sit to stand;supported;occupation based/functional task  -TB               User Key  (r) = Recorded By, (t) = Taken By, (c) = Cosigned By      Initials Name Provider Type    TB Kaylyn Sorensen OT Occupational Therapist                   Goals/Plan    No documentation.                  Clinical Impression       Row Name 04/30/25 0958          Pain Assessment    Pretreatment Pain Rating 7/10  -TB     Posttreatment Pain Rating 8/10  -TB     Pain Location shoulder  -TB     Pain Side/Orientation right;generalized  -TB     Pain Management Interventions  activity modification encouraged;exercise or physical activity utilized;premedicated for activity;positioning techniques utilized;cold applied;other (see comments)  RUE IS nerve catheter infusing  -TB     Response to Pain Interventions activity level improved;activity participation with increased pain  -TB       Row Name 04/30/25 0958          Plan of Care Review    Plan of Care Reviewed With patient  -TB     Progress improving  -TB     Outcome Evaluation Pt participates in therapy with good effort. Able to stand and reposition in chair for comfort and activity with Mod Ax1. Education reinforced for R shoulder precautions, sling teaching, and ADL retraining including instruction for nerve catheter mgmt. Max A UB ADLs and sling application. RUE HEP completed with AROM hand/wrist and AAROM elbow. Tolerates AAFE to 100. Pt remains on 3L to maintain sats >90%. Reports 8/10 pain despite pre-medication. OT will continue to follow IP. Recommend IRF for best outcome if pt is medically appropriate.  -TB       Row Name 04/30/25 0958          Therapy Plan Review/Discharge Plan (OT)    Anticipated Discharge Disposition (OT) inpatient rehabilitation facility  -TB       Row Name 04/30/25 0958          Vital Signs    Pre Systolic BP Rehab --  RN cleared OT  -TB     Pre SpO2 (%) 99  -TB     O2 Delivery Pre Treatment supplemental O2  -TB     Intra SpO2 (%) 86  -TB     O2 Delivery Intra Treatment room air  -TB     Post SpO2 (%) 93  -TB     O2 Delivery Post Treatment supplemental O2  -TB     Pre Patient Position Sitting  -TB     Intra Patient Position Standing  -TB     Post Patient Position Sitting  -TB       Row Name 04/30/25 0958          Positioning and Restraints    Pre-Treatment Position sitting in chair/recliner  -TB     Post Treatment Position chair  -TB     In Chair notified nsg;reclined;call light within reach;encouraged to call for assist;exit alarm on;with brace;legs elevated  -TB               User Key  (r) = Recorded By,  (t) = Taken By, (c) = Cosigned By      Initials Name Provider Type     Kaylyn Sorensen OT Occupational Therapist                   Outcome Measures    No documentation.                   Occupational Therapy Education       Title: PT OT SLP Therapies (In Progress)       Topic: Occupational Therapy (In Progress)       Point: ADL training (Done)       Learning Progress Summary            Patient Acceptance, E,D, VU,NR by TB at 4/30/2025 1003    Acceptance, E,D, VU,NR by TB at 4/29/2025 1613                      Point: Home exercise program (Done)       Learning Progress Summary            Patient Acceptance, E,D, VU,NR by TB at 4/30/2025 1003    Acceptance, E,D, VU,NR by TB at 4/29/2025 1613                      Point: Precautions (Done)       Learning Progress Summary            Patient Acceptance, E,D, VU,NR by  at 4/30/2025 1003    Acceptance, E,D, VU,NR by TB at 4/29/2025 1613                                      User Key       Initials Effective Dates Name Provider Type Discipline     07/11/23 -  Kaylyn Sorensen OT Occupational Therapist OT                  OT Recommendation and Plan  Therapy Frequency (OT): daily  Plan of Care Review  Plan of Care Reviewed With: patient  Progress: improving  Outcome Evaluation: Pt participates in therapy with good effort. Able to stand and reposition in chair for comfort and activity with Mod Ax1. Education reinforced for R shoulder precautions, sling teaching, and ADL retraining including instruction for nerve catheter mgmt. Max A UB ADLs and sling application. RUE HEP completed with AROM hand/wrist and AAROM elbow. Tolerates AAFE to 100. Pt remains on 3L to maintain sats >90%. Reports 8/10 pain despite pre-medication. OT will continue to follow IP. Recommend IRF for best outcome if pt is medically appropriate.     Time Calculation:   Evaluation Complexity (OT)  Review Occupational Profile/Medical/Therapy History Complexity: expanded/moderate  complexity  Assessment, Occupational Performance/Identification of Deficit Complexity: 3-5 performance deficits  Clinical Decision Making Complexity (OT): detailed assessment/moderate complexity  Overall Complexity of Evaluation (OT): moderate complexity     Time Calculation- OT       Row Name 04/30/25 0846             Time Calculation- OT    OT Start Time 0846  -TB      OT Received On 04/30/25  -TB      OT Goal Re-Cert Due Date 05/09/25  -TB         Timed Charges    75795 - OT Therapeutic Exercise Minutes 15  -TB      69163 - OT Self Care/Mgmt Minutes 25  -TB         Total Minutes    Timed Charges Total Minutes 40  -TB       Total Minutes 40  -TB                User Key  (r) = Recorded By, (t) = Taken By, (c) = Cosigned By      Initials Name Provider Type    TB Kaylyn Sorensen, OT Occupational Therapist                  Therapy Charges for Today       Code Description Service Date Service Provider Modifiers Qty    55061343270 HC OT THER PROC EA 15 MIN 4/29/2025 Kaylyn Sorensen, OT GO 1    73150826197 HC OT SELF CARE/MGMT/TRAIN EA 15 MIN 4/29/2025 Kaylyn Sorensen, OT GO 1    99939623774 HC OT EVAL MOD COMPLEXITY 3 4/29/2025 Kaylyn Sorensen, OT GO 1    23901251700 HC CAREGIVER TRAINING STRATEGIES & TQ 1ST 30 MINUTES 4/29/2025 Kaylyn Sorensen, OT  1    [id number]  OT THER PROC EA 15 MIN 4/30/2025 Kaylyn Sorensen, OT GO 1    53949202424 HC OT SELF CARE/MGMT/TRAIN EA 15 MIN 4/30/2025 Kaylyn Sorensen, OT GO 2                 Kaylyn Sorensen, OT  4/30/2025

## 2025-04-30 NOTE — CASE MANAGEMENT/SOCIAL WORK
Continued Stay Note  [facility]     Patient Name: Wilbert Kelley  MRN: [id number]  Today's Date: 4/30/2025    Admit Date: 4/29/2025    Plan: rehab   Discharge Plan       Row Name 04/30/25 1125       Plan    Plan rehab    Patient/Family in Agreement with Plan yes    Plan Comments Pt lives in [place] with wife. He reports he had been independent with ADLs and mobility prior to admit. He has a O2 concentrator at home but no other DME. Pt is followed by his PCP and has drug coverage. At this time pt and family have requeseted a referral for rehab at [facility]. This has been given. The list of other SNFs have also been given for the family to chose from as well. If accepted he will need insurance precert. CM to follow                   Discharge Codes    No documentation.                       Gayathri Rasheed RN

## 2025-04-30 NOTE — PLAN OF CARE
Problem: Adult Inpatient Plan of Care  Goal: Absence of Hospital-Acquired Illness or Injury  Outcome: Progressing  Intervention: Identify and Manage Fall Risk  Description: Perform standard risk assessment on admission using a validated tool or comprehensive approach appropriate to the patient; reassess fall risk frequently, with change in status or transfer to another level of care.Communicate risk to interprofessional healthcare team; ensure fall risk visible cue.Determine need for increased observation, equipment and environmental modification, as well as use of supportive, nonskid footwear.Adjust safety measures to individual needs and identified risk factors.Reinforce the importance of active participation with fall risk prevention, safety, and physical activity with the patient and family.Perform regular intentional rounding to assess need for position change, pain assessment and personal needs, including assistance with toileting.  Recent Flowsheet Documentation  Taken 4/30/2025 0556 by Kika Wisdom, RN  Safety Promotion/Fall Prevention:   activity supervised   gait belt   nonskid shoes/slippers when out of bed   safety round/check completed   toileting scheduled  Taken 4/30/2025 0420 by Kika Wisdom, RN  Safety Promotion/Fall Prevention:   activity supervised   elopement precautions   fall prevention program maintained   room organization consistent   safety round/check completed   toileting scheduled  Taken 4/30/2025 0137 by Kika Wisdom, RN  Safety Promotion/Fall Prevention: activity supervised  Taken 4/30/2025 0000 by Kika Wisdom, RN  Safety Promotion/Fall Prevention:   activity supervised   elopement precautions   fall prevention program maintained   gait belt   room organization consistent   nonskid shoes/slippers when out of bed   safety round/check completed   toileting scheduled  Intervention: Prevent Skin Injury  Description: Perform a screening for skin injury risk, such  as pressure or moisture-associated skin damage on admission and at regular intervals throughout hospital stay.Keep all areas of skin (especially folds) clean and dry.Maintain adequate skin hydration.Relieve and redistribute pressure and protect bony prominences and skin at risk for injury; implement measures based on patient-specific risk factors.Match turning and repositioning schedule to clinical condition.Encourage weight shift frequently; assist with reposition if unable to complete independently.Float heels off bed; avoid pressure on the Achilles tendon.Keep skin free from extended contact with medical devices.Optimize nutrition and hydration.Encourage functional activity and mobility, as early as tolerated.Use aids (e.g., slide boards, mechanical lift) during transfer.  Recent Flowsheet Documentation  Taken 4/30/2025 0556 by Kika Wisdom RN  Body Position: position changed independently  Taken 4/30/2025 0420 by Kika Wisdom RN  Body Position:   position changed independently   lower extremity elevated  Taken 4/30/2025 0137 by Kika Wisdom RN  Body Position:   position changed independently   lower extremity elevated  Taken 4/30/2025 0000 by Kika Wisdom RN  Body Position: position changed independently  Taken 4/29/2025 2200 by Kika Wisdom RN  Body Position: lower extremity elevated  Taken 4/29/2025 2045 by Kika Wisdmo RN  Body Position: legs elevated  Intervention: Prevent and Manage VTE (Venous Thromboembolism) Risk  Description: Assess for VTE (venous thromboembolism) risk.Promote early mobilization; encourage both active and passive leg exercises, if unable to ambulate.Initiate and maintain compression or other therapy, as indicated, based on identified risk in accordance with organizational protocol and provider order.Recognize the patient's individual risk for bleeding before initiating pharmacologic thromboprophylaxis.  Recent Flowsheet Documentation  Taken  4/30/2025 0556 by Kika Wisdom RN  VTE Prevention/Management:   SCDs (sequential compression devices) on   bilateral  Taken 4/30/2025 0420 by Kika Wisdom RN  VTE Prevention/Management:   SCDs (sequential compression devices) on   bilateral  Taken 4/30/2025 0137 by Kika Wisdom RN  VTE Prevention/Management:   bilateral   SCDs (sequential compression devices) on  Taken 4/30/2025 0000 by Kika Wisdom RN  VTE Prevention/Management:   SCDs (sequential compression devices) on   bilateral  Taken 4/29/2025 2200 by Kika Wisdom RN  VTE Prevention/Management:   SCDs (sequential compression devices) on   bilateral  Taken 4/29/2025 2045 by Kika Wisdom RN  VTE Prevention/Management:   bilateral   SCDs (sequential compression devices) on  Intervention: Prevent Infection  Description: Maintain skin and mucous membrane integrity; promote hand, oral and pulmonary hygiene.Optimize fluid balance, nutrition, sleep and glycemic control to maximize infection resistance.Identify potential sources of infection early to prevent or mitigate progression of infection (e.g., wound, lines, devices).Evaluate ongoing need for invasive devices; remove promptly when no longer indicated.Review vaccination status.  Recent Flowsheet Documentation  Taken 4/30/2025 0556 by Kika Wisdom RN  Infection Prevention:   environmental surveillance performed   equipment surfaces disinfected   hand hygiene promoted   personal protective equipment utilized   single patient room provided  Taken 4/30/2025 0420 by Kika Wisdom RN  Infection Prevention:   environmental surveillance performed   equipment surfaces disinfected   hand hygiene promoted   personal protective equipment utilized   single patient room provided  Taken 4/30/2025 0137 by Kika Wisdom RN  Infection Prevention:   environmental surveillance performed   equipment surfaces disinfected   hand hygiene promoted   personal protective  equipment utilized   single patient room provided  Taken 4/30/2025 0000 by Kika Wisdom, RN  Infection Prevention:   environmental surveillance performed   equipment surfaces disinfected   hand hygiene promoted   personal protective equipment utilized   single patient room provided  Taken 4/29/2025 2200 by Kika Wisdom, RN  Infection Prevention:   environmental surveillance performed   equipment surfaces disinfected   hand hygiene promoted   personal protective equipment utilized   single patient room provided  Taken 4/29/2025 2045 by Kika Wisdom, RN  Infection Prevention:   environmental surveillance performed   equipment surfaces disinfected   hand hygiene promoted   personal protective equipment utilized   single patient room provided  Goal: Optimal Comfort and Wellbeing  Outcome: Progressing  Intervention: Monitor Pain and Promote Comfort  Description: Assess pain level, treatment efficacy and patient response at regular intervals using a consistent pain scale.Consider the presence and impact of preexisting chronic pain.Encourage patient and caregiver involvement in pain assessment, interventions and safety measures.Promote activity; balance with sleep and rest to enhance healing.  Recent Flowsheet Documentation  Taken 4/30/2025 0605 by Kika Wisdom, RN  Pain Management Interventions:   pain medication given   pain management plan reviewed with patient/caregiver   position adjusted   pillow support provided  Taken 4/30/2025 0137 by Kika Wisdom, RN  Pain Management Interventions: pain medication given  Taken 4/30/2025 0000 by Kika Wisdom, RN  Pain Management Interventions:   pain management plan reviewed with patient/caregiver   no interventions per patient request  Taken 4/29/2025 2045 by Kika Wisdom, RN  Pain Management Interventions:   pain management plan reviewed with patient/caregiver   no interventions per patient request  Intervention: Provide Person-Centered  Care  Description: Use a family-focused approach to care; encourage support system presence and participation.Develop trust and rapport by proactively providing information, encouraging questions, addressing concerns and offering reassurance.Acknowledge emotional response to hospitalization.Recognize and utilize personal coping strategies and strengths; develop goals via shared decision-making.Honor spiritual and cultural preferences.  Recent Flowsheet Documentation  Taken 4/30/2025 0000 by Kika Wisdom RN  Trust Relationship/Rapport:   questions encouraged   care explained   choices provided  Taken 4/29/2025 2200 by Kika Wisdom, RN  Trust Relationship/Rapport:   care explained   choices provided   questions encouraged  Taken 4/29/2025 2045 by Kika Wisdom, RN  Trust Relationship/Rapport:   care explained   choices provided   questions answered   questions encouraged  Goal: Readiness for Transition of Care  Outcome: Progressing     Problem: Comorbidity Management  Goal: Blood Glucose Level Within Target Range  Outcome: Progressing  Goal: Blood Pressure in Desired Range  Outcome: Progressing     Problem: Shoulder Arthroplasty (Total, Mono, Reverse)  Goal: Optimal Coping  Outcome: Progressing  Goal: Absence of Bleeding  Outcome: Progressing  Goal: Optimal Functional Ability  Outcome: Progressing  Intervention: Promote Optimal Functional Status  Description: Implement multidisciplinary rehabilitation following early mobility guidelines; coordinate pain control to optimize comfort with activity.Identify functional limitations, such as mobility and ADL (activities of daily living) safety and independence; encourage optimal participation to minimize decline associated with inactivity.Assess and retrain in functional mobility; avoid direct pushing, pulling, lifting or weightbearing on the surgical arm; assess balance and safety with mobility.Encourage ADLs (activities of daily living), such as  self-feeding, hygiene and dressing; provide set-up, adaptations, assistance and extra time as needed.Promote a safe and accessible environment and effective use of assistive devices and equipment.Pace and cluster activity to balance with rest periods and conserve energy; promote adequate nutrition, sleep and rest.Facilitate range of motion and prescribed exercises, such as pendulum exercises, fist pumps and elbow motion.Evaluate and address performance deficits, such as cognitive, balance and activity tolerance impairments.  Recent Flowsheet Documentation  Taken 4/30/2025 0556 by Kika Wisdom RN  Activity Management: ambulated to bathroom  Assistive Device Utilized: gait belt  Self-Care Promotion: independence encouraged  Taken 4/30/2025 0420 by Kika Wisdom RN  Activity Management: up in chair  Self-Care Promotion: independence encouraged  Taken 4/30/2025 0251 by Kika Wisdom RN  Assistive Device Utilized: gait belt  Taken 4/30/2025 0137 by Kika Wisdom RN  Activity Management: up in chair  Taken 4/30/2025 0000 by Kika Wisdom RN  Activity Management: up in chair  Self-Care Promotion: independence encouraged  Taken 4/29/2025 2200 by Kika Wisdom RN  Activity Management: up in chair  Taken 4/29/2025 2045 by Kika Wisdom RN  Activity Management: up in chair  Goal: Absence of Infection Signs and Symptoms  Outcome: Progressing  Goal: Intact Neurovascular Status  Outcome: Progressing  Goal: Optimal Pain Control and Function  Outcome: Progressing  Intervention: Prevent or Manage Pain  Description: Evaluate and assist with psychosocial, cultural and spiritual factors impacting pain.Set pain management goals; mutually determine pain management plan and review plan regularly.Use a consistent, validated tool for pain assessment including function and quality of life; evaluate pain level, effect of treatment and patient's response at regular intervals.Match pharmacologic  analgesia to severity and type of pain mechanism; evaluate risk for opioid use and dependence; consider medication rotation and multimodal approach. Titrate to patient response.Provide around-the-clock dosing of pain medication to keep pain levels in control.Manage medication-induced adverse events and side effects.Provide multimodal interventions, such as physical activity, therapeutic exercise, TENS (transcutaneous electrical nerve stimulation) and manual therapy; consider addition of complementary or alternative therapies.Utilize intermittent cold pack to assist pain management.Consider and address emotional response to pain.Modify pain perception using techniques, such as distraction, virtual reality, mindfulness, guided imagery, meditation or music.  Recent Flowsheet Documentation  Taken 4/30/2025 0605 by Kika Wisdom RN  Pain Management Interventions:   pain medication given   pain management plan reviewed with patient/caregiver   position adjusted   pillow support provided  Taken 4/30/2025 0556 by Kika Wisdom RN  Diversional Activities: television  Taken 4/30/2025 0420 by Kika Wisdom RN  Diversional Activities: television  Taken 4/30/2025 0137 by Kika Wisdom RN  Pain Management Interventions: pain medication given  Taken 4/30/2025 0000 by Kika Wisdom RN  Pain Management Interventions:   pain management plan reviewed with patient/caregiver   no interventions per patient request  Diversional Activities: television  Taken 4/29/2025 2200 by Kika Wisdom RN  Diversional Activities: television  Taken 4/29/2025 2045 by Kika Wisdom RN  Pain Management Interventions:   pain management plan reviewed with patient/caregiver   no interventions per patient request  Goal: Nausea and Vomiting Relief  Outcome: Progressing  Goal: Effective Urinary Elimination  Outcome: Progressing     Problem: Fall Injury Risk  Goal: Absence of Fall and Fall-Related Injury  Outcome:  Progressing  Intervention: Identify and Manage Contributors  Description: Develop a fall prevention plan, considering patient-centered interventions and family/caregiver involvement; identify and address patient's facilitators and barriers.Provide reorientation, appropriate sensory stimulation and routines with changes in mental status to decrease risk of fall.Promote use of personal vision and auditory aids.Assess assistance level required for safe and effective self-care; provide support as needed, such as toileting and mobilization. For age 65 and older, implement timed toileting with assistance.Encourage physical activity, such as performance of mobility and self-care at highest level of patient ability, multicomponent exercise program and provision of appropriate assistive devices.If fall occurs, assess the severity of injury; implement fall injury protocol. Determine the cause and revise fall injury prevention plan.Regularly review and advocate for medication adjustment to decrease fall risk; consider administration times, polypharmacy and age.Balance adequate pain management with potential for oversedation.  Recent Flowsheet Documentation  Taken 4/30/2025 0556 by Kika Wisdom, RN  Self-Care Promotion: independence encouraged  Taken 4/30/2025 0420 by Kika Wisdom, RN  Self-Care Promotion: independence encouraged  Taken 4/30/2025 0000 by Kika Wisdom, RN  Self-Care Promotion: independence encouraged  Intervention: Promote Injury-Free Environment  Description: Provide a safe, barrier-free environment that encourages independent activity.Keep care area uncluttered and well-lighted.Determine need for increased observation or monitoring.Avoid use of devices that minimize mobility, such as restraints or indwelling urinary catheter.  Recent Flowsheet Documentation  Taken 4/30/2025 0556 by Kika Wisdom, RN  Safety Promotion/Fall Prevention:   activity supervised   gait belt   nonskid  shoes/slippers when out of bed   safety round/check completed   toileting scheduled  Taken 4/30/2025 0420 by Kika Wisdom, RN  Safety Promotion/Fall Prevention:   activity supervised   elopement precautions   fall prevention program maintained   room organization consistent   safety round/check completed   toileting scheduled  Taken 4/30/2025 0137 by Kika Wisdom, RN  Safety Promotion/Fall Prevention: activity supervised  Taken 4/30/2025 0000 by Kika Wisdom, RN  Safety Promotion/Fall Prevention:   activity supervised   elopement precautions   fall prevention program maintained   gait belt   room organization consistent   nonskid shoes/slippers when out of bed   safety round/check completed   toileting scheduled   Goal Outcome Evaluation:

## 2025-04-30 NOTE — PROGRESS NOTES
[facility]    Acute pain service Inpatient Progress Note    Patient Name: Wilbert Kelley  :  1960  MRN:  [id number]        Acute Pain  Service Inpatient Progress Note:    Analgesia:Fair  Pain Score:5/10  LOC: alert and awake  Side Effects:Shortness of breath (pump was off since last evening for SOB)  Catheter Plan:Catheter removed and tip intact

## 2025-04-30 NOTE — PLAN OF CARE
Goal Outcome Evaluation:  Plan of Care Reviewed With: patient        Progress: improving  Outcome Evaluation: Pt continues to demonstrate good progress towards goals this date. Pt ambulated 60ft in hallway w/ minAx2, no AD, and 2L NC. No overt LOB noted however pt unsteady throughout. In addition pt required 1 standing rest break due to fatigue and reported LE weakness after a short distance of mobility. Pt required frequent cues for safety and object navigation to protect RUE. Continue to progress poc as able. D/c rec is IRF for best outcome.    Anticipated Discharge Disposition (PT): inpatient rehabilitation facility

## 2025-05-01 LAB
GLUCOSE BLDC GLUCOMTR-MCNC: 104 MG/DL (ref 70–130)
GLUCOSE BLDC GLUCOMTR-MCNC: 127 MG/DL (ref 70–130)
GLUCOSE BLDC GLUCOMTR-MCNC: 158 MG/DL (ref 70–130)
GLUCOSE BLDC GLUCOMTR-MCNC: 69 MG/DL (ref 70–130)
GLUCOSE BLDC GLUCOMTR-MCNC: 79 MG/DL (ref 70–130)

## 2025-05-01 PROCEDURE — 97110 THERAPEUTIC EXERCISES: CPT

## 2025-05-01 PROCEDURE — 63710000001 INSULIN LISPRO (HUMAN) PER 5 UNITS: Performed by: INTERNAL MEDICINE

## 2025-05-01 PROCEDURE — 97116 GAIT TRAINING THERAPY: CPT

## 2025-05-01 PROCEDURE — 82948 REAGENT STRIP/BLOOD GLUCOSE: CPT

## 2025-05-01 PROCEDURE — 63710000001 INSULIN GLARGINE PER 5 UNITS: Performed by: INTERNAL MEDICINE

## 2025-05-01 RX ORDER — BISACODYL 5 MG/1
5 TABLET, DELAYED RELEASE ORAL DAILY PRN
Status: DISCONTINUED | OUTPATIENT
Start: 2025-05-01 | End: 2025-05-06 | Stop reason: HOSPADM

## 2025-05-01 RX ORDER — POLYETHYLENE GLYCOL 3350 17 G/17G
17 POWDER, FOR SOLUTION ORAL DAILY PRN
Status: DISCONTINUED | OUTPATIENT
Start: 2025-05-01 | End: 2025-05-06 | Stop reason: HOSPADM

## 2025-05-01 RX ORDER — AMOXICILLIN 250 MG
2 CAPSULE ORAL 2 TIMES DAILY
Status: DISCONTINUED | OUTPATIENT
Start: 2025-05-01 | End: 2025-05-06 | Stop reason: HOSPADM

## 2025-05-01 RX ORDER — BISACODYL 10 MG
10 SUPPOSITORY, RECTAL RECTAL DAILY PRN
Status: DISCONTINUED | OUTPATIENT
Start: 2025-05-01 | End: 2025-05-06 | Stop reason: HOSPADM

## 2025-05-01 RX ADMIN — LISINOPRIL 40 MG: 40 TABLET ORAL at 08:41

## 2025-05-01 RX ADMIN — ATORVASTATIN CALCIUM 40 MG: 40 TABLET, FILM COATED ORAL at 08:37

## 2025-05-01 RX ADMIN — BISACODYL 5 MG: 5 TABLET, COATED ORAL at 13:03

## 2025-05-01 RX ADMIN — DOCUSATE SODIUM 100 MG: 100 CAPSULE, LIQUID FILLED ORAL at 22:25

## 2025-05-01 RX ADMIN — HYDROCODONE BITARTRATE AND ACETAMINOPHEN 2 TABLET: 7.5; 325 TABLET ORAL at 08:33

## 2025-05-01 RX ADMIN — HYDROCODONE BITARTRATE AND ACETAMINOPHEN 2 TABLET: 7.5; 325 TABLET ORAL at 17:37

## 2025-05-01 RX ADMIN — SENNOSIDES AND DOCUSATE SODIUM 2 TABLET: 50; 8.6 TABLET ORAL at 22:26

## 2025-05-01 RX ADMIN — PANTOPRAZOLE SODIUM 40 MG: 40 TABLET, DELAYED RELEASE ORAL at 08:38

## 2025-05-01 RX ADMIN — GABAPENTIN 800 MG: 400 CAPSULE ORAL at 08:37

## 2025-05-01 RX ADMIN — INSULIN LISPRO 45 UNITS: 100 INJECTION, SOLUTION INTRAVENOUS; SUBCUTANEOUS at 17:38

## 2025-05-01 RX ADMIN — ACETAMINOPHEN 325 MG: 325 TABLET, FILM COATED ORAL at 05:17

## 2025-05-01 RX ADMIN — INSULIN GLARGINE 46 UNITS: 100 INJECTION, SOLUTION SUBCUTANEOUS at 08:35

## 2025-05-01 RX ADMIN — LISINOPRIL 40 MG: 40 TABLET ORAL at 22:26

## 2025-05-01 RX ADMIN — INSULIN LISPRO 45 UNITS: 100 INJECTION, SOLUTION INTRAVENOUS; SUBCUTANEOUS at 08:34

## 2025-05-01 RX ADMIN — HYDROCODONE BITARTRATE AND ACETAMINOPHEN 2 TABLET: 7.5; 325 TABLET ORAL at 22:46

## 2025-05-01 RX ADMIN — ACETAMINOPHEN 325 MG: 325 TABLET, FILM COATED ORAL at 22:25

## 2025-05-01 RX ADMIN — INSULIN LISPRO 45 UNITS: 100 INJECTION, SOLUTION INTRAVENOUS; SUBCUTANEOUS at 13:02

## 2025-05-01 RX ADMIN — DULOXETINE 60 MG: 60 CAPSULE, DELAYED RELEASE ORAL at 08:36

## 2025-05-01 RX ADMIN — HYDROCODONE BITARTRATE AND ACETAMINOPHEN 2 TABLET: 7.5; 325 TABLET ORAL at 13:01

## 2025-05-01 RX ADMIN — TAMSULOSIN HYDROCHLORIDE 0.4 MG: 0.4 CAPSULE ORAL at 08:38

## 2025-05-01 RX ADMIN — DOCUSATE SODIUM 100 MG: 100 CAPSULE, LIQUID FILLED ORAL at 08:37

## 2025-05-01 RX ADMIN — POLYETHYLENE GLYCOL 3350 17 G: 17 POWDER, FOR SOLUTION ORAL at 17:39

## 2025-05-01 RX ADMIN — ASPIRIN 325 MG: 325 TABLET ORAL at 08:36

## 2025-05-01 RX ADMIN — HYDROCHLOROTHIAZIDE 12.5 MG: 25 TABLET ORAL at 08:41

## 2025-05-01 RX ADMIN — NEBIVOLOL 10 MG: 5 TABLET ORAL at 08:42

## 2025-05-01 RX ADMIN — INSULIN LISPRO 4 UNITS: 100 INJECTION, SOLUTION INTRAVENOUS; SUBCUTANEOUS at 08:34

## 2025-05-01 NOTE — THERAPY TREATMENT NOTE
"Patient Name: Wilbert Kelley  : 1960    MRN: [id number]                              Today's Date: 2025       Admit Date: 2025    Visit Dx: No diagnosis found.  Patient Active Problem List   Diagnosis    Shortness of breath    Type 2 diabetes mellitus with hyperglycemia, with long-term current use of insulin    Sleep apnea    Coronary artery disease of native artery of native heart with stable angina pectoris    S/P CABG x 3 on 3/21/2023    Hypertension    Hyperlipidemia    EMANUEL (acute kidney injury)    Paroxysmal atrial fibrillation    Bilateral pneumonia    Acute on chronic heart failure with preserved ejection fraction (HFpEF)    S/P reverse total shoulder arthroplasty, right    Obesity, Class III, BMI 40-49.9 (morbid obesity)     Past Medical History:   Diagnosis Date    Arthritis     BPH (benign prostatic hyperplasia)     Cancer     basal and melanoma-  x2 - left side ear and elbow    Constipation     Coronary artery disease     DDD (degenerative disc disease), lumbar     Diabetes mellitus     couple times month check sugar    Frozen shoulder     left    GERD (gastroesophageal reflux disease)     History of pneumonia 2025    Hyperlipidemia     Hypertension     Limited mobility     left shoulder  - possible frozen shoulder-= please be aware for surgery    Oxygen dependent     \"2 liters at night since pneumonia\" per wife    Rotator cuff injury     right shoulder 3/14/2025    Sleep apnea     insurance took cpap machine due to pt not using 6 hours per night.    Tinnitus     Wears glasses     small print     Past Surgical History:   Procedure Laterality Date    CARDIAC CATHETERIZATION      CARDIAC CATHETERIZATION Right 2023    Procedure: Left Heart Cath;  Surgeon: Jarod Boyle MD;  Location:  [facility];  Service: Cardiovascular;  Laterality: Right;    CATARACT EXTRACTION, BILATERAL Bilateral     COLONOSCOPY      CORONARY ARTERY BYPASS GRAFT N/A 3/21/2023    Procedure: " MEDIAN STERNOTOMY, CORONARY ARTERY BYPASS GRAFTING X 3, UTILIZING THE LEFT INTERNAL MAMMARY ARTERY, ENDOSCOPIC VEIN HARVEST OF THE RIGHT GREATER SAPENOUSE VEIN;  Surgeon: aMrkus Emanuel MD;  Location: [facility] OR;  Service: Cardiothoracic;  Laterality: N/A;    CORONARY STENT PLACEMENT      x4    ENDOSCOPY      FINGER SURGERY Left     middle finger - left side    SKIN CANCER EXCISION      x2    TOTAL SHOULDER ARTHROPLASTY W/ DISTAL CLAVICLE EXCISION Right 4/29/2025    Procedure: TOTAL SHOULDER REVERSE ARTHROPLASTY WITH BICEPS TENODESIS RIGHT;  Surgeon: Enzo Mckeon Jr., MD;  Location: [facility] OR;  Service: Orthopedics;  Laterality: Right;    WISDOM TOOTH EXTRACTION        General Information       Row Name 05/01/25 0917          Physical Therapy Time and Intention    Document Type therapy note (daily note)  -AB     Mode of Treatment physical therapy  -AB       Row Name 05/01/25 0917          General Information    Patient Profile Reviewed yes  -AB     Existing Precautions/Restrictions fall;right;shoulder;non-weight bearing;brace on at all times;other (see comments)  s/p R rTSA with NWB precautions, simple sling  -AB     Barriers to Rehab medically complex;previous functional deficit;physical barrier  -AB       Row Name 05/01/25 0917          Cognition    Orientation Status (Cognition) oriented x 3  -AB       Row Name 05/01/25 0917          Safety Issues/Impairments Affecting Functional Mobility    Safety Issues Affecting Function (Mobility) awareness of need for assistance;insight into deficits/self-awareness;safety precaution awareness;safety precautions follow-through/compliance;sequencing abilities  -AB     Impairments Affecting Function (Mobility) balance;endurance/activity tolerance;pain;strength;sensation/sensory awareness;range of motion (ROM);postural/trunk control  -AB               User Key  (r) = Recorded By, (t) = Taken By, (c) = Cosigned By      Initials Name Provider Type    AB Nazanin Carrington,  PT Physical Therapist                   Mobility       Row Name 05/01/25 0917          Bed Mobility    Comment, (Bed Mobility) Up in room w/ tech  -AB       Row Name 05/01/25 0917          Transfers    Comment, (Transfers) Cues for hand placement, sequencing, and safety.  -AB       Row Name 05/01/25 0917          Sit-Stand Transfer    Sit-Stand Bacon (Transfers) verbal cues;1 person assist;contact guard  -AB       Row Name 05/01/25 0917          Gait/Stairs (Locomotion)    Bacon Level (Gait) verbal cues;minimum assist (75% patient effort);1 person assist  -AB     Assistive Device (Gait) other (see comments)  no AD  -AB     Patient was able to Ambulate yes  -AB     Distance in Feet (Gait) 60  -AB     Deviations/Abnormal Patterns (Gait) bilateral deviations;pamela decreased;gait speed decreased;stride length decreased;base of support, wide  -AB     Bilateral Gait Deviations forward flexed posture;heel strike decreased  -AB     Comment, (Gait/Stairs) Pt ambulated with step through gait pattern at a slowed pace. Cues provided for PLB, object navigation, and safety. No overt LOB but min A applied for improved stability. O2 sat >90% on RA. Further activity limited by weakness and fatigue.  -AB       Row Name 05/01/25 0917          Mobility    Extremity Weight-bearing Status right upper extremity  -AB     Right Upper Extremity (Weight-bearing Status) non weight-bearing (NWB)   -AB               User Key  (r) = Recorded By, (t) = Taken By, (c) = Cosigned By      Initials Name Provider Type    AB Nazanin Carrington, PT Physical Therapist                   Obj/Interventions       Row Name 05/01/25 0920          Motor Skills    Therapeutic Exercise hip;knee;ankle  -AB       Row Name 05/01/25 0920          Hip (Therapeutic Exercise)    Hip (Therapeutic Exercise) isometric exercises  -AB     Hip Isometrics (Therapeutic Exercise) bilateral;gluteal sets;10 repetitions  -AB     Hip Strengthening (Therapeutic Exercise)  bilateral  -AB       Row Name 05/01/25 0920          Knee (Therapeutic Exercise)    Knee (Therapeutic Exercise) strengthening exercise  -AB     Knee Strengthening (Therapeutic Exercise) bilateral;LAQ (long arc quad);10 repetitions  -AB       Row Name 05/01/25 0920          Ankle (Therapeutic Exercise)    Ankle (Therapeutic Exercise) AROM (active range of motion)  -AB     Ankle AROM (Therapeutic Exercise) bilateral;dorsiflexion;plantarflexion;10 repetitions  -AB       Row Name 05/01/25 0920          Balance    Balance Assessment sitting static balance;sitting dynamic balance;standing static balance;standing dynamic balance  -AB     Static Sitting Balance standby assist  -AB     Dynamic Sitting Balance standby assist  -AB     Position, Sitting Balance unsupported;sitting in chair  -AB     Static Standing Balance contact guard  -AB     Dynamic Standing Balance minimal assist;1-person assist;verbal cues  -AB     Position/Device Used, Standing Balance unsupported  -AB     Balance Interventions sitting;standing;sit to stand;static;dynamic;occupation based/functional task  -AB     Comment, Balance Unsteady w/o overt LOB.  -AB               User Key  (r) = Recorded By, (t) = Taken By, (c) = Cosigned By      Initials Name Provider Type    AB Nazanin Carrington, PT Physical Therapist                   Goals/Plan    No documentation.                  Clinical Impression       Row Name 05/01/25 0952          Pain    Pretreatment Pain Rating 8/10  -AB     Posttreatment Pain Rating 8/10  -AB     Pain Location shoulder  -AB     Pain Side/Orientation right;generalized  -AB     Pain Management Interventions activity modification encouraged;exercise or physical activity utilized;positioning techniques utilized;cold applied  -AB     Response to Pain Interventions activity participation with tolerable pain  -AB       Row Name 05/01/25 0952          Plan of Care Review    Plan of Care Reviewed With patient  -AB     Progress improving  -AB      Outcome Evaluation Pt with good effort and ambulated 60' with min Ax1 and no AD. Cues provided for safety and object navigation. O2 sat >90% on RA. He continues to present below his functional baseline with weakness, impaired balance, and elevated pain. Further IPPT is warrented. PT will progress as able per POC.  -AB       Row Name 05/01/25 0952          Vital Signs    Pre Systolic BP Rehab 150  -AB     Pre Treatment Diastolic BP 81  -AB     O2 Delivery Pre Treatment room air  -AB     Intra SpO2 (%) 95  -AB     O2 Delivery Intra Treatment room air  -AB     Post SpO2 (%) 93  -AB     O2 Delivery Post Treatment room air  -AB     Pre Patient Position Standing  -AB     Intra Patient Position Standing  -AB     Post Patient Position Sitting  -AB       Row Name 05/01/25 0952          Positioning and Restraints    Pre-Treatment Position standing in room  -AB     Post Treatment Position chair  -AB     In Chair notified nsg;reclined;sitting;call light within reach;encouraged to call for assist;exit alarm on;waffle cushion  Pt requested legs down d/t back pain.  -AB               User Key  (r) = Recorded By, (t) = Taken By, (c) = Cosigned By      Initials Name Provider Type    AB Nazanin Carrington, PT Physical Therapist                   Outcome Measures       Row Name 05/01/25 0958          How much help from another person do you currently need...    Turning from your back to your side while in flat bed without using bedrails? 3  -AB     Moving from lying on back to sitting on the side of a flat bed without bedrails? 3  -AB     Moving to and from a bed to a chair (including a wheelchair)? 3  -AB     Standing up from a chair using your arms (e.g., wheelchair, bedside chair)? 3  -AB     Climbing 3-5 steps with a railing? 2  -AB     To walk in hospital room? 3  -AB     AM-PAC 6 Clicks Score (PT) 17  -AB     Highest Level of Mobility Goal 5 --> Static standing  -AB       Row Name 05/01/25 0958          Functional Assessment     Outcome Measure Options AM-PAC 6 Clicks Basic Mobility (PT)  -AB               User Key  (r) = Recorded By, (t) = Taken By, (c) = Cosigned By      Initials Name Provider Type    Nazanin Dash PT Physical Therapist                                 Physical Therapy Education       Title: PT OT SLP Therapies (In Progress)       Topic: Physical Therapy (Done)       Point: Mobility training (Done)       Learning Progress Summary            Patient Acceptance, E,D, VU,NR by AB at 5/1/2025 0959    Acceptance, E, VU by AC at 4/30/2025 1159    Acceptance, E,D, VU,NR by LR at 4/29/2025 1459    Comment: Educated on NWB status of R UE, precautions, safety with mobility, correct supine<->sit t/f technique, correct sit<->stand t/f technique, correct gait mechanics, and progression of POC.                      Point: Home exercise program (Done)       Learning Progress Summary            Patient Acceptance, E,D, VU,NR by AB at 5/1/2025 0959    Acceptance, E, VU by AC at 4/30/2025 1159    Acceptance, E,D, VU,NR by LR at 4/29/2025 1459    Comment: Educated on NWB status of R UE, precautions, safety with mobility, correct supine<->sit t/f technique, correct sit<->stand t/f technique, correct gait mechanics, and progression of POC.                      Point: Body mechanics (Done)       Learning Progress Summary            Patient Acceptance, E,D, VU,NR by AB at 5/1/2025 0959    Acceptance, E, VU by AC at 4/30/2025 1159    Acceptance, E,D, VU,NR by LR at 4/29/2025 1459    Comment: Educated on NWB status of R UE, precautions, safety with mobility, correct supine<->sit t/f technique, correct sit<->stand t/f technique, correct gait mechanics, and progression of POC.                      Point: Precautions (Done)       Learning Progress Summary            Patient Acceptance, E,D, VU,NR by AB at 5/1/2025 0959    Acceptance, E, VU by AC at 4/30/2025 1159    Acceptance, E,D, VU,NR by LR at 4/29/2025 1459    Comment: Educated on NWB  status of R UE, precautions, safety with mobility, correct supine<->sit t/f technique, correct sit<->stand t/f technique, correct gait mechanics, and progression of POC.                                      User Key       Initials Effective Dates Name Provider Type Discipline    LR 02/03/23 -  Kika Browning, PT Physical Therapist PT    AB 09/22/22 -  Nazanin Carrington, PT Physical Therapist PT    AC 07/11/24 -  Keara Jane, PT Physical Therapist PT                  PT Recommendation and Plan     Progress: improving  Outcome Evaluation: Pt with good effort and ambulated 60' with min Ax1 and no AD. Cues provided for safety and object navigation. O2 sat >90% on RA. He continues to present below his functional baseline with weakness, impaired balance, and elevated pain. Further IPPT is warrented. PT will progress as able per POC.     Time Calculation:         PT Charges       Row Name 05/01/25 0959             Time Calculation    Start Time 0849  -AB      PT Received On 05/01/25  -AB         Timed Charges    66317 - PT Therapeutic Exercise Minutes 8  -AB      00185 - Gait Training Minutes  10  -AB      70863 - PT Therapeutic Activity Minutes 5  -AB         Total Minutes    Timed Charges Total Minutes 23  -AB       Total Minutes 23  -AB                User Key  (r) = Recorded By, (t) = Taken By, (c) = Cosigned By      Initials Name Provider Type    AB Nazanin Carrington, PT Physical Therapist                  Therapy Charges for Today       Code Description Service Date Service Provider Modifiers Qty    44325370200 HC PT THER PROC EA 15 MIN 5/1/2025 Nazanin Carrington, PT GP 1    49822070580 HC GAIT TRAINING EA 15 MIN 5/1/2025 Nazanin Carrington, PT GP 1            PT G-Codes  Outcome Measure Options: AM-PAC 6 Clicks Basic Mobility (PT)  AM-PAC 6 Clicks Score (PT): 17  AM-PAC 6 Clicks Score (OT): 13  PT Discharge Summary  Anticipated Discharge Disposition (PT): inpatient rehabilitation facility    Nazanin Carrington  PT  5/1/2025

## 2025-05-01 NOTE — PLAN OF CARE
Goal Outcome Evaluation:  Plan of Care Reviewed With: patient        Progress: improving  Outcome Evaluation: Pt with good effort and ambulated 60' with min Ax1 and no AD. Cues provided for safety and object navigation. O2 sat >90% on RA. He continues to present below his functional baseline with weakness, impaired balance, and elevated pain. Further IPPT is warrented. PT will progress as able per POC.    Anticipated Discharge Disposition (PT): inpatient rehabilitation facility

## 2025-05-01 NOTE — THERAPY TREATMENT NOTE
"Patient Name: Wilbert Kelley  : 1960    MRN: [id number]                              Today's Date: 2025       Admit Date: 2025    Visit Dx: No diagnosis found.  Patient Active Problem List   Diagnosis    Shortness of breath    Type 2 diabetes mellitus with hyperglycemia, with long-term current use of insulin    Sleep apnea    Coronary artery disease of native artery of native heart with stable angina pectoris    S/P CABG x 3 on 3/21/2023    Hypertension    Hyperlipidemia    EMANUEL (acute kidney injury)    Paroxysmal atrial fibrillation    Bilateral pneumonia    Acute on chronic heart failure with preserved ejection fraction (HFpEF)    S/P reverse total shoulder arthroplasty, right    Obesity, Class III, BMI 40-49.9 (morbid obesity)     Past Medical History:   Diagnosis Date    Arthritis     BPH (benign prostatic hyperplasia)     Cancer     basal and melanoma-  x2 - left side ear and elbow    Constipation     Coronary artery disease     DDD (degenerative disc disease), lumbar     Diabetes mellitus     couple times month check sugar    Frozen shoulder     left    GERD (gastroesophageal reflux disease)     History of pneumonia 2025    Hyperlipidemia     Hypertension     Limited mobility     left shoulder  - possible frozen shoulder-= please be aware for surgery    Oxygen dependent     \"2 liters at night since pneumonia\" per wife    Rotator cuff injury     right shoulder 3/14/2025    Sleep apnea     insurance took cpap machine due to pt not using 6 hours per night.    Tinnitus     Wears glasses     small print     Past Surgical History:   Procedure Laterality Date    CARDIAC CATHETERIZATION      CARDIAC CATHETERIZATION Right 2023    Procedure: Left Heart Cath;  Surgeon: Jarod Boyle MD;  Location:  [facility];  Service: Cardiovascular;  Laterality: Right;    CATARACT EXTRACTION, BILATERAL Bilateral     COLONOSCOPY      CORONARY ARTERY BYPASS GRAFT N/A 3/21/2023    Procedure: " MEDIAN STERNOTOMY, CORONARY ARTERY BYPASS GRAFTING X 3, UTILIZING THE LEFT INTERNAL MAMMARY ARTERY, ENDOSCOPIC VEIN HARVEST OF THE RIGHT GREATER SAPENOUSE VEIN;  Surgeon: Markus Emanuel MD;  Location: [facility] OR;  Service: Cardiothoracic;  Laterality: N/A;    CORONARY STENT PLACEMENT      x4    ENDOSCOPY      FINGER SURGERY Left     middle finger - left side    SKIN CANCER EXCISION      x2    TOTAL SHOULDER ARTHROPLASTY W/ DISTAL CLAVICLE EXCISION Right 4/29/2025    Procedure: TOTAL SHOULDER REVERSE ARTHROPLASTY WITH BICEPS TENODESIS RIGHT;  Surgeon: Enzo Mckeon Jr., MD;  Location: [facility] OR;  Service: Orthopedics;  Laterality: Right;    WISDOM TOOTH EXTRACTION        General Information       Row Name 05/01/25 1313          OT Time and Intention    Document Type therapy note (daily note)  -     Mode of Treatment occupational therapy  -       Row Name 05/01/25 1313          General Information    Patient Profile Reviewed yes  -LC     Prior Level of Function --  See IE  -LC     Existing Precautions/Restrictions fall;right;shoulder;non-weight bearing;brace on at all times;other (see comments)  s/p R rTSA with NWB precautions, simple sling  -     Barriers to Rehab medically complex;previous functional deficit;physical barrier  -       Row Name 05/01/25 1313          Cognition    Orientation Status (Cognition) oriented x 3  -LC       Row Name 05/01/25 1313          Safety Issues/Impairments Affecting Functional Mobility    Safety Issues Affecting Function (Mobility) awareness of need for assistance;insight into deficits/self-awareness;safety precaution awareness;safety precautions follow-through/compliance;sequencing abilities  -     Impairments Affecting Function (Mobility) balance;endurance/activity tolerance;pain;strength;sensation/sensory awareness;range of motion (ROM);postural/trunk control  -               User Key  (r) = Recorded By, (t) = Taken By, (c) = Cosigned By      Initials Name  Provider Type     Kika Sin, LISA Occupational Therapist                     Mobility/ADL's       Row Name 05/01/25 1315          Bed Mobility    Comment, (Bed Mobility) UIC  -       Row Name 05/01/25 1315          Activities of Daily Living    BADL Assessment/Intervention upper body dressing  -       Row Name 05/01/25 1315          Mobility    Extremity Weight-bearing Status right upper extremity  -     Right Upper Extremity (Weight-bearing Status) non weight-bearing (NWB)  -       Row Name 05/01/25 1315          Upper Body Dressing Assessment/Training    [place] Level (Upper Body Dressing) don;doff;other (see comments)  Sling  -     Position (Upper Body Dressing) supported sitting  -     Comment, (Upper Body Dressing) Completed donning and doffing sling, family verbalized understanding.  -               User Key  (r) = Recorded By, (t) = Taken By, (c) = Cosigned By      Initials Name Provider Type     Kika Sin OT Occupational Therapist                   Obj/Interventions       Row Name 05/01/25 1316          Shoulder (Therapeutic Exercise)    Shoulder (Therapeutic Exercise) AAROM (active assistive range of motion)  -     Shoulder AAROM (Therapeutic Exercise) right;flexion;10 repetitions  Pt tolerates AAFE to 100  -       Row Name 05/01/25 1316          Elbow/Forearm (Therapeutic Exercise)    Elbow/Forearm (Therapeutic Exercise) AAROM (active assistive range of motion)  -     Elbow/Forearm AAROM (Therapeutic Exercise) right;flexion;extension;supination;pronation;10 repetitions  -       Row Name 05/01/25 1316          Wrist (Therapeutic Exercise)    Wrist (Therapeutic Exercise) AROM (active range of motion)  -     Wrist AROM (Therapeutic Exercise) left;flexion;extension;10 repetitions  -       Row Name 05/01/25 1316          Hand (Therapeutic Exercise)    Hand (Therapeutic Exercise) AROM (active range of motion)  -     Hand AROM/AAROM (Therapeutic Exercise) right;finger  flexion;finger extension;10 repetitions  -       Row Name 05/01/25 1316          Motor Skills    Therapeutic Exercise shoulder;elbow/forearm;wrist;hand  Reviewed education for RUE HEP per MD parameters: AROM @ hand, wrist, elbow; AAFE to 150, no ER past 0. Family present.  -       Row Name 05/01/25 1316          Balance    Balance Assessment sitting static balance  -     Static Sitting Balance standby assist  -               User Key  (r) = Recorded By, (t) = Taken By, (c) = Cosigned By      Initials Name Provider Type    Kika Rodriguez OT Occupational Therapist                   Goals/Plan    No documentation.                  Clinical Impression       Row Name 05/01/25 1319          Pain Assessment    Additional Documentation Pain Scale: FACES Pre/Post-Treatment (Group)  -       Row Name 05/01/25 1319          Pain Scale: FACES Pre/Post-Treatment    Pain: FACES Scale, Pretreatment 4-->hurts little more  -     Posttreatment Pain Rating 4-->hurts little more  -       Row Name 05/01/25 1319          Plan of Care Review    Plan of Care Reviewed With patient;family  -     Progress no change  -     Outcome Evaluation Reviewed education for shoulder precautions and sling mgmt during ADLs. Completed RUE HEP per MD parameters: AROM @ hand, wrist, elbow and AAFE x 10 reps. Pt. tolerated forward elevation to 100. Will continue to progress as able. Recommend IPR at discharge.  -       Row Name 05/01/25 1319          Positioning and Restraints    Pre-Treatment Position sitting in chair/recliner  -     Post Treatment Position chair  -LC     In Chair notified nsg;reclined;call light within reach;encouraged to call for assist;with family/caregiver;waffle cushion;legs elevated  alarm unchanged  -               User Key  (r) = Recorded By, (t) = Taken By, (c) = Cosigned By      Initials Name Provider Type    Kika Rodriguez, LISA Occupational Therapist                   Outcome Measures       Row Name  05/01/25 1322          How much help from another is currently needed...    Putting on and taking off regular lower body clothing? 1  -LC     Bathing (including washing, rinsing, and drying) 2  -LC     Toileting (which includes using toilet bed pan or urinal) 2  -LC     Putting on and taking off regular upper body clothing 2  -LC     Taking care of personal grooming (such as brushing teeth) 3  -LC     Eating meals 3  -LC     AM-PAC 6 Clicks Score (OT) 13  -       Row Name 05/01/25 0958 05/01/25 0800       How much help from another person do you currently need...    Turning from your back to your side while in flat bed without using bedrails? 3  -AB 3  -BM    Moving from lying on back to sitting on the side of a flat bed without bedrails? 3  -AB 3  -BM    Moving to and from a bed to a chair (including a wheelchair)? 3  -AB 3  -BM    Standing up from a chair using your arms (e.g., wheelchair, bedside chair)? 3  -AB 3  -BM    Climbing 3-5 steps with a railing? 2  -AB 2  -BM    To walk in hospital room? 3  -AB 3  -BM    AM-PAC 6 Clicks Score (PT) 17  -AB 17  -BM    Highest Level of Mobility Goal 5 --> Static standing  -AB 5 --> Static standing  -BM      Row Name 05/01/25 1322 05/01/25 0958       Functional Assessment    Outcome Measure Options AM-PAC 6 Clicks Daily Activity (OT)  - AM-PAC 6 Clicks Basic Mobility (PT)  -AB              User Key  (r) = Recorded By, (t) = Taken By, (c) = Cosigned By      Initials Name Provider Type    LC Kika Sin, OT Occupational Therapist    AB Nazanin Carrington, PT Physical Therapist    BM Michelle Slade, RN Registered Nurse                    Occupational Therapy Education       Title: PT OT SLP Therapies (In Progress)       Topic: Occupational Therapy (In Progress)       Point: ADL training (In Progress)       Learning Progress Summary            Patient Acceptance, E, NR by ED at 5/1/2025 1131    Acceptance, E,D, VU,NR by TB at 4/30/2025 1003    Acceptance, E,D, VU,NR by  TB at 4/29/2025 1613                      Point: Home exercise program (Done)       Learning Progress Summary            Patient Acceptance, E,D, VU,NR by TB at 4/30/2025 1003    Acceptance, E,D, VU,NR by TB at 4/29/2025 1613                      Point: Precautions (In Progress)       Learning Progress Summary            Patient Acceptance, E, NR by  at 5/1/2025 1131    Acceptance, E,D, VU,NR by TB at 4/30/2025 1003    Acceptance, E,D, VU,NR by TB at 4/29/2025 1613                      Point: Body mechanics (In Progress)       Learning Progress Summary            Patient Acceptance, E, NR by  at 5/1/2025 1131                                      User Key       Initials Effective Dates Name Provider Type Discipline     07/11/23 -  Kaylyn Sorensen OT Occupational Therapist OT     06/16/21 -  Kika Sin OT Occupational Therapist OT                  OT Recommendation and Plan     Plan of Care Review  Plan of Care Reviewed With: patient, family  Progress: no change  Outcome Evaluation: Reviewed education for shoulder precautions and sling mgmt during ADLs. Completed RUE HEP per MD parameters: AROM @ hand, wrist, elbow and AAFE x 10 reps. Pt. tolerated forward elevation to 100. Will continue to progress as able. Recommend IPR at discharge.     Time Calculation:         Time Calculation- OT       Row Name 05/01/25 1324 05/01/25 0959          Time Calculation- OT    OT Start Time 1131  - --     OT Received On 05/01/25  - --     OT Goal Re-Cert Due Date 05/09/25  - --        Timed Charges    90182 - OT Therapeutic Exercise Minutes 12 - --     35281 - Gait Training Minutes  -- 10  -AB        Total Minutes    Timed Charges Total Minutes 12  - 10  -AB      Total Minutes 12  - 10  -AB               User Key  (r) = Recorded By, (t) = Taken By, (c) = Cosigned By      Initials Name Provider Type    LC Kika Sin, OT Occupational Therapist    AB Nazanin Carrington, PT Physical Therapist                   Therapy Charges for Today       Code Description Service Date Service Provider Modifiers Qty    84522524844 HC OT THER PROC EA 15 MIN 5/1/2025 Kika Sin, OT GO 1                 Kika Sin OT  5/1/2025

## 2025-05-01 NOTE — DISCHARGE PLACEMENT REQUEST
"Shabnam Lopez (64 y.o. Male)       Date of Birth   1960    Social Security Number       Address   [address] [place] [address]    Home Phone   [phone]    MRN   [id number]       [facility]    Marital Status                               Admission Date   4/29/2025    Admission Type   Elective    Admitting Provider   Enzo Mckeon Jr., MD    Attending Provider   Enzo Mckeon Jr., MD    Department, Room/Bed   [address], [address]       Discharge Date       Discharge Disposition       Discharge Destination                                 Attending Provider: Enzo Mckeon Jr., MD    Allergies: Morphine, Adhesive Tape    Isolation: None   Infection: None   Code Status: Prior    Ht: 185.4 cm (73\")   Wt: 150 kg (331 lb 2.1 oz)    Admission Cmt: None   Principal Problem: S/P reverse total shoulder arthroplasty, right [Z96.611]                   Active Insurance as of 4/29/2025       Primary Coverage       Payor Plan Insurance Group Employer/Plan Group    ANTH MEDICARE REPLACEMENT [facility] MEDICARE ADVANTAGE HMO [id number]       Payor Plan Address Payor Plan Phone Number Payor Plan Fax Number Effective Dates    [address] [phone]  1/1/2025 - None Entered    [facility] [address]         Subscriber Name Subscriber Birth Date Member ID       SHABNAM LOPEZ 1960 [id number]                     Emergency Contacts        (Rel.) Home Phone Work Phone Mobile Phone    ANUP LOPEZ (Spouse) -- -- [phone]              Imaging Results (Last 7 Days)       Procedure Component Value Units Date/Time    XR Shoulder 2+ View Right [[id number]] Collected: 04/29/25 1303     Updated: 04/29/25 1311    Narrative:      XR SHOULDER 2+ VW RIGHT    Date of Exam: 4/29/2025 12:31 PM EDT    Indication: s/p R RTSA    Comparison: None available.    Findings:  Total reverse right shoulder prosthesis is seen, components in anatomic alignment. No acute bony " abnormality is identified. Soft tissue drain is seen superiorly, there appears to be overlying metallic loop from a right upper extremity sling, and a couple   of monitoring leads.. Adjacent bony structures and included portion of the right upper lung appear grossly normal.      Impression:      Impression:  Total reverse right shoulder prosthesis, components in anatomic alignment.      Electronically Signed: Sylvester Wang MD    4/29/2025 1:07 PM EDT    Workstation ID: [id number]

## 2025-05-01 NOTE — PLAN OF CARE
Goal Outcome Evaluation:                   Pt is alert and oriented x 4; Patient is on 3L NC when asleep at night or when having pain medication; Vital signs are stable; sugars have been stable; Non-Tele; Pain controlled with PO narcotics throughout the day; Last BM was on 04/28; Patient voiding well.

## 2025-05-01 NOTE — PLAN OF CARE
Problem: Adult Inpatient Plan of Care  Goal: Absence of Hospital-Acquired Illness or Injury  Intervention: Identify and Manage Fall Risk  Recent Flowsheet Documentation  Taken 5/1/2025 0400 by Charito Holman RN  Safety Promotion/Fall Prevention:   activity supervised   assistive device/personal items within reach   clutter free environment maintained   fall prevention program maintained   gait belt   lighting adjusted   mobility aid in reach   muscle strengthening facilitated   nonskid shoes/slippers when out of bed   room organization consistent   safety round/check completed  Taken 5/1/2025 0200 by Charito Holman RN  Safety Promotion/Fall Prevention:   activity supervised   assistive device/personal items within reach   clutter free environment maintained   fall prevention program maintained   gait belt   lighting adjusted   mobility aid in reach   muscle strengthening facilitated   nonskid shoes/slippers when out of bed   room organization consistent   safety round/check completed  Taken 5/1/2025 0000 by Charito Holman RN  Safety Promotion/Fall Prevention:   activity supervised   assistive device/personal items within reach   clutter free environment maintained   fall prevention program maintained   gait belt   lighting adjusted   mobility aid in reach   muscle strengthening facilitated   nonskid shoes/slippers when out of bed   room organization consistent   safety round/check completed  Taken 4/30/2025 2252 by Charito Holman RN  Safety Promotion/Fall Prevention:   activity supervised   assistive device/personal items within reach   clutter free environment maintained   fall prevention program maintained   gait belt   lighting adjusted   mobility aid in reach   muscle strengthening facilitated   nonskid shoes/slippers when out of bed   room organization consistent   safety round/check completed  Taken 4/30/2025 2000 by Charito Holman, RN  Safety Promotion/Fall Prevention:   activity supervised   assistive device/personal  items within reach   clutter free environment maintained   fall prevention program maintained   gait belt   lighting adjusted   mobility aid in reach   muscle strengthening facilitated   nonskid shoes/slippers when out of bed   room organization consistent   safety round/check completed  Intervention: Prevent Skin Injury  Recent Flowsheet Documentation  Taken 5/1/2025 0400 by Charito Holman RN  Body Position: position changed independently  Skin Protection: incontinence pads utilized  Taken 5/1/2025 0200 by Charito Holman RN  Body Position: position changed independently  Skin Protection: incontinence pads utilized  Taken 5/1/2025 0000 by Charito Holman RN  Body Position: position changed independently  Skin Protection: incontinence pads utilized  Taken 4/30/2025 2252 by Charito Holman RN  Body Position: position changed independently  Skin Protection: incontinence pads utilized  Taken 4/30/2025 2000 by Charito Holman RN  Body Position: position changed independently  Skin Protection: incontinence pads utilized  Intervention: Prevent and Manage VTE (Venous Thromboembolism) Risk  Recent Flowsheet Documentation  Taken 5/1/2025 0400 by Charito Holman RN  VTE Prevention/Management:   bilateral   SCDs (sequential compression devices) off  Taken 5/1/2025 0200 by Charito Holman RN  VTE Prevention/Management:   bilateral   SCDs (sequential compression devices) off  Taken 5/1/2025 0000 by Charito Holman RN  VTE Prevention/Management:   bilateral   SCDs (sequential compression devices) off  Taken 4/30/2025 2000 by Charito Holman RN  VTE Prevention/Management:   bilateral   SCDs (sequential compression devices) off  Intervention: Prevent Infection  Recent Flowsheet Documentation  Taken 5/1/2025 0400 by Charito Holman RN  Infection Prevention:   environmental surveillance performed   equipment surfaces disinfected   hand hygiene promoted   rest/sleep promoted   single patient room provided   visitors restricted/screened  Taken 5/1/2025 0200 by  Moudy, Charito, RN  Infection Prevention:   environmental surveillance performed   equipment surfaces disinfected   hand hygiene promoted   rest/sleep promoted   single patient room provided   visitors restricted/screened  Taken 5/1/2025 0000 by Charito Holman RN  Infection Prevention:   environmental surveillance performed   equipment surfaces disinfected   hand hygiene promoted   rest/sleep promoted   single patient room provided   visitors restricted/screened  Taken 4/30/2025 2252 by Charito Holman RN  Infection Prevention:   environmental surveillance performed   equipment surfaces disinfected   hand hygiene promoted   rest/sleep promoted   single patient room provided   visitors restricted/screened  Taken 4/30/2025 2000 by Charito Holman RN  Infection Prevention:   environmental surveillance performed   equipment surfaces disinfected   hand hygiene promoted   rest/sleep promoted   single patient room provided   visitors restricted/screened  Goal: Optimal Comfort and Wellbeing  Intervention: Monitor Pain and Promote Comfort  Recent Flowsheet Documentation  Taken 5/1/2025 0400 by Charito Holman RN  Pain Management Interventions:   pillow support provided   quiet environment facilitated   relaxation techniques promoted  Taken 5/1/2025 0200 by Charito Holman RN  Pain Management Interventions:   pillow support provided   quiet environment facilitated   relaxation techniques promoted  Taken 5/1/2025 0000 by Charito Holman RN  Pain Management Interventions:   pillow support provided   position adjusted   quiet environment facilitated   relaxation techniques promoted  Taken 4/30/2025 2322 by Charito Holman RN  Pain Management Interventions:   pillow support provided   quiet environment facilitated   relaxation techniques promoted  Taken 4/30/2025 2252 by Charito Holman RN  Pain Management Interventions:   pain management plan reviewed with patient/caregiver   pain medication given   pillow support provided   position adjusted   quiet  environment facilitated   relaxation techniques promoted  Taken 4/30/2025 2147 by Charito Holman RN  Pain Management Interventions:   pillow support provided   position adjusted   quiet environment facilitated   relaxation techniques promoted  Taken 4/30/2025 2117 by Charito Holman RN  Pain Management Interventions:   pain medication given   pillow support provided   position adjusted   quiet environment facilitated   relaxation techniques promoted  Taken 4/30/2025 2000 by Charito Holman RN  Pain Management Interventions:   pain management plan reviewed with patient/caregiver   pillow support provided   position adjusted   quiet environment facilitated   relaxation techniques promoted  Intervention: Provide Person-Centered Care  Recent Flowsheet Documentation  Taken 4/30/2025 2000 by Charito Holman RN  Trust Relationship/Rapport:   care explained   choices provided   questions answered   questions encouraged     Problem: Comorbidity Management  Goal: Blood Glucose Level Within Target Range  Intervention: Monitor and Manage Glycemia  Recent Flowsheet Documentation  Taken 4/30/2025 2000 by Charito Holman RN  Medication Review/Management: medications reviewed  Goal: Blood Pressure in Desired Range  Intervention: Maintain Blood Pressure Management  Recent Flowsheet Documentation  Taken 4/30/2025 2000 by Charito Holman RN  Medication Review/Management: medications reviewed     Problem: Shoulder Arthroplasty (Total, Mono, Reverse)  Goal: Optimal Functional Ability  Intervention: Promote Optimal Functional Status  Recent Flowsheet Documentation  Taken 5/1/2025 0400 by Charito Holman RN  Activity Management: up in chair  Taken 5/1/2025 0200 by Charito Holman RN  Activity Management: up ad idalia  Taken 5/1/2025 0000 by Charito Holman, RN  Activity Management: up in chair  Taken 4/30/2025 2252 by Charito Holman RN  Activity Management: up in chair  Goal: Anesthesia/Sedation Recovery  Intervention: Optimize Anesthesia Recovery  Recent Flowsheet  Documentation  Taken 5/1/2025 0400 by Charito Holman RN  Safety Promotion/Fall Prevention:   activity supervised   assistive device/personal items within reach   clutter free environment maintained   fall prevention program maintained   gait belt   lighting adjusted   mobility aid in reach   muscle strengthening facilitated   nonskid shoes/slippers when out of bed   room organization consistent   safety round/check completed  Taken 5/1/2025 0200 by Charito Holman RN  Safety Promotion/Fall Prevention:   activity supervised   assistive device/personal items within reach   clutter free environment maintained   fall prevention program maintained   gait belt   lighting adjusted   mobility aid in reach   muscle strengthening facilitated   nonskid shoes/slippers when out of bed   room organization consistent   safety round/check completed  Taken 5/1/2025 0000 by Charito Holman RN  Safety Promotion/Fall Prevention:   activity supervised   assistive device/personal items within reach   clutter free environment maintained   fall prevention program maintained   gait belt   lighting adjusted   mobility aid in reach   muscle strengthening facilitated   nonskid shoes/slippers when out of bed   room organization consistent   safety round/check completed  Taken 4/30/2025 2252 by Charito Holman RN  Safety Promotion/Fall Prevention:   activity supervised   assistive device/personal items within reach   clutter free environment maintained   fall prevention program maintained   gait belt   lighting adjusted   mobility aid in reach   muscle strengthening facilitated   nonskid shoes/slippers when out of bed   room organization consistent   safety round/check completed  Taken 4/30/2025 2000 by Charito Holman, RN  Safety Promotion/Fall Prevention:   activity supervised   assistive device/personal items within reach   clutter free environment maintained   fall prevention program maintained   gait belt   lighting adjusted   mobility aid in reach    muscle strengthening facilitated   nonskid shoes/slippers when out of bed   room organization consistent   safety round/check completed  Goal: Optimal Pain Control and Function  Intervention: Prevent or Manage Pain  Recent Flowsheet Documentation  Taken 5/1/2025 0400 by Charito Holman RN  Pain Management Interventions:   pillow support provided   quiet environment facilitated   relaxation techniques promoted  Taken 5/1/2025 0200 by Charito Holman RN  Pain Management Interventions:   pillow support provided   quiet environment facilitated   relaxation techniques promoted  Taken 5/1/2025 0000 by Charito Holman RN  Pain Management Interventions:   pillow support provided   position adjusted   quiet environment facilitated   relaxation techniques promoted  Taken 4/30/2025 2322 by Charito Holman RN  Pain Management Interventions:   pillow support provided   quiet environment facilitated   relaxation techniques promoted  Taken 4/30/2025 2252 by Charito Holman RN  Pain Management Interventions:   pain management plan reviewed with patient/caregiver   pain medication given   pillow support provided   position adjusted   quiet environment facilitated   relaxation techniques promoted  Taken 4/30/2025 2147 by Charito Holman RN  Pain Management Interventions:   pillow support provided   position adjusted   quiet environment facilitated   relaxation techniques promoted  Taken 4/30/2025 2117 by Charito Holman RN  Pain Management Interventions:   pain medication given   pillow support provided   position adjusted   quiet environment facilitated   relaxation techniques promoted  Taken 4/30/2025 2000 by Charito Holman RN  Pain Management Interventions:   pain management plan reviewed with patient/caregiver   pillow support provided   position adjusted   quiet environment facilitated   relaxation techniques promoted  Diversional Activities: television     Problem: Fall Injury Risk  Goal: Absence of Fall and Fall-Related Injury  Intervention:  Identify and Manage Contributors  Recent Flowsheet Documentation  Taken 4/30/2025 2000 by Charito Holman RN  Medication Review/Management: medications reviewed  Intervention: Promote Injury-Free Environment  Recent Flowsheet Documentation  Taken 5/1/2025 0400 by Charito Holman RN  Safety Promotion/Fall Prevention:   activity supervised   assistive device/personal items within reach   clutter free environment maintained   fall prevention program maintained   gait belt   lighting adjusted   mobility aid in reach   muscle strengthening facilitated   nonskid shoes/slippers when out of bed   room organization consistent   safety round/check completed  Taken 5/1/2025 0200 by Charito Holman RN  Safety Promotion/Fall Prevention:   activity supervised   assistive device/personal items within reach   clutter free environment maintained   fall prevention program maintained   gait belt   lighting adjusted   mobility aid in reach   muscle strengthening facilitated   nonskid shoes/slippers when out of bed   room organization consistent   safety round/check completed  Taken 5/1/2025 0000 by Charito Holman RN  Safety Promotion/Fall Prevention:   activity supervised   assistive device/personal items within reach   clutter free environment maintained   fall prevention program maintained   gait belt   lighting adjusted   mobility aid in reach   muscle strengthening facilitated   nonskid shoes/slippers when out of bed   room organization consistent   safety round/check completed  Taken 4/30/2025 2252 by Charito Holman RN  Safety Promotion/Fall Prevention:   activity supervised   assistive device/personal items within reach   clutter free environment maintained   fall prevention program maintained   gait belt   lighting adjusted   mobility aid in reach   muscle strengthening facilitated   nonskid shoes/slippers when out of bed   room organization consistent   safety round/check completed  Taken 4/30/2025 2000 by Charito Holman, RJ  Safety  Promotion/Fall Prevention:   activity supervised   assistive device/personal items within reach   clutter free environment maintained   fall prevention program maintained   gait belt   lighting adjusted   mobility aid in reach   muscle strengthening facilitated   nonskid shoes/slippers when out of bed   room organization consistent   safety round/check completed   Goal Outcome Evaluation:   Pt alert and oriented x4 on 3L NC. 20 gauge IV in left hand, saline locked. Sling in place.Pt voids spontaneously and up to bathroom as needed. Pain controlled with IV dilaudid and PO norco. Call light within reach.

## 2025-05-01 NOTE — PROGRESS NOTES
"Orthopedic Daily Progress Note      CC: SHERLEY overnight. PT/OT recommending SNF after discharge.    Pain well controlled  General: no fevers, chills  Abdomen: no nausea, vomiting, or diarrhea    No other complaints    Physical Exam:  I have reviewed the vital signs.  Temp:  [98 °F (36.7 °C)-98.3 °F (36.8 °C)] 98 °F (36.7 °C)  Heart Rate:  [60-67] 67  Resp:  [18-20] 18  BP: (111-150)/(59-81) 150/81    Objective:  Vital signs: (most recent): Blood pressure 150/81, pulse 67, temperature 98 °F (36.7 °C), temperature source Oral, resp. rate 18, height 185.4 cm (73\"), weight (!) 150 kg (331 lb 2.1 oz), SpO2 100%.              General Appearance:    Alert, cooperative, no distress  Extremities: No clubbing, cyanosis, or edema to lower extremities  Pulses:  2+ in distal surgical extremity  Skin: Incision Clean/dry/intact      Results Review:    I have reviewed the labs, radiology results and diagnostic studies:no new labs this AM    Results from last 7 days   Lab Units 04/30/25 2026   HEMOGLOBIN g/dL 10.3*     Results from last 7 days   Lab Units 04/30/25 2026   SODIUM mmol/L 136   POTASSIUM mmol/L 3.8   CO2 mmol/L 27.0   CREATININE mg/dL 1.20   GLUCOSE mg/dL 103*       I have reviewed the medications.    Assessment/Problem List  POD# 2 Days Post-Op   S/p R RTSA with biceps tenodesis    Plan  NWB RUE with AAFE to 150. No ER past zero.AROM at elbow and wrist as tolerated  PT/OT      Discharge Planning: I expect patient to be discharged to SNF in TBD days.    Enzo Mckeon Jr., MD  05/01/25  10:14 EDT            "

## 2025-05-01 NOTE — PLAN OF CARE
Goal Outcome Evaluation:  Plan of Care Reviewed With: patient, family        Progress: no change  Outcome Evaluation: Reviewed education for shoulder precautions and sling mgmt during ADLs. Completed RUE HEP per MD parameters: AROM @ hand, wrist, elbow and AAFE x 10 reps. Pt. tolerated forward elevation to 100. Will continue to progress as able. Recommend IPR at discharge.

## 2025-05-01 NOTE — PROGRESS NOTES
"IM progress note      Wilbert Kelley  [id number]  1960     LOS: 0 days     Attending: Enzo Mckeon Jr., MD    Primary Care Provider: Jose M Simon MD      Chief Complaint/Reason for visit:  Right shoulder pain    Subjective   Doing ok.   PNB cath is out.   Pain control still a challenge, but is improving.    Objective      Visit Vitals  /67 (BP Location: Left arm, Patient Position: Sitting)   Pulse 66   Temp 97.8 °F (36.6 °C) (Oral)   Resp 18   Ht 185.4 cm (73\")   Wt (!) 150 kg (331 lb 2.1 oz)   SpO2 96%   BMI 43.69 kg/m²     Temp (24hrs), Av.1 °F (36.7 °C), Min:97.8 °F (36.6 °C), Max:98.3 °F (36.8 °C)      Nutrition: PO    Respiratory: per NC, wean as able    Physical Therapy:    Nazanin Carrington, PT   Physical Therapist  Physical Therapy     Plan of Care     Signed     Date of Service: 25  Creation Time: 25     Signed         Goal Outcome Evaluation:  Plan of Care Reviewed With: patient  Progress: improving  Outcome Evaluation: Pt with good effort and ambulated 60' with min Ax1 and no AD. Cues provided for safety and object navigation. O2 sat >90% on RA. He continues to present below his functional baseline with weakness, impaired balance, and elevated pain. Further IPPT is warrented. PT will progress as able per POC.     Anticipated Discharge Disposition (PT): inpatient rehabilitation facility                 Physical Exam:     General Appearance:    Alert, cooperative, in no acute distress   Head:    Normocephalic, without obvious abnormality, atraumatic    Lungs:     Normal effort, symmetric chest rise, no crepitus, clear to      auscultation bilaterally             Heart:    Regular rhythm and normal rate, normal S1 and S2   Abdomen:     Normal bowel sounds, no masses, no organomegaly, soft        non-tender, non-distended, no guarding, no rebound                tenderness   Extremities:   Right UE in a sling, CDI  dressing . Interscalene nerve block cath is " out.   Distal pulses, cap refill, movements of fingers, wrist, intact.   Pulses:   Pulses palpable and equal bilaterally   Skin:   No bleeding, bruising or rash   Neurologic:   Cranial nerves 2 - 12 grossly intact     Results Review:     I reviewed the patient's new clinical results.   Results from last 7 days   Lab Units 04/30/25 2026   HEMOGLOBIN g/dL 10.3*   HEMATOCRIT % 35.0*     Results from last 7 days   Lab Units 04/30/25 2026 04/29/25  0918   SODIUM mmol/L 136  --    POTASSIUM mmol/L 3.8 4.3   CHLORIDE mmol/L 98  --    CO2 mmol/L 27.0  --    BUN mg/dL 29*  --    CREATININE mg/dL 1.20  --    CALCIUM mg/dL 9.0  --    GLUCOSE mg/dL 103* 221*         Latest Reference Range & Units 04/30/25 16:31 04/30/25 20:05 04/30/25 20:26 05/01/25 07:56   Glucose 70 - 130 mg/dL 209 (H) 126 103 (H) 158 (H)   (H): Data is abnormally high    I reviewed the patient's new imaging including images and reports.    All medications reviewed.   acetaminophen, 325 mg, Oral, Q6H  aspirin, 325 mg, Oral, Daily  atorvastatin, 40 mg, Oral, Daily  docusate sodium, 100 mg, Oral, BID  DULoxetine, 60 mg, Oral, Daily  gabapentin, 800 mg, Oral, Daily  hydroCHLOROthiazide, 12.5 mg, Oral, Daily  insulin glargine, 46 Units, Subcutaneous, Q12H  insulin lispro, 4-24 Units, Subcutaneous, 4x Daily AC & at Bedtime  Insulin Lispro, 45 Units, Subcutaneous, TID With Meals  lisinopril, 40 mg, Oral, BID  nebivolol, 10 mg, Oral, Daily  pantoprazole, 40 mg, Oral, Daily  tamsulosin, 0.4 mg, Oral, Daily        Assessment & Plan     S/P reverse total shoulder arthroplasty, right    Type 2 diabetes mellitus with hyperglycemia, with long-term current use of insulin    Sleep apnea    S/P CABG x 3 on 3/21/2023    Hypertension    Hyperlipidemia    Paroxysmal atrial fibrillation    Obesity, Class III, BMI 40-49.9 (morbid obesity)      Plan  1. PT/OT- RUE sling, NWB  2. Pain control-prns. Dc nerve block. Following with  Increased norco dose  3. IS-encouraged  [facility]  5. Bowel regimen  6. Monitor post-op labs  7. DC planning for rehab. CM following.   D/w pt, , and with .     - Hypertension:  Resume home medications as appropriate, formulary substitution when indicated.  Holding parameters.  PRN medications for elevated blood pressure.     -DM type 2, insulin resistance.  Hgb A1C   Hold OHA as appropriate  FSBG before meals/bedtime, ( q 6 when NPO), along with correction Humalog.  Long acting and prandial insulin     -MARIAH:  Continue O2 supplement  Monitor O2 sats.     -PAF:  Resume BB. DOAC to resume when ok with . likely POD2.      Reece Moreira MD  05/01/25  11:01 EDT

## 2025-05-02 LAB
GLUCOSE BLDC GLUCOMTR-MCNC: 130 MG/DL (ref 70–130)
GLUCOSE BLDC GLUCOMTR-MCNC: 155 MG/DL (ref 70–130)
GLUCOSE BLDC GLUCOMTR-MCNC: 174 MG/DL (ref 70–130)
GLUCOSE BLDC GLUCOMTR-MCNC: 210 MG/DL (ref 70–130)
GLUCOSE BLDC GLUCOMTR-MCNC: 45 MG/DL (ref 70–130)
GLUCOSE BLDC GLUCOMTR-MCNC: 46 MG/DL (ref 70–130)
GLUCOSE BLDC GLUCOMTR-MCNC: 50 MG/DL (ref 70–130)
GLUCOSE BLDC GLUCOMTR-MCNC: 76 MG/DL (ref 70–130)
GLUCOSE BLDC GLUCOMTR-MCNC: 86 MG/DL (ref 70–130)

## 2025-05-02 PROCEDURE — 97116 GAIT TRAINING THERAPY: CPT

## 2025-05-02 PROCEDURE — 97110 THERAPEUTIC EXERCISES: CPT

## 2025-05-02 PROCEDURE — 97535 SELF CARE MNGMENT TRAINING: CPT

## 2025-05-02 PROCEDURE — 63710000001 INSULIN LISPRO (HUMAN) PER 5 UNITS: Performed by: INTERNAL MEDICINE

## 2025-05-02 PROCEDURE — 82948 REAGENT STRIP/BLOOD GLUCOSE: CPT

## 2025-05-02 PROCEDURE — 63710000001 INSULIN GLARGINE PER 5 UNITS: Performed by: INTERNAL MEDICINE

## 2025-05-02 RX ORDER — ACETAMINOPHEN 500 MG
1000 TABLET ORAL EVERY 8 HOURS SCHEDULED
Status: DISCONTINUED | OUTPATIENT
Start: 2025-05-02 | End: 2025-05-06 | Stop reason: HOSPADM

## 2025-05-02 RX ORDER — BUMETANIDE 1 MG/1
2 TABLET ORAL DAILY
Status: DISCONTINUED | OUTPATIENT
Start: 2025-05-03 | End: 2025-05-06 | Stop reason: HOSPADM

## 2025-05-02 RX ORDER — OXYCODONE HYDROCHLORIDE 10 MG/1
10 TABLET ORAL EVERY 4 HOURS PRN
Refills: 0 | Status: DISCONTINUED | OUTPATIENT
Start: 2025-05-02 | End: 2025-05-06 | Stop reason: HOSPADM

## 2025-05-02 RX ORDER — MAGNESIUM CARB/ALUMINUM HYDROX 105-160MG
296 TABLET,CHEWABLE ORAL ONCE
Status: COMPLETED | OUTPATIENT
Start: 2025-05-02 | End: 2025-05-02

## 2025-05-02 RX ADMIN — ATORVASTATIN CALCIUM 40 MG: 40 TABLET, FILM COATED ORAL at 09:50

## 2025-05-02 RX ADMIN — LISINOPRIL 40 MG: 40 TABLET ORAL at 20:15

## 2025-05-02 RX ADMIN — ASPIRIN 325 MG: 325 TABLET ORAL at 09:50

## 2025-05-02 RX ADMIN — ACETAMINOPHEN 325 MG: 325 TABLET, FILM COATED ORAL at 12:40

## 2025-05-02 RX ADMIN — HYDROCODONE BITARTRATE AND ACETAMINOPHEN 2 TABLET: 7.5; 325 TABLET ORAL at 15:19

## 2025-05-02 RX ADMIN — GABAPENTIN 800 MG: 400 CAPSULE ORAL at 09:50

## 2025-05-02 RX ADMIN — MAGNESIUM CITRATE 296 ML: 1.75 LIQUID ORAL at 15:20

## 2025-05-02 RX ADMIN — LISINOPRIL 40 MG: 40 TABLET ORAL at 09:50

## 2025-05-02 RX ADMIN — ACETAMINOPHEN 1000 MG: 500 TABLET ORAL at 21:10

## 2025-05-02 RX ADMIN — INSULIN LISPRO 45 UNITS: 100 INJECTION, SOLUTION INTRAVENOUS; SUBCUTANEOUS at 09:51

## 2025-05-02 RX ADMIN — OXYCODONE HYDROCHLORIDE 10 MG: 10 TABLET ORAL at 18:00

## 2025-05-02 RX ADMIN — INSULIN GLARGINE 46 UNITS: 100 INJECTION, SOLUTION SUBCUTANEOUS at 09:51

## 2025-05-02 RX ADMIN — INSULIN LISPRO 8 UNITS: 100 INJECTION, SOLUTION INTRAVENOUS; SUBCUTANEOUS at 12:40

## 2025-05-02 RX ADMIN — PANTOPRAZOLE SODIUM 40 MG: 40 TABLET, DELAYED RELEASE ORAL at 09:50

## 2025-05-02 RX ADMIN — DOCUSATE SODIUM 100 MG: 100 CAPSULE, LIQUID FILLED ORAL at 20:15

## 2025-05-02 RX ADMIN — TAMSULOSIN HYDROCHLORIDE 0.4 MG: 0.4 CAPSULE ORAL at 09:50

## 2025-05-02 RX ADMIN — NEBIVOLOL 10 MG: 5 TABLET ORAL at 09:50

## 2025-05-02 RX ADMIN — DULOXETINE 60 MG: 60 CAPSULE, DELAYED RELEASE ORAL at 09:50

## 2025-05-02 RX ADMIN — OXYCODONE HYDROCHLORIDE 10 MG: 10 TABLET ORAL at 22:48

## 2025-05-02 RX ADMIN — HYDROCODONE BITARTRATE AND ACETAMINOPHEN 2 TABLET: 7.5; 325 TABLET ORAL at 02:28

## 2025-05-02 RX ADMIN — INSULIN LISPRO 45 UNITS: 100 INJECTION, SOLUTION INTRAVENOUS; SUBCUTANEOUS at 12:40

## 2025-05-02 RX ADMIN — SENNOSIDES AND DOCUSATE SODIUM 2 TABLET: 50; 8.6 TABLET ORAL at 20:14

## 2025-05-02 RX ADMIN — APIXABAN 5 MG: 5 TABLET, FILM COATED ORAL at 20:14

## 2025-05-02 RX ADMIN — DOCUSATE SODIUM 100 MG: 100 CAPSULE, LIQUID FILLED ORAL at 09:50

## 2025-05-02 RX ADMIN — HYDROCHLOROTHIAZIDE 12.5 MG: 25 TABLET ORAL at 09:50

## 2025-05-02 RX ADMIN — SENNOSIDES AND DOCUSATE SODIUM 2 TABLET: 50; 8.6 TABLET ORAL at 09:50

## 2025-05-02 RX ADMIN — ACETAMINOPHEN 325 MG: 325 TABLET, FILM COATED ORAL at 05:51

## 2025-05-02 NOTE — CASE MANAGEMENT/SOCIAL WORK
Continued Stay Note   [facility]     Patient Name: Wilbert Kelley  MRN: [id number]  Today's Date: 5/2/2025    Admit Date: 4/29/2025    Plan: Rehab at DC   Discharge Plan       Row Name 05/02/25 1040       Plan    Plan Rehab at DC    Patient/Family in Agreement with Plan yes    Plan Comments I spoke with Karie at [facility] acute rehab [phone] and faxed her some additional clinical info to [phone]. I spoke with the pts spouse who- requested a call- and updated her by phone.    Final Discharge Disposition Code 62 - inpatient rehab facility      Row Name 05/02/25 0850       Plan    Final Discharge Disposition Code 62 - inpatient rehab facility                   Discharge Codes    No documentation.                 Expected Discharge Date and Time       Expected Discharge Date Expected Discharge Time    May 3, 2025               Lani Hutchins RN

## 2025-05-02 NOTE — NURSING NOTE
Pt A&O x4, 3L nasal cannula, vss, non tele. Patient ambulating with stand by assist, voiding well. Pain managed with PO meds.

## 2025-05-02 NOTE — THERAPY TREATMENT NOTE
"Patient Name: Wilbert eKlley  : 1960    MRN: [id number]                              Today's Date: 2025       Admit Date: 2025    Visit Dx: No diagnosis found.  Patient Active Problem List   Diagnosis    Shortness of breath    Type 2 diabetes mellitus with hyperglycemia, with long-term current use of insulin    Sleep apnea    Coronary artery disease of native artery of native heart with stable angina pectoris    S/P CABG x 3 on 3/21/2023    Hypertension    Hyperlipidemia    EMANUEL (acute kidney injury)    Paroxysmal atrial fibrillation    Bilateral pneumonia    Acute on chronic heart failure with preserved ejection fraction (HFpEF)    S/P reverse total shoulder arthroplasty, right    Obesity, Class III, BMI 40-49.9 (morbid obesity)     Past Medical History:   Diagnosis Date    Arthritis     BPH (benign prostatic hyperplasia)     Cancer     basal and melanoma-  x2 - left side ear and elbow    Constipation     Coronary artery disease     DDD (degenerative disc disease), lumbar     Diabetes mellitus     couple times month check sugar    Frozen shoulder     left    GERD (gastroesophageal reflux disease)     History of pneumonia 2025    Hyperlipidemia     Hypertension     Limited mobility     left shoulder  - possible frozen shoulder-= please be aware for surgery    Oxygen dependent     \"2 liters at night since pneumonia\" per wife    Rotator cuff injury     right shoulder 3/14/2025    Sleep apnea     insurance took cpap machine due to pt not using 6 hours per night.    Tinnitus     Wears glasses     small print     Past Surgical History:   Procedure Laterality Date    CARDIAC CATHETERIZATION      CARDIAC CATHETERIZATION Right 2023    Procedure: Left Heart Cath;  Surgeon: Jarod Boyle MD;  Location:  [facility];  Service: Cardiovascular;  Laterality: Right;    CATARACT EXTRACTION, BILATERAL Bilateral     COLONOSCOPY      CORONARY ARTERY BYPASS GRAFT N/A 3/21/2023    Procedure: " MEDIAN STERNOTOMY, CORONARY ARTERY BYPASS GRAFTING X 3, UTILIZING THE LEFT INTERNAL MAMMARY ARTERY, ENDOSCOPIC VEIN HARVEST OF THE RIGHT GREATER SAPENOUSE VEIN;  Surgeon: Markus Emanuel MD;  Location: [facility] OR;  Service: Cardiothoracic;  Laterality: N/A;    CORONARY STENT PLACEMENT      x4    ENDOSCOPY      FINGER SURGERY Left     middle finger - left side    SKIN CANCER EXCISION      x2    TOTAL SHOULDER ARTHROPLASTY W/ DISTAL CLAVICLE EXCISION Right 4/29/2025    Procedure: TOTAL SHOULDER REVERSE ARTHROPLASTY WITH BICEPS TENODESIS RIGHT;  Surgeon: Enzo Mckeon Jr., MD;  Location: [facility] OR;  Service: Orthopedics;  Laterality: Right;    WISDOM TOOTH EXTRACTION        General Information       Row Name 05/02/25 1109          Physical Therapy Time and Intention    Document Type therapy note (daily note)  -AB     Mode of Treatment physical therapy  -AB       Row Name 05/02/25 1109          General Information    Patient Profile Reviewed yes  -AB     Existing Precautions/Restrictions fall;right;shoulder;non-weight bearing;brace on at all times;other (see comments)  s/p R rTSA with NWB precautions, simple sling  -AB     Barriers to Rehab medically complex;previous functional deficit;physical barrier  -AB       Row Name 05/02/25 1109          Cognition    Orientation Status (Cognition) oriented x 3  -AB       Row Name 05/02/25 1109          Safety Issues/Impairments Affecting Functional Mobility    Safety Issues Affecting Function (Mobility) awareness of need for assistance;insight into deficits/self-awareness;safety precaution awareness;safety precautions follow-through/compliance;sequencing abilities  -AB     Impairments Affecting Function (Mobility) balance;endurance/activity tolerance;pain;strength;sensation/sensory awareness;range of motion (ROM);postural/trunk control  -AB               User Key  (r) = Recorded By, (t) = Taken By, (c) = Cosigned By      Initials Name Provider Type    AB Nazanin Carrington,  PT Physical Therapist                   Mobility       Row Name 05/02/25 1111          Bed Mobility    Comment, (Bed Mobility) received and left UIC  -AB       Row Name 05/02/25 1111          Transfers    Comment, (Transfers) Cues for hand placement, sequencing, and safety. Impulsive with mobility.  -AB       Row Name 05/02/25 1111          Sit-Stand Transfer    Sit-Stand [place] (Transfers) verbal cues;1 person assist;contact guard  -AB       Row Name 05/02/25 1111          Gait/Stairs (Locomotion)    [place] Level (Gait) verbal cues;minimum assist (75% patient effort);1 person assist  -AB     Assistive Device (Gait) other (see comments)  No AD  -AB     Patient was able to Ambulate yes  -AB     Distance in Feet (Gait) 60  -AB     Deviations/Abnormal Patterns (Gait) bilateral deviations;pamela decreased;gait speed decreased;stride length decreased;base of support, wide  -AB     Bilateral Gait Deviations forward flexed posture;heel strike decreased  -AB     Comment, (Gait/Stairs) Pt ambulated with step through gait pattern at a slowed pace. Min A provided for improved stability. Pt states that BLE feel weak during gait. No overt LOB. Further activity limited by weakness and fatigue.  -AB       Row Name 05/02/25 1111          Mobility    Extremity Weight-bearing Status right upper extremity  -AB     Right Upper Extremity (Weight-bearing Status) non weight-bearing (NWB)   -AB               User Key  (r) = Recorded By, (t) = Taken By, (c) = Cosigned By      Initials Name Provider Type    AB Nazanin Carrington, PT Physical Therapist                   Obj/Interventions       Row Name 05/02/25 1113          Motor Skills    Therapeutic Exercise hip;knee;ankle  abdominal sets x10  -AB       Row Name 05/02/25 1113          Hip (Therapeutic Exercise)    Hip (Therapeutic Exercise) isometric exercises  -AB     Hip Isometrics (Therapeutic Exercise) bilateral;gluteal sets;10 repetitions  -AB     Hip Strengthening  (Therapeutic Exercise) marching while seated;10 repetitions  -AB       Row Name 05/02/25 1113          Knee (Therapeutic Exercise)    Knee Strengthening (Therapeutic Exercise) bilateral;LAQ (long arc quad)  -AB       Row Name 05/02/25 1113          Ankle (Therapeutic Exercise)    Ankle AROM (Therapeutic Exercise) bilateral;dorsiflexion;plantarflexion;10 repetitions  -AB       Row Name 05/02/25 1113          Balance    Balance Assessment sitting static balance;sitting dynamic balance;standing static balance;standing dynamic balance  -AB     Static Sitting Balance standby assist  -AB     Dynamic Sitting Balance standby assist  -AB     Position, Sitting Balance unsupported;sitting in chair  -AB     Static Standing Balance contact guard  -AB     Dynamic Standing Balance minimal assist;1-person assist;verbal cues  -AB     Position/Device Used, Standing Balance unsupported  -AB     Balance Interventions sitting;standing;sit to stand;static;dynamic;occupation based/functional task  -AB     Comment, Balance unsteady w/o overt LOB.  -AB               User Key  (r) = Recorded By, (t) = Taken By, (c) = Cosigned By      Initials Name Provider Type    AB Nazanin Carrington, PT Physical Therapist                   Goals/Plan    No documentation.                  Clinical Impression       Row Name 05/02/25 1120          Pain    Pretreatment Pain Rating 7/10  -AB     Posttreatment Pain Rating 7/10  -AB     Pain Location extremity  -AB     Pain Side/Orientation right;upper  -AB     Pain Management Interventions activity modification encouraged;exercise or physical activity utilized;positioning techniques utilized  -AB     Response to Pain Interventions activity participation with tolerable pain  -AB       Row Name 05/02/25 1120          Plan of Care Review    Progress no change  -AB     Outcome Evaluation Pt continues to present below baseline with weakness, impaired balance, decreased edurance, and elevated pain. Pt ambulated 60' with  min Ax1 and was unsteady w/o LOB. Further IPPT is warrented. PT will progress as able per POC.  -AB       Row Name 05/02/25 1120          Vital Signs    Pre Systolic BP Rehab 144  -AB     Pre Treatment Diastolic BP 78  -AB     O2 Delivery Pre Treatment room air  -AB     O2 Delivery Intra Treatment room air  -AB     Post SpO2 (%) 98  -AB     O2 Delivery Post Treatment room air  -AB     Pre Patient Position Sitting  -AB     Intra Patient Position Standing  -AB     Post Patient Position Sitting  -AB       Row Name 05/02/25 1120          Positioning and Restraints    Pre-Treatment Position sitting in chair/recliner  -AB     Post Treatment Position chair  -AB     In Chair notified nsg;reclined;call light within reach;sitting;encouraged to call for assist;exit alarm on;waffle cushion  pt requesting legs down  -AB               User Key  (r) = Recorded By, (t) = Taken By, (c) = Cosigned By      Initials Name Provider Type    AB Nazanin Carrington, PT Physical Therapist                   Outcome Measures       Row Name 05/02/25 1122 05/02/25 0950       How much help from another person do you currently need...    Turning from your back to your side while in flat bed without using bedrails? 3  -AB 3  -AP    Moving from lying on back to sitting on the side of a flat bed without bedrails? 3  -AB 3  -AP    Moving to and from a bed to a chair (including a wheelchair)? 3  -AB 3  -AP    Standing up from a chair using your arms (e.g., wheelchair, bedside chair)? 3  -AB 3  -AP    Climbing 3-5 steps with a railing? 2  -AB 3  -AP    To walk in hospital room? 3  -AB 3  -AP    AM-PAC 6 Clicks Score (PT) 17  -AB 18  -AP    Highest Level of Mobility Goal 5 --> Static standing  -AB 6 --> Walk 10 steps or more  -AP      Row Name 05/02/25 1122 05/02/25 0847       Functional Assessment    Outcome Measure Options AM-PAC 6 Clicks Basic Mobility (PT)  -AB AM-PAC 6 Clicks Daily Activity (OT)  -MR              User Key  (r) = Recorded By, (t) = Taken  By, (c) = Cosigned By      Initials Name Provider Type    Nazanin Salinas, RN Registered Nurse    Karen Montoya, OT Occupational Therapist    Nazanin Dash, PT Physical Therapist                                 Physical Therapy Education       Title: PT OT SLP Therapies (In Progress)       Topic: Physical Therapy (Done)       Point: Mobility training (Done)       Learning Progress Summary            Patient Acceptance, E,D, VU,NR by AB at 5/2/2025 1122    Acceptance, E,D, VU,NR by AB at 5/1/2025 0959    Acceptance, E, VU by AC at 4/30/2025 1159    Acceptance, E,D, VU,NR by LR at 4/29/2025 1459    Comment: Educated on NWB status of R UE, precautions, safety with mobility, correct supine<->sit t/f technique, correct sit<->stand t/f technique, correct gait mechanics, and progression of POC.                      Point: Home exercise program (Done)       Learning Progress Summary            Patient Acceptance, E,D, VU,NR by AB at 5/2/2025 1122    Acceptance, E,D, VU,NR by AB at 5/1/2025 0959    Acceptance, E, VU by AC at 4/30/2025 1159    Acceptance, E,D, VU,NR by LR at 4/29/2025 1459    Comment: Educated on NWB status of R UE, precautions, safety with mobility, correct supine<->sit t/f technique, correct sit<->stand t/f technique, correct gait mechanics, and progression of POC.                      Point: Body mechanics (Done)       Learning Progress Summary            Patient Acceptance, E,D, VU,NR by AB at 5/2/2025 1122    Acceptance, E,D, VU,NR by AB at 5/1/2025 0959    Acceptance, E, VU by AC at 4/30/2025 1159    Acceptance, E,D, VU,NR by LR at 4/29/2025 1459    Comment: Educated on NWB status of R UE, precautions, safety with mobility, correct supine<->sit t/f technique, correct sit<->stand t/f technique, correct gait mechanics, and progression of POC.                      Point: Precautions (Done)       Learning Progress Summary            Patient Acceptance, E,D, VU,NR by AB at 5/2/2025 1122     Acceptance, E,D, VU,NR by AB at 5/1/2025 0959    Acceptance, E, VU by AC at 4/30/2025 1159    Acceptance, E,D, VU,NR by LR at 4/29/2025 1459    Comment: Educated on NWB status of R UE, precautions, safety with mobility, correct supine<->sit t/f technique, correct sit<->stand t/f technique, correct gait mechanics, and progression of POC.                                      User Key       Initials Effective Dates Name Provider Type Discipline    LR 02/03/23 -  Kika Browning, PT Physical Therapist PT    AB 09/22/22 -  Nazanin Carrington, PT Physical Therapist PT    AC 07/11/24 -  Keara Jane, PT Physical Therapist PT                  PT Recommendation and Plan     Progress: no change  Outcome Evaluation: Pt continues to present below baseline with weakness, impaired balance, decreased edurance, and elevated pain. Pt ambulated 60' with min Ax1 and was unsteady w/o LOB. Further IPPT is warrented. PT will progress as able per POC.     Time Calculation:         PT Charges       Row Name 05/02/25 1122             Time Calculation    Start Time 1030  -AB      PT Received On 05/02/25  -AB         Timed Charges    02259 - Gait Training Minutes  8  -AB      48970 - PT Therapeutic Activity Minutes 7  -AB         Total Minutes    Timed Charges Total Minutes 15  -AB       Total Minutes 15  -AB                User Key  (r) = Recorded By, (t) = Taken By, (c) = Cosigned By      Initials Name Provider Type    AB Nazanin Carrington, PT Physical Therapist                  Therapy Charges for Today       Code Description Service Date Service Provider Modifiers Qty    50784587339 HC PT THER PROC EA 15 MIN 5/1/2025 Nazanin Carrington, PT GP 1    05569192814 HC GAIT TRAINING EA 15 MIN 5/1/2025 Nazanin Carrington, PT GP 1    75977082248 HC GAIT TRAINING EA 15 MIN 5/2/2025 Nazanin Carrington, PT GP 1            PT G-Codes  Outcome Measure Options: AM-PAC 6 Clicks Basic Mobility (PT)  AM-PAC 6 Clicks Score (PT): 17  AM-PAC 6 Clicks Score (OT):  13  PT Discharge Summary  Anticipated Discharge Disposition (PT): inpatient rehabilitation facility    Nazanin Carrington, PT  5/2/2025

## 2025-05-02 NOTE — PLAN OF CARE
Goal Outcome Evaluation:  Plan of Care Reviewed With: patient        Progress: no change  Outcome Evaluation: Pt continues to present below baseline with weakness, impaired balance, decreased edurance, and elevated pain. Pt ambulated 60' with min Ax1 and was unsteady w/o LOB. Further IPPT is warrented. PT will progress as able per POC.    Anticipated Discharge Disposition (PT): inpatient rehabilitation facility

## 2025-05-02 NOTE — PROGRESS NOTES
"IM progress note      Wilbert Kellye  [id number]  1960     LOS: 0 days     Attending: Enzo Mckeon Jr., MD    Primary Care Provider: Jose M Simon MD      Chief Complaint/Reason for visit:  Right shoulder pain    Subjective   Doing ok.   PNB cath is out.   No f/c/n/vom. Still no BM.     Objective      Visit Vitals  /65 (BP Location: Left arm, Patient Position: Sitting)   Pulse 66   Temp 98.3 °F (36.8 °C) (Oral)   Resp 18   Ht 185.4 cm (73\")   Wt (!) 150 kg (331 lb 2.1 oz)   SpO2 95%   BMI 43.69 kg/m²     Temp (24hrs), Av.1 °F (36.7 °C), Min:97.7 °F (36.5 °C), Max:98.3 °F (36.8 °C)      Nutrition: PO    Respiratory: per NC, wean as able    Physical Therapy:          Signed         Goal Outcome Evaluation:  Plan of Care Reviewed With: patient  Progress: no change  Outcome Evaluation: Pt continues to present below baseline with weakness, impaired balance, decreased edurance, and elevated pain. Pt ambulated 60' with min Ax1 and was unsteady w/o LOB. Further IPPT is warrented. PT will progress as able per POC.     Anticipated Discharge Disposition (PT): inpatient rehabilitation facility                   Physical Exam:     General Appearance:    Alert, cooperative, in no acute distress   Head:    Normocephalic, without obvious abnormality, atraumatic    Lungs:     Normal effort, symmetric chest rise, no crepitus, clear to      auscultation bilaterally             Heart:    Regular rhythm and normal rate, normal S1 and S2   Abdomen:     Normal bowel sounds, no masses, no organomegaly, soft        non-tender, non-distended, no guarding, no rebound    tenderness   Extremities:   Right UE in a sling, CDI  dressing . Interscalene nerve block cath is out.   Distal pulses, cap refill, movements of fingers, wrist, intact.   Pulses:   Pulses palpable and equal bilaterally   Skin:   No bleeding, bruising or rash   Neurologic:   Cranial nerves 2 - 12 grossly intact     Results Review:     I reviewed the " patient's new clinical results.   Results from last 7 days   Lab Units 04/30/25 2026   HEMOGLOBIN g/dL 10.3*   HEMATOCRIT % 35.0*     Results from last 7 days   Lab Units 04/30/25 2026 04/29/25  0918   SODIUM mmol/L 136  --    POTASSIUM mmol/L 3.8 4.3   CHLORIDE mmol/L 98  --    CO2 mmol/L 27.0  --    BUN mg/dL 29*  --    CREATININE mg/dL 1.20  --    CALCIUM mg/dL 9.0  --    GLUCOSE mg/dL 103* 221*          Latest Reference Range & Units 05/02/25 07:41 05/02/25 09:49 05/02/25 11:23 05/02/25 16:39   Glucose 70 - 130 mg/dL 174 (H) 155 (H) 210 (H) 86   (H): Data is abnormally high    I reviewed the patient's new imaging including images and reports.    All medications reviewed.   acetaminophen, 325 mg, Oral, Q6H  apixaban, 5 mg, Oral, Q12H  atorvastatin, 40 mg, Oral, Daily  [START ON 5/3/2025] bumetanide, 2 mg, Oral, Daily  docusate sodium, 100 mg, Oral, BID  DULoxetine, 60 mg, Oral, Daily  gabapentin, 800 mg, Oral, Daily  hydroCHLOROthiazide, 12.5 mg, Oral, Daily  insulin glargine, 46 Units, Subcutaneous, Q12H  insulin lispro, 4-24 Units, Subcutaneous, 4x Daily AC & at Bedtime  Insulin Lispro, 45 Units, Subcutaneous, TID With Meals  lisinopril, 40 mg, Oral, BID  nebivolol, 10 mg, Oral, Daily  pantoprazole, 40 mg, Oral, Daily  senna-docusate sodium, 2 tablet, Oral, BID  tamsulosin, 0.4 mg, Oral, Daily        Assessment & Plan     S/P reverse total shoulder arthroplasty, right    Type 2 diabetes mellitus with hyperglycemia, with long-term current use of insulin    Sleep apnea    S/P CABG x 3 on 3/21/2023    Hypertension    Hyperlipidemia    Paroxysmal atrial fibrillation    Obesity, Class III, BMI 40-49.9 (morbid obesity)      Plan  1. PT/OT- RUE sling, NWB  2. Pain control-prns. S/p nerve block. Following with  Increased norco dose  3. IS-encouraged  4. DVT proph- mechs  5. Bowel regimen, adjusting.   6. Monitor post-op labs  7. DC planning for rehab. CM following.   D/w pt, , and with .     -  Hypertension:  Resume home medications as appropriate, formulary substitution when indicated.  Holding parameters.  PRN medications for elevated blood pressure.     -DM type 2, insulin resistance.  Hgb A1C   Hold OHA as appropriate  FSBG before meals/bedtime, ( q 6 when NPO), along with correction Humalog.  Long acting and prandial insulin     -MARIAH:  Continue O2 supplement  Monitor O2 sats.     -PAF:  Resumed BB. DOAC  resuming.       Reece Moreira MD  05/02/25  17:03 EDT

## 2025-05-02 NOTE — PLAN OF CARE
Goal Outcome Evaluation:  Plan of Care Reviewed With: patient        Progress: no change  Outcome Evaluation: Pt re-educated on shoulder precautions, sling management, HEP w/in surgeon parameters and hygiene. Pt tolerated AAFR to approx 120 degrees this date, good effort w/ elbow/wrist and hand ROM. Continue to progress as able per current POC. Continue to recommend inpatient rehab at d/c.    Anticipated Discharge Disposition (OT): inpatient rehabilitation facility

## 2025-05-02 NOTE — PROGRESS NOTES
"Orthopedic Daily Progress Note      CC: SHERLEY overnight. Awaiting placement.    Pain well controlled  General: no fevers, chills  Abdomen: no nausea, vomiting, or diarrhea    No other complaints    Physical Exam:  I have reviewed the vital signs.  Temp:  [97.7 °F (36.5 °C)-98.3 °F (36.8 °C)] 97.9 °F (36.6 °C)  Heart Rate:  [60-70] 64  Resp:  [16-18] 18  BP: (107-144)/(63-78) 130/70    Objective:  Vital signs: (most recent): Blood pressure 130/70, pulse 64, temperature 97.9 °F (36.6 °C), temperature source Oral, resp. rate 18, height 185.4 cm (73\"), weight (!) 150 kg (331 lb 2.1 oz), SpO2 91%.              General Appearance:    Alert, cooperative, no distress  Extremities: No clubbing, cyanosis, or edema to lower extremities  Pulses:  2+ in distal surgical extremity  Skin: Incision Clean/dry/intact      Results Review:    I have reviewed the labs, radiology results and diagnostic studies:no new labs this AM    Results from last 7 days   Lab Units 04/30/25 2026   HEMOGLOBIN g/dL 10.3*     Results from last 7 days   Lab Units 04/30/25 2026   SODIUM mmol/L 136   POTASSIUM mmol/L 3.8   CO2 mmol/L 27.0   CREATININE mg/dL 1.20   GLUCOSE mg/dL 103*       I have reviewed the medications.    Assessment/Problem List  POD# 3 Days Post-Op   S/p R RTSA with biceps tenodesis    Plan  NWB RUE with AAFE to 150. No ER past zero.AROM at elbow and wrist as tolerated  PT/OT      Discharge Planning: I expect patient to be discharged to SNF in TBD days.    Enzo Mckeon Jr., MD  05/02/25  12:48 EDT            "

## 2025-05-02 NOTE — CASE MANAGEMENT/SOCIAL WORK
Continued Stay Note  [facility]     Patient Name: Wilbert Kelley  MRN: [id number]  Today's Date: 5/2/2025    Admit Date: 4/29/2025    Plan: Rehab   Discharge Plan       Row Name 05/02/25 1532       Plan    Plan Rehab    Plan Comments I confirmed that [facility] had received the fax. They will call the weekend CM if they obtain approval on the weekend.    Final Discharge Disposition Code 62 - inpatient rehab facility                   Discharge Codes    No documentation.                 Expected Discharge Date and Time       Expected Discharge Date Expected Discharge Time    May 3, 2025               Lani Hutchins RN

## 2025-05-02 NOTE — THERAPY TREATMENT NOTE
"Patient Name: Wilbert Kelley  : 1960    MRN: [id number]                              Today's Date: 2025       Admit Date: 2025    Visit Dx: No diagnosis found.  Patient Active Problem List   Diagnosis    Shortness of breath    Type 2 diabetes mellitus with hyperglycemia, with long-term current use of insulin    Sleep apnea    Coronary artery disease of native artery of native heart with stable angina pectoris    S/P CABG x 3 on 3/21/2023    Hypertension    Hyperlipidemia    EMANUEL (acute kidney injury)    Paroxysmal atrial fibrillation    Bilateral pneumonia    Acute on chronic heart failure with preserved ejection fraction (HFpEF)    S/P reverse total shoulder arthroplasty, right    Obesity, Class III, BMI 40-49.9 (morbid obesity)     Past Medical History:   Diagnosis Date    Arthritis     BPH (benign prostatic hyperplasia)     Cancer     basal and melanoma-  x2 - left side ear and elbow    Constipation     Coronary artery disease     DDD (degenerative disc disease), lumbar     Diabetes mellitus     couple times month check sugar    Frozen shoulder     left    GERD (gastroesophageal reflux disease)     History of pneumonia 2025    Hyperlipidemia     Hypertension     Limited mobility     left shoulder  - possible frozen shoulder-= please be aware for surgery    Oxygen dependent     \"2 liters at night since pneumonia\" per wife    Rotator cuff injury     right shoulder 3/14/2025    Sleep apnea     insurance took cpap machine due to pt not using 6 hours per night.    Tinnitus     Wears glasses     small print     Past Surgical History:   Procedure Laterality Date    CARDIAC CATHETERIZATION      CARDIAC CATHETERIZATION Right 2023    Procedure: Left Heart Cath;  Surgeon: Jarod Boyle MD;  Location:  [facility];  Service: Cardiovascular;  Laterality: Right;    CATARACT EXTRACTION, BILATERAL Bilateral     COLONOSCOPY      CORONARY ARTERY BYPASS GRAFT N/A 3/21/2023    Procedure: " MEDIAN STERNOTOMY, CORONARY ARTERY BYPASS GRAFTING X 3, UTILIZING THE LEFT INTERNAL MAMMARY ARTERY, ENDOSCOPIC VEIN HARVEST OF THE RIGHT GREATER SAPENOUSE VEIN;  Surgeon: Markus Emanuel MD;  Location: [facility] OR;  Service: Cardiothoracic;  Laterality: N/A;    CORONARY STENT PLACEMENT      x4    ENDOSCOPY      FINGER SURGERY Left     middle finger - left side    SKIN CANCER EXCISION      x2    TOTAL SHOULDER ARTHROPLASTY W/ DISTAL CLAVICLE EXCISION Right 4/29/2025    Procedure: TOTAL SHOULDER REVERSE ARTHROPLASTY WITH BICEPS TENODESIS RIGHT;  Surgeon: Enzo Mckeon Jr., MD;  Location: [facility] OR;  Service: Orthopedics;  Laterality: Right;    WISDOM TOOTH EXTRACTION        General Information       Row Name 05/02/25 0840          OT Time and Intention    Document Type therapy note (daily note)  -MR     Mode of Treatment occupational therapy  -MR     Patient Effort good  -MR       Row Name 05/02/25 0840          General Information    Patient Profile Reviewed yes  -MR     Existing Precautions/Restrictions fall;right;shoulder;non-weight bearing;brace on at all times;other (see comments)  s/p R rTSA with NWB precautions, simple sling  -MR     Barriers to Rehab medically complex;previous functional deficit;physical barrier  -MR       Row Name 05/02/25 0840          Cognition    Orientation Status (Cognition) oriented x 3  -MR       Row Name 05/02/25 0840          Safety Issues/Impairments Affecting Functional Mobility    Safety Issues Affecting Function (Mobility) awareness of need for assistance;insight into deficits/self-awareness;safety precaution awareness;safety precautions follow-through/compliance;sequencing abilities  -MR     Impairments Affecting Function (Mobility) balance;endurance/activity tolerance;pain;strength;sensation/sensory awareness;range of motion (ROM);postural/trunk control  -MR               User Key  (r) = Recorded By, (t) = Taken By, (c) = Cosigned By      Initials Name Provider Type    MR  Karen Drew, OT Occupational Therapist                     Mobility/ADL's       Row Name 05/02/25 0841          Bed Mobility    Comment, (Bed Mobility) Pt received UIC and left UIC at end of session.  -MR       Row Name 05/02/25 0841          Activities of Daily Living    BADL Assessment/Intervention upper body dressing;bathing  -MR       Row Name 05/02/25 0841          Mobility    Extremity Weight-bearing Status right upper extremity  -MR     Right Upper Extremity (Weight-bearing Status) non weight-bearing (NWB)  -MR       Row Name 05/02/25 0841          Bathing Assessment/Intervention    Buna Level (Bathing) maximum assist (25% patient effort);upper body;bathing skills  -MR     Position (Bathing) unsupported sitting  -MR     Comment, (Bathing) Pt re-educated on R shoulder precautions and ADl retraining. Pt demonstrating good follow through w/ R axilla hygiene.  -MR       Row Name 05/02/25 0841          Upper Body Dressing Assessment/Training    Buna Level (Upper Body Dressing) don;doff;other (see comments);dependent (less than 25% patient effort)  sling  -MR     Position (Upper Body Dressing) supported sitting  -MR               User Key  (r) = Recorded By, (t) = Taken By, (c) = Cosigned By      Initials Name Provider Type    MR Karen Drew, OT Occupational Therapist                   Obj/Interventions       Row Name 05/02/25 0842          Shoulder (Therapeutic Exercise)    Shoulder (Therapeutic Exercise) AAROM (active assistive range of motion)  -MR     Shoulder AAROM (Therapeutic Exercise) right;flexion;10 repetitions  Pt tolerates AAFE to 100  -MR       Row Name 05/02/25 0842          Elbow/Forearm (Therapeutic Exercise)    Elbow/Forearm (Therapeutic Exercise) AAROM (active assistive range of motion)  -MR     Elbow/Forearm AAROM (Therapeutic Exercise) right;flexion;extension;supination;pronation;10 repetitions;sitting  -MR       Row Name 05/02/25 0842          Wrist (Therapeutic Exercise)     Wrist (Therapeutic Exercise) AROM (active range of motion)  -MR     Wrist AROM (Therapeutic Exercise) bilateral;flexion;10 repetitions;right  -MR       Row Name 05/02/25 0842          Hand (Therapeutic Exercise)    Hand (Therapeutic Exercise) AROM (active range of motion)  -MR     Hand AROM/AAROM (Therapeutic Exercise) right;AROM (active range of motion);finger flexion;finger extension;10 repetitions  -MR       Row Name 05/02/25 0842          Motor Skills    Therapeutic Exercise shoulder;elbow/forearm;wrist;hand  Reviewed education for RUE HEP per surgeon parameters: AROM @ hand, wrist, elbow; AAFE to 150, no ER past 0.  -MR       Row Name 05/02/25 0842          Balance    Balance Assessment sitting static balance  -MR     Static Sitting Balance standby assist  -MR               User Key  (r) = Recorded By, (t) = Taken By, (c) = Cosigned By      Initials Name Provider Type    Karen Montoya, LISA Occupational Therapist                   Goals/Plan    No documentation.                  Clinical Impression       Row Name 05/02/25 0844          Pain Assessment    Pretreatment Pain Rating 7/10  -MR     Posttreatment Pain Rating 7/10  -MR     Pain Location shoulder  -MR     Pain Side/Orientation right;generalized  -MR       Row Name 05/02/25 0844          Plan of Care Review    Plan of Care Reviewed With patient  -MR     Progress no change  -MR     Outcome Evaluation Pt re-educated on shoulder precautions, sling management, HEP w/in surgeon parameters and hygiene. Pt tolerated AAFR to approx 120 degrees this date, good effort w/ elbow/wrist and hand ROM. Continue to progress as able per current POC. Continue to recommend inpatient rehab at d/c.  -MR       Row Name 05/02/25 0844          Therapy Plan Review/Discharge Plan (OT)    Anticipated Discharge Disposition (OT) inpatient rehabilitation facility  -MR       Row Name 05/02/25 0844          Vital Signs    Pre SpO2 (%) 100  -MR     O2 Delivery Pre Treatment nasal  cannula  -MR     O2 Delivery Intra Treatment nasal cannula  -MR     Post SpO2 (%) 100  -MR     O2 Delivery Post Treatment nasal cannula  -MR     Pre Patient Position Sitting  -MR     Intra Patient Position Sitting  -MR     Post Patient Position Sitting  -MR       Row Name 05/02/25 0844          Positioning and Restraints    Pre-Treatment Position sitting in chair/recliner  -MR     Post Treatment Position chair  -MR     In Chair notified nsg;reclined;sitting;call light within reach;encouraged to call for assist;exit alarm on;waffle cushion;legs elevated;heels elevated  RUE in sling, elevated w/ pillow and ice applied  -MR               User Key  (r) = Recorded By, (t) = Taken By, (c) = Cosigned By      Initials Name Provider Type    Karen Montoya OT Occupational Therapist                   Outcome Measures       Row Name 05/02/25 0847          How much help from another is currently needed...    Putting on and taking off regular lower body clothing? 1  -MR     Bathing (including washing, rinsing, and drying) 2  -MR     Toileting (which includes using toilet bed pan or urinal) 2  -MR     Putting on and taking off regular upper body clothing 2  -MR     Taking care of personal grooming (such as brushing teeth) 3  -MR     Eating meals 3  -MR     AM-PAC 6 Clicks Score (OT) 13  -MR       Row Name 05/02/25 0847          Functional Assessment    Outcome Measure Options AM-PAC 6 Clicks Daily Activity (OT)  -MR               User Key  (r) = Recorded By, (t) = Taken By, (c) = Cosigned By      Initials Name Provider Type    Karen Montoya OT Occupational Therapist                    Occupational Therapy Education       Title: PT OT SLP Therapies (In Progress)       Topic: Occupational Therapy (In Progress)       Point: ADL training (In Progress)       Learning Progress Summary            Patient Acceptance, E, NR by MR at 5/2/2025 0847    Acceptance, E, NR by ED at 5/1/2025 1131    Acceptance, E,D, VU,NR by TB at  4/30/2025 1003    Acceptance, E,D, VU,NR by TB at 4/29/2025 1613                      Point: Home exercise program (In Progress)       Learning Progress Summary            Patient Acceptance, E, NR by MR at 5/2/2025 0847    Acceptance, E,D, VU,NR by TB at 4/30/2025 1003    Acceptance, E,D, VU,NR by TB at 4/29/2025 1613                      Point: Precautions (In Progress)       Learning Progress Summary            Patient Acceptance, E, NR by MR at 5/2/2025 0847    Acceptance, E, NR by  at 5/1/2025 1131    Acceptance, E,D, VU,NR by TB at 4/30/2025 1003    Acceptance, E,D, VU,NR by TB at 4/29/2025 1613                      Point: Body mechanics (In Progress)       Learning Progress Summary            Patient Acceptance, E, NR by MR at 5/2/2025 0847    Acceptance, E, NR by  at 5/1/2025 1131                                      User Key       Initials Effective Dates Name Provider Type Discipline     07/11/23 -  Kaylyn Sorensen, OT Occupational Therapist OT     06/16/21 -  Kika Sin, OT Occupational Therapist OT     09/22/22 -  Karen Drew, OT Occupational Therapist OT                  OT Recommendation and Plan     Plan of Care Review  Plan of Care Reviewed With: patient  Progress: no change  Outcome Evaluation: Pt re-educated on shoulder precautions, sling management, HEP w/in surgeon parameters and hygiene. Pt tolerated AAFR to approx 120 degrees this date, good effort w/ elbow/wrist and hand ROM. Continue to progress as able per current POC. Continue to recommend inpatient rehab at d/c.     Time Calculation:         Time Calculation- OT       Row Name 05/02/25 0848             Time Calculation- OT    OT Start Time 0753  -MR      OT Received On 05/02/25  -MR         Timed Charges    37987 - OT Therapeutic Exercise Minutes 15  -MR      51438 - OT Self Care/Mgmt Minutes 10  -MR         Total Minutes    Timed Charges Total Minutes 25  -MR       Total Minutes 25  -MR                User Key  (r)  = Recorded By, (t) = Taken By, (c) = Cosigned By      Initials Name Provider Type     Karen Drew, LISA Occupational Therapist                  Therapy Charges for Today       Code Description Service Date Service Provider Modifiers Qty    [id number]  OT THER PROC EA 15 MIN 5/2/2025 Karen Drew OT GO 1    [id number]  OT SELF CARE/MGMT/TRAIN EA 15 MIN 5/2/2025 Karen Drew OT GO 1                 Karen Drew OT  5/2/2025

## 2025-05-03 LAB
GLUCOSE BLDC GLUCOMTR-MCNC: 102 MG/DL (ref 70–130)
GLUCOSE BLDC GLUCOMTR-MCNC: 103 MG/DL (ref 70–130)
GLUCOSE BLDC GLUCOMTR-MCNC: 121 MG/DL (ref 70–130)
GLUCOSE BLDC GLUCOMTR-MCNC: 195 MG/DL (ref 70–130)
GLUCOSE BLDC GLUCOMTR-MCNC: 231 MG/DL (ref 70–130)

## 2025-05-03 PROCEDURE — 63710000001 INSULIN GLARGINE PER 5 UNITS: Performed by: INTERNAL MEDICINE

## 2025-05-03 PROCEDURE — 97530 THERAPEUTIC ACTIVITIES: CPT

## 2025-05-03 PROCEDURE — 63710000001 INSULIN LISPRO (HUMAN) PER 5 UNITS: Performed by: INTERNAL MEDICINE

## 2025-05-03 PROCEDURE — 82948 REAGENT STRIP/BLOOD GLUCOSE: CPT

## 2025-05-03 PROCEDURE — 97110 THERAPEUTIC EXERCISES: CPT

## 2025-05-03 PROCEDURE — 97535 SELF CARE MNGMENT TRAINING: CPT

## 2025-05-03 PROCEDURE — 97116 GAIT TRAINING THERAPY: CPT

## 2025-05-03 RX ORDER — INSULIN LISPRO 100 [IU]/ML
30 INJECTION, SOLUTION INTRAVENOUS; SUBCUTANEOUS
Status: DISCONTINUED | OUTPATIENT
Start: 2025-05-03 | End: 2025-05-06 | Stop reason: HOSPADM

## 2025-05-03 RX ADMIN — INSULIN LISPRO 8 UNITS: 100 INJECTION, SOLUTION INTRAVENOUS; SUBCUTANEOUS at 22:14

## 2025-05-03 RX ADMIN — INSULIN GLARGINE 35 UNITS: 100 INJECTION, SOLUTION SUBCUTANEOUS at 22:14

## 2025-05-03 RX ADMIN — SENNOSIDES AND DOCUSATE SODIUM 2 TABLET: 50; 8.6 TABLET ORAL at 09:34

## 2025-05-03 RX ADMIN — OXYCODONE HYDROCHLORIDE 10 MG: 10 TABLET ORAL at 22:15

## 2025-05-03 RX ADMIN — ACETAMINOPHEN 1000 MG: 500 TABLET ORAL at 06:11

## 2025-05-03 RX ADMIN — TAMSULOSIN HYDROCHLORIDE 0.4 MG: 0.4 CAPSULE ORAL at 09:42

## 2025-05-03 RX ADMIN — LISINOPRIL 40 MG: 40 TABLET ORAL at 22:15

## 2025-05-03 RX ADMIN — BUMETANIDE 2 MG: 1 TABLET ORAL at 09:34

## 2025-05-03 RX ADMIN — DULOXETINE 60 MG: 60 CAPSULE, DELAYED RELEASE ORAL at 09:34

## 2025-05-03 RX ADMIN — ACETAMINOPHEN 1000 MG: 500 TABLET ORAL at 16:17

## 2025-05-03 RX ADMIN — NALOXEGOL OXALATE 25 MG: 25 TABLET, FILM COATED ORAL at 06:11

## 2025-05-03 RX ADMIN — DOCUSATE SODIUM 100 MG: 100 CAPSULE, LIQUID FILLED ORAL at 09:34

## 2025-05-03 RX ADMIN — SENNOSIDES AND DOCUSATE SODIUM 2 TABLET: 50; 8.6 TABLET ORAL at 22:15

## 2025-05-03 RX ADMIN — APIXABAN 5 MG: 5 TABLET, FILM COATED ORAL at 22:16

## 2025-05-03 RX ADMIN — INSULIN LISPRO 30 UNITS: 100 INJECTION, SOLUTION INTRAVENOUS; SUBCUTANEOUS at 17:36

## 2025-05-03 RX ADMIN — NEBIVOLOL 10 MG: 5 TABLET ORAL at 09:34

## 2025-05-03 RX ADMIN — PANTOPRAZOLE SODIUM 40 MG: 40 TABLET, DELAYED RELEASE ORAL at 09:34

## 2025-05-03 RX ADMIN — INSULIN LISPRO 4 UNITS: 100 INJECTION, SOLUTION INTRAVENOUS; SUBCUTANEOUS at 17:36

## 2025-05-03 RX ADMIN — ATORVASTATIN CALCIUM 40 MG: 40 TABLET, FILM COATED ORAL at 09:34

## 2025-05-03 RX ADMIN — ACETAMINOPHEN 1000 MG: 500 TABLET ORAL at 22:15

## 2025-05-03 RX ADMIN — GABAPENTIN 800 MG: 400 CAPSULE ORAL at 09:34

## 2025-05-03 RX ADMIN — OXYCODONE HYDROCHLORIDE 10 MG: 10 TABLET ORAL at 09:34

## 2025-05-03 RX ADMIN — DOCUSATE SODIUM 100 MG: 100 CAPSULE, LIQUID FILLED ORAL at 22:15

## 2025-05-03 RX ADMIN — INSULIN GLARGINE 46 UNITS: 100 INJECTION, SOLUTION SUBCUTANEOUS at 12:50

## 2025-05-03 RX ADMIN — APIXABAN 5 MG: 5 TABLET, FILM COATED ORAL at 09:34

## 2025-05-03 RX ADMIN — LISINOPRIL 40 MG: 40 TABLET ORAL at 09:34

## 2025-05-03 RX ADMIN — OXYCODONE HYDROCHLORIDE 10 MG: 10 TABLET ORAL at 04:02

## 2025-05-03 RX ADMIN — HYDROCHLOROTHIAZIDE 12.5 MG: 25 TABLET ORAL at 09:34

## 2025-05-03 NOTE — PLAN OF CARE
Goal Outcome Evaluation:  Plan of Care Reviewed With: patient        Progress: no change  Outcome Evaluation: Patient re-educated on sling management, HEP w/in surgeon parameters, and shoulder precautions. Pt demonstrated good effort w/ elbow, wrist and hand ROM, tolerated approx 120 degrees of shoulder AAROM FE. Deficits warrant continuation of skilled OT services. Continue to progress as able per current POC.    Anticipated Discharge Disposition (OT): inpatient rehabilitation facility

## 2025-05-03 NOTE — THERAPY TREATMENT NOTE
"Patient Name: Wilbert Kelley  : 1960    MRN: [id number]                              Today's Date: 5/3/2025       Admit Date: 2025    Visit Dx: No diagnosis found.  Patient Active Problem List   Diagnosis    Shortness of breath    Type 2 diabetes mellitus with hyperglycemia, with long-term current use of insulin    Sleep apnea    Coronary artery disease of native artery of native heart with stable angina pectoris    S/P CABG x 3 on 3/21/2023    Hypertension    Hyperlipidemia    EMANUEL (acute kidney injury)    Paroxysmal atrial fibrillation    Bilateral pneumonia    Acute on chronic heart failure with preserved ejection fraction (HFpEF)    S/P reverse total shoulder arthroplasty, right    Obesity, Class III, BMI 40-49.9 (morbid obesity)     Past Medical History:   Diagnosis Date    Arthritis     BPH (benign prostatic hyperplasia)     Cancer     basal and melanoma-  x2 - left side ear and elbow    Constipation     Coronary artery disease     DDD (degenerative disc disease), lumbar     Diabetes mellitus     couple times month check sugar    Frozen shoulder     left    GERD (gastroesophageal reflux disease)     History of pneumonia 2025    Hyperlipidemia     Hypertension     Limited mobility     left shoulder  - possible frozen shoulder-= please be aware for surgery    Oxygen dependent     \"2 liters at night since pneumonia\" per wife    Rotator cuff injury     right shoulder 3/14/2025    Sleep apnea     insurance took cpap machine due to pt not using 6 hours per night.    Tinnitus     Wears glasses     small print     Past Surgical History:   Procedure Laterality Date    CARDIAC CATHETERIZATION      CARDIAC CATHETERIZATION Right 2023    Procedure: Left Heart Cath;  Surgeon: Jarod Boyle MD;  [facility]:  [facility];  Service: Cardiovascular;  Laterality: Right;    CATARACT EXTRACTION, BILATERAL Bilateral     COLONOSCOPY      CORONARY ARTERY BYPASS GRAFT N/A 3/21/2023    Procedure: " MEDIAN STERNOTOMY, CORONARY ARTERY BYPASS GRAFTING X 3, UTILIZING THE LEFT INTERNAL MAMMARY ARTERY, ENDOSCOPIC VEIN HARVEST OF THE RIGHT GREATER SAPENOUSE VEIN;  Surgeon: Markus Emanuel MD;  [facility];  Service: Cardiothoracic;  Laterality: N/A;    CORONARY STENT PLACEMENT      x4    ENDOSCOPY      FINGER SURGERY Left     middle finger - left side    SKIN CANCER EXCISION      x2    TOTAL SHOULDER ARTHROPLASTY W/ DISTAL CLAVICLE EXCISION Right 4/29/2025    Procedure: TOTAL SHOULDER REVERSE ARTHROPLASTY WITH BICEPS TENODESIS RIGHT;  Surgeon: Enzo Mckeon Jr., MD;  Location: [facility] OR;  Service: Orthopedics;  Laterality: Right;    WISDOM TOOTH EXTRACTION        General Information       Row Name 05/03/25 1034          Physical Therapy Time and Intention    Document Type therapy note (daily note)  -LO     Mode of Treatment physical therapy;individual therapy  -       Row Name 05/03/25 1034          General Information    Patient Profile Reviewed yes  -LO     Existing Precautions/Restrictions fall;right;shoulder;non-weight bearing;brace on at all times;other (see comments)  s/p R rTSA with NWB precautions, simple sling  -LO       Row Name 05/03/25 1034          Cognition    Orientation Status (Cognition) oriented x 3  -LO       Row Name 05/03/25 1034          Safety Issues/Impairments Affecting Functional Mobility    Safety Issues Affecting Function (Mobility) at risk behavior observed;awareness of need for assistance;impulsivity;insight into deficits/self-awareness;judgment;safety precaution awareness  -LO     Impairments Affecting Function (Mobility) balance;endurance/activity tolerance;pain;strength;sensation/sensory awareness;range of motion (ROM);postural/trunk control  -LO               User Key  (r) = Recorded By, (t) = Taken By, (c) = Cosigned By      Initials Name Provider Type    LO Kirti Garcia PT Physical Therapist                   Mobility       Row Name 05/03/25 1035          Bed  Mobility    Comment, (Bed Mobility) UIC  -LO       Row Name 05/03/25 1035          Transfers    Comment, (Transfers) stand> recliner> stand> recliner, no AD, review of sequencing for safety. Cont to demonstrate impulsivity with mobility.  -LO       Row Name 05/03/25 1035          Bed-Chair Transfer    Bed-Chair Baldwin (Transfers) not tested  -LO       Row Name 05/03/25 1035          Sit-Stand Transfer    Sit-Stand Baldwin (Transfers) verbal cues;1 person assist;contact guard  -LO     Assistive Device (Sit-Stand Transfers) other (see comments)  none  -LO       Row Name 05/03/25 1035          Gait/Stairs (Locomotion)    Baldwin Level (Gait) verbal cues;minimum assist (75% patient effort);1 person assist  -LO     Assistive Device (Gait) other (see comments)  none  -LO     Distance in Feet (Gait) 65  -LO     Deviations/Abnormal Patterns (Gait) bilateral deviations;pamela decreased;gait speed decreased;stride length decreased;base of support, wide  -LO     Bilateral Gait Deviations forward flexed posture;heel strike decreased  -LO     Baldwin Level (Stairs) not tested  -LO     Comment, (Gait/Stairs) Ambulates with step through pattern with wide REAGAN and B hip in excessive ER. Con tto demonstrate unsteadiness without overt loss of balance. frequent cuing required to avoid obstacles such as door frames with RUE.  -LO       Row Name 05/03/25 1035          Mobility    Extremity Weight-bearing Status right upper extremity  -LO     Right Upper Extremity (Weight-bearing Status) non weight-bearing (NWB)  -LO               User Key  (r) = Recorded By, (t) = Taken By, (c) = Cosigned By      Initials Name Provider Type    Kirti Mike, PT Physical Therapist                   Obj/Interventions       Row Name 05/03/25 1038          Balance    Balance Assessment sitting static balance;sitting dynamic balance;standing static balance;standing dynamic balance  -LO     Static Sitting Balance standby assist  -LO      Dynamic Sitting Balance standby assist  -LO     Position, Sitting Balance unsupported;sitting in chair  -LO     Static Standing Balance contact guard  -LO     Dynamic Standing Balance minimal assist  -LO     Position/Device Used, Standing Balance unsupported  -LO     Comment, Balance CGA without AD in standing ( unable to use an AD on L due to L shoulder pain). cont to be unsteady in standing without overt loss of balance.  -LO               User Key  (r) = Recorded By, (t) = Taken By, (c) = Cosigned By      Initials Name Provider Type    Kirti Mike, PT Physical Therapist                   Goals/Plan    No documentation.                  Clinical Impression       Row Name 05/03/25 1040          Pain    Pretreatment Pain Rating 7/10  -LO     Posttreatment Pain Rating 7/10  -LO     Pain Location shoulder  -LO     Pain Side/Orientation right  -LO     Pain Management Interventions activity modification encouraged  -LO     Response to Pain Interventions activity participation with tolerable pain  -LO     Pre/Posttreatment Pain Comment RN premedicated  -       Row Name 05/03/25 1040          Plan of Care Review    Plan of Care Reviewed With patient;spouse  -LO     Progress no change  -LO     Outcome Evaluation Patient able to ambulate 65' without AD CGA but cont to demonstrate unsteadiness throughout without overt loss of balance. Cont to require frequent cuing to protect RUE and avoid obstacles such as door frame with RUE. Patient will cont to benefit from skilled IP PT services to address impairments for return to PLOF. Cont IP PT POC.  -       Row Name 05/03/25 1040          Therapy Assessment/Plan (PT)    Rehab Potential (PT) fair  -LO     Criteria for Skilled Interventions Met (PT) yes;meets criteria;skilled treatment is necessary  -LO     Therapy Frequency (PT) daily  -       Row Name 05/03/25 1040          Vital Signs    Pre Systolic BP Rehab 145  -LO     Pre Treatment Diastolic BP 70  -LO     Post  Systolic BP Rehab 142  -LO     Post Treatment Diastolic BP 76  -LO     O2 Delivery Pre Treatment room air  -LO     O2 Delivery Intra Treatment room air  -LO     Post SpO2 (%) 96  -LO     O2 Delivery Post Treatment room air  -LO     Pre Patient Position Standing  -LO     Intra Patient Position Standing  -LO     Post Patient Position Sitting  -LO       Row Name 05/03/25 1040          Positioning and Restraints    Pre-Treatment Position bathroom  -LO     Post Treatment Position chair  -LO     In Chair notified nsg;sitting;call light within reach;encouraged to call for assist;with family/caregiver;waffle cushion  RN deferred chair alarm with family member in room  -LO               User Key  (r) = Recorded By, (t) = Taken By, (c) = Cosigned By      Initials Name Provider Type    Kirti Mike, PT Physical Therapist                   Outcome Measures       Row Name 05/03/25 1043 05/03/25 0934       How much help from another person do you currently need...    Turning from your back to your side while in flat bed without using bedrails? 3  -LO 3  -AP    Moving from lying on back to sitting on the side of a flat bed without bedrails? 3  -LO 3  -AP    Moving to and from a bed to a chair (including a wheelchair)? 3  -LO 3  -AP    Standing up from a chair using your arms (e.g., wheelchair, bedside chair)? 3  -LO 3  -AP    Climbing 3-5 steps with a railing? 2  -LO 2  -AP    To walk in hospital room? 3  -LO 3  -AP    AM-PAC 6 Clicks Score (PT) 17  -LO 17  -AP    Highest Level of Mobility Goal 5 --> Static standing  -LO 5 --> Static standing  -AP      Row Name 05/03/25 1043          Functional Assessment    Outcome Measure Options AM-PAC 6 Clicks Basic Mobility (PT)  -LO               User Key  (r) = Recorded By, (t) = Taken By, (c) = Cosigned By      Initials Name Provider Type    Nazanin Salinas, RN Registered Nurse    Kirti Mike, PT Physical Therapist                                 Physical Therapy Education        Title: PT OT SLP Therapies (In Progress)       Topic: Physical Therapy (Done)       Point: Mobility training (Done)       Learning Progress Summary            Patient Acceptance, E, VU,NR by LO at 5/3/2025 1006    Comment: PT POC    Acceptance, E,D, VU,NR by AB at 5/2/2025 1122    Acceptance, E,D, VU,NR by AB at 5/1/2025 0959    Acceptance, E, VU by AC at 4/30/2025 1159    Acceptance, E,D, VU,NR by LR at 4/29/2025 1459    Comment: Educated on NWB status of R UE, precautions, safety with mobility, correct supine<->sit t/f technique, correct sit<->stand t/f technique, correct gait mechanics, and progression of POC.                      Point: Home exercise program (Done)       Learning Progress Summary            Patient Acceptance, E, VU,NR by LO at 5/3/2025 1006    Comment: PT POC    Acceptance, E,D, VU,NR by AB at 5/2/2025 1122    Acceptance, E,D, VU,NR by AB at 5/1/2025 0959    Acceptance, E, VU by AC at 4/30/2025 1159    Acceptance, E,D, VU,NR by LR at 4/29/2025 1459    Comment: Educated on NWB status of R UE, precautions, safety with mobility, correct supine<->sit t/f technique, correct sit<->stand t/f technique, correct gait mechanics, and progression of POC.                      Point: Body mechanics (Done)       Learning Progress Summary            Patient Acceptance, E, VU,NR by LO at 5/3/2025 1006    Comment: PT POC    Acceptance, E,D, VU,NR by AB at 5/2/2025 1122    Acceptance, E,D, VU,NR by AB at 5/1/2025 0959    Acceptance, E, VU by AC at 4/30/2025 1159    Acceptance, E,D, VU,NR by LR at 4/29/2025 1459    Comment: Educated on NWB status of R UE, precautions, safety with mobility, correct supine<->sit t/f technique, correct sit<->stand t/f technique, correct gait mechanics, and progression of POC.                      Point: Precautions (Done)       Learning Progress Summary            Patient Acceptance, E, VU,NR by LO at 5/3/2025 1006    Comment: PT POC    Acceptance, E,CASSIDY, ANGELES,NR by AB at 5/2/2025 1122     Acceptance, E,D, VU,NR by AB at 5/1/2025 0959    Acceptance, E, VU by AC at 4/30/2025 1159    Acceptance, E,D, VU,NR by LR at 4/29/2025 1459    Comment: Educated on NWB status of R UE, precautions, safety with mobility, correct supine<->sit t/f technique, correct sit<->stand t/f technique, correct gait mechanics, and progression of POC.                                      User Key       Initials Effective Dates Name Provider Type Discipline    LR 02/03/23 -  Kika Browning, PT Physical Therapist PT    LO 06/16/21 -  Kirti Garcia, PT Physical Therapist PT    AB 09/22/22 -  Nazanin Carrington, PT Physical Therapist PT    AC 07/11/24 -  Keara Jane, PT Physical Therapist PT                  PT Recommendation and Plan     Progress: no change  Outcome Evaluation: Patient able to ambulate 65' without AD CGA but cont to demonstrate unsteadiness throughout without overt loss of balance. Cont to require frequent cuing to protect RUE and avoid obstacles such as door frame with RUE. Patient will cont to benefit from skilled IP PT services to address impairments for return to PLOF. Cont IP PT POC.     Time Calculation:         PT Charges       Row Name 05/03/25 1006             Time Calculation    Start Time 1006  -LO      PT Received On 05/03/25  -LO      PT Goal Re-Cert Due Date 05/09/25  -LO         Timed Charges    97908 - Gait Training Minutes  12  -LO      82523 - PT Therapeutic Activity Minutes 13  -LO         Total Minutes    Timed Charges Total Minutes 25  -LO       Total Minutes 25  -LO                User Key  (r) = Recorded By, (t) = Taken By, (c) = Cosigned By      Initials Name Provider Type    LO Kirti Garcia, PT Physical Therapist                  Therapy Charges for Today       Code Description Service Date Service Provider Modifiers Qty    44746568587 HC GAIT TRAINING EA 15 MIN 5/3/2025 Kirti Garcia, PT GP 1    49937751023 HC PT THERAPEUTIC ACT EA 15 MIN 5/3/2025 Kirti Garcia, PT GP 1            PT  G-Codes  Outcome Measure Options: AM-PAC 6 Clicks Basic Mobility (PT)  AM-PAC 6 Clicks Score (PT): 17  AM-PAC 6 Clicks Score (OT): 13  PT Discharge Summary  Anticipated Discharge Disposition (PT): inpatient rehabilitation facility    Kirti Garcia, PT  5/3/2025

## 2025-05-03 NOTE — PLAN OF CARE
Goal Outcome Evaluation:  Plan of Care Reviewed With: patient        Progress: improving     Pt aox4, vss, room air-2L prn, bm this shift, ambulated several times in room x sb assist, R shoulder cdi/sling in place, no changes, awaiting dc to rehab..                 Problem: Adult Inpatient Plan of Care  Goal: Absence of Hospital-Acquired Illness or Injury  Intervention: Identify and Manage Fall Risk  Recent Flowsheet Documentation  Taken 5/3/2025 1617 by Nazanin Yanez RN  Safety Promotion/Fall Prevention:   activity supervised   safety round/check completed  Taken 5/3/2025 1400 by Nazanin Yanez RN  Safety Promotion/Fall Prevention:   activity supervised   safety round/check completed  Taken 5/3/2025 1250 by Nazanin Yanez RN  Safety Promotion/Fall Prevention:   activity supervised   safety round/check completed  Taken 5/3/2025 1034 by Nazanin Yanez RN  Safety Promotion/Fall Prevention:   activity supervised   safety round/check completed  Taken 5/3/2025 0934 by Nazanin Yanez RN  Safety Promotion/Fall Prevention:   activity supervised   safety round/check completed     Problem: Adult Inpatient Plan of Care  Goal: Absence of Hospital-Acquired Illness or Injury  Intervention: Identify and Manage Fall Risk  Recent Flowsheet Documentation  Taken 5/3/2025 1617 by Nazanin Yanez RN  Safety Promotion/Fall Prevention:   activity supervised   safety round/check completed  Taken 5/3/2025 1400 by Nazanin Yanez RN  Safety Promotion/Fall Prevention:   activity supervised   safety round/check completed  Taken 5/3/2025 1250 by Nazanin Yanez RN  Safety Promotion/Fall Prevention:   activity supervised   safety round/check completed  Taken 5/3/2025 1034 by Nazanin Yanez, RN  Safety Promotion/Fall Prevention:   activity supervised   safety round/check completed  Taken 5/3/2025 0934 by Nazanin Yanez RN  Safety Promotion/Fall Prevention:   activity supervised   safety round/check completed      Problem: Adult Inpatient Plan of Care  Goal: Absence of Hospital-Acquired Illness or Injury  Intervention: Prevent Infection  Recent Flowsheet Documentation  Taken 5/3/2025 1617 by Nazanin Yanez RN  Infection Prevention:   hand hygiene promoted   rest/sleep promoted  Taken 5/3/2025 1400 by Nazanin Yanez RN  Infection Prevention:   hand hygiene promoted   rest/sleep promoted  Taken 5/3/2025 1250 by Nazanin Yanez RN  Infection Prevention:   hand hygiene promoted   rest/sleep promoted  Taken 5/3/2025 1034 by Nazanin Yanez RN  Infection Prevention:   hand hygiene promoted   rest/sleep promoted  Taken 5/3/2025 0934 by Nazanin Yanez RN  Infection Prevention:   hand hygiene promoted   rest/sleep promoted     Problem: Adult Inpatient Plan of Care  Goal: Absence of Hospital-Acquired Illness or Injury  Intervention: Prevent and Manage VTE (Venous Thromboembolism) Risk  Recent Flowsheet Documentation  Taken 5/3/2025 1250 by Nazanin Yanez, RN  VTE Prevention/Management: (eliquis) --  Taken 5/3/2025 0934 by Nazanin Yanez RN  VTE Prevention/Management: (eliquis) --

## 2025-05-03 NOTE — PROGRESS NOTES
"IM progress note      Wilbert Kelley  [id number]  1960     LOS: 0 days     Attending: Enzo Mckeon Jr., MD    Primary Care Provider: Jose M Siomn MD      Chief Complaint/Reason for visit:  Right shoulder pain    Subjective   Doing ok.   PNB cath is out.   No f/c/n/vom.  Had 2 BMs, one large.   Appetite a bit low. Hypoglycemic episodes noted.     Objective      Visit Vitals  /76 (BP Location: Left arm, Patient Position: Sitting)   Pulse 62   Temp 98.3 °F (36.8 °C) (Oral)   Resp 18   Ht 185.4 cm (73\")   Wt (!) 150 kg (331 lb 2.1 oz)   SpO2 97%   BMI 43.69 kg/m²     Temp (24hrs), Av.2 °F (36.8 °C), Min:97.6 °F (36.4 °C), Max:98.5 °F (36.9 °C)      Nutrition: PO     OT:          Goal Outcome Evaluation:  Plan of Care Reviewed With: patient  Progress: no change  Outcome Evaluation: Patient re-educated on sling management, HEP w/in surgeon parameters, and shoulder precautions. Pt demonstrated good effort w/ elbow, wrist and hand ROM, tolerated approx 120 degrees of shoulder AAROM FE. Deficits warrant continuation of skilled OT services. Continue to progress as able per current POC.     Anticipated Discharge Disposition (OT): inpatient rehabilitation facility             PT:  Kirti Garcia, PT   Physical Therapist  Physical Therapy     Plan of Care     Signed     Date of Service: 25 1006  Creation Time: 25 1044     Signed         Goal Outcome Evaluation:  Plan of Care Reviewed With: patient, spouse  Progress: no change  Outcome Evaluation: Patient able to ambulate 65' without AD CGA but cont to demonstrate unsteadiness throughout without overt loss of balance. Cont to require frequent cuing to protect RUE and avoid obstacles such as door frame with RUE. Patient will cont to benefit from skilled IP PT services to address impairments for return to PLOF. Cont IP PT POC.     Anticipated Discharge Disposition (PT): inpatient rehabilitation facility                        Physical " Exam:     General Appearance:    Alert, cooperative, in no acute distress   Head:    Normocephalic, without obvious abnormality, atraumatic    Lungs:     Normal effort, symmetric chest rise, no crepitus, clear to      auscultation bilaterally             Heart:    Regular rhythm and normal rate, normal S1 and S2   Abdomen:     Normal bowel sounds, no masses, no organomegaly, soft        non-tender, non-distended, no guarding, no rebound    tenderness   Extremities:   Right UE in a sling, CDI  dressing . Interscalene nerve block cath is out.   Distal pulses, cap refill, movements of fingers, wrist, intact.   Pulses:   Pulses palpable and equal bilaterally   Skin:   No bleeding, bruising or rash   Neurologic:   Cranial nerves 2 - 12 grossly intact     Results Review:     I reviewed the patient's new clinical results.   Results from last 7 days   Lab Units 04/30/25 2026   HEMOGLOBIN g/dL 10.3*   HEMATOCRIT % 35.0*     Results from last 7 days   Lab Units 04/30/25 2026 04/29/25  0918   SODIUM mmol/L 136  --    POTASSIUM mmol/L 3.8 4.3   CHLORIDE mmol/L 98  --    CO2 mmol/L 27.0  --    BUN mg/dL 29*  --    CREATININE mg/dL 1.20  --    CALCIUM mg/dL 9.0  --    GLUCOSE mg/dL 103* 221*          Latest Reference Range & Units 05/02/25 07:41 05/02/25 09:49 05/02/25 11:23 05/02/25 16:39   Glucose 70 - 130 mg/dL 174 (H) 155 (H) 210 (H) 86   (H): Data is abnormally high    I reviewed the patient's new imaging including images and reports.    All medications reviewed.   acetaminophen, 1,000 mg, Oral, Q8H  apixaban, 5 mg, Oral, Q12H  atorvastatin, 40 mg, Oral, Daily  bumetanide, 2 mg, Oral, Daily  docusate sodium, 100 mg, Oral, BID  DULoxetine, 60 mg, Oral, Daily  gabapentin, 800 mg, Oral, Daily  hydroCHLOROthiazide, 12.5 mg, Oral, Daily  insulin glargine, 35 Units, Subcutaneous, Q12H  Insulin Lispro, 30 Units, Subcutaneous, TID With Meals  insulin lispro, 4-24 Units, Subcutaneous, 4x Daily AC & at Bedtime  lisinopril, 40 mg,  Oral, BID  Naloxegol Oxalate, 25 mg, Oral, QAM  nebivolol, 10 mg, Oral, Daily  pantoprazole, 40 mg, Oral, Daily  senna-docusate sodium, 2 tablet, Oral, BID  tamsulosin, 0.4 mg, Oral, Daily        Assessment & Plan     S/P reverse total shoulder arthroplasty, right    Type 2 diabetes mellitus with hyperglycemia, with long-term current use of insulin    Sleep apnea    S/P CABG x 3 on 3/21/2023    Hypertension    Hyperlipidemia    Paroxysmal atrial fibrillation    Obesity, Class III, BMI 40-49.9 (morbid obesity)      Plan  1. PT/OT- CHEKO pardo, NWB  2. Pain control-prns. S/p nerve block. Following with Oxycodone.   3. IS-encouraged  4. DVT proph- mechs  5. Bowel regimen, adjusting.   6. Monitor post-op labs  7. DC planning for rehab. CM following.   D/w pt, , and with .     - Hypertension:  Resume home medications as appropriate, formulary substitution when indicated.  Holding parameters.  PRN medications for elevated blood pressure.     -DM type 2, insulin resistance.  Hgb A1C   Hold OHA as appropriate  FSBG before meals/bedtime, ( q 6 when NPO), along with correction Humalog.  Long acting and prandial insulin  Doses adjusted/lowered on 5/3.       -MARIAH:  Continue O2 supplement  Monitor O2 sats.     -PAF:  Resumed BB. DOAC  resumed      Reece Moreira MD  05/03/25  14:02 EDT

## 2025-05-03 NOTE — THERAPY TREATMENT NOTE
"Patient Name: Wilbert Kelley  : 1960    MRN: [id number]                              Today's Date: 5/3/2025       Admit Date: 2025    Visit Dx: No diagnosis found.  Patient Active Problem List   Diagnosis    Shortness of breath    Type 2 diabetes mellitus with hyperglycemia, with long-term current use of insulin    Sleep apnea    Coronary artery disease of native artery of native heart with stable angina pectoris    S/P CABG x 3 on 3/21/2023    Hypertension    Hyperlipidemia    EMANUEL (acute kidney injury)    Paroxysmal atrial fibrillation    Bilateral pneumonia    Acute on chronic heart failure with preserved ejection fraction (HFpEF)    S/P reverse total shoulder arthroplasty, right    Obesity, Class III, BMI 40-49.9 (morbid obesity)     Past Medical History:   Diagnosis Date    Arthritis     BPH (benign prostatic hyperplasia)     Cancer     basal and melanoma-  x2 - left side ear and elbow    Constipation     Coronary artery disease     DDD (degenerative disc disease), lumbar     Diabetes mellitus     couple times month check sugar    Frozen shoulder     left    GERD (gastroesophageal reflux disease)     History of pneumonia 2025    Hyperlipidemia     Hypertension     Limited mobility     left shoulder  - possible frozen shoulder-= please be aware for surgery    Oxygen dependent     \"2 liters at night since pneumonia\" per wife    Rotator cuff injury     right shoulder 3/14/2025    Sleep apnea     insurance took cpap machine due to pt not using 6 hours per night.    Tinnitus     Wears glasses     small print     Past Surgical History:   Procedure Laterality Date    CARDIAC CATHETERIZATION      CARDIAC CATHETERIZATION Right 2023    Procedure: Left Heart Cath;  Surgeon: Jarod Boyle MD;  Location:  [facility];  Service: Cardiovascular;  Laterality: Right;    CATARACT EXTRACTION, BILATERAL Bilateral     COLONOSCOPY      CORONARY ARTERY BYPASS GRAFT N/A 3/21/2023    Procedure: " MEDIAN STERNOTOMY, CORONARY ARTERY BYPASS GRAFTING X 3, UTILIZING THE LEFT INTERNAL MAMMARY ARTERY, ENDOSCOPIC VEIN HARVEST OF THE RIGHT GREATER SAPENOUSE VEIN;  Surgeon: Markus Emanuel MD;  Location: Pending sale to [facility] OR;  Service: Cardiothoracic;  Laterality: N/A;    CORONARY STENT PLACEMENT      x4    ENDOSCOPY      FINGER SURGERY Left     middle finger - left side    SKIN CANCER EXCISION      x2    TOTAL SHOULDER ARTHROPLASTY W/ DISTAL CLAVICLE EXCISION Right 4/29/2025    Procedure: TOTAL SHOULDER REVERSE ARTHROPLASTY WITH BICEPS TENODESIS RIGHT;  Surgeon: Enzo Mckeon Jr., MD;  Location: Pending sale to [facility] OR;  Service: Orthopedics;  Laterality: Right;    WISDOM TOOTH EXTRACTION        General Information       Row Name 05/03/25 1052          OT Time and Intention    Document Type therapy note (daily note)  -MR     Mode of Treatment occupational therapy  -MR     Patient Effort good  -MR       Row Name 05/03/25 1052          General Information    Patient Profile Reviewed yes  -MR     Existing Precautions/Restrictions fall;right;shoulder;non-weight bearing;brace on at all times;other (see comments)  s/p R rTSA with NWB precautions, simple sling  -MR     Barriers to Rehab medically complex;previous functional deficit;physical barrier  -MR       Row Name 05/03/25 1052          Cognition    Orientation Status (Cognition) oriented x 3  -MR       Row Name 05/03/25 1052          Safety Issues/Impairments Affecting Functional Mobility    Safety Issues Affecting Function (Mobility) insight into deficits/self-awareness;awareness of need for assistance;impulsivity;judgment;safety precaution awareness  -MR     Impairments Affecting Function (Mobility) balance;endurance/activity tolerance;pain;strength;sensation/sensory awareness;range of motion (ROM);postural/trunk control  -MR               User Key  (r) = Recorded By, (t) = Taken By, (c) = Cosigned By      Initials Name Provider Type    Karen Montoya, OT Occupational Therapist                      Mobility/ADL's       Row Name 05/03/25 1052          Bed Mobility    Comment, (Bed Mobility) Pt received UIC and left UIC at end of session.  -MR       Row Name 05/03/25 1052          Transfers    Transfers sit-stand transfer;toilet transfer  -MR       Row Name 05/03/25 1052          Sit-Stand Transfer    Sit-Stand Baldwin (Transfers) verbal cues;1 person assist;contact guard  -MR       Row Name 05/03/25 1052          Toilet Transfer    Type (Toilet Transfer) sit-stand;stand-sit  -MR     Baldwin Level (Toilet Transfer) contact guard  -MR       Row Name 05/03/25 1052          Functional Mobility    Functional Mobility- Ind. Level contact guard assist  -MR     Functional Mobility-Distance (Feet) --  HH distances w/in the pt's room  -MR       Row Name 05/03/25 1052          Activities of Daily Living    BADL Assessment/Intervention grooming;toileting;upper body dressing  -MR       Row Name 05/03/25 1052          Mobility    Extremity Weight-bearing Status right upper extremity  -MR     Right Upper Extremity (Weight-bearing Status) non weight-bearing (NWB)  -MR       Row Name 05/03/25 1052          Bathing Assessment/Intervention    Baldwin Level (Bathing) maximum assist (25% patient effort);upper body;bathing skills  -MR     Position (Bathing) unsupported sitting  -MR       Row Name 05/03/25 1052          Upper Body Dressing Assessment/Training    Baldwin Level (Upper Body Dressing) don;doff;dependent (less than 25% patient effort)  sling  -MR     Position (Upper Body Dressing) unsupported sitting  -MR       Row Name 05/03/25 1052          Toileting Assessment/Training    Baldwin Level (Toileting) adjust/manage clothing;contact guard assist  -MR     Assistive Devices (Toileting) commode  -MR     Position (Toileting) unsupported sitting;unsupported standing  -               User Key  (r) = Recorded By, (t) = Taken By, (c) = Cosigned By      Initials Name Provider Type    MR  Karen Drew OT Occupational Therapist                   Obj/Interventions       Row Name 05/03/25 1055          Shoulder (Therapeutic Exercise)    Shoulder (Therapeutic Exercise) AAROM (active assistive range of motion)  -MR     Shoulder AAROM (Therapeutic Exercise) right;extension;flexion;10 repetitions;sitting  -MR       Row Name 05/03/25 1055          Elbow/Forearm (Therapeutic Exercise)    Elbow/Forearm (Therapeutic Exercise) AAROM (active assistive range of motion)  -MR     Elbow/Forearm AAROM (Therapeutic Exercise) right;flexion;extension;supination;pronation;sitting  -MR       Row Name 05/03/25 1055          Wrist (Therapeutic Exercise)    Wrist (Therapeutic Exercise) AAROM (active assistive range of motion)  -MR     Wrist AROM (Therapeutic Exercise) right;flexion;extension;10 repetitions  -MR       Row Name 05/03/25 1055          Hand (Therapeutic Exercise)    Hand (Therapeutic Exercise) AROM (active range of motion)  -MR     Hand AROM/AAROM (Therapeutic Exercise) right;AROM (active range of motion);finger flexion;finger extension;10 repetitions  -MR       Row Name 05/03/25 1055          Motor Skills    Therapeutic Exercise shoulder;elbow/forearm;wrist  -MR       Row Name 05/03/25 1055          Balance    Balance Assessment sitting static balance;sitting dynamic balance;standing static balance;standing dynamic balance  -MR     Static Sitting Balance standby assist  -MR     Dynamic Sitting Balance standby assist  -MR     Position, Sitting Balance unsupported;sitting in chair  -MR     Static Standing Balance contact guard  -MR     Dynamic Standing Balance minimal assist  -MR     Position/Device Used, Standing Balance unsupported  -MR     Balance Interventions sitting;standing;sit to stand;supported;static;dynamic;minimal challenge;occupation based/functional task  -               User Key  (r) = Recorded By, (t) = Taken By, (c) = Cosigned By      Initials Name Provider Type    Karen Montoya OT  Occupational Therapist                   Goals/Plan    No documentation.                  Clinical Impression       Row Name 05/03/25 1057          Pain Assessment    Pretreatment Pain Rating 7/10  -MR     Posttreatment Pain Rating 7/10  -MR     Pain Location shoulder  -MR     Pain Side/Orientation right  -MR       Row Name 05/03/25 1057          Plan of Care Review    Plan of Care Reviewed With patient  -MR     Progress no change  -MR     Outcome Evaluation Patient re-educated on sling management, HEP w/in surgeon parameters, and shoulder precautions. Pt demonstrated good effort w/ elbow, wrist and hand ROM, tolerated approx 120 degrees of shoulder AAROM FE. Deficits warrant continuation of skilled OT services. Continue to progress as able per current POC.  -MR       Row Name 05/03/25 1057          Therapy Plan Review/Discharge Plan (OT)    Anticipated Discharge Disposition (OT) inpatient rehabilitation facility  -MR       Row Name 05/03/25 1057          Vital Signs    Pre Systolic BP Rehab 149  -MR     Pre Treatment Diastolic BP 81  -MR     Pre SpO2 (%) 100  -MR     O2 Delivery Pre Treatment room air  -MR     O2 Delivery Intra Treatment room air  -MR     Post SpO2 (%) 100  -MR     O2 Delivery Post Treatment room air  -MR     Pre Patient Position Sitting  -MR     Intra Patient Position Standing  -MR     Post Patient Position Sitting  -MR       Row Name 05/03/25 1057          Positioning and Restraints    Pre-Treatment Position sitting in chair/recliner  -MR     Post Treatment Position chair  -MR     In Chair notified nsg;sitting;call light within reach;with family/caregiver;waffle cushion;encouraged to call for assist;RUE elevated  RUE in sling  -MR               User Key  (r) = Recorded By, (t) = Taken By, (c) = Cosigned By      Initials Name Provider Type    Karen Montoya, OT Occupational Therapist                   Outcome Measures       Row Name 05/03/25 1102          How much help from another is currently  needed...    Putting on and taking off regular lower body clothing? 1  -MR     Bathing (including washing, rinsing, and drying) 2  -MR     Toileting (which includes using toilet bed pan or urinal) 2  -MR     Putting on and taking off regular upper body clothing 2  -MR     Taking care of personal grooming (such as brushing teeth) 3  -MR     Eating meals 3  -MR     AM-PAC 6 Clicks Score (OT) 13  -MR       Row Name 05/03/25 1043 05/03/25 0934       How much help from another person do you currently need...    Turning from your back to your side while in flat bed without using bedrails? 3  -LO 3  -AP    Moving from lying on back to sitting on the side of a flat bed without bedrails? 3  -LO 3  -AP    Moving to and from a bed to a chair (including a wheelchair)? 3  -LO 3  -AP    Standing up from a chair using your arms (e.g., wheelchair, bedside chair)? 3  -LO 3  -AP    Climbing 3-5 steps with a railing? 2  -LO 2  -AP    To walk in hospital room? 3  -LO 3  -AP    AM-PAC 6 Clicks Score (PT) 17  -LO 17  -AP    Highest Level of Mobility Goal 5 --> Static standing  -LO 5 --> Static standing  -AP      Row Name 05/03/25 1102 05/03/25 1043       Functional Assessment    Outcome Measure Options AM-PAC 6 Clicks Daily Activity (OT)  -MR AM-PAC 6 Clicks Basic Mobility (PT)  -LO              User Key  (r) = Recorded By, (t) = Taken By, (c) = Cosigned By      Initials Name Provider Type    Nazanin Salinas RN Registered Nurse    Kirti Mike, PT Physical Therapist    Karen Montoya, OT Occupational Therapist                    Occupational Therapy Education       Title: PT OT SLP Therapies (In Progress)       Topic: Occupational Therapy (In Progress)       Point: ADL training (In Progress)       Learning Progress Summary            Patient Acceptance, E, NR by MR at 5/3/2025 1102    Acceptance, E, NR by MR at 5/2/2025 0847    Acceptance, E, NR by LC at 5/1/2025 1131    Acceptance, E,D, VU,NR by TB at 4/30/2025 1003                       Point: Home exercise program (In Progress)       Learning Progress Summary            Patient Acceptance, E, NR by MR at 5/3/2025 1102    Acceptance, E, NR by MR at 5/2/2025 0847    Acceptance, E,D, VU,NR by TB at 4/30/2025 1003                      Point: Precautions (In Progress)       Learning Progress Summary            Patient Acceptance, E, NR by MR at 5/3/2025 1102    Acceptance, E, NR by MR at 5/2/2025 0847    Acceptance, E, NR by  at 5/1/2025 1131    Acceptance, E,D, VU,NR by TB at 4/30/2025 1003                      Point: Body mechanics (In Progress)       Learning Progress Summary            Patient Acceptance, E, NR by MR at 5/3/2025 1102    Acceptance, E, NR by MR at 5/2/2025 0847    Acceptance, E, NR by  at 5/1/2025 1131                                      User Key       Initials Effective Dates Name Provider Type Discipline     07/11/23 -  Kaylyn Sorensen, OT Occupational Therapist OT     06/16/21 -  Kika Sin, OT Occupational Therapist OT     09/22/22 -  Karen Drew, OT Occupational Therapist OT                  OT Recommendation and Plan     Plan of Care Review  Plan of Care Reviewed With: patient  Progress: no change  Outcome Evaluation: Patient re-educated on sling management, HEP w/in surgeon parameters, and shoulder precautions. Pt demonstrated good effort w/ elbow, wrist and hand ROM, tolerated approx 120 degrees of shoulder AAROM FE. Deficits warrant continuation of skilled OT services. Continue to progress as able per current POC.     Time Calculation:         Time Calculation- OT       Row Name 05/03/25 1103 05/03/25 1006          Time Calculation- OT    OT Start Time 0905 -MR --     OT Received On 05/03/25  -MR --        Timed Charges    11240 - OT Therapeutic Exercise Minutes 9 -MR --     58499 - Gait Training Minutes  -- 12  -LO     39737 - OT Therapeutic Activity Minutes 5 -MR --     40176 - OT Self Care/Mgmt Minutes 10  -MR --        Total  Minutes    Timed Charges Total Minutes 24  -MR 12  -LO      Total Minutes 24  -MR 12  -LO               User Key  (r) = Recorded By, (t) = Taken By, (c) = Cosigned By      Initials Name Provider Type    Kirti Mike, PT Physical Therapist    MR Karen Drew, OT Occupational Therapist                  Therapy Charges for Today       Code Description Service Date Service Provider Modifiers Qty    52914553889 HC OT THER PROC EA 15 MIN 5/2/2025 Karen Drew OT GO 1    46857714223 HC OT SELF CARE/MGMT/TRAIN EA 15 MIN 5/2/2025 Karen Drew OT GO 1    62053283284 HC OT THER PROC EA 15 MIN 5/3/2025 Karen Drew OT GO 1    66133032380 HC OT SELF CARE/MGMT/TRAIN EA 15 MIN 5/3/2025 Karen Drew OT GO 1                 Karen Drew OT  5/3/2025

## 2025-05-03 NOTE — NURSING NOTE
Patient A&O X4, vss. Blood Glucose 46 @1959 patient was given orange juice and peanut butter crackers. BG checked again @2018 and it was 50, patient encouraged to eat food brought in by wife and drink more juice. BG rechecked at 2110 and it was 76 and again at 2238 and it was 130. Patient reported moderate to severe ain through the night that was managed with PO meds. Patient remained up in chair through the night.

## 2025-05-03 NOTE — PROGRESS NOTES
"Orthopedic Daily Progress Note      CC: Right shoulder pain    Working with occupational therapy this morning.  No new complaints.  Pain controlled currently.    Physical Exam:  I have reviewed the vital signs.  Temp:  [97.6 °F (36.4 °C)-98.5 °F (36.9 °C)] 98.5 °F (36.9 °C)  Heart Rate:  [56-68] 62  Resp:  [18] 18  BP: (124-149)/(65-81) 145/70    Objective:  Vital signs: (most recent): Blood pressure 145/70, pulse 62, temperature 98.5 °F (36.9 °C), temperature source Axillary, resp. rate 18, height 185.4 cm (73\"), weight (!) 150 kg (331 lb 2.1 oz), SpO2 94%.              General Appearance:    Alert, cooperative, no distress  Extremities: No clubbing, cyanosis, or edema to lower extremities  Pulses:  2+ in distal surgical extremity  Skin: Incision Clean/dry/intact      Results Review:    I have reviewed the labs, radiology results and diagnostic studies:no new labs this AM    Results from last 7 days   Lab Units 04/30/25 2026   HEMOGLOBIN g/dL 10.3*     Results from last 7 days   Lab Units 04/30/25 2026   SODIUM mmol/L 136   POTASSIUM mmol/L 3.8   CO2 mmol/L 27.0   CREATININE mg/dL 1.20   GLUCOSE mg/dL 103*       I have reviewed the medications.    Assessment/Problem List  POD# 4 Days Post-Op   S/p R RTSA with biceps tenodesis    Plan  NWB RUE with AAFE to 150. No ER past zero.AROM at elbow and wrist as tolerated  PT/OT      Discharge Planning: I expect patient to be discharged to SNF in TBD days.    Mk Gilmore MD  Covering for Dr. Mckeon  05/03/25  10:40 EDT            "

## 2025-05-03 NOTE — PLAN OF CARE
Goal Outcome Evaluation:  Plan of Care Reviewed With: patient, spouse        Progress: no change  Outcome Evaluation: Patient able to ambulate 65' without AD CGA but cont to demonstrate unsteadiness throughout without overt loss of balance. Cont to require frequent cuing to protect RUE and avoid obstacles such as door frame with RUE. Patient will cont to benefit from skilled IP PT services to address impairments for return to PLOF. Cont IP PT POC.    Anticipated Discharge Disposition (PT): inpatient rehabilitation facility

## 2025-05-04 LAB
GLUCOSE BLDC GLUCOMTR-MCNC: 160 MG/DL (ref 70–130)
GLUCOSE BLDC GLUCOMTR-MCNC: 161 MG/DL (ref 70–130)
GLUCOSE BLDC GLUCOMTR-MCNC: 203 MG/DL (ref 70–130)
GLUCOSE BLDC GLUCOMTR-MCNC: 67 MG/DL (ref 70–130)

## 2025-05-04 PROCEDURE — 63710000001 INSULIN LISPRO (HUMAN) PER 5 UNITS: Performed by: INTERNAL MEDICINE

## 2025-05-04 PROCEDURE — 97110 THERAPEUTIC EXERCISES: CPT

## 2025-05-04 PROCEDURE — 82948 REAGENT STRIP/BLOOD GLUCOSE: CPT

## 2025-05-04 PROCEDURE — 97116 GAIT TRAINING THERAPY: CPT

## 2025-05-04 PROCEDURE — 97530 THERAPEUTIC ACTIVITIES: CPT

## 2025-05-04 PROCEDURE — 63710000001 INSULIN GLARGINE PER 5 UNITS: Performed by: INTERNAL MEDICINE

## 2025-05-04 RX ADMIN — SENNOSIDES AND DOCUSATE SODIUM 2 TABLET: 50; 8.6 TABLET ORAL at 20:24

## 2025-05-04 RX ADMIN — TAMSULOSIN HYDROCHLORIDE 0.4 MG: 0.4 CAPSULE ORAL at 08:09

## 2025-05-04 RX ADMIN — APIXABAN 5 MG: 5 TABLET, FILM COATED ORAL at 08:09

## 2025-05-04 RX ADMIN — ATORVASTATIN CALCIUM 40 MG: 40 TABLET, FILM COATED ORAL at 08:12

## 2025-05-04 RX ADMIN — ACETAMINOPHEN 1000 MG: 500 TABLET ORAL at 06:32

## 2025-05-04 RX ADMIN — NEBIVOLOL 10 MG: 5 TABLET ORAL at 08:09

## 2025-05-04 RX ADMIN — ACETAMINOPHEN 1000 MG: 500 TABLET ORAL at 21:48

## 2025-05-04 RX ADMIN — INSULIN LISPRO 30 UNITS: 100 INJECTION, SOLUTION INTRAVENOUS; SUBCUTANEOUS at 12:50

## 2025-05-04 RX ADMIN — INSULIN LISPRO 4 UNITS: 100 INJECTION, SOLUTION INTRAVENOUS; SUBCUTANEOUS at 17:13

## 2025-05-04 RX ADMIN — INSULIN LISPRO 30 UNITS: 100 INJECTION, SOLUTION INTRAVENOUS; SUBCUTANEOUS at 17:13

## 2025-05-04 RX ADMIN — INSULIN LISPRO 8 UNITS: 100 INJECTION, SOLUTION INTRAVENOUS; SUBCUTANEOUS at 12:50

## 2025-05-04 RX ADMIN — DOCUSATE SODIUM 100 MG: 100 CAPSULE, LIQUID FILLED ORAL at 08:09

## 2025-05-04 RX ADMIN — OXYCODONE HYDROCHLORIDE 10 MG: 10 TABLET ORAL at 06:32

## 2025-05-04 RX ADMIN — DOCUSATE SODIUM 100 MG: 100 CAPSULE, LIQUID FILLED ORAL at 20:24

## 2025-05-04 RX ADMIN — GABAPENTIN 800 MG: 400 CAPSULE ORAL at 08:09

## 2025-05-04 RX ADMIN — OXYCODONE HYDROCHLORIDE 10 MG: 10 TABLET ORAL at 21:48

## 2025-05-04 RX ADMIN — INSULIN GLARGINE 35 UNITS: 100 INJECTION, SOLUTION SUBCUTANEOUS at 08:10

## 2025-05-04 RX ADMIN — OXYCODONE HYDROCHLORIDE 10 MG: 10 TABLET ORAL at 14:32

## 2025-05-04 RX ADMIN — APIXABAN 5 MG: 5 TABLET, FILM COATED ORAL at 20:24

## 2025-05-04 RX ADMIN — HYDROCHLOROTHIAZIDE 12.5 MG: 25 TABLET ORAL at 08:09

## 2025-05-04 RX ADMIN — DULOXETINE 60 MG: 60 CAPSULE, DELAYED RELEASE ORAL at 08:09

## 2025-05-04 RX ADMIN — SENNOSIDES AND DOCUSATE SODIUM 2 TABLET: 50; 8.6 TABLET ORAL at 08:09

## 2025-05-04 RX ADMIN — BUMETANIDE 2 MG: 1 TABLET ORAL at 08:09

## 2025-05-04 RX ADMIN — LISINOPRIL 40 MG: 40 TABLET ORAL at 20:24

## 2025-05-04 RX ADMIN — PANTOPRAZOLE SODIUM 40 MG: 40 TABLET, DELAYED RELEASE ORAL at 08:09

## 2025-05-04 RX ADMIN — NALOXEGOL OXALATE 25 MG: 25 TABLET, FILM COATED ORAL at 06:32

## 2025-05-04 RX ADMIN — INSULIN GLARGINE 35 UNITS: 100 INJECTION, SOLUTION SUBCUTANEOUS at 21:54

## 2025-05-04 RX ADMIN — INSULIN LISPRO 4 UNITS: 100 INJECTION, SOLUTION INTRAVENOUS; SUBCUTANEOUS at 08:11

## 2025-05-04 RX ADMIN — INSULIN LISPRO 30 UNITS: 100 INJECTION, SOLUTION INTRAVENOUS; SUBCUTANEOUS at 08:09

## 2025-05-04 RX ADMIN — ACETAMINOPHEN 1000 MG: 500 TABLET ORAL at 14:30

## 2025-05-04 RX ADMIN — LISINOPRIL 40 MG: 40 TABLET ORAL at 08:09

## 2025-05-04 NOTE — PROGRESS NOTES
"IM progress note      Wilbert Kelley  [id number]  1960     LOS: 0 days     Attending: Enzo Mckeon Jr., MD    Primary Care Provider: Jose M Simon MD      Chief Complaint/Reason for visit:  Right shoulder pain    Subjective   Feels okay.  No new complaints.  Occasional right shoulder pain.  No nausea or vomiting.  No shortness of breath.  Voiding without difficulty.  Objective      Visit Vitals  /69   Pulse 60   Temp 97.4 °F (36.3 °C) (Oral)   Resp 18   Ht 185.4 cm (73\")   Wt (!) 150 kg (331 lb 2.1 oz)   SpO2 96%   BMI 43.69 kg/m²     Temp (24hrs), Av.7 °F (36.5 °C), Min:97.4 °F (36.3 °C), Max:98 °F (36.7 °C)       Occupational Therapy  Karen Drew, LISA   Occupational Therapist  Specialty: Occupational Therapy     Plan of Care     Signed     Date of Service: 25 1042  Creation Time: 25 1138     Signed         Goal Outcome Evaluation:  Plan of Care Reviewed With: patient  Progress: improving  Outcome Evaluation: Pt continues to require assist for sling management, ther ex, safety w/ mobility and transfers. Good effort noted w/ ther ex within surgeon parameters, pt tolerated approx 120 degrees of shoulder flexion this date. MaxA for sling management. Continue to progress as able per current POC.     Anticipated Discharge Disposition (OT): inpatient rehabilitation facility                PT:  Kirti Garcia PT   Physical Therapist  Physical Therapy     Plan of Care     Signed     Date of Service: 25 1006  Creation Time: 25 1044     Signed         Goal Outcome Evaluation:  Plan of Care Reviewed With: patient, spouse  Progress: no change  Outcome Evaluation: Patient able to ambulate 65' without AD CGA but cont to demonstrate unsteadiness throughout without overt loss of balance. Cont to require frequent cuing to protect RUE and avoid obstacles such as door frame with RUE. Patient will cont to benefit from skilled IP PT services to address impairments for return to PLOF. " Cont IP PT POC.     Anticipated Discharge Disposition (PT): inpatient rehabilitation facility                        Physical Exam:     General Appearance:    Alert, cooperative, in no acute distress   Head:    Normocephalic, without obvious abnormality, atraumatic    Lungs:     Normal effort, symmetric chest rise, no crepitus, clear to      auscultation bilaterally             Heart:    Regular rhythm and normal rate, normal S1 and S2   Abdomen:     Normal bowel sounds, no masses, no organomegaly, soft        non-tender, non-distended, no guarding, no rebound    tenderness   Extremities:   Right UE in a sling, CDI  dressing . Interscalene nerve block cath is out.   Distal pulses, cap refill, movements of fingers, wrist, intact.   Pulses:   Pulses palpable and equal bilaterally   Skin:   No bleeding, bruising or rash   Neurologic:   Cranial nerves 2 - 12 grossly intact     Results Review:     I reviewed the patient's new clinical results.   Results from last 7 days   Lab Units 04/30/25 2026   HEMOGLOBIN g/dL 10.3*   HEMATOCRIT % 35.0*     Results from last 7 days   Lab Units 04/30/25 2026 04/29/25  0918   SODIUM mmol/L 136  --    POTASSIUM mmol/L 3.8 4.3   CHLORIDE mmol/L 98  --    CO2 mmol/L 27.0  --    BUN mg/dL 29*  --    CREATININE mg/dL 1.20  --    CALCIUM mg/dL 9.0  --    GLUCOSE mg/dL 103* 221*             Latest Reference Range & Units 05/03/25 16:18 05/03/25 20:09 05/04/25 07:29 05/04/25 12:03   Glucose 70 - 130 mg/dL 195 (H) 231 (H) 161 (H) 203 (H)   (H): Data is abnormally high    I reviewed the patient's new imaging including images and reports.    All medications reviewed.   acetaminophen, 1,000 mg, Oral, Q8H  apixaban, 5 mg, Oral, Q12H  atorvastatin, 40 mg, Oral, Daily  bumetanide, 2 mg, Oral, Daily  docusate sodium, 100 mg, Oral, BID  DULoxetine, 60 mg, Oral, Daily  gabapentin, 800 mg, Oral, Daily  hydroCHLOROthiazide, 12.5 mg, Oral, Daily  insulin glargine, 35 Units, Subcutaneous, Q12H  Insulin  Lispro, 30 Units, Subcutaneous, TID With Meals  insulin lispro, 4-24 Units, Subcutaneous, 4x Daily AC & at Bedtime  lisinopril, 40 mg, Oral, BID  Naloxegol Oxalate, 25 mg, Oral, QAM  nebivolol, 10 mg, Oral, Daily  pantoprazole, 40 mg, Oral, Daily  senna-docusate sodium, 2 tablet, Oral, BID  tamsulosin, 0.4 mg, Oral, Daily        Assessment & Plan     S/P reverse total shoulder arthroplasty, right    Type 2 diabetes mellitus with hyperglycemia, with long-term current use of insulin    Sleep apnea    S/P CABG x 3 on 3/21/2023    Hypertension    Hyperlipidemia    Paroxysmal atrial fibrillation    Obesity, Class III, BMI 40-49.9 (morbid obesity)      Plan  1. PT/OT- RUE sling, NWB  2. Pain control-prns. S/p nerve block. Following with Oxycodone.   3. IS-encouraged  4. [facility]  5. Bowel regimen, adjusting.   6. Monitor post-op labs  7. DC planning for rehab. CM following.        - Hypertension:  Resume home medications as appropriate, formulary substitution when indicated.  Holding parameters.  PRN medications for elevated blood pressure.     -DM type 2, insulin resistance.  Hgb A1C   Hold OHA as appropriate  FSBG before meals/bedtime, ( q 6 when NPO), along with correction Humalog.  Long acting and prandial insulin  Doses adjusted/lowered on 5/3.       -MARIAH:  Continue O2 supplement  Monitor O2 sats.     -PAF:  Resumed BB. DOAC  resumed      Reece Moreira MD  05/04/25  12:21 EDT

## 2025-05-04 NOTE — PLAN OF CARE
Goal Outcome Evaluation:  Plan of Care Reviewed With: patient        Progress: improving  Outcome Evaluation: Pt continues to require assist for sling management, ther ex, safety w/ mobility and transfers. Good effort noted w/ ther ex within surgeon parameters, pt tolerated approx 120 degrees of shoulder flexion this date. MaxA for sling management. Continue to progress as able per current POC.    Anticipated Discharge Disposition (OT): inpatient rehabilitation facility

## 2025-05-04 NOTE — PLAN OF CARE
Goal Outcome Evaluation:  Plan of Care Reviewed With: patient        Progress: improving  Outcome Evaluation: INCREASED DISTANCE AMBULATED  FEET WITH CGA FOR SAFETY. PT HAS WIDE REAGAN WITH SHORTENED STEP LENGTH BUT WITHOUT OVERT LOB DURING GAIT IN [place]. NEEDS CUES TO AVOID BUMPING INTO DOORWAY WITH R UE IN SLING. C/O NOT SLEEPING WELL FOR YEARS AND NOTED TO HAVE DECREASED O2 SATS AT 80% UPON ARRIVAL WHEN SLEEPING IN RECLINER. SATS QUICKLY REBOUNDED TO 98% ON RA ONCE AWAKE. NSG NOTIFIED.    Anticipated Discharge Disposition (PT): inpatient rehabilitation facility

## 2025-05-04 NOTE — PLAN OF CARE
Goal Outcome Evaluation:  Plan of Care Reviewed With: patient        Progress: no change       Problem: Adult Inpatient Plan of Care  Goal: Absence of Hospital-Acquired Illness or Injury  Intervention: Identify and Manage Fall Risk  Recent Flowsheet Documentation  Taken 5/4/2025 1830 by Nazanin Yanez RN  Safety Promotion/Fall Prevention:   activity supervised   safety round/check completed  Taken 5/4/2025 1622 by Nazanin Yanez RN  Safety Promotion/Fall Prevention:   activity supervised   safety round/check completed  Taken 5/4/2025 1432 by Nazanin Yanez RN  Safety Promotion/Fall Prevention:   activity supervised   safety round/check completed  Taken 5/4/2025 1250 by Nazanin Yanez RN  Safety Promotion/Fall Prevention:   activity supervised   safety round/check completed  Taken 5/4/2025 1039 by Nazanin Yanez RN  Safety Promotion/Fall Prevention:   activity supervised   safety round/check completed  Taken 5/4/2025 0817 by Nazanin Yanez RN  Safety Promotion/Fall Prevention:   activity supervised   safety round/check completed     Problem: Adult Inpatient Plan of Care  Goal: Absence of Hospital-Acquired Illness or Injury  Intervention: Prevent Skin Injury  Recent Flowsheet Documentation  Taken 5/4/2025 1830 by Nazanin Yanez RN  Body Position: legs elevated  Skin Protection:   incontinence pads utilized   protective footwear used  Taken 5/4/2025 1622 by Nazanin Yanez RN  Body Position:   heels elevated   legs elevated  Skin Protection: protective footwear used  Taken 5/4/2025 1432 by Nazanin Yanez RN  Body Position: position changed independently  Skin Protection:   incontinence pads utilized   protective footwear used  Taken 5/4/2025 1250 by Nazanin Yanez RN  Body Position: legs elevated  Skin Protection: protective footwear used  Taken 5/4/2025 1039 by Nazanin Yanez RN  Body Position: position changed independently  Skin Protection: incontinence pads utilized  Taken 5/4/2025  0817 by Nazanin Yanez, RN  Body Position: position changed independently  Skin Protection: incontinence pads utilized     Problem: Adult Inpatient Plan of Care  Goal: Absence of Hospital-Acquired Illness or Injury  Intervention: Prevent and Manage VTE (Venous Thromboembolism) Risk  Recent Flowsheet Documentation  Taken 5/4/2025 1830 by Nazanin Yanez, RN  VTE Prevention/Management: (eliquis) other (see comments)  Taken 5/4/2025 1622 by Nazanin Yanez RN  VTE Prevention/Management: (eliquis) other (see comments)  Taken 5/4/2025 1432 by Nazanin Yanez, RN  VTE Prevention/Management: (eliquis) other (see comments)  Taken 5/4/2025 1250 by Nazanin Yanez RN  VTE Prevention/Management: (eliquis, walking to bathroom frequently) other (see comments)  Taken 5/4/2025 0817 by Nazanin Yanez, RN  VTE Prevention/Management: (eliquis) other (see comments)

## 2025-05-04 NOTE — NURSING NOTE
Patient A&O X4, vss, room air. Pain managed with PO meds. Patient slept well through the night. Ambulating in room and to bathroom with stand by assist.

## 2025-05-04 NOTE — THERAPY TREATMENT NOTE
"Patient Name: Wilbert Kelley  : 1960    MRN: [id number]                              Today's Date: 2025       Admit Date: 2025    Visit Dx: No diagnosis found.  Patient Active Problem List   Diagnosis    Shortness of breath    Type 2 diabetes mellitus with hyperglycemia, with long-term current use of insulin    Sleep apnea    Coronary artery disease of native artery of native heart with stable angina pectoris    S/P CABG x 3 on 3/21/2023    Hypertension    Hyperlipidemia    EMANUEL (acute kidney injury)    Paroxysmal atrial fibrillation    Bilateral pneumonia    Acute on chronic heart failure with preserved ejection fraction (HFpEF)    S/P reverse total shoulder arthroplasty, right    Obesity, Class III, BMI 40-49.9 (morbid obesity)     Past Medical History:   Diagnosis Date    Arthritis     BPH (benign prostatic hyperplasia)     Cancer     basal and melanoma-  x2 - left side ear and elbow    Constipation     Coronary artery disease     DDD (degenerative disc disease), lumbar     Diabetes mellitus     couple times month check sugar    Frozen shoulder     left    GERD (gastroesophageal reflux disease)     History of pneumonia 2025    Hyperlipidemia     Hypertension     Limited mobility     left shoulder  - possible frozen shoulder-= please be aware for surgery    Oxygen dependent     \"2 liters at night since pneumonia\" per wife    Rotator cuff injury     right shoulder 3/14/2025    Sleep apnea     insurance took cpap machine due to pt not using 6 hours per night.    Tinnitus     Wears glasses     small print     Past Surgical History:   Procedure Laterality Date    CARDIAC CATHETERIZATION      CARDIAC CATHETERIZATION Right 2023    Procedure: Left Heart Cath;  Surgeon: Jarod Boyle MD;  [facility]:  [facility];  Service: Cardiovascular;  Laterality: Right;    CATARACT EXTRACTION, BILATERAL Bilateral     COLONOSCOPY      CORONARY ARTERY BYPASS GRAFT N/A 3/21/2023    Procedure: " MEDIAN STERNOTOMY, CORONARY ARTERY BYPASS GRAFTING X 3, UTILIZING THE LEFT INTERNAL MAMMARY ARTERY, ENDOSCOPIC VEIN HARVEST OF THE RIGHT GREATER SAPENOUSE VEIN;  Surgeon: Markus Emanuel MD;  Location: [facility] OR;  Service: Cardiothoracic;  Laterality: N/A;    CORONARY STENT PLACEMENT      x4    ENDOSCOPY      FINGER SURGERY Left     middle finger - left side    SKIN CANCER EXCISION      x2    TOTAL SHOULDER ARTHROPLASTY W/ DISTAL CLAVICLE EXCISION Right 4/29/2025    Procedure: TOTAL SHOULDER REVERSE ARTHROPLASTY WITH BICEPS TENODESIS RIGHT;  Surgeon: Enzo Mckeon Jr., MD;  Location: [facility] OR;  Service: Orthopedics;  Laterality: Right;    WISDOM TOOTH EXTRACTION        General Information       Row Name 05/04/25 1133          OT Time and Intention    Document Type therapy note (daily note)  -MR     Mode of Treatment occupational therapy  -MR     Patient Effort good  -MR       Row Name 05/04/25 1133          General Information    Patient Profile Reviewed yes  -MR     Existing Precautions/Restrictions fall;right;shoulder;non-weight bearing;brace on at all times;other (see comments)  s/p R rTSA with NWB precautions, simple sling  -MR     Barriers to Rehab medically complex;previous functional deficit;physical barrier  -MR       Row Name 05/04/25 1133          Cognition    Orientation Status (Cognition) oriented x 3  -MR       Row Name 05/04/25 1133          Safety Issues/Impairments Affecting Functional Mobility    Safety Issues Affecting Function (Mobility) insight into deficits/self-awareness;awareness of need for assistance;impulsivity;judgment;safety precaution awareness  -MR     Impairments Affecting Function (Mobility) balance;endurance/activity tolerance;pain;strength;sensation/sensory awareness;range of motion (ROM);postural/trunk control  -MR               User Key  (r) = Recorded By, (t) = Taken By, (c) = Cosigned By      Initials Name Provider Type    Karen Montoya, LISA Occupational Therapist                      Mobility/ADL's       Row Name 05/04/25 1133          Bed Mobility    Comment, (Bed Mobility) UIC and left UIC at end of session  -MR       Row Name 05/04/25 1133          Transfers    Transfers sit-stand transfer;toilet transfer  -MR       Row Name 05/04/25 1133          Sit-Stand Transfer    Sit-Stand Fritch (Transfers) verbal cues;1 person assist;contact guard  -MR       Row Name 05/04/25 1133          Toilet Transfer    Type (Toilet Transfer) sit-stand;stand-sit  -MR     Fritch Level (Toilet Transfer) contact guard  -MR       Row Name 05/04/25 1133          Functional Mobility    Functional Mobility- Ind. Level contact guard assist  -MR     Functional Mobility-Distance (Feet) --  HH distances to and from restroom  -       Row Name 05/04/25 1133          Activities of Daily Living    BADL Assessment/Intervention upper body dressing;toileting;bathing  -       Row Name 05/04/25 1133          Mobility    Extremity Weight-bearing Status right upper extremity  -MR     Right Upper Extremity (Weight-bearing Status) non weight-bearing (NWB)  -       Row Name 05/04/25 1133          Bathing Assessment/Intervention    Fritch Level (Bathing) maximum assist (25% patient effort);upper body;bathing skills  -MR     Position (Bathing) unsupported sitting  -MR       Row Name 05/04/25 1133          Upper Body Dressing Assessment/Training    Fritch Level (Upper Body Dressing) don;doff;dependent (less than 25% patient effort)  sling  -MR     Position (Upper Body Dressing) unsupported sitting  -       Row Name 05/04/25 1133          Toileting Assessment/Training    Fritch Level (Toileting) adjust/manage clothing;contact guard assist  -MR     Assistive Devices (Toileting) commode  -MR     Position (Toileting) unsupported sitting;unsupported standing  -               User Key  (r) = Recorded By, (t) = Taken By, (c) = Cosigned By      Initials Name Provider Type    MR Drew  LISA Jones Occupational Therapist                   Obj/Interventions       Row Name 05/04/25 1134          Shoulder (Therapeutic Exercise)    Shoulder (Therapeutic Exercise) AAROM (active assistive range of motion)  -MR     Shoulder AAROM (Therapeutic Exercise) right;flexion;extension;sitting;10 repetitions  -MR       Row Name 05/04/25 1134          Elbow/Forearm (Therapeutic Exercise)    Elbow/Forearm (Therapeutic Exercise) AAROM (active assistive range of motion)  -MR     Elbow/Forearm AAROM (Therapeutic Exercise) right;flexion;extension;supination;pronation;sitting;10 repetitions  -MR       Row Name 05/04/25 1134          Wrist (Therapeutic Exercise)    Wrist (Therapeutic Exercise) AAROM (active assistive range of motion)  -MR     Wrist AROM (Therapeutic Exercise) right;flexion;extension;10 repetitions  -MR       Row Name 05/04/25 1134          Hand (Therapeutic Exercise)    Hand (Therapeutic Exercise) AROM (active range of motion)  -MR     Hand AROM/AAROM (Therapeutic Exercise) right;AROM (active range of motion);finger extension;finger flexion;10 repetitions  -MR       Row Name 05/04/25 1134          Motor Skills    Therapeutic Exercise shoulder;elbow/forearm;wrist;hand  -MR       Row Name 05/04/25 1134          Balance    Balance Assessment sitting static balance;sitting dynamic balance;standing static balance;standing dynamic balance  -MR     Static Sitting Balance standby assist  -MR     Dynamic Sitting Balance standby assist  -MR     Position, Sitting Balance unsupported;sitting in chair  -MR     Static Standing Balance contact guard  -MR     Dynamic Standing Balance contact guard  -MR     Position/Device Used, Standing Balance unsupported  -MR     Balance Interventions sitting;standing;sit to stand;supported;static;dynamic;minimal challenge;occupation based/functional task  -               User Key  (r) = Recorded By, (t) = Taken By, (c) = Cosigned By      Initials Name Provider Type    MR Drew,  LISA Jones Occupational Therapist                   Goals/Plan    No documentation.                  Clinical Impression       Row Name 05/04/25 1135          Pain Assessment    Pretreatment Pain Rating 7/10  -MR     Posttreatment Pain Rating 7/10  -MR     Pain Location shoulder  -MR     Pain Side/Orientation right;generalized  -MR       Row Name 05/04/25 1135          Plan of Care Review    Plan of Care Reviewed With patient  -MR     Progress improving  -MR     Outcome Evaluation Pt continues to require assist for sling management, ther ex, safety w/ mobility and transfers. Good effort noted w/ ther ex within surgeon parameters, pt tolerated approx 120 degrees of shoulder flexion this date. MaxA for sling management. Continue to progress as able per current POC.  -MR       Row Name 05/04/25 1135          Therapy Plan Review/Discharge Plan (OT)    Anticipated Discharge Disposition (OT) inpatient rehabilitation facility  -MR       Row Name 05/04/25 1135          Vital Signs    Pre Systolic BP Rehab 134  -MR     Pre Treatment Diastolic BP 69  -MR     Post Systolic BP Rehab 107  -MR     Post Treatment Diastolic BP 89  -MR     Pre SpO2 (%) 91  -MR     O2 Delivery Pre Treatment room air  -MR     O2 Delivery Intra Treatment room air  -MR     Post SpO2 (%) 94  -MR     O2 Delivery Post Treatment room air  -MR       Row Name 05/04/25 1135          Positioning and Restraints    Pre-Treatment Position sitting in chair/recliner  -MR     Post Treatment Position chair  -MR     In Chair notified nsg;reclined;sitting;call light within reach;encouraged to call for assist;exit alarm on;waffle cushion;RUE elevated  RUE in simple sling w/ ice applied  -MR               User Key  (r) = Recorded By, (t) = Taken By, (c) = Cosigned By      Initials Name Provider Type    Karen Montoya OT Occupational Therapist                   Outcome Measures       Row Name 05/04/25 1137          How much help from another is currently needed...     Putting on and taking off regular lower body clothing? 1  -MR     Bathing (including washing, rinsing, and drying) 2  -MR     Toileting (which includes using toilet bed pan or urinal) 2  -MR     Putting on and taking off regular upper body clothing 2  -MR     Taking care of personal grooming (such as brushing teeth) 3  -MR     Eating meals 3  -MR     AM-PAC 6 Clicks Score (OT) 13  -MR       Row Name 05/04/25 0817          How much help from another person do you currently need...    Turning from your back to your side while in flat bed without using bedrails? 4  -AP     Moving from lying on back to sitting on the side of a flat bed without bedrails? 4  -AP     Moving to and from a bed to a chair (including a wheelchair)? 4  -AP     Standing up from a chair using your arms (e.g., wheelchair, bedside chair)? 4  -AP     Climbing 3-5 steps with a railing? 3  -AP     To walk in hospital room? 4  -AP     AM-PAC 6 Clicks Score (PT) 23  -AP     Highest Level of Mobility Goal 7 --> Walk 25 feet or more  -AP       Row Name 05/04/25 1137          Functional Assessment    Outcome Measure Options AM-PAC 6 Clicks Daily Activity (OT)  -MR               User Key  (r) = Recorded By, (t) = Taken By, (c) = Cosigned By      Initials Name Provider Type    Nazanin Salinas, RN Registered Nurse    Karen Montoya, OT Occupational Therapist                    Occupational Therapy Education       Title: PT OT SLP Therapies (In Progress)       Topic: Occupational Therapy (In Progress)       Point: ADL training (In Progress)       Learning Progress Summary            Patient Acceptance, E, NR by MR at 5/4/2025 1137    Acceptance, E, NR by MR at 5/3/2025 1102    Acceptance, E, NR by MR at 5/2/2025 0847    Acceptance, E, NR by  at 5/1/2025 1131                      Point: Home exercise program (In Progress)       Learning Progress Summary            Patient Acceptance, E, NR by MR at 5/4/2025 1137    Acceptance, E, NR by MR at 5/3/2025  1102    Acceptance, E, NR by MR at 5/2/2025 0847                      Point: Precautions (In Progress)       Learning Progress Summary            Patient Acceptance, E, NR by MR at 5/4/2025 1137    Acceptance, E, NR by MR at 5/3/2025 1102    Acceptance, E, NR by MR at 5/2/2025 0847    Acceptance, E, NR by  at 5/1/2025 1131                      Point: Body mechanics (In Progress)       Learning Progress Summary            Patient Acceptance, E, NR by MR at 5/4/2025 1137    Acceptance, E, NR by MR at 5/3/2025 1102    Acceptance, E, NR by MR at 5/2/2025 0847    Acceptance, E, NR by  at 5/1/2025 1131                                      User Key       Initials Effective Dates Name Provider Type Discipline     06/16/21 -  Kika Sin OT Occupational Therapist OT     09/22/22 -  Karen Drew OT Occupational Therapist OT                  OT Recommendation and Plan     Plan of Care Review  Plan of Care Reviewed With: patient  Progress: improving  Outcome Evaluation: Pt continues to require assist for sling management, ther ex, safety w/ mobility and transfers. Good effort noted w/ ther ex within surgeon parameters, pt tolerated approx 120 degrees of shoulder flexion this date. MaxA for sling management. Continue to progress as able per current POC.     Time Calculation:         Time Calculation- OT       Row Name 05/04/25 1138             Time Calculation- OT    OT Start Time 1042  -MR      OT Received On 05/04/25  -MR         Timed Charges    28569 - OT Therapeutic Exercise Minutes 10  -MR      43071 - OT Therapeutic Activity Minutes 1  -MR      38814 - OT Self Care/Mgmt Minutes 5  -MR         Total Minutes    Timed Charges Total Minutes 16  -MR       Total Minutes 16  -MR                User Key  (r) = Recorded By, (t) = Taken By, (c) = Cosigned By      Initials Name Provider Type    MR Karen Drew, LISA Occupational Therapist                  Therapy Charges for Today       Code Description Service Date  Service Provider Modifiers Qty    82322350752 HC OT THER PROC EA 15 MIN 5/3/2025 Karen Drew OT GO 1    08241803455 HC OT SELF CARE/MGMT/TRAIN EA 15 MIN 5/3/2025 Karen Drew OT GO 1    97831264328 HC OT THER PROC EA 15 MIN 5/4/2025 Karen Drew OT GO 1                 Karen Drew OT  5/4/2025

## 2025-05-04 NOTE — PROGRESS NOTES
"Orthopedic Daily Progress Note      CC: Right shoulder pain    Patient resting comfortably in a chair.  Pain controlled.  No new complaints.  Awaiting rehab facility.    Physical Exam:  I have reviewed the vital signs.  Temp:  [97.4 °F (36.3 °C)-98 °F (36.7 °C)] 97.4 °F (36.3 °C)  Heart Rate:  [60-69] 60  Resp:  [18] 18  BP: (112-162)/(63-79) 134/69    Objective:  Vital signs: (most recent): Blood pressure 134/69, pulse 60, temperature 97.4 °F (36.3 °C), temperature source Oral, resp. rate 18, height 185.4 cm (73\"), weight (!) 150 kg (331 lb 2.1 oz), SpO2 96%.              General Appearance:    Alert, cooperative, no distress  Extremities: No clubbing, cyanosis, or edema to lower extremities  Pulses:  2+ in distal surgical extremity  Skin: Incision Clean/dry/intact      Results Review:    I have reviewed the labs, radiology results and diagnostic studies:no new labs this AM    Results from last 7 days   Lab Units 04/30/25 2026   HEMOGLOBIN g/dL 10.3*     Results from last 7 days   Lab Units 04/30/25 2026   SODIUM mmol/L 136   POTASSIUM mmol/L 3.8   CO2 mmol/L 27.0   CREATININE mg/dL 1.20   GLUCOSE mg/dL 103*       I have reviewed the medications.    Assessment/Problem List  POD# 5 Days Post-Op   S/p R RTSA with biceps tenodesis    Plan  NWB RUE with AAFE to 150. No ER past zero.AROM at elbow and wrist as tolerated  PT/OT      Discharge Planning: I expect patient to be discharged to SNF in TBD days.    Mk Gilmore MD  Covering for Dr. Mckeon  05/04/25  11:44 EDT            "

## 2025-05-04 NOTE — THERAPY TREATMENT NOTE
"Patient Name: Wilbert Kelley  : 1960    MRN: [id number]                              Today's Date: 2025       Admit Date: 2025    Visit Dx: No diagnosis found.  Patient Active Problem List   Diagnosis    Shortness of breath    Type 2 diabetes mellitus with hyperglycemia, with long-term current use of insulin    Sleep apnea    Coronary artery disease of native artery of native heart with stable angina pectoris    S/P CABG x 3 on 3/21/2023    Hypertension    Hyperlipidemia    EMANUEL (acute kidney injury)    Paroxysmal atrial fibrillation    Bilateral pneumonia    Acute on chronic heart failure with preserved ejection fraction (HFpEF)    S/P reverse total shoulder arthroplasty, right    Obesity, Class III, BMI 40-49.9 (morbid obesity)     Past Medical History:   Diagnosis Date    Arthritis     BPH (benign prostatic hyperplasia)     Cancer     basal and melanoma-  x2 - left side ear and elbow    Constipation     Coronary artery disease     DDD (degenerative disc disease), lumbar     Diabetes mellitus     couple times month check sugar    Frozen shoulder     left    GERD (gastroesophageal reflux disease)     History of pneumonia 2025    Hyperlipidemia     Hypertension     Limited mobility     left shoulder  - possible frozen shoulder-= please be aware for surgery    Oxygen dependent     \"2 liters at night since pneumonia\" per wife    Rotator cuff injury     right shoulder 3/14/2025    Sleep apnea     insurance took cpap machine due to pt not using 6 hours per night.    Tinnitus     Wears glasses     small print     Past Surgical History:   Procedure Laterality Date    CARDIAC CATHETERIZATION      CARDIAC CATHETERIZATION Right 2023    Procedure: Left Heart Cath;  Surgeon: Jarod Boyle MD;  Location:  [facility];  Service: Cardiovascular;  Laterality: Right;    CATARACT EXTRACTION, BILATERAL Bilateral     COLONOSCOPY      CORONARY ARTERY BYPASS GRAFT N/A 3/21/2023    Procedure: " "MEDIAN STERNOTOMY, CORONARY ARTERY BYPASS GRAFTING X 3, UTILIZING THE LEFT INTERNAL MAMMARY ARTERY, ENDOSCOPIC VEIN HARVEST OF THE RIGHT GREATER SAPENOUSE VEIN;  Surgeon: Markus Emanuel MD;  Location: [facility] OR;  Service: Cardiothoracic;  Laterality: N/A;    CORONARY STENT PLACEMENT      x4    ENDOSCOPY      FINGER SURGERY Left     middle finger - left side    SKIN CANCER EXCISION      x2    TOTAL SHOULDER ARTHROPLASTY W/ DISTAL CLAVICLE EXCISION Right 4/29/2025    Procedure: TOTAL SHOULDER REVERSE ARTHROPLASTY WITH BICEPS TENODESIS RIGHT;  Surgeon: Enzo Mckeon Jr., MD;  Location: [facility] OR;  Service: Orthopedics;  Laterality: Right;    WISDOM TOOTH EXTRACTION        General Information       Row Name 05/04/25 1613          Physical Therapy Time and Intention    Document Type therapy note (daily note)  -CD     Mode of Treatment physical therapy  -CD       Row Name 05/04/25 1613          General Information    Patient Profile Reviewed yes  -CD     Existing Precautions/Restrictions fall;right;shoulder;non-weight bearing;brace on at all times  s/p R rTSA with NWB precautions, simple sling  -CD     Barriers to Rehab medically complex;previous functional deficit;physical barrier  -CD       Row Name 05/04/25 1613          Cognition    Orientation Status (Cognition) oriented x 3  -CD       Row Name 05/04/25 1613          Safety Issues/Impairments Affecting Functional Mobility    Safety Issues Affecting Function (Mobility) insight into deficits/self-awareness;awareness of need for assistance;safety precaution awareness  -CD     Impairments Affecting Function (Mobility) balance;endurance/activity tolerance;pain;strength;range of motion (ROM);postural/trunk control  -CD     Comment, Safety Issues/Impairments (Mobility) PT BIGGEST C/O IS OF NOT SLEEPING. HAS NOT SLEPT WELL \"IN YEARS\". REPORTS FAILING CPAP TRIAL AND HOME IN THE PAST DUE TO ONLY WEARING 3 HOURS AT A TIME. NOTED PT SLEEPING ON RA AND O2 SATS 84% UPON " ARRIVAL. INCREASED QUICKLY TO 98% ONCE AWAKE. NSG NOTIFIED.   -CD               User Key  (r) = Recorded By, (t) = Taken By, (c) = Cosigned By      Initials Name Provider Type    CD Leonarda Ruiz PT Physical Therapist                   Mobility       Row Name 05/04/25 1617          Bed Mobility    Comment, (Bed Mobility) PT SITTING UIC UPON ARRIVAL.  -CD       Row Name 05/04/25 1617          Transfers    Comment, (Transfers) STS FROM RECLINER.  -CD       Row Name 05/04/25 1617          Sit-Stand Transfer    Sit-Stand [place] (Transfers) contact guard;verbal cues  -CD     Assistive Device (Sit-Stand Transfers) --  NO A.D.  -CD       Row Name 05/04/25 1617          Gait/Stairs (Locomotion)    [place] Level (Gait) contact guard;verbal cues  -CD     Assistive Device (Gait) cane, straight  CANE X 80 FEET. NO A.D. X 80 FEET.  -CD     Distance in Feet (Gait) 160  -CD     Deviations/Abnormal Patterns (Gait) bilateral deviations;pamela decreased;gait speed decreased;base of support, wide;stride length decreased  -CD     Bilateral Gait Deviations forward flexed posture;heel strike decreased  -CD     Comment, (Gait/Stairs) PT HAD DIFFICULTY SEQUENCING GAIT WITH CANE DESPITE CUES. ENDED UP CARRYING MOST OF TIME. PT IS UNSTEADY  BUT WITH NO OVERT LOB WITHOUT A.D. HAS WIDE REAGAN WITH EXCESSIVE B HIP ER. NEEDS CUES TO ACCOMODATE R UE IN SLING WHEN GOING THROUGH DOORWAY.  -CD       Row Name 05/04/25 1617          Mobility    Extremity Weight-bearing Status right upper extremity  -CD     Right Upper Extremity (Weight-bearing Status) non weight-bearing (NWB)  -CD               User Key  (r) = Recorded By, (t) = Taken By, (c) = Cosigned By      Initials Name Provider Type    Leonarda Nelson PT Physical Therapist                   Obj/Interventions       Row Name 05/04/25 1621          Balance    Static Sitting Balance independent  -CD     Dynamic Sitting Balance standby assist  -CD     Position, Sitting Balance  unsupported;sitting in chair  -CD     Static Standing Balance contact guard  -CD     Dynamic Standing Balance contact guard  -CD     Position/Device Used, Standing Balance unsupported  -CD     Comment, Balance NO OVERT LOB WITH GAIT IN BALLARD BUT UNSTEADY WITH WIDE REAGAN AND SHORTENED STEP LENGTH.  -CD               User Key  (r) = Recorded By, (t) = Taken By, (c) = Cosigned By      Initials Name Provider Type    CD Leonarda Ruiz PT Physical Therapist                   Goals/Plan    No documentation.                  Clinical Impression       Row Name 05/04/25 1622          Pain    Pretreatment Pain Rating 7/10  -CD     Posttreatment Pain Rating 7/10  -CD     Pain Location shoulder  -CD     Pain Side/Orientation bilateral;right  -CD     Pain Management Interventions positioning techniques utilized;activity modification encouraged  -CD     Response to Pain Interventions activity participation with tolerable pain  -CD       Row Name 05/04/25 1622          Plan of Care Review    Plan of Care Reviewed With patient  -CD     Progress improving  -CD     Outcome Evaluation INCREASED DISTANCE AMBULATED  FEET WITH CGA FOR SAFETY. PT HAS WIDE REAGAN WITH SHORTENED STEP LENGTH BUT WITHOUT OVERT LOB DURING GAIT IN BALLARD. NEEDS CUES TO AVOID BUMPING INTO DOORWAY WITH R UE IN SLING. C/O NOT SLEEPING WELL FOR YEARS AND NOTED TO HAVE DECREASED O2 SATS AT 80% UPON ARRIVAL WHEN SLEEPING IN RECLINER. SATS QUICKLY REBOUNDED TO 98% ON RA ONCE AWAKE. NSG NOTIFIED.  -CD       Row Name 05/04/25 1622          Therapy Assessment/Plan (PT)    Patient/Family Therapy Goals Statement (PT) TO RETURN TO PLOF, SLEEP BETTER.  -CD     Rehab Potential (PT) good  -CD     Criteria for Skilled Interventions Met (PT) yes;meets criteria;skilled treatment is necessary  -CD     Therapy Frequency (PT) daily  -CD       Row Name 05/04/25 1622          Vital Signs    Pre Systolic BP Rehab 138  -CD     Pre Treatment Diastolic BP 70  -CD     Post Systolic BP Rehab  142  -CD     Post Treatment Diastolic BP 62  -CD     Posttreatment Heart Rate (beats/min) 90  -CD     Pre SpO2 (%) 80  SLEEPING IN RECLINER.  -CD     O2 Delivery Pre Treatment room air  -CD     Intra SpO2 (%) 98  -CD     O2 Delivery Intra Treatment room air  -CD     Post SpO2 (%) 100  -CD     O2 Delivery Post Treatment room air  -CD     Pre Patient Position Sitting  -CD     Intra Patient Position Standing  -CD     Post Patient Position Sitting  -CD       Row Name 05/04/25 1622          Positioning and Restraints    Pre-Treatment Position sitting in chair/recliner  -CD     Post Treatment Position chair  -CD     In Chair reclined;call light within reach;encouraged to call for assist;exit alarm on;legs elevated;notified nsg;RUE elevated  R UE IN SLING AND SUPPORTED ON PILLOW. STRAP LOOSENED AT NECK WHILE UP IN CHAIR DUE TO NECK DISCOMFORT. NSG NOTIFIED.  -CD               User Key  (r) = Recorded By, (t) = Taken By, (c) = Cosigned By      Initials Name Provider Type    CD Leonarda Ruiz, PT Physical Therapist                   Outcome Measures       Row Name 05/04/25 1629 05/04/25 0817       How much help from another person do you currently need...    Turning from your back to your side while in flat bed without using bedrails? 2  -CD 4  -AP    Moving from lying on back to sitting on the side of a flat bed without bedrails? 2  -CD 4  -AP    Moving to and from a bed to a chair (including a wheelchair)? 3  -CD 4  -AP    Standing up from a chair using your arms (e.g., wheelchair, bedside chair)? 3  -CD 4  -AP    Climbing 3-5 steps with a railing? 3  -CD 3  -AP    To walk in hospital room? 3  -CD 4  -AP    AM-PAC 6 Clicks Score (PT) 16  -CD 23  -AP    Highest Level of Mobility Goal 5 --> Static standing  -CD 7 --> Walk 25 feet or more  -AP      Row Name 05/04/25 1629 05/04/25 1137       Functional Assessment    Outcome Measure Options AM-PAC 6 Clicks Basic Mobility (PT)  - AM-PAC 6 Clicks Daily Activity (OT)  -               User Key  (r) = Recorded By, (t) = Taken By, (c) = Cosigned By      Initials Name Provider Type    CD Leonarda Ruiz, PT Physical Therapist    Nazanin Salinas, RN Registered Nurse    Karen Montoya, OT Occupational Therapist                                 Physical Therapy Education       Title: PT OT SLP Therapies (In Progress)       Topic: Physical Therapy (Done)       Point: Mobility training (Done)       Learning Progress Summary            Patient Acceptance, E, VU,NR by CD at 5/4/2025 1630    Comment: SEE FLOWSHEET    Acceptance, E, VU,NR by LO at 5/3/2025 1006    Comment: PT POC    Acceptance, E,D, VU,NR by AB at 5/2/2025 1122    Acceptance, E,D, VU,NR by AB at 5/1/2025 0959    Acceptance, E, VU by AC at 4/30/2025 1159    Acceptance, E,D, VU,NR by LR at 4/29/2025 1459    Comment: Educated on NWB status of R UE, precautions, safety with mobility, correct supine<->sit t/f technique, correct sit<->stand t/f technique, correct gait mechanics, and progression of POC.                      Point: Home exercise program (Done)       Learning Progress Summary            Patient Acceptance, E, VU,NR by CD at 5/4/2025 1630    Comment: SEE FLOWSHEET    Acceptance, E, VU,NR by LO at 5/3/2025 1006    Comment: PT POC    Acceptance, E,D, VU,NR by AB at 5/2/2025 1122    Acceptance, E,D, VU,NR by AB at 5/1/2025 0959    Acceptance, E, VU by AC at 4/30/2025 1159    Acceptance, E,D, VU,NR by LR at 4/29/2025 1459    Comment: Educated on NWB status of R UE, precautions, safety with mobility, correct supine<->sit t/f technique, correct sit<->stand t/f technique, correct gait mechanics, and progression of POC.                      Point: Body mechanics (Done)       Learning Progress Summary            Patient Acceptance, E, VU,NR by CD at 5/4/2025 1630    Comment: SEE FLOWSHEET    Acceptance, E, VU,NR by LO at 5/3/2025 1006    Comment: PT POC    Acceptance, E,D, VU,NR by AB at 5/2/2025 1122    Acceptance, CASSIDY MOSLEY VUNR by  AB at 5/1/2025 0959    Acceptance, E, VU by AC at 4/30/2025 1159    Acceptance, E,D, VU,NR by LR at 4/29/2025 1459    Comment: Educated on NWB status of R UE, precautions, safety with mobility, correct supine<->sit t/f technique, correct sit<->stand t/f technique, correct gait mechanics, and progression of POC.                      Point: Precautions (Done)       Learning Progress Summary            Patient Acceptance, E, VU,NR by CD at 5/4/2025 1630    Comment: SEE FLOWSHEET    Acceptance, E, VU,NR by LO at 5/3/2025 1006    Comment: PT POC    Acceptance, E,D, VU,NR by AB at 5/2/2025 1122    Acceptance, E,D, VU,NR by AB at 5/1/2025 0959    Acceptance, E, VU by AC at 4/30/2025 1159    Acceptance, E,D, VU,NR by LR at 4/29/2025 1459    Comment: Educated on NWB status of R UE, precautions, safety with mobility, correct supine<->sit t/f technique, correct sit<->stand t/f technique, correct gait mechanics, and progression of POC.                                      User Key       Initials Effective Dates Name Provider Type Discipline    CD 02/03/23 -  Leonarda Ruiz, PT Physical Therapist PT    LR 02/03/23 -  Kika Browning, PT Physical Therapist PT    LO 06/16/21 -  Kirti Garcia, PT Physical Therapist PT    AB 09/22/22 -  Nazanin Carrington, PT Physical Therapist PT    AC 07/11/24 -  Keara Jane, PT Physical Therapist PT                  PT Recommendation and Plan     Progress: improving  Outcome Evaluation: INCREASED DISTANCE AMBULATED  FEET WITH CGA FOR SAFETY. PT HAS WIDE REAGAN WITH SHORTENED STEP LENGTH BUT WITHOUT OVERT LOB DURING GAIT IN BALLARD. NEEDS CUES TO AVOID BUMPING INTO DOORWAY WITH R UE IN SLING. C/O NOT SLEEPING WELL FOR YEARS AND NOTED TO HAVE DECREASED O2 SATS AT 80% UPON ARRIVAL WHEN SLEEPING IN RECLINER. SATS QUICKLY REBOUNDED TO 98% ON RA ONCE AWAKE. NSG NOTIFIED.     Time Calculation:         PT Charges       Row Name 05/04/25 1630             Time Calculation    Start Time 1530  -CD      PT  Received On 05/04/25  -CD         Time Calculation- PT    Total Timed Code Minutes- PT 28 minute(s)  -CD         Timed Charges    94531 - Gait Training Minutes  20  -CD      97661 - PT Therapeutic Activity Minutes 8  -CD         Total Minutes    Timed Charges Total Minutes 28  -CD       Total Minutes 28  -CD                User Key  (r) = Recorded By, (t) = Taken By, (c) = Cosigned By      Initials Name Provider Type    CD Leonarda Ruiz, PT Physical Therapist                  Therapy Charges for Today       Code Description Service Date Service Provider Modifiers Qty    38455532969 HC GAIT TRAINING EA 15 MIN 5/4/2025 Leonarda Ruiz, PT GP 1    70974323680 HC PT THERAPEUTIC ACT EA 15 MIN 5/4/2025 Leonarda Ruiz, PT GP 1            PT G-Codes  Outcome Measure Options: AM-PAC 6 Clicks Basic Mobility (PT)  AM-PAC 6 Clicks Score (PT): 16  AM-PAC 6 Clicks Score (OT): 13  PT Discharge Summary  Anticipated Discharge Disposition (PT): inpatient rehabilitation facility    Leonarda Ruiz, PT  5/4/2025

## 2025-05-05 LAB
GLUCOSE BLDC GLUCOMTR-MCNC: 149 MG/DL (ref 70–130)
GLUCOSE BLDC GLUCOMTR-MCNC: 191 MG/DL (ref 70–130)
GLUCOSE BLDC GLUCOMTR-MCNC: 261 MG/DL (ref 70–130)
GLUCOSE BLDC GLUCOMTR-MCNC: 95 MG/DL (ref 70–130)

## 2025-05-05 PROCEDURE — 82948 REAGENT STRIP/BLOOD GLUCOSE: CPT

## 2025-05-05 PROCEDURE — 97110 THERAPEUTIC EXERCISES: CPT

## 2025-05-05 PROCEDURE — 63710000001 INSULIN LISPRO (HUMAN) PER 5 UNITS: Performed by: INTERNAL MEDICINE

## 2025-05-05 PROCEDURE — 97535 SELF CARE MNGMENT TRAINING: CPT | Performed by: OCCUPATIONAL THERAPIST

## 2025-05-05 PROCEDURE — 97116 GAIT TRAINING THERAPY: CPT

## 2025-05-05 PROCEDURE — 63710000001 INSULIN GLARGINE PER 5 UNITS: Performed by: INTERNAL MEDICINE

## 2025-05-05 PROCEDURE — 97110 THERAPEUTIC EXERCISES: CPT | Performed by: OCCUPATIONAL THERAPIST

## 2025-05-05 RX ADMIN — GABAPENTIN 800 MG: 400 CAPSULE ORAL at 09:05

## 2025-05-05 RX ADMIN — LISINOPRIL 40 MG: 40 TABLET ORAL at 09:05

## 2025-05-05 RX ADMIN — TAMSULOSIN HYDROCHLORIDE 0.4 MG: 0.4 CAPSULE ORAL at 09:06

## 2025-05-05 RX ADMIN — BUMETANIDE 2 MG: 1 TABLET ORAL at 09:04

## 2025-05-05 RX ADMIN — INSULIN GLARGINE 35 UNITS: 100 INJECTION, SOLUTION SUBCUTANEOUS at 09:06

## 2025-05-05 RX ADMIN — HYDROCHLOROTHIAZIDE 12.5 MG: 25 TABLET ORAL at 09:04

## 2025-05-05 RX ADMIN — OXYCODONE HYDROCHLORIDE 10 MG: 10 TABLET ORAL at 09:05

## 2025-05-05 RX ADMIN — ACETAMINOPHEN 1000 MG: 500 TABLET ORAL at 22:06

## 2025-05-05 RX ADMIN — INSULIN GLARGINE 35 UNITS: 100 INJECTION, SOLUTION SUBCUTANEOUS at 20:50

## 2025-05-05 RX ADMIN — ACETAMINOPHEN 1000 MG: 500 TABLET ORAL at 13:31

## 2025-05-05 RX ADMIN — SENNOSIDES AND DOCUSATE SODIUM 2 TABLET: 50; 8.6 TABLET ORAL at 20:50

## 2025-05-05 RX ADMIN — SENNOSIDES AND DOCUSATE SODIUM 2 TABLET: 50; 8.6 TABLET ORAL at 09:06

## 2025-05-05 RX ADMIN — APIXABAN 5 MG: 5 TABLET, FILM COATED ORAL at 09:04

## 2025-05-05 RX ADMIN — OXYCODONE HYDROCHLORIDE 10 MG: 10 TABLET ORAL at 22:06

## 2025-05-05 RX ADMIN — ACETAMINOPHEN 1000 MG: 500 TABLET ORAL at 05:37

## 2025-05-05 RX ADMIN — INSULIN LISPRO 30 UNITS: 100 INJECTION, SOLUTION INTRAVENOUS; SUBCUTANEOUS at 16:42

## 2025-05-05 RX ADMIN — INSULIN LISPRO 12 UNITS: 100 INJECTION, SOLUTION INTRAVENOUS; SUBCUTANEOUS at 09:06

## 2025-05-05 RX ADMIN — INSULIN LISPRO 30 UNITS: 100 INJECTION, SOLUTION INTRAVENOUS; SUBCUTANEOUS at 09:06

## 2025-05-05 RX ADMIN — LISINOPRIL 40 MG: 40 TABLET ORAL at 20:50

## 2025-05-05 RX ADMIN — DOCUSATE SODIUM 100 MG: 100 CAPSULE, LIQUID FILLED ORAL at 09:04

## 2025-05-05 RX ADMIN — PANTOPRAZOLE SODIUM 40 MG: 40 TABLET, DELAYED RELEASE ORAL at 09:06

## 2025-05-05 RX ADMIN — ATORVASTATIN CALCIUM 40 MG: 40 TABLET, FILM COATED ORAL at 09:05

## 2025-05-05 RX ADMIN — NALOXEGOL OXALATE 25 MG: 25 TABLET, FILM COATED ORAL at 05:38

## 2025-05-05 RX ADMIN — INSULIN LISPRO 30 UNITS: 100 INJECTION, SOLUTION INTRAVENOUS; SUBCUTANEOUS at 12:09

## 2025-05-05 RX ADMIN — INSULIN LISPRO 4 UNITS: 100 INJECTION, SOLUTION INTRAVENOUS; SUBCUTANEOUS at 12:09

## 2025-05-05 RX ADMIN — NEBIVOLOL 10 MG: 5 TABLET ORAL at 09:22

## 2025-05-05 RX ADMIN — DOCUSATE SODIUM 100 MG: 100 CAPSULE, LIQUID FILLED ORAL at 20:50

## 2025-05-05 RX ADMIN — APIXABAN 5 MG: 5 TABLET, FILM COATED ORAL at 20:50

## 2025-05-05 RX ADMIN — DULOXETINE 60 MG: 60 CAPSULE, DELAYED RELEASE ORAL at 09:05

## 2025-05-05 NOTE — PLAN OF CARE
Goal Outcome Evaluation:  Plan of Care Reviewed With: patient        Progress: no change  Outcome Evaluation: Up in recliner. Pt declines getting into bed. Pt advised to keep legs elevated in recliner NWB to right upper extremity.Prineo dressing is CDI.  Sling in use. Ice intermittently. Glucoses 261 and 191. See MAR. Pain managed with oral medication. Waiting pre-cert from [facility] per Case management note for a final discharge plan.

## 2025-05-05 NOTE — PLAN OF CARE
Goal Outcome Evaluation:  Plan of Care Reviewed With: patient        Progress: improving  Outcome Evaluation: Pt continues to demonstrate good effort in therapy sessions however pt limited by significant shoulder pain this date. Pt ambulated to the BR w/ CGAx1, no AD, and RA. No overt LOB or buckling noted however pt unsteady throughout. Pt then ambulated throughout hallway w/ CGAx1 on RA and O2 sats remained >90%. Pt demonstrated rapid fatigue and continues to demonstrate poor safety awareness. D/c rec remains IRF for best outcome to decrease risk of falls.    Anticipated Discharge Disposition (PT): inpatient rehabilitation facility

## 2025-05-05 NOTE — DISCHARGE PLACEMENT REQUEST
"Wilbert Kelley (64 y.o. Male)       Effie Maloneer RN  [phone]          Date of Birth   1960    Social Security Number       Address   [address] [place] [address]    Home Phone   [phone]    MRN   [id number]       [facility]    Marital Status                               Admission Date   4/29/2025    Admission Type   Elective    Admitting Provider   Enzo Mckeon Jr., MD    Attending Provider   Enzo Mckeon Jr., MD    Department, Room/Bed   [address], S382/1       Discharge Date       Discharge Disposition       Discharge Destination                                 Attending Provider: Enzo Mckeon Jr., MD    Allergies: Morphine, Adhesive Tape    Isolation: None   Infection: None   Code Status: Prior    Ht: 185.4 cm (73\")   Wt: 150 kg (331 lb 2.1 oz)    Admission Cmt: None   Principal Problem: S/P reverse total shoulder arthroplasty, right [Z96.611]                   Active Insurance as of 4/29/2025       Primary Coverage       Payor Plan Insurance Group Employer/Plan Group    ANTHEM MEDICARE REPLACEMENT ANTHEM MEDICARE ADVANTAGE HMO [id number]       Payor Plan Address Payor Plan Phone Number Payor Plan Fax Number Effective Dates    [address] [phone]  1/1/2025 - None Entered    [facility] [address]         Subscriber Name Subscriber Birth Date Member ID       WILBERT KELLEY 1960 [id number]                     Emergency Contacts        (Rel.) Home Phone Work Phone Mobile Phone    ANUP KELLEY (Spouse) -- -- [phone]                 Physician Progress Notes (most recent note)        Reece Moreira MD at 05/04/25 1221          IM progress note      Wilbert Kelley  [id number]  1960     LOS: 0 days     Attending: Enzo Mckeon Jr., MD    Primary Care Provider: Jose M Simon MD      Chief Complaint/Reason for visit:  Right shoulder pain    Subjective   Feels okay.  No new complaints.  " "Occasional right shoulder pain.  No nausea or vomiting.  No shortness of breath.  Voiding without difficulty.  Objective      Visit Vitals  /69   Pulse 60   Temp 97.4 °F (36.3 °C) (Oral)   Resp 18   Ht 185.4 cm (73\")   Wt (!) 150 kg (331 lb 2.1 oz)   SpO2 96%   BMI 43.69 kg/m²     Temp (24hrs), Av.7 °F (36.5 °C), Min:97.4 °F (36.3 °C), Max:98 °F (36.7 °C)       Occupational Therapy  Karen Drew, LISA   Occupational Therapist  Specialty: Occupational Therapy     Plan of Care     Signed     Date of Service: 25 1042  Creation Time: 25 1138     Signed         Goal Outcome Evaluation:  Plan of Care Reviewed With: patient  Progress: improving  Outcome Evaluation: Pt continues to require assist for sling management, ther ex, safety w/ mobility and transfers. Good effort noted w/ ther ex within surgeon parameters, pt tolerated approx 120 degrees of shoulder flexion this date. MaxA for sling management. Continue to progress as able per current POC.     Anticipated Discharge Disposition (OT): inpatient rehabilitation facility                PT:  Kirti Garcia, MANOHAR   Physical Therapist  Physical Therapy     Plan of Care     Signed     Date of Service: 25 1006  Creation Time: 25     Signed         Goal Outcome Evaluation:  Plan of Care Reviewed With: patient, spouse  Progress: no change  Outcome Evaluation: Patient able to ambulate 65' without AD CGA but cont to demonstrate unsteadiness throughout without overt loss of balance. Cont to require frequent cuing to protect RUE and avoid obstacles such as door frame with RUE. Patient will cont to benefit from skilled IP PT services to address impairments for return to PLOF. Cont IP PT POC.     Anticipated Discharge Disposition (PT): inpatient rehabilitation facility                        Physical Exam:     General Appearance:    Alert, cooperative, in no acute distress   Head:    Normocephalic, without obvious abnormality, atraumatic    Lungs:  "    Normal effort, symmetric chest rise, no crepitus, clear to      auscultation bilaterally             Heart:    Regular rhythm and normal rate, normal S1 and S2   Abdomen:     Normal bowel sounds, no masses, no organomegaly, soft        non-tender, non-distended, no guarding, no rebound    tenderness   Extremities:   Right UE in a sling, CDI  dressing . Interscalene nerve block cath is out.   Distal pulses, cap refill, movements of fingers, wrist, intact.   Pulses:   Pulses palpable and equal bilaterally   Skin:   No bleeding, bruising or rash   Neurologic:   Cranial nerves 2 - 12 grossly intact     Results Review:     I reviewed the patient's new clinical results.   Results from last 7 days   Lab Units 04/30/25 2026   HEMOGLOBIN g/dL 10.3*   HEMATOCRIT % 35.0*     Results from last 7 days   Lab Units 04/30/25 2026 04/29/25  0918   SODIUM mmol/L 136  --    POTASSIUM mmol/L 3.8 4.3   CHLORIDE mmol/L 98  --    CO2 mmol/L 27.0  --    BUN mg/dL 29*  --    CREATININE mg/dL 1.20  --    CALCIUM mg/dL 9.0  --    GLUCOSE mg/dL 103* 221*             Latest Reference Range & Units 05/03/25 16:18 05/03/25 20:09 05/04/25 07:29 05/04/25 12:03   Glucose 70 - 130 mg/dL 195 (H) 231 (H) 161 (H) 203 (H)   (H): Data is abnormally high    I reviewed the patient's new imaging including images and reports.    All medications reviewed.   acetaminophen, 1,000 mg, Oral, Q8H  apixaban, 5 mg, Oral, Q12H  atorvastatin, 40 mg, Oral, Daily  bumetanide, 2 mg, Oral, Daily  docusate sodium, 100 mg, Oral, BID  DULoxetine, 60 mg, Oral, Daily  gabapentin, 800 mg, Oral, Daily  hydroCHLOROthiazide, 12.5 mg, Oral, Daily  insulin glargine, 35 Units, Subcutaneous, Q12H  Insulin Lispro, 30 Units, Subcutaneous, TID With Meals  insulin lispro, 4-24 Units, Subcutaneous, 4x Daily AC & at Bedtime  lisinopril, 40 mg, Oral, BID  Naloxegol Oxalate, 25 mg, Oral, QAM  nebivolol, 10 mg, Oral, Daily  pantoprazole, 40 mg, Oral, Daily  senna-docusate sodium, 2 tablet,  Oral, BID  tamsulosin, 0.4 mg, Oral, Daily        Assessment & Plan     S/P reverse total shoulder arthroplasty, right    Type 2 diabetes mellitus with hyperglycemia, with long-term current use of insulin    Sleep apnea    S/P CABG x 3 on 3/21/2023    Hypertension    Hyperlipidemia    Paroxysmal atrial fibrillation    Obesity, Class III, BMI 40-49.9 (morbid obesity)      Plan  1. PT/OT- RUE sling, NWB  2. Pain control-prns. S/p nerve block. Following with Oxycodone.   3. IS-encouraged  [facility]  5. Bowel regimen, adjusting.   6. Monitor post-op labs  7. DC planning for rehab. CM following.        - Hypertension:  Resume home medications as appropriate, formulary substitution when indicated.  Holding parameters.  PRN medications for elevated blood pressure.     -DM type 2, insulin resistance.  Hgb A1C   Hold OHA as appropriate  FSBG before meals/bedtime, ( q 6 when NPO), along with correction Humalog.  Long acting and prandial insulin  Doses adjusted/lowered on 5/3.       -MARIAH:  Continue O2 supplement  Monitor O2 sats.     -PAF:  Resumed BB. DOAC  resumed      Reece Moreira MD  05/04/25  12:21 EDT    Electronically signed by Reece Moreira MD at 05/04/25 1222       Collected Updated Procedure Result Status    04/30/2025 2026 04/30/2025 2112 Basic Metabolic Panel [[id number]]    (Abnormal)   Blood    Final result Component Value Units   Glucose 103 High  mg/dL   BUN 29 High  mg/dL   Creatinine 1.20 mg/dL   Sodium 136 mmol/L   Potassium 3.8 mmol/L   Chloride 98 mmol/L   CO2 27.0 mmol/L   Calcium 9.0 mg/dL   BUN/Creatinine Ratio 24.2    Anion Gap 11.0 mmol/L   eGFR 68.0 mL/min/1.73          04/30/2025 2026 04/30/2025 2049 Hemoglobin & Hematocrit, Blood [[id number]]   (Abnormal)   Blood    Final result Component Value Units   Hemoglobin 10.3 Low  g/dL   Hematocrit 35.0 Low  %          04/29/2025 0918 04/29/2025 1013 Potassium [[id number]]   Blood    Final result Component Value Units   Potassium 4.3   mmol/L          04/29/2025 0918 04/29/2025 1013 Glucose, Random [[id number]]   (Abnormal)   Blood    Final result Component Value Units   Glucose 221 High  mg/dL

## 2025-05-05 NOTE — THERAPY TREATMENT NOTE
"Patient Name: Wilbert Kelley  : 1960    MRN: [id number]                              Today's Date: 2025       Admit Date: 2025    Visit Dx: No diagnosis found.  Patient Active Problem List   Diagnosis    Shortness of breath    Type 2 diabetes mellitus with hyperglycemia, with long-term current use of insulin    Sleep apnea    Coronary artery disease of native artery of native heart with stable angina pectoris    S/P CABG x 3 on 3/21/2023    Hypertension    Hyperlipidemia    EMANUEL (acute kidney injury)    Paroxysmal atrial fibrillation    Bilateral pneumonia    Acute on chronic heart failure with preserved ejection fraction (HFpEF)    S/P reverse total shoulder arthroplasty, right    Obesity, Class III, BMI 40-49.9 (morbid obesity)     Past Medical History:   Diagnosis Date    Arthritis     BPH (benign prostatic hyperplasia)     Cancer     basal and melanoma-  x2 - left side ear and elbow    Constipation     Coronary artery disease     DDD (degenerative disc disease), lumbar     Diabetes mellitus     couple times month check sugar    Frozen shoulder     left    GERD (gastroesophageal reflux disease)     History of pneumonia 2025    Hyperlipidemia     Hypertension     Limited mobility     left shoulder  - possible frozen shoulder-= please be aware for surgery    Oxygen dependent     \"2 liters at night since pneumonia\" per wife    Rotator cuff injury     right shoulder 3/14/2025    Sleep apnea     insurance took cpap machine due to pt not using 6 hours per night.    Tinnitus     Wears glasses     small print     Past Surgical History:   Procedure Laterality Date    CARDIAC CATHETERIZATION      CARDIAC CATHETERIZATION Right 2023    Procedure: Left Heart Cath;  Surgeon: Jarod Boyle MD;  Location:  [facility];  Service: Cardiovascular;  Laterality: Right;    CATARACT EXTRACTION, BILATERAL Bilateral     COLONOSCOPY      CORONARY ARTERY BYPASS GRAFT N/A 3/21/2023    Procedure: " MEDIAN STERNOTOMY, CORONARY ARTERY BYPASS GRAFTING X 3, UTILIZING THE LEFT INTERNAL MAMMARY ARTERY, ENDOSCOPIC VEIN HARVEST OF THE RIGHT GREATER SAPENOUSE VEIN;  Surgeon: Markus Emanuel MD;  Location: [facility] OR;  Service: Cardiothoracic;  Laterality: N/A;    CORONARY STENT PLACEMENT      x4    ENDOSCOPY      FINGER SURGERY Left     middle finger - left side    SKIN CANCER EXCISION      x2    TOTAL SHOULDER ARTHROPLASTY W/ DISTAL CLAVICLE EXCISION Right 4/29/2025    Procedure: TOTAL SHOULDER REVERSE ARTHROPLASTY WITH BICEPS TENODESIS RIGHT;  Surgeon: Enzo Mckeon Jr., MD;  Location: [facility] OR;  Service: Orthopedics;  Laterality: Right;    WISDOM TOOTH EXTRACTION        General Information       Row Name 05/05/25 1135          OT Time and Intention    Document Type therapy note (daily note)  -AR     Mode of Treatment individual therapy;occupational therapy  -AR       Row Name 05/05/25 1135          General Information    Existing Precautions/Restrictions fall;right;shoulder;non-weight bearing;brace on at all times  simple sling  -AR       Row Name 05/05/25 1135          Safety Issues/Impairments Affecting Functional Mobility    Safety Issues Affecting Function (Mobility) awareness of need for assistance;insight into deficits/self-awareness;safety precaution awareness;safety precautions follow-through/compliance;judgment  -AR     Impairments Affecting Function (Mobility) balance;endurance/activity tolerance;pain;strength;range of motion (ROM)  -AR               User Key  (r) = Recorded By, (t) = Taken By, (c) = Cosigned By      Initials Name Provider Type    Dione Alanis, OT Occupational Therapist                     Mobility/ADL's       Row Name 05/05/25 1136          Transfers    Transfers sit-stand transfer;stand-sit transfer  -AR       Row Name 05/05/25 1136          Sit-Stand Transfer    Sit-Stand [place] (Transfers) contact guard;verbal cues  -AR       Row Name 05/05/25 1136           Stand-Sit Transfer    Stand-Sit [place] (Transfers) contact guard;verbal cues  -AR       Row Name 05/05/25 1136          Toilet Transfer    Comment, (Toilet Transfer) Pt declined need to toilet  -AR       Row Name 05/05/25 1136          Activities of Daily Living    BADL Assessment/Intervention bathing;upper body dressing;lower body dressing;feeding;toileting  -AR       Row Name 05/05/25 1136          Mobility    Extremity Weight-bearing Status right upper extremity  -AR     Right Upper Extremity (Weight-bearing Status) non weight-bearing (NWB)  -AR       Row Name 05/05/25 1136          Bathing Assessment/Intervention    Comment, (Bathing) Pt unable to recall R shoulder precautions, reviewed precautions and axilla care to maintain. Issued LH sponge as pt has limited L shoulder AROM.  -AR       Row Name 05/05/25 1136          Upper Body Dressing Assessment/Training    [place]inger Level (Upper Body Dressing) don;doff;maximum assist (25% patient effort)  -AR     Position (Upper Body Dressing) supported sitting  -AR     Comment, (Upper Body Dressing) Pt unable to recall day-dressing technique. Educated on ADL retraining to maintain precautions and sling management.  -AR       Row Name 05/05/25 1136          Toileting Assessment/Training    Comment, (Toileting) Issued toilet tongs as he reports inability to perform post-toilet hygiene and educated on use.  -AR       Row Name 05/05/25 1136          Lower Body Dressing Assessment/Training    Comment, (Lower Body Dressing) Issued reacher and shoe horn to assist and educated on use.  -AR       Row Name 05/05/25 1136          Self-Feeding Assessment/Training    Grainger Level (Feeding) liquids to mouth;supervision  -AR     Position (Feeding) supported sitting  -AR               User Key  (r) = Recorded By, (t) = Taken By, (c) = Cosigned By      Initials Name Provider Type    Dione Alanis OT Occupational Therapist                   Obj/Interventions        Row Name 05/05/25 1141          Sensory Assessment (Somatosensory)    Sensory Assessment (Somatosensory) UE sensation intact  -AR       Row Name 05/05/25 1141          Shoulder (Therapeutic Exercise)    Shoulder AAROM (Therapeutic Exercise) right;flexion;extension;sitting;10 repetitions  -AR       Row Name 05/05/25 1141          Elbow/Forearm (Therapeutic Exercise)    Elbow/Forearm (Therapeutic Exercise) AROM (active range of motion);AAROM (active assistive range of motion)  -AR     Elbow/Forearm AROM (Therapeutic Exercise) right;sitting;10 repetitions;supination;pronation  -AR     Elbow/Forearm AAROM (Therapeutic Exercise) right;flexion;extension;sitting;10 repetitions  -AR       Row Name 05/05/25 1141          Wrist (Therapeutic Exercise)    Wrist AROM (Therapeutic Exercise) right;flexion;extension;10 repetitions  -AR       Row Name 05/05/25 1141          Hand (Therapeutic Exercise)    Hand AROM/AAROM (Therapeutic Exercise) right;AROM (active range of motion);finger flexion;finger extension;10 repetitions  -AR       Row Name 05/05/25 1141          Motor Skills    Therapeutic Exercise elbow/forearm;shoulder;wrist;hand  -AR       Row Name 05/05/25 1141          Balance    Static Sitting Balance supervision  -AR     Dynamic Sitting Balance supervision  -AR     Position, Sitting Balance unsupported;sitting in chair  -AR     Static Standing Balance contact guard  -AR     Dynamic Standing Balance contact guard  -AR     Position/Device Used, Standing Balance unsupported  -AR               User Key  (r) = Recorded By, (t) = Taken By, (c) = Cosigned By      Initials Name Provider Type    Dione Alanis OT Occupational Therapist                   Goals/Plan    No documentation.                  Clinical Impression       Row Name 05/05/25 1143          Pain Assessment    Pretreatment Pain Rating 7/10  -AR     Posttreatment Pain Rating 8/10  -AR     Pain Location shoulder  -AR     Pain Side/Orientation  right;generalized  -AR     Pain Management Interventions activity modification encouraged;breathing exercises utilized;cold applied;exercise or physical activity utilized;nursing notified;premedicated for activity;positioning techniques utilized  -AR     Response to Pain Interventions activity level improved;activity participation with increased pain  -AR       Row Name 05/05/25 1143          Plan of Care Review    Plan of Care Reviewed With patient  -AR     Progress no change  -AR     Outcome Evaluation Pt with poor carry-over from prior teaching, OT reviewed all precautions, ADL retraining to maintain and HEP. OT issued necessary AE and educated on use. He required max assist sling mangemen and tolerated AAROM  with max assist and ongoing cues to decrease pt effort. Pt limited with NWB/immobilization RUE, limited shoulder AROM LUE, decreased safety awareness, acute pain, decreased balance, decreased occupational endurance and is performing below baseline. Recommend IPR.  -AR       Row Name 05/05/25 1143          Therapy Plan Review/Discharge Plan (OT)    Anticipated Discharge Disposition (OT) inpatient rehabilitation facility  -AR       Row Name 05/05/25 1143          Vital Signs    Pre Patient Position Sitting  -AR     Intra Patient Position Standing  -AR     Post Patient Position Sitting  -AR       Row Name 05/05/25 1143          Positioning and Restraints    Pre-Treatment Position sitting in chair/recliner  -AR     Post Treatment Position chair  -AR     In Chair notified nsg;call light within reach;encouraged to call for assist;exit alarm on;waffle cushion;sitting;with brace  -AR               User Key  (r) = Recorded By, (t) = Taken By, (c) = Cosigned By      Initials Name Provider Type    Dione Alanis, LISA Occupational Therapist                   Outcome Measures       Row Name 05/05/25 1148          How much help from another is currently needed...    Putting on and taking off regular lower  body clothing? 2  -AR     Bathing (including washing, rinsing, and drying) 2  -AR     Toileting (which includes using toilet bed pan or urinal) 2  -AR     Putting on and taking off regular upper body clothing 2  -AR     Taking care of personal grooming (such as brushing teeth) 3  -AR     Eating meals 3  -AR     AM-PAC 6 Clicks Score (OT) 14  -AR       Row Name 05/05/25 1113          How much help from another person do you currently need...    Turning from your back to your side while in flat bed without using bedrails? 3  -AC     Moving from lying on back to sitting on the side of a flat bed without bedrails? 3  -AC     Moving to and from a bed to a chair (including a wheelchair)? 3  -AC     Standing up from a chair using your arms (e.g., wheelchair, bedside chair)? 3  -AC     Climbing 3-5 steps with a railing? 3  -AC     To walk in hospital room? 3  -AC     AM-PAC 6 Clicks Score (PT) 18  -AC     Highest Level of Mobility Goal 6 --> Walk 10 steps or more  -AC       Row Name 05/05/25 1148 05/05/25 1113       Functional Assessment    Outcome Measure Options AM-PAC 6 Clicks Daily Activity (OT)  -AR AM-PAC 6 Clicks Basic Mobility (PT)  -AC              User Key  (r) = Recorded By, (t) = Taken By, (c) = Cosigned By      Initials Name Provider Type    Dione Alanis, OT Occupational Therapist    AC Keara Jane, PT Physical Therapist                    Occupational Therapy Education       Title: PT OT SLP Therapies (In Progress)       Topic: Occupational Therapy (In Progress)       Point: ADL training (In Progress)       Learning Progress Summary            Patient Acceptance, E,TB,D,H, NR by AR at 5/5/2025 1149    Acceptance, E, NR by MR at 5/4/2025 1137    Acceptance, E, NR by MR at 5/3/2025 1102    Acceptance, E, NR by MR at 5/2/2025 0847                      Point: Home exercise program (In Progress)       Learning Progress Summary            Patient Acceptance, E,TB,D,H, NR by AR at 5/5/2025 1149     Acceptance, E, NR by MR at 5/4/2025 1137    Acceptance, E, NR by MR at 5/3/2025 1102    Acceptance, E, NR by MR at 5/2/2025 0847                      Point: Precautions (In Progress)       Learning Progress Summary            Patient Acceptance, E,TB,D,H, NR by AR at 5/5/2025 1149    Acceptance, E, NR by MR at 5/4/2025 1137    Acceptance, E, NR by MR at 5/3/2025 1102    Acceptance, E, NR by MR at 5/2/2025 0847                      Point: Body mechanics (In Progress)       Learning Progress Summary            Patient Acceptance, E,TB,D,H, NR by AR at 5/5/2025 1149    Acceptance, E, NR by MR at 5/4/2025 1137    Acceptance, E, NR by MR at 5/3/2025 1102    Acceptance, E, NR by MR at 5/2/2025 0847                                      User Key       Initials Effective Dates Name Provider Type Discipline    AR 07/11/23 -  Dione Sadler, OT Occupational Therapist OT    MR 09/22/22 -  Karen Drew OT Occupational Therapist OT                  OT Recommendation and Plan     Plan of Care Review  Plan of Care Reviewed With: patient  Progress: no change  Outcome Evaluation: Pt with poor carry-over from prior teaching, OT reviewed all precautions, ADL retraining to maintain and HEP. OT issued necessary AE and educated on use. He required max assist sling mangemen and tolerated AAROM  with max assist and ongoing cues to decrease pt effort. Pt limited with NWB/immobilization RUE, limited shoulder AROM LUE, decreased safety awareness, acute pain, decreased balance, decreased occupational endurance and is performing below baseline. Recommend IPR.     Time Calculation:         Time Calculation- OT       Row Name 05/05/25 1149 05/05/25 1113          Time Calculation- OT    OT Start Time 0940  -AR --     OT Received On 05/05/25 -AR --     OT Goal Re-Cert Due Date 05/09/25 -AR --        Timed Charges    55347 - OT Therapeutic Exercise Minutes 15  -AR --     60257 - Gait Training Minutes  -- 11  -AC     97212 - OT Self  Care/Mgmt Minutes 26  -AR --        Total Minutes    Timed Charges Total Minutes 41  -AR 11  -AC      Total Minutes 41  -AR 11  -AC               User Key  (r) = Recorded By, (t) = Taken By, (c) = Cosigned By      Initials Name Provider Type    Dione Alanis, OT Occupational Therapist    AC Keara Jane PT Physical Therapist                  Therapy Charges for Today       Code Description Service Date Service Provider Modifiers Qty    55528313043 HC OT SELF CARE/MGMT/TRAIN EA 15 MIN 5/5/2025 Dione Sadler, OT GO 2    [id number]  OT THER PROC EA 15 MIN 5/5/2025 Dione Sadelr OT GO 1                 Dione Sadler OT  5/5/2025

## 2025-05-05 NOTE — PLAN OF CARE
Goal Outcome Evaluation:  Plan of Care Reviewed With: patient        Progress: no change  Outcome Evaluation: Pt with poor carry-over from prior teaching, OT reviewed all precautions, ADL retraining to maintain and HEP. OT issued necessary AE and educated on use. He required max assist sling mangemen and tolerated AAROM  with max assist and ongoing cues to decrease pt effort. Pt limited with NWB/immobilization RUE, limited shoulder AROM LUE, decreased safety awareness, acute pain, decreased balance, decreased occupational endurance and is performing below baseline. Recommend IPR.    Anticipated Discharge Disposition (OT): inpatient rehabilitation facility

## 2025-05-05 NOTE — THERAPY TREATMENT NOTE
"Patient Name: Wilbert Kelley  : 1960    MRN: [id number]                              Today's Date: 2025       Admit Date: 2025    Visit Dx: No diagnosis found.  Patient Active Problem List   Diagnosis    Shortness of breath    Type 2 diabetes mellitus with hyperglycemia, with long-term current use of insulin    Sleep apnea    Coronary artery disease of native artery of native heart with stable angina pectoris    S/P CABG x 3 on 3/21/2023    Hypertension    Hyperlipidemia    EMANUEL (acute kidney injury)    Paroxysmal atrial fibrillation    Bilateral pneumonia    Acute on chronic heart failure with preserved ejection fraction (HFpEF)    S/P reverse total shoulder arthroplasty, right    Obesity, Class III, BMI 40-49.9 (morbid obesity)     Past Medical History:   Diagnosis Date    Arthritis     BPH (benign prostatic hyperplasia)     Cancer     basal and melanoma-  x2 - left side ear and elbow    Constipation     Coronary artery disease     DDD (degenerative disc disease), lumbar     Diabetes mellitus     couple times month check sugar    Frozen shoulder     left    GERD (gastroesophageal reflux disease)     History of pneumonia 2025    Hyperlipidemia     Hypertension     Limited mobility     left shoulder  - possible frozen shoulder-= please be aware for surgery    Oxygen dependent     \"2 liters at night since pneumonia\" per wife    Rotator cuff injury     right shoulder 3/14/2025    Sleep apnea     insurance took cpap machine due to pt not using 6 hours per night.    Tinnitus     Wears glasses     small print     Past Surgical History:   Procedure Laterality Date    CARDIAC CATHETERIZATION      CARDIAC CATHETERIZATION Right 2023    Procedure: Left Heart Cath;  Surgeon: Jarod Boyle MD;  Location:  [facility];  Service: Cardiovascular;  Laterality: Right;    CATARACT EXTRACTION, BILATERAL Bilateral     COLONOSCOPY      CORONARY ARTERY BYPASS GRAFT N/A 3/21/2023    Procedure: " MEDIAN STERNOTOMY, CORONARY ARTERY BYPASS GRAFTING X 3, UTILIZING THE LEFT INTERNAL MAMMARY ARTERY, ENDOSCOPIC VEIN HARVEST OF THE RIGHT GREATER SAPENOUSE VEIN;  Surgeon: Markus Emanuel MD;  Location: [facility] OR;  Service: Cardiothoracic;  Laterality: N/A;    CORONARY STENT PLACEMENT      x4    ENDOSCOPY      FINGER SURGERY Left     middle finger - left side    SKIN CANCER EXCISION      x2    TOTAL SHOULDER ARTHROPLASTY W/ DISTAL CLAVICLE EXCISION Right 4/29/2025    Procedure: TOTAL SHOULDER REVERSE ARTHROPLASTY WITH BICEPS TENODESIS RIGHT;  Surgeon: Enzo Mckeon Jr., MD;  Location: [facility] OR;  Service: Orthopedics;  Laterality: Right;    WISDOM TOOTH EXTRACTION        General Information       Row Name 05/05/25 0947          Physical Therapy Time and Intention    Document Type therapy note (daily note)  -     Mode of Treatment physical therapy  -       Row Name 05/05/25 0947          General Information    Patient Profile Reviewed yes  -AC     Existing Precautions/Restrictions fall;right;shoulder;non-weight bearing;brace on at all times  s/p R rTSA with NWB precautions, simple sling  -AC     Barriers to Rehab medically complex;previous functional deficit;physical barrier  -AC       Row Name 05/05/25 0947          Cognition    Orientation Status (Cognition) oriented x 3  -AC       Row Name 05/05/25 0947          Safety Issues/Impairments Affecting Functional Mobility    Safety Issues Affecting Function (Mobility) awareness of need for assistance;insight into deficits/self-awareness;safety precautions follow-through/compliance;safety precaution awareness;judgment  -     Impairments Affecting Function (Mobility) balance;endurance/activity tolerance;pain;strength;range of motion (ROM);postural/trunk control  -AC               User Key  (r) = Recorded By, (t) = Taken By, (c) = Cosigned By      Initials Name Provider Type    AC Keara Jane PT Physical Therapist                   Mobility       Row Name  05/05/25 1049          Bed Mobility    Comment, (Bed Mobility) Pt UIC upon arrival  -       Row Name 05/05/25 1049          Sit-Stand Transfer    Sit-Stand [place] (Transfers) contact guard;verbal cues  -     Comment, (Sit-Stand Transfer) STS 2x, 1x from bedside chair, 1x from bedside commode  -       Row Name 05/05/25 1049          Gait/Stairs (Locomotion)    [place] Level (Gait) contact guard;verbal cues  -AC     Assistive Device (Gait) other (see comments)  no AD  -AC     Patient was able to Ambulate yes  -AC     Distance in Feet (Gait) 150  +15  -AC     Deviations/Abnormal Patterns (Gait) bilateral deviations;pamela decreased;gait speed decreased;base of support, wide;stride length decreased  -AC     Bilateral Gait Deviations forward flexed posture;heel strike decreased  -     Comment, (Gait/Stairs) Pt ambulated to the bathroom w/ CGAx1, no AD, and RA. Pt demonstrated unsteady gait, wide base of support, and slowed gait speed. In addition pt continues to demonstrate poor safety awareness and required frequent cues for object navigation to protect RUE. No overt LOB or buckling noted however pt unsteady throughout and quickly reported fatigue.  -       Row Name 05/05/25 1049          Mobility    Extremity Weight-bearing Status right upper extremity  -     Right Upper Extremity (Weight-bearing Status) non weight-bearing (NWB)  -               User Key  (r) = Recorded By, (t) = Taken By, (c) = Cosigned By      Initials Name Provider Type    AC Keara Jane PT Physical Therapist                   Obj/Interventions       Row Name 05/05/25 1100          Motor Skills    Therapeutic Exercise hip;knee;ankle  -       Row Name 05/05/25 1100          Hip (Therapeutic Exercise)    Hip Isometrics (Therapeutic Exercise) bilateral;gluteal sets;10 repetitions  -     Hip Strengthening (Therapeutic Exercise) bilateral;marching while seated;10 repetitions  -       Row Name 05/05/25 1100          Knee  (Therapeutic Exercise)    Knee Strengthening (Therapeutic Exercise) bilateral;LAQ (long arc quad);10 repetitions  -AC       Row Name 05/05/25 1100          Ankle (Therapeutic Exercise)    Ankle (Therapeutic Exercise) AROM (active range of motion)  -     Ankle AROM (Therapeutic Exercise) bilateral;dorsiflexion;plantarflexion;10 repetitions  -AC       Row Name 05/05/25 1100          Balance    Balance Assessment sitting static balance;sitting dynamic balance;standing static balance;standing dynamic balance  -     Static Sitting Balance independent  -     Dynamic Sitting Balance standby assist  -AC     Position, Sitting Balance unsupported;sitting in chair  -AC     Static Standing Balance contact guard  -     Dynamic Standing Balance contact guard  -AC     Position/Device Used, Standing Balance supported  -     Balance Interventions sitting;standing;sit to stand;supported;static;dynamic  -               User Key  (r) = Recorded By, (t) = Taken By, (c) = Cosigned By      Initials Name Provider Type    AC Keara Jane, PT Physical Therapist                   Goals/Plan    No documentation.                  Clinical Impression       Row Name 05/05/25 1105          Pain    Pretreatment Pain Rating 8/10  -     Posttreatment Pain Rating 8/10  -     Pain Location shoulder  -AC     Pain Side/Orientation left  -AC     Pain Management Interventions activity modification encouraged;exercise or physical activity utilized;positioning techniques utilized;cold applied  -     Response to Pain Interventions activity participation with tolerable pain  -AC       Row Name 05/05/25 1105          Plan of Care Review    Plan of Care Reviewed With patient  -     Progress improving  -     Outcome Evaluation Pt continues to demonstrate good effort in therapy sessions however pt limited by significant shoulder pain this date. Pt ambulated to the  w/ CGAx1, no AD, and RA. No overt LOB or buckling noted however pt unsteady  throughout. Pt then ambulated throughout hallway w/ CGAx1 on RA and O2 sats remained >90%. Pt demonstrated rapid fatigue and continues to demonstrate poor safety awareness. D/c rec remains IRF for best outcome to decrease risk of falls.  -AC       Row Name 05/05/25 1105          Therapy Assessment/Plan (PT)    Rehab Potential (PT) good  -AC     Criteria for Skilled Interventions Met (PT) yes;meets criteria;skilled treatment is necessary  -AC     Therapy Frequency (PT) daily  -AC     Predicted Duration of Therapy Intervention (PT) 5 days  -AC       Row Name 05/05/25 1105          Vital Signs    O2 Delivery Pre Treatment room air  -AC     O2 Delivery Intra Treatment room air  -AC     O2 Delivery Post Treatment room air  -AC     Pre Patient Position Sitting  -AC     Intra Patient Position Standing  -AC     Post Patient Position Sitting  -AC       Row Name 05/05/25 1105          Positioning and Restraints    Pre-Treatment Position sitting in chair/recliner  -AC     Post Treatment Position chair  -AC     In Chair notified nsg;reclined;sitting;call light within reach;encouraged to call for assist;exit alarm on;waffle cushion;legs elevated  ice applied  -AC               User Key  (r) = Recorded By, (t) = Taken By, (c) = Cosigned By      Initials Name Provider Type    AC Keara Jane, PT Physical Therapist                   Outcome Measures       Row Name 05/05/25 1113          How much help from another person do you currently need...    Turning from your back to your side while in flat bed without using bedrails? 3  -AC     Moving from lying on back to sitting on the side of a flat bed without bedrails? 3  -AC     Moving to and from a bed to a chair (including a wheelchair)? 3  -AC     Standing up from a chair using your arms (e.g., wheelchair, bedside chair)? 3  -AC     Climbing 3-5 steps with a railing? 3  -AC     To walk in hospital room? 3  -AC     AM-PAC 6 Clicks Score (PT) 18  -AC     Highest Level of Mobility Goal  6 --> Walk 10 steps or more  -       Row Name 05/05/25 1113          Functional Assessment    Outcome Measure Options AM-PAC 6 Clicks Basic Mobility (PT)  -               User Key  (r) = Recorded By, (t) = Taken By, (c) = Cosigned By      Initials Name Provider Type    Keara Nicole PT Physical Therapist                                 Physical Therapy Education       Title: PT OT SLP Therapies (In Progress)       Topic: Physical Therapy (In Progress)       Point: Mobility training (In Progress)       Learning Progress Summary            Patient Acceptance, E, NR by AC at 5/5/2025 1113    Acceptance, E, VU,NR by CD at 5/4/2025 1630    Comment: SEE FLOWSHEET    Acceptance, E, VU,NR by LO at 5/3/2025 1006    Comment: PT POC    Acceptance, E,D, VU,NR by AB at 5/2/2025 1122    Acceptance, E,D, VU,NR by AB at 5/1/2025 0959    Acceptance, E, VU by AC at 4/30/2025 1159    Acceptance, E,D, VU,NR by LR at 4/29/2025 1459    Comment: Educated on NWB status of R UE, precautions, safety with mobility, correct supine<->sit t/f technique, correct sit<->stand t/f technique, correct gait mechanics, and progression of POC.                      Point: Home exercise program (In Progress)       Learning Progress Summary            Patient Acceptance, E, NR by AC at 5/5/2025 1113    Acceptance, E, VU,NR by CD at 5/4/2025 1630    Comment: SEE FLOWSHEET    Acceptance, E, VU,NR by LO at 5/3/2025 1006    Comment: PT POC    Acceptance, E,D, VU,NR by AB at 5/2/2025 1122    Acceptance, E,D, VU,NR by AB at 5/1/2025 0959    Acceptance, E, VU by AC at 4/30/2025 1159    Acceptance, E,D, VU,NR by LR at 4/29/2025 1459    Comment: Educated on NWB status of R UE, precautions, safety with mobility, correct supine<->sit t/f technique, correct sit<->stand t/f technique, correct gait mechanics, and progression of POC.                      Point: Body mechanics (In Progress)       Learning Progress Summary            Patient Acceptance, E, NR by AC at  5/5/2025 1113    Acceptance, E, VU,NR by CD at 5/4/2025 1630    Comment: SEE FLOWSHEET    Acceptance, E, VU,NR by LO at 5/3/2025 1006    Comment: PT POC    Acceptance, E,D, VU,NR by AB at 5/2/2025 1122    Acceptance, E,D, VU,NR by AB at 5/1/2025 0959    Acceptance, E, VU by AC at 4/30/2025 1159    Acceptance, E,D, VU,NR by LR at 4/29/2025 1459    Comment: Educated on NWB status of R UE, precautions, safety with mobility, correct supine<->sit t/f technique, correct sit<->stand t/f technique, correct gait mechanics, and progression of POC.                      Point: Precautions (In Progress)       Learning Progress Summary            Patient Acceptance, E, NR by AC at 5/5/2025 1113    Acceptance, E, VU,NR by CD at 5/4/2025 1630    Comment: SEE FLOWSHEET    Acceptance, E, VU,NR by LO at 5/3/2025 1006    Comment: PT POC    Acceptance, E,D, VU,NR by AB at 5/2/2025 1122    Acceptance, E,D, VU,NR by AB at 5/1/2025 0959    Acceptance, E, VU by AC at 4/30/2025 1159    Acceptance, E,D, VU,NR by LR at 4/29/2025 1459    Comment: Educated on NWB status of R UE, precautions, safety with mobility, correct supine<->sit t/f technique, correct sit<->stand t/f technique, correct gait mechanics, and progression of POC.                                      User Key       Initials Effective Dates Name Provider Type Discipline    CD 02/03/23 -  Leonarda Ruiz, PT Physical Therapist PT    LR 02/03/23 -  Kika Browning, PT Physical Therapist PT    LO 06/16/21 -  Kirti Garcia, PT Physical Therapist PT    AB 09/22/22 -  Nazanin Carrington, PT Physical Therapist PT    AC 07/11/24 -  Keara Jane, PT Physical Therapist PT                  PT Recommendation and Plan     Progress: improving  Outcome Evaluation: Pt continues to demonstrate good effort in therapy sessions however pt limited by significant shoulder pain this date. Pt ambulated to the [facility]/ CGAx1, no AD, and RA. No overt LOB or buckling noted however pt unsteady throughout. Pt  then ambulated throughout hallway w/ CGAx1 on RA and O2 sats remained >90%. Pt demonstrated rapid fatigue and continues to demonstrate poor safety awareness. D/c rec remains IRF for best outcome to decrease risk of falls.     Time Calculation:         PT Charges       Row Name 05/05/25 1113             Time Calculation    Start Time 0822  -AC      PT Received On 05/05/25  -      PT Goal Re-Cert Due Date 05/09/25  -AC         Time Calculation- PT    Total Timed Code Minutes- PT 23 minute(s)  -AC         Timed Charges    44594 - PT Therapeutic Exercise Minutes 10  -AC      38644 - Gait Training Minutes  11  -AC      67669 - PT Therapeutic Activity Minutes 2  -AC         Total Minutes    Timed Charges Total Minutes 23  -AC       Total Minutes 23  -AC                User Key  (r) = Recorded By, (t) = Taken By, (c) = Cosigned By      Initials Name Provider Type    AC Keara Jane, PT Physical Therapist                  Therapy Charges for Today       Code Description Service Date Service Provider Modifiers Qty    17560168957 HC PT THER PROC EA 15 MIN 5/5/2025 Keara Jane, PT GP 1    47722916791 HC GAIT TRAINING EA 15 MIN 5/5/2025 Keara Jane, PT GP 1            PT G-Codes  Outcome Measure Options: AM-PAC 6 Clicks Basic Mobility (PT)  AM-PAC 6 Clicks Score (PT): 18  AM-PAC 6 Clicks Score (OT): 13  PT Discharge Summary  Anticipated Discharge Disposition (PT): inpatient rehabilitation facility    Keara Jane PT  5/5/2025

## 2025-05-05 NOTE — CASE MANAGEMENT/SOCIAL WORK
Continued Stay Note  [facility]     Patient Name: Wilbert Kelley  MRN: [id number]  Today's Date: 5/5/2025    Admit Date: 4/29/2025    Plan: [facility], pending outcome of P2P   Discharge Plan       Row Name 05/05/25 1904       Plan    Plan [facility], pending outcome of P2P    Patient/Family in Agreement with Plan yes    Plan Comments Update to previous note:  Received call from [facility] offering a P2P by calling [phone]. Deadline is 5/6/25 at 0800. Sent information to physician advisor. Updated clinicals were faxed as requested to [phone]. Updated Karie with [facility] at [phone].    Final Discharge Disposition Code 62 - inpatient rehab facility                   Discharge Codes    No documentation.                 Expected Discharge Date and Time       Expected Discharge Date Expected Discharge Time    May 3, 2025               Effie Sin RN

## 2025-05-05 NOTE — CASE MANAGEMENT/SOCIAL WORK
Continued Stay Note  [facility]     Patient Name: Wilbert Kelley  MRN: [id number]  Today's Date: 5/5/2025    Admit Date: 4/29/2025    Plan: [facility], pending pre-cert   Discharge Plan       Row Name 05/05/25 1343       Plan    Plan [facility], pending pre-cert    Patient/Family in Agreement with Plan yes    Plan Comments CM spoke to Dearing with [facility] ([phone]) to check status of pre-cert. Still pending at this time. CM faxed requested notes to Dearing today at [phone]. Updated patient at bedside. CM will continue to follow.    Final Discharge Disposition Code 62 - inpatient rehab facility                   Discharge Codes    No documentation.                 Expected Discharge Date and Time       Expected Discharge Date Expected Discharge Time    May 3, 2025               Effie Sin RN

## 2025-05-05 NOTE — PLAN OF CARE
Problem: Adult Inpatient Plan of Care  Goal: Absence of Hospital-Acquired Illness or Injury  Intervention: Identify and Manage Fall Risk  Recent Flowsheet Documentation  Taken 5/5/2025 0400 by Nguyen Soriano RN  Safety Promotion/Fall Prevention:   activity supervised   assistive device/personal items within reach   clutter free environment maintained   lighting adjusted   nonskid shoes/slippers when out of bed   room organization consistent   safety round/check completed  Taken 5/5/2025 0215 by Nguyen Soriano RN  Safety Promotion/Fall Prevention:   activity supervised   assistive device/personal items within reach   clutter free environment maintained   lighting adjusted   nonskid shoes/slippers when out of bed   room organization consistent   safety round/check completed  Taken 5/5/2025 0015 by Nguyen Soriano RN  Safety Promotion/Fall Prevention:   activity supervised   assistive device/personal items within reach   clutter free environment maintained   lighting adjusted   nonskid shoes/slippers when out of bed   room organization consistent   safety round/check completed  Taken 5/4/2025 2210 by Nguyen Soriano RN  Safety Promotion/Fall Prevention:   activity supervised   assistive device/personal items within reach   clutter free environment maintained   lighting adjusted   nonskid shoes/slippers when out of bed   room organization consistent   safety round/check completed  Taken 5/4/2025 2024 by Nguyen Soriano RN  Safety Promotion/Fall Prevention:   activity supervised   assistive device/personal items within reach   clutter free environment maintained   lighting adjusted   nonskid shoes/slippers when out of bed   room organization consistent   safety round/check completed  Intervention: Prevent Skin Injury  Recent Flowsheet Documentation  Taken 5/5/2025 0400 by Nguyen Soriano RN  Body Position: legs elevated  Skin Protection: incontinence pads utilized  Taken 5/5/2025 0215 by Nguyen Soriano RN  Body Position: position  changed independently  Skin Protection: incontinence pads utilized  Taken 5/5/2025 0015 by Nguyen Soriano RN  Body Position: legs elevated  Skin Protection: incontinence pads utilized  Taken 5/4/2025 2210 by Nguyen Soriano RN  Body Position: legs elevated  Skin Protection: incontinence pads utilized  Taken 5/4/2025 2024 by Nguyen Soriano RN  Body Position: position changed independently  Skin Protection: incontinence pads utilized  Intervention: Prevent and Manage VTE (Venous Thromboembolism) Risk  Recent Flowsheet Documentation  Taken 5/5/2025 0400 by Nguyen Soriano RN  VTE Prevention/Management: (eliquis) other (see comments)  Taken 5/5/2025 0015 by Nguyen Soriano RN  VTE Prevention/Management: (eliquis) other (see comments)  Taken 5/4/2025 2210 by Nguyen Soriano RN  VTE Prevention/Management: (eliquis) other (see comments)  Taken 5/4/2025 2024 by Nguyen Soriano RN  VTE Prevention/Management: (eliquis) other (see comments)  Intervention: Prevent Infection  Recent Flowsheet Documentation  Taken 5/5/2025 0400 by Nguyen Soriano RN  Infection Prevention:   environmental surveillance performed   hand hygiene promoted   rest/sleep promoted   single patient room provided  Taken 5/5/2025 0215 by Nguyen Soriano RN  Infection Prevention:   environmental surveillance performed   hand hygiene promoted   rest/sleep promoted   single patient room provided  Taken 5/5/2025 0015 by Nguyen Soriano RN  Infection Prevention:   environmental surveillance performed   hand hygiene promoted   rest/sleep promoted   single patient room provided  Taken 5/4/2025 2210 by Nguyen Soriano RN  Infection Prevention:   environmental surveillance performed   hand hygiene promoted   rest/sleep promoted   single patient room provided  Taken 5/4/2025 2024 by Nguyen Soriano RN  Infection Prevention:   environmental surveillance performed   hand hygiene promoted   rest/sleep promoted   single patient room provided  Goal: Optimal Comfort and Wellbeing  Intervention:  Monitor Pain and Promote Comfort  Recent Flowsheet Documentation  Taken 5/5/2025 0015 by Nguyen Soriano, RN  Pain Management Interventions: no interventions per patient request  Taken 5/4/2025 2210 by Nguyen Soriano RN  Pain Management Interventions:   pillow support provided   position adjusted  Taken 5/4/2025 2024 by Nguyen Soriano, RN  Pain Management Interventions:   pain management plan reviewed with patient/caregiver   medication offered but refused  Intervention: Provide Person-Centered Care  Recent Flowsheet Documentation  Taken 5/4/2025 2024 by Nguyen Soriano RN  Trust Relationship/Rapport:   care explained   choices provided   emotional support provided   empathic listening provided   questions answered   questions encouraged   reassurance provided   thoughts/feelings acknowledged   Goal Outcome Evaluation:   A&OX4 RA  RUE in sling. Pt ambulating with slightly unsteady gait. Pain controlled with PO medication.  Sleeping up in recliner due to difficulty getting OOB safely with 1 arm. Voiding with out difficulty. Awaiting placement with [facility].

## 2025-05-05 NOTE — PROGRESS NOTES
"IM progress note      Wilbert Kelley  [id number]  1960     LOS: 0 days     Attending: Enzo Mckeon Jr., MD    Primary Care Provider: Jose M Simon MD      Chief Complaint/Reason for visit:  Right shoulder pain    Subjective   Feels okay.  No new complaints.  Good pain control. No n/vom/sob.     Objective      Visit Vitals  /61 (BP Location: Left arm, Patient Position: Sitting)   Pulse 65   Temp 97.6 °F (36.4 °C) (Oral)   Resp 18   Ht 185.4 cm (73\")   Wt (!) 150 kg (331 lb 2.1 oz)   SpO2 94%   BMI 43.69 kg/m²     Temp (24hrs), Av °F (36.7 °C), Min:97.6 °F (36.4 °C), Max:98.4 °F (36.9 °C)         OT:  Dione Sadler OT   Occupational Therapist  Occupational Therapy     Plan of Care     Signed     Date of Service: 25 114  Creation Time: 25     Signed         Goal Outcome Evaluation:  Plan of Care Reviewed With: patient  Progress: no change  Outcome Evaluation: Pt with poor carry-over from prior teaching, OT reviewed all precautions, ADL retraining to maintain and HEP. OT issued necessary AE and educated on use. He required max assist sling mangemen and tolerated AAROM  with max assist and ongoing cues to decrease pt effort. Pt limited with NWB/immobilization RUE, limited shoulder AROM LUE, decreased safety awareness, acute pain, decreased balance, decreased occupational endurance and is performing below baseline. Recommend IPR.     Anticipated Discharge Disposition (OT): inpatient rehabilitation facility                     Physical Exam:     General Appearance:    Alert, cooperative, in no acute distress   Head:    Normocephalic, without obvious abnormality, atraumatic    Lungs:     Normal effort, symmetric chest rise, no crepitus, clear to      auscultation bilaterally             Heart:    Regular rhythm and normal rate, normal S1 and S2   Abdomen:     Normal bowel sounds, no masses, no organomegaly, soft        non-tender, non-distended, no guarding, no " rebound    tenderness   Extremities:   Right UE in a sling, CDI  dressing . Interscalene nerve block cath is out. Mild hand swelling.   Distal pulses, cap refill, movements of fingers, wrist, intact.   Pulses:   Pulses palpable and equal bilaterally   Skin:   No bleeding, bruising or rash   Neurologic:   Cranial nerves 2 - 12 grossly intact     Results Review:     I reviewed the patient's new clinical results.   Results from last 7 days   Lab Units 04/30/25 2026   HEMOGLOBIN g/dL 10.3*   HEMATOCRIT % 35.0*     Results from last 7 days   Lab Units 04/30/25 2026 04/29/25  0918   SODIUM mmol/L 136  --    POTASSIUM mmol/L 3.8 4.3   CHLORIDE mmol/L 98  --    CO2 mmol/L 27.0  --    BUN mg/dL 29*  --    CREATININE mg/dL 1.20  --    CALCIUM mg/dL 9.0  --    GLUCOSE mg/dL 103* 221*         Latest Reference Range & Units 05/04/25 16:31 05/04/25 20:17 05/05/25 07:38 05/05/25 12:04   Glucose 70 - 130 mg/dL 160 (H) 67 (L) 261 (H) 191 (H)   (H): Data is abnormally high  (L): Data is abnormally low  I reviewed the patient's new imaging including images and reports.    All medications reviewed.   acetaminophen, 1,000 mg, Oral, Q8H  apixaban, 5 mg, Oral, Q12H  atorvastatin, 40 mg, Oral, Daily  bumetanide, 2 mg, Oral, Daily  docusate sodium, 100 mg, Oral, BID  DULoxetine, 60 mg, Oral, Daily  gabapentin, 800 mg, Oral, Daily  hydroCHLOROthiazide, 12.5 mg, Oral, Daily  insulin glargine, 35 Units, Subcutaneous, Q12H  Insulin Lispro, 30 Units, Subcutaneous, TID With Meals  insulin lispro, 4-24 Units, Subcutaneous, 4x Daily AC & at Bedtime  lisinopril, 40 mg, Oral, BID  Naloxegol Oxalate, 25 mg, Oral, QAM  nebivolol, 10 mg, Oral, Daily  pantoprazole, 40 mg, Oral, Daily  senna-docusate sodium, 2 tablet, Oral, BID  tamsulosin, 0.4 mg, Oral, Daily        Assessment & Plan     S/P reverse total shoulder arthroplasty, right    Type 2 diabetes mellitus with hyperglycemia, with long-term current use of insulin    Sleep apnea    S/P CABG x 3 on  3/21/2023    Hypertension    Hyperlipidemia    Paroxysmal atrial fibrillation    Obesity, Class III, BMI 40-49.9 (morbid obesity)      Plan  1. PT/OT- CHEKO biggsing, NWB  2. Pain control-prns. S/p nerve block. Following with Oxycodone.   3. IS-encouraged  4. DVT proph- [facility]  5. Bowel regimen, adjusting.   6. Monitor post-op labs  7. DC planning for rehab. CM following.        - Hypertension:  Resume home medications as appropriate, formulary substitution when indicated.  Holding parameters.  PRN medications for elevated blood pressure.     -DM type 2, insulin resistance.  Hgb A1C   Hold OHA as appropriate  FSBG before meals/bedtime, ( q 6 when NPO), along with correction Humalog.  Long acting and prandial insulin  Doses adjusted/lowered on 5/3.       -MARIAH:  Continue O2 supplement  Monitor O2 sats.     -PAF:  Resumed BB. DOAC  resumed      Reece Moreira MD  05/05/25  16:25 EDT

## 2025-05-06 VITALS
WEIGHT: 315 LBS | TEMPERATURE: 97.6 F | OXYGEN SATURATION: 99 % | DIASTOLIC BLOOD PRESSURE: 68 MMHG | RESPIRATION RATE: 18 BRPM | BODY MASS INDEX: 41.75 KG/M2 | HEIGHT: 73 IN | SYSTOLIC BLOOD PRESSURE: 106 MMHG | HEART RATE: 66 BPM

## 2025-05-06 LAB
GLUCOSE BLDC GLUCOMTR-MCNC: 166 MG/DL (ref 70–130)
GLUCOSE BLDC GLUCOMTR-MCNC: 194 MG/DL (ref 70–130)

## 2025-05-06 PROCEDURE — 82948 REAGENT STRIP/BLOOD GLUCOSE: CPT

## 2025-05-06 PROCEDURE — 97110 THERAPEUTIC EXERCISES: CPT | Performed by: OCCUPATIONAL THERAPIST

## 2025-05-06 PROCEDURE — 97535 SELF CARE MNGMENT TRAINING: CPT | Performed by: OCCUPATIONAL THERAPIST

## 2025-05-06 PROCEDURE — 63710000001 INSULIN GLARGINE PER 5 UNITS: Performed by: INTERNAL MEDICINE

## 2025-05-06 PROCEDURE — 63710000001 INSULIN LISPRO (HUMAN) PER 5 UNITS: Performed by: INTERNAL MEDICINE

## 2025-05-06 PROCEDURE — 97110 THERAPEUTIC EXERCISES: CPT

## 2025-05-06 PROCEDURE — 97116 GAIT TRAINING THERAPY: CPT

## 2025-05-06 RX ORDER — HYDROCODONE BITARTRATE AND ACETAMINOPHEN 7.5; 325 MG/1; MG/1
1 TABLET ORAL EVERY 4 HOURS PRN
Qty: 24 TABLET | Refills: 0 | Status: SHIPPED | OUTPATIENT
Start: 2025-05-06

## 2025-05-06 RX ADMIN — INSULIN LISPRO 4 UNITS: 100 INJECTION, SOLUTION INTRAVENOUS; SUBCUTANEOUS at 08:36

## 2025-05-06 RX ADMIN — DULOXETINE 60 MG: 60 CAPSULE, DELAYED RELEASE ORAL at 08:38

## 2025-05-06 RX ADMIN — ATORVASTATIN CALCIUM 40 MG: 40 TABLET, FILM COATED ORAL at 08:38

## 2025-05-06 RX ADMIN — INSULIN LISPRO 30 UNITS: 100 INJECTION, SOLUTION INTRAVENOUS; SUBCUTANEOUS at 08:35

## 2025-05-06 RX ADMIN — INSULIN GLARGINE 35 UNITS: 100 INJECTION, SOLUTION SUBCUTANEOUS at 08:36

## 2025-05-06 RX ADMIN — HYDROCHLOROTHIAZIDE 12.5 MG: 25 TABLET ORAL at 08:38

## 2025-05-06 RX ADMIN — LISINOPRIL 40 MG: 40 TABLET ORAL at 08:38

## 2025-05-06 RX ADMIN — BUMETANIDE 2 MG: 1 TABLET ORAL at 08:38

## 2025-05-06 RX ADMIN — ACETAMINOPHEN 1000 MG: 500 TABLET ORAL at 13:03

## 2025-05-06 RX ADMIN — GABAPENTIN 800 MG: 400 CAPSULE ORAL at 08:38

## 2025-05-06 RX ADMIN — SENNOSIDES AND DOCUSATE SODIUM 2 TABLET: 50; 8.6 TABLET ORAL at 08:38

## 2025-05-06 RX ADMIN — INSULIN LISPRO 30 UNITS: 100 INJECTION, SOLUTION INTRAVENOUS; SUBCUTANEOUS at 12:17

## 2025-05-06 RX ADMIN — TAMSULOSIN HYDROCHLORIDE 0.4 MG: 0.4 CAPSULE ORAL at 08:36

## 2025-05-06 RX ADMIN — NEBIVOLOL 10 MG: 5 TABLET ORAL at 08:36

## 2025-05-06 RX ADMIN — NALOXEGOL OXALATE 25 MG: 25 TABLET, FILM COATED ORAL at 08:48

## 2025-05-06 RX ADMIN — PANTOPRAZOLE SODIUM 40 MG: 40 TABLET, DELAYED RELEASE ORAL at 08:38

## 2025-05-06 RX ADMIN — DOCUSATE SODIUM 100 MG: 100 CAPSULE, LIQUID FILLED ORAL at 08:38

## 2025-05-06 RX ADMIN — APIXABAN 5 MG: 5 TABLET, FILM COATED ORAL at 08:38

## 2025-05-06 RX ADMIN — ACETAMINOPHEN 1000 MG: 500 TABLET ORAL at 05:24

## 2025-05-06 RX ADMIN — INSULIN LISPRO 4 UNITS: 100 INJECTION, SOLUTION INTRAVENOUS; SUBCUTANEOUS at 12:17

## 2025-05-06 RX ADMIN — OXYCODONE HYDROCHLORIDE 10 MG: 10 TABLET ORAL at 13:03

## 2025-05-06 NOTE — THERAPY TREATMENT NOTE
"Patient Name: Wilbert Kelley  : 1960    MRN: [id number]                              Today's Date: 2025       Admit Date: 2025    Visit Dx:     ICD-10-CM ICD-9-CM   1. S/P reverse total shoulder arthroplasty, right  Z96.611 V43.61     Patient Active Problem List   Diagnosis    Shortness of breath    Type 2 diabetes mellitus with hyperglycemia, with long-term current use of insulin    Sleep apnea    Coronary artery disease of native artery of native heart with stable angina pectoris    S/P CABG x 3 on 3/21/2023    Hypertension    Hyperlipidemia    EMANUEL (acute kidney injury)    Paroxysmal atrial fibrillation    Bilateral pneumonia    Acute on chronic heart failure with preserved ejection fraction (HFpEF)    S/P reverse total shoulder arthroplasty, right    Obesity, Class III, BMI 40-49.9 (morbid obesity)     Past Medical History:   Diagnosis Date    Arthritis     BPH (benign prostatic hyperplasia)     Cancer     basal and melanoma-  x2 - left side ear and elbow    Constipation     Coronary artery disease     DDD (degenerative disc disease), lumbar     Diabetes mellitus     couple times month check sugar    Frozen shoulder     left    GERD (gastroesophageal reflux disease)     History of pneumonia 2025    Hyperlipidemia     Hypertension     Limited mobility     left shoulder  - possible frozen shoulder-= please be aware for surgery    Oxygen dependent     \"2 liters at night since pneumonia\" per wife    Rotator cuff injury     right shoulder 3/14/2025    Sleep apnea     insurance took cpap machine due to pt not using 6 hours per night.    Tinnitus     Wears glasses     small print     Past Surgical History:   Procedure Laterality Date    CARDIAC CATHETERIZATION      CARDIAC CATHETERIZATION Right 2023    Procedure: Left Heart Cath;  Surgeon: Jarod Boyle MD;  Location: [facility] CATH INVASIVE LOCATION;  Service: Cardiovascular;  Laterality: Right;    CATARACT EXTRACTION, BILATERAL Bilateral     " COLONOSCOPY      CORONARY ARTERY BYPASS GRAFT N/A 3/21/2023    Procedure: MEDIAN STERNOTOMY, CORONARY ARTERY BYPASS GRAFTING X 3, UTILIZING THE LEFT INTERNAL MAMMARY ARTERY, ENDOSCOPIC VEIN HARVEST OF THE RIGHT GREATER SAPENOUSE VEIN;  Surgeon: Markus Emanuel MD;  Location: [facility] OR;  Service: Cardiothoracic;  Laterality: N/A;    CORONARY STENT PLACEMENT      x4    ENDOSCOPY      FINGER SURGERY Left     middle finger - left side    SKIN CANCER EXCISION      x2    TOTAL SHOULDER ARTHROPLASTY W/ DISTAL CLAVICLE EXCISION Right 4/29/2025    Procedure: TOTAL SHOULDER REVERSE ARTHROPLASTY WITH BICEPS TENODESIS RIGHT;  Surgeon: Enzo Mckeon Jr., MD;  Location: [facility] OR;  Service: Orthopedics;  Laterality: Right;    WISDOM TOOTH EXTRACTION        General Information       Row Name 05/06/25 1124          Physical Therapy Time and Intention    Document Type therapy note (daily note)  -     Mode of Treatment physical therapy  -       Row Name 05/06/25 1124          General Information    Patient Profile Reviewed yes  -SS     Existing Precautions/Restrictions fall;right;shoulder;non-weight bearing;brace on at all times;other (see comments)  simple sling  -     Barriers to Rehab medically complex;previous functional deficit;physical barrier  -SS       Row Name 05/06/25 1124          Cognition    Orientation Status (Cognition) oriented x 3  -SS       Row Name 05/06/25 1124          Safety Issues/Impairments Affecting Functional Mobility    Safety Issues Affecting Function (Mobility) at risk behavior observed;awareness of need for assistance;impulsivity;insight into deficits/self-awareness;judgment;positioning of assistive device;problem-solving;safety precaution awareness;safety precautions follow-through/compliance;sequencing abilities  -SS     Impairments Affecting Function (Mobility) balance;endurance/activity tolerance;pain;strength;range of motion (ROM);postural/trunk control  -SS               User Key   (r) = Recorded By, (t) = Taken By, (c) = Cosigned By      Initials Name Provider Type    Alexandra Armando PT Physical Therapist                   Mobility       Row Name 05/06/25 1128          Bed Mobility    Comment, (Bed Mobility) up in chair  -       Row Name 05/06/25 1128          Sit-Stand Transfer    Sit-Stand [place] (Transfers) verbal cues;standby assist  -SS     Assistive Device (Sit-Stand Transfers) cane, straight  -SS     Comment, (Sit-Stand Transfer) VC for sequencing  -       Row Name 05/06/25 1128          Gait/Stairs (Locomotion)    [place] Level (Gait) contact guard;verbal cues  -     Assistive Device (Gait) cane, straight  -SS     Patient was able to Ambulate yes  -SS     Distance in Feet (Gait) 200  -SS     Deviations/Abnormal Patterns (Gait) bilateral deviations;pamela decreased;gait speed decreased;base of support, wide;stride length decreased  -SS     Bilateral Gait Deviations forward flexed posture;heel strike decreased  -SS     [place] Level (Stairs) contact guard;verbal cues  -     Assistive Device (Stairs) cane, straight  -SS     Handrail Location (Stairs) none  -SS     Number of Steps (Stairs) 5  -SS     Ascending Technique (Stairs) step-to-step  -SS     Descending Technique (Stairs) step-to-step  -SS     Comment, (Gait/Stairs) Pt. ambulated with a step through gait pattern. VC for upright posture, safety recommendations, avoiding obstacles on R side. Activity limited by fatigue. Pt. navigated steps w/cueing for AD management, sequencing. No buckling or LOB noted.  -       Row Name 05/06/25 1128          Mobility    Extremity Weight-bearing Status right upper extremity  -SS     Right Upper Extremity (Weight-bearing Status) non weight-bearing (NWB)  -               User Key  (r) = Recorded By, (t) = Taken By, (c) = Cosigned By      Initials Name Provider Type    Alexandra Armando PT Physical Therapist                   Obj/Interventions       Row Name 05/06/25  1132          Motor Skills    Therapeutic Exercise hip;knee;ankle  -       Row Name 05/06/25 1132          Hip (Therapeutic Exercise)    Hip (Therapeutic Exercise) isometric exercises;strengthening exercise  -     Hip Isometrics (Therapeutic Exercise) bilateral;gluteal sets;10 repetitions  -     Hip Strengthening (Therapeutic Exercise) bilateral;aBduction;aDduction;heel slides;marching while seated;10 repetitions  -       Row Name 05/06/25 1132          Knee (Therapeutic Exercise)    Knee (Therapeutic Exercise) isometric exercises;strengthening exercise  -     Knee Isometrics (Therapeutic Exercise) bilateral;quad sets;10 repetitions  -     Knee Strengthening (Therapeutic Exercise) bilateral;SLR (straight leg raise);LAQ (long arc quad);10 repetitions  -       Row Name 05/06/25 1132          Ankle (Therapeutic Exercise)    Ankle (Therapeutic Exercise) AROM (active range of motion)  -     Ankle AROM (Therapeutic Exercise) bilateral;dorsiflexion;plantarflexion;10 repetitions  -       Row Name 05/06/25 1132          Balance    Balance Assessment sitting static balance;sitting dynamic balance;standing static balance;standing dynamic balance  -     Static Sitting Balance supervision  -     Dynamic Sitting Balance standby assist  -     Position, Sitting Balance unsupported;sitting in chair  -     Static Standing Balance contact guard  -     Dynamic Standing Balance contact guard  -SS     Position/Device Used, Standing Balance unsupported;supported;cane, straight  -SS     Balance Interventions sitting;standing;sit to stand;supported;static;dynamic  -               User Key  (r) = Recorded By, (t) = Taken By, (c) = Cosigned By      Initials Name Provider Type     Alexandra Pratt, PT Physical Therapist                   Goals/Plan       Row Name 05/06/25 1143          Bed Mobility Goal 1 (PT)    Activity/Assistive Device (Bed Mobility Goal 1, PT) sit to supine/supine to sit  -     [place]  Level/Cues Needed (Bed Mobility Goal 1, PT) modified independence  -SS     Time Frame (Bed Mobility Goal 1, PT) short term goal (STG);3 days  -SS     Progress/Outcomes (Bed Mobility Goal 1, PT) goal revised this date;goal ongoing  -SS       Row Name 05/06/25 1143          Transfer Goal 1 (PT)    Activity/Assistive Device (Transfer Goal 1, PT) sit-to-stand/stand-to-sit;bed-to-chair/chair-to-bed;cane, straight  -SS     [place] Level/Cues Needed (Transfer Goal 1, PT) independent  -SS     Time Frame (Transfer Goal 1, PT) long term goal (LTG);10 days  -SS     Progress/Outcome (Transfer Goal 1, PT) good progress toward goal;goal revised this date;goal ongoing  -SS       Row Name 05/06/25 1143          Gait Training Goal 1 (PT)    Activity/Assistive Device (Gait Training Goal 1, PT) gait (walking locomotion)  -SS     [place] Level (Gait Training Goal 1, PT) independent  -SS     Distance (Gait Training Goal 1, PT) 500  -SS     Time Frame (Gait Training Goal 1, PT) long term goal (LTG);10 days  -SS     Progress/Outcome (Gait Training Goal 1, PT) good progress toward goal;goal revised this date;goal ongoing  -SS       Row Name 05/06/25 1143          Balance Goal 1 (PT)    Activity/Assistive Device (Balance Goal) sitting static balance;sitting dynamic balance;sit to stand dynamic balance;standing static balance;standing dynamic balance  -SS     [place] Level/Cues Needed (Balance Goal 1, PT) independent  -SS     Time Frame (Balance Goal 1, PT) long-term goal (LTG);1 week  -SS       Row Name 05/06/25 1143          Stairs Goal 1 (PT)    Activity/Assistive Device (Stairs Goal 1, PT) ascending stairs;descending stairs;step-to-step;cane, straight  -SS     [place] Level/Cues Needed (Stairs Goal 1, PT) modified independence  -SS     Number of Stairs (Stairs Goal 1, PT) 5  -SS     Time Frame (Stairs Goal 1, PT) long term goal (LTG);10 days  -SS     Progress/Outcome (Stairs Goal 1, PT) good progress toward goal;goal  "revised this date;goal ongoing  -               User Key  (r) = Recorded By, (t) = Taken By, (c) = Cosigned By      Initials Name Provider Type     Alexandra Pratt, PT Physical Therapist                   Clinical Impression       Row Name 05/06/25 1133          Pain    Pretreatment Pain Rating 7/10  -     Posttreatment Pain Rating 7/10  -     Pain Side/Orientation other (see comments)  \"all over\"  -     Pain Management Interventions activity modification encouraged;cold applied;complementary health approaches promoted;diversional activity provided;exercise or physical activity utilized;meditation or guided imagery encouraged;movement retraining implemented;positioning techniques utilized  -     Response to Pain Interventions activity participation with tolerable pain  -       Row Name 05/06/25 1133          Plan of Care Review    Plan of Care Reviewed With patient  -     Progress improving  -     Outcome Evaluation Pt. continues to present below baseline function w/generalized weakness, balance deficits and acute NWB status of RUE affecting his ability to safely participate in functional mobility. He performed transfers, ambulated 200' and navigated steps w/contact guard assist. Activity limited by fatigue. Pt. tolerated ther-ex well. Continue acute PT POC to progress as tolerated.  -       Row Name 05/06/25 1133          Therapy Assessment/Plan (PT)    Rehab Potential (PT) good  -     Criteria for Skilled Interventions Met (PT) yes;meets criteria;skilled treatment is necessary  -     Therapy Frequency (PT) daily  -       Row Name 05/06/25 1133          Vital Signs    Pre Systolic BP Rehab 132  -SS     Pre Treatment Diastolic BP 77  -SS     Post Systolic BP Rehab 152  -SS     Post Treatment Diastolic BP 82  -SS     Pretreatment Heart Rate (beats/min) --  pt. in bathroom upon arrival  -     Posttreatment Heart Rate (beats/min) 77  -SS     Post SpO2 (%) 98  -SS     O2 Delivery Post " Treatment room air  -SS     Pre Patient Position Sitting  -     Post Patient Position Sitting  -SS       Row Name 05/06/25 1133          Positioning and Restraints    Pre-Treatment Position bathroom  -SS     Post Treatment Position chair  -SS     In Chair notified nsg;call light within reach;encouraged to call for assist;exit alarm on;sitting;RUE elevated  pt declined legs elevated  -               User Key  (r) = Recorded By, (t) = Taken By, (c) = Cosigned By      Initials Name Provider Type     Alexandra Pratt PT Physical Therapist                   Outcome Measures       Row Name 05/06/25 1144 05/06/25 0735       How much help from another person do you currently need...    Turning from your back to your side while in flat bed without using bedrails? 3  - 3  -MK    Moving from lying on back to sitting on the side of a flat bed without bedrails? 3  - 3  -MK    Moving to and from a bed to a chair (including a wheelchair)? 3  - 4  -MK    Standing up from a chair using your arms (e.g., wheelchair, bedside chair)? 3  - 4  -MK    Climbing 3-5 steps with a railing? 3  -SS 3  -MK    To walk in hospital room? 3  -SS 3  -MK    AM-PAC 6 Clicks Score (PT) 18  -SS 20  -MK    Highest Level of Mobility Goal 6 --> Walk 10 steps or more  - 6 --> Walk 10 steps or more  -      Row Name 05/06/25 1144          Functional Assessment    Outcome Measure Options AM-PAC 6 Clicks Basic Mobility (PT)  -               User Key  (r) = Recorded By, (t) = Taken By, (c) = Cosigned By      Initials Name Provider Type    Xuan Bear, RN Registered Nurse    Alexandra Armando PT Physical Therapist                                 Physical Therapy Education       Title: PT OT SLP Therapies (In Progress)       Topic: Physical Therapy (Done)       Point: Mobility training (Done)       Learning Progress Summary            Patient Tommy, E, ANGELES,DU,NR by  at 5/6/2025 1144    Comment: Reviewed safety/technique w/transfers,  ambulation, HEP, PT POC, stair navigation    Acceptance, E, NR by AC at 5/5/2025 1113    Acceptance, E, VU,NR by CD at 5/4/2025 1630    Comment: SEE FLOWSHEET    Acceptance, E, VU,NR by LO at 5/3/2025 1006    Comment: PT POC    Acceptance, E,D, VU,NR by AB at 5/2/2025 1122    Acceptance, E,D, VU,NR by AB at 5/1/2025 0959    Acceptance, E, VU by AC at 4/30/2025 1159    Acceptance, E,D, VU,NR by LR at 4/29/2025 1459    Comment: Educated on NWB status of R UE, precautions, safety with mobility, correct supine<->sit t/f technique, correct sit<->stand t/f technique, correct gait mechanics, and progression of POC.                      Point: Home exercise program (Done)       Learning Progress Summary            Patient Eager, E, VU,DU,NR by SS at 5/6/2025 1144    Comment: Reviewed safety/technique w/transfers, ambulation, HEP, PT POC, stair navigation    Acceptance, E, NR by AC at 5/5/2025 1113    Acceptance, E, VU,NR by CD at 5/4/2025 1630    Comment: SEE FLOWSHEET    Acceptance, E, VU,NR by LO at 5/3/2025 1006    Comment: PT POC    Acceptance, E,D, VU,NR by AB at 5/2/2025 1122    Acceptance, E,D, VU,NR by AB at 5/1/2025 0959    Acceptance, E, VU by AC at 4/30/2025 1159    Acceptance, E,D, VU,NR by LR at 4/29/2025 1459    Comment: Educated on NWB status of R UE, precautions, safety with mobility, correct supine<->sit t/f technique, correct sit<->stand t/f technique, correct gait mechanics, and progression of POC.                      Point: Body mechanics (Done)       Learning Progress Summary            Patient Eager, E, VU,DU,NR by SS at 5/6/2025 1144    Comment: Reviewed safety/technique w/transfers, ambulation, HEP, PT POC, stair navigation    Acceptance, E, NR by AC at 5/5/2025 1113    Acceptance, E, VU,NR by CD at 5/4/2025 1630    Comment: SEE FLOWSHEET    Acceptance, E, VU,NR by LO at 5/3/2025 1006    Comment: PT POC    Acceptance, E,D, VU,NR by AB at 5/2/2025 1122    Acceptance, E,D, VU,NR by AB at 5/1/2025 0950     Acceptance, E, VU by AC at 4/30/2025 1159    Acceptance, E,D, VU,NR by LR at 4/29/2025 1459    Comment: Educated on NWB status of R UE, precautions, safety with mobility, correct supine<->sit t/f technique, correct sit<->stand t/f technique, correct gait mechanics, and progression of POC.                      Point: Precautions (Done)       Learning Progress Summary            Patient Eager, E, VU,DU,NR by SS at 5/6/2025 1144    Comment: Reviewed safety/technique w/transfers, ambulation, HEP, PT POC, stair navigation    Acceptance, E, NR by AC at 5/5/2025 1113    Acceptance, E, VU,NR by CD at 5/4/2025 1630    Comment: SEE FLOWSHEET    Acceptance, E, VU,NR by LO at 5/3/2025 1006    Comment: PT POC    Acceptance, E,D, VU,NR by AB at 5/2/2025 1122    Acceptance, E,D, VU,NR by AB at 5/1/2025 0959    Acceptance, E, VU by AC at 4/30/2025 1159    Acceptance, E,D, VU,NR by LR at 4/29/2025 1459    Comment: Educated on NWB status of R UE, precautions, safety with mobility, correct supine<->sit t/f technique, correct sit<->stand t/f technique, correct gait mechanics, and progression of POC.                                      User Key       Initials Effective Dates Name Provider Type Discipline    CD 02/03/23 -  Leonarda Ruiz, PT Physical Therapist PT    LR 02/03/23 -  Kika Browning, PT Physical Therapist PT    LO 06/16/21 -  Kirti Garcia, PT Physical Therapist PT    SS 06/01/21 -  Alexandra Pratt, PT Physical Therapist PT    AB 09/22/22 -  Nazanin Carrington, PT Physical Therapist PT    AC 07/11/24 -  Keara aJne, PT Physical Therapist PT                  PT Recommendation and Plan     Progress: improving  Outcome Evaluation: Pt. continues to present below baseline function w/generalized weakness, balance deficits and acute NWB status of RUE affecting his ability to safely participate in functional mobility. He performed transfers, ambulated 200' and navigated steps w/contact guard assist. Activity limited by  fatigue. Pt. tolerated ther-ex well. Continue acute PT POC to progress as tolerated.     Time Calculation:         PT Charges       Row Name 05/06/25 1145             Time Calculation    Start Time 0901  -SS      PT Received On 05/06/25  -SS         Timed Charges    00426 - PT Therapeutic Exercise Minutes 10  -SS      49960 - Gait Training Minutes  15  -SS      71980 - PT Therapeutic Activity Minutes 2  -SS         Total Minutes    Timed Charges Total Minutes 27  -SS       Total Minutes 27  -SS                User Key  (r) = Recorded By, (t) = Taken By, (c) = Cosigned By      Initials Name Provider Type    SS Alexandra Pratt, MANOHAR Physical Therapist                  Therapy Charges for Today       Code Description Service Date Service Provider Modifiers Qty    83737704218 HC PT THER PROC EA 15 MIN 5/6/2025 Alexandra Pratt, PT GP 1    [id number] HC GAIT TRAINING EA 15 MIN 5/6/2025 Alexandra Pratt, PT GP 1            PT G-Codes  Outcome Measure Options: AM-PAC 6 Clicks Basic Mobility (PT)  AM-PAC 6 Clicks Score (PT): 18  AM-PAC 6 Clicks Score (OT): 14  PT Discharge Summary  Anticipated Discharge Disposition (PT): inpatient rehabilitation facility    Alexandra Pratt PT  5/6/2025

## 2025-05-06 NOTE — PLAN OF CARE
Goal Outcome Evaluation:  Plan of Care Reviewed With: patient        Progress: improving  Outcome Evaluation: Ready for discharge. Pt given discharge instructions and follow up appointment information. Prineo dressing is CDI to incision. Pt verbalizes understanding. Slng to deacon arm.

## 2025-05-06 NOTE — PLAN OF CARE
Goal Outcome Evaluation:  Plan of Care Reviewed With: patient        Progress: improving  Outcome Evaluation: Pt. continues to present below baseline function w/generalized weakness, balance deficits and acute NWB status of RUE affecting his ability to safely participate in functional mobility. He performed transfers, ambulated 200' and navigated steps w/contact guard assist. Activity limited by fatigue. Pt. tolerated ther-ex well. Continue acute PT POC to progress as tolerated.    Anticipated Discharge Disposition (PT): inpatient rehabilitation facility

## 2025-05-06 NOTE — THERAPY TREATMENT NOTE
"Patient Name: Wilbert Kelley  : 1960    MRN: [id number]                              Today's Date: 2025       Admit Date: 2025    Visit Dx:     ICD-10-CM ICD-9-CM   1. S/P reverse total shoulder arthroplasty, right  Z96.611 V43.61     Patient Active Problem List   Diagnosis    Shortness of breath    Type 2 diabetes mellitus with hyperglycemia, with long-term current use of insulin    Sleep apnea    Coronary artery disease of native artery of native heart with stable angina pectoris    S/P CABG x 3 on 3/21/2023    Hypertension    Hyperlipidemia    EMANUEL (acute kidney injury)    Paroxysmal atrial fibrillation    Bilateral pneumonia    Acute on chronic heart failure with preserved ejection fraction (HFpEF)    S/P reverse total shoulder arthroplasty, right    Obesity, Class III, BMI 40-49.9 (morbid obesity)     Past Medical History:   Diagnosis Date    Arthritis     BPH (benign prostatic hyperplasia)     Cancer     basal and melanoma-  x2 - left side ear and elbow    Constipation     Coronary artery disease     DDD (degenerative disc disease), lumbar     Diabetes mellitus     couple times month check sugar    Frozen shoulder     left    GERD (gastroesophageal reflux disease)     History of pneumonia 2025    Hyperlipidemia     Hypertension     Limited mobility     left shoulder  - possible frozen shoulder-= please be aware for surgery    Oxygen dependent     \"2 liters at night since pneumonia\" per wife    Rotator cuff injury     right shoulder 3/14/2025    Sleep apnea     insurance took cpap machine due to pt not using 6 hours per night.    Tinnitus     Wears glasses     small print     Past Surgical History:   Procedure Laterality Date    CARDIAC CATHETERIZATION      CARDIAC CATHETERIZATION Right 2023    Procedure: Left Heart Cath;  Surgeon: Jarod Boyle MD;  Location: [facility] CATH INVASIVE LOCATION;  Service: Cardiovascular;  Laterality: Right;    CATARACT EXTRACTION, BILATERAL Bilateral     " COLONOSCOPY      CORONARY ARTERY BYPASS GRAFT N/A 3/21/2023    Procedure: MEDIAN STERNOTOMY, CORONARY ARTERY BYPASS GRAFTING X 3, UTILIZING THE LEFT INTERNAL MAMMARY ARTERY, ENDOSCOPIC VEIN HARVEST OF THE RIGHT GREATER SAPENOUSE VEIN;  Surgeon: Markus Emanuel MD;  Location: [facility] OR;  Service: Cardiothoracic;  Laterality: N/A;    CORONARY STENT PLACEMENT      x4    ENDOSCOPY      FINGER SURGERY Left     middle finger - left side    SKIN CANCER EXCISION      x2    TOTAL SHOULDER ARTHROPLASTY W/ DISTAL CLAVICLE EXCISION Right 4/29/2025    Procedure: TOTAL SHOULDER REVERSE ARTHROPLASTY WITH BICEPS TENODESIS RIGHT;  Surgeon: Enzo Mckeon Jr., MD;  Location: [facility] OR;  Service: Orthopedics;  Laterality: Right;    WISDOM TOOTH EXTRACTION        General Information       Row Name 05/06/25 1549          OT Time and Intention    Document Type therapy note (daily note)  -AR     Mode of Treatment individual therapy;occupational therapy  -AR       Row Name 05/06/25 1549          General Information    Existing Precautions/Restrictions fall;right;shoulder;non-weight bearing;brace on at all times;other (see comments)  simple sling  -AR       Row Name 05/06/25 1549          Cognition    Orientation Status (Cognition) oriented x 4  -AR       Row Name 05/06/25 1549          Safety Issues/Impairments Affecting Functional Mobility    Safety Issues Affecting Function (Mobility) awareness of need for assistance;insight into deficits/self-awareness;judgment;problem-solving;safety precaution awareness;safety precautions follow-through/compliance  -AR     Impairments Affecting Function (Mobility) balance;endurance/activity tolerance;pain;strength;range of motion (ROM);postural/trunk control  -AR               User Key  (r) = Recorded By, (t) = Taken By, (c) = Cosigned By      Initials Name Provider Type    AR Dione Sadler, OT Occupational Therapist                     Mobility/ADL's       Row Name 05/06/25 9333           Sit-Stand Transfer    Sit-Stand [place] (Transfers) supervision  -AR       Row Name 05/06/25 1550          Stand-Sit Transfer    Stand-Sit [place] (Transfers) supervision  -AR       Row Name 05/06/25 1550          Activities of Daily Living    BADL Assessment/Intervention bathing;upper body dressing;lower body dressing;feeding  -AR       Row Name 05/06/25 1550          Mobility    Extremity Weight-bearing Status right upper extremity  -AR     Right Upper Extremity (Weight-bearing Status) non weight-bearing (NWB)  -AR       Row Name 05/06/25 1550          Bathing Assessment/Intervention    Comment, (Bathing) Reviewed R shoulder precautions and axilla care to maintain.  -AR       Row Name 05/06/25 1550          Upper Body Dressing Assessment/Training    [place] Level (Upper Body Dressing) doff;front opening garment;moderate assist (50% patient effort);don;pull-over garment;maximum assist (25% patient effort)  sling  -AR     Position (Upper Body Dressing) unsupported sitting  -AR     Comment, (Upper Body Dressing) No family at bedside for teaching, reviewed shoulder precautions, ADL retraining to maintain, sling management.  -AR       Row Name 05/06/25 1550          Lower Body Dressing Assessment/Training    [place] Level (Lower Body Dressing) don;pants/bottoms;moderate assist (50% patient effort)  -AR     Position (Lower Body Dressing) edge of bed sitting;unsupported standing  -AR       Row Name 05/06/25 1550          Self-Feeding Assessment/Training    [place] Level (Feeding) liquids to mouth;supervision  -AR     Position (Feeding) supported sitting  -AR               User Key  (r) = Recorded By, (t) = Taken By, (c) = Cosigned By      Initials Name Provider Type    Dione Alanis OT Occupational Therapist                   Obj/Interventions       Row Name 05/06/25 1551          Sensory Assessment (Somatosensory)    Sensory Assessment (Somatosensory) UE sensation intact  -[facility]  Name 05/06/25 1551          Shoulder (Therapeutic Exercise)    Shoulder AAROM (Therapeutic Exercise) right;flexion;extension;sitting;10 repetitions  -AR       Row Name 05/06/25 1551          Elbow/Forearm (Therapeutic Exercise)    Elbow/Forearm AROM (Therapeutic Exercise) right;extension;flexion;supination;pronation;sitting;10 repetitions  -AR       Row Name 05/06/25 1551          Wrist (Therapeutic Exercise)    Wrist AROM (Therapeutic Exercise) right;flexion;extension;10 repetitions  -AR       Row Name 05/06/25 1551          Hand (Therapeutic Exercise)    Hand AROM/AAROM (Therapeutic Exercise) right;AROM (active range of motion);finger flexion;finger extension;10 repetitions  -AR       Row Name 05/06/25 1551          Motor Skills    Therapeutic Exercise shoulder;elbow/forearm;wrist;hand  Reviewed written HEP and issued pulleys for home use, educated on use.  -AR       Row Name 05/06/25 1551          Balance    Balance Assessment sitting static balance;sitting dynamic balance;standing static balance;standing dynamic balance  -AR     Static Sitting Balance supervision  -AR     Dynamic Sitting Balance supervision  -AR     Position, Sitting Balance unsupported;sitting in chair  -AR     Static Standing Balance supervision  -AR     Dynamic Standing Balance supervision  -AR     Position/Device Used, Standing Balance unsupported  -AR               User Key  (r) = Recorded By, (t) = Taken By, (c) = Cosigned By      Initials Name Provider Type    Dione Alanis, OT Occupational Therapist                   Goals/Plan    No documentation.                  Clinical Impression       Row Name 05/06/25 1552          Pain Assessment    Pretreatment Pain Rating 7/10  -AR     Posttreatment Pain Rating 7/10  -AR     Pain Location shoulder  -AR     Pain Side/Orientation right;generalized  -AR     Pain Management Interventions activity modification encouraged;cold applied;exercise or physical activity utilized;movement  retraining implemented;positioning techniques utilized  -AR     Response to Pain Interventions activity participation with tolerable pain  -AR       Row Name 05/06/25 1552          Plan of Care Review    Plan of Care Reviewed With patient  -AR     Progress improving  -AR     Outcome Evaluation OT educated pt and family on shoulder precautions, ADL retraining to maintain, sling management and HEP. He tolerated R shoulder AAFE 120, issued pulleys and educated on use. Reviewed AE use to assist. Recommend IPR.  -AR       Row Name 05/06/25 1552          Therapy Plan Review/Discharge Plan (OT)    Anticipated Discharge Disposition (OT) inpatient rehabilitation facility  -AR       Row Name 05/06/25 1552          Vital Signs    Pre Patient Position Sitting  -AR     Intra Patient Position Standing  -AR     Post Patient Position Sitting  -AR       Row Name 05/06/25 1552          Positioning and Restraints    Pre-Treatment Position sitting in chair/recliner  -AR     Post Treatment Position chair  -AR     In Chair sitting;call light within reach;encouraged to call for assist;with brace  cold pack applied over incision  -AR               User Key  (r) = Recorded By, (t) = Taken By, (c) = Cosigned By      Initials Name Provider Type    Dione Alanis, OT Occupational Therapist                   Outcome Measures       Row Name 05/06/25 7021          How much help from another is currently needed...    Putting on and taking off regular lower body clothing? 2  -AR     Bathing (including washing, rinsing, and drying) 2  -AR     Toileting (which includes using toilet bed pan or urinal) 2  -AR     Putting on and taking off regular upper body clothing 2  -AR     Taking care of personal grooming (such as brushing teeth) 3  -AR     Eating meals 3  -AR     AM-PAC 6 Clicks Score (OT) 14  -AR       Row Name 05/06/25 1144 05/06/25 0735       How much help from another person do you currently need...    Turning from your back to your  side while in flat bed without using bedrails? 3  -SS 3  -MK    Moving from lying on back to sitting on the side of a flat bed without bedrails? 3  -SS 3  -MK    Moving to and from a bed to a chair (including a wheelchair)? 3  -SS 4  -MK    Standing up from a chair using your arms (e.g., wheelchair, bedside chair)? 3  -SS 4  -MK    Climbing 3-5 steps with a railing? 3  -SS 3  -MK    To walk in hospital room? 3  -SS 3  -MK    AM-PAC 6 Clicks Score (PT) 18  -SS 20  -MK    Highest Level of Mobility Goal 6 --> Walk 10 steps or more  -SS 6 --> Walk 10 steps or more  -MK      Row Name 05/06/25 1555 05/06/25 1144       Functional Assessment    Outcome Measure Options AM-PAC 6 Clicks Daily Activity (OT)  -AR AM-PAC 6 Clicks Basic Mobility (PT)  -SS              User Key  (r) = Recorded By, (t) = Taken By, (c) = Cosigned By      Initials Name Provider Type    Dione Alanis, OT Occupational Therapist    Xuan Bear, RN Registered Nurse    Alexandra Armando, PT Physical Therapist                    Occupational Therapy Education       Title: PT OT SLP Therapies (Done)       Topic: Occupational Therapy (Done)       Point: ADL training (Done)       Learning Progress Summary            Patient Eager, E,TB,D,H, VU,NR by AR at 5/6/2025 1555    Acceptance, E,TB,D,H, NR by AR at 5/5/2025 1149    Acceptance, E, NR by MR at 5/4/2025 1137    Acceptance, E, NR by MR at 5/3/2025 1102                      Point: Home exercise program (Done)       Learning Progress Summary            Patient Eager, E,TB,D,H, VU,NR by AR at 5/6/2025 1555    Acceptance, E,TB,D,H, NR by AR at 5/5/2025 1149    Acceptance, E, NR by MR at 5/4/2025 1137    Acceptance, E, NR by MR at 5/3/2025 1102                      Point: Precautions (Done)       Learning Progress Summary            Patient Eager, E,TB,D,H, VU,NR by AR at 5/6/2025 1555    Acceptance, E,TB,D,H, NR by AR at 5/5/2025 1149    Acceptance, E, NR by MR at 5/4/2025 1137    Acceptance,  E, NR by MR at 5/3/2025 1102                      Point: Body mechanics (Done)       Learning Progress Summary            Patient Eager, E,TB,D,H, VU,NR by AR at 5/6/2025 1555    Acceptance, E,TB,D,H, NR by AR at 5/5/2025 1149    Acceptance, E, NR by MR at 5/4/2025 1137    Acceptance, E, NR by MR at 5/3/2025 1102                                      User Key       Initials Effective Dates Name Provider Type Discipline    AR 07/11/23 -  Dione Sadler, OT Occupational Therapist OT    MR 09/22/22 -  Karen Drew OT Occupational Therapist OT                  OT Recommendation and Plan     Plan of Care Review  Plan of Care Reviewed With: patient  Progress: improving  Outcome Evaluation: OT educated pt and family on shoulder precautions, ADL retraining to maintain, sling management and HEP. He tolerated R shoulder AAFE 120, issued pulleys and educated on use. Reviewed AE use to assist. Recommend IPR.     Time Calculation:         Time Calculation- OT       Row Name 05/06/25 1555 05/06/25 1145          Time Calculation- OT    OT Start Time 1520  -AR --     OT Received On 05/06/25  -AR --     OT Goal Re-Cert Due Date 05/09/25  -AR --        Timed Charges    98714 - OT Therapeutic Exercise Minutes 16  -AR --     47639 - Gait Training Minutes  -- 15  -SS     32228 - OT Self Care/Mgmt Minutes 20  -AR --        Total Minutes    Timed Charges Total Minutes 36  -AR 15  -SS      Total Minutes 36  -AR 15  -SS               User Key  (r) = Recorded By, (t) = Taken By, (c) = Cosigned By      Initials Name Provider Type    AR Dione Sadler, OT Occupational Therapist    SS Alexandra Pratt PT Physical Therapist                  Therapy Charges for Today       Code Description Service Date Service Provider Modifiers Qty    22643011213 HC OT SELF CARE/MGMT/TRAIN EA 15 MIN 5/5/2025 Dione Sadler OT GO 2    06367954356 HC OT THER PROC EA 15 MIN 5/5/2025 Dione Sadler OT GO 1    40959719100 HC OT SELF  CARE/MGMT/TRAIN EA 15 MIN 5/6/2025 Dione Sadler, OT GO 1    77880969627 HC OT THER PROC EA 15 MIN 5/6/2025 Dione Sadler OT GO 1                 Dione Sadler OT  5/6/2025

## 2025-05-06 NOTE — DISCHARGE SUMMARY
Patient Name: Wilbert Kelley  MRN: [id number]  : 1960  DOS: 2025    Attending: Enzo Mckeon Jr., MD    Primary Care Provider: Jose M Simon MD    Date of Admission:.2025  7:25 AM    Date of Discharge:  2025    Discharge Diagnosis:   S/P reverse total shoulder arthroplasty, right    Type 2 diabetes mellitus with hyperglycemia, with long-term current use of insulin    Sleep apnea    S/P CABG x 3 on 3/21/2023    Hypertension    Hyperlipidemia    Paroxysmal atrial fibrillation    Obesity, Class III, BMI 40-49.9 (morbid obesity)      Hospital Course    At admit:  Patient is a pleasant 64 y.o. male presented for scheduled surgery by .      He has a long history of right shoulder pain and limited range of motion.  He had a fall on 3/24/2025 and landed on the right shoulder injuring it further.  He has had numbness, tingling and difficulty with  right hand.  Conservative treatments failed to provide lasting benefit.      He underwent right reverse total shoulder arthroplasty and right biceps tenodesis, surgery was done under GA and a block, he tolerated surgery well, and was admitted for further management.  I saw him in PACU he is doing fairly well.  No complaints of nausea, vomiting, or shortness of breath.  He has some postoperative pain.        Reviewed with patient his past medical history and home medications.  He is on a large dose of insulin both long-acting and short acting.  His long-acting is Tresiba 80 units twice daily.  He is short acting lispro is 60 units with meals 3 times daily.  He is on chronic anticoagulation.  This is on hold for surgery.  He has peripheral neuropathy.  He has history of sleep apnea but not on CPAP currently.  He is on home oxygen at 2 L.  He has coronary artery disease with prior CABG.  This is stable currently.  He is on tamsulosin for BPH.    After admit:  Patient was provided pain medications as needed for pain control, along with  interscalene nerve block infusion of Ropivacaine. Block cath has since been removed.    Adjustments were made to pain medications to optimize postop pain management.     Risks and benefits of opiate medications discussed with patient. BENIGNO report on chart was reviewed.    The patient was seen by OT and was taught exercises for right arm.  The patient used an IS for atelectasis prophylaxis and mechanicals for DVT prophylaxis.  Apixaban was resumed during his stay.    Home medications were resumed as appropriate, and labs were monitored and remained fairly stable.     With the progress he has made, Mr. Kelley is ready for DC home today.  Inpatient rehab was recommended initially.  This was declined by patient's insurance.    Discussed with patient regarding plan and he alternate Motrin shows understanding and agreement.        Procedures Performed  Procedure(s):  TOTAL SHOULDER REVERSE ARTHROPLASTY WITH BICEPS TENODESIS RIGHT       Pertinent Test Results:    I reviewed the patient's new clinical results.   Results from last 7 days   Lab Units 04/30/25 2026   HEMOGLOBIN g/dL 10.3*   HEMATOCRIT % 35.0*     Results from last 7 days   Lab Units 04/30/25 2026   SODIUM mmol/L 136   POTASSIUM mmol/L 3.8   CHLORIDE mmol/L 98   CO2 mmol/L 27.0   BUN mg/dL 29*   CREATININE mg/dL 1.20   CALCIUM mg/dL 9.0   GLUCOSE mg/dL 103*     I reviewed the patient's new imaging including images and reports.      Occupational Therapy    Date of Service: 05/06/25 1555  Creation Time: 05/06/25 1555     Signed         Goal Outcome Evaluation:  Plan of Care Reviewed With: patient  Progress: improving  Outcome Evaluation: OT educated pt and family on shoulder precautions, ADL retraining to maintain, sling management and HEP. He tolerated R shoulder AAFE 120, issued pulleys and educated on use. Reviewed AE use to assist. Recommend IPR.     Anticipated Discharge Disposition (OT): inpatient rehabilitation facility                Physical  "therapy    Date of Service: 25 09  Creation Time: 25 1145     Signed         Goal Outcome Evaluation:  Plan of Care Reviewed With: patient  Progress: improving  Outcome Evaluation: Pt. continues to present below baseline function w/generalized weakness, balance deficits and acute NWB status of RUE affecting his ability to safely participate in functional mobility. He performed transfers, ambulated 200' and navigated steps w/contact guard assist. Activity limited by fatigue. Pt. tolerated ther-ex well. Continue acute PT POC to progress as tolerated.     Anticipated Discharge Disposition (PT): inpatient rehabilitation facility                Discharge Assessment:       Visit Vitals  /75 (BP Location: Left arm, Patient Position: Sitting)   Pulse 66   Temp 97.9 °F (36.6 °C) (Oral)   Resp 18   Ht 185.4 cm (73\")   Wt (!) 150 kg (331 lb 2.1 oz)   SpO2 99%   BMI 43.69 kg/m²     Temp (24hrs), Av °F (36.7 °C), Min:97.6 °F (36.4 °C), Max:98.9 °F (37.2 °C)      General Appearance:    Alert, cooperative, in no acute distress   Lungs:     Clear to auscultation,respirations regular, even and   unlabored    Heart:    Regular rhythm and normal rate, normal S1 and S2    Abdomen:     Normal bowel sounds, no masses, no organomegaly, soft        nontender, nondistended, no guarding, no rebound                 tenderness   Extremities: Right UE in a sling, CDI  dressing .  Distal pulses, cap refill,  intact. Movement and sensation intact distally.  Mild edema of the hand     Pulses:   Pulses palpable and equal bilaterally   Skin:   No bleeding, bruising or rash        Discharge Disposition: Home          Discharge Medications        New Medications        Instructions Start Date   HYDROcodone-acetaminophen 7.5-325 MG per tablet  Commonly known as: NORCO   1 tablet, Oral, Every 4 Hours PRN             Continue These Medications        Instructions Start Date   apixaban 5 MG tablet tablet  Commonly known as: " ELIQUIS   5 mg, Oral, 2 Times Daily      aspirin 81 MG EC tablet   81 mg, Oral, Daily      atorvastatin 40 MG tablet  Commonly known as: LIPITOR   40 mg, Oral, Daily      bumetanide 2 MG tablet  Commonly known as: BUMEX   2 mg, Daily      DULoxetine 60 MG capsule  Commonly known as: CYMBALTA   60 mg, Daily      fenofibrate 160 MG tablet   160 mg, Oral, Daily      gabapentin 800 MG tablet  Commonly known as: NEURONTIN       HUMALOG SC   60 Units, 3 Times Daily Before Meals      hydroCHLOROthiazide 12.5 MG capsule  Commonly known as: MICROZIDE   12.5 mg, Daily      Lantus SoloStar 100 UNIT/ML injection pen  Generic drug: Insulin Glargine   100 Units, Subcutaneous, Daily      lisinopril 40 MG tablet  Commonly known as: PRINIVIL,ZESTRIL   40 mg, Oral, 2 Times Daily      metFORMIN 1000 MG tablet  Commonly known as: GLUCOPHAGE   1,000 mg, 2 Times Daily With Meals      nebivolol 10 MG tablet  Commonly known as: BYSTOLIC   10 mg, Oral, Daily      Ozempic (0.25 or 0.5 MG/DOSE) 2 MG/1.5ML solution pen-injector  Generic drug: Semaglutide(0.25 or 0.5MG/DOS)   0.25 mg, Weekly      pantoprazole 40 MG EC tablet  Commonly known as: PROTONIX   40 mg, Daily      tamsulosin 0.4 MG capsule 24 hr capsule  Commonly known as: FLOMAX   0.4 mg, Oral, Daily             Stop These Medications      TRESIBA SC          Patient will resume his diabetic medication he was on at home.  Will monitor blood glucose closely and adjust doses down if his diet improves leading to decreased blood sugars.  Discussed that with patient and his wife      Diet Instructions    Cardiac Consistent Carbohydrate diet as tolerated             Activity Instructions    Activity as instructed by your physician         SHIELA STILL, ROM elbow, wrist, and hand per 's instructions.      Future Appointments   Date Time Provider Department Center   5/27/2025  1:00 PM Lia Diaz APRN [facility]   6/19/2025  2:30 PM Camila Castorena APRN [facility]    10/7/2025  2:00 PM Camila Castorena APRN [facility]            Reece Moreira MD  05/06/25  12:12 EDT

## 2025-05-06 NOTE — PROGRESS NOTES
"Orthopedic Daily Progress Note      CC: Right shoulder pain    Patient denied rehab today by insurance. Plan is to now go home with wife.    Physical Exam:  I have reviewed the vital signs.  Temp:  [97.6 °F (36.4 °C)-98.9 °F (37.2 °C)] 97.6 °F (36.4 °C)  Heart Rate:  [61-65] 61  Resp:  [18] 18  BP: (124-148)/(59-77) 132/77    Objective:  Vital signs: (most recent): Blood pressure 132/77, pulse 61, temperature 97.6 °F (36.4 °C), temperature source Oral, resp. rate 18, height 185.4 cm (73\"), weight (!) 150 kg (331 lb 2.1 oz), SpO2 96%.              General Appearance:    Alert, cooperative, no distress  Extremities: No clubbing, cyanosis, or edema to lower extremities  Pulses:  2+ in distal surgical extremity  Skin: Incision Clean/dry/intact      Results Review:    I have reviewed the labs, radiology results and diagnostic studies:no new labs this AM    Results from last 7 days   Lab Units 04/30/25 2026   HEMOGLOBIN g/dL 10.3*     Results from last 7 days   Lab Units 04/30/25 2026   SODIUM mmol/L 136   POTASSIUM mmol/L 3.8   CO2 mmol/L 27.0   CREATININE mg/dL 1.20   GLUCOSE mg/dL 103*       I have reviewed the medications.    Assessment/Problem List  POD# 7 Days Post-Op   S/p R RTSA with biceps tenodesis    Plan  NWB RUE with AAFE to 150. No ER past zero.AROM at elbow and wrist as tolerated  PT/OT      Discharge Planning: I expect patient to be discharged to home in 0 days.    Enzo Mckeon Jr., MD  Covering for Dr. Mckeon  05/06/25  11:35 EDT            "

## 2025-05-06 NOTE — PLAN OF CARE
Goal Outcome Evaluation:  Plan of Care Reviewed With: patient        Progress: improving  Outcome Evaluation: OT educated pt and family on shoulder precautions, ADL retraining to maintain, sling management and HEP. He tolerated R shoulder AAFE 120, issued pulleys and educated on use. Reviewed AE use to assist. Recommend IPR.    Anticipated Discharge Disposition (OT): inpatient rehabilitation facility

## 2025-05-06 NOTE — PLAN OF CARE
Problem: Adult Inpatient Plan of Care  Goal: Absence of Hospital-Acquired Illness or Injury  Intervention: Identify and Manage Fall Risk  Recent Flowsheet Documentation  Taken 5/6/2025 0220 by Nguyen Soriano RN  Safety Promotion/Fall Prevention:   activity supervised   assistive device/personal items within reach   clutter free environment maintained   lighting adjusted   nonskid shoes/slippers when out of bed   room organization consistent   safety round/check completed  Taken 5/6/2025 0046 by Nguyen Soriano RN  Safety Promotion/Fall Prevention:   activity supervised   assistive device/personal items within reach   clutter free environment maintained   lighting adjusted   nonskid shoes/slippers when out of bed   room organization consistent   safety round/check completed  Taken 5/5/2025 2206 by Nguyen Soriano RN  Safety Promotion/Fall Prevention:   activity supervised   assistive device/personal items within reach   clutter free environment maintained   lighting adjusted   nonskid shoes/slippers when out of bed   room organization consistent   safety round/check completed  Taken 5/5/2025 2050 by Nguyen Soriano RN  Safety Promotion/Fall Prevention:   activity supervised   assistive device/personal items within reach   clutter free environment maintained   lighting adjusted   nonskid shoes/slippers when out of bed   room organization consistent   safety round/check completed  Intervention: Prevent Skin Injury  Recent Flowsheet Documentation  Taken 5/6/2025 0220 by Nguyen Soriano RN  Body Position: legs elevated  Skin Protection: incontinence pads utilized  Taken 5/6/2025 0046 by Nguyen Soriano RN  Body Position: legs elevated  Skin Protection: incontinence pads utilized  Taken 5/5/2025 2206 by Nguyen Soriano RN  Body Position: legs elevated  Skin Protection: incontinence pads utilized  Taken 5/5/2025 2050 by Nguyen Soriano RN  Body Position: legs elevated  Skin Protection:   incontinence pads utilized   silicone foam  dressing in place  Intervention: Prevent and Manage VTE (Venous Thromboembolism) Risk  Recent Flowsheet Documentation  Taken 5/6/2025 0220 by Nguyen Soriano RN  VTE Prevention/Management: (eliquis) other (see comments)  Taken 5/6/2025 0046 by Nguyen Soriano RN  VTE Prevention/Management: (eliquis) other (see comments)  Taken 5/5/2025 2206 by Nguyen Soriano RN  VTE Prevention/Management: (eliquis) other (see comments)  Taken 5/5/2025 2050 by Nguyen Soriano RN  VTE Prevention/Management: (eliquis) other (see comments)  Intervention: Prevent Infection  Recent Flowsheet Documentation  Taken 5/6/2025 0220 by Nguyen Soriano RN  Infection Prevention:   environmental surveillance performed   hand hygiene promoted   rest/sleep promoted   single patient room provided  Taken 5/6/2025 0046 by Nguyen Soriano RN  Infection Prevention:   environmental surveillance performed   hand hygiene promoted   rest/sleep promoted   single patient room provided  Taken 5/5/2025 2206 by Nguyen Soriano RN  Infection Prevention:   environmental surveillance performed   hand hygiene promoted   rest/sleep promoted   single patient room provided  Goal: Optimal Comfort and Wellbeing  Intervention: Monitor Pain and Promote Comfort  Recent Flowsheet Documentation  Taken 5/6/2025 0220 by Nguyen Soriano RN  Pain Management Interventions:   medication offered but refused   no interventions per patient request  Taken 5/6/2025 0046 by Nguyen Soriano RN  Pain Management Interventions: medication offered but refused  Taken 5/5/2025 2050 by Nguyen Soriano RN  Pain Management Interventions:   pain management plan reviewed with patient/caregiver   medication offered but refused   position adjusted   pillow support provided  Intervention: Provide Person-Centered Care  Recent Flowsheet Documentation  Taken 5/5/2025 2050 by Nguyen Soriano RN  Trust Relationship/Rapport:   care explained   choices provided   emotional support provided   empathic listening provided   questions  answered   questions encouraged   reassurance provided   thoughts/feelings acknowledged   Goal Outcome Evaluation:               A&OX4 RA RUE in sling. Pt ambulating with slightly unsteady gait. Pain controlled with PO medication. Sleeping up in recliner due to difficulty getting OOB safely with 1 arm. Voiding with out difficulty. CM working on CH placement.

## 2025-05-06 NOTE — CASE MANAGEMENT/SOCIAL WORK
Continued Stay Note  [facility]     Patient Name: Wilbert Kelley  MRN: [id number]  Today's Date: 5/6/2025    Admit Date: 4/29/2025    Plan: [facility], pending outcome of P2P   Discharge Plan       Row Name 05/06/25 0935       Plan    Plan Comments Pt was denied for acute rehab and after appeal denial was upheld. Pt is currently able to ambualte 150 feet. CM spoke with pt and wife and they report their plan for discharge is to return home. CM will follow for any other dc needs.    Final Discharge Disposition Code 01 - home or self-care                   Discharge Codes    No documentation.                 Expected Discharge Date and Time       Expected Discharge Date Expected Discharge Time    May 3, 2025               Gayathri Rasheed RN

## 2025-05-21 DIAGNOSIS — I48.0 PAROXYSMAL ATRIAL FIBRILLATION: ICD-10-CM

## 2025-05-27 ENCOUNTER — OFFICE VISIT (OUTPATIENT)
Dept: ENDOCRINOLOGY | Facility: CLINIC | Age: 65
End: 2025-05-27
Payer: MEDICARE

## 2025-05-27 VITALS
OXYGEN SATURATION: 95 % | SYSTOLIC BLOOD PRESSURE: 153 MMHG | HEART RATE: 82 BPM | DIASTOLIC BLOOD PRESSURE: 75 MMHG | BODY MASS INDEX: 42.22 KG/M2 | WEIGHT: 315 LBS

## 2025-05-27 DIAGNOSIS — Z79.4 TYPE 2 DIABETES MELLITUS WITH HYPERGLYCEMIA, WITH LONG-TERM CURRENT USE OF INSULIN: Primary | ICD-10-CM

## 2025-05-27 DIAGNOSIS — E11.65 TYPE 2 DIABETES MELLITUS WITH HYPERGLYCEMIA, WITH LONG-TERM CURRENT USE OF INSULIN: Primary | ICD-10-CM

## 2025-05-27 LAB
EXPIRATION DATE: ABNORMAL
HBA1C MFR BLD: 7.2 % (ref 4.5–5.7)
Lab: ABNORMAL

## 2025-05-27 PROCEDURE — 82570 ASSAY OF URINE CREATININE: CPT | Performed by: NURSE PRACTITIONER

## 2025-05-27 PROCEDURE — 82043 UR ALBUMIN QUANTITATIVE: CPT | Performed by: NURSE PRACTITIONER

## 2025-05-27 RX ORDER — INSULIN ASPART 100 [IU]/ML
INJECTION, SOLUTION INTRAVENOUS; SUBCUTANEOUS
COMMUNITY
Start: 2025-04-15

## 2025-05-27 RX ORDER — INSULIN DEGLUDEC 200 U/ML
INJECTION, SOLUTION SUBCUTANEOUS
COMMUNITY
Start: 2025-04-15

## 2025-05-27 RX ORDER — CEPHALEXIN 500 MG/1
500 CAPSULE ORAL 2 TIMES DAILY
COMMUNITY
Start: 2025-05-16

## 2025-05-27 RX ORDER — ACYCLOVIR 400 MG/1
TABLET ORAL
COMMUNITY
Start: 2025-05-06

## 2025-05-27 RX ORDER — POTASSIUM CHLORIDE 750 MG/1
TABLET, EXTENDED RELEASE ORAL EVERY 24 HOURS
COMMUNITY

## 2025-05-27 RX ORDER — HYDROXYZINE HYDROCHLORIDE 25 MG/1
TABLET, FILM COATED ORAL EVERY 24 HOURS
COMMUNITY
Start: 2025-05-16

## 2025-05-27 NOTE — PROGRESS NOTES
"Chief Complaint   Patient presents with    Diabetes     F/u last A1c 03/28/25 value 6.8        Referring Provider  No ref. provider found     HPI   Wilbert Kelley is a 64 y.o. male had concerns including Diabetes (F/u last A1c 03/28/25 value 6.8).    T2DM.     He is doing well.  He has noted some improving BG's.  He is tolerating his meds well and has been taking them regularly without missing doses.     Diabetes:  Diabetes was diagnosed [id number].  Complications include neuropathy, CABG x4 in 2023.  Last ophtho exam was 2025-Dr Darby.  Current medications for diabetes include Tresiba 80 units BID, Humalog 60 units TID with meals, Metformin 1000 mg BID, Ozempic 1 mg weekly.  Previous meds: none  Glucagon: Last use: none  He checks his blood sugar Dexcom CGM.   Hypos:rarely    ACE/ARB:yes, Statin: yes  Labs:  Lipid Panel:utd   RICHARD: utd    The following portions of the patient's history were reviewed and updated as appropriate: allergies, current medications, past family history, past medical history, past social history, past surgical history, and problem list.    Diet:  Breakfast: skips usually  Lunch: sandwich or something else small  Dinner: meat, potatoes  Drinks: zero sugar pop, water  Snacks:chips, crackers  His schedule is mostly night-time.  He eats first meal of the day at 8 pm then another meal 12-1 am. Snacks some.  Past Medical History:   Diagnosis Date    Arthritis     BPH (benign prostatic hyperplasia)     Cancer     basal and melanoma-  x2 - left side ear and elbow    Constipation     Coronary artery disease     DDD (degenerative disc disease), lumbar     Diabetes mellitus     couple times month check sugar    Frozen shoulder     left    GERD (gastroesophageal reflux disease)     History of pneumonia 02/2025    Hyperlipidemia     Hypertension     Limited mobility     left shoulder  - possible frozen shoulder-= please be aware for surgery    Oxygen dependent     \"2 liters at night since pneumonia\" per " wife    Rotator cuff injury     right shoulder 3/14/2025    Sleep apnea     insurance took cpap machine due to pt not using 6 hours per night.    Tinnitus     Wears glasses     small print     Past Surgical History:   Procedure Laterality Date    CARDIAC CATHETERIZATION      CARDIAC CATHETERIZATION Right 03/16/2023    Procedure: Left Heart Cath;  Surgeon: Jarod Boyle MD;  Location:  [facility];  Service: Cardiovascular;  Laterality: Right;    CATARACT EXTRACTION, BILATERAL Bilateral     COLONOSCOPY      CORONARY ARTERY BYPASS GRAFT N/A 3/21/2023    Procedure: MEDIAN STERNOTOMY, CORONARY ARTERY BYPASS GRAFTING X 3, UTILIZING THE LEFT INTERNAL MAMMARY ARTERY, ENDOSCOPIC VEIN HARVEST OF THE RIGHT GREATER SAPENOUSE VEIN;  Surgeon: Markus Emanuel MD;  Location:  [facility];  Service: Cardiothoracic;  Laterality: N/A;    CORONARY STENT PLACEMENT      x4    ENDOSCOPY      FINGER SURGERY Left     middle finger - left side    SKIN CANCER EXCISION      x2    TOTAL SHOULDER ARTHROPLASTY W/ DISTAL CLAVICLE EXCISION Right 4/29/2025    Procedure: TOTAL SHOULDER REVERSE ARTHROPLASTY WITH BICEPS TENODESIS RIGHT;  Surgeon: Enzo Mckeon Jr., MD;  Location:  [facility];  Service: Orthopedics;  Laterality: Right;    WISDOM TOOTH EXTRACTION        Family History   Problem Relation Age of Onset    COPD Mother     Heart attack Father 45        passed    Diabetes Paternal Grandmother     Heart attack Paternal Grandfather 42        massive heart attack    Heart disease Paternal Grandfather       Social History     Socioeconomic History    Marital status:     Number of children: 1   Tobacco Use    Smoking status: Never     Passive exposure: Past    Smokeless tobacco: Current     Types: Snuff   Vaping Use    Vaping status: Never Used   Substance and Sexual Activity    Alcohol use: Not Currently    Drug use: Never    Sexual activity: Defer      Allergies   Allergen Reactions    Morphine Nausea And Vomiting      projectile    Adhesive Tape Rash     Pt states sticky tabs cause irritation when left on long.  PAPER TAPE IS OK.       Current Outpatient Medications on File Prior to Visit   Medication Sig Dispense Refill    apixaban (ELIQUIS) 5 MG tablet tablet Take 1 tablet by mouth 2 (Two) Times a Day. 56 tablet 0    aspirin 81 MG EC tablet Take 1 tablet by mouth Daily. 90 tablet 3    atorvastatin (LIPITOR) 40 MG tablet Take 1 tablet by mouth Daily. 90 tablet 12    bumetanide (BUMEX) 2 MG tablet Take 1 tablet by mouth Daily. (Patient taking differently: Take 1 tablet by mouth Daily. Takes prn)      cephalexin (KEFLEX) 500 MG capsule Take 1 capsule by mouth 2 (Two) Times a Day.      Continuous Glucose Sensor (Dexcom G7 Sensor) misc CHANGE every 10 DAYS as directed      DULoxetine (CYMBALTA) 60 MG capsule Take 1 capsule by mouth Daily.      fenofibrate 160 MG tablet Take 1 tablet by mouth Daily. 90 tablet 12    gabapentin (NEURONTIN) 800 MG tablet       hydroCHLOROthiazide (MICROZIDE) 12.5 MG capsule Take 1 capsule by mouth Daily.      HYDROcodone-acetaminophen (NORCO) 7.5-325 MG per tablet Take 1 tablet by mouth Every 4 (Four) Hours As Needed for Mild Pain or Moderate Pain. 24 tablet 0    hydrOXYzine (ATARAX) 25 MG tablet Daily.      lisinopril (PRINIVIL,ZESTRIL) 40 MG tablet Take 1 tablet by mouth 2 (Two) Times a Day. 60 tablet 11    metFORMIN (GLUCOPHAGE) 1000 MG tablet Take 1 tablet by mouth 2 (Two) Times a Day With Meals.      nebivolol (BYSTOLIC) 10 MG tablet Take 1 tablet by mouth Daily. 30 tablet 12    NovoLOG FlexPen 100 UNIT/ML solution pen-injector sc pen Inject 60 units 3 times a day by subcutaneous route before meals for 30 days.      pantoprazole (PROTONIX) 40 MG EC tablet Take 1 tablet by mouth Daily.      potassium chloride 10 MEQ CR tablet Daily.      Semaglutide,0.25 or 0.5MG/DOS, (Ozempic, 0.25 or 0.5 MG/DOSE,) 2 MG/1.5ML solution pen-injector Inject 0.25 mg under the skin into the appropriate area as directed  1 (One) Time Per Week. (Patient taking differently: Inject 1 mg under the skin into the appropriate area as directed 1 (One) Time Per Week.)      tamsulosin (FLOMAX) 0.4 MG capsule 24 hr capsule Take 1 capsule by mouth Daily. 30 capsule 0    Tresiba FlexTouch 200 UNIT/ML solution pen-injector pen injection Inject 80 units twice a day by subcutaneous route for 30 days.      Insulin Lispro (HUMALOG SC) Inject 60 Units under the skin into the appropriate area as directed 3 (Three) Times a Day Before Meals. (Patient not taking: Reported on 5/27/2025)       No current facility-administered medications on file prior to visit.        Review of Systems   Constitutional:  Positive for fatigue and unexpected weight gain.   Eyes: Negative.    Endocrine: Positive for polydipsia, polyphagia and polyuria.   Musculoskeletal:  Positive for back pain and neck pain.   Psychiatric/Behavioral:  Positive for sleep disturbance.    All other systems reviewed and are negative.       /75 (BP Location: Left arm, Patient Position: Sitting, Cuff Size: Adult)   Pulse 82   Wt (!) 145 kg (320 lb)   SpO2 95%   BMI 42.22 kg/m²      Physical Exam  Vitals reviewed.   Constitutional:       Appearance: Normal appearance.      Comments: Using cane to ambulate d/t pain.   Eyes:      Extraocular Movements: Extraocular movements intact.   Neck:      Thyroid: No thyroid mass, thyromegaly or thyroid tenderness.   Cardiovascular:      Rate and Rhythm: Normal rate.   Pulmonary:      Effort: Pulmonary effort is normal.   Feet:      Right foot:      Skin integrity: Skin integrity normal.      Left foot:      Skin integrity: Skin integrity normal.   Skin:     Findings: No abrasion or wound.   Neurological:      General: No focal deficit present.      Mental Status: He is alert and oriented to person, place, and time.   Psychiatric:         Mood and Affect: Mood normal.         Behavior: Behavior normal.         Thought Content: Thought content normal.    "      Judgment: Judgment normal.       CMP:  Lab Results   Component Value Date    BUN 29 (H) 04/30/2025    CREATININE 1.20 04/30/2025    BCR 24.2 04/30/2025     04/30/2025    K 3.8 04/30/2025    CO2 27.0 04/30/2025    CALCIUM 9.0 04/30/2025    ALBUMIN 3.3 (L) 05/11/2023    AGRATIO 0.7 05/11/2023    BILITOT 1.0 05/11/2023    ALKPHOS 106 05/11/2023    AST 16 05/11/2023    ALT 27 05/11/2023     Lipid Panel:  Lab Results   Component Value Date    CHOL 71 03/22/2023    TRIG 185 (H) 03/22/2023    HDL 18 (L) 03/22/2023    VLDL 30 03/22/2023    LDL 23 03/22/2023     HbA1c:  Lab Results   Component Value Date    HGBA1C 7.2 (A) 05/27/2025      Glucose:  Lab Results   Component Value Date    POCGLU 166 (H) 05/06/2025     Microalbumin:  Lab Results   Component Value Date    MALBCRERATIO 26.2 05/27/2025     TSH:  No results found for: \"TSH\"    Assessment and Plan    Diagnoses and all orders for this visit:    1. Type 2 diabetes mellitus with hyperglycemia, with long-term current use of insulin (Primary)  Assessment & Plan:  -Diabetes is improving with A1c 7.2.  -Discussed dietary and exercise guidelines with patient and family.  -Discussed the importance of yearly eye exams.  -Discussed the importance of checking BG's regularly.  Continue using CGM.  2 week data download is as follows:  Very High: 13%  High:38%  In Range:49%  Low: 0%  Very Low:0%  Trend shows overall hypers with post meal hypers as well.   -Continue Tresiba 80 units BID.  -Continue Humalog 60 units TID with meals.  -Continue Metformin 1000 mg BID.  -Continue Ozempic 1 mg weekly. Patient has no personal history of pancreatitis, no family history of MEN syndrome or medullary thyroid cancer. Possible side effects including nausea, bloating, other GI upset and rarely pancreatitis were discussed. He was advised to call the office with any symptoms or concerns.   -He has to make improvements in his diet which his wife reports that they are improving. "   -Discussed long term risks of untreated/undertreated diabetes including retinopathy, neuropathy, nephropathy, amputations, hospitalizations, MI, CVA.  -Discussed Glucagon use and appropriate timing for this.   -S/S hypoglycemia reviewed with Rule of 15's advised.  -Follow-up in 3 months.    Orders:  -     POC Glycosylated Hemoglobin (Hb A1C)  -     Microalbumin / Creatinine Urine Ratio - Urine, Clean Catch         Return in about 3 months (around 8/27/2025) for Follow-up appointment, A1C. The patient was instructed to contact the clinic with any interval questions or concerns.        This document has been electronically signed by MALINDA Lara  May 30, 2025 11:27 EDT   Endocrinology    Please note that portions of this document were completed with a voice recognition program. Efforts were made to edit the dictations, but occasionally words are mis-transcribed.

## 2025-05-28 LAB
ALBUMIN UR-MCNC: 3.5 MG/DL
CREAT UR-MCNC: 133.5 MG/DL
MICROALBUMIN/CREAT UR: 26.2 MG/G (ref 0–29)

## 2025-05-30 NOTE — ASSESSMENT & PLAN NOTE
-Diabetes is improving with A1c 7.2.  -Discussed dietary and exercise guidelines with patient and family.  -Discussed the importance of yearly eye exams.  -Discussed the importance of checking BG's regularly.  Continue using CGM.  2 week data download is as follows:  Very High: 13%  High:38%  In Range:49%  Low: 0%  Very Low:0%  Trend shows overall hypers with post meal hypers as well.   -Continue Tresiba 80 units BID.  -Continue Humalog 60 units TID with meals.  -Continue Metformin 1000 mg BID.  -Continue Ozempic 1 mg weekly. Patient has no personal history of pancreatitis, no family history of MEN syndrome or medullary thyroid cancer. Possible side effects including nausea, bloating, other GI upset and rarely pancreatitis were discussed. He was advised to call the office with any symptoms or concerns.   -He has to make improvements in his diet which his wife reports that they are improving.   -Discussed long term risks of untreated/undertreated diabetes including retinopathy, neuropathy, nephropathy, amputations, hospitalizations, MI, CVA.  -Discussed Glucagon use and appropriate timing for this.   -S/S hypoglycemia reviewed with Rule of 15's advised.  -Follow-up in 3 months.

## 2025-06-24 ENCOUNTER — APPOINTMENT (OUTPATIENT)
Dept: GENERAL RADIOLOGY | Facility: HOSPITAL | Age: 65
End: 2025-06-24
Payer: MEDICARE

## 2025-06-24 ENCOUNTER — ANESTHESIA EVENT CONVERTED (OUTPATIENT)
Dept: ANESTHESIOLOGY | Facility: HOSPITAL | Age: 65
End: 2025-06-24
Payer: MEDICARE

## 2025-06-24 ENCOUNTER — ANESTHESIA EVENT (OUTPATIENT)
Dept: PERIOP | Facility: HOSPITAL | Age: 65
End: 2025-06-24
Payer: MEDICARE

## 2025-06-24 ENCOUNTER — ANESTHESIA (OUTPATIENT)
Dept: PERIOP | Facility: HOSPITAL | Age: 65
End: 2025-06-24
Payer: MEDICARE

## 2025-06-24 ENCOUNTER — HOSPITAL ENCOUNTER (INPATIENT)
Facility: HOSPITAL | Age: 65
LOS: 1 days | Discharge: HOME | End: 2025-06-24
Attending: ORTHOPAEDIC SURGERY | Admitting: ORTHOPAEDIC SURGERY
Payer: MEDICARE

## 2025-06-24 VITALS
RESPIRATION RATE: 12 BRPM | SYSTOLIC BLOOD PRESSURE: 135 MMHG | BODY MASS INDEX: 41.75 KG/M2 | WEIGHT: 315 LBS | DIASTOLIC BLOOD PRESSURE: 60 MMHG | HEART RATE: 67 BPM | TEMPERATURE: 97.5 F | HEIGHT: 73 IN | OXYGEN SATURATION: 92 %

## 2025-06-24 DIAGNOSIS — Z96.611 S/P REVERSE TOTAL SHOULDER ARTHROPLASTY, RIGHT: ICD-10-CM

## 2025-06-24 PROBLEM — T84.028A UNSTABLE REVERSE TOTAL SHOULDER ARTHROPLASTY: Status: ACTIVE | Noted: 2025-06-24

## 2025-06-24 PROBLEM — Z96.619 UNSTABLE REVERSE TOTAL SHOULDER ARTHROPLASTY: Status: ACTIVE | Noted: 2025-06-24

## 2025-06-24 LAB
GLUCOSE BLDC GLUCOMTR-MCNC: 106 MG/DL (ref 70–130)
GLUCOSE BLDC GLUCOMTR-MCNC: 160 MG/DL (ref 70–130)

## 2025-06-24 PROCEDURE — 25010000002 LIDOCAINE PF 1% 1 % SOLUTION: Performed by: NURSE ANESTHETIST, CERTIFIED REGISTERED

## 2025-06-24 PROCEDURE — 25010000002 SUGAMMADEX 200 MG/2ML SOLUTION: Performed by: NURSE ANESTHETIST, CERTIFIED REGISTERED

## 2025-06-24 PROCEDURE — 25010000002 FENTANYL CITRATE (PF) 100 MCG/2ML SOLUTION: Performed by: NURSE ANESTHETIST, CERTIFIED REGISTERED

## 2025-06-24 PROCEDURE — C9088 BUPIVACAINE-MELOXICAM ER 200-6 MG/7ML SOLUTION: HCPCS | Performed by: ORTHOPAEDIC SURGERY

## 2025-06-24 PROCEDURE — 0RPJ0JZ REMOVAL OF SYNTHETIC SUBSTITUTE FROM RIGHT SHOULDER JOINT, OPEN APPROACH: ICD-10-PCS | Performed by: ORTHOPAEDIC SURGERY

## 2025-06-24 PROCEDURE — 25010000002 BUPIVACAINE-MELOXICAM ER 200-6 MG/7ML SOLUTION: Performed by: ORTHOPAEDIC SURGERY

## 2025-06-24 PROCEDURE — 25810000003 LACTATED RINGERS PER 1000 ML: Performed by: ANESTHESIOLOGY

## 2025-06-24 PROCEDURE — 82948 REAGENT STRIP/BLOOD GLUCOSE: CPT

## 2025-06-24 PROCEDURE — 97550 CAREGIVER TRAING 1ST 30 MIN: CPT

## 2025-06-24 PROCEDURE — 25010000002 PHENYLEPHRINE 10 MG/ML SOLUTION 1 ML VIAL: Performed by: NURSE ANESTHETIST, CERTIFIED REGISTERED

## 2025-06-24 PROCEDURE — 97530 THERAPEUTIC ACTIVITIES: CPT

## 2025-06-24 PROCEDURE — 25010000002 FENTANYL CITRATE (PF) 50 MCG/ML SOLUTION

## 2025-06-24 PROCEDURE — 73030 X-RAY EXAM OF SHOULDER: CPT

## 2025-06-24 PROCEDURE — 25010000002 PROPOFOL 10 MG/ML EMULSION: Performed by: NURSE ANESTHETIST, CERTIFIED REGISTERED

## 2025-06-24 PROCEDURE — 25010000002 DEXAMETHASONE PER 1 MG: Performed by: NURSE ANESTHETIST, CERTIFIED REGISTERED

## 2025-06-24 PROCEDURE — 25010000002 CEFAZOLIN 3 G RECONSTITUTED SOLUTION 1 EACH VIAL: Performed by: ORTHOPAEDIC SURGERY

## 2025-06-24 PROCEDURE — 25010000002 PHENYLEPHRINE HCL-NACL 1000-0.9 MCG/10ML-% SOLUTION PREFILLED SYRINGE: Performed by: NURSE ANESTHETIST, CERTIFIED REGISTERED

## 2025-06-24 PROCEDURE — 97166 OT EVAL MOD COMPLEX 45 MIN: CPT

## 2025-06-24 PROCEDURE — 25010000002 CEFTRIAXONE PER 250 MG: Performed by: ORTHOPAEDIC SURGERY

## 2025-06-24 PROCEDURE — 0RRJ00Z REPLACEMENT OF RIGHT SHOULDER JOINT WITH REVERSE BALL AND SOCKET SYNTHETIC SUBSTITUTE, OPEN APPROACH: ICD-10-PCS | Performed by: ORTHOPAEDIC SURGERY

## 2025-06-24 PROCEDURE — 25010000002 ONDANSETRON PER 1 MG: Performed by: NURSE ANESTHETIST, CERTIFIED REGISTERED

## 2025-06-24 PROCEDURE — 25010000002 LIDOCAINE PF 1% 1 % SOLUTION: Performed by: ANESTHESIOLOGY

## 2025-06-24 PROCEDURE — 97535 SELF CARE MNGMENT TRAINING: CPT

## 2025-06-24 RX ORDER — FENTANYL CITRATE 50 UG/ML
INJECTION, SOLUTION INTRAMUSCULAR; INTRAVENOUS
Status: COMPLETED
Start: 2025-06-24 | End: 2025-06-24

## 2025-06-24 RX ORDER — SODIUM CHLORIDE 0.9 % (FLUSH) 0.9 %
10 SYRINGE (ML) INJECTION EVERY 12 HOURS SCHEDULED
Status: DISCONTINUED | OUTPATIENT
Start: 2025-06-24 | End: 2025-06-24 | Stop reason: HOSPADM

## 2025-06-24 RX ORDER — LIDOCAINE HYDROCHLORIDE 10 MG/ML
INJECTION, SOLUTION EPIDURAL; INFILTRATION; INTRACAUDAL; PERINEURAL AS NEEDED
Status: DISCONTINUED | OUTPATIENT
Start: 2025-06-24 | End: 2025-06-24 | Stop reason: SURG

## 2025-06-24 RX ORDER — HYDROCODONE BITARTRATE AND ACETAMINOPHEN 7.5; 325 MG/1; MG/1
1 TABLET ORAL EVERY 4 HOURS PRN
Qty: 24 TABLET | Refills: 0 | Status: SHIPPED | OUTPATIENT
Start: 2025-06-24

## 2025-06-24 RX ORDER — FAMOTIDINE 10 MG/ML
20 INJECTION, SOLUTION INTRAVENOUS ONCE
Status: CANCELLED | OUTPATIENT
Start: 2025-06-24 | End: 2025-06-24

## 2025-06-24 RX ORDER — PHENYLEPHRINE HCL IN 0.9% NACL 1 MG/10 ML
SYRINGE (ML) INTRAVENOUS AS NEEDED
Status: DISCONTINUED | OUTPATIENT
Start: 2025-06-24 | End: 2025-06-24 | Stop reason: SURG

## 2025-06-24 RX ORDER — FENTANYL CITRATE 50 UG/ML
INJECTION, SOLUTION INTRAMUSCULAR; INTRAVENOUS AS NEEDED
Status: DISCONTINUED | OUTPATIENT
Start: 2025-06-24 | End: 2025-06-24 | Stop reason: SURG

## 2025-06-24 RX ORDER — ONDANSETRON 2 MG/ML
INJECTION INTRAMUSCULAR; INTRAVENOUS AS NEEDED
Status: DISCONTINUED | OUTPATIENT
Start: 2025-06-24 | End: 2025-06-24 | Stop reason: SURG

## 2025-06-24 RX ORDER — SODIUM CHLORIDE, SODIUM LACTATE, POTASSIUM CHLORIDE, CALCIUM CHLORIDE 600; 310; 30; 20 MG/100ML; MG/100ML; MG/100ML; MG/100ML
9 INJECTION, SOLUTION INTRAVENOUS CONTINUOUS
Status: DISCONTINUED | OUTPATIENT
Start: 2025-06-25 | End: 2025-06-24 | Stop reason: HOSPADM

## 2025-06-24 RX ORDER — FAMOTIDINE 20 MG/1
20 TABLET, FILM COATED ORAL ONCE
Status: COMPLETED | OUTPATIENT
Start: 2025-06-24 | End: 2025-06-24

## 2025-06-24 RX ORDER — ROPIVACAINE HYDROCHLORIDE 2 MG/ML
INJECTION, SOLUTION EPIDURAL; INFILTRATION; PERINEURAL CONTINUOUS
Status: DISCONTINUED | OUTPATIENT
Start: 2025-06-24 | End: 2025-06-24

## 2025-06-24 RX ORDER — ONDANSETRON 2 MG/ML
4 INJECTION INTRAMUSCULAR; INTRAVENOUS ONCE AS NEEDED
Status: DISCONTINUED | OUTPATIENT
Start: 2025-06-24 | End: 2025-06-24 | Stop reason: HOSPADM

## 2025-06-24 RX ORDER — TRANEXAMIC ACID 10 MG/ML
1000 INJECTION, SOLUTION INTRAVENOUS ONCE
Status: COMPLETED | OUTPATIENT
Start: 2025-06-24 | End: 2025-06-24

## 2025-06-24 RX ORDER — PROPOFOL 10 MG/ML
VIAL (ML) INTRAVENOUS AS NEEDED
Status: DISCONTINUED | OUTPATIENT
Start: 2025-06-24 | End: 2025-06-24 | Stop reason: SURG

## 2025-06-24 RX ORDER — ROCURONIUM BROMIDE 10 MG/ML
INJECTION, SOLUTION INTRAVENOUS AS NEEDED
Status: DISCONTINUED | OUTPATIENT
Start: 2025-06-24 | End: 2025-06-24 | Stop reason: SURG

## 2025-06-24 RX ORDER — DEXAMETHASONE SODIUM PHOSPHATE 4 MG/ML
INJECTION, SOLUTION INTRA-ARTICULAR; INTRALESIONAL; INTRAMUSCULAR; INTRAVENOUS; SOFT TISSUE AS NEEDED
Status: DISCONTINUED | OUTPATIENT
Start: 2025-06-24 | End: 2025-06-24 | Stop reason: SURG

## 2025-06-24 RX ORDER — SODIUM CHLORIDE 0.9 % (FLUSH) 0.9 %
10 SYRINGE (ML) INJECTION AS NEEDED
Status: DISCONTINUED | OUTPATIENT
Start: 2025-06-24 | End: 2025-06-24 | Stop reason: HOSPADM

## 2025-06-24 RX ORDER — LIDOCAINE HYDROCHLORIDE 10 MG/ML
0.5 INJECTION, SOLUTION EPIDURAL; INFILTRATION; INTRACAUDAL; PERINEURAL ONCE AS NEEDED
Status: COMPLETED | OUTPATIENT
Start: 2025-06-24 | End: 2025-06-24

## 2025-06-24 RX ORDER — MIDAZOLAM HYDROCHLORIDE 1 MG/ML
1 INJECTION, SOLUTION INTRAMUSCULAR; INTRAVENOUS
Status: DISCONTINUED | OUTPATIENT
Start: 2025-06-24 | End: 2025-06-24 | Stop reason: HOSPADM

## 2025-06-24 RX ORDER — FENTANYL CITRATE 50 UG/ML
50 INJECTION, SOLUTION INTRAMUSCULAR; INTRAVENOUS
Status: DISCONTINUED | OUTPATIENT
Start: 2025-06-24 | End: 2025-06-24 | Stop reason: HOSPADM

## 2025-06-24 RX ADMIN — PROPOFOL 400 MG: 10 INJECTION, EMULSION INTRAVENOUS at 11:09

## 2025-06-24 RX ADMIN — Medication 300 MCG: at 11:18

## 2025-06-24 RX ADMIN — FENTANYL CITRATE 100 MCG: 50 INJECTION, SOLUTION INTRAMUSCULAR; INTRAVENOUS at 11:09

## 2025-06-24 RX ADMIN — PHENYLEPHRINE HYDROCHLORIDE 0.5 MCG/KG/MIN: 10 INJECTION INTRAVENOUS at 11:24

## 2025-06-24 RX ADMIN — Medication 200 MCG: at 11:15

## 2025-06-24 RX ADMIN — LIDOCAINE HYDROCHLORIDE 50 MG: 10 INJECTION, SOLUTION EPIDURAL; INFILTRATION; INTRACAUDAL; PERINEURAL at 11:09

## 2025-06-24 RX ADMIN — TRANEXAMIC ACID 1000 MG: 10 INJECTION, SOLUTION INTRAVENOUS at 11:58

## 2025-06-24 RX ADMIN — DEXAMETHASONE SODIUM PHOSPHATE 4 MG: 4 INJECTION, SOLUTION INTRA-ARTICULAR; INTRALESIONAL; INTRAMUSCULAR; INTRAVENOUS; SOFT TISSUE at 11:14

## 2025-06-24 RX ADMIN — ROCURONIUM 60 MG: 50 INJECTION, SOLUTION INTRAVENOUS at 11:09

## 2025-06-24 RX ADMIN — FENTANYL CITRATE 50 MCG: 50 INJECTION, SOLUTION INTRAMUSCULAR; INTRAVENOUS at 12:26

## 2025-06-24 RX ADMIN — FAMOTIDINE 20 MG: 20 TABLET, FILM COATED ORAL at 08:50

## 2025-06-24 RX ADMIN — SODIUM CHLORIDE 3000 MG: 900 INJECTION INTRAVENOUS at 11:06

## 2025-06-24 RX ADMIN — Medication 300 MCG: at 11:24

## 2025-06-24 RX ADMIN — TRANEXAMIC ACID 1000 MG: 10 INJECTION, SOLUTION INTRAVENOUS at 11:16

## 2025-06-24 RX ADMIN — ONDANSETRON 4 MG: 2 INJECTION INTRAMUSCULAR; INTRAVENOUS at 11:14

## 2025-06-24 RX ADMIN — FENTANYL CITRATE 50 MCG: 50 INJECTION, SOLUTION INTRAMUSCULAR; INTRAVENOUS at 12:55

## 2025-06-24 RX ADMIN — SODIUM CHLORIDE, POTASSIUM CHLORIDE, SODIUM LACTATE AND CALCIUM CHLORIDE 9 ML/HR: 600; 310; 30; 20 INJECTION, SOLUTION INTRAVENOUS at 09:07

## 2025-06-24 RX ADMIN — SUGAMMADEX 400 MG: 100 INJECTION, SOLUTION INTRAVENOUS at 12:05

## 2025-06-24 RX ADMIN — LIDOCAINE HYDROCHLORIDE 0.5 ML: 10 INJECTION, SOLUTION EPIDURAL; INFILTRATION; INTRACAUDAL; PERINEURAL at 08:50

## 2025-06-24 NOTE — ANESTHESIA PROCEDURE NOTES
Airway  Reason: elective    Date/Time: 6/24/2025 11:11 AM  Airway not difficult    General Information and Staff    Patient location during procedure: OR  CRNA/CAA: Matias De La Rosa CRNA    Indications and Patient Condition  Indications for airway management: airway protection    Preoxygenated: yes  MILS not maintained throughout    Mask difficulty assessment: 2 - vent by mask + OA or adjuvant +/- NMBA    Final Airway Details    Final airway type: endotracheal airway      Successful airway: ETT  Cuffed: yes   Successful intubation technique: video laryngoscopy  Adjuncts used in placement: intubating stylet  Endotracheal tube insertion site: oral  Blade: Walter  Blade size: 4  ETT size (mm): 8.0  Cormack-Lehane Classification: grade I - full view of glottis  Placement verified by: chest auscultation and capnometry   Cuff volume (mL): 10  Measured from: lips  ETT/EBT  to lips (cm): 23  Number of attempts at approach: 1  Assessment: lips, teeth, and gum same as pre-op and atraumatic intubation    Additional Comments  Negative epigastric sounds, Breath sound equal bilaterally with symmetric chest rise and fall

## 2025-06-24 NOTE — ANESTHESIA PROCEDURE NOTES
Peripheral Block      Patient reassessed immediately prior to procedure    Patient location during procedure: pre-op  Reason for block: at surgeon's request and post-op pain management  Preanesthetic Checklist  Completed: patient identified, IV checked, site marked, risks and benefits discussed, surgical consent, monitors and equipment checked, pre-op evaluation and timeout performed  Prep:  Sterile barriers:cap, gloves, mask and washed/disinfected hands  Prep: ChloraPrep  Patient monitoring: blood pressure monitoring, continuous pulse oximetry and EKG  Procedure    Sedation: yes  Performed under: local infiltration  Guidance:ultrasound guided    ULTRASOUND INTERPRETATION.  Using ultrasound guidance a 20 G gauge needle was placed in close proximity to the nerve, at which point, under ultrasound guidance anesthetic was injected in the area of the nerve and spread of the anesthesia was seen on ultrasound in close proximity thereto.  There were no abnormalities seen on ultrasound; a digital image was taken; and the patient tolerated the procedure with no complications. Images:still images obtained, printed/placed on chart    Block Type:interscalene  Injection Technique:catheter  Needle Type:Tuohy and echogenic  Needle Gauge:18 G  Resistance on Injection: none  Catheter Size:20 G (20g)          Post Assessment  Injection Assessment: negative aspiration for heme, no paresthesia on injection and incremental injection  Patient Tolerance:comfortable throughout block  Complications:no  Additional Notes  CATHETER  A high-frequency linear transducer, with sterile cover, was placed in the supraclavicular fossa to identify the subclavian artery and trunks and divisions of the brachial plexus. The transducer was then moved in a cephalad orientation with a slight rotation to continue visualization of the brachial plexus from the trunks and divisions, on to the C5-C7 roots. The insertion site was prepped and draped in sterile  "fashion. Skin and cutaneous tissue was infiltrated with 2-5 ml of 1% Lidocaine. Using ultrasound-guidance, an 18-gauge Contiplex Ultra 360 Touhy needle was advanced in plane from lateral to medial. Preservative-free normal saline was utilized for hydro-dissection of tissue, advancement of Touhy, and to confirm final needle placement at the fascial plane between the middle scalene muscle and sheath of the brachial plexus (C5-C7). A 20-gauge Contiplex Echo catheter was placed through the needle and advance out the tip of the Touhy 3-5 cm with the \"Slaughter Flip\". The Touhy needle was then removed, and final catheter position verified lateral to the brachial plexus with local anesthetic (LA) and ultrasound visualization. The catheter was secured in the usual fashion with skin glue, benzoin, steri-strips, CHG tegaderm and label noting \"Nerve Block Catheter\". Jerk tape applied at yellow connector and catheter connection. All LA was injected in increments of 3-5 ml after catheter secured. Aspiration every 5 ml to prevent intravascular injection. Injection was completed with negative aspiration of blood and negative intravascular injection. Injection pressures were normal with minimal resistance.   Performed by: Obi Fierro, CRNA            "

## 2025-06-24 NOTE — OP NOTE
DATE OF OPERATION: 06/24/25  PREOPERATIVE DIAGNOSIS: unstable Right reverse total shoulder arthroplasty  POSTOPERATIVE DIAGNOSES:  1. Same    PROCEDURES PERFORMED:  1. Revision of right reverse total shoulder arthroplasty.  (glenoid and humeral component)      Procedure/CPT® Codes:  IA CAT SHOULDER ARTHRPLSTY HUMERAL&GLENOID COMPNT [42810]    SURGICAL APPROACH: Deltopectoral      SURGICAL TECHNIQUE: n/a     Procedure(s):  REVISION REVERSE TOTAL SHOULDER  ARTHROPLASTY RIGHT    Staff:  Surgeon(s):  Enzo Mckeon Jr., MD    Circulator: Diomedes Curtis RN  Scrub Person: Dana Post; Willard Urena  Nursing Assistant: Elizabeth Molina  Assistant: Shannon Navarro PA-C     Assistant: Shannon Navarro PA-C  was responsible for performing the following activities: Retraction, Suction, Irrigation, Suturing, Closing, and Placing Dressing and their skilled assistance was necessary for the success of this case.    Anesthesia: General    Estimated Blood Loss: 100ml    Implants:    Implant Name Type Inv. Item Serial No.  Lot No. LRB No. Used Action   RSP HUMERAL SOCKET INSERT 40MM SEMI-CONSTRAINED +4MM    [employer] [id number] Right 1 Implanted   GLENOSPHERE 40MM/+4MM WITH RETAINING SCREW 8MM LATERAL OFFSET    [employer] [id number] Right 1 Implanted       Specimen:                None    INDICATIONS: This is a 64-year-old male with right shoulder pain and limited function after RTSA approximatley 2 months ago. He did well until 1 week ago when he began noticing some instability of the joint. XRs demonstrated evidence of anterosuperior instability of shoulder. They have failed conservative treatment and after a discussion of risks, benefits, and alternatives, wished to proceed with revision shoulder arthroplasty.  DESCRIPTION OF PROCEDURE: On the day of surgery, the patient identified the right shoulder as the correct operative extremity. This was initialed by the surgeon with the  patient's acknowledgment. The patient underwent placement of an interscalene block and was taken to the operating room and placed in the supine position. Upon induction of adequate anesthesia, the patient was brought up to the beach chair position and the shoulder and upper extremity were prepped and draped in the usual sterile fashion. Timeout confirmed the correct patient and operative extremity as well as that antibiotics were on board. A standard deltopectoral approach to the shoulder was carried out through the patient's old incisional scar. It was carried sharply through the skin and subcutaneous tissue. Medial and lateral flaps were developed over the deltopectoral fascia. The cephalic vein was identified and mobilized laterally with the deltoid. The subdeltoid and subpectoral spaces were mobilized and a blunt retractor was placed deep to this. The clavipectoral fascia was opened on the lateral edge of the conjoined tendon and the retractor was moved deep to this. The shoulder was then dislocated. The inferior capsule was released directly off the humerus to allow greater than 90° of external rotation. We then exposed the glenoid after removal of the polythetylene component from the humerus. We then placed trial glenosphere components. A trial polyethylene was placed and trialing was carried out. We then removed our trial components and placed our final glenosphere. It was malleted into place. The locking screw was then tightened uneventfully. The appropriate final size polyethylene component chosen and malleted into place.  The shoulder was then reduced. The joint was copiously irrigated with normal saline irrigation mixed with Rocephin after the final implants were assembled and locked into place.  Passive range of motion will be 120 degrees but external rotation will be limited to 0° in the perioperative period. The deltopectoral interval was approximated with 0 Vicryl, the subcutaneous tissue with 2-0  Vicryl, and the skin with an exofin dressing. Anesthesia was reversed and the patient was taken to the recovery room in stable condition. All instrument, needle, and sponge counts were correct.      Enzo Mckeon Jr., MD  06/24/25  12:11 EDT

## 2025-06-24 NOTE — THERAPY EVALUATION
"Patient Name: Wilbert Kelley  : 1960    MRN: [id number]                              Today's Date: 2025       Admit Date: 2025    Visit Dx:     ICD-10-CM ICD-9-CM   1. S/P reverse total shoulder arthroplasty, right  Z96.611 V43.61     Patient Active Problem List   Diagnosis    Shortness of breath    Type 2 diabetes mellitus with hyperglycemia, with long-term current use of insulin    Sleep apnea    Coronary artery disease of native artery of native heart with stable angina pectoris    S/P CABG x 3 on 3/21/2023    Hypertension    Hyperlipidemia    EMANUEL (acute kidney injury)    Paroxysmal atrial fibrillation    Bilateral pneumonia    Acute on chronic heart failure with preserved ejection fraction (HFpEF)    S/P reverse total shoulder arthroplasty, right    Obesity, Class III, BMI 40-49.9 (morbid obesity)    Unstable reverse total shoulder arthroplasty     Past Medical History:   Diagnosis Date    Arthritis     BPH (benign prostatic hyperplasia)     Cancer     basal and melanoma-  x2 - left side ear and elbow    Constipation     Coronary artery disease     DDD (degenerative disc disease), lumbar     Diabetes mellitus     couple times month check sugar    Frozen shoulder     left    GERD (gastroesophageal reflux disease)     History of pneumonia 2025    Hyperlipidemia     Hypertension     Limited mobility     left shoulder  - possible frozen shoulder-= please be aware for surgery    Oxygen dependent     \"2 liters at night since pneumonia\" per wife    Rotator cuff injury     right shoulder 3/14/2025    Sleep apnea     insurance took cpap machine due to pt not using 6 hours per night.    Tinnitus     Wears glasses     small print     Past Surgical History:   Procedure Laterality Date    CARDIAC CATHETERIZATION      CARDIAC CATHETERIZATION Right 2023    Procedure: Left Heart Cath;  Surgeon: Jarod Boyle MD;  Location: [facility] CATH INVASIVE LOC[facility];  Service: Cardiovascular;  Laterality: Right; "    CATARACT EXTRACTION, BILATERAL Bilateral     COLONOSCOPY      CORONARY ARTERY BYPASS GRAFT N/A 3/21/2023    Procedure: MEDIAN STERNOTOMY, CORONARY ARTERY BYPASS GRAFTING X 3, UTILIZING THE LEFT INTERNAL MAMMARY ARTERY, ENDOSCOPIC VEIN HARVEST OF THE RIGHT GREATER SAPENOUSE VEIN;  Surgeon: Markus Emanuel MD;  Location: [facility] OR;  Service: Cardiothoracic;  Laterality: N/A;    CORONARY STENT PLACEMENT      x4    ENDOSCOPY      FINGER SURGERY Left     middle finger - left side    SKIN CANCER EXCISION      x2    TOTAL SHOULDER ARTHROPLASTY W/ DISTAL CLAVICLE EXCISION Right 4/29/2025    Procedure: TOTAL SHOULDER REVERSE ARTHROPLASTY WITH BICEPS TENODESIS RIGHT;  Surgeon: Enzo Mckeon Jr., MD;  Location: [facility] OR;  Service: Orthopedics;  Laterality: Right;    WISDOM TOOTH EXTRACTION        General Information       Row Name 06/24/25 140          OT Time and Intention    Document Type evaluation  -KF     Mode of Treatment occupational therapy;individual therapy  -KF       Row Name 06/24/25 1401          General Information    Patient Profile Reviewed yes  -KF     Prior Level of Function independent:;all household mobility;community mobility;bed mobility;ADL's  Independent prior, no AD. Pt has a SPC from previous admission, denies falls since R TSA in April 2025.  -KF     Existing Precautions/Restrictions fall;non-weight bearing;right;shoulder  s/p R revision rTSA in simple sling  -KF     Barriers to Rehab medically complex;previous functional deficit;physical barrier  -KF       Row Name 06/24/25 1401          Occupational Profile    Reason for Services/Referral (Occupational Profile) Pt caregiver training was provided face-to-face on strategies and techniques related to activities of daily living, transfers, mobility, home safety, and durable medical equipment  for 20 minutes without the patient present.  -KF       Row Name 06/24/25 1407          Living Environment    Current Living Arrangements home  -KF      People in Home spouse  -       Row Name 06/24/25 1405          Home Main Entrance    Number of Stairs, Main Entrance five;other (see comments)  3+2  -KF     Stair Railings, Main Entrance none  -       Row Name 06/24/25 1405          Stairs Within Home, Primary    Number of Stairs, Within Home, Primary none  -KF       Row Name 06/24/25 1405          Cognition    Orientation Status (Cognition) oriented x 3  -       Row Name 06/24/25 1405          Safety Issues/Impairments Affecting Functional Mobility    Safety Issues Affecting Function (Mobility) at risk behavior observed;awareness of need for assistance;insight into deficits/self-awareness;safety precaution awareness;safety precautions follow-through/compliance;sequencing abilities  -     Impairments Affecting Function (Mobility) balance;endurance/activity tolerance;shortness of breath;strength;pain;range of motion (ROM)  -KF               User Key  (r) = Recorded By, (t) = Taken By, (c) = Cosigned By      Initials Name Provider Type     Renee Vogel OT Occupational Therapist                     Mobility/ADL's       Row Name 06/24/25 1407          Bed Mobility    Bed Mobility supine-sit;sit-supine  -KF     Supine-Sit [place] (Bed Mobility) standby assist  -     Sit-Supine [place] (Bed Mobility) standby assist  -     Comment, (Bed Mobility) Pt educated on shoulder precautions and implication during bed mobility. Pt educated they may be more comfortable sleeping in a recliner upon initial discharge.  -       Row Name 06/24/25 1407          Transfers    Transfers sit-stand transfer;stand-sit transfer  -[facility] Name 06/24/25 1407          Sit-Stand Transfer    Sit-Stand [place] (Transfers) contact guard  -     Comment, (Sit-Stand Transfer) x4 from the EOB  -       Row Name 06/24/25 1407          Stand-Sit Transfer    Stand-Sit [place] (Transfers) contact guard  -       Row Name 06/24/25 1407          Functional  Mobility    Functional Mobility- Ind. Level contact guard assist  -KF     Functional Mobility- Device other (see comments)  no AD  -KF     Functional Mobility-Distance (Feet) --  100' x2  -KF     Functional Mobility- Comment The pt's O2 dropped to 84% during initial ambulation, dropping to 86% at the lowest during second ambulation trial. Pt was able to recover within 30-60 seconds with seated rest and cues for PLB. The pt also ascended/descended 2 steps to simulate home set up with CGA x2 for safety. No over LOB observed.  -       Row Name 06/24/25 1407          Activities of Daily Living    BADL Assessment/Intervention bathing;upper body dressing;lower body dressing  -       Row Name 06/24/25 1407          Upper Body Dressing Assessment/Training    [place] Level (Upper Body Dressing) doff;don;other (see comments);front opening garment  sling  -KF     Position (Upper Body Dressing) edge of bed sitting  -KF     Comment, (Upper Body Dressing) Pt educated on shoulder precautions, ADL retraining, sling management, and nerve catheter care to avoid dislodgement. Pt verbalized understanding. Frequent cues to maintain R shoulder precautions.  -       Row Name 06/24/25 1407          Bathing Assessment/Intervention    Comment, (Bathing) Pt educated on shoulder precautions and ADL retraining including axilla care. Pt educated on nerve catheter management, including cath cannot get wet.  -       Row Name 06/24/25 1407          Lower Body Dressing Assessment/Training    [place] Level (Lower Body Dressing) don;pants/bottoms;socks;shoes/slippers;moderate assist (50% patient effort)  -KF     Position (Lower Body Dressing) edge of bed sitting;unsupported standing  -KF               User Key  (r) = Recorded By, (t) = Taken By, (c) = Cosigned By      Initials Name Provider Type    KF Renee Vogel OT Occupational Therapist                   Obj/Interventions       Row Name 06/24/25 1409          Sensory  Assessment (Somatosensory)    Sensory Assessment (Somatosensory) UE sensation intact;right UE  -KF     Right UE Sensory Assessment general sensation;impaired  -       Row Name 06/24/25 1409          Vision Assessment/Intervention    Visual Impairment/Limitations WFL  -       Row Name 06/24/25 1409          Range of Motion Comprehensive    General Range of Motion upper extremity range of motion deficits identified  -KF     Comment, General Range of Motion R elbow/wrist/hand WFL, shoulder NT; LUE WFL  -       Row Name 06/24/25 1409          Strength Comprehensive (MMT)    General Manual Muscle Testing (MMT) Assessment upper extremity strength deficits identified  -KF     Comment, General Manual Muscle Testing (MMT) Assessment RUE NT, LUE L  -       Row Name 06/24/25 1409          Elbow/Forearm (Therapeutic Exercise)    Elbow/Forearm (Therapeutic Exercise) AROM (active range of motion)  -     Elbow/Forearm AROM (Therapeutic Exercise) right;flexion;extension;supination;pronation;sitting;10 repetitions  -       Row Name 06/24/25 1409          Wrist (Therapeutic Exercise)    Wrist (Therapeutic Exercise) AROM (active range of motion)  -     Wrist AROM (Therapeutic Exercise) right;flexion;extension;10 repetitions  -       Row Name 06/24/25 1409          Hand (Therapeutic Exercise)    Hand (Therapeutic Exercise) AROM (active range of motion)  -     Hand AROM/AAROM (Therapeutic Exercise) right;AROM (active range of motion);finger flexion;finger extension;10 repetitions  -       Row Name 06/24/25 1409          Motor Skills    Therapeutic Exercise elbow/forearm;wrist;hand;other (see comments)  Pt's wife was at the bedside and stated Dr. Mckeon deferred shoulder AAFE until follow-up in 2 weeks.  -       Row Name 06/24/25 1409          Balance    Balance Assessment sitting static balance;sitting dynamic balance;sit to stand dynamic balance;standing static balance;standing dynamic balance  -     Static  Sitting Balance supervision  -KF     Dynamic Sitting Balance standby assist  -KF     Position, Sitting Balance unsupported;sitting edge of bed  -KF     Sit to Stand Dynamic Balance contact guard  -KF     Static Standing Balance contact guard  -KF     Dynamic Standing Balance contact guard  -KF     Position/Device Used, Standing Balance unsupported  -KF     Balance Interventions sitting;standing;sit to stand;static;dynamic;occupation based/functional task  -KF               User Key  (r) = Recorded By, (t) = Taken By, (c) = Cosigned By      Initials Name Provider Type    KF Renee Vogel, OT Occupational Therapist                   Goals/Plan       Row Name 06/24/25 1415          Transfer Goal 1 (OT)    Activity/Assistive Device (Transfer Goal 1, OT) commode;bed-to-chair/chair-to-bed  -KF     [place] Level/Cues Needed (Transfer Goal 1, OT) supervision required  -KF     Time Frame (Transfer Goal 1, OT) long term goal (LTG);10 days  -KF     Progress/Outcome (Transfer Goal 1, OT) goal ongoing  -KF       Row Name 06/24/25 1415          Dressing Goal 1 (OT)    Activity/Device (Dressing Goal 1, OT) upper body dressing  -KF     [place]/Cues Needed (Dressing Goal 1, OT) minimum assist (75% or more patient effort)  -KF     Time Frame (Dressing Goal 1, OT) short term goal (STG);5 days  -KF     Progress/Outcome (Dressing Goal 1, OT) goal ongoing  -       Row Name 06/24/25 1415          ROM Goal 1 (OT)    ROM Goal 1 (OT) RUE HEP within MD parameters without assistance  -KF     Time Frame (ROM Goal 1, OT) long term goal (LTG);10 days  -KF     Progress/Outcome (ROM Goal 1, OT) goal ongoing  -KF       Row Name 06/24/25 1415          Therapy Assessment/Plan (OT)    Planned Therapy Interventions (OT) activity tolerance training;adaptive equipment training;BADL retraining;functional balance retraining;occupation/activity based interventions;patient/caregiver education/training;ROM/therapeutic exercise;strengthening  exercise;transfer/mobility retraining  -KF               User Key  (r) = Recorded By, (t) = Taken By, (c) = Cosigned By      Initials Name Provider Type    KF Renee Vogel, LISA Occupational Therapist                   Clinical Impression       Row Name 06/24/25 1411          Pain Assessment    Pretreatment Pain Rating 6/10  -KF     Posttreatment Pain Rating 6/10  -KF     Pain Location shoulder  -KF     Pain Side/Orientation right;generalized  -KF     Pain Management Interventions activity modification encouraged;exercise or physical activity utilized;positioning techniques utilized;cold applied  -KF     Response to Pain Interventions no change per patient report  -KF       Row Name 06/24/25 1411          Plan of Care Review    Plan of Care Reviewed With patient;spouse  -KF     Progress no change  -KF     Outcome Evaluation OT evaluation completed. The pt presents below baseline with NWB/immobilization of RUE, decreased activity tolerance, and balance deficits warranting continued IP OT services. The pt ambulated 100' x2 with CGA, no AD. The pt's O2 dropped to 84% on RA during initial ambulation trial and 86% during second ambulation trial. Pt recovered within 30-60 seconds with seated rest. OT reviewed R shoulder precautions, ADL retraining, and RUE HEP within MD parameters with good verbal understanding and demonstration observed. The pt is cleared for a d/c home with initial 24/7 assist when medically ready and pain controlled.  -KF       Row Name 06/24/25 1411          Therapy Assessment/Plan (OT)    Patient/Family Therapy Goal Statement (OT) Return home  -KF     Rehab Potential (OT) good  -KF     Criteria for Skilled Therapeutic Interventions Met (OT) yes;skilled treatment is necessary  -KF     Therapy Frequency (OT) daily  -KF     Predicted Duration of Therapy Intervention (OT) 7 days  -KF       Row Name 06/24/25 1411          Therapy Plan Review/Discharge Plan (OT)    Anticipated Discharge Disposition (OT)  home with 24/7 care  -KF       Row Name 06/24/25 1411          Vital Signs    Pre Systolic BP Rehab 135  -KF     Pre Treatment Diastolic BP 60  -KF     Pretreatment Heart Rate (beats/min) 71  -KF     Pre SpO2 (%) 93  -KF     O2 Delivery Pre Treatment room air  -KF     Intra SpO2 (%) 84   -KF     O2 Delivery Intra Treatment room air  -KF     Post SpO2 (%) 93  -KF     O2 Delivery Post Treatment room air  -KF     Pre Patient Position Supine  -KF     Intra Patient Position Standing  -KF     Post Patient Position Supine  -KF       Row Name 06/24/25 1411          Positioning and Restraints    Pre-Treatment Position in bed  -KF     Post Treatment Position bed  -KF     In Bed notified nsg;supine;call light within reach;encouraged to call for assist;with family/caregiver  -KF               User Key  (r) = Recorded By, (t) = Taken By, (c) = Cosigned By      Initials Name Provider Type    KF Renee Vogel, OT Occupational Therapist                   Outcome Measures       Row Name 06/24/25 1416          How much help from another is currently needed...    Putting on and taking off regular lower body clothing? 2  -KF     Bathing (including washing, rinsing, and drying) 2  -KF     Toileting (which includes using toilet bed pan or urinal) 3  -KF     Putting on and taking off regular upper body clothing 2  -KF     Taking care of personal grooming (such as brushing teeth) 3  -KF     Eating meals 4  -KF     AM-PAC 6 Clicks Score (OT) 16  -KF       Row Name 06/24/25 1416          How much help from another person do you currently need...    Turning from your back to your side while in flat bed without using bedrails? 3  -KF     Moving from lying on back to sitting on the side of a flat bed without bedrails? 3  -KF     Moving to and from a bed to a chair (including a wheelchair)? 3  -KF     Standing up from a chair using your arms (e.g., wheelchair, bedside chair)? 3  -KF     Climbing 3-5 steps with a railing? 3  -KF     To walk in  hospital room? 3  -KF     AM-PAC 6 Clicks Score (PT) 18  -     Highest Level of Mobility Goal Walk 10 Steps or More-6  -KF       Row Name 06/24/25 1416          Functional Assessment    Outcome Measure Options AM-PAC 6 Clicks Daily Activity (OT);AM-PAC 6 Clicks Basic Mobility (PT)  -               User Key  (r) = Recorded By, (t) = Taken By, (c) = Cosigned By      Initials Name Provider Type    KF Renee Vogel OT Occupational Therapist                    Occupational Therapy Education       Title: PT OT SLP Therapies (In Progress)       Topic: Occupational Therapy (In Progress)       Point: ADL training (Done)       Learning Progress Summary            Patient Acceptance, E,TB, VU,DU by  at 6/24/2025 1320                      Point: Precautions (Done)       Learning Progress Summary            Patient Acceptance, E,TB, VU,DU by  at 6/24/2025 1320                      Point: Body mechanics (Done)       Learning Progress Summary            Patient Acceptance, E,TB, VU,DU by  at 6/24/2025 1320                                      User Key       Initials Effective Dates Name Provider Type [facility] 08/09/23 -  Renee Vogel OT Occupational Therapist OT                  OT Recommendation and Plan  Planned Therapy Interventions (OT): activity tolerance training, adaptive equipment training, BADL retraining, functional balance retraining, occupation/activity based interventions, patient/caregiver education/training, ROM/therapeutic exercise, strengthening exercise, transfer/mobility retraining  Therapy Frequency (OT): daily  Plan of Care Review  Plan of Care Reviewed With: patient, spouse  Progress: no change  Outcome Evaluation: OT evaluation completed. The pt presents below baseline with NWB/immobilization of RUE, decreased activity tolerance, and balance deficits warranting continued IP OT services. The pt ambulated 100' x2 with CGA, no AD. The pt's O2 dropped to 84% on RA during initial  ambulation trial and 86% during second ambulation trial. Pt recovered within 30-60 seconds with seated rest. OT reviewed R shoulder precautions, ADL retraining, and RUE HEP within MD parameters with good verbal understanding and demonstration observed. The pt is cleared for a d/c home with initial 24/7 assist when medically ready and pain controlled.     Time Calculation:   Evaluation Complexity (OT)  Review Occupational Profile/Medical/Therapy History Complexity: expanded/moderate complexity  Assessment, Occupational Performance/Identification of Deficit Complexity: 3-5 performance deficits  Clinical Decision Making Complexity (OT): detailed assessment/moderate complexity  Overall Complexity of Evaluation (OT): moderate complexity     Time Calculation- OT       Row Name 06/24/25 1417             Time Calculation- OT    OT Start Time 1320  -KF      OT Received On 06/24/25  -KF      OT Goal Re-Cert Due Date 07/04/25  -KF         Timed Charges    58969 - OT Therapeutic Exercise Minutes 5  -KF      91968 - OT Therapeutic Activity Minutes 10  -KF      21504 - OT Self Care/Mgmt Minutes 8  -KF         Untimed Charges    OT Eval/Re-eval Minutes 47  -KF         Total Minutes    Timed Charges Total Minutes 23  -KF      Untimed Charges Total Minutes 47  -KF       Total Minutes 70  -KF                User Key  (r) = Recorded By, (t) = Taken By, (c) = Cosigned By      Initials Name Provider Type    KF Renee Vogel OT Occupational Therapist                  Therapy Charges for Today       Code Description Service Date Service Provider Modifiers Qty    79553529264 HC OT EVAL MOD COMPLEXITY 4 6/24/2025 Renee Vogel OT GO 1    76821549327 HC OT THERAPEUTIC ACT EA 15 MIN 6/24/2025 Renee Vogel OT GO 1    14602673238 HC OT SELF CARE/MGMT/TRAIN EA 15 MIN 6/24/2025 Renee Vogel OT GO 1    30688395660 HC CAREGIVER TRAINING STRATEGIES & TQ 1ST 30 MINUTES 6/24/2025 Renee Vogel OT  1    58857860662 HC OT THER SUPP EA  15 MIN 6/24/2025 Renee Vogel, OT GO 1                 Renee Vogel, OT  6/24/2025

## 2025-06-24 NOTE — ANESTHESIA POSTPROCEDURE EVALUATION
Patient: Wilbert Kelley    Procedure Summary       Date: 06/24/25 Room / [facility]:  [facility]  [facility]    Anesthesia Start: 1104 Anesthesia Stop: 1218    Procedure: REVISION REVERSE TOTAL SHOULDER  ARTHROPLASTY RIGHT (Right: Shoulder) Diagnosis:     Surgeons: Enzo Mckeon Jr., MD Provider: Blaine Ordoñez MD    Anesthesia Type: general ASA Status: 3            Anesthesia Type: general    Vitals  No vitals data found for the desired time range.          Post Anesthesia Care and Evaluation    Patient location during evaluation: PACU  Patient participation: complete - patient participated  Level of consciousness: awake and alert  Pain management: adequate    Airway patency: patent  Anesthetic complications: No anesthetic complications  PONV Status: none  Cardiovascular status: hemodynamically stable and acceptable  Respiratory status: nonlabored ventilation, acceptable and nasal cannula  Hydration status: acceptable

## 2025-06-24 NOTE — NURSING NOTE
Caregiver Telephone Number    Phone Number for Ride/Caregiver: ANUP JESSICA (SPOUSE) [phone]     Who will be staying with you post procedure for the next 24 hours? Name and Phone Number?: ANUP LOPEZ (SPOUSE) [phone]

## 2025-06-24 NOTE — PLAN OF CARE
Goal Outcome Evaluation:  Plan of Care Reviewed With: patient, spouse        Progress: no change  Outcome Evaluation: OT evaluation completed. The pt presents below baseline with NWB/immobilization of RUE, decreased activity tolerance, and balance deficits warranting continued IP OT services. The pt ambulated 100' x2 with CGA, no AD. The pt's O2 dropped to 84% on RA during initial ambulation trial and 86% during second ambulation trial. Pt recovered within 30-60 seconds with seated rest. OT reviewed R shoulder precautions, ADL retraining, and RUE HEP within MD parameters with good verbal understanding and demonstration observed. The pt is cleared for a d/c home with initial 24/7 assist when medically ready and pain controlled.    Anticipated Discharge Disposition (OT): home with 24/7 care

## 2025-06-24 NOTE — H&P
Pre-Op H&P  Wilbert Kelley  [id number]  1960      Chief complaint: Right shoulder pain      Subjective:  Patient is a 64 y.o.male presents for scheduled surgery by Dr. Mckeon. He anticipates a REVISION REVERSE TOTAL SHOULDER  ARTHROPLASTY RIGHT  today. He had right shoulder replacement on 4/29/25. He states he continues to have significant pain and difficulty with motion. He states the shoulder continues to dislocate.       Review of Systems:  Constitutional-- No fever, chills or sweats. No fatigue.  CV-- No chest pain, palpitation or syncope. +HTN, HLD, CAD  Resp-- No SOB, cough, hemoptysis  Skin--No rashes or lesions      Allergies:   Allergies   Allergen Reactions    Morphine Nausea And Vomiting     projectile    Adhesive Tape Rash     Pt states sticky tabs cause irritation when left on long.  PAPER TAPE IS OK.          Home Meds:  Medications Prior to Admission   Medication Sig Dispense Refill Last Dose/Taking    atorvastatin (LIPITOR) 40 MG tablet Take 1 tablet by mouth Daily. 90 tablet 12 6/23/2025    bumetanide (BUMEX) 2 MG tablet Take 1 tablet by mouth Daily. (Patient taking differently: Take 1 tablet by mouth Daily. Takes prn)   Past Week    fenofibrate 160 MG tablet Take 1 tablet by mouth Daily. 90 tablet 12 6/23/2025    gabapentin (NEURONTIN) 800 MG tablet    6/23/2025    hydroCHLOROthiazide (MICROZIDE) 12.5 MG capsule Take 1 capsule by mouth Daily.   6/23/2025    HYDROcodone-acetaminophen (NORCO) 7.5-325 MG per tablet Take 1 tablet by mouth Every 4 (Four) Hours As Needed for Mild Pain or Moderate Pain. 24 tablet 0 6/23/2025    hydrOXYzine (ATARAX) 25 MG tablet Daily.   6/23/2025    lisinopril (PRINIVIL,ZESTRIL) 40 MG tablet Take 1 tablet by mouth 2 (Two) Times a Day. 60 tablet 11 6/23/2025    metFORMIN (GLUCOPHAGE) 1000 MG tablet Take 1 tablet by mouth 2 (Two) Times a Day With Meals.   6/23/2025    nebivolol (BYSTOLIC) 10 MG tablet Take 1 tablet by mouth Daily. 30 tablet 12 6/23/2025    NovoLOG  FlexPen 100 UNIT/ML solution pen-injector sc pen Inject 60 units 3 times a day by subcutaneous route before meals for 30 days.   6/23/2025    pantoprazole (PROTONIX) 40 MG EC tablet Take 1 tablet by mouth Daily.   6/23/2025    potassium chloride 10 MEQ CR tablet Daily.   Past Week    tamsulosin (FLOMAX) 0.4 MG capsule 24 hr capsule Take 1 capsule by mouth Daily. 30 capsule 0 6/23/2025    apixaban (ELIQUIS) 5 MG tablet tablet Take 1 tablet by mouth 2 (Two) Times a Day. 56 tablet 0 6/22/2025    aspirin 81 MG EC tablet Take 1 tablet by mouth Daily. 90 tablet 3 6/20/2025    cephalexin (KEFLEX) 500 MG capsule Take 1 capsule by mouth 2 (Two) Times a Day.   More than a month    Continuous Glucose Sensor (Dexcom G7 Sensor) misc CHANGE every 10 DAYS as directed       DULoxetine (CYMBALTA) 60 MG capsule Take 1 capsule by mouth Daily.   Unknown    Insulin Lispro (HUMALOG SC) Inject 60 Units under the skin into the appropriate area as directed 3 (Three) Times a Day Before Meals. (Patient not taking: Reported on 5/27/2025)       Semaglutide,0.25 or 0.5MG/DOS, (Ozempic, 0.25 or 0.5 MG/DOSE,) 2 MG/1.5ML solution pen-injector Inject 0.25 mg under the skin into the appropriate area as directed 1 (One) Time Per Week. (Patient taking differently: Inject 1 mg under the skin into the appropriate area as directed 1 (One) Time Per Week.)   6/16/2025    Tresiba FlexTouch 200 UNIT/ML solution pen-injector pen injection Inject 80 units twice a day by subcutaneous route for 30 days.   Unknown         PMH:   Past Medical History:   Diagnosis Date    Arthritis     BPH (benign prostatic hyperplasia)     Cancer     basal and melanoma-  x2 - left side ear and elbow    Constipation     Coronary artery disease     DDD (degenerative disc disease), lumbar     Diabetes mellitus     couple times month check sugar    Frozen shoulder     left    GERD (gastroesophageal reflux disease)     History of pneumonia 02/2025    Hyperlipidemia     Hypertension      "Limited mobility     left shoulder  - possible frozen shoulder-= please be aware for surgery    Oxygen dependent     \"2 liters at night since pneumonia\" per wife    Rotator cuff injury     right shoulder 3/14/2025    Sleep apnea     insurance took cpap machine due to pt not using 6 hours per night.    Tinnitus     Wears glasses     small print     PSH:    Past Surgical History:   Procedure Laterality Date    CARDIAC CATHETERIZATION      CARDIAC CATHETERIZATION Right 03/16/2023    Procedure: Left Heart Cath;  Surgeon: Jarod Boyle MD;  [facility]:  [facility];  Service: Cardiovascular;  Laterality: Right;    CATARACT EXTRACTION, BILATERAL Bilateral     COLONOSCOPY      CORONARY ARTERY BYPASS GRAFT N/A 3/21/2023    Procedure: MEDIAN STERNOTOMY, CORONARY ARTERY BYPASS GRAFTING X 3, UTILIZING THE LEFT INTERNAL MAMMARY ARTERY, ENDOSCOPIC VEIN HARVEST OF THE RIGHT GREATER SAPENOUSE VEIN;  Surgeon: Markus Emanuel MD;  Location:  [facility];  Service: Cardiothoracic;  Laterality: N/A;    CORONARY STENT PLACEMENT      x4    ENDOSCOPY      FINGER SURGERY Left     middle finger - left side    SKIN CANCER EXCISION      x2    TOTAL SHOULDER ARTHROPLASTY W/ DISTAL CLAVICLE EXCISION Right 4/29/2025    Procedure: TOTAL SHOULDER REVERSE ARTHROPLASTY WITH BICEPS TENODESIS RIGHT;  Surgeon: Enzo Mckeon Jr., MD;  Location:  [facility];  Service: Orthopedics;  Laterality: Right;    WISDOM TOOTH EXTRACTION         Immunization History:  Influenza: No  Pneumococcal: No  Tetanus: No    Social History:   Tobacco:   Social History     Tobacco Use   Smoking Status Never    Passive exposure: Past   Smokeless Tobacco Current    Types: Snuff      Alcohol:     Social History     Substance and Sexual Activity   Alcohol Use Not Currently         Physical Exam: VS: /76  HR 80  RR 16  T 97.3  sat 96%RA      General Appearance:    Alert, cooperative, no distress, appears stated age   Head:    Normocephalic, without " obvious abnormality, atraumatic   Lungs:     Clear to auscultation bilaterally, respirations unlabored    Heart:   Regular rate and rhythm, S1 and S2 normal    Abdomen:    Soft without tenderness   Extremities:   Extremities normal, atraumatic, no cyanosis or edema   Skin:   Skin color, texture, turgor normal, no rashes or lesions   Neurologic:   Grossly intact     Results Review:     LABS:  Lab Results   Component Value Date    WBC 9.00 03/28/2025    HGB 10.3 (L) 04/30/2025    HCT 35.0 (L) 04/30/2025    MCV 77.8 (L) 03/28/2025     03/28/2025    NEUTROABS 6.66 05/12/2023    GLUCOSE 103 (H) 04/30/2025    BUN 29 (H) 04/30/2025    CREATININE 1.20 04/30/2025     04/30/2025    K 3.8 04/30/2025    CL 98 04/30/2025    CO2 27.0 04/30/2025    MG 1.6 05/06/2023    PHOS 3.0 03/29/2023    CALCIUM 9.0 04/30/2025    ALBUMIN 3.3 (L) 05/11/2023    AST 16 05/11/2023    ALT 27 05/11/2023    BILITOT 1.0 05/11/2023       RADIOLOGY:  Imaging Results (Last 72 Hours)       ** No results found for the last 72 hours. **            I reviewed the patient's new clinical results.    Cancer Staging (if applicable)  Cancer Patient: __ yes __no __unknown; If yes, clinical stage T:__ N:__M:__, stage group or __N/A      Impression: Right shoulder pain      Plan: REVISION REVERSE TOTAL SHOULDER  ARTHROPLASTY RIGHT       Dione Don, APRN   6/24/2025   08:43 EDT

## 2025-06-24 NOTE — ANESTHESIA PREPROCEDURE EVALUATION
Anesthesia Evaluation     Patient summary reviewed and Nursing notes reviewed   no history of anesthetic complications:   NPO Solid Status: > 8 hours  NPO Liquid Status: > 8 hours           Airway   Mallampati: II  TM distance: >3 FB  Neck ROM: full  No difficulty expected  Dental      Pulmonary - normal exam   (+) pneumonia ,shortness of breath, sleep apnea  Cardiovascular - normal exam    (+) hypertension, CAD, CABG, dysrhythmias, angina, CHF , hyperlipidemia      Neuro/Psych  GI/Hepatic/Renal/Endo    (+) morbid obesity, GERD, renal disease-, diabetes mellitus    Musculoskeletal     Abdominal    Substance History      OB/GYN          Other   arthritis,   history of cancer                Anesthesia Plan    ASA 3     general     intravenous induction     Anesthetic plan, risks, benefits, and alternatives have been provided, discussed and informed consent has been obtained with: patient.    Plan discussed with CRNA.    CODE STATUS:

## 2025-06-24 NOTE — ANESTHESIA PROCEDURE NOTES
"Peripheral Block      Patient reassessed immediately prior to procedure    Patient location during procedure: OR  Reason for block: at surgeon's request and post-op pain management  Preanesthetic Checklist  Completed: patient identified, IV checked, site marked, risks and benefits discussed, surgical consent, monitors and equipment checked, pre-op evaluation and timeout performed  Prep:  Pt Position: supine  Sterile barriers:cap, gloves, mask and washed/disinfected hands  Prep: ChloraPrep  Patient monitoring: blood pressure monitoring, continuous pulse oximetry and EKG  Procedure  Performed under: general  Guidance:ultrasound guided and landmark technique  Images:still images obtained, printed/placed on chart    Laterality:N/A  Block Type:PECS I and PECS II  Injection Technique:single-shot  Needle Type:short-bevel  Needle Gauge:20 G  Resistance on Injection: none          Medications  Preservative Free Saline:10ml  Comment:Block Injection:  Total volume of LA divided between Right and Left sided blocks         Post Assessment  Injection Assessment: negative aspiration for heme, incremental injection and no paresthesia on injection  Patient Tolerance:comfortable throughout block  Complications:no  Additional Notes  Interpectoral-Pectoserratus Plane   A high-frequency linear transducer, with sterile cover, was placed medial to the coracoid process in the paramedian sagittal plane. The transducer was moved caudally to the 4th rib and rotated slightly to allow an in-plane needle trajectory from medial to lateral. Pectoralis Major Muscle (PMM), Pectoralis Minor Muscle (PmM), Thoracoacromial Artery, Ribs, and Pleura were identified under ultrasound. The insertion site was prepped in sterile fashion and then localized with 2-5 ml of 1% Lidocaine. Using ultrasound-guidance, a 20-gauge B-Rodgers 4\" Ultraplex 360 non-stimulating echogenic needle was advanced in plane until the tip of the needle was in the fascial plane between " the PMM and PmM, lateral to the Thoracoacromial Artery. 1-3ml of preservative free normal saline was used to hydro-dissect the fascial planes. After the fascial plane was verified, 10ml local anesthetic (LA) was injected for Interpectoral fascial plane block. The needle was continued along the same path to the level of the 4th rib below PmM.  Initially preservative free normal saline was used to confirm needle position and then 20 ml of LA was injected for Pectoserratus fascial plane block. Aspiration every 5 ml to prevent intravascular injection. Injection was completed with negative aspiration of blood and negative intravascular injection. Injection pressures were normal with minimal resistance.     Performed by: Obi Fierro, CRNA

## 2025-06-30 ENCOUNTER — OFFICE VISIT (OUTPATIENT)
Dept: CARDIOLOGY | Facility: CLINIC | Age: 65
End: 2025-06-30
Payer: MEDICARE

## 2025-06-30 ENCOUNTER — LAB (OUTPATIENT)
Dept: LAB | Facility: HOSPITAL | Age: 65
End: 2025-06-30
Payer: MEDICARE

## 2025-06-30 VITALS
HEART RATE: 73 BPM | DIASTOLIC BLOOD PRESSURE: 72 MMHG | BODY MASS INDEX: 41.75 KG/M2 | WEIGHT: 315 LBS | HEIGHT: 73 IN | OXYGEN SATURATION: 95 % | SYSTOLIC BLOOD PRESSURE: 124 MMHG

## 2025-06-30 DIAGNOSIS — G47.33 OBSTRUCTIVE SLEEP APNEA SYNDROME: ICD-10-CM

## 2025-06-30 DIAGNOSIS — E78.2 MIXED HYPERLIPIDEMIA: Chronic | ICD-10-CM

## 2025-06-30 DIAGNOSIS — I25.118 CORONARY ARTERY DISEASE OF NATIVE ARTERY OF NATIVE HEART WITH STABLE ANGINA PECTORIS: Chronic | ICD-10-CM

## 2025-06-30 DIAGNOSIS — G47.19 EXCESSIVE DAYTIME SLEEPINESS: ICD-10-CM

## 2025-06-30 DIAGNOSIS — I25.118 CORONARY ARTERY DISEASE OF NATIVE ARTERY OF NATIVE HEART WITH STABLE ANGINA PECTORIS: Primary | Chronic | ICD-10-CM

## 2025-06-30 DIAGNOSIS — I10 PRIMARY HYPERTENSION: Chronic | ICD-10-CM

## 2025-06-30 PROCEDURE — 3074F SYST BP LT 130 MM HG: CPT | Performed by: NURSE PRACTITIONER

## 2025-06-30 PROCEDURE — 80076 HEPATIC FUNCTION PANEL: CPT | Performed by: NURSE PRACTITIONER

## 2025-06-30 PROCEDURE — 3078F DIAST BP <80 MM HG: CPT | Performed by: NURSE PRACTITIONER

## 2025-06-30 PROCEDURE — 80061 LIPID PANEL: CPT

## 2025-06-30 PROCEDURE — 36415 COLL VENOUS BLD VENIPUNCTURE: CPT

## 2025-06-30 PROCEDURE — 99214 OFFICE O/P EST MOD 30 MIN: CPT | Performed by: NURSE PRACTITIONER

## 2025-06-30 PROCEDURE — 1159F MED LIST DOCD IN RCRD: CPT | Performed by: NURSE PRACTITIONER

## 2025-06-30 PROCEDURE — 83695 ASSAY OF LIPOPROTEIN(A): CPT

## 2025-06-30 PROCEDURE — 1160F RVW MEDS BY RX/DR IN RCRD: CPT | Performed by: NURSE PRACTITIONER

## 2025-06-30 NOTE — PROGRESS NOTES
Chief Complaint  Follow-up (Patient denies chest pain or soa , has swelling to legs, blisters and wound to rt leg . Recent shoulder surgery )    Subjective          Wilbert Kelley presents to [facility] for follow up.    History of Present Illness    Mr. Kelley presents for follow-up of coronary artery disease, paroxysmal atrial fibrillation, hypertension, hyperlipidemia, and chronic HFpEF.  His last visit to clinic was 04/01/2025 with me.  At that time he was requesting surgical clearance for right reverse total shoulder arthroplasty.  History of Present Illness  The patient presents for evaluation of leg ulcer, hypertension, diabetes mellitus, sleep apnea, and fatty liver.    He has been experiencing leg numbness, which he attributes to neuropathy. This condition has led to several falls, including one in his yard where he landed on his shoulder, resulting in a complete tear of his rotator cuff. He also fell in the bathroom on Sunday morning, but this did not involve his shoulder. His legs tend to give out after standing for extended periods. He has a history of weak ankles and back issues, which have contributed to his falls. He has fallen out of his recliner three times since his initial surgery. He has been sleeping in a recliner due to his shoulder condition, which has resulted in severe leg swelling. He has been using a new recliner for sleep but only manages about three hours of sleep per night. He has gained weight and reports no shortness of breath. He has been taking Bumex 2 mg once daily instead of twice as prescribed.    He had a total reconstruction surgery on his right arm 10 weeks ago. He is not currently undergoing physical therapy for his shoulder. He saw his family doctor a month ago for what appeared to be cellulitis in his leg. He was given an oral antibiotic, which improved the condition slightly. However, after re-injuring it, the condition worsened with new  "blisters and increased redness. The drainage is clear yellow. He has been applying nonstick Telfa dressings and washing it with antibacterial soap during showers.    He is currently on lisinopril 40 mg twice daily but does not regularly monitor his blood pressure at home. His readings have been in the 120s and 130s, with a recent reading of 140 at Dr. Simon's office.    His last HbA1c was 6.8 when he was taking NovoLog regularly. However, it increased to 7.3 when he saw his endocrinologist at the end of 05/2025. Since resuming Ozempic, his blood sugar levels have improved.    He reports no chest pain, worsening shortness of breath, or heart fluttering. He continues to take Eliquis twice daily.    He has not had a cholesterol panel done in over a year. His family doctor orders all his lab work.   He believes he has fatty liver disease.    CPAP machine was reclaimed by the DME company because he appeared to be noncompliant.. He reports that he has significant trouble sleeping.  He typically sleeps for two to three hours per night.  He very much would like to resume APAP therapy to reduce his risk for stroke, heart attack, and other sequela due to uncontrolled MARIAH.             Tobacco Use: High Risk (7/1/2025)    Patient History     Smoking Tobacco Use: Never     Smokeless Tobacco Use: Current     Passive Exposure: Past       Objective     Vital Signs:   /72 (BP Location: Left arm, Patient Position: Sitting, Cuff Size: Large Adult)   Pulse 73   Ht 185.4 cm (73\")   Wt (!) 149 kg (328 lb)   SpO2 95%   BMI 43.27 kg/m²       Physical Exam  Vitals and nursing note reviewed. Exam conducted with a chaperone present (Accompanied by his wife).   Constitutional:       General: He is not in acute distress.     Appearance: He is morbidly obese.   HENT:      Head: Normocephalic and atraumatic.   Eyes:      Conjunctiva/sclera: Conjunctivae normal.   Neck:      Vascular: No carotid bruit.   Cardiovascular:      Rate and " Rhythm: Normal rate and regular rhythm.      Pulses: Normal pulses.   Pulmonary:      Effort: Pulmonary effort is normal.      Breath sounds: Normal breath sounds.   Musculoskeletal:      Cervical back: Neck supple.      Right lower leg: No edema.      Left lower leg: No edema.      Comments: Right arm in the sling   Skin:     General: Skin is warm and dry.      Comments: Shallow serration on right shin.  See attached photo   Neurological:      General: No focal deficit present.   Psychiatric:         Mood and Affect: Mood normal.         Behavior: Behavior normal.             Result Review :            The following data was reviewed by me 06/19/25 at 09:56 EDT: cardiac and lab studies as detailed below.    WBC   Date Value Ref Range Status   03/28/2025 9.00 3.40 - 10.80 10*3/mm3 Final     Hemoglobin   Date Value Ref Range Status   04/30/2025 10.3 (L) 13.0 - 17.7 g/dL Final     Hematocrit   Date Value Ref Range Status   04/30/2025 35.0 (L) 37.5 - 51.0 % Final     MCV   Date Value Ref Range Status   03/28/2025 77.8 (L) 79.0 - 97.0 fL Final     MCH   Date Value Ref Range Status   03/28/2025 23.8 (L) 26.6 - 33.0 pg Final     Platelets   Date Value Ref Range Status   03/28/2025 348 140 - 450 10*3/mm3 Final     Glucose   Date Value Ref Range Status   04/30/2025 103 (H) 65 - 99 mg/dL Final     BUN   Date Value Ref Range Status   04/30/2025 29 (H) 8 - 23 mg/dL Final     Creatinine   Date Value Ref Range Status   04/30/2025 1.20 0.76 - 1.27 mg/dL Final     Sodium   Date Value Ref Range Status   04/30/2025 136 136 - 145 mmol/L Final     Potassium   Date Value Ref Range Status   04/30/2025 3.8 3.5 - 5.2 mmol/L Final     Chloride   Date Value Ref Range Status   04/30/2025 98 98 - 107 mmol/L Final     CO2   Date Value Ref Range Status   04/30/2025 27.0 22.0 - 29.0 mmol/L Final     Calcium   Date Value Ref Range Status   04/30/2025 9.0 8.6 - 10.5 mg/dL Final     Total Protein   Date Value Ref Range Status   06/30/2025 7.9 6.0 -  8.5 g/dL Final     Albumin   Date Value Ref Range Status   06/30/2025 3.9 3.5 - 5.2 g/dL Final     ALT (SGPT)   Date Value Ref Range Status   06/30/2025 8 1 - 41 U/L Final     AST (SGOT)   Date Value Ref Range Status   06/30/2025 15 1 - 40 U/L Final     Alkaline Phosphatase   Date Value Ref Range Status   06/30/2025 79 39 - 117 U/L Final     Total Bilirubin   Date Value Ref Range Status   06/30/2025 0.3 0.0 - 1.2 mg/dL Final     Anion Gap   Date Value Ref Range Status   04/30/2025 11.0 5.0 - 15.0 mmol/L Final     eGFR   Date Value Ref Range Status   04/30/2025 68.0 >60.0 mL/min/1.73 Final     Total Cholesterol   Date Value Ref Range Status   06/30/2025 95 0 - 200 mg/dL Final     HDL Cholesterol   Date Value Ref Range Status   06/30/2025 17 (L) 40 - 60 mg/dL Final     LDL Cholesterol    Date Value Ref Range Status   06/30/2025 54 0 - 100 mg/dL Final     LDL/HDL Ratio   Date Value Ref Range Status   06/30/2025 3.02  Final     Hemoglobin A1C   Date Value Ref Range Status   05/27/2025 7.2 (A) 4.5 - 5.7 % Final   03/28/2025 6.80 (H) 4.80 - 5.60 % Final   03/20/2023 7.70 (H) 4.80 - 5.60 % Final     Magnesium   Date Value Ref Range Status   05/06/2023 1.6 1.6 - 2.4 mg/dL Final     Lipids      Last Cardiac Cath  Results for orders placed during the hospital encounter of 03/16/23    Cardiac Catheterization/Vascular Study 03/16/2023  9:53 AM    Conclusion    Mid RCA lesion is 100% stenosed.    1st Mrg lesion is 80% stenosed.    Prox LAD lesion is 80% stenosed.    Mid LAD lesion is 70% stenosed.    Cardiac.  Patient with significant coronary artery disease involving multiple vessels.  Patient is diabetic with proximal LAD disease also.  Will refer for bypass surgery.  Discussed with Dr. Swanson at [facility].  Patient is going to follow-up on Monday in their clinic      Last Coronary CTA      Last Stress test  Results for orders placed during the hospital encounter of 02/02/23    Stress Test With Myocardial Perfusion (1  Day) 02/03/2023  1:25 PM    Interpretation Summary    Left ventricular ejection fraction is normal (Calculated EF = 57%).    Impressions are consistent with an intermediate risk study.    There is no prior study available for comparison.    Findings consistent with a normal ECG stress test.    Basal to mid inferior infarction with basal to mid gibran-infarction ischemia extending also to the inferoseptal walls with also small apical septal ischemia, TID 1.51.       Last Echo  Results for orders placed during the hospital encounter of 05/10/23    Adult Transthoracic Echo Complete w/ Color, Spectral and Contrast if necessary per protocol 05/11/2023  2:40 PM    Interpretation Summary    The quality of the study is limited with poor acoustic windows.    Left ventricular systolic function is normal. Left ventricular ejection fraction appears to be 61 - 65%.    Left ventricular diastolic function is consistent with (grade II w/high LAP) pseudonormalization.    The cardiac chamber dimensions are normal.    No significant valvular heart disease is present.    Mild pulmonary hypertension is present.    There is no evidence of pericardial effusion.             Current Outpatient Medications   Medication Sig Dispense Refill    apixaban (ELIQUIS) 5 MG tablet tablet Take 1 tablet by mouth 2 (Two) Times a Day. 56 tablet 0    aspirin 81 MG EC tablet Take 1 tablet by mouth Daily. 90 tablet 3    atorvastatin (LIPITOR) 40 MG tablet Take 1 tablet by mouth Daily. 90 tablet 12    bumetanide (BUMEX) 2 MG tablet Take 1 tablet by mouth Daily.      Continuous Glucose Sensor (Dexcom G7 Sensor) misc CHANGE every 10 DAYS as directed      fenofibrate 160 MG tablet Take 1 tablet by mouth Daily. 90 tablet 12    gabapentin (NEURONTIN) 800 MG tablet       hydroCHLOROthiazide (MICROZIDE) 12.5 MG capsule Take 1 capsule by mouth Daily.      HYDROcodone-acetaminophen (NORCO) 7.5-325 MG per tablet Take 1 tablet by mouth Every 4 (Four) Hours As Needed for  Mild Pain or Moderate Pain. 24 tablet 0    hydrOXYzine (ATARAX) 25 MG tablet Daily.      lisinopril (PRINIVIL,ZESTRIL) 40 MG tablet Take 1 tablet by mouth Daily.      metFORMIN (GLUCOPHAGE) 1000 MG tablet Take 1 tablet by mouth 2 (Two) Times a Day With Meals.      nebivolol (BYSTOLIC) 20 MG tablet Take 1 tablet by mouth Daily. 90 tablet 3    NovoLOG FlexPen 100 UNIT/ML solution pen-injector sc pen Inject 60 units 3 times a day by subcutaneous route before meals for 30 days.      pantoprazole (PROTONIX) 40 MG EC tablet Take 1 tablet by mouth Daily.      Semaglutide,0.25 or 0.5MG/DOS, (Ozempic, 0.25 or 0.5 MG/DOSE,) 2 MG/1.5ML solution pen-injector Inject 0.25 mg under the skin into the appropriate area as directed 1 (One) Time Per Week.      tamsulosin (FLOMAX) 0.4 MG capsule 24 hr capsule Take 1 capsule by mouth Daily. 30 capsule 0    Tresiba FlexTouch 200 UNIT/ML solution pen-injector pen injection Inject 80 units twice a day by subcutaneous route for 30 days.      DULoxetine (CYMBALTA) 60 MG capsule Take 1 capsule by mouth Daily. (Patient not taking: Reported on 6/30/2025)      Insulin Lispro (HUMALOG SC) Inject 60 Units under the skin into the appropriate area as directed 3 (Three) Times a Day Before Meals. (Patient not taking: Reported on 5/27/2025)      potassium chloride 10 MEQ CR tablet Daily. (Patient not taking: Reported on 6/30/2025)       No current facility-administered medications for this visit.            Assessment and Plan    Diagnoses and all orders for this visit:    1. Coronary artery disease of native artery of native heart with stable angina pectoris (Primary)  -     Lipoprotein A (LPA); Future  -     Lipid Panel; Future  -     Hepatic Function Panel  -     lisinopril (PRINIVIL,ZESTRIL) 40 MG tablet; Take 1 tablet by mouth Daily.  -     nebivolol (BYSTOLIC) 20 MG tablet; Take 1 tablet by mouth Daily.  Dispense: 90 tablet; Refill: 3    2. Mixed hyperlipidemia  -     Lipoprotein A (LPA);  Future  -     Lipid Panel; Future  -     Hepatic Function Panel    3. Primary hypertension  Assessment & Plan:  Current prescribed dose of lisinopril is not consistent with maximum recommended daily dosage.  Have down titrated lisinopril and increased Nebivolol.  Wife agrees to monitor blood pressure and let us know if it is over 130/80    Orders:  -     lisinopril (PRINIVIL,ZESTRIL) 40 MG tablet; Take 1 tablet by mouth Daily.  -     nebivolol (BYSTOLIC) 20 MG tablet; Take 1 tablet by mouth Daily.  Dispense: 90 tablet; Refill: 3    4. Excessive daytime sleepiness  -     Home Sleep Study; Future    5. Obstructive sleep apnea syndrome  -     Home Sleep Study; Future          Follow Up     Return in about 3 months (around 9/30/2025).    Patient was given instructions and counseling regarding his condition or for health maintenance advice. Please see specific information pulled into the AVS if appropriate.       Electronically signed by MALINDA Schaeffer, 07/01/25, 11:09 AM EDT.    Patient or patient representative verbalized consent for the use of Ambient Listening during the visit with  MALINDA Schaeffer for chart documentation. 7/1/2025  11:09 EDT     Dictated Utilizing Dragon Dictation: Part of this note may be an electronic transcription/translation of spoken language to printed text using the Dragon Dictation System

## 2025-07-01 ENCOUNTER — SPECIALTY PHARMACY (OUTPATIENT)
Dept: PHARMACY | Facility: HOSPITAL | Age: 65
End: 2025-07-01
Payer: MEDICARE

## 2025-07-01 LAB
ALBUMIN SERPL-MCNC: 3.9 G/DL (ref 3.5–5.2)
ALP SERPL-CCNC: 79 U/L (ref 39–117)
ALT SERPL W P-5'-P-CCNC: 8 U/L (ref 1–41)
AST SERPL-CCNC: 15 U/L (ref 1–40)
BILIRUB CONJ SERPL-MCNC: 0.2 MG/DL (ref 0–0.3)
BILIRUB INDIRECT SERPL-MCNC: 0.1 MG/DL
BILIRUB SERPL-MCNC: 0.3 MG/DL (ref 0–1.2)
CHOLEST SERPL-MCNC: 95 MG/DL (ref 0–200)
HDLC SERPL-MCNC: 17 MG/DL (ref 40–60)
LDLC SERPL CALC-MCNC: 54 MG/DL (ref 0–100)
LDLC/HDLC SERPL: 3.02 {RATIO}
PROT SERPL-MCNC: 7.9 G/DL (ref 6–8.5)
TRIGL SERPL-MCNC: 133 MG/DL (ref 0–150)
VLDLC SERPL-MCNC: 24 MG/DL (ref 5–40)

## 2025-07-01 RX ORDER — LISINOPRIL 40 MG/1
40 TABLET ORAL DAILY
Start: 2025-07-01 | End: 2025-07-03 | Stop reason: SDUPTHER

## 2025-07-01 RX ORDER — NEBIVOLOL 20 MG/1
20 TABLET ORAL DAILY
Qty: 90 TABLET | Refills: 3 | Status: SHIPPED | OUTPATIENT
Start: 2025-07-01

## 2025-07-01 NOTE — ASSESSMENT & PLAN NOTE
Current prescribed dose of lisinopril is not consistent with maximum recommended daily dosage.  Have down titrated lisinopril and increased Nebivolol.  Wife agrees to monitor blood pressure and let us know if it is over 130/80

## 2025-07-02 LAB — LPA SERPL-SCNC: 139.9 NMOL/L

## 2025-07-03 DIAGNOSIS — I25.118 CORONARY ARTERY DISEASE OF NATIVE ARTERY OF NATIVE HEART WITH STABLE ANGINA PECTORIS: Chronic | ICD-10-CM

## 2025-07-03 DIAGNOSIS — I10 PRIMARY HYPERTENSION: Chronic | ICD-10-CM

## 2025-07-03 RX ORDER — LISINOPRIL 40 MG/1
40 TABLET ORAL DAILY
Start: 2025-07-03

## 2025-07-16 ENCOUNTER — TELEPHONE (OUTPATIENT)
Dept: CARDIOLOGY | Facility: CLINIC | Age: 65
End: 2025-07-16
Payer: MEDICARE

## 2025-07-16 DIAGNOSIS — I25.118 CORONARY ARTERY DISEASE OF NATIVE ARTERY OF NATIVE HEART WITH STABLE ANGINA PECTORIS: Chronic | ICD-10-CM

## 2025-07-16 DIAGNOSIS — I10 PRIMARY HYPERTENSION: Chronic | ICD-10-CM

## 2025-07-16 RX ORDER — NEBIVOLOL 20 MG/1
40 TABLET ORAL DAILY
Qty: 180 TABLET | Refills: 3 | Status: SHIPPED | OUTPATIENT
Start: 2025-07-16

## 2025-07-16 NOTE — TELEPHONE ENCOUNTER
Patients wife called stating patients BP is still running high 168/80 this morming, last night BP was around 180/90 with red flushed face, patient took a second dose of the Nebivolol 20 mg, which did bring the BP down

## 2025-07-16 NOTE — TELEPHONE ENCOUNTER
SPOKE WITH PATIENT UNDERSTANDING OF INSTRUCTIONS AND WILL NOTIFY  CLINIC OF BP READINGS ABOVE 130/80

## 2025-07-29 ENCOUNTER — TELEPHONE (OUTPATIENT)
Dept: CARDIOLOGY | Facility: CLINIC | Age: 65
End: 2025-07-29
Payer: MEDICARE

## 2025-07-29 DIAGNOSIS — I10 PRIMARY HYPERTENSION: Primary | Chronic | ICD-10-CM

## 2025-07-29 RX ORDER — AMLODIPINE BESYLATE 10 MG/1
10 TABLET ORAL DAILY
Qty: 90 TABLET | Refills: 3 | Status: SHIPPED | OUTPATIENT
Start: 2025-07-29

## 2025-07-29 NOTE — TELEPHONE ENCOUNTER
I spoke with wife and told her that we would add amlodipine for better blood pressure control.  They will let me know if his blood pressure remains above goal.  I also informed her that taking 1 Eliquis daily would not be appropriate as it is usually out of the system within 12 hours.  I recommended that they hold Eliquis for 3 days completely and then resume taking it    Electronically signed by MALINDA Schaeffer, 07/29/25, 3:34 PM EDT.

## 2025-07-29 NOTE — TELEPHONE ENCOUNTER
WIFE CALLED STATING PATIENTS BP IS STILL RUNNING HIGH, WANTING TO KNOW IF MEDS NEED TO BE ADJUSTED.    ALWAYS STATES THAT PATIENT HAS HAD 3 HEMATOMAS SINCE PROCEDURE STATES THAT HE HAD TO STOP ELIQUIS DUE TO HEMATOMA WHICH SEEMED TO HELP, THEN AFTER STARTING THE ELIQUIS AGAIN PATIENT DEVELOPED THE 3RD HEMATOMA.     WIFE ASKED IF MAYBE PATIENT COULD ONLY TAKE 1 ELIQUIS DAILY UNTIL THE INCISION HAS COMPLETELY HEALED

## 2025-08-08 ENCOUNTER — RESULTS FOLLOW-UP (OUTPATIENT)
Dept: CARDIOLOGY | Facility: CLINIC | Age: 65
End: 2025-08-08
Payer: MEDICARE

## 2025-08-08 DIAGNOSIS — I48.0 PAROXYSMAL ATRIAL FIBRILLATION: ICD-10-CM

## 2025-08-11 DIAGNOSIS — E78.41 ELEVATED LIPOPROTEIN(A): Primary | ICD-10-CM

## 2025-08-11 DIAGNOSIS — E78.2 MIXED HYPERLIPIDEMIA: Chronic | ICD-10-CM

## 2025-08-11 DIAGNOSIS — I25.118 CORONARY ARTERY DISEASE OF NATIVE ARTERY OF NATIVE HEART WITH STABLE ANGINA PECTORIS: Chronic | ICD-10-CM

## (undated) DEVICE — GLV SURG SENSICARE PI MIC PF SZ7 LF STRL

## (undated) DEVICE — GLV SURG PREMIERPRO GAMMEX NEOPRN PF SZ8 GRN

## (undated) DEVICE — GLV SURG PREMIERPRO MIC LTX PF SZ7 BRN

## (undated) DEVICE — KT PUMP INFUBLOCK MDL 2100 PMKITSOLIS

## (undated) DEVICE — SUT PROLN 3/0 SH D/A 36IN 8522H

## (undated) DEVICE — Device

## (undated) DEVICE — CATH F5 INF JL 4 100CM: Brand: INFINITI

## (undated) DEVICE — DRAPE, RADIAL, STERILE: Brand: MEDLINE

## (undated) DEVICE — STRYKER PERFORMANCE SERIES SAGITTAL BLADE: Brand: STRYKER PERFORMANCE SERIES

## (undated) DEVICE — TRAP FLD MINIVAC MEGADYNE 100ML

## (undated) DEVICE — TUBING, SUCTION, 1/4" X 10', STRAIGHT: Brand: MEDLINE

## (undated) DEVICE — OASIS DRAIN, SINGLE, INLINE & ATS COMPATIBLE: Brand: OASIS

## (undated) DEVICE — PATIENT RETURN ELECTRODE, SINGLE-USE, CONTACT QUALITY MONITORING, ADULT, WITH 9FT CORD, FOR PATIENTS WEIGING OVER 33LBS. (15KG): Brand: MEGADYNE

## (undated) DEVICE — ELECTRD BLD EZ CLN STD 2.5IN

## (undated) DEVICE — PAD ARMBRD SURG CONVOL 7.5X20X2IN

## (undated) DEVICE — SUCTION CANISTER 2500CC: Brand: DEROYAL

## (undated) DEVICE — SUT PROLN 4/0 RB1 D/A 36IN 8557H

## (undated) DEVICE — GW J/TP MOVE/CORE 0.035 3MM/TP 145CM

## (undated) DEVICE — CANN VESL DLP 1WY BLNT/TP 3MM

## (undated) DEVICE — ANTIBACTERIAL UNDYED BRAIDED (POLYGLACTIN 910), SYNTHETIC ABSORBABLE SUTURE: Brand: COATED VICRYL

## (undated) DEVICE — LEVEL SENSORS PADS ARE USED TO ATTACH THE LEVEL SENSORS TO A HARD SHELL RESERVOIR. INCLUDES COUPLING GEL.: Brand: TERUMO® ADVANCED PERFUSION SYSTEM 1

## (undated) DEVICE — PINNACLE INTRODUCER SHEATH: Brand: PINNACLE

## (undated) DEVICE — TBG SXN RIGD MINI/SUCKER 9F 4.75IN

## (undated) DEVICE — BNDG ELAS CO-FLEX SLF ADHR 4IN5YD LF STRL

## (undated) DEVICE — SYS SKIN EXOFIN WND CLS 4X22CM

## (undated) DEVICE — PK HEART OPN 10

## (undated) DEVICE — PROXIMATE RH ROTATING HEAD SKIN STAPLERS (35 WIDE) CONTAINS 35 STAINLESS STEEL STAPLES: Brand: PROXIMATE

## (undated) DEVICE — CONNECT Y INTERSEPT W/LL 3/8 X 3/8 X 3/8IN

## (undated) DEVICE — LN FLTR ORL/NASL MICROSTREAM NONINTUB A/LNG

## (undated) DEVICE — CVR PROB ULTRASND/TRANSD W/GEL LNG 18X250CM STRL

## (undated) DEVICE — GLV SURG SENSICARE PI LF PF 7.0

## (undated) DEVICE — PK PERFUS CUST W/CARDIOPLEGIA

## (undated) DEVICE — GLV SURG SENSICARE PI LF PF 6.5

## (undated) DEVICE — CATH F5 INF 3DRC 100CM: Brand: INFINITI

## (undated) DEVICE — CANN AORT ROOT DLP VNT 14G 7F

## (undated) DEVICE — ADAPT/Y PERFUS DLP FML/LUER COLR/CODE/CLMP 8.9AND25.4CM

## (undated) DEVICE — GLV SURG BIOGEL LTX PF 7 1/2

## (undated) DEVICE — SUT PROLN 7/0 CV BV1 24IN 8304H BX/36

## (undated) DEVICE — BLANKT WARM LOWR/BDY 100X120CM

## (undated) DEVICE — PENCL SMOKE/EVAC MEGADYNE TELESCP 10FT

## (undated) DEVICE — DRSNG SURESITE WNDW 4X4.5

## (undated) DEVICE — GLV SURG BIOGEL LTX PF 8

## (undated) DEVICE — SYS VASOVIEW HEMOPRO ENDOSCOPIC HARVST VESL

## (undated) DEVICE — SUT SILK 2 SUTUPAK TIE 60IN SA8H 2STRAND

## (undated) DEVICE — SUT PDS 1 CTX 36IN VIO PDP371T

## (undated) DEVICE — FLTR RESERV PERFUS INTERSEPT 02 STRL

## (undated) DEVICE — RSP SEMICONSTRAINED HUMERAL SOCKET INSERT, 36MM, HXE-PLUS
Type: IMPLANTABLE DEVICE | Site: SHOULDER | Status: NON-FUNCTIONAL
Brand: DJO SURGICAL
Removed: 2025-04-29

## (undated) DEVICE — GLV SURG PREMIERPRO MIC LTX PF SZ7.5 BRN

## (undated) DEVICE — HANDPIECE SET WITH HIGH FLOW TIP AND SUCTION TUBE: Brand: INTERPULSE

## (undated) DEVICE — PK MAJ SHLDR SPLT 10

## (undated) DEVICE — MANIFLD NAMIC PRECEPTOR INTEGR/TRANSD RT/HND 1/PRT 500PSI

## (undated) DEVICE — NDL PERC 1PRT THNWALL W/BASEPLT 18G 7CM

## (undated) DEVICE — PULLOVER TOGA, 2X LARGE: Brand: FLYTE, SURGICOOL

## (undated) DEVICE — SUT PROLN 4/0 SH D/A 36IN 8521H

## (undated) DEVICE — STERILE PVP: Brand: MEDLINE INDUSTRIES, INC.

## (undated) DEVICE — GLV SURG PREMIERPRO MIC LTX PF SZ6.5 BRN

## (undated) DEVICE — AVID DUAL STAGE VENOUS DRAINAGE CANNULA: Brand: AVID DUAL STAGE VENOUS DRAINAGE CANNULA

## (undated) DEVICE — AVANTI + 4F STD W/GW: Brand: AVANTI

## (undated) DEVICE — 3M™ MEDIPORE™ H SOFT CLOTH SURGICAL TAPE, 2863, 3 IN X 10 YD, 12/CASE: Brand: 3M™ MEDIPORE™

## (undated) DEVICE — INTENDED TO SUPPORT AND MAINTAIN THE POSITION OF AN ANESTHETIZED PATIENT DURING SURGERY: Brand: ERIN BEACH CHAIR FACE MASK

## (undated) DEVICE — PAD, DEFIB, ADULT, RADIOTRANS, ZOLL: Brand: MEDLINE

## (undated) DEVICE — ST EXT IV SMRTSTE 2VLV FIX M LL 6ML 41

## (undated) DEVICE — CATHETER,URETHRAL,REDRUBBER,STERILE,22FR: Brand: MEDLINE

## (undated) DEVICE — TBG SXN INTRACARD RIDGID FLUT 24F .25X13IN A/

## (undated) DEVICE — CLTH CLENS READYCLEANSE PERI CARE PK/5

## (undated) DEVICE — SUT PROLN 6/0 C1 D/A 30IN 8706H

## (undated) DEVICE — COATED BRAIDED POLYESTER: Brand: TI-CRON

## (undated) DEVICE — DRP SURG U/DRP INVISISHIELD PA 48X52IN

## (undated) DEVICE — Device: Brand: PERFECTCUT ROTATING AORTIC PUNCH

## (undated) DEVICE — DRSNG SURESITE123 6X8IN

## (undated) DEVICE — PK ATS CUST W CARDIOTOMY RESEVOIR

## (undated) DEVICE — SUT SILK 0/0 CT2 18IN C027D

## (undated) DEVICE — 12 FOOT DISPOSABLE EXTENSION CABLE WITH SAFE CONNECT / SCREW-DOWN

## (undated) DEVICE — RUNWAY RADL W/TOP PAD

## (undated) DEVICE — GLV SURG PREMIERPRO MIC LTX PF SZ8 BRN

## (undated) DEVICE — SENSR CERBRL O2 PK/2

## (undated) DEVICE — PENCL ROCKRSWCH MEGADYNE W/HOLSTR 10FT SS

## (undated) DEVICE — EZ GLIDE AORTIC CANNULA: Brand: EDWARDS LIFESCIENCES EZ GLIDE AORTIC CANNULA

## (undated) DEVICE — GLIDESHEATH SLENDER STAINLESS STEEL KIT: Brand: GLIDESHEATH SLENDER

## (undated) DEVICE — ST INF PRI SMRTSTE 20DRP 2VLV 24ML 117

## (undated) DEVICE — SUT SILK 4/0 TIES 18IN A183H

## (undated) DEVICE — MTS LHK BAPTIST CORBIN: Brand: NAMIC

## (undated) DEVICE — TOWEL,OR,DSP,ST,BLUE,STD,4/PK,20PK/CS: Brand: MEDLINE

## (undated) DEVICE — TEMP PACING WIRE: Brand: MYO/WIRE

## (undated) DEVICE — PK CATH CARD 70

## (undated) DEVICE — RADIFOCUS OPTITORQUE ANGIOGRAPHIC CATHETER: Brand: OPTITORQUE

## (undated) DEVICE — ARM SLING: Brand: DEROYAL

## (undated) DEVICE — GW INQW FIX/CORE PTFE J/3MM .035 260CM

## (undated) DEVICE — BLD SCLPL BEAVR MINI STR 2BVL 180D LF

## (undated) DEVICE — TBG PENCL TELESCP MEGADYNE SMOKE EVAC 10FT